# Patient Record
Sex: FEMALE | Race: WHITE | NOT HISPANIC OR LATINO | Employment: OTHER | URBAN - NONMETROPOLITAN AREA
[De-identification: names, ages, dates, MRNs, and addresses within clinical notes are randomized per-mention and may not be internally consistent; named-entity substitution may affect disease eponyms.]

---

## 2018-11-20 ENCOUNTER — DOCTOR'S OFFICE (OUTPATIENT)
Dept: URBAN - NONMETROPOLITAN AREA CLINIC 1 | Facility: CLINIC | Age: 78
Setting detail: OPHTHALMOLOGY
End: 2018-11-20
Payer: COMMERCIAL

## 2018-11-20 DIAGNOSIS — H40.023: ICD-10-CM

## 2018-11-20 DIAGNOSIS — H26.40: ICD-10-CM

## 2018-11-20 DIAGNOSIS — E11.9: ICD-10-CM

## 2018-11-20 DIAGNOSIS — D31.32: ICD-10-CM

## 2018-11-20 DIAGNOSIS — H43.813: ICD-10-CM

## 2018-11-20 DIAGNOSIS — L21.8: ICD-10-CM

## 2018-11-20 DIAGNOSIS — H33.301: ICD-10-CM

## 2018-11-20 DIAGNOSIS — Z96.1: ICD-10-CM

## 2018-11-20 PROCEDURE — 92014 COMPRE OPH EXAM EST PT 1/>: CPT | Performed by: OPHTHALMOLOGY

## 2018-11-20 PROCEDURE — 76514 ECHO EXAM OF EYE THICKNESS: CPT | Performed by: OPHTHALMOLOGY

## 2018-11-20 PROCEDURE — 92134 CPTRZ OPH DX IMG PST SGM RTA: CPT | Performed by: OPHTHALMOLOGY

## 2018-11-20 ASSESSMENT — REFRACTION_CURRENTRX
OD_OVR_VA: 20/
OD_OVR_VA: 20/
OS_OVR_VA: 20/
OD_OVR_VA: 20/

## 2018-11-20 ASSESSMENT — CONFRONTATIONAL VISUAL FIELD TEST (CVF)
OD_FINDINGS: FULL
OS_FINDINGS: FULL

## 2018-11-20 ASSESSMENT — REFRACTION_MANIFEST
OS_SPHERE: PLANO
OD_ADD: +2.50
OS_VA2: 20/
OS_VA1: 20/
OD_VA1: 20/
OS_VA2: 20/30
OS_VA3: 20/
OD_VA3: 20/
OS_VA1: 20/30
OD_VA2: 20/
OS_CYLINDER: -0.75
OD_AXIS: 120
OD_SPHERE: +0.75
OU_VA: 20/
OS_VA3: 20/
OD_VA1: 20/40+2
OD_VA3: 20/
OU_VA: 20/
OD_VA2: 20/40
OS_AXIS: 072
OS_ADD: +2.50
OD_CYLINDER: -0.75

## 2018-11-20 ASSESSMENT — REFRACTION_AUTOREFRACTION
OD_SPHERE: +1.50
OS_AXIS: 083
OD_CYLINDER: -0.75
OS_SPHERE: +0.50
OD_AXIS: 132
OS_CYLINDER: -1.00

## 2018-11-20 ASSESSMENT — VISUAL ACUITY
OD_BCVA: 20/40
OS_BCVA: 20/50+2

## 2018-11-20 ASSESSMENT — SPHEQUIV_DERIVED
OD_SPHEQUIV: 0.375
OD_SPHEQUIV: 1.125
OS_SPHEQUIV: 0

## 2018-11-20 ASSESSMENT — SUPERFICIAL PUNCTATE KERATITIS (SPK)
OD_SPK: T
OS_SPK: T

## 2018-11-27 ENCOUNTER — DOCTOR'S OFFICE (OUTPATIENT)
Dept: URBAN - NONMETROPOLITAN AREA CLINIC 1 | Facility: CLINIC | Age: 78
Setting detail: OPHTHALMOLOGY
End: 2018-11-27
Payer: COMMERCIAL

## 2018-11-27 DIAGNOSIS — H26.491: ICD-10-CM

## 2018-11-27 PROCEDURE — 66821 AFTER CATARACT LASER SURGERY: CPT | Performed by: OPHTHALMOLOGY

## 2018-11-27 ASSESSMENT — SPHEQUIV_DERIVED
OD_SPHEQUIV: 0.375
OS_SPHEQUIV: 0
OD_SPHEQUIV: 1.125

## 2018-11-27 ASSESSMENT — REFRACTION_MANIFEST
OD_VA2: 20/40
OD_VA3: 20/
OU_VA: 20/
OD_VA1: 20/40+2
OD_VA2: 20/
OD_VA3: 20/
OS_VA3: 20/
OD_AXIS: 120
OD_ADD: +2.50
OS_VA1: 20/30
OS_CYLINDER: -0.75
OU_VA: 20/
OD_VA1: 20/
OD_SPHERE: +0.75
OS_VA2: 20/
OS_VA2: 20/30
OS_AXIS: 072
OS_ADD: +2.50
OS_VA3: 20/
OD_CYLINDER: -0.75
OS_VA1: 20/
OS_SPHERE: PLANO

## 2018-11-27 ASSESSMENT — REFRACTION_AUTOREFRACTION
OS_SPHERE: +0.50
OD_CYLINDER: -0.75
OS_CYLINDER: -1.00
OD_AXIS: 132
OS_AXIS: 083
OD_SPHERE: +1.50

## 2018-11-27 ASSESSMENT — REFRACTION_CURRENTRX
OS_OVR_VA: 20/
OD_OVR_VA: 20/
OD_OVR_VA: 20/
OS_OVR_VA: 20/
OD_OVR_VA: 20/
OS_OVR_VA: 20/

## 2018-11-27 ASSESSMENT — VISUAL ACUITY
OS_BCVA: 20/50+2
OD_BCVA: 20/40

## 2018-12-18 ENCOUNTER — DOCTOR'S OFFICE (OUTPATIENT)
Dept: URBAN - NONMETROPOLITAN AREA CLINIC 1 | Facility: CLINIC | Age: 78
Setting detail: OPHTHALMOLOGY
End: 2018-12-18
Payer: COMMERCIAL

## 2018-12-18 DIAGNOSIS — H26.492: ICD-10-CM

## 2018-12-18 PROCEDURE — 66821 AFTER CATARACT LASER SURGERY: CPT | Performed by: OPHTHALMOLOGY

## 2018-12-18 ASSESSMENT — REFRACTION_CURRENTRX
OD_OVR_VA: 20/
OS_OVR_VA: 20/
OD_OVR_VA: 20/
OD_OVR_VA: 20/
OS_OVR_VA: 20/
OS_OVR_VA: 20/

## 2018-12-18 ASSESSMENT — REFRACTION_MANIFEST
OS_VA2: 20/30
OS_VA1: 20/30
OD_VA1: 20/40+2
OS_VA1: 20/
OD_ADD: +2.50
OD_VA3: 20/
OS_VA3: 20/
OD_CYLINDER: -0.75
OU_VA: 20/
OS_AXIS: 072
OD_VA1: 20/
OS_ADD: +2.50
OS_VA2: 20/
OU_VA: 20/
OD_VA2: 20/
OD_SPHERE: +0.75
OD_VA2: 20/40
OS_SPHERE: PLANO
OS_VA3: 20/
OD_AXIS: 120
OS_CYLINDER: -0.75
OD_VA3: 20/

## 2018-12-18 ASSESSMENT — SPHEQUIV_DERIVED
OD_SPHEQUIV: 1.125
OS_SPHEQUIV: 0
OD_SPHEQUIV: 0.375

## 2018-12-18 ASSESSMENT — REFRACTION_AUTOREFRACTION
OD_CYLINDER: -0.75
OS_CYLINDER: -1.00
OD_SPHERE: +1.50
OS_SPHERE: +0.50
OS_AXIS: 083
OD_AXIS: 132

## 2018-12-18 ASSESSMENT — VISUAL ACUITY
OS_BCVA: 20/50+2
OD_BCVA: 20/40

## 2019-05-21 ENCOUNTER — DOCTOR'S OFFICE (OUTPATIENT)
Dept: URBAN - NONMETROPOLITAN AREA CLINIC 1 | Facility: CLINIC | Age: 79
Setting detail: OPHTHALMOLOGY
End: 2019-05-21
Payer: COMMERCIAL

## 2019-05-21 ENCOUNTER — RX ONLY (RX ONLY)
Age: 79
End: 2019-05-21

## 2019-05-21 DIAGNOSIS — H40.023: ICD-10-CM

## 2019-05-21 DIAGNOSIS — H33.301: ICD-10-CM

## 2019-05-21 DIAGNOSIS — H26.40: ICD-10-CM

## 2019-05-21 DIAGNOSIS — H43.813: ICD-10-CM

## 2019-05-21 DIAGNOSIS — E11.9: ICD-10-CM

## 2019-05-21 DIAGNOSIS — Z96.1: ICD-10-CM

## 2019-05-21 DIAGNOSIS — D31.32: ICD-10-CM

## 2019-05-21 PROBLEM — L21.8 SEBORRHEIC DERMATITIS, OTHER: Status: RESOLVED | Noted: 2018-11-20 | Resolved: 2019-05-21

## 2019-05-21 PROCEDURE — 92083 EXTENDED VISUAL FIELD XM: CPT | Performed by: OPHTHALMOLOGY

## 2019-05-21 PROCEDURE — 92134 CPTRZ OPH DX IMG PST SGM RTA: CPT | Performed by: OPHTHALMOLOGY

## 2019-05-21 PROCEDURE — 92014 COMPRE OPH EXAM EST PT 1/>: CPT | Performed by: OPHTHALMOLOGY

## 2019-05-21 ASSESSMENT — REFRACTION_CURRENTRX
OD_OVR_VA: 20/
OS_OVR_VA: 20/
OS_OVR_VA: 20/
OD_OVR_VA: 20/
OD_OVR_VA: 20/
OS_OVR_VA: 20/

## 2019-05-21 ASSESSMENT — REFRACTION_MANIFEST
OU_VA: 20/
OS_SPHERE: PLANO
OS_VA3: 20/
OD_VA2: 20/
OD_VA3: 20/
OS_VA1: 20/30
OD_AXIS: 120
OS_VA2: 20/30
OS_VA3: 20/
OD_VA2: 20/40
OU_VA: 20/
OD_VA1: 20/40+2
OS_VA2: 20/
OS_CYLINDER: -0.75
OD_CYLINDER: -0.75
OS_VA1: 20/
OD_ADD: +2.50
OS_ADD: +2.50
OD_VA3: 20/
OD_VA1: 20/
OS_AXIS: 072
OD_SPHERE: +0.75

## 2019-05-21 ASSESSMENT — CONFRONTATIONAL VISUAL FIELD TEST (CVF)
OD_FINDINGS: FULL
OS_FINDINGS: FULL

## 2019-05-21 ASSESSMENT — SPHEQUIV_DERIVED
OS_SPHEQUIV: -0.25
OD_SPHEQUIV: 0.875
OD_SPHEQUIV: 0.375

## 2019-05-21 ASSESSMENT — REFRACTION_AUTOREFRACTION
OS_SPHERE: +0.75
OD_SPHERE: +1.50
OD_AXIS: 125
OS_AXIS: 070
OD_CYLINDER: -1.25
OS_CYLINDER: -2.00

## 2019-05-21 ASSESSMENT — SUPERFICIAL PUNCTATE KERATITIS (SPK)
OD_SPK: T
OS_SPK: T

## 2019-05-21 ASSESSMENT — VISUAL ACUITY
OD_BCVA: 20/30-1
OS_BCVA: 20/30-1

## 2019-11-22 ENCOUNTER — DOCTOR'S OFFICE (OUTPATIENT)
Dept: URBAN - NONMETROPOLITAN AREA CLINIC 1 | Facility: CLINIC | Age: 79
Setting detail: OPHTHALMOLOGY
End: 2019-11-22
Payer: COMMERCIAL

## 2019-11-22 DIAGNOSIS — D31.32: ICD-10-CM

## 2019-11-22 DIAGNOSIS — Z96.1: ICD-10-CM

## 2019-11-22 DIAGNOSIS — H33.301: ICD-10-CM

## 2019-11-22 DIAGNOSIS — H40.023: ICD-10-CM

## 2019-11-22 DIAGNOSIS — E11.9: ICD-10-CM

## 2019-11-22 DIAGNOSIS — H26.40: ICD-10-CM

## 2019-11-22 DIAGNOSIS — H43.813: ICD-10-CM

## 2019-11-22 PROCEDURE — 92133 CPTRZD OPH DX IMG PST SGM ON: CPT | Performed by: OPHTHALMOLOGY

## 2019-11-22 PROCEDURE — 92014 COMPRE OPH EXAM EST PT 1/>: CPT | Performed by: OPHTHALMOLOGY

## 2019-11-22 ASSESSMENT — REFRACTION_CURRENTRX
OD_OVR_VA: 20/
OS_OVR_VA: 20/
OS_OVR_VA: 20/
OD_OVR_VA: 20/
OD_OVR_VA: 20/
OS_OVR_VA: 20/

## 2019-11-22 ASSESSMENT — REFRACTION_MANIFEST
OD_VA3: 20/
OD_VA1: 20/40+2
OD_VA2: 20/40
OS_VA1: 20/30
OS_VA3: 20/
OD_ADD: +2.50
OS_VA2: 20/
OS_SPHERE: PLANO
OS_AXIS: 072
OD_VA3: 20/
OD_AXIS: 120
OS_ADD: +2.50
OU_VA: 20/
OS_VA3: 20/
OS_VA2: 20/30
OD_CYLINDER: -0.75
OD_VA2: 20/
OS_VA1: 20/
OU_VA: 20/
OD_VA1: 20/
OS_CYLINDER: -0.75
OD_SPHERE: +0.75

## 2019-11-22 ASSESSMENT — CONFRONTATIONAL VISUAL FIELD TEST (CVF)
OS_FINDINGS: FULL
OD_FINDINGS: FULL

## 2019-11-22 ASSESSMENT — SUPERFICIAL PUNCTATE KERATITIS (SPK)
OD_SPK: 2+
OS_SPK: 2+

## 2019-11-22 ASSESSMENT — REFRACTION_AUTOREFRACTION
OS_SPHERE: +0.75
OD_CYLINDER: -1.25
OS_AXIS: 071
OD_SPHERE: +1.50
OD_AXIS: 126
OS_CYLINDER: -1.00

## 2019-11-22 ASSESSMENT — VISUAL ACUITY
OS_BCVA: 20/30+1
OD_BCVA: 20/30-1

## 2019-11-22 ASSESSMENT — SPHEQUIV_DERIVED
OS_SPHEQUIV: 0.25
OD_SPHEQUIV: 0.875
OD_SPHEQUIV: 0.375

## 2020-07-20 ENCOUNTER — HOSPITAL ENCOUNTER (EMERGENCY)
Facility: HOSPITAL | Age: 80
Discharge: HOME | End: 2020-07-20
Attending: EMERGENCY MEDICINE | Admitting: EMERGENCY MEDICINE
Payer: COMMERCIAL

## 2020-07-20 ENCOUNTER — APPOINTMENT (EMERGENCY)
Dept: RADIOLOGY | Facility: HOSPITAL | Age: 80
End: 2020-07-20
Payer: COMMERCIAL

## 2020-07-20 VITALS
WEIGHT: 220.46 LBS | TEMPERATURE: 98.2 F | OXYGEN SATURATION: 96 % | DIASTOLIC BLOOD PRESSURE: 65 MMHG | RESPIRATION RATE: 18 BRPM | HEART RATE: 81 BPM | BODY MASS INDEX: 46.28 KG/M2 | SYSTOLIC BLOOD PRESSURE: 142 MMHG | HEIGHT: 58 IN

## 2020-07-20 DIAGNOSIS — M25.562 LEFT KNEE PAIN: Primary | ICD-10-CM

## 2020-07-20 LAB
ANION GAP SERPL CALCULATED.3IONS-SCNC: 9 MMOL/L (ref 4–13)
APPEARANCE FLD: ABNORMAL
BASOPHILS # BLD AUTO: 0.02 THOUSANDS/ΜL (ref 0–0.1)
BASOPHILS NFR BLD AUTO: 0 % (ref 0–1)
BUN SERPL-MCNC: 30 MG/DL (ref 5–25)
CALCIUM SERPL-MCNC: 8.8 MG/DL (ref 8.3–10.1)
CHLORIDE SERPL-SCNC: 105 MMOL/L (ref 100–108)
CO2 SERPL-SCNC: 29 MMOL/L (ref 21–32)
COLOR FLD: YELLOW
CREAT SERPL-MCNC: 1.07 MG/DL (ref 0.6–1.3)
EOSINOPHIL # BLD AUTO: 0.08 THOUSAND/ΜL (ref 0–0.61)
EOSINOPHIL NFR BLD AUTO: 1 % (ref 0–6)
ERYTHROCYTE [DISTWIDTH] IN BLOOD BY AUTOMATED COUNT: 15.3 % (ref 11.6–15.1)
GFR SERPL CREATININE-BSD FRML MDRD: 49 ML/MIN/1.73SQ M
GLUCOSE SERPL-MCNC: 154 MG/DL (ref 65–140)
HCT VFR BLD AUTO: 35.2 % (ref 34.8–46.1)
HGB BLD-MCNC: 11.1 G/DL (ref 11.5–15.4)
IMM GRANULOCYTES # BLD AUTO: 0.04 THOUSAND/UL (ref 0–0.2)
IMM GRANULOCYTES NFR BLD AUTO: 1 % (ref 0–2)
LYMPHOCYTES # BLD AUTO: 0.65 THOUSANDS/ΜL (ref 0.6–4.47)
LYMPHOCYTES NFR BLD AUTO: 8 % (ref 14–44)
MCH RBC QN AUTO: 30.3 PG (ref 26.8–34.3)
MCHC RBC AUTO-ENTMCNC: 31.5 G/DL (ref 31.4–37.4)
MCV RBC AUTO: 96 FL (ref 82–98)
MONOCYTES # BLD AUTO: 0.71 THOUSAND/ΜL (ref 0.17–1.22)
MONOCYTES NFR BLD AUTO: 9 % (ref 4–12)
MONONUC CELLS NFR FLD MANUAL: 10 %
NEUTROPHILS # BLD AUTO: 6.73 THOUSANDS/ΜL (ref 1.85–7.62)
NEUTS SEG NFR BLD AUTO: 81 % (ref 43–75)
NEUTS SEG NFR BLD AUTO: 90 %
NRBC BLD AUTO-RTO: 0 /100 WBCS
PLATELET # BLD AUTO: 131 THOUSANDS/UL (ref 149–390)
PMV BLD AUTO: 11.6 FL (ref 8.9–12.7)
POTASSIUM SERPL-SCNC: 4 MMOL/L (ref 3.5–5.3)
RBC # BLD AUTO: 3.66 MILLION/UL (ref 3.81–5.12)
SITE: ABNORMAL
SODIUM SERPL-SCNC: 143 MMOL/L (ref 136–145)
TOTAL CELLS COUNTED SPEC: 100
WBC # BLD AUTO: 8.23 THOUSAND/UL (ref 4.31–10.16)
WBC # FLD MANUAL: ABNORMAL /UL (ref 0–200)

## 2020-07-20 PROCEDURE — 86618 LYME DISEASE ANTIBODY: CPT | Performed by: EMERGENCY MEDICINE

## 2020-07-20 PROCEDURE — 87070 CULTURE OTHR SPECIMN AEROBIC: CPT | Performed by: EMERGENCY MEDICINE

## 2020-07-20 PROCEDURE — 80048 BASIC METABOLIC PNL TOTAL CA: CPT | Performed by: EMERGENCY MEDICINE

## 2020-07-20 PROCEDURE — 89051 BODY FLUID CELL COUNT: CPT | Performed by: EMERGENCY MEDICINE

## 2020-07-20 PROCEDURE — 73562 X-RAY EXAM OF KNEE 3: CPT

## 2020-07-20 PROCEDURE — 87205 SMEAR GRAM STAIN: CPT | Performed by: EMERGENCY MEDICINE

## 2020-07-20 PROCEDURE — 99284 EMERGENCY DEPT VISIT MOD MDM: CPT

## 2020-07-20 PROCEDURE — 36415 COLL VENOUS BLD VENIPUNCTURE: CPT | Performed by: EMERGENCY MEDICINE

## 2020-07-20 PROCEDURE — 20610 DRAIN/INJ JOINT/BURSA W/O US: CPT | Performed by: EMERGENCY MEDICINE

## 2020-07-20 PROCEDURE — 99282 EMERGENCY DEPT VISIT SF MDM: CPT | Performed by: EMERGENCY MEDICINE

## 2020-07-20 PROCEDURE — 85025 COMPLETE CBC W/AUTO DIFF WBC: CPT | Performed by: EMERGENCY MEDICINE

## 2020-07-20 RX ORDER — MECLIZINE HYDROCHLORIDE 25 MG/1
25 TABLET ORAL AS NEEDED
COMMUNITY
Start: 2015-05-08

## 2020-07-20 RX ORDER — CLONIDINE HYDROCHLORIDE 0.1 MG/1
1 TABLET ORAL 2 TIMES DAILY
COMMUNITY
Start: 2019-02-25

## 2020-07-20 RX ORDER — COLCHICINE 0.6 MG/1
0.6 TABLET ORAL DAILY
COMMUNITY
Start: 2020-02-04

## 2020-07-20 RX ORDER — ALBUTEROL SULFATE 90 UG/1
2 AEROSOL, METERED RESPIRATORY (INHALATION)
COMMUNITY
Start: 2018-05-07

## 2020-07-20 RX ORDER — CARVEDILOL 25 MG/1
25 TABLET ORAL 2 TIMES DAILY
COMMUNITY

## 2020-07-20 RX ORDER — DIGOXIN 250 MCG
0.5 TABLET ORAL DAILY
COMMUNITY
Start: 2019-02-19

## 2020-07-20 RX ORDER — SPIRONOLACTONE 25 MG/1
25 TABLET ORAL DAILY
COMMUNITY

## 2020-07-20 RX ORDER — WARFARIN SODIUM 7.5 MG/1
2 TABLET ORAL DAILY
COMMUNITY

## 2020-07-20 RX ORDER — FUROSEMIDE 40 MG/1
40 TABLET ORAL 2 TIMES DAILY
COMMUNITY
Start: 2019-03-11

## 2020-07-20 RX ORDER — MULTIVITAMIN
CAPSULE ORAL
COMMUNITY

## 2020-07-20 RX ORDER — ALBUTEROL SULFATE 2.5 MG/3ML
2.5 SOLUTION RESPIRATORY (INHALATION)
COMMUNITY
Start: 2019-08-14

## 2020-07-20 RX ORDER — CALCIUM CARBONATE 300MG(750)
400 TABLET,CHEWABLE ORAL DAILY
COMMUNITY

## 2020-07-20 RX ORDER — PANTOPRAZOLE SODIUM 40 MG/1
40 TABLET, DELAYED RELEASE ORAL 2 TIMES DAILY
COMMUNITY
Start: 2012-08-31

## 2020-07-20 RX ORDER — ACETAMINOPHEN 325 MG/1
975 TABLET ORAL ONCE
Status: COMPLETED | OUTPATIENT
Start: 2020-07-20 | End: 2020-07-20

## 2020-07-20 RX ORDER — ATORVASTATIN CALCIUM 10 MG/1
10 TABLET, FILM COATED ORAL DAILY
COMMUNITY
Start: 2018-12-19

## 2020-07-20 RX ADMIN — ACETAMINOPHEN 975 MG: 325 TABLET ORAL at 18:53

## 2020-07-20 NOTE — DISCHARGE INSTRUCTIONS
Return immediately if worse or any new symptoms  Use walker, avoid weight-bearing on left knee  Tylenol 1000 mg every 6 hours as needed for pain
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
## 2020-07-22 LAB — B BURGDOR IGG+IGM SER-ACNC: <0.91 ISR (ref 0–0.9)

## 2020-07-23 LAB
BACTERIA SPEC BFLD CULT: NO GROWTH
GRAM STN SPEC: NORMAL
GRAM STN SPEC: NORMAL

## 2020-09-30 ENCOUNTER — HOSPITAL ENCOUNTER (EMERGENCY)
Facility: HOSPITAL | Age: 80
Discharge: HOME | End: 2020-09-30
Attending: STUDENT IN AN ORGANIZED HEALTH CARE EDUCATION/TRAINING PROGRAM | Admitting: STUDENT IN AN ORGANIZED HEALTH CARE EDUCATION/TRAINING PROGRAM
Payer: COMMERCIAL

## 2020-09-30 VITALS
HEART RATE: 87 BPM | OXYGEN SATURATION: 97 % | DIASTOLIC BLOOD PRESSURE: 68 MMHG | SYSTOLIC BLOOD PRESSURE: 148 MMHG | WEIGHT: 225.86 LBS | RESPIRATION RATE: 18 BRPM | HEIGHT: 58 IN | BODY MASS INDEX: 47.41 KG/M2 | TEMPERATURE: 96.7 F

## 2020-09-30 DIAGNOSIS — I10 HYPERTENSION, UNSPECIFIED TYPE: Primary | ICD-10-CM

## 2020-09-30 LAB
ANION GAP SERPL CALCULATED.3IONS-SCNC: 6 MMOL/L (ref 4–13)
BASOPHILS # BLD AUTO: 0.03 THOUSANDS/ΜL (ref 0–0.1)
BASOPHILS NFR BLD AUTO: 0 % (ref 0–1)
BUN SERPL-MCNC: 24 MG/DL (ref 5–25)
CALCIUM SERPL-MCNC: 8.6 MG/DL (ref 8.3–10.1)
CHLORIDE SERPL-SCNC: 104 MMOL/L (ref 100–108)
CO2 SERPL-SCNC: 30 MMOL/L (ref 21–32)
CREAT SERPL-MCNC: 0.91 MG/DL (ref 0.6–1.3)
DIGOXIN SERPL-MCNC: 0.4 NG/ML (ref 0.8–2)
EOSINOPHIL # BLD AUTO: 0.18 THOUSAND/ΜL (ref 0–0.61)
EOSINOPHIL NFR BLD AUTO: 2 % (ref 0–6)
ERYTHROCYTE [DISTWIDTH] IN BLOOD BY AUTOMATED COUNT: 15.4 % (ref 11.6–15.1)
GFR SERPL CREATININE-BSD FRML MDRD: 60 ML/MIN/1.73SQ M
GLUCOSE SERPL-MCNC: 125 MG/DL (ref 65–140)
HCT VFR BLD AUTO: 41.1 % (ref 34.8–46.1)
HGB BLD-MCNC: 12.7 G/DL (ref 11.5–15.4)
IMM GRANULOCYTES # BLD AUTO: 0.03 THOUSAND/UL (ref 0–0.2)
IMM GRANULOCYTES NFR BLD AUTO: 0 % (ref 0–2)
INR PPP: 1.73 (ref 0.84–1.19)
LYMPHOCYTES # BLD AUTO: 0.88 THOUSANDS/ΜL (ref 0.6–4.47)
LYMPHOCYTES NFR BLD AUTO: 11 % (ref 14–44)
MCH RBC QN AUTO: 29.6 PG (ref 26.8–34.3)
MCHC RBC AUTO-ENTMCNC: 30.9 G/DL (ref 31.4–37.4)
MCV RBC AUTO: 96 FL (ref 82–98)
MONOCYTES # BLD AUTO: 0.5 THOUSAND/ΜL (ref 0.17–1.22)
MONOCYTES NFR BLD AUTO: 6 % (ref 4–12)
NEUTROPHILS # BLD AUTO: 6.25 THOUSANDS/ΜL (ref 1.85–7.62)
NEUTS SEG NFR BLD AUTO: 81 % (ref 43–75)
NRBC BLD AUTO-RTO: 0 /100 WBCS
PLATELET # BLD AUTO: 150 THOUSANDS/UL (ref 149–390)
PMV BLD AUTO: 11.2 FL (ref 8.9–12.7)
POTASSIUM SERPL-SCNC: 3.9 MMOL/L (ref 3.5–5.3)
PROTHROMBIN TIME: 19.9 SECONDS (ref 11.6–14.5)
RBC # BLD AUTO: 4.29 MILLION/UL (ref 3.81–5.12)
SODIUM SERPL-SCNC: 140 MMOL/L (ref 136–145)
WBC # BLD AUTO: 7.87 THOUSAND/UL (ref 4.31–10.16)

## 2020-09-30 PROCEDURE — 85610 PROTHROMBIN TIME: CPT | Performed by: STUDENT IN AN ORGANIZED HEALTH CARE EDUCATION/TRAINING PROGRAM

## 2020-09-30 PROCEDURE — 80048 BASIC METABOLIC PNL TOTAL CA: CPT | Performed by: STUDENT IN AN ORGANIZED HEALTH CARE EDUCATION/TRAINING PROGRAM

## 2020-09-30 PROCEDURE — 99284 EMERGENCY DEPT VISIT MOD MDM: CPT

## 2020-09-30 PROCEDURE — 80162 ASSAY OF DIGOXIN TOTAL: CPT | Performed by: STUDENT IN AN ORGANIZED HEALTH CARE EDUCATION/TRAINING PROGRAM

## 2020-09-30 PROCEDURE — 99285 EMERGENCY DEPT VISIT HI MDM: CPT | Performed by: STUDENT IN AN ORGANIZED HEALTH CARE EDUCATION/TRAINING PROGRAM

## 2020-09-30 PROCEDURE — 85025 COMPLETE CBC W/AUTO DIFF WBC: CPT | Performed by: STUDENT IN AN ORGANIZED HEALTH CARE EDUCATION/TRAINING PROGRAM

## 2020-09-30 PROCEDURE — 93005 ELECTROCARDIOGRAM TRACING: CPT

## 2020-09-30 PROCEDURE — 36415 COLL VENOUS BLD VENIPUNCTURE: CPT | Performed by: STUDENT IN AN ORGANIZED HEALTH CARE EDUCATION/TRAINING PROGRAM

## 2020-09-30 RX ORDER — GUAIFENESIN 400 MG/1
400 TABLET ORAL
COMMUNITY

## 2020-09-30 RX ORDER — WARFARIN SODIUM 2 MG/1
5 TABLET ORAL
COMMUNITY

## 2020-09-30 RX ORDER — ALLOPURINOL 300 MG/1
300 TABLET ORAL DAILY
COMMUNITY

## 2020-09-30 RX ORDER — ASPIRIN 81 MG/1
81 TABLET, CHEWABLE ORAL DAILY
COMMUNITY

## 2020-10-03 LAB
ATRIAL RATE: 93 BPM
QRS AXIS: 180 DEGREES
QRSD INTERVAL: 134 MS
QT INTERVAL: 424 MS
QTC INTERVAL: 501 MS
T WAVE AXIS: 40 DEGREES
VENTRICULAR RATE: 84 BPM

## 2020-10-03 PROCEDURE — 93010 ELECTROCARDIOGRAM REPORT: CPT | Performed by: INTERNAL MEDICINE

## 2021-05-06 DIAGNOSIS — I50.22 CHRONIC SYSTOLIC (CONGESTIVE) HEART FAILURE (HCC): ICD-10-CM

## 2021-08-23 ENCOUNTER — HOSPITAL ENCOUNTER (EMERGENCY)
Facility: HOSPITAL | Age: 81
Discharge: HOME | End: 2021-08-23
Attending: EMERGENCY MEDICINE | Admitting: EMERGENCY MEDICINE
Payer: COMMERCIAL

## 2021-08-23 ENCOUNTER — APPOINTMENT (EMERGENCY)
Dept: CT IMAGING | Facility: HOSPITAL | Age: 81
End: 2021-08-23
Payer: COMMERCIAL

## 2021-08-23 VITALS
WEIGHT: 234.57 LBS | OXYGEN SATURATION: 96 % | SYSTOLIC BLOOD PRESSURE: 143 MMHG | RESPIRATION RATE: 21 BRPM | DIASTOLIC BLOOD PRESSURE: 65 MMHG | HEART RATE: 61 BPM | TEMPERATURE: 97.1 F

## 2021-08-23 DIAGNOSIS — D69.6 THROMBOCYTOPENIA (HCC): ICD-10-CM

## 2021-08-23 DIAGNOSIS — N28.9 RENAL INSUFFICIENCY: ICD-10-CM

## 2021-08-23 DIAGNOSIS — R20.0 FACIAL NUMBNESS: Primary | ICD-10-CM

## 2021-08-23 DIAGNOSIS — R51.9 FACIAL PAIN: ICD-10-CM

## 2021-08-23 LAB
ANION GAP SERPL CALCULATED.3IONS-SCNC: 11 MMOL/L (ref 4–13)
APTT PPP: 40 SECONDS (ref 23–37)
BUN SERPL-MCNC: 36 MG/DL (ref 5–25)
CALCIUM SERPL-MCNC: 8.5 MG/DL (ref 8.3–10.1)
CHLORIDE SERPL-SCNC: 104 MMOL/L (ref 100–108)
CO2 SERPL-SCNC: 27 MMOL/L (ref 21–32)
CREAT SERPL-MCNC: 1.34 MG/DL (ref 0.6–1.3)
ERYTHROCYTE [DISTWIDTH] IN BLOOD BY AUTOMATED COUNT: 15.7 % (ref 11.6–15.1)
ERYTHROCYTE [SEDIMENTATION RATE] IN BLOOD: 27 MM/HOUR (ref 0–29)
GFR SERPL CREATININE-BSD FRML MDRD: 37 ML/MIN/1.73SQ M
GLUCOSE SERPL-MCNC: 148 MG/DL (ref 65–140)
GLUCOSE SERPL-MCNC: 157 MG/DL (ref 65–140)
HCT VFR BLD AUTO: 37.4 % (ref 34.8–46.1)
HGB BLD-MCNC: 12 G/DL (ref 11.5–15.4)
INR PPP: 2.94 (ref 0.84–1.19)
MCH RBC QN AUTO: 31.5 PG (ref 26.8–34.3)
MCHC RBC AUTO-ENTMCNC: 32.1 G/DL (ref 31.4–37.4)
MCV RBC AUTO: 98 FL (ref 82–98)
PLATELET # BLD AUTO: 123 THOUSANDS/UL (ref 149–390)
PMV BLD AUTO: 11.4 FL (ref 8.9–12.7)
POTASSIUM SERPL-SCNC: 4 MMOL/L (ref 3.5–5.3)
PROTHROMBIN TIME: 30 SECONDS (ref 11.6–14.5)
RBC # BLD AUTO: 3.81 MILLION/UL (ref 3.81–5.12)
SODIUM SERPL-SCNC: 142 MMOL/L (ref 136–145)
TROPONIN I SERPL-MCNC: <0.02 NG/ML
WBC # BLD AUTO: 5.43 THOUSAND/UL (ref 4.31–10.16)

## 2021-08-23 PROCEDURE — 84484 ASSAY OF TROPONIN QUANT: CPT | Performed by: EMERGENCY MEDICINE

## 2021-08-23 PROCEDURE — G0426 INPT/ED TELECONSULT50: HCPCS | Performed by: PSYCHIATRY & NEUROLOGY

## 2021-08-23 PROCEDURE — 99285 EMERGENCY DEPT VISIT HI MDM: CPT

## 2021-08-23 PROCEDURE — 96374 THER/PROPH/DIAG INJ IV PUSH: CPT

## 2021-08-23 PROCEDURE — 70496 CT ANGIOGRAPHY HEAD: CPT

## 2021-08-23 PROCEDURE — 85730 THROMBOPLASTIN TIME PARTIAL: CPT | Performed by: EMERGENCY MEDICINE

## 2021-08-23 PROCEDURE — 70498 CT ANGIOGRAPHY NECK: CPT

## 2021-08-23 PROCEDURE — 85027 COMPLETE CBC AUTOMATED: CPT | Performed by: EMERGENCY MEDICINE

## 2021-08-23 PROCEDURE — 93005 ELECTROCARDIOGRAM TRACING: CPT

## 2021-08-23 PROCEDURE — 85610 PROTHROMBIN TIME: CPT | Performed by: EMERGENCY MEDICINE

## 2021-08-23 PROCEDURE — 82948 REAGENT STRIP/BLOOD GLUCOSE: CPT

## 2021-08-23 PROCEDURE — 36415 COLL VENOUS BLD VENIPUNCTURE: CPT | Performed by: EMERGENCY MEDICINE

## 2021-08-23 PROCEDURE — 80048 BASIC METABOLIC PNL TOTAL CA: CPT | Performed by: EMERGENCY MEDICINE

## 2021-08-23 PROCEDURE — 99291 CRITICAL CARE FIRST HOUR: CPT | Performed by: EMERGENCY MEDICINE

## 2021-08-23 PROCEDURE — 85652 RBC SED RATE AUTOMATED: CPT | Performed by: EMERGENCY MEDICINE

## 2021-08-23 RX ORDER — KETOROLAC TROMETHAMINE 30 MG/ML
7.5 INJECTION, SOLUTION INTRAMUSCULAR; INTRAVENOUS ONCE
Status: COMPLETED | OUTPATIENT
Start: 2021-08-23 | End: 2021-08-23

## 2021-08-23 RX ADMIN — IOHEXOL 85 ML: 350 INJECTION, SOLUTION INTRAVENOUS at 14:23

## 2021-08-23 RX ADMIN — KETOROLAC TROMETHAMINE 7.5 MG: 30 INJECTION, SOLUTION INTRAMUSCULAR at 16:01

## 2021-08-23 NOTE — ED PROVIDER NOTES
History  Chief Complaint   Patient presents with   [address] 20     79year-old female with right-sided headache and right-sided facial numbness  No upper extremity or lower extremity numbness  Started earlier today  No change in vision  No chest pain no shortness of breath  Patient is ambulatory  Stroke alert was called as this symptomatology it started over last 24 hours and was related to the right side of the face  History provided by:  Patient  STROKE Alert  Location:  Right-sided facial numbness and headache  Quality:  Numbness  Severity:  Mild  Onset quality:  Sudden  Timing:  Constant  Progression:  Unchanged  Chronicity:  New  Associated symptoms: headaches    Associated symptoms: no abdominal pain, no chest pain, no congestion, no cough, no diarrhea, no ear pain, no fatigue, no fever, no myalgias, no nausea, no rash, no rhinorrhea, no shortness of breath, no sore throat, no vomiting and no wheezing    Headaches:     Severity:  Mild    Onset quality:  Sudden      None       Past Medical History:   Diagnosis Date    A-fib [facility])     Cardiac pacemaker     [facility]congestive [facility] failure) ([facility])     Chronic cellulitis     COPD [facility]chronic obstructive pulmonary disease) ([facility] [address] )     Diabetes mellitus (HCC)     Edema     High cholesterol     Hyperparathyroidism (HCC)     Hypertension     Hyperuricemia     Stage 4 chronic kidney disease (HCC)        Past Surgical History:   Procedure Laterality Date    CARDIAC DEFIBRILLATOR PLACEMENT       SECTION      x 2    COLON SURGERY      HERNIA REPAIR         History reviewed  No pertinent family history  I have reviewed and agree with the history as documented      E-Cigarette/Vaping    E-Cigarette Use Never User      E-Cigarette/Vaping Substances    Nicotine No     THC No     CBD No     Flavoring No      Social History     Tobacco Use    Smoking status: Never Smoker    Smokeless tobacco: Never Used   Vaping Use    Vaping Use: Never used   Substance Use Topics    Alcohol use: Never    Drug use: Never       Review of Systems   Constitutional: Negative for activity change, fatigue and fever  HENT: Negative for congestion, ear pain, rhinorrhea, sinus pressure and sore throat  Eyes: Negative  Negative for redness and itching  Respiratory: Negative for cough, chest tightness, shortness of breath and wheezing  Cardiovascular: Negative for chest pain, palpitations and leg swelling  Gastrointestinal: Negative for abdominal pain, diarrhea, nausea and vomiting  Endocrine: Negative  Genitourinary: Negative for dysuria, flank pain and frequency  Musculoskeletal: Negative for arthralgias, back pain and myalgias  Skin: Negative  Negative for pallor and rash  Allergic/Immunologic: Negative  Neurological: Positive for headaches  Negative for dizziness, tremors, seizures, syncope, facial asymmetry, speech difficulty and weakness  Hematological: Negative  Psychiatric/Behavioral: Negative  All other systems reviewed and are negative  Physical Exam  Physical Exam  Vitals and nursing note reviewed  Constitutional:       General: She is awake  Appearance: Normal appearance  She is well-developed  She is obese  She is not toxic-appearing  HENT:      Head: Normocephalic and atraumatic  Hair is normal       Jaw: No tenderness or pain on movement  Right Ear: External ear normal       Left Ear: External ear normal       Nose: Nose normal  No congestion or rhinorrhea  Mouth/Throat:      Mouth: Mucous membranes are moist       Pharynx: Oropharynx is clear  Eyes:      General: Lids are normal  No scleral icterus  Extraocular Movements: Extraocular movements intact  Pupils: Pupils are equal, round, and reactive to light  Cardiovascular:      Rate and Rhythm: Normal rate and regular rhythm  Heart sounds: Normal heart sounds  No murmur heard       Pulmonary:      Effort: Pulmonary effort is normal  No respiratory distress  Breath sounds: Normal breath sounds  No stridor  No wheezing or rales  Abdominal:      General: Abdomen is flat  There is no distension  Palpations: Abdomen is soft  Tenderness: There is no abdominal tenderness  There is no right CVA tenderness, left CVA tenderness or guarding  Musculoskeletal:         General: No deformity  Normal range of motion  Cervical back: Normal range of motion and neck supple  Skin:     General: Skin is warm and dry  Coloration: Skin is not jaundiced or pale  Findings: No rash  Neurological:      General: No focal deficit present  Mental Status: She is alert and oriented to person, place, and time  Mental status is at baseline  Cranial Nerves: No cranial nerve deficit  Sensory: No sensory deficit  Motor: No weakness  Gait: Gait normal    Psychiatric:         Attention and Perception: Attention normal          Mood and Affect: Mood normal          Speech: Speech normal          Behavior: Behavior normal          Thought Content:  Thought content normal          Judgment: Judgment normal          Vital Signs  ED Triage Vitals   Temperature Pulse Respirations Blood Pressure SpO2   08/23/21 1500 08/23/21 1430 08/23/21 1430 08/23/21 1430 08/23/21 1430   (!) 97 1 °F (36 2 °C) 70 20 (!) 172/77 97 %      Temp src Heart Rate Source Patient Position - Orthostatic VS BP Location FiO2 (%)   -- 08/23/21 1430 08/23/21 1430 -- --    Monitor Lying        Pain Score       08/23/21 1430       No Pain           Vitals:    08/23/21 1455 08/23/21 1515 08/23/21 1600 08/23/21 1645   BP:  157/66 147/66 143/65   Pulse: 64 62 61 61   Patient Position - Orthostatic VS:             Visual Acuity  Visual Acuity      Most Recent Value   L Pupil Size (mm)  3   R Pupil Size (mm)  3          ED Medications  Medications   iohexol (OMNIPAQUE) 350 MG/ML injection (SINGLE-DOSE) 85 mL (85 mL Intravenous Given 8/23/21 1423)   ketorolac (TORADOL) injection 7 5 mg (7 5 mg Intravenous Given 8/23/21 1601)       Diagnostic Studies  Results Reviewed     Procedure Component Value Units Date/Time    CBC and Platelet [[id number]]  (Abnormal) Collected: 08/23/21 1449    Lab Status: Final result Specimen: Blood from Line, Venous Updated: 08/23/21 1521     WBC 5 43 Thousand/uL      RBC 3 81 Million/uL      Hemoglobin 12 0 g/dL      Hematocrit 37 4 %      MCV 98 fL      MCH 31 5 pg      MCHC 32 1 g/dL      RDW 15 7 %      Platelets 855 Thousands/uL      MPV 11 4 fL     Troponin I [[id number]]  (Normal) Collected: 08/23/21 1449    Lab Status: Final result Specimen: Blood from Line, Venous Updated: 08/23/21 1517     Troponin I <0 02 ng/mL     Protime-INR [[id number]]  (Abnormal) Collected: 08/23/21 1449    Lab Status: Final result Specimen: Blood from Line, Venous Updated: 08/23/21 1512     Protime 30 0 seconds      INR 2 94    APTT [[id number]]  (Abnormal) Collected: 08/23/21 1449    Lab Status: Final result Specimen: Blood from Line, Venous Updated: 08/23/21 1512     PTT 40 seconds     Basic metabolic panel [[id number]]  (Abnormal) Collected: 08/23/21 1449    Lab Status: Final result Specimen: Blood from Line, Venous Updated: 08/23/21 1509     Sodium 142 mmol/L      Potassium 4 0 mmol/L      Chloride 104 mmol/L      CO2 27 mmol/L      ANION GAP 11 mmol/L      BUN 36 mg/dL      Creatinine 1 34 mg/dL      Glucose 148 mg/dL      Calcium 8 5 mg/dL      eGFR 37 ml/min/1 73sq m     Narrative:      [place] guidelines for Chronic Kidney Disease (CKD):     Stage 1 with normal or high GFR (GFR > 90 mL/min/1 73 square meters)    Stage 2 Mild CKD (GFR = 60-89 mL/min/1 73 square meters)    Stage 3A Moderate CKD (GFR = 45-59 mL/min/1 73 square meters)    Stage 3B Moderate CKD (GFR = 30-44 mL/min/1 73 square meters)    Stage 4 Severe CKD (GFR = 15-29 mL/min/1 73 square meters)    Stage 5 End Stage CKD (GFR <15 mL/min/1 73 square meters)  Note: GFR calculation is accurate only with a steady state creatinine    Sedimentation rate, automated [[id number]]  (Normal) Collected: 08/23/21 1449    Lab Status: Final result Specimen: Blood from Line, Venous Updated: 08/23/21 1508     Sed Rate 27 mm/hour     Fingerstick Glucose (POCT) [[id number]]  (Abnormal) Collected: 08/23/21 1445    Lab Status: Final result Updated: 08/23/21 1452     POC Glucose 157 mg/dl                  CT stroke alert brain   Final Result by Marin Carlos MD (08/23 1458)      No mass effect, acute intracranial hemorrhage or CT evidence for a large acute vascular distribution infarct  Correlate with separate CT angiogram report for the remainder the findings including an incidentally noted left paraclinoid lesion  Findings were directly discussed with Dr Jessica Yanez at 2:39 PM       Workstation performed: [id number]         CTA stroke alert (head/neck)   Final Result by Marin Carlos MD (08/23 1458)      No evidence for high-grade stenosis, major branch vessel occlusion or vascular aneurysm of the intracranial circulation  No evidence for high-grade stenosis or focal occlusion of the cervical vasculature  Incidental note is made of an enhancing left paraclinoid lesion, as described above and suspicious for the presence of a meningioma  Contrast-enhanced MRI is recommended for further evaluation  Preliminary findings were directly discussed with Dr Jessica Yanez at 2:39 PM                         Workstation performed: [id number]                    Procedures  Procedures         ED Course  ED Course as of Aug 30 0751   [facility] Aug 23, 2021   1503 No acute findings on CTA or CT  There is an incidental finding of a possible meningioma for which an outpatient MRI can be arranged        [address] [place], [place] [address] Sed Rate: 27                 Stroke Assessment     Row Name 08/23/21 1421 08/23/21 1423          NIH Stroke Scale    Interval  Baseline  --     Level of Consciousness (1a )  0  0     LOC Questions (1b )  0 0     LOC Commands (1c )  0  0     Best Gaze (2 )  0  0     Visual (3 )  0  0     Facial Palsy (4 )  0  0     Motor Arm, Left (5a )  0  0     Motor Arm, Right (5b )  0  0     Motor Leg, Left (6a )  0  0     Motor Leg, Right (6b )  0  0     Limb Ataxia (7 )  0  0     Sensory (8 )  0  0     Best Language (9 )  0  0     Dysarthria (10 )  0  0     Extinction and Inattention (11 ) (Formerly Neglect)  0  0     Total  0  0         First Filed Value   TPA Decision  Patient not a TPA candidate  Patient is not a candidate options  Symptoms resolved/clearly non disabling  MDM  Number of Diagnoses or Management Options  Facial numbness: new and requires workup  Facial pain: new and requires workup  Renal insufficiency: new and requires workup  Thrombocytopenia [facility]): new and requires workup  Diagnosis management comments: Stroke alert called  Suspect stroke unlikely  No tenderness over the temporal artery  No change in vision  Giant cell arteritis also unlikely  Consultation with Neurology  Agree with our assessment  Patient was subsequently discharged after a stroke evaluation in the emergency department  Patient presented to the ED and was found to be critically ill as demonstrated by the clinical history and primary physical evaluation  Pt had demonstrative findings and / or derangements of vital signs indicative for severe illness or injury  I personally performed bedside history and evaluation  Interventions to address these clinical needs were ordered/performed  These included, but not necessarily limited to, the ordering and subsequent review of lab studies, imaging and EKG  Please see chart with regards to specific resuscitative interventions and diagnostics  Due to a probability of clinically significant, life or limb threatening condition, the patient required my highest level of care, intervention and attention   I personally spent the documented time directly managing the patient  The critical care time included obtaining a history, examining the patient, ordering and review of studies, arranging urgent treatment with development of a management plan, evaluation of patient's response to treatment, reassessment, and, if warranted, discussions with other providers or consultants  Documentation to the medical record for continuity of care was also required  Patients records pertinent to the emergent presenting condition were reviewed as available  Family was updated as available and appropriate  This critical care time was performed to assess and manage the high probability of imminent, life-threatening deterioration that could result in multi-organ failure if not addressed  It was exclusive of separately billable procedures and treating other patients and teaching time  Please see MDM section and the rest of the note for further information on patient assessment, reassessment, interventions and treatment  Total time was 33 mins exclusive of separate billable procedures           Amount and/or Complexity of Data Reviewed  Clinical lab tests: ordered and reviewed  Tests in the radiology section of CPT®: reviewed and ordered  Discussion of test results with the performing providers: yes  Obtain history from someone other than the patient: yes  Discuss the patient with other providers: yes    Risk of Complications, Morbidity, and/or Mortality  Presenting problems: high  Diagnostic procedures: moderate  Management options: moderate    Patient Progress  Patient progress: stable      Disposition  Final diagnoses:   Facial numbness   Facial pain   Renal insufficiency   Thrombocytopenia ([address] )     Time reflects when diagnosis was documented in both MDM as applicable and the Disposition within this note     Time User Action Codes Description Comment    8/23/2021  2:17 PM Karla Lozano Add [R20 0] Facial numbness     8/23/2021  4:52 PM Monae Gillespie Add [R51 9] Facial pain 8/23/2021  4:53 PM Roopa Angel Add [N28 9] Renal insufficiency     8/23/2021  4:53 PM Roopa Angel Add [D69 6] Thrombocytopenia [facility])       ED Disposition     ED Disposition Condition Date/Time Comment    Discharge Stable Mon Aug 23, 2021  4:52 PM Miladis Denny discharge to home/self care  Follow-up Information     Follow up With Specialties Details Why Contact Info    Your doctor  In 1 week            There are no discharge medications for this patient  No discharge procedures on file      PDMP Review     None          ED Provider  Electronically Signed by           Boby Crump,   08/23/21 [address] [place] [address],   08/30/21 8039

## 2021-08-23 NOTE — TELEMEDICINE
TeleConsultation - Stroke   Radha Juarez 79 y o  female MRN: [id number]  Unit/Bed#: ED 09 Encounter: [id number]        REQUIRED DOCUMENTATION:     1  This service was provided via Telemedicine  2  Provider located at [address]  3  TeleMed provider: Buzz Marquez DO   4  Identify all parties in room with patient during tele consult:   patient and nurse  5  Patient was then informed that this was a Telemedicine visit and that the exam was being conducted confidentially over secure lines  My office door was closed  No one else was in the room  Patient acknowledged consent and understanding of privacy and security of the Telemedicine visit, and gave us permission to have the assistant stay in the room in order to assist with the history and to conduct the exam   I informed the patient that I have reviewed their record in Epic and presented the opportunity for them to ask any questions regarding the visit today  The patient agreed to participate  Assessment/Plan   Assessment: Plan:      59-year-old female with nonspecific but sharp right temporal pain associated with some tingling   -  In consistent with CVA/TIA  -  Inconsistent with GCA  -  May be some headache/migraine variant however patient denies similar sensation in the past   -  No real need for any additional neurologic workup   -   No need for new outpatient neurologic follow-up  -  okay to discharge from neurologic standpoint   -please call questions /concerns    TPA Decision: Patient not a TPA candidate  Unclear time of onset outside appropriate time window  Radha Juarez will not need outpatient follow up with Neurology  She will not require outpatient neurological testing  History of Present Illness     Reason for Consult / Principal Problem:  Facial tingling  Hx and PE limited by:  Not applicable  Patient last known well:  5:00 a m    Stroke alert called:  14 18  Neurology time of arrival:  immediate  HPI: Radha Juarez is a 79 y o  right handed female  With no reported past medical history or medications who presents with  Right-sided head pain and tingling  Patient reports that she awoke this morning around 5:00 a m  with a sharp pain in the right temple, this has persisted for most of the day  She also reports about 90 minutes of tingling  In a similar location  Appears to be involving a patchy portion of the V1 and V2 segments of the trigeminal nerve  - Patient denies any numbness, tingling, weakness, heaviness, or clumsiness of a limb,change in vision, hearing, or speech difficulties  CT head was unremarkable and CTA head and neck was without any clear hemodynamically significant stenosis, LV 0 or other IR target  Consult to Neurology  Consult performed by: Neela Pelaez DO  Consult ordered by: Corrie Escamilla DO          Review of Systems   Constitutional: Negative for chills and fever  HENT: Negative for facial swelling and hearing loss  Eyes: Negative for pain and discharge  Respiratory: Negative for cough, choking and shortness of breath  Cardiovascular: Negative for chest pain  Gastrointestinal: Negative for constipation, diarrhea, nausea and vomiting  Endocrine: Negative for cold intolerance and heat intolerance  Genitourinary: Negative for difficulty urinating, frequency and urgency  Skin: Negative for color change and rash  Neurological: Negative for dizziness, facial asymmetry, weakness, light-headedness, numbness and headaches  All other systems reviewed and are negative  Historical Information   Past Medical History:   Diagnosis Date    Cardiac pacemaker      No past surgical history on file    Social History   Social History     Substance and Sexual Activity   Alcohol Use None     Social History     Substance and Sexual Activity   Drug Use Not on file     E-Cigarette/Vaping     E-Cigarette/Vaping Substances     Social History     Tobacco Use   Smoking Status Not on file Family History: non-contributory    Review of previous medical records was  completed  Meds/Allergies   all current active meds have been reviewed    No Known Allergies    Objective   Vitals:Blood pressure 169/74, pulse 64, temperature (!) 97 1 °F (36 2 °C), resp  rate (!) 28, weight 106 kg (234 lb 9 1 oz), SpO2 95 %  ,There is no height or weight on file to calculate BMI  No intake or output data in the 24 hours ending 08/23/21 1508    Invasive Devices: Invasive Devices     Peripheral Intravenous Line            Peripheral IV 08/23/21 Left Wrist <1 day                Physical Exam  Constitutional:       General: She is not in acute distress  Appearance: Normal appearance  She is not toxic-appearing  HENT:      Head: Normocephalic  Eyes:      General:         Right eye: No discharge  Left eye: No discharge  Extraocular Movements: Extraocular movements intact  Conjunctiva/sclera: Conjunctivae normal       Pupils: Pupils are equal, round, and reactive to light  Pulmonary:      Effort: Pulmonary effort is normal  No respiratory distress  Breath sounds: Normal breath sounds  Neurological:      General: No focal deficit present  Mental Status: She is alert and oriented to person, place, and time  Cranial Nerves: No cranial nerve deficit  Coordination: Coordination normal    Psychiatric:         Mood and Affect: Mood normal          Behavior: Behavior normal        Neurologic Exam     Mental Status   Oriented to person, place, and time  Awake alert oriented x3  Attention appears normal and intact  Language is fluent, comprehension appears to be intact  No gross evidence of aphasia  Patient is interacting appropriately  Cranial Nerves     CN III, IV, VI   Pupils are equal, round, and reactive to light  Pupils are equal and round  Extraocular muscles intact, gaze conjugate  Face appears symmetric with respect to motor  Tongue is midline    Shoulder shrug is symmetric  Motor Exam Patient moves all 4 extremities equally without drift  Bulk appears normal     Sensory Exam   Patient acknowledges sensation equally in all 4 extremities     Gait, Coordination, and Reflexes  no gross ataxia in any limb  Finger-to-nose and heel-to-shin were intact  Gait was deferred       NIHSS:  1a Level of Consciousness: 0 = Alert   1b  LOC Questions: 0 = Answers both correctly   1c  LOC Commands: 0 = Obeys both correctly   2  Best Gaze: 0 = Normal   3  Visual: 0 = No visual field loss   4  Facial Palsy: 0=Normal symmetric movement   5a  Motor Right Arm: 0=No drift, limb holds 90 (or 45) degrees for full 10 seconds   5b  Motor Left Arm: 0=No drift, limb holds 90 (or 45) degrees for full 10 seconds   6a  Motor Right Le=No drift, limb holds 90 (or 45) degrees for full 10 seconds   6b  Motor Left Le=No drift, limb holds 90 (or 45) degrees for full 10 seconds   7  Limb Ataxia:  0=Absent   8  Sensory: 0=Normal; no sensory loss   9  Best Language:  0=No aphasia, normal   10  Dysarthria: 0=Normal articulation   11  Extinction and Inattention (formerly Neglect): 0=No abnormality   Total Score: 0     Time NIHSS was completed:   3:10 p m  Modified [place] Score:  0 (No baseline symptoms/disability)    Lab Results: I have personally reviewed pertinent reports  Imaging Studies: I have personally reviewed pertinent films in PACS I have personally reviewed radiology reports as well  EKG, Pathology, and Other Studies: I have personally reviewed pertinent reports      VTE Prophylaxis: Sequential compression device Alex Nam)     Code Status: No Order  Advance Directive and Living Will:      Power of :    POLST:

## 2021-08-23 NOTE — DISCHARGE INSTRUCTIONS
Return with any worsening  Follow up with your primary care doctor regarding your platelet count and your kidney function  Procedure To Be Performed At Next Visit: Botox Instructions (Optional): Pt will need about 18 units (10-12 forehead) and 6-7 glabella area Detail Level: Detailed

## 2021-08-24 LAB
ATRIAL RATE: 98 BPM
QRS AXIS: 166 DEGREES
QRSD INTERVAL: 144 MS
QT INTERVAL: 440 MS
QTC INTERVAL: 488 MS
T WAVE AXIS: 27 DEGREES
VENTRICULAR RATE: 74 BPM

## 2021-11-11 ENCOUNTER — APPOINTMENT (EMERGENCY)
Dept: RADIOLOGY | Facility: HOSPITAL | Age: 81
End: 2021-11-11
Payer: COMMERCIAL

## 2021-11-11 ENCOUNTER — HOSPITAL ENCOUNTER (OUTPATIENT)
Facility: HOSPITAL | Age: 81
Setting detail: OBSERVATION
Discharge: HOME | End: 2021-11-12
Attending: EMERGENCY MEDICINE | Admitting: STUDENT IN AN ORGANIZED HEALTH CARE EDUCATION/TRAINING PROGRAM
Payer: COMMERCIAL

## 2021-11-11 DIAGNOSIS — I50.9 CHF (CONGESTIVE HEART FAILURE) (HCC): Primary | ICD-10-CM

## 2021-11-11 LAB
ALBUMIN SERPL BCP-MCNC: 3.3 G/DL (ref 3.5–5)
ALP SERPL-CCNC: 112 U/L (ref 46–116)
ALT SERPL W P-5'-P-CCNC: 19 U/L (ref 12–78)
ANION GAP SERPL CALCULATED.3IONS-SCNC: 11 MMOL/L (ref 4–13)
AST SERPL W P-5'-P-CCNC: 15 U/L (ref 5–45)
BASOPHILS # BLD AUTO: 0.04 THOUSANDS/ΜL (ref 0–0.1)
BASOPHILS NFR BLD AUTO: 1 % (ref 0–1)
BILIRUB SERPL-MCNC: 0.62 MG/DL (ref 0.2–1)
BUN SERPL-MCNC: 23 MG/DL (ref 5–25)
CALCIUM ALBUM COR SERPL-MCNC: 8.8 MG/DL (ref 8.3–10.1)
CALCIUM SERPL-MCNC: 8.2 MG/DL (ref 8.3–10.1)
CHLORIDE SERPL-SCNC: 106 MMOL/L (ref 100–108)
CO2 SERPL-SCNC: 28 MMOL/L (ref 21–32)
CREAT SERPL-MCNC: 1.18 MG/DL (ref 0.6–1.3)
EOSINOPHIL # BLD AUTO: 0.13 THOUSAND/ΜL (ref 0–0.61)
EOSINOPHIL NFR BLD AUTO: 2 % (ref 0–6)
ERYTHROCYTE [DISTWIDTH] IN BLOOD BY AUTOMATED COUNT: 14.9 % (ref 11.6–15.1)
GFR SERPL CREATININE-BSD FRML MDRD: 43 ML/MIN/1.73SQ M
GLUCOSE SERPL-MCNC: 140 MG/DL (ref 65–140)
HCT VFR BLD AUTO: 34.6 % (ref 34.8–46.1)
HGB BLD-MCNC: 10.7 G/DL (ref 11.5–15.4)
IMM GRANULOCYTES # BLD AUTO: 0.04 THOUSAND/UL (ref 0–0.2)
IMM GRANULOCYTES NFR BLD AUTO: 1 % (ref 0–2)
LYMPHOCYTES # BLD AUTO: 0.72 THOUSANDS/ΜL (ref 0.6–4.47)
LYMPHOCYTES NFR BLD AUTO: 9 % (ref 14–44)
MCH RBC QN AUTO: 31.9 PG (ref 26.8–34.3)
MCHC RBC AUTO-ENTMCNC: 30.9 G/DL (ref 31.4–37.4)
MCV RBC AUTO: 103 FL (ref 82–98)
MONOCYTES # BLD AUTO: 0.55 THOUSAND/ΜL (ref 0.17–1.22)
MONOCYTES NFR BLD AUTO: 7 % (ref 4–12)
NEUTROPHILS # BLD AUTO: 6.35 THOUSANDS/ΜL (ref 1.85–7.62)
NEUTS SEG NFR BLD AUTO: 80 % (ref 43–75)
NRBC BLD AUTO-RTO: 0 /100 WBCS
NT-PROBNP SERPL-MCNC: 1583 PG/ML
PLATELET # BLD AUTO: 152 THOUSANDS/UL (ref 149–390)
PMV BLD AUTO: 10.5 FL (ref 8.9–12.7)
POTASSIUM SERPL-SCNC: 3.4 MMOL/L (ref 3.5–5.3)
PROT SERPL-MCNC: 7.1 G/DL (ref 6.4–8.2)
RBC # BLD AUTO: 3.35 MILLION/UL (ref 3.81–5.12)
SODIUM SERPL-SCNC: 145 MMOL/L (ref 136–145)
WBC # BLD AUTO: 7.83 THOUSAND/UL (ref 4.31–10.16)

## 2021-11-11 PROCEDURE — 96374 THER/PROPH/DIAG INJ IV PUSH: CPT

## 2021-11-11 PROCEDURE — 83735 ASSAY OF MAGNESIUM: CPT | Performed by: EMERGENCY MEDICINE

## 2021-11-11 PROCEDURE — 83880 ASSAY OF NATRIURETIC PEPTIDE: CPT

## 2021-11-11 PROCEDURE — 84484 ASSAY OF TROPONIN QUANT: CPT

## 2021-11-11 PROCEDURE — 80053 COMPREHEN METABOLIC PANEL: CPT

## 2021-11-11 PROCEDURE — 71045 X-RAY EXAM CHEST 1 VIEW: CPT

## 2021-11-11 PROCEDURE — 93005 ELECTROCARDIOGRAM TRACING: CPT

## 2021-11-11 PROCEDURE — 85025 COMPLETE CBC W/AUTO DIFF WBC: CPT

## 2021-11-11 PROCEDURE — 99285 EMERGENCY DEPT VISIT HI MDM: CPT | Performed by: EMERGENCY MEDICINE

## 2021-11-11 PROCEDURE — 99285 EMERGENCY DEPT VISIT HI MDM: CPT

## 2021-11-11 PROCEDURE — 36415 COLL VENOUS BLD VENIPUNCTURE: CPT

## 2021-11-11 RX ORDER — NITROGLYCERIN 0.4 MG/1
0.4 TABLET SUBLINGUAL ONCE
Status: COMPLETED | OUTPATIENT
Start: 2021-11-11 | End: 2021-11-11

## 2021-11-11 RX ORDER — FUROSEMIDE 10 MG/ML
40 INJECTION INTRAMUSCULAR; INTRAVENOUS ONCE
Status: COMPLETED | OUTPATIENT
Start: 2021-11-11 | End: 2021-11-11

## 2021-11-11 RX ADMIN — FUROSEMIDE 40 MG: 10 INJECTION, SOLUTION INTRAMUSCULAR; INTRAVENOUS at 23:42

## 2021-11-11 RX ADMIN — NITROGLYCERIN 0.4 MG: 0.4 TABLET SUBLINGUAL at 23:42

## 2021-11-12 VITALS
DIASTOLIC BLOOD PRESSURE: 71 MMHG | BODY MASS INDEX: 50.79 KG/M2 | RESPIRATION RATE: 22 BRPM | SYSTOLIC BLOOD PRESSURE: 130 MMHG | OXYGEN SATURATION: 96 % | TEMPERATURE: 98.2 F | HEART RATE: 77 BPM | WEIGHT: 243 LBS

## 2021-11-12 PROBLEM — N18.4 CKD (CHRONIC KIDNEY DISEASE) STAGE 4, GFR 15-29 ML/MIN (HCC): Status: ACTIVE | Noted: 2021-11-12

## 2021-11-12 PROBLEM — I48.21 PERMANENT ATRIAL FIBRILLATION (HCC): Status: ACTIVE | Noted: 2021-11-12

## 2021-11-12 PROBLEM — I50.9 ACUTE ON CHRONIC CONGESTIVE HEART FAILURE (HCC): Status: ACTIVE | Noted: 2021-11-12

## 2021-11-12 PROBLEM — D64.9 ANEMIA: Status: ACTIVE | Noted: 2021-11-12

## 2021-11-12 PROBLEM — N18.30 STAGE 3 CHRONIC KIDNEY DISEASE (HCC): Status: ACTIVE | Noted: 2021-11-12

## 2021-11-12 LAB
2HR DELTA HS TROPONIN: 1 NG/L
4HR DELTA HS TROPONIN: -1 NG/L
BILIRUB UR QL STRIP: NEGATIVE
CARDIAC TROPONIN I PNL SERPL HS: 12 NG/L
CARDIAC TROPONIN I PNL SERPL HS: 13 NG/L
CARDIAC TROPONIN I PNL SERPL HS: 14 NG/L
CLARITY UR: CLEAR
COLOR UR: YELLOW
DIGOXIN SERPL-MCNC: 1.2 NG/ML (ref 0.8–2)
GLUCOSE UR STRIP-MCNC: NEGATIVE MG/DL
HGB UR QL STRIP.AUTO: NEGATIVE
INR PPP: 2.2 (ref 0.84–1.19)
KETONES UR STRIP-MCNC: NEGATIVE MG/DL
LEUKOCYTE ESTERASE UR QL STRIP: NEGATIVE
MAGNESIUM SERPL-MCNC: 1.7 MG/DL (ref 1.6–2.6)
NITRITE UR QL STRIP: NEGATIVE
PH UR STRIP.AUTO: 5.5 [PH]
PROT UR STRIP-MCNC: NEGATIVE MG/DL
PROTHROMBIN TIME: 23.9 SECONDS (ref 11.6–14.5)
SP GR UR STRIP.AUTO: 1.01 (ref 1–1.03)
UROBILINOGEN UR QL STRIP.AUTO: 0.2 E.U./DL

## 2021-11-12 PROCEDURE — 36415 COLL VENOUS BLD VENIPUNCTURE: CPT | Performed by: EMERGENCY MEDICINE

## 2021-11-12 PROCEDURE — 93005 ELECTROCARDIOGRAM TRACING: CPT

## 2021-11-12 PROCEDURE — 85610 PROTHROMBIN TIME: CPT | Performed by: EMERGENCY MEDICINE

## 2021-11-12 PROCEDURE — 81003 URINALYSIS AUTO W/O SCOPE: CPT | Performed by: EMERGENCY MEDICINE

## 2021-11-12 PROCEDURE — 80162 ASSAY OF DIGOXIN TOTAL: CPT

## 2021-11-12 PROCEDURE — 84484 ASSAY OF TROPONIN QUANT: CPT

## 2021-11-12 PROCEDURE — 99236 HOSP IP/OBS SAME DATE HI 85: CPT | Performed by: STUDENT IN AN ORGANIZED HEALTH CARE EDUCATION/TRAINING PROGRAM

## 2021-11-12 RX ORDER — ASPIRIN 81 MG/1
81 TABLET, CHEWABLE ORAL DAILY
Status: DISCONTINUED | OUTPATIENT
Start: 2021-11-12 | End: 2021-11-12 | Stop reason: HOSPADM

## 2021-11-12 RX ORDER — GABAPENTIN 100 MG/1
100 CAPSULE ORAL 3 TIMES DAILY
Status: DISCONTINUED | OUTPATIENT
Start: 2021-11-12 | End: 2021-11-12 | Stop reason: HOSPADM

## 2021-11-12 RX ORDER — FERROUS SULFATE 325(65) MG
325 TABLET ORAL
Status: DISCONTINUED | OUTPATIENT
Start: 2021-11-12 | End: 2021-11-12 | Stop reason: HOSPADM

## 2021-11-12 RX ORDER — ALLOPURINOL 100 MG/1
300 TABLET ORAL DAILY
Status: DISCONTINUED | OUTPATIENT
Start: 2021-11-12 | End: 2021-11-12 | Stop reason: HOSPADM

## 2021-11-12 RX ORDER — ALBUTEROL SULFATE 2.5 MG/3ML
2.5 SOLUTION RESPIRATORY (INHALATION) EVERY 4 HOURS PRN
Status: DISCONTINUED | OUTPATIENT
Start: 2021-11-12 | End: 2021-11-12 | Stop reason: HOSPADM

## 2021-11-12 RX ORDER — WARFARIN SODIUM 3 MG/1
3 TABLET ORAL 3 TIMES WEEKLY
Status: DISCONTINUED | OUTPATIENT
Start: 2021-11-12 | End: 2021-11-12 | Stop reason: HOSPADM

## 2021-11-12 RX ORDER — ACETAMINOPHEN 325 MG/1
650 TABLET ORAL EVERY 6 HOURS PRN
Status: DISCONTINUED | OUTPATIENT
Start: 2021-11-12 | End: 2021-11-12 | Stop reason: HOSPADM

## 2021-11-12 RX ORDER — ATORVASTATIN CALCIUM 10 MG/1
10 TABLET, FILM COATED ORAL DAILY
Status: DISCONTINUED | OUTPATIENT
Start: 2021-11-12 | End: 2021-11-12 | Stop reason: HOSPADM

## 2021-11-12 RX ORDER — BUDESONIDE AND FORMOTEROL FUMARATE DIHYDRATE 160; 4.5 UG/1; UG/1
2 AEROSOL RESPIRATORY (INHALATION) 2 TIMES DAILY
Status: DISCONTINUED | OUTPATIENT
Start: 2021-11-12 | End: 2021-11-12 | Stop reason: HOSPADM

## 2021-11-12 RX ORDER — DILTIAZEM HYDROCHLORIDE 180 MG/1
180 CAPSULE, COATED, EXTENDED RELEASE ORAL DAILY
Status: DISCONTINUED | OUTPATIENT
Start: 2021-11-12 | End: 2021-11-12 | Stop reason: HOSPADM

## 2021-11-12 RX ORDER — FUROSEMIDE 10 MG/ML
40 INJECTION INTRAMUSCULAR; INTRAVENOUS ONCE
Status: COMPLETED | OUTPATIENT
Start: 2021-11-12 | End: 2021-11-12

## 2021-11-12 RX ORDER — WARFARIN SODIUM 2 MG/1
2 TABLET ORAL
Status: DISCONTINUED | OUTPATIENT
Start: 2021-11-13 | End: 2021-11-12 | Stop reason: HOSPADM

## 2021-11-12 RX ORDER — ACETAMINOPHEN 325 MG/1
975 TABLET ORAL ONCE
Status: COMPLETED | OUTPATIENT
Start: 2021-11-12 | End: 2021-11-12

## 2021-11-12 RX ORDER — DILTIAZEM HYDROCHLORIDE 180 MG/1
180 CAPSULE, COATED, EXTENDED RELEASE ORAL DAILY
COMMUNITY

## 2021-11-12 RX ORDER — FUROSEMIDE 10 MG/ML
40 INJECTION INTRAMUSCULAR; INTRAVENOUS
Status: DISCONTINUED | OUTPATIENT
Start: 2021-11-12 | End: 2021-11-12

## 2021-11-12 RX ORDER — PANTOPRAZOLE SODIUM 40 MG/1
40 TABLET, DELAYED RELEASE ORAL
Status: DISCONTINUED | OUTPATIENT
Start: 2021-11-12 | End: 2021-11-12 | Stop reason: HOSPADM

## 2021-11-12 RX ORDER — CARVEDILOL 12.5 MG/1
25 TABLET ORAL 2 TIMES DAILY
Status: DISCONTINUED | OUTPATIENT
Start: 2021-11-12 | End: 2021-11-12 | Stop reason: HOSPADM

## 2021-11-12 RX ORDER — DIGOXIN 125 MCG
125 TABLET ORAL DAILY
Status: DISCONTINUED | OUTPATIENT
Start: 2021-11-12 | End: 2021-11-12 | Stop reason: HOSPADM

## 2021-11-12 RX ORDER — POTASSIUM CHLORIDE 20 MEQ/1
40 TABLET, EXTENDED RELEASE ORAL ONCE
Status: COMPLETED | OUTPATIENT
Start: 2021-11-12 | End: 2021-11-12

## 2021-11-12 RX ORDER — GABAPENTIN 100 MG/1
100 CAPSULE ORAL 3 TIMES DAILY
COMMUNITY

## 2021-11-12 RX ORDER — BUDESONIDE AND FORMOTEROL FUMARATE DIHYDRATE 160; 4.5 UG/1; UG/1
2 AEROSOL RESPIRATORY (INHALATION) 2 TIMES DAILY
COMMUNITY

## 2021-11-12 RX ORDER — METOLAZONE 5 MG/1
5 TABLET ORAL DAILY
COMMUNITY

## 2021-11-12 RX ADMIN — FERROUS SULFATE TAB 325 MG (65 MG ELEMENTAL FE) 325 MG: 325 (65 FE) TAB at 08:15

## 2021-11-12 RX ADMIN — DILTIAZEM HYDROCHLORIDE 180 MG: 180 CAPSULE, COATED, EXTENDED RELEASE ORAL at 08:18

## 2021-11-12 RX ADMIN — FUROSEMIDE 40 MG: 10 INJECTION, SOLUTION INTRAMUSCULAR; INTRAVENOUS at 08:17

## 2021-11-12 RX ADMIN — ATORVASTATIN CALCIUM 10 MG: 10 TABLET, FILM COATED ORAL at 08:17

## 2021-11-12 RX ADMIN — CARVEDILOL 25 MG: 12.5 TABLET, FILM COATED ORAL at 08:16

## 2021-11-12 RX ADMIN — DIGOXIN 125 MCG: 125 TABLET ORAL at 08:17

## 2021-11-12 RX ADMIN — ACETAMINOPHEN 975 MG: 325 TABLET ORAL at 06:34

## 2021-11-12 RX ADMIN — POTASSIUM CHLORIDE 40 MEQ: 1500 TABLET, EXTENDED RELEASE ORAL at 06:36

## 2021-11-12 RX ADMIN — PANTOPRAZOLE SODIUM 40 MG: 40 TABLET, DELAYED RELEASE ORAL at 06:34

## 2021-11-12 RX ADMIN — BUDESONIDE AND FORMOTEROL FUMARATE DIHYDRATE 2 PUFF: 160; 4.5 AEROSOL RESPIRATORY (INHALATION) at 08:17

## 2021-11-12 RX ADMIN — FUROSEMIDE 40 MG: 10 INJECTION, SOLUTION INTRAMUSCULAR; INTRAVENOUS at 14:36

## 2021-11-12 RX ADMIN — ASPIRIN 81 MG CHEWABLE TABLET 81 MG: 81 TABLET CHEWABLE at 08:16

## 2021-11-12 RX ADMIN — GABAPENTIN 100 MG: 100 CAPSULE ORAL at 08:16

## 2021-11-12 RX ADMIN — SACUBITRIL AND VALSARTAN 1 TABLET: 97; 103 TABLET, FILM COATED ORAL at 08:18

## 2021-11-12 RX ADMIN — GABAPENTIN 100 MG: 100 CAPSULE ORAL at 14:37

## 2021-11-12 RX ADMIN — ALLOPURINOL 300 MG: 100 TABLET ORAL at 08:16

## 2021-11-15 LAB
ATRIAL RATE: 69 BPM
ATRIAL RATE: 70 BPM
ATRIAL RATE: 75 BPM
P AXIS: 14 DEGREES
PR INTERVAL: 192 MS
QRS AXIS: 125 DEGREES
QRS AXIS: 141 DEGREES
QRS AXIS: 182 DEGREES
QRSD INTERVAL: 120 MS
QRSD INTERVAL: 140 MS
QRSD INTERVAL: 150 MS
QT INTERVAL: 426 MS
QT INTERVAL: 450 MS
QT INTERVAL: 462 MS
QTC INTERVAL: 469 MS
QTC INTERVAL: 498 MS
QTC INTERVAL: 502 MS
T WAVE AXIS: -15 DEGREES
T WAVE AXIS: 34 DEGREES
T WAVE AXIS: 71 DEGREES
VENTRICULAR RATE: 70 BPM
VENTRICULAR RATE: 73 BPM
VENTRICULAR RATE: 75 BPM

## 2022-01-01 ENCOUNTER — APPOINTMENT (INPATIENT)
Dept: RADIOLOGY | Facility: HOSPITAL | Age: 82
End: 2022-01-01

## 2022-01-01 ENCOUNTER — APPOINTMENT (INPATIENT)
Dept: ULTRASOUND IMAGING | Facility: HOSPITAL | Age: 82
End: 2022-01-01

## 2022-01-01 ENCOUNTER — APPOINTMENT (EMERGENCY)
Dept: RADIOLOGY | Facility: HOSPITAL | Age: 82
End: 2022-01-01

## 2022-01-01 ENCOUNTER — HOSPITAL ENCOUNTER (INPATIENT)
Facility: HOSPITAL | Age: 82
LOS: 1 days | End: 2022-11-05
Attending: EMERGENCY MEDICINE | Admitting: FAMILY MEDICINE

## 2022-01-01 VITALS
RESPIRATION RATE: 27 BRPM | HEART RATE: 84 BPM | HEIGHT: 64 IN | SYSTOLIC BLOOD PRESSURE: 148 MMHG | DIASTOLIC BLOOD PRESSURE: 65 MMHG | OXYGEN SATURATION: 87 % | TEMPERATURE: 98.8 F | WEIGHT: 214.73 LBS | BODY MASS INDEX: 36.66 KG/M2

## 2022-01-01 DIAGNOSIS — I50.9 CHF (CONGESTIVE HEART FAILURE) (HCC): Primary | ICD-10-CM

## 2022-01-01 DIAGNOSIS — U07.1 COVID-19 VIRUS INFECTION: ICD-10-CM

## 2022-01-01 DIAGNOSIS — J90 PLEURAL EFFUSION: ICD-10-CM

## 2022-01-01 DIAGNOSIS — R78.81 BACTEREMIA: ICD-10-CM

## 2022-01-01 LAB
2HR DELTA HS TROPONIN: 59 NG/L
4HR DELTA HS TROPONIN: 89 NG/L
ALBUMIN SERPL BCP-MCNC: 1.9 G/DL (ref 3.5–5)
ALBUMIN SERPL BCP-MCNC: 1.9 G/DL (ref 3.5–5)
ALBUMIN SERPL BCP-MCNC: 2.1 G/DL (ref 3.5–5)
ALP SERPL-CCNC: 104 U/L (ref 46–116)
ALP SERPL-CCNC: 117 U/L (ref 46–116)
ALP SERPL-CCNC: 120 U/L (ref 46–116)
ALT SERPL W P-5'-P-CCNC: 14 U/L (ref 12–78)
ALT SERPL W P-5'-P-CCNC: 21 U/L (ref 12–78)
ALT SERPL W P-5'-P-CCNC: 21 U/L (ref 12–78)
ANION GAP SERPL CALCULATED.3IONS-SCNC: 1 MMOL/L (ref 4–13)
ANION GAP SERPL CALCULATED.3IONS-SCNC: 2 MMOL/L (ref 4–13)
ANION GAP SERPL CALCULATED.3IONS-SCNC: 3 MMOL/L (ref 4–13)
APTT PPP: 35 SECONDS (ref 23–37)
AST SERPL W P-5'-P-CCNC: 30 U/L (ref 5–45)
AST SERPL W P-5'-P-CCNC: 31 U/L (ref 5–45)
AST SERPL W P-5'-P-CCNC: 34 U/L (ref 5–45)
ATRIAL RATE: 234 BPM
BACTERIA UR QL AUTO: ABNORMAL /HPF
BASE EX.OXY STD BLDV CALC-SCNC: 65.9 % (ref 60–80)
BASE EX.OXY STD BLDV CALC-SCNC: 98.4 % (ref 60–80)
BASE EXCESS BLDA CALC-SCNC: 9 MMOL/L (ref -2–3)
BASE EXCESS BLDV CALC-SCNC: 8.4 MMOL/L
BASE EXCESS BLDV CALC-SCNC: 8.5 MMOL/L
BASOPHILS # BLD AUTO: 0 THOUSANDS/ÂΜL (ref 0–0.1)
BASOPHILS # BLD AUTO: 0 THOUSANDS/ÂΜL (ref 0–0.1)
BASOPHILS # BLD AUTO: 0.01 THOUSANDS/ÂΜL (ref 0–0.1)
BASOPHILS NFR BLD AUTO: 0 % (ref 0–1)
BILIRUB SERPL-MCNC: 0.28 MG/DL (ref 0.2–1)
BILIRUB SERPL-MCNC: 0.29 MG/DL (ref 0.2–1)
BILIRUB SERPL-MCNC: 0.41 MG/DL (ref 0.2–1)
BILIRUB UR QL STRIP: NEGATIVE
BUN SERPL-MCNC: 47 MG/DL (ref 5–25)
BUN SERPL-MCNC: 70 MG/DL (ref 5–25)
BUN SERPL-MCNC: 71 MG/DL (ref 5–25)
CA-I BLD-SCNC: 1.28 MMOL/L (ref 1.12–1.32)
CALCIUM ALBUM COR SERPL-MCNC: 10.1 MG/DL (ref 8.3–10.1)
CALCIUM ALBUM COR SERPL-MCNC: 10.2 MG/DL (ref 8.3–10.1)
CALCIUM ALBUM COR SERPL-MCNC: 10.2 MG/DL (ref 8.3–10.1)
CALCIUM SERPL-MCNC: 8.4 MG/DL (ref 8.3–10.1)
CALCIUM SERPL-MCNC: 8.5 MG/DL (ref 8.3–10.1)
CALCIUM SERPL-MCNC: 8.7 MG/DL (ref 8.3–10.1)
CARDIAC TROPONIN I PNL SERPL HS: 100 NG/L
CARDIAC TROPONIN I PNL SERPL HS: 125 NG/L (ref 8–18)
CARDIAC TROPONIN I PNL SERPL HS: 130 NG/L
CARDIAC TROPONIN I PNL SERPL HS: 41 NG/L
CHLORIDE SERPL-SCNC: 103 MMOL/L (ref 96–108)
CHLORIDE SERPL-SCNC: 104 MMOL/L (ref 96–108)
CHLORIDE SERPL-SCNC: 104 MMOL/L (ref 96–108)
CK SERPL-CCNC: 22 U/L (ref 26–192)
CLARITY UR: CLEAR
CO2 SERPL-SCNC: 37 MMOL/L (ref 21–32)
CO2 SERPL-SCNC: 39 MMOL/L (ref 21–32)
CO2 SERPL-SCNC: 40 MMOL/L (ref 21–32)
COLOR UR: YELLOW
CREAT SERPL-MCNC: 1.02 MG/DL (ref 0.6–1.3)
CREAT SERPL-MCNC: 1.1 MG/DL (ref 0.6–1.3)
CREAT SERPL-MCNC: 1.21 MG/DL (ref 0.6–1.3)
CRP SERPL QL: 153.9 MG/L
CRP SERPL QL: 99.6 MG/L
D DIMER PPP FEU-MCNC: 3.01 UG/ML FEU
DIGOXIN SERPL-MCNC: 0.9 NG/ML (ref 0.8–2)
EOSINOPHIL # BLD AUTO: 0 THOUSAND/ÂΜL (ref 0–0.61)
EOSINOPHIL # BLD AUTO: 0 THOUSAND/ÂΜL (ref 0–0.61)
EOSINOPHIL # BLD AUTO: 0.04 THOUSAND/ÂΜL (ref 0–0.61)
EOSINOPHIL NFR BLD AUTO: 0 % (ref 0–6)
EOSINOPHIL NFR BLD AUTO: 0 % (ref 0–6)
EOSINOPHIL NFR BLD AUTO: 1 % (ref 0–6)
ERYTHROCYTE [DISTWIDTH] IN BLOOD BY AUTOMATED COUNT: 16.5 % (ref 11.6–15.1)
ERYTHROCYTE [DISTWIDTH] IN BLOOD BY AUTOMATED COUNT: 16.6 % (ref 11.6–15.1)
ERYTHROCYTE [DISTWIDTH] IN BLOOD BY AUTOMATED COUNT: 16.7 % (ref 11.6–15.1)
EST. AVERAGE GLUCOSE BLD GHB EST-MCNC: 105 MG/DL
FLUAV RNA RESP QL NAA+PROBE: NEGATIVE
FLUBV RNA RESP QL NAA+PROBE: NEGATIVE
GFR SERPL CREATININE-BSD FRML MDRD: 41 ML/MIN/1.73SQ M
GFR SERPL CREATININE-BSD FRML MDRD: 46 ML/MIN/1.73SQ M
GFR SERPL CREATININE-BSD FRML MDRD: 51 ML/MIN/1.73SQ M
GLUCOSE SERPL-MCNC: 132 MG/DL (ref 65–140)
GLUCOSE SERPL-MCNC: 136 MG/DL (ref 65–140)
GLUCOSE SERPL-MCNC: 137 MG/DL (ref 65–140)
GLUCOSE SERPL-MCNC: 142 MG/DL (ref 65–140)
GLUCOSE SERPL-MCNC: 153 MG/DL (ref 65–140)
GLUCOSE SERPL-MCNC: 174 MG/DL (ref 65–140)
GLUCOSE SERPL-MCNC: 182 MG/DL (ref 65–140)
GLUCOSE SERPL-MCNC: 186 MG/DL (ref 65–140)
GLUCOSE SERPL-MCNC: 204 MG/DL (ref 65–140)
GLUCOSE SERPL-MCNC: 250 MG/DL (ref 65–140)
GLUCOSE UR STRIP-MCNC: NEGATIVE MG/DL
HBA1C MFR BLD: 5.3 %
HCO3 BLDA-SCNC: 39.9 MMOL/L (ref 22–28)
HCO3 BLDV-SCNC: 35.7 MMOL/L (ref 24–30)
HCO3 BLDV-SCNC: 37.3 MMOL/L (ref 24–30)
HCT VFR BLD AUTO: 33 % (ref 34.8–46.1)
HCT VFR BLD AUTO: 35.3 % (ref 34.8–46.1)
HCT VFR BLD AUTO: 35.8 % (ref 34.8–46.1)
HCT VFR BLD CALC: 41 % (ref 34.8–46.1)
HGB BLD-MCNC: 10 G/DL (ref 11.5–15.4)
HGB BLD-MCNC: 9.3 G/DL (ref 11.5–15.4)
HGB BLD-MCNC: 9.9 G/DL (ref 11.5–15.4)
HGB BLDA-MCNC: 13.9 G/DL (ref 11.5–15.4)
HGB UR QL STRIP.AUTO: NEGATIVE
HYALINE CASTS #/AREA URNS LPF: ABNORMAL /LPF
IMM GRANULOCYTES # BLD AUTO: 0.05 THOUSAND/UL (ref 0–0.2)
IMM GRANULOCYTES # BLD AUTO: 0.07 THOUSAND/UL (ref 0–0.2)
IMM GRANULOCYTES # BLD AUTO: 0.07 THOUSAND/UL (ref 0–0.2)
IMM GRANULOCYTES NFR BLD AUTO: 1 % (ref 0–2)
INR PPP: 1.64 (ref 0.84–1.19)
KETONES UR STRIP-MCNC: NEGATIVE MG/DL
LACTATE SERPL-SCNC: 1.2 MMOL/L (ref 0.5–2)
LACTATE SERPL-SCNC: 2.3 MMOL/L (ref 0.5–2)
LEUKOCYTE ESTERASE UR QL STRIP: ABNORMAL
LIPASE SERPL-CCNC: 104 U/L (ref 73–393)
LYMPHOCYTES # BLD AUTO: 0.53 THOUSANDS/ÂΜL (ref 0.6–4.47)
LYMPHOCYTES # BLD AUTO: 0.54 THOUSANDS/ÂΜL (ref 0.6–4.47)
LYMPHOCYTES # BLD AUTO: 0.7 THOUSANDS/ÂΜL (ref 0.6–4.47)
LYMPHOCYTES NFR BLD AUTO: 10 % (ref 14–44)
LYMPHOCYTES NFR BLD AUTO: 9 % (ref 14–44)
LYMPHOCYTES NFR BLD AUTO: 9 % (ref 14–44)
MCH RBC QN AUTO: 28 PG (ref 26.8–34.3)
MCH RBC QN AUTO: 28.1 PG (ref 26.8–34.3)
MCH RBC QN AUTO: 28.4 PG (ref 26.8–34.3)
MCHC RBC AUTO-ENTMCNC: 27.7 G/DL (ref 31.4–37.4)
MCHC RBC AUTO-ENTMCNC: 28.2 G/DL (ref 31.4–37.4)
MCHC RBC AUTO-ENTMCNC: 28.3 G/DL (ref 31.4–37.4)
MCV RBC AUTO: 101 FL (ref 82–98)
MCV RBC AUTO: 101 FL (ref 82–98)
MCV RBC AUTO: 99 FL (ref 82–98)
MONOCYTES # BLD AUTO: 0.12 THOUSAND/ÂΜL (ref 0.17–1.22)
MONOCYTES # BLD AUTO: 0.23 THOUSAND/ÂΜL (ref 0.17–1.22)
MONOCYTES # BLD AUTO: 0.49 THOUSAND/ÂΜL (ref 0.17–1.22)
MONOCYTES NFR BLD AUTO: 2 % (ref 4–12)
MONOCYTES NFR BLD AUTO: 4 % (ref 4–12)
MONOCYTES NFR BLD AUTO: 6 % (ref 4–12)
MRSA NOSE QL CULT: NORMAL
NEUTROPHILS # BLD AUTO: 4.48 THOUSANDS/ÂΜL (ref 1.85–7.62)
NEUTROPHILS # BLD AUTO: 4.95 THOUSANDS/ÂΜL (ref 1.85–7.62)
NEUTROPHILS # BLD AUTO: 6.95 THOUSANDS/ÂΜL (ref 1.85–7.62)
NEUTS SEG NFR BLD AUTO: 83 % (ref 43–75)
NEUTS SEG NFR BLD AUTO: 86 % (ref 43–75)
NEUTS SEG NFR BLD AUTO: 87 % (ref 43–75)
NITRITE UR QL STRIP: NEGATIVE
NON-SQ EPI CELLS URNS QL MICRO: ABNORMAL /HPF
NRBC BLD AUTO-RTO: 0 /100 WBCS
NT-PROBNP SERPL-MCNC: 3526 PG/ML
O2 CT BLDV-SCNC: 16 ML/DL
O2 CT BLDV-SCNC: 9.9 ML/DL
OTHER STN SPEC: ABNORMAL
PCO2 BLD: 43 MMOL/L (ref 21–32)
PCO2 BLD: 90.1 MM HG (ref 36–44)
PCO2 BLDV: 64.3 MM HG (ref 42–50)
PCO2 BLDV: 79.8 MM HG (ref 42–50)
PH BLD: 7.25 [PH] (ref 7.35–7.45)
PH BLDV: 7.29 [PH] (ref 7.3–7.4)
PH BLDV: 7.36 [PH] (ref 7.3–7.4)
PH UR STRIP.AUTO: 5.5 [PH]
PLATELET # BLD AUTO: 258 THOUSANDS/UL (ref 149–390)
PLATELET # BLD AUTO: 282 THOUSANDS/UL (ref 149–390)
PLATELET # BLD AUTO: 290 THOUSANDS/UL (ref 149–390)
PMV BLD AUTO: 10.1 FL (ref 8.9–12.7)
PMV BLD AUTO: 10.3 FL (ref 8.9–12.7)
PMV BLD AUTO: 10.3 FL (ref 8.9–12.7)
PO2 BLD: 39 MM HG (ref 75–129)
PO2 BLDV: 160.9 MM HG (ref 35–45)
PO2 BLDV: 36.4 MM HG (ref 35–45)
POTASSIUM BLD-SCNC: 4.1 MMOL/L (ref 3.5–5.3)
POTASSIUM SERPL-SCNC: 4.2 MMOL/L (ref 3.5–5.3)
POTASSIUM SERPL-SCNC: 4.4 MMOL/L (ref 3.5–5.3)
POTASSIUM SERPL-SCNC: 4.8 MMOL/L (ref 3.5–5.3)
PROCALCITONIN SERPL-MCNC: 0.92 NG/ML
PROT SERPL-MCNC: 6.1 G/DL (ref 6.4–8.4)
PROT SERPL-MCNC: 6.4 G/DL (ref 6.4–8.4)
PROT SERPL-MCNC: 6.5 G/DL (ref 6.4–8.4)
PROT UR STRIP-MCNC: NEGATIVE MG/DL
PROTHROMBIN TIME: 19.5 SECONDS (ref 11.6–14.5)
QRS AXIS: 197 DEGREES
QRSD INTERVAL: 130 MS
QT INTERVAL: 380 MS
QTC INTERVAL: 499 MS
RBC # BLD AUTO: 3.27 MILLION/UL (ref 3.81–5.12)
RBC # BLD AUTO: 3.54 MILLION/UL (ref 3.81–5.12)
RBC # BLD AUTO: 3.56 MILLION/UL (ref 3.81–5.12)
RBC #/AREA URNS AUTO: ABNORMAL /HPF
RSV RNA RESP QL NAA+PROBE: NEGATIVE
SAO2 % BLD FROM PO2: 61 % (ref 60–85)
SARS-COV-2 RNA RESP QL NAA+PROBE: POSITIVE
SODIUM BLD-SCNC: 144 MMOL/L (ref 136–145)
SODIUM SERPL-SCNC: 143 MMOL/L (ref 135–147)
SODIUM SERPL-SCNC: 144 MMOL/L (ref 135–147)
SODIUM SERPL-SCNC: 146 MMOL/L (ref 135–147)
SP GR UR STRIP.AUTO: 1.01 (ref 1–1.03)
SPECIMEN SOURCE: ABNORMAL
T WAVE AXIS: -3 DEGREES
UROBILINOGEN UR QL STRIP.AUTO: 0.2 E.U./DL
VENTRICULAR RATE: 104 BPM
WBC # BLD AUTO: 5.2 THOUSAND/UL (ref 4.31–10.16)
WBC # BLD AUTO: 5.79 THOUSAND/UL (ref 4.31–10.16)
WBC # BLD AUTO: 8.24 THOUSAND/UL (ref 4.31–10.16)
WBC #/AREA URNS AUTO: ABNORMAL /HPF
WBC CLUMPS # UR AUTO: PRESENT /UL

## 2022-01-01 PROCEDURE — XW033E5 INTRODUCTION OF REMDESIVIR ANTI-INFECTIVE INTO PERIPHERAL VEIN, PERCUTANEOUS APPROACH, NEW TECHNOLOGY GROUP 5: ICD-10-PCS | Performed by: FAMILY MEDICINE

## 2022-01-01 RX ORDER — INSULIN LISPRO 100 [IU]/ML
1-6 INJECTION, SOLUTION INTRAVENOUS; SUBCUTANEOUS
Status: DISCONTINUED | OUTPATIENT
Start: 2022-01-01 | End: 2022-01-01

## 2022-01-01 RX ORDER — FUROSEMIDE 10 MG/ML
60 INJECTION INTRAMUSCULAR; INTRAVENOUS
Status: DISCONTINUED | OUTPATIENT
Start: 2022-01-01 | End: 2022-01-01

## 2022-01-01 RX ORDER — FUROSEMIDE 10 MG/ML
60 INJECTION INTRAMUSCULAR; INTRAVENOUS ONCE
Status: COMPLETED | OUTPATIENT
Start: 2022-01-01 | End: 2022-01-01

## 2022-01-01 RX ORDER — CARVEDILOL 3.12 MG/1
3.12 TABLET ORAL 2 TIMES DAILY WITH MEALS
Status: DISCONTINUED | OUTPATIENT
Start: 2022-01-01 | End: 2022-01-01

## 2022-01-01 RX ORDER — BUMETANIDE 0.25 MG/ML
2 INJECTION INTRAMUSCULAR; INTRAVENOUS CONTINUOUS
Status: DISCONTINUED | OUTPATIENT
Start: 2022-01-01 | End: 2022-01-01

## 2022-01-01 RX ORDER — POTASSIUM CHLORIDE 750 MG/1
10 TABLET, EXTENDED RELEASE ORAL DAILY
Status: DISCONTINUED | OUTPATIENT
Start: 2022-01-01 | End: 2022-01-01

## 2022-01-01 RX ORDER — BUDESONIDE 0.5 MG/2ML
0.5 INHALANT ORAL
Status: DISCONTINUED | OUTPATIENT
Start: 2022-01-01 | End: 2022-01-01

## 2022-01-01 RX ORDER — DIGOXIN 125 MCG
62.5 TABLET ORAL DAILY
Status: DISCONTINUED | OUTPATIENT
Start: 2022-01-01 | End: 2022-01-01

## 2022-01-01 RX ORDER — BUMETANIDE 1 MG/1
2 TABLET ORAL 2 TIMES DAILY
Status: DISCONTINUED | OUTPATIENT
Start: 2022-01-01 | End: 2022-01-01

## 2022-01-01 RX ORDER — CEFTRIAXONE 1 G/50ML
1000 INJECTION, SOLUTION INTRAVENOUS EVERY 24 HOURS
Status: DISCONTINUED | OUTPATIENT
Start: 2022-01-01 | End: 2022-01-01

## 2022-01-01 RX ORDER — BUMETANIDE 0.25 MG/ML
1 INJECTION INTRAMUSCULAR; INTRAVENOUS CONTINUOUS
Status: DISCONTINUED | OUTPATIENT
Start: 2022-01-01 | End: 2022-01-01

## 2022-01-01 RX ORDER — HALOPERIDOL 5 MG/ML
0.5 INJECTION INTRAMUSCULAR EVERY 2 HOUR PRN
Status: DISCONTINUED | OUTPATIENT
Start: 2022-01-01 | End: 2022-01-01 | Stop reason: HOSPADM

## 2022-01-01 RX ORDER — MIDODRINE HYDROCHLORIDE 5 MG/1
5 TABLET ORAL
Status: DISCONTINUED | OUTPATIENT
Start: 2022-01-01 | End: 2022-01-01

## 2022-01-01 RX ORDER — CEFEPIME HYDROCHLORIDE 2 G/50ML
2000 INJECTION, SOLUTION INTRAVENOUS EVERY 12 HOURS
Status: DISCONTINUED | OUTPATIENT
Start: 2022-01-01 | End: 2022-01-01

## 2022-01-01 RX ORDER — ACETAMINOPHEN 325 MG/1
650 TABLET ORAL EVERY 6 HOURS PRN
Status: DISCONTINUED | OUTPATIENT
Start: 2022-01-01 | End: 2022-01-01

## 2022-01-01 RX ORDER — ENOXAPARIN SODIUM 100 MG/ML
1 INJECTION SUBCUTANEOUS EVERY 12 HOURS SCHEDULED
Status: DISCONTINUED | OUTPATIENT
Start: 2022-01-01 | End: 2022-01-01

## 2022-01-01 RX ORDER — ACETAMINOPHEN 650 MG/1
650 SUPPOSITORY RECTAL EVERY 4 HOURS PRN
Status: DISCONTINUED | OUTPATIENT
Start: 2022-01-01 | End: 2022-01-01 | Stop reason: HOSPADM

## 2022-01-01 RX ORDER — INSULIN LISPRO 100 [IU]/ML
2-12 INJECTION, SOLUTION INTRAVENOUS; SUBCUTANEOUS
Status: DISCONTINUED | OUTPATIENT
Start: 2022-01-01 | End: 2022-01-01

## 2022-01-01 RX ORDER — PANTOPRAZOLE SODIUM 40 MG/1
40 TABLET, DELAYED RELEASE ORAL
Status: DISCONTINUED | OUTPATIENT
Start: 2022-01-01 | End: 2022-01-01

## 2022-01-01 RX ORDER — DEXAMETHASONE SODIUM PHOSPHATE 4 MG/ML
6 INJECTION, SOLUTION INTRA-ARTICULAR; INTRALESIONAL; INTRAMUSCULAR; INTRAVENOUS; SOFT TISSUE EVERY 24 HOURS
Status: DISCONTINUED | OUTPATIENT
Start: 2022-01-01 | End: 2022-01-01

## 2022-01-01 RX ORDER — LINEZOLID 2 MG/ML
600 INJECTION, SOLUTION INTRAVENOUS ONCE
Status: COMPLETED | OUTPATIENT
Start: 2022-01-01 | End: 2022-01-01

## 2022-01-01 RX ORDER — DEXAMETHASONE SODIUM PHOSPHATE 4 MG/ML
6 INJECTION, SOLUTION INTRA-ARTICULAR; INTRALESIONAL; INTRAMUSCULAR; INTRAVENOUS; SOFT TISSUE EVERY 12 HOURS SCHEDULED
Status: DISCONTINUED | OUTPATIENT
Start: 2022-01-01 | End: 2022-01-01

## 2022-01-01 RX ORDER — POLYETHYLENE GLYCOL 3350 17 G/17G
17 POWDER, FOR SOLUTION ORAL DAILY PRN
Status: DISCONTINUED | OUTPATIENT
Start: 2022-01-01 | End: 2022-01-01

## 2022-01-01 RX ORDER — ALBUMIN (HUMAN) 12.5 G/50ML
12.5 SOLUTION INTRAVENOUS ONCE
Status: CANCELLED | OUTPATIENT
Start: 2022-01-01 | End: 2022-01-01

## 2022-01-01 RX ORDER — LEVALBUTEROL 1.25 MG/.5ML
1.25 SOLUTION, CONCENTRATE RESPIRATORY (INHALATION)
Status: DISCONTINUED | OUTPATIENT
Start: 2022-01-01 | End: 2022-01-01

## 2022-01-01 RX ORDER — MORPHINE SULFATE 10 MG/ML
5 INJECTION, SOLUTION INTRAMUSCULAR; INTRAVENOUS ONCE
Status: COMPLETED | OUTPATIENT
Start: 2022-01-01 | End: 2022-01-01

## 2022-01-01 RX ORDER — PANTOPRAZOLE SODIUM 40 MG/10ML
40 INJECTION, POWDER, LYOPHILIZED, FOR SOLUTION INTRAVENOUS DAILY
Status: DISCONTINUED | OUTPATIENT
Start: 2022-01-01 | End: 2022-01-01

## 2022-01-01 RX ORDER — ALBUMIN (HUMAN) 12.5 G/50ML
25 SOLUTION INTRAVENOUS ONCE
Status: DISCONTINUED | OUTPATIENT
Start: 2022-01-01 | End: 2022-01-01

## 2022-01-01 RX ORDER — ATORVASTATIN CALCIUM 10 MG/1
10 TABLET, FILM COATED ORAL
Status: DISCONTINUED | OUTPATIENT
Start: 2022-01-01 | End: 2022-01-01

## 2022-01-01 RX ORDER — BUMETANIDE 0.25 MG/ML
2 INJECTION, SOLUTION INTRAMUSCULAR; INTRAVENOUS 3 TIMES DAILY
Status: DISCONTINUED | OUTPATIENT
Start: 2022-01-01 | End: 2022-01-01

## 2022-01-01 RX ORDER — ALBUTEROL SULFATE 90 UG/1
2 AEROSOL, METERED RESPIRATORY (INHALATION) EVERY 4 HOURS PRN
Status: DISCONTINUED | OUTPATIENT
Start: 2022-01-01 | End: 2022-01-01

## 2022-01-01 RX ORDER — FERROUS SULFATE 325(65) MG
325 TABLET ORAL DAILY
Status: DISCONTINUED | OUTPATIENT
Start: 2022-01-01 | End: 2022-01-01

## 2022-01-01 RX ORDER — LIDOCAINE 50 MG/G
1 PATCH TOPICAL DAILY
Status: DISCONTINUED | OUTPATIENT
Start: 2022-01-01 | End: 2022-01-01 | Stop reason: HOSPADM

## 2022-01-01 RX ORDER — AMOXICILLIN 250 MG
1 CAPSULE ORAL 2 TIMES DAILY
Status: DISCONTINUED | OUTPATIENT
Start: 2022-01-01 | End: 2022-01-01

## 2022-01-01 RX ORDER — BUMETANIDE 0.25 MG/ML
2 INJECTION, SOLUTION INTRAMUSCULAR; INTRAVENOUS 2 TIMES DAILY
Status: DISCONTINUED | OUTPATIENT
Start: 2022-01-01 | End: 2022-01-01

## 2022-01-01 RX ORDER — FORMOTEROL FUMARATE 20 UG/2ML
20 SOLUTION RESPIRATORY (INHALATION)
Status: DISCONTINUED | OUTPATIENT
Start: 2022-01-01 | End: 2022-01-01

## 2022-01-01 RX ORDER — NYSTATIN 100000 [USP'U]/G
POWDER TOPICAL 2 TIMES DAILY
Status: DISCONTINUED | OUTPATIENT
Start: 2022-01-01 | End: 2022-01-01

## 2022-01-01 RX ORDER — METHOCARBAMOL 750 MG/1
750 TABLET, FILM COATED ORAL EVERY 6 HOURS PRN
Status: DISCONTINUED | OUTPATIENT
Start: 2022-01-01 | End: 2022-01-01

## 2022-01-01 RX ORDER — LANOLIN ALCOHOL/MO/W.PET/CERES
6 CREAM (GRAM) TOPICAL
Status: DISCONTINUED | OUTPATIENT
Start: 2022-01-01 | End: 2022-01-01

## 2022-01-01 RX ORDER — GABAPENTIN 300 MG/1
300 CAPSULE ORAL 3 TIMES DAILY
Status: DISCONTINUED | OUTPATIENT
Start: 2022-01-01 | End: 2022-01-01

## 2022-01-01 RX ORDER — FLUTICASONE FUROATE AND VILANTEROL 100; 25 UG/1; UG/1
1 POWDER RESPIRATORY (INHALATION)
Status: DISCONTINUED | OUTPATIENT
Start: 2022-01-01 | End: 2022-01-01

## 2022-01-01 RX ORDER — LORAZEPAM 2 MG/ML
1 INJECTION INTRAMUSCULAR
Status: DISCONTINUED | OUTPATIENT
Start: 2022-01-01 | End: 2022-01-01 | Stop reason: HOSPADM

## 2022-01-01 RX ORDER — DEXAMETHASONE SODIUM PHOSPHATE 4 MG/ML
0.1 INJECTION, SOLUTION INTRA-ARTICULAR; INTRALESIONAL; INTRAMUSCULAR; INTRAVENOUS; SOFT TISSUE EVERY 12 HOURS
Status: DISCONTINUED | OUTPATIENT
Start: 2022-01-01 | End: 2022-01-01

## 2022-01-01 RX ORDER — BUMETANIDE 0.25 MG/ML
4 INJECTION, SOLUTION INTRAMUSCULAR; INTRAVENOUS ONCE
Status: DISCONTINUED | OUTPATIENT
Start: 2022-01-01 | End: 2022-01-01

## 2022-01-01 RX ADMIN — APIXABAN 5 MG: 5 TABLET, FILM COATED ORAL at 08:35

## 2022-01-01 RX ADMIN — CEFEPIME HYDROCHLORIDE 2000 MG: 2 INJECTION, SOLUTION INTRAVENOUS at 06:01

## 2022-01-01 RX ADMIN — MIDODRINE HYDROCHLORIDE 5 MG: 5 TABLET ORAL at 12:51

## 2022-01-01 RX ADMIN — DIGOXIN 62.5 MCG: 125 TABLET ORAL at 08:33

## 2022-01-01 RX ADMIN — IPRATROPIUM BROMIDE 2 PUFF: 17 AEROSOL, METERED RESPIRATORY (INHALATION) at 17:00

## 2022-01-01 RX ADMIN — FUROSEMIDE 60 MG: 10 INJECTION, SOLUTION INTRAMUSCULAR; INTRAVENOUS at 08:20

## 2022-01-01 RX ADMIN — ATORVASTATIN CALCIUM 10 MG: 10 TABLET, FILM COATED ORAL at 16:48

## 2022-01-01 RX ADMIN — NYSTATIN 1 APPLICATION: 100000 POWDER TOPICAL at 10:03

## 2022-01-01 RX ADMIN — LIDOCAINE 5% 1 PATCH: 700 PATCH TOPICAL at 08:22

## 2022-01-01 RX ADMIN — DEXAMETHASONE SODIUM PHOSPHATE 9.76 MG: 4 INJECTION, SOLUTION INTRAMUSCULAR; INTRAVENOUS at 12:17

## 2022-01-01 RX ADMIN — BUMETANIDE 2 MG: 1 TABLET ORAL at 08:34

## 2022-01-01 RX ADMIN — IPRATROPIUM BROMIDE 2 PUFF: 17 AEROSOL, METERED RESPIRATORY (INHALATION) at 08:33

## 2022-01-01 RX ADMIN — MIDODRINE HYDROCHLORIDE 5 MG: 5 TABLET ORAL at 08:34

## 2022-01-01 RX ADMIN — POTASSIUM CHLORIDE 10 MEQ: 750 TABLET, EXTENDED RELEASE ORAL at 08:34

## 2022-01-01 RX ADMIN — FLUTICASONE FUROATE AND VILANTEROL TRIFENATATE 1 PUFF: 100; 25 POWDER RESPIRATORY (INHALATION) at 08:33

## 2022-01-01 RX ADMIN — GABAPENTIN 300 MG: 300 CAPSULE ORAL at 08:33

## 2022-01-01 RX ADMIN — INSULIN LISPRO 1 UNITS: 100 INJECTION, SOLUTION INTRAVENOUS; SUBCUTANEOUS at 12:52

## 2022-01-01 RX ADMIN — DEXAMETHASONE SODIUM PHOSPHATE 6 MG: 4 INJECTION, SOLUTION INTRAMUSCULAR; INTRAVENOUS at 08:21

## 2022-01-01 RX ADMIN — BUMETANIDE 2 MG: 0.25 INJECTION, SOLUTION INTRAMUSCULAR; INTRAVENOUS at 09:01

## 2022-01-01 RX ADMIN — DEXAMETHASONE SODIUM PHOSPHATE 6 MG: 4 INJECTION, SOLUTION INTRAMUSCULAR; INTRAVENOUS at 21:26

## 2022-01-01 RX ADMIN — NYSTATIN: 100000 POWDER TOPICAL at 08:35

## 2022-01-01 RX ADMIN — MORPHINE SULFATE 5 MG: 10 INJECTION, SOLUTION INTRAMUSCULAR; INTRAVENOUS at 15:41

## 2022-01-01 RX ADMIN — CARVEDILOL 3.12 MG: 3.12 TABLET, FILM COATED ORAL at 16:49

## 2022-01-01 RX ADMIN — LEVALBUTEROL HYDROCHLORIDE 1.25 MG: 1.25 SOLUTION, CONCENTRATE RESPIRATORY (INHALATION) at 09:17

## 2022-01-01 RX ADMIN — INSULIN LISPRO 2 UNITS: 100 INJECTION, SOLUTION INTRAVENOUS; SUBCUTANEOUS at 08:19

## 2022-01-01 RX ADMIN — DEXAMETHASONE SODIUM PHOSPHATE 6 MG: 4 INJECTION, SOLUTION INTRAMUSCULAR; INTRAVENOUS at 08:35

## 2022-01-01 RX ADMIN — ENOXAPARIN SODIUM 100 MG: 100 INJECTION SUBCUTANEOUS at 12:17

## 2022-01-01 RX ADMIN — PANTOPRAZOLE SODIUM 40 MG: 40 TABLET, DELAYED RELEASE ORAL at 08:34

## 2022-01-01 RX ADMIN — BUDESONIDE 0.5 MG: 0.5 INHALANT ORAL at 09:17

## 2022-01-01 RX ADMIN — FUROSEMIDE 60 MG: 10 INJECTION, SOLUTION INTRAMUSCULAR; INTRAVENOUS at 00:20

## 2022-01-01 RX ADMIN — LEVALBUTEROL HYDROCHLORIDE 1.25 MG: 1.25 SOLUTION, CONCENTRATE RESPIRATORY (INHALATION) at 13:57

## 2022-01-01 RX ADMIN — DOCUSATE SODIUM AND SENNOSIDES 1 TABLET: 8.6; 5 TABLET, FILM COATED ORAL at 08:34

## 2022-01-01 RX ADMIN — CARVEDILOL 3.12 MG: 3.12 TABLET, FILM COATED ORAL at 08:44

## 2022-01-01 RX ADMIN — MELATONIN 6 MG: 3 TAB ORAL at 21:24

## 2022-01-01 RX ADMIN — IPRATROPIUM BROMIDE 0.5 MG: 0.5 SOLUTION RESPIRATORY (INHALATION) at 09:17

## 2022-01-01 RX ADMIN — LIDOCAINE 5% 1 PATCH: 700 PATCH TOPICAL at 08:44

## 2022-01-01 RX ADMIN — DORNASE ALFA 2.5 MG: 1 SOLUTION RESPIRATORY (INHALATION) at 11:24

## 2022-01-01 RX ADMIN — IPRATROPIUM BROMIDE 0.5 MG: 0.5 SOLUTION RESPIRATORY (INHALATION) at 13:57

## 2022-01-01 RX ADMIN — NYSTATIN 1 APPLICATION: 100000 POWDER TOPICAL at 17:00

## 2022-01-01 RX ADMIN — MIDODRINE HYDROCHLORIDE 5 MG: 5 TABLET ORAL at 16:49

## 2022-01-01 RX ADMIN — INSULIN LISPRO 1 UNITS: 100 INJECTION, SOLUTION INTRAVENOUS; SUBCUTANEOUS at 21:22

## 2022-01-01 RX ADMIN — SACUBITRIL AND VALSARTAN 1 TABLET: 24; 26 TABLET, FILM COATED ORAL at 17:01

## 2022-01-01 RX ADMIN — INSULIN LISPRO 6 UNITS: 100 INJECTION, SOLUTION INTRAVENOUS; SUBCUTANEOUS at 16:47

## 2022-01-01 RX ADMIN — REMDESIVIR 100 MG: 100 INJECTION, POWDER, LYOPHILIZED, FOR SOLUTION INTRAVENOUS at 09:15

## 2022-01-01 RX ADMIN — LORAZEPAM 1 MG: 2 INJECTION INTRAMUSCULAR; INTRAVENOUS at 15:52

## 2022-01-01 RX ADMIN — SACUBITRIL AND VALSARTAN 1 TABLET: 24; 26 TABLET, FILM COATED ORAL at 08:33

## 2022-01-01 RX ADMIN — GABAPENTIN 300 MG: 300 CAPSULE ORAL at 16:49

## 2022-01-01 RX ADMIN — FUROSEMIDE 60 MG: 10 INJECTION, SOLUTION INTRAMUSCULAR; INTRAVENOUS at 16:48

## 2022-01-01 RX ADMIN — ACETAMINOPHEN 650 MG: 325 TABLET ORAL at 16:47

## 2022-01-01 RX ADMIN — FERROUS SULFATE TAB 325 MG (65 MG ELEMENTAL FE) 325 MG: 325 (65 FE) TAB at 08:33

## 2022-01-01 RX ADMIN — REMDESIVIR 200 MG: 100 INJECTION, POWDER, LYOPHILIZED, FOR SOLUTION INTRAVENOUS at 08:46

## 2022-01-01 RX ADMIN — GABAPENTIN 300 MG: 300 CAPSULE ORAL at 21:24

## 2022-01-01 RX ADMIN — APIXABAN 5 MG: 5 TABLET, FILM COATED ORAL at 17:00

## 2022-01-01 RX ADMIN — LINEZOLID 600 MG: 600 INJECTION, SOLUTION INTRAVENOUS at 03:10

## 2022-01-01 RX ADMIN — IPRATROPIUM BROMIDE 2 PUFF: 17 AEROSOL, METERED RESPIRATORY (INHALATION) at 21:29

## 2022-01-01 RX ADMIN — PANTOPRAZOLE SODIUM 40 MG: 40 INJECTION, POWDER, FOR SOLUTION INTRAVENOUS at 13:51

## 2022-01-01 RX ADMIN — FORMOTEROL FUMARATE DIHYDRATE 20 MCG: 20 SOLUTION RESPIRATORY (INHALATION) at 09:17

## 2022-06-15 ENCOUNTER — DOCTOR'S OFFICE (OUTPATIENT)
Dept: URBAN - NONMETROPOLITAN AREA CLINIC 1 | Facility: CLINIC | Age: 82
Setting detail: OPHTHALMOLOGY
End: 2022-06-15
Payer: COMMERCIAL

## 2022-06-15 ENCOUNTER — RX ONLY (RX ONLY)
Age: 82
End: 2022-06-15

## 2022-06-15 DIAGNOSIS — Z96.1: ICD-10-CM

## 2022-06-15 DIAGNOSIS — H43.813: ICD-10-CM

## 2022-06-15 DIAGNOSIS — Z79.84: ICD-10-CM

## 2022-06-15 DIAGNOSIS — D31.32: ICD-10-CM

## 2022-06-15 DIAGNOSIS — E11.9: ICD-10-CM

## 2022-06-15 DIAGNOSIS — E11.3293: ICD-10-CM

## 2022-06-15 DIAGNOSIS — H40.023: ICD-10-CM

## 2022-06-15 PROBLEM — H26.40 POSTERIOR CAPSULAR OPACIFICATION: Status: RESOLVED | Noted: 2018-11-20 | Resolved: 2022-06-15

## 2022-06-15 PROCEDURE — 92134 CPTRZ OPH DX IMG PST SGM RTA: CPT | Performed by: OPHTHALMOLOGY

## 2022-06-15 PROCEDURE — 99214 OFFICE O/P EST MOD 30 MIN: CPT | Performed by: OPHTHALMOLOGY

## 2022-06-15 PROCEDURE — 92083 EXTENDED VISUAL FIELD XM: CPT | Performed by: OPHTHALMOLOGY

## 2022-06-15 PROCEDURE — 76514 ECHO EXAM OF EYE THICKNESS: CPT | Performed by: OPHTHALMOLOGY

## 2022-06-15 ASSESSMENT — REFRACTION_MANIFEST
OD_CYLINDER: -0.75
OS_VA1: 20/30
OD_SPHERE: +0.75
OD_VA1: 20/40+2
OS_AXIS: 072
OS_CYLINDER: -0.75
OS_ADD: +2.50
OS_VA2: 20/30
OD_ADD: +2.50
OD_AXIS: 120
OS_SPHERE: PLANO
OD_VA2: 20/40

## 2022-06-15 ASSESSMENT — REFRACTION_AUTOREFRACTION
OD_CYLINDER: -0.75
OD_SPHERE: +1.50
OD_AXIS: 112

## 2022-06-15 ASSESSMENT — SUPERFICIAL PUNCTATE KERATITIS (SPK)
OS_SPK: 2+
OD_SPK: 2+

## 2022-06-15 ASSESSMENT — SPHEQUIV_DERIVED
OD_SPHEQUIV: 0.375
OD_SPHEQUIV: 1.125

## 2022-06-15 ASSESSMENT — VISUAL ACUITY
OS_BCVA: 20/50-2
OD_BCVA: 20/40

## 2022-06-15 ASSESSMENT — PACHYMETRY
OS_CT_CORRECTION: 539
OD_CT_CORRECTION: 541

## 2022-07-11 ENCOUNTER — DOCTOR'S OFFICE (OUTPATIENT)
Dept: URBAN - NONMETROPOLITAN AREA CLINIC 1 | Facility: CLINIC | Age: 82
Setting detail: OPHTHALMOLOGY
End: 2022-07-11
Payer: COMMERCIAL

## 2022-07-11 ENCOUNTER — OPTICAL OFFICE (OUTPATIENT)
Dept: URBAN - NONMETROPOLITAN AREA CLINIC 4 | Facility: CLINIC | Age: 82
Setting detail: OPHTHALMOLOGY
End: 2022-07-11
Payer: COMMERCIAL

## 2022-07-11 DIAGNOSIS — H52.223: ICD-10-CM

## 2022-07-11 DIAGNOSIS — H52.4: ICD-10-CM

## 2022-07-11 PROBLEM — H40.023 GLAUCOMA SUSPECT, HIGH RISK; BOTH EYES: Status: ACTIVE | Noted: 2018-11-20

## 2022-07-11 PROCEDURE — 92012 INTRM OPH EXAM EST PATIENT: CPT | Performed by: OPTOMETRIST

## 2022-07-11 PROCEDURE — V2020 VISION SVCS FRAMES PURCHASES: HCPCS | Performed by: OPHTHALMOLOGY

## 2022-07-11 PROCEDURE — V2203 LENS SPHCYL BIFOCAL 4.00D/.1: HCPCS | Performed by: OPHTHALMOLOGY

## 2022-07-11 ASSESSMENT — SPHEQUIV_DERIVED
OD_SPHEQUIV: 0.625
OS_SPHEQUIV: -0.75
OS_SPHEQUIV: -0.25
OD_SPHEQUIV: 0.375

## 2022-07-11 ASSESSMENT — REFRACTION_MANIFEST
OD_CYLINDER: -0.75
OD_AXIS: 120
OS_VA2: 20/30
OS_CYLINDER: -1.00
OS_VA1: 20/30
OD_SPHERE: +0.75
OD_VA2: 20/30-2
OD_VA1: 20/30-2
OS_AXIS: 085
OS_SPHERE: -0.25
OS_ADD: +3.00
OD_ADD: +3.00

## 2022-07-11 ASSESSMENT — REFRACTION_AUTOREFRACTION
OS_AXIS: 086
OS_SPHERE: +0.25
OD_SPHERE: +1.00
OD_CYLINDER: -0.75
OS_CYLINDER: -1.00
OD_AXIS: 119

## 2022-07-11 ASSESSMENT — VISUAL ACUITY
OD_BCVA: 20/40
OS_BCVA: 20/40

## 2022-09-17 ENCOUNTER — HOSPITAL ENCOUNTER (INPATIENT)
Facility: HOSPITAL | Age: 82
LOS: 13 days | Discharge: NURSING, REHAB OR LONG-TERM CARE | DRG: 377 | End: 2022-09-30
Attending: SURGERY | Admitting: SURGERY
Payer: COMMERCIAL

## 2022-09-17 ENCOUNTER — APPOINTMENT (OUTPATIENT)
Dept: RADIOLOGY | Facility: HOSPITAL | Age: 82
End: 2022-09-17
Payer: COMMERCIAL

## 2022-09-17 ENCOUNTER — HOSPITAL ENCOUNTER (EMERGENCY)
Facility: HOSPITAL | Age: 82
End: 2022-09-17
Attending: EMERGENCY MEDICINE | Admitting: EMERGENCY MEDICINE
Payer: COMMERCIAL

## 2022-09-17 ENCOUNTER — APPOINTMENT (EMERGENCY)
Dept: CT IMAGING | Facility: HOSPITAL | Age: 82
End: 2022-09-17
Payer: COMMERCIAL

## 2022-09-17 VITALS
TEMPERATURE: 96.6 F | RESPIRATION RATE: 30 BRPM | SYSTOLIC BLOOD PRESSURE: 109 MMHG | HEART RATE: 70 BPM | DIASTOLIC BLOOD PRESSURE: 53 MMHG | OXYGEN SATURATION: 95 %

## 2022-09-17 DIAGNOSIS — S22.089A T11 VERTEBRAL FRACTURE (HCC): ICD-10-CM

## 2022-09-17 DIAGNOSIS — D64.9 ANEMIA, UNSPECIFIED TYPE: ICD-10-CM

## 2022-09-17 DIAGNOSIS — E87.20 LACTIC ACIDOSIS: ICD-10-CM

## 2022-09-17 DIAGNOSIS — I50.20 HFREF (HEART FAILURE WITH REDUCED EJECTION FRACTION) (HCC): ICD-10-CM

## 2022-09-17 DIAGNOSIS — I50.9 CHRONIC HEART FAILURE (HCC): ICD-10-CM
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
## 2022-09-17 DIAGNOSIS — R79.1 SUPRATHERAPEUTIC INR: ICD-10-CM

## 2022-09-17 DIAGNOSIS — S22.089A CLOSED FRACTURE OF ELEVENTH THORACIC VERTEBRA, UNSPECIFIED FRACTURE MORPHOLOGY, INITIAL ENCOUNTER (HCC): Primary | ICD-10-CM

## 2022-09-17 DIAGNOSIS — J90 PLEURAL EFFUSION ON LEFT: ICD-10-CM

## 2022-09-17 DIAGNOSIS — N17.9 AKI (ACUTE KIDNEY INJURY) (HCC): ICD-10-CM

## 2022-09-17 DIAGNOSIS — K92.2 GI BLEED: ICD-10-CM

## 2022-09-17 DIAGNOSIS — R79.89 ELEVATED BRAIN NATRIURETIC PEPTIDE (BNP) LEVEL: ICD-10-CM
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
## 2022-09-17 DIAGNOSIS — K92.1 MELENA: ICD-10-CM

## 2022-09-17 DIAGNOSIS — D64.9 ANEMIA: ICD-10-CM

## 2022-09-17 LAB
ABO GROUP BLD: NORMAL
ALBUMIN SERPL BCP-MCNC: 2.5 G/DL (ref 3.5–5)
ALBUMIN SERPL BCP-MCNC: 2.5 G/DL (ref 3.5–5)
ALP SERPL-CCNC: 54 U/L (ref 46–116)
ALP SERPL-CCNC: 55 U/L (ref 46–116)
ALT SERPL W P-5'-P-CCNC: 15 U/L (ref 12–78)
ALT SERPL W P-5'-P-CCNC: 39 U/L (ref 12–78)
ANION GAP SERPL CALCULATED.3IONS-SCNC: 12 MMOL/L (ref 4–13)
ANION GAP SERPL CALCULATED.3IONS-SCNC: 20 MMOL/L (ref 4–13)
ANISOCYTOSIS BLD QL SMEAR: PRESENT
APTT PPP: 34 SECONDS (ref 23–37)
APTT PPP: 62 SECONDS (ref 23–37)
AST SERPL W P-5'-P-CCNC: 15 U/L (ref 5–45)
AST SERPL W P-5'-P-CCNC: 44 U/L (ref 5–45)
BACTERIA UR QL AUTO: ABNORMAL /HPF
BASE EX.OXY STD BLDV CALC-SCNC: 77.3 % (ref 60–80)
BASE EXCESS BLDV CALC-SCNC: -10.3 MMOL/L
BASOPHILS # BLD MANUAL: 0 THOUSAND/UL (ref 0–0.1)
BASOPHILS # BLD MANUAL: 0 THOUSAND/UL (ref 0–0.1)
BASOPHILS NFR MAR MANUAL: 0 % (ref 0–1)
BASOPHILS NFR MAR MANUAL: 0 % (ref 0–1)
BILIRUB SERPL-MCNC: 0.44 MG/DL (ref 0.2–1)
BILIRUB SERPL-MCNC: 0.57 MG/DL (ref 0.2–1)
BILIRUB UR QL STRIP: NEGATIVE
BLD GP AB SCN SERPL QL: NEGATIVE
BLD GP AB SCN SERPL QL: NEGATIVE
BUN SERPL-MCNC: 131 MG/DL (ref 5–25)
BUN SERPL-MCNC: 139 MG/DL (ref 5–25)
CALCIUM ALBUM COR SERPL-MCNC: 9.5 MG/DL (ref 8.3–10.1)
CALCIUM ALBUM COR SERPL-MCNC: 9.6 MG/DL (ref 8.3–10.1)
CALCIUM SERPL-MCNC: 8.3 MG/DL (ref 8.3–10.1)
CALCIUM SERPL-MCNC: 8.4 MG/DL (ref 8.3–10.1)
CARDIAC TROPONIN I PNL SERPL HS: 30 NG/L
CFFMA (FUNCTIONAL FIBRINOGEN MAX AMPLITUDE): 42.4 MM (ref 15–32)
CHLORIDE SERPL-SCNC: 104 MMOL/L (ref 96–108)
CHLORIDE SERPL-SCNC: 98 MMOL/L (ref 96–108)
CK SERPL-CCNC: 45 U/L (ref 26–192)
CKLY30: 0 % (ref 0–2.6)
CKR(REACTION TIME): 7.3 MIN (ref 4.6–9.1)
CLARITY UR: CLEAR
CO2 SERPL-SCNC: 19 MMOL/L (ref 21–32)
CO2 SERPL-SCNC: 21 MMOL/L (ref 21–32)
COLOR UR: YELLOW
CREAT SERPL-MCNC: 1.79 MG/DL (ref 0.6–1.3)
CREAT SERPL-MCNC: 1.98 MG/DL (ref 0.6–1.3)
CRTMA(RAPIDTEG MAX AMPLITUDE): 69.8 MM (ref 52–70)
EOSINOPHIL # BLD MANUAL: 0 THOUSAND/UL (ref 0–0.4)
EOSINOPHIL # BLD MANUAL: 0.22 THOUSAND/UL (ref 0–0.4)
EOSINOPHIL NFR BLD MANUAL: 0 % (ref 0–6)
EOSINOPHIL NFR BLD MANUAL: 1 % (ref 0–6)
ERYTHROCYTE [DISTWIDTH] IN BLOOD BY AUTOMATED COUNT: 18.2 % (ref 11.6–15.1)
ERYTHROCYTE [DISTWIDTH] IN BLOOD BY AUTOMATED COUNT: 19.9 % (ref 11.6–15.1)
FLUAV RNA RESP QL NAA+PROBE: NEGATIVE
FLUBV RNA RESP QL NAA+PROBE: NEGATIVE
GFR SERPL CREATININE-BSD FRML MDRD: 23 ML/MIN/1.73SQ M
GFR SERPL CREATININE-BSD FRML MDRD: 26 ML/MIN/1.73SQ M
GLUCOSE SERPL-MCNC: 228 MG/DL (ref 65–140)
GLUCOSE SERPL-MCNC: 263 MG/DL (ref 65–140)
GLUCOSE SERPL-MCNC: 290 MG/DL (ref 65–140)
GLUCOSE UR STRIP-MCNC: ABNORMAL MG/DL
HCO3 BLDV-SCNC: 16.3 MMOL/L (ref 24–30)
HCT VFR BLD AUTO: 18.9 % (ref 34.8–46.1)
HCT VFR BLD AUTO: 21.8 % (ref 34.8–46.1)
HGB BLD-MCNC: 5.7 G/DL (ref 11.5–15.4)
HGB BLD-MCNC: 6.8 G/DL (ref 11.5–15.4)
HGB UR QL STRIP.AUTO: ABNORMAL
HYALINE CASTS #/AREA URNS LPF: ABNORMAL /LPF
HYPERCHROMIA BLD QL SMEAR: PRESENT
INR PPP: 1.65 (ref 0.84–1.19)
INR PPP: 4.66 (ref 0.84–1.19)
KETONES UR STRIP-MCNC: NEGATIVE MG/DL
LACTATE SERPL-SCNC: 10.6 MMOL/L (ref 0.5–2)
LACTATE SERPL-SCNC: 6.8 MMOL/L (ref 0.5–2)
LEUKOCYTE ESTERASE UR QL STRIP: NEGATIVE
LYMPHOCYTES # BLD AUTO: 0.52 THOUSAND/UL (ref 0.6–4.47)
LYMPHOCYTES # BLD AUTO: 0.66 THOUSAND/UL (ref 0.6–4.47)
LYMPHOCYTES # BLD AUTO: 2 % (ref 14–44)
LYMPHOCYTES # BLD AUTO: 3 % (ref 14–44)
MAGNESIUM SERPL-MCNC: 2.7 MG/DL (ref 1.6–2.6)
MCH RBC QN AUTO: 30 PG (ref 26.8–34.3)
MCH RBC QN AUTO: 30.4 PG (ref 26.8–34.3)
MCHC RBC AUTO-ENTMCNC: 30.2 G/DL (ref 31.4–37.4)
MCHC RBC AUTO-ENTMCNC: 31.2 G/DL (ref 31.4–37.4)
MCV RBC AUTO: 100 FL (ref 82–98)
MCV RBC AUTO: 97 FL (ref 82–98)
METAMYELOCYTES NFR BLD MANUAL: 2 % (ref 0–1)
MICROCYTES BLD QL AUTO: PRESENT
MONOCYTES # BLD AUTO: 0.22 THOUSAND/UL (ref 0–1.22)
MONOCYTES # BLD AUTO: 0.26 THOUSAND/UL (ref 0–1.22)
MONOCYTES NFR BLD: 1 % (ref 4–12)
MONOCYTES NFR BLD: 1 % (ref 4–12)
NEUTROPHILS # BLD MANUAL: 20.56 THOUSAND/UL (ref 1.85–7.62)
NEUTROPHILS # BLD MANUAL: 25.35 THOUSAND/UL (ref 1.85–7.62)
NEUTS BAND NFR BLD MANUAL: 3 % (ref 0–8)
NEUTS SEG NFR BLD AUTO: 90 % (ref 43–75)
NEUTS SEG NFR BLD AUTO: 97 % (ref 43–75)
NITRITE UR QL STRIP: NEGATIVE
NON-SQ EPI CELLS URNS QL MICRO: ABNORMAL /HPF
NRBC BLD AUTO-RTO: 1 /100 WBC (ref 0–2)
NT-PROBNP SERPL-MCNC: 2236 PG/ML
O2 CT BLDV-SCNC: 7.2 ML/DL
OVALOCYTES BLD QL SMEAR: PRESENT
OVALOCYTES BLD QL SMEAR: PRESENT
PCO2 BLDV: 39.2 MM HG (ref 42–50)
PH BLDV: 7.24 [PH] (ref 7.3–7.4)
PH UR STRIP.AUTO: 5 [PH]
PHOSPHATE SERPL-MCNC: 4.8 MG/DL (ref 2.3–4.1)
PLATELET # BLD AUTO: 173 THOUSANDS/UL (ref 149–390)
PLATELET # BLD AUTO: 210 THOUSANDS/UL (ref 149–390)
PLATELET BLD QL SMEAR: ABNORMAL
PLATELET BLD QL SMEAR: ADEQUATE
PMV BLD AUTO: 10.5 FL (ref 8.9–12.7)
PMV BLD AUTO: 10.6 FL (ref 8.9–12.7)
PO2 BLDV: 55.3 MM HG (ref 35–45)
POIKILOCYTOSIS BLD QL SMEAR: PRESENT
POIKILOCYTOSIS BLD QL SMEAR: PRESENT
POLYCHROMASIA BLD QL SMEAR: PRESENT
POTASSIUM SERPL-SCNC: 3.7 MMOL/L (ref 3.5–5.3)
POTASSIUM SERPL-SCNC: 3.8 MMOL/L (ref 3.5–5.3)
PROT SERPL-MCNC: 5.9 G/DL (ref 6.4–8.4)
PROT SERPL-MCNC: 6.1 G/DL (ref 6.4–8.4)
PROT UR STRIP-MCNC: NEGATIVE MG/DL
PROTHROMBIN TIME: 19.7 SECONDS (ref 11.6–14.5)
PROTHROMBIN TIME: 43.8 SECONDS (ref 11.6–14.5)
RBC # BLD AUTO: 1.9 MILLION/UL (ref 3.81–5.12)
RBC # BLD AUTO: 2.24 MILLION/UL (ref 3.81–5.12)
RBC #/AREA URNS AUTO: ABNORMAL /HPF
RBC MORPH BLD: PRESENT
RBC MORPH BLD: PRESENT
RH BLD: POSITIVE
RSV RNA RESP QL NAA+PROBE: NEGATIVE
SARS-COV-2 RNA RESP QL NAA+PROBE: NEGATIVE
SODIUM SERPL-SCNC: 137 MMOL/L (ref 135–147)
SODIUM SERPL-SCNC: 137 MMOL/L (ref 135–147)
SP GR UR STRIP.AUTO: 1.01 (ref 1–1.03)
SPECIMEN EXPIRATION DATE: NORMAL
SPECIMEN EXPIRATION DATE: NORMAL
UROBILINOGEN UR QL STRIP.AUTO: 0.2 E.U./DL
WBC # BLD AUTO: 22.11 THOUSAND/UL (ref 4.31–10.16)
WBC # BLD AUTO: 26.13 THOUSAND/UL (ref 4.31–10.16)
WBC #/AREA URNS AUTO: ABNORMAL /HPF

## 2022-09-17 PROCEDURE — 86920 COMPATIBILITY TEST SPIN: CPT

## 2022-09-17 PROCEDURE — 83735 ASSAY OF MAGNESIUM: CPT

## 2022-09-17 PROCEDURE — 90471 IMMUNIZATION ADMIN: CPT

## 2022-09-17 PROCEDURE — 0241U HB NFCT DS VIR RESP RNA 4 TRGT: CPT | Performed by: PHYSICIAN ASSISTANT

## 2022-09-17 PROCEDURE — 36430 TRANSFUSION BLD/BLD COMPNT: CPT

## 2022-09-17 PROCEDURE — 99285 EMERGENCY DEPT VISIT HI MDM: CPT

## 2022-09-17 PROCEDURE — 82948 REAGENT STRIP/BLOOD GLUCOSE: CPT

## 2022-09-17 PROCEDURE — 85025 COMPLETE CBC W/AUTO DIFF WBC: CPT | Performed by: PHYSICIAN ASSISTANT

## 2022-09-17 PROCEDURE — P9016 RBC LEUKOCYTES REDUCED: HCPCS

## 2022-09-17 PROCEDURE — 85730 THROMBOPLASTIN TIME PARTIAL: CPT | Performed by: PHYSICIAN ASSISTANT

## 2022-09-17 PROCEDURE — 82805 BLOOD GASES W/O2 SATURATION: CPT | Performed by: PHYSICIAN ASSISTANT

## 2022-09-17 PROCEDURE — 30233N1 TRANSFUSION OF NONAUTOLOGOUS RED BLOOD CELLS INTO PERIPHERAL VEIN, PERCUTANEOUS APPROACH: ICD-10-PCS | Performed by: FAMILY MEDICINE

## 2022-09-17 PROCEDURE — 85027 COMPLETE CBC AUTOMATED: CPT

## 2022-09-17 PROCEDURE — 85007 BL SMEAR W/DIFF WBC COUNT: CPT

## 2022-09-17 PROCEDURE — 83550 IRON BINDING TEST: CPT

## 2022-09-17 PROCEDURE — 85610 PROTHROMBIN TIME: CPT | Performed by: PHYSICIAN ASSISTANT

## 2022-09-17 PROCEDURE — 85576 BLOOD PLATELET AGGREGATION: CPT

## 2022-09-17 PROCEDURE — 70450 CT HEAD/BRAIN W/O DYE: CPT

## 2022-09-17 PROCEDURE — 84484 ASSAY OF TROPONIN QUANT: CPT | Performed by: PHYSICIAN ASSISTANT

## 2022-09-17 PROCEDURE — 85384 FIBRINOGEN ACTIVITY: CPT

## 2022-09-17 PROCEDURE — 82728 ASSAY OF FERRITIN: CPT

## 2022-09-17 PROCEDURE — 72125 CT NECK SPINE W/O DYE: CPT

## 2022-09-17 PROCEDURE — 74177 CT ABD & PELVIS W/CONTRAST: CPT

## 2022-09-17 PROCEDURE — 86901 BLOOD TYPING SEROLOGIC RH(D): CPT | Performed by: PHYSICIAN ASSISTANT

## 2022-09-17 PROCEDURE — 99291 CRITICAL CARE FIRST HOUR: CPT | Performed by: SURGERY

## 2022-09-17 PROCEDURE — 36415 COLL VENOUS BLD VENIPUNCTURE: CPT

## 2022-09-17 PROCEDURE — 80053 COMPREHEN METABOLIC PANEL: CPT

## 2022-09-17 PROCEDURE — 85007 BL SMEAR W/DIFF WBC COUNT: CPT | Performed by: PHYSICIAN ASSISTANT

## 2022-09-17 PROCEDURE — 84100 ASSAY OF PHOSPHORUS: CPT

## 2022-09-17 PROCEDURE — 86850 RBC ANTIBODY SCREEN: CPT

## 2022-09-17 PROCEDURE — 96374 THER/PROPH/DIAG INJ IV PUSH: CPT

## 2022-09-17 PROCEDURE — 83880 ASSAY OF NATRIURETIC PEPTIDE: CPT | Performed by: PHYSICIAN ASSISTANT

## 2022-09-17 PROCEDURE — 86901 BLOOD TYPING SEROLOGIC RH(D): CPT

## 2022-09-17 PROCEDURE — 71260 CT THORAX DX C+: CPT

## 2022-09-17 PROCEDURE — 99291 CRITICAL CARE FIRST HOUR: CPT | Performed by: PHYSICIAN ASSISTANT

## 2022-09-17 PROCEDURE — 86900 BLOOD TYPING SEROLOGIC ABO: CPT | Performed by: PHYSICIAN ASSISTANT

## 2022-09-17 PROCEDURE — 85027 COMPLETE CBC AUTOMATED: CPT | Performed by: PHYSICIAN ASSISTANT

## 2022-09-17 PROCEDURE — 83605 ASSAY OF LACTIC ACID: CPT

## 2022-09-17 PROCEDURE — C9113 INJ PANTOPRAZOLE SODIUM, VIA: HCPCS

## 2022-09-17 PROCEDURE — 85610 PROTHROMBIN TIME: CPT

## 2022-09-17 PROCEDURE — 73080 X-RAY EXAM OF ELBOW: CPT

## 2022-09-17 PROCEDURE — 85397 CLOTTING FUNCT ACTIVITY: CPT

## 2022-09-17 PROCEDURE — 83540 ASSAY OF IRON: CPT

## 2022-09-17 PROCEDURE — 87040 BLOOD CULTURE FOR BACTERIA: CPT

## 2022-09-17 PROCEDURE — 90715 TDAP VACCINE 7 YRS/> IM: CPT | Performed by: PHYSICIAN ASSISTANT

## 2022-09-17 PROCEDURE — 85347 COAGULATION TIME ACTIVATED: CPT

## 2022-09-17 PROCEDURE — 93005 ELECTROCARDIOGRAM TRACING: CPT

## 2022-09-17 PROCEDURE — 83605 ASSAY OF LACTIC ACID: CPT | Performed by: EMERGENCY MEDICINE

## 2022-09-17 PROCEDURE — 81001 URINALYSIS AUTO W/SCOPE: CPT | Performed by: PHYSICIAN ASSISTANT

## 2022-09-17 PROCEDURE — 86900 BLOOD TYPING SEROLOGIC ABO: CPT

## 2022-09-17 PROCEDURE — 36415 COLL VENOUS BLD VENIPUNCTURE: CPT | Performed by: PHYSICIAN ASSISTANT

## 2022-09-17 PROCEDURE — 94640 AIRWAY INHALATION TREATMENT: CPT

## 2022-09-17 PROCEDURE — 82272 OCCULT BLD FECES 1-3 TESTS: CPT

## 2022-09-17 PROCEDURE — 96365 THER/PROPH/DIAG IV INF INIT: CPT

## 2022-09-17 PROCEDURE — 96375 TX/PRO/DX INJ NEW DRUG ADDON: CPT

## 2022-09-17 PROCEDURE — 87040 BLOOD CULTURE FOR BACTERIA: CPT | Performed by: PHYSICIAN ASSISTANT

## 2022-09-17 PROCEDURE — 86850 RBC ANTIBODY SCREEN: CPT | Performed by: PHYSICIAN ASSISTANT

## 2022-09-17 PROCEDURE — 80053 COMPREHEN METABOLIC PANEL: CPT | Performed by: PHYSICIAN ASSISTANT

## 2022-09-17 PROCEDURE — 73090 X-RAY EXAM OF FOREARM: CPT

## 2022-09-17 PROCEDURE — 82550 ASSAY OF CK (CPK): CPT | Performed by: PHYSICIAN ASSISTANT

## 2022-09-17 PROCEDURE — 85730 THROMBOPLASTIN TIME PARTIAL: CPT

## 2022-09-17 RX ORDER — METHYLPREDNISOLONE SODIUM SUCCINATE 125 MG/2ML
125 INJECTION, POWDER, LYOPHILIZED, FOR SOLUTION INTRAMUSCULAR; INTRAVENOUS ONCE
Status: COMPLETED | OUTPATIENT
Start: 2022-09-17 | End: 2022-09-17

## 2022-09-17 RX ORDER — FUROSEMIDE 40 MG/1
40 TABLET ORAL 2 TIMES DAILY
Status: DISCONTINUED | OUTPATIENT
Start: 2022-09-17 | End: 2022-09-18

## 2022-09-17 RX ORDER — PHYTONADIONE 5 MG/1
5 TABLET ORAL ONCE
Status: COMPLETED | OUTPATIENT
Start: 2022-09-17 | End: 2022-09-17

## 2022-09-17 RX ORDER — BACITRACIN, NEOMYCIN, POLYMYXIN B 400; 3.5; 5 [USP'U]/G; MG/G; [USP'U]/G
1 OINTMENT TOPICAL ONCE
Status: COMPLETED | OUTPATIENT
Start: 2022-09-17 | End: 2022-09-17

## 2022-09-17 RX ORDER — METOLAZONE 5 MG/1
5 TABLET ORAL DAILY
Status: DISCONTINUED | OUTPATIENT
Start: 2022-09-18 | End: 2022-09-18

## 2022-09-17 RX ORDER — IPRATROPIUM BROMIDE AND ALBUTEROL SULFATE 2.5; .5 MG/3ML; MG/3ML
3 SOLUTION RESPIRATORY (INHALATION) ONCE
Status: COMPLETED | OUTPATIENT
Start: 2022-09-17 | End: 2022-09-17

## 2022-09-17 RX ORDER — FENTANYL CITRATE 50 UG/ML
50 INJECTION, SOLUTION INTRAMUSCULAR; INTRAVENOUS ONCE
Status: COMPLETED | OUTPATIENT
Start: 2022-09-17 | End: 2022-09-17

## 2022-09-17 RX ORDER — CLONIDINE HYDROCHLORIDE 0.1 MG/1
0.1 TABLET ORAL 2 TIMES DAILY PRN
Status: DISCONTINUED | OUTPATIENT
Start: 2022-09-17 | End: 2022-09-17

## 2022-09-17 RX ADMIN — PHYTONADIONE 5 MG: 5 TABLET ORAL at 18:19

## 2022-09-17 RX ADMIN — PHYTONADIONE 10 MG: 10 INJECTION, EMULSION INTRAMUSCULAR; INTRAVENOUS; SUBCUTANEOUS at 19:31

## 2022-09-17 RX ADMIN — TETANUS TOXOID, REDUCED DIPHTHERIA TOXOID AND ACELLULAR PERTUSSIS VACCINE, ADSORBED 0.5 ML: 5; 2.5; 8; 8; 2.5 SUSPENSION INTRAMUSCULAR at 18:04

## 2022-09-17 RX ADMIN — Medication 2000 UNITS: at 19:42

## 2022-09-17 RX ADMIN — FENTANYL CITRATE 50 MCG: 50 INJECTION INTRAMUSCULAR; INTRAVENOUS at 20:12

## 2022-09-17 RX ADMIN — IOHEXOL 90 ML: 350 INJECTION, SOLUTION INTRAVENOUS at 17:49

## 2022-09-17 RX ADMIN — IPRATROPIUM BROMIDE AND ALBUTEROL SULFATE 3 ML: 2.5; .5 SOLUTION RESPIRATORY (INHALATION) at 18:01

## 2022-09-17 RX ADMIN — METHYLPREDNISOLONE SODIUM SUCCINATE 125 MG: 125 INJECTION, POWDER, FOR SOLUTION INTRAMUSCULAR; INTRAVENOUS at 18:02

## 2022-09-17 RX ADMIN — BACITRACIN ZINC, NEOMYCIN, POLYMYXIN B 1 LARGE APPLICATION: 400; 3.5; 5 OINTMENT TOPICAL at 18:38

## 2022-09-17 RX ADMIN — SODIUM CHLORIDE 8 MG/HR: 9 INJECTION, SOLUTION INTRAVENOUS at 23:04

## 2022-09-17 NOTE — EMTALA/ACUTE CARE TRANSFER
[address]  [facility] [address]  Dept: [facility] TRANSFER CONSENT    NAME Mary Alice Adams                                         1940                              MRN [id number]    I have been informed of my rights regarding examination, treatment, and transfer   by Dr Tawanda Raines MD    Benefits: Specialized equipment and/or services available at the receiving facility (Include comment)________________________    Risks: Potential for delay in receiving treatment, Potential deterioration of medical condition, Loss of IV, Increased discomfort during transfer, Possible worsening of condition or death during transfer      Consent for Transfer:  I acknowledge that my medical condition has been evaluated and explained to me by the emergency department physician or other qualified medical person and/or my attending physician, who has recommended that I be transferred to the service of  Accepting Physician: Dr Amber Bello at [address] [facility] [address], [address] : One [place] Guzman  The above potential benefits of such transfer, the potential risks associated with such transfer, and the probable risks of not being transferred have been explained to me, and I fully understand them  The doctor has explained that, in my case, the benefits of transfer outweigh the risks  I agree to be transferred  I authorize the performance of emergency medical procedures and treatments upon me in both transit and upon arrival at the receiving facility  Additionally, I authorize the release of any and all medical records to the receiving facility and request they be transported with me, if possible  I understand that the safest mode of transportation during a medical emergency is an ambulance and that the Hospital advocates the use of this mode of transport   Risks of traveling to the receiving facility by car, including absence of medical control, life sustaining equipment, such as oxygen, and medical personnel has been explained to me and I fully understand them  (NILA CORRECT BOX BELOW)  [  ]  I consent to the stated transfer and to be transported by ambulance/helicopter  [  ]  I consent to the stated transfer, but refuse transportation by ambulance and accept full responsibility for my transportation by car  I understand the risks of non-ambulance transfers and I exonerate the Hospital and its staff from any deterioration in my condition that results from this refusal     X___________________________________________    DATE  22  TIME________  Signature of patient or legally responsible individual signing on patient behalf           RELATIONSHIP TO PATIENT_________________________          Provider Certification    NAME Nadege Rondon                                        [facility] 1940                              MRN [id number]    A medical screening exam was performed on the above named patient  Based on the examination:    Condition Necessitating Transfer The primary encounter diagnosis was Fall, initial encounter  Diagnoses of Anemia, GI bleed, Supratherapeutic INR, T11 vertebral fracture (HCC), KAYLEE (acute kidney injury) ([facility] [address] ), Elevated brain natriuretic peptide (BNP) level, and Lactic acidosis were also pertinent to this visit      Patient Condition: The patient has been stabilized such that within reasonable medical probability, no material deterioration of the patient condition or the condition of the unborn child(waleska) is likely to result from the transfer    Reason for Transfer: Level of Care needed not available at this facility    Transfer Requirements: Phil Lyles7, [place]   · Space available and qualified personnel available for treatment as acknowledged by    · Agreed to accept transfer and to provide appropriate medical treatment as acknowledged by         Elvis  · Appropriate medical records of the examination and treatment of the patient are provided at the time of transfer   [address] [place] [address] [address] _______  · Transfer will be performed by qualified personnel from    and appropriate transfer equipment as required, including the use of necessary and appropriate life support measures  Provider Certification: I have examined the patient and explained the following risks and benefits of being transferred/refusing transfer to the patient/family:  General risk, such as traffic hazards, adverse weather conditions, rough terrain or turbulence, possible failure of equipment (including vehicle or aircraft), or consequences of actions of persons outside the control of the transport personnel, Unanticipated needs of medical equipment and personnel during transport, Risk of worsening condition, The possibility of a transport vehicle being unavailable, Consent was not obtained as patient is committed to psychiatric facility and transfer is mandated      Based on these reasonable risks and benefits to the patient and/or the unborn child(waleska), and based upon the information available at the time of the patients examination, I certify that the medical benefits reasonably to be expected from the provision of appropriate medical treatments at another medical facility outweigh the increasing risks, if any, to the individuals medical condition, and in the case of labor to the unborn child, from effecting the transfer      X____________________________________________ DATE 09/17/22        TIME_______      ORIGINAL - SEND TO MEDICAL RECORDS   COPY - SEND WITH PATIENT DURING TRANSFER

## 2022-09-17 NOTE — ED NOTES
Brace applied by this RN, Charge nurse Darien Anthony and Provider Grover, [address]  09/17/22 3980

## 2022-09-17 NOTE — ED PROVIDER NOTES
Emergency Department Trauma Note  Cris Hernandez 79 y o  female MRN: [id number]  Unit/Bed#: ED 11/ED 11 Encounter: [id number]      Trauma Alert: Trauma Acuity: Trauma Evaluation  Model of Arrival: Mode of Arrival: BLS via Trauma Squad Name and Number: [id number]  Trauma Team: Current Providers  Attending Provider: Ofelia Blackwood MD  Registered Nurse: Rochelle Hassan RN  Physician Assistant: Han Howard PA-C  Consultants:     None      History of Present Illness     Chief Complaint:   Chief Complaint   Patient presents with    Fall     HPI:  Cris Hernandez is a 79 y o  female who presents with back pain  Mechanism:Details of Incident: fall from standing last night after legs gave out, denies hitting head, no loc  on coumadin Injury Date: 09/16/22        79year old female presents emergency department from home status post fall  Per patient yesterday evening and this morning when attempting to move on her recliner she slipped off of the recliner  She denies any head strike or LOC  Reports she called EMS today due to lower back pain from landing on her bottom  Patient reports history of COPD states she uses 1 5 L oxygen nasal cannula at home as needed  Reports chronic shortness of breath  Reports chronic leg wounds that are cared for by neighbor  She admits to skin tear on the left forearm which she reports happened last night when she slipped off the chair  Patient on Coumadin which she reports she gets checked biweekly  Patient states she lives alone  States she is frequently checked on by neighbor and brother  Patient initially headache, visual changes, confusion, dizziness  She denies any neck pain  Reports pain in her lower back  Denies chest pain  She denies fevers  Per EMS on their arrival oxygen saturation was in the 80s which improved on 2 L nasal cannula  EMS reported house was unkept         History provided by:  Patient and EMS personnel  Fall  Mechanism of injury: fall    Injury location:  Torso  Torso injury location:  Back  Incident location:  Home  Fall:     Point of impact:  Buttocks    Entrapped after fall: no    Protective equipment: none    Suspicion of alcohol use: no    Suspicion of drug use: no    Tetanus status:  Unknown  Associated symptoms: back pain    Associated symptoms: no abdominal pain, no blindness, no chest pain, no difficulty breathing, no headaches, no hearing loss, no loss of consciousness, no nausea, no neck pain, no seizures and no vomiting      Review of Systems   Constitutional: Negative for appetite change, chills, diaphoresis, fatigue and fever  HENT: Negative  Negative for hearing loss  Eyes: Negative for blindness  Respiratory: Positive for shortness of breath  Negative for cough, choking, chest tightness, wheezing and stridor  Cardiovascular: Negative for chest pain, palpitations and leg swelling  Gastrointestinal: Negative  Negative for abdominal pain, nausea and vomiting  Musculoskeletal: Positive for back pain  Negative for neck pain  Skin: Positive for wound  Neurological: Negative  Negative for seizures, loss of consciousness and headaches  All other systems reviewed and are negative        Historical Information     Immunizations:   Immunization History   Administered Date(s) Administered    Tdap 2022       Past Medical History:   Diagnosis Date    A-fib [facility])     Cardiac pacemaker     CHF (congestive heart failure) (HCC)     Chronic cellulitis     COPD (chronic obstructive pulmonary disease) (HCC)     Diabetes mellitus (HCC)     Edema     High cholesterol     [facility] [address] [facility]     Hypertension     Hyperuricemia     Stage 4 chronic kidney disease ([facility] [address] )        Family History   Problem Relation Age of Onset    Hypertension Mother     Stroke Mother     Heart disease Father      Past Surgical History:   Procedure Laterality Date    CARDIAC DEFIBRILLATOR PLACEMENT       SECTION      x 2    COLON SURGERY      HERNIA REPAIR       Social History     Tobacco Use    Smoking status: Never Smoker    Smokeless tobacco: Never Used   Vaping Use    Vaping Use: Never used   Substance Use Topics    Alcohol use: Never    Drug use: Never     E-Cigarette/Vaping    E-Cigarette Use Never User      E-Cigarette/Vaping Substances    Nicotine No     THC No     CBD No     Flavoring No        Family History: non-contributory    Meds/Allergies   Prior to Admission Medications   Prescriptions Last Dose Informant Patient Reported? Taking? FERROUS SULFATE PO   Yes No   Sig: daily   Magnesium 400 MG TABS   Yes No   Sig: Take 400 mg by mouth daily   Multiple Vitamin (MULTIVITAMIN) capsule   Yes No   Sig: Take by mouth   albuterol (2 5 mg/3 mL) 0 083 % nebulizer solution   Yes No   Sig: Inhale 2 5 mg   albuterol (PROVENTIL HFA,VENTOLIN HFA) 90 mcg/act inhaler   Yes No   Sig: Inhale 2 puffs   allopurinol (ZYLOPRIM) 300 mg tablet   Yes No   Sig: Take 300 mg by mouth daily     aspirin 81 mg chewable tablet   Yes No   Sig: Chew 81 mg daily   atorvastatin (LIPITOR) 10 mg tablet   Yes No   Sig: Take 10 mg by mouth daily   budesonide-formoterol (SYMBICORT) 160-4 5 mcg/act inhaler   Yes No   Sig: Inhale 2 puffs 2 (two) times a day Rinse mouth after use     carvedilol (COREG) 25 mg tablet   Yes No   Sig: Take 25 mg by mouth 2 (two) times a day   cloNIDine (CATAPRES) 0 1 mg tablet   Yes No   Sig: Take 1 tablet by mouth 2 (two) times a day   colchicine (COLCRYS) 0 6 mg tablet   Yes No   Sig: Take 0 6 mg by mouth daily   digoxin (LANOXIN) 0 25 mg tablet   Yes No   Sig: Take 0 5 tablets by mouth daily   diltiazem (CARDIZEM CD) 180 mg 24 hr capsule   Yes No   Sig: Take 180 mg by mouth daily   furosemide (LASIX) 40 mg tablet   Yes No   Sig: Take 40 mg by mouth 2 (two) times a day     gabapentin (NEURONTIN) 100 mg capsule   Yes No   Sig: Take 100 mg by mouth 3 (three) times a day   guaiFENesin 400 mg   Yes No   Sig: Take 400 mg by mouth every 4 (four) hours   meclizine (ANTIVERT) 25 mg tablet   Yes No   Sig: Take 25 mg by mouth as needed   metolazone (ZAROXOLYN) 5 mg tablet   Yes No   Sig: Take 5 mg by mouth daily   pantoprazole (PROTONIX) 40 mg tablet   Yes No   Sig: Take 40 mg by mouth 2 (two) times a day   sacubitril-valsartan (ENTRESTO)  MG TABS   Yes No   Sig: Take 1 tablet by mouth 2 (two) times a day   spironolactone (Aldactone) 25 mg tablet   Yes No   Sig: Take 25 mg by mouth daily   warfarin (COUMADIN) 2 mg tablet   Yes No   Sig: Take 5 mg by mouth daily  and saturday   warfarin (COUMADIN) 7 5 mg tablet   Yes No   Si mg daily  and thursday      Facility-Administered Medications: None       No Known Allergies    PHYSICAL EXAM    PE limited by: none    Objective   Vitals:   First set: Temperature: (!) 97 1 °F (36 2 °C) (22)  Pulse: 90 (22)  Respirations: (!) 31 (22)  Blood Pressure: 136/59 (22)  SpO2: (!) 85 % (22)    Primary Survey:   (A) Airway:  Intact  (B) Breathing:  Spontaneously  (C) Circulation: Pulses:   normal  (D) Disabliity:  GCS Total:  15  (E) Expose:  Completed    Secondary Survey: (Click on Physical Exam tab above)  Physical Exam  Vitals and nursing note reviewed  Exam conducted with a chaperone present Papa Crow)  Constitutional:       General: She is not in acute distress  Appearance: Normal appearance  She is obese  She is ill-appearing  She is not toxic-appearing or diaphoretic  HENT:      Head: Normocephalic and atraumatic  No raccoon eyes, Dwyer's sign or contusion  Nose: Nose normal       Mouth/Throat:      Mouth: Mucous membranes are moist    Eyes:      Extraocular Movements: Extraocular movements intact  Conjunctiva/sclera: Conjunctivae normal       Pupils: Pupils are equal, round, and reactive to light  Cardiovascular:      Rate and Rhythm: Normal rate and regular rhythm     Pulmonary: Effort: Tachypnea present  Breath sounds: Decreased breath sounds, wheezing and rales present  Chest:      Chest wall: No tenderness  Abdominal:      General: Abdomen is flat  Bowel sounds are normal  There is no distension  Palpations: Abdomen is soft  Tenderness: There is no abdominal tenderness  There is no guarding  Genitourinary:     Rectum: Guaiac result positive  Comments: Black stool  Musculoskeletal:         General: Tenderness present  No swelling or deformity  Normal range of motion  Cervical back: Normal, normal range of motion and neck supple  No tenderness  Thoracic back: Tenderness and bony tenderness present  Lumbar back: Tenderness and bony tenderness present  Right lower leg: Edema present  Left lower leg: Edema present  Skin:     General: Skin is warm and dry  Capillary Refill: Capillary refill takes less than 2 seconds  Findings: Bruising present  Comments: Skin tear left arm  Skin tear right arm  Healing wounds bilateral lower legs   Neurological:      General: No focal deficit present  Mental Status: She is alert and oriented to person, place, and time  GCS: GCS eye subscore is 4  GCS verbal subscore is 5  GCS motor subscore is 6  Cranial Nerves: Cranial nerves are intact  No facial asymmetry  Sensory: Sensation is intact  Psychiatric:         Mood and Affect: Mood normal          Behavior: Behavior normal       left arm        Cervical spine cleared by clinical criteria?  No tenderness to exam, due to age and lower back pain will proceed with imagining     Invasive Devices  Report    Peripheral Intravenous Line  Duration           Peripheral IV 09/17/22 Left Hand <1 day    Peripheral IV 09/17/22 Right Antecubital <1 day          Drain  Duration           Urethral Catheter Latex 16 Fr  <1 day                Lab Results:   Results Reviewed     Procedure Component Value Units Date/Time    Urine Microscopic [[id number]]  (Abnormal) Collected: 09/17/22 1839    Lab Status: Final result Specimen: Urine, Indwelling Ball Catheter Updated: 09/17/22 1950     RBC, UA 2-4 /hpf      WBC, UA 2-4 /hpf      Epithelial Cells None Seen /hpf      Bacteria, UA Occasional /hpf      Hyaline Casts, UA 1-2 /lpf     UA w Reflex to Microscopic w Reflex to Culture [[id number]]  (Abnormal) Collected: 09/17/22 1839    Lab Status: Final result Specimen: Urine, Indwelling Ball Catheter Updated: 09/17/22 1925     Color, UA Yellow     Clarity, UA Clear     Specific Gravity, UA 1 015     pH, UA 5 0     Leukocytes, UA Negative     Nitrite, UA Negative     Protein, UA Negative mg/dl      Glucose,  (1/10%) mg/dl      Ketones, UA Negative mg/dl      Urobilinogen, UA 0 2 E U /dl      Bilirubin, UA Negative     Occult Blood, UA Small    HS Troponin I 4hr [[id number]]     Lab Status: No result Specimen: Blood     Protime-INR [[id number]]     Lab Status: No result Specimen: Blood     Manual Differential(PHLEBS Do Not Order) [[id number]]  (Abnormal) Collected: 09/17/22 1718    Lab Status: Final result Specimen: Blood from Arm, Right Updated: 09/17/22 1858     Segmented % 90 %      Bands % 3 %      Lymphocytes % 3 %      Monocytes % 1 %      Eosinophils, % 1 %      Basophils % 0 %      Metamyelocytes% 2 %      Absolute Neutrophils 20 56 Thousand/uL      Lymphocytes Absolute 0 66 Thousand/uL      Monocytes Absolute 0 22 Thousand/uL      Eosinophils Absolute 0 22 Thousand/uL      Basophils Absolute 0 00 Thousand/uL      Total Counted --     RBC Morphology Present     Hypochromia Present     Ovalocytes Present     Poikilocytes Present     Platelet Estimate Adequate    Lactic acid, plasma [[id number]]  (Abnormal) Collected: 09/17/22 1723    Lab Status: Final result Specimen: Blood from Line, Venous Updated: 09/17/22 1847     LACTIC ACID 10 6 mmol/L     Narrative:      Result may be elevated if tourniquet was used during collection      Lactic acid 2 Hours [[id number]]     Lab Status: No result Specimen: Blood     FLU/RSV/COVID - if FLU/RSV clinically relevant [[id number]]  (Normal) Collected: 09/17/22 1718    Lab Status: Final result Specimen: Nares from Nose Updated: 09/17/22 1817     SARS-CoV-2 Negative     INFLUENZA A PCR Negative     INFLUENZA B PCR Negative     RSV PCR Negative    Narrative:      FOR PEDIATRIC PATIENTS - copy/paste COVID Guidelines URL to browser: [email or web address]  [employer]    SARS-CoV-2 assay is a Nucleic Acid Amplification assay intended for the  qualitative detection of nucleic acid from SARS-CoV-2 in nasopharyngeal  swabs  Results are for the presumptive identification of SARS-CoV-2 RNA  Positive results are indicative of infection with SARS-CoV-2, the virus  causing COVID-19, but do not rule out bacterial infection or co-infection  with other viruses  Laboratories within the [place] and its  territories are required to report all positive results to the appropriate  public health authorities  Negative results do not preclude SARS-CoV-2  infection and should not be used as the sole basis for treatment or other  patient management decisions  Negative results must be combined with  clinical observations, patient history, and epidemiological information  This test has not been FDA cleared or approved  This test has been authorized by FDA under an Emergency Use Authorization  (EUA)  This test is only authorized for the duration of time the  declaration that circumstances exist justifying the authorization of the  emergency use of an in vitro diagnostic tests for detection of SARS-CoV-2  virus and/or diagnosis of COVID-19 infection under section 564(b)(1) of  the Act, 21 U  S C  830LSS-3(S)(5), unless the authorization is terminated  or revoked sooner  The test has been validated but independent review by FDA  and CLIA is pending  Test performed using [employer]pert:  This RT-PCR assay targets N2,  a region unique to SARS-CoV-2  A conserved region in the E-gene was chosen  for pan-Sarbecovirus detection which includes SARS-CoV-2      HS Troponin 0hr (reflex protocol) [[id number]]  (Normal) Collected: 09/17/22 1718    Lab Status: Final result Specimen: Blood from Arm, Right Updated: 09/17/22 1809     hs TnI 0hr 30 ng/L     Comprehensive metabolic panel [[id number]]  (Abnormal) Collected: 09/17/22 1718    Lab Status: Final result Specimen: Blood from Arm, Right Updated: 09/17/22 1809     Sodium 137 mmol/L      Potassium 3 7 mmol/L      Chloride 98 mmol/L      CO2 19 mmol/L      ANION GAP 20 mmol/L       mg/dL      Creatinine 1 98 mg/dL      Glucose 263 mg/dL      Calcium 8 3 mg/dL      Corrected Calcium 9 5 mg/dL      AST 15 U/L      ALT 15 U/L      Alkaline Phosphatase 55 U/L      Total Protein 6 1 g/dL      Albumin 2 5 g/dL      Total Bilirubin 0 44 mg/dL      eGFR 23 ml/min/1 73sq m     Narrative:      [place] guidelines for Chronic Kidney Disease (CKD):     Stage 1 with normal or high GFR (GFR > 90 mL/min/1 73 square meters)    Stage 2 Mild CKD (GFR = 60-89 mL/min/1 73 square meters)    Stage 3A Moderate CKD (GFR = 45-59 mL/min/1 73 square meters)    Stage 3B Moderate CKD (GFR = 30-44 mL/min/1 73 square meters)    Stage 4 Severe CKD (GFR = 15-29 mL/min/1 73 square meters)    Stage 5 End Stage CKD (GFR <15 mL/min/1 73 square meters)  Note: GFR calculation is accurate only with a steady state creatinine    NT-BNP PRO [[id number]]  (Abnormal) Collected: 09/17/22 1718    Lab Status: Final result Specimen: Blood from Arm, Right Updated: 09/17/22 1809     NT-proBNP 2,236 pg/mL     CK Total with Reflex CKMB [[id number]]  (Normal) Collected: 09/17/22 1718    Lab Status: Final result Specimen: Blood from Arm, Right Updated: 09/17/22 1809     Total CK 45 U/L     HS Troponin I 2hr [[id number]]     Lab Status: No result Specimen: Blood     Protime-INR [[id number]] (Abnormal) Collected: 09/17/22 1718    Lab Status: Final result Specimen: Blood from Arm, Right Updated: 09/17/22 1804     Protime 43 8 seconds      INR 4 66    APTT [[id number]]  (Abnormal) Collected: 09/17/22 1718    Lab Status: Final result Specimen: Blood from Arm, Right Updated: 09/17/22 1804     PTT 62 seconds     Blood culture #2 [[id number]] Collected: 09/17/22 1749    Lab Status: In process Specimen: Blood from Hand, Right Updated: 09/17/22 1754    CBC and differential [[id number]]  (Abnormal) Collected: 09/17/22 1718    Lab Status: Final result Specimen: Blood from Arm, Right Updated: 09/17/22 1752     WBC 22 11 Thousand/uL      RBC 1 90 Million/uL      Hemoglobin 5 7 g/dL      Hematocrit 18 9 %       fL      MCH 30 0 pg      MCHC 30 2 g/dL      RDW 19 9 %      MPV 10 5 fL      Platelets 104 Thousands/uL     Narrative: This is an appended report  These results have been appended to a previously verified report  Blood gas, venous [[id number]]  (Abnormal) Collected: 09/17/22 1718    Lab Status: Final result Specimen: Blood from Vein Updated: 09/17/22 1733     pH, Amilcar 7 236     pCO2, Amilcar 39 2 mm Hg      pO2, Amilcar 55 3 mm Hg      HCO3, Amilcar 16 3 mmol/L      Base Excess, Amilcar -10 3 mmol/L      O2 Content, Amilcar 7 2 ml/dL      O2 HGB, VENOUS 77 3 %     Blood culture #1 [[id number]] Collected: 09/17/22 1718    Lab Status: In process Specimen: Blood from Arm, Right Updated: 09/17/22 1730    Fingerstick Glucose (POCT) [[id number]]  (Abnormal) Collected: 09/17/22 1711    Lab Status: Final result Updated: 09/17/22 1712     POC Glucose 290 mg/dl                  Imaging Studies:   Direct to CT: No  TRAUMA - CT head wo contrast   Final Result by Jamin Cristina MD (09/17 1803)      No acute intracranial abnormality  The study was marked in EPIC for immediate notification given its trauma status              Workstation performed: [id number]         TRAUMA - CT spine cervical wo contrast   Final Result by Adrianna Jacques MD (09/17 1808)      No cervical spine fracture or traumatic malalignment  The study was marked in EPIC for immediate notification given its trauma status  Workstation performed: [id number]         TRAUMA - CT chest abdomen pelvis w contrast   Final Result by Adrianna Jacques MD (09/17 1840)   1  Fracture of the T11 vertebral body with narrowing of the spinal canal at this level  Evaluation is limited secondary to osteopenia/ankylosing spondylitis  Additional probable small fracture of the anterior endplate of B74  Further evaluation with    MRI should be considered  2   Scattered pulmonary nodules measuring up to 5 mm  Based on current Fleischner Society 2017 Guidelines on incidental pulmonary nodule, no routine follow-up is needed if the patient is low risk  If the patient is high risk, optional follow-up chest CT    at 12 months can be considered  3   Mildly prominent pretracheal lymph node measuring 11 mm  This too can be reassessed at time of follow-up chest CT  4   Bilateral cystic adnexal masses for which further evaluation with pelvic ultrasound is recommended  5   Cardiomegaly  I personally discussed this study with Jaci Whiting on 9/17/2022 at 6:40 PM                The study was marked in [facility] for significant notification  Workstation performed: [id number]         CT recon only thoracolumbar   Final Result by Adrianna Jacques MD (09/17 1840)   1  Evaluation is limited by osteopenia and ankylosing spondylitis  There is disruption of the anterior endplates/syndesmophytes of the T10 vertebral body and disruption of the anterior and posterior endplate of the B33 vertebral bodies compatible with    fracture  There is narrowing of the Spinal canal at the T11 level  Further evaluation with MRI should be considered given spinal canal narrowing and exam limitations     2   Degenerative changes of the thoracolumbar spine  I personally discussed this study with Russell Chaudhary on 9/17/2022 at 6:40 PM                Workstation performed: [id number]         XR forearm 2 views LEFT    (Results Pending)   XR elbow 3+ vw left    (Results Pending)         Procedures  ECG 12 Lead Documentation Only    Date/Time: 9/17/2022 6:30 PM  Performed by: Sol Seymour PA-C  Authorized by: Sol Seymour PA-C     Indications / Diagnosis:  Fall  Patient location:  ED  Interpretation:     Interpretation: non-specific    Rate:     ECG rate:  64    ECG rate assessment: normal    Rhythm:     Rhythm: paced    Ectopy:     Ectopy: none    QRS:     QRS axis:  Normal  Conduction:     Conduction: normal    ST segments:     ST segments:  Normal  T waves:     T waves: normal      CriticalCare Time  Performed by: Sol Seymour PA-C  Authorized by: Sol Seymour PA-C     Critical care provider statement:     Critical care time (minutes):  30    Critical care was necessary to treat or prevent imminent or life-threatening deterioration of the following conditions:  Circulatory failure, trauma and renal failure    Critical care was time spent personally by me on the following activities:  Blood draw for specimens, obtaining history from patient or surrogate, development of treatment plan with patient or surrogate, discussions with consultants, evaluation of patient's response to treatment, examination of patient, interpretation of cardiac output measurements, ordering and performing treatments and interventions, ordering and review of laboratory studies, ordering and review of radiographic studies, re-evaluation of patient's condition and review of old charts             ED Course  ED Course as of 09/17/22 2030   Sat Sep 17, 2022   1716 POC Glucose(!): 290   1716 SpO2(!): 88 %   1717 Will forego pelvic x-ray patient has no pelvic instability on exam   Able lift both legs up off the bed without difficulty     1718 Will forgot chest xray: patient without blunt chest trauma  No tenderness  Reports SOB as baseline due to COPD   1804 CT head:No acute intracranial abnormality   1805 WBC(!): 22 11   1805 Hemoglobin(!!): 5 7  Patient states she required a blood transfusion several years ago  States source of bleeding was unknown at that time  She denies any blood in stool or hematuria   1807 POCT INR(!): 4 66   1814 CT cspine: No cervical spine fracture or traumatic malalignment  1815 Cervical Collar Clearance: The patient had a CT scan of the cervical spine demonstrating no acute injury  On exam, the patient had no midline point tenderness or paresthesias/numbness/weakness in the extremities  The patient had full range of motion (was then able to flex, extend, and rotate head laterally) without pain  There were no distracting injuries and the patient was not intoxicated  The patient's cervical spine was cleared radiologically and clinically  Jaime Underwood PA-C  9/17/2022 6:15 PM        1815 Blood consent obtained   1816 NT-proBNP(!): 2,236   1816 Creatinine(!): 1 98  KAYLEE   1817 hs TnI 0hr: 30   1817 SARS-COV-2: Negative   1821 Patient reports feeling improvement s/p breathing treatment    1845 CT: 1  Fracture of the T11 vertebral body with narrowing of the spinal canal at this level  Evaluation is limited secondary to osteopenia/ankylosing spondylitis  Additional probable small fracture of the anterior endplate of I06  Further evaluation with   MRI should be considered  2   Scattered pulmonary nodules measuring up to 5 mm  Based on current Fleischner Society 2017 Guidelines on incidental pulmonary nodule, no routine follow-up is needed if the patient is low risk  If the patient is high risk, optional follow-up chest CT   at 12 months can be considered  3   Mildly prominent pretracheal lymph node measuring 11 mm  This too can be reassessed at time of follow-up chest CT    4   Bilateral cystic adnexal masses for which further evaluation with pelvic ultrasound is recommended  5   Cardiomegaly  1848 LACTIC ACID(!!): 10 6   1906 Patient was accepted by Dr Jonny Jeffers [facility] trauma    1910 TSLO placed   2023 [employer] at bedside           MDM  Number of Diagnoses or Management Options  KAYLEE (acute kidney injury) [facility]): new and requires workup  Anemia: new and requires workup  Elevated brain natriuretic peptide (BNP) level: new and requires workup  Fall, initial encounter: new and requires workup  GI bleed: new and requires workup  Lactic acidosis: new and requires workup  Supratherapeutic INR: new and requires workup  T11 vertebral fracture [facility]): new and requires workup  Diagnosis management comments: 79year old female presents to the emergency department for evaluation of back pain status post fall  Trauma evaluation called on arrival   Vitals and medical records reviewed  Patient with extensive medical history  Patient found to have GI bleed with melena  Anemia with suprapubic INR - on Coumadin  Patient additionally found to have KAYLEE, elevated BNP  Lactic acidosis leukocytosis  Patient was hypoxic on EMS arrival however now oxygen saturation in 90s on 2L NC, wears 1 5 L at home  Pan scan significant for traumatic injury of T11 vertebrae fracture with spinal cord narrowing and suspected T10 fracture  Patient was placed in TLSO  Discuss transfer with the patient who was agreeable requested [facility]  Discussed with trauma at [facility] accepted patient for transfer under Dr Jonny Jeffers          Amount and/or Complexity of Data Reviewed  Clinical lab tests: ordered and reviewed  Tests in the radiology section of CPT®: ordered and reviewed  Review and summarize past medical records: yes  Discuss the patient with other providers: yes  Independent visualization of images, tracings, or specimens: yes            Disposition  [facility] Transfer: No  Final diagnoses:   Fall, initial encounter   Anemia   GI bleed   Supratherapeutic INR   T11 vertebral fracture (HCC)   KAYLEE (acute kidney injury) (HCC)   Elevated brain natriuretic peptide (BNP) level   Lactic acidosis     Time reflects when diagnosis was documented in both MDM as applicable and the Disposition within this note     Time User Action Codes Description Comment    9/17/2022  7:09 PM Maria Dolores Gong [C97  HFAN] Fall, initial encounter     9/17/2022  7:09 PM Maria Dolores Gong [D64 9] Anemia     9/17/2022  7:09 PM Crystal Sol Add [K92 2] GI bleed     9/17/2022  7:10 PM Crystal Sol Add [R79 1] Supratherapeutic INR     9/17/2022  7:10 PM Crystal Sol Add [T20 315F] T11 vertebral fracture ([facility] [address] )     9/17/2022  7:10 PM Crystal Sol Add [N17 9] KAYLEE (acute kidney injury) ([facility] [address] )     9/17/2022  7:11 PM Crystal Sol Add [R79 89] Elevated brain natriuretic peptide (BNP) level     9/17/2022  7:11 PM Crystal Sol Add [E87 2] Lactic acidosis       ED Disposition     ED Disposition   Transfer to Another Facility-In Network    Condition   --    Date/Time   Sat Sep 17, 2022  7:09 PM    Comment   Lupe Jiménez should be transferred out to [facility]             MD Documentation    Thompson Chavez Most Recent Value   Patient Condition The patient has been stabilized such that within reasonable medical probability, no material deterioration of the patient condition or the condition of the unborn child(waleska) is likely to result from the transfer   Reason for Transfer Level of Care needed not available at this facility   Benefits of Transfer Specialized equipment and/or services available at the receiving facility (Include comment)________________________   Risks of Transfer Potential for delay in receiving treatment, Potential deterioration of medical condition, Loss of IV, Increased discomfort during transfer, Possible worsening of condition or death during transfer   Accepting Physician Dr Giuliana Malone Name, [place], Gloria Marcano PA-C   Provider Certification General risk, such as traffic hazards, adverse weather conditions, rough terrain or turbulence, possible failure of equipment (including vehicle or aircraft), or consequences of actions of persons outside the control of the transport personnel, Unanticipated needs of medical equipment and personnel during transport, Risk of worsening condition, The possibility of a transport vehicle being unavailable, Consent was not obtained as patient is committed to psychiatric facility and transfer is mandated      RN Documentation    Flowsheet Row Most [facility] Name, [address] Gloria Hayden      Follow-up Information    None       Patient's Medications   Discharge Prescriptions    No medications on file     No discharge procedures on file      PDMP Review     None          ED Provider  Electronically Signed by         Marcia Mark PA-C  09/17/22 2030

## 2022-09-18 ENCOUNTER — APPOINTMENT (OUTPATIENT)
Dept: RADIOLOGY | Facility: HOSPITAL | Age: 82
DRG: 377 | End: 2022-09-18
Payer: COMMERCIAL

## 2022-09-18 ENCOUNTER — APPOINTMENT (INPATIENT)
Dept: NON INVASIVE DIAGNOSTICS | Facility: HOSPITAL | Age: 82
DRG: 377 | End: 2022-09-18
Payer: COMMERCIAL

## 2022-09-18 PROBLEM — N17.9 ACUTE-ON-CHRONIC KIDNEY INJURY (HCC): Status: ACTIVE | Noted: 2022-09-18

## 2022-09-18 PROBLEM — N18.9 ACUTE-ON-CHRONIC KIDNEY INJURY (HCC): Status: ACTIVE | Noted: 2022-09-18

## 2022-09-18 PROBLEM — J44.9 COPD (CHRONIC OBSTRUCTIVE PULMONARY DISEASE) (HCC): Status: ACTIVE | Noted: 2022-09-18

## 2022-09-18 PROBLEM — R65.10 SIRS (SYSTEMIC INFLAMMATORY RESPONSE SYNDROME) (HCC): Status: ACTIVE | Noted: 2022-09-18
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
## 2022-09-18 LAB
ALBUMIN SERPL BCP-MCNC: 2.5 G/DL (ref 3.5–5)
ALP SERPL-CCNC: 53 U/L (ref 46–116)
ALT SERPL W P-5'-P-CCNC: 43 U/L (ref 12–78)
ANION GAP SERPL CALCULATED.3IONS-SCNC: 8 MMOL/L (ref 4–13)
AORTIC ROOT: 3.4 CM
AORTIC VALVE MEAN VELOCITY: 17 M/S
APICAL FOUR CHAMBER EJECTION FRACTION: 68 %
ASCENDING AORTA: 3.8 CM
AST SERPL W P-5'-P-CCNC: 54 U/L (ref 5–45)
ATRIAL RATE: 68 BPM
ATRIAL RATE: 69 BPM
AV AREA BY CONTINUOUS VTI: 1.1 CM2
AV AREA PEAK VELOCITY: 1.1 CM2
AV LVOT MEAN GRADIENT: 2 MMHG
AV LVOT PEAK GRADIENT: 4 MMHG
AV MEAN GRADIENT: 13 MMHG
AV PEAK GRADIENT: 25 MMHG
AV REGURGITATION PRESSURE HALF TIME: 428 MS
AV VALVE AREA: 1.1 CM2
AV VELOCITY RATIO: 0.38
BILIRUB SERPL-MCNC: 0.54 MG/DL (ref 0.2–1)
BUN SERPL-MCNC: 133 MG/DL (ref 5–25)
CA-I BLD-SCNC: 1.16 MMOL/L (ref 1.12–1.32)
CALCIUM ALBUM COR SERPL-MCNC: 9.9 MG/DL (ref 8.3–10.1)
CALCIUM SERPL-MCNC: 8.7 MG/DL (ref 8.3–10.1)
CHLORIDE SERPL-SCNC: 106 MMOL/L (ref 96–108)
CO2 SERPL-SCNC: 23 MMOL/L (ref 21–32)
CREAT SERPL-MCNC: 1.64 MG/DL (ref 0.6–1.3)
DIGOXIN SERPL-MCNC: 2 NG/ML (ref 0.8–2)
DOP CALC AO PEAK VEL: 2.49 M/S
DOP CALC AO VTI: 52.08 CM
DOP CALC LVOT AREA: 2.83 CM2
DOP CALC LVOT DIAMETER: 1.9 CM
DOP CALC LVOT PEAK VEL VTI: 20.24 CM
DOP CALC LVOT PEAK VEL: 0.95 M/S
DOP CALC LVOT STROKE INDEX: 30.3 ML/M2
DOP CALC LVOT STROKE VOLUME: 57.36 CM3
ERYTHROCYTE [DISTWIDTH] IN BLOOD BY AUTOMATED COUNT: 17.5 % (ref 11.6–15.1)
FERRITIN SERPL-MCNC: 587 NG/ML (ref 8–388)
FRACTIONAL SHORTENING: 37 % (ref 28–44)
GFR SERPL CREATININE-BSD FRML MDRD: 28 ML/MIN/1.73SQ M
GLUCOSE SERPL-MCNC: 206 MG/DL (ref 65–140)
GLUCOSE SERPL-MCNC: 220 MG/DL (ref 65–140)
GLUCOSE SERPL-MCNC: 314 MG/DL (ref 65–140)
HCT VFR BLD AUTO: 24.3 % (ref 34.8–46.1)
HGB BLD-MCNC: 7.3 G/DL (ref 11.5–15.4)
HGB BLD-MCNC: 7.7 G/DL (ref 11.5–15.4)
INTERVENTRICULAR SEPTUM IN DIASTOLE (PARASTERNAL SHORT AXIS VIEW): 1.3 CM
INTERVENTRICULAR SEPTUM: 1.3 CM (ref 0.6–1.1)
IRON SATN MFR SERPL: 26 % (ref 15–50)
IRON SERPL-MCNC: 67 UG/DL (ref 50–170)
LAAS-AP2: 41.5 CM2
LAAS-AP4: 36.3 CM2
LACTATE SERPL-SCNC: 1.7 MMOL/L (ref 0.5–2)
LEFT ATRIUM SIZE: 6.3 CM
LEFT INTERNAL DIMENSION IN SYSTOLE: 3.1 CM (ref 2.1–4)
LEFT VENTRICULAR INTERNAL DIMENSION IN DIASTOLE: 4.9 CM (ref 3.5–6)
LEFT VENTRICULAR POSTERIOR WALL IN END DIASTOLE: 1 CM
LEFT VENTRICULAR STROKE VOLUME: 73 ML
LVSV (TEICH): 73 ML
MAGNESIUM SERPL-MCNC: 2.8 MG/DL (ref 1.6–2.6)
MCH RBC QN AUTO: 30.2 PG (ref 26.8–34.3)
MCHC RBC AUTO-ENTMCNC: 31.7 G/DL (ref 31.4–37.4)
MCV RBC AUTO: 95 FL (ref 82–98)
NRBC BLD AUTO-RTO: 0 /100 WBCS
PHOSPHATE SERPL-MCNC: 4.5 MG/DL (ref 2.3–4.1)
PLATELET # BLD AUTO: 145 THOUSANDS/UL (ref 149–390)
PMV BLD AUTO: 11 FL (ref 8.9–12.7)
POTASSIUM SERPL-SCNC: 4.1 MMOL/L (ref 3.5–5.3)
PR INTERVAL: 0 MS
PR INTERVAL: 0 MS
PROT SERPL-MCNC: 5.7 G/DL (ref 6.4–8.4)
QRS AXIS: 239 DEGREES
QRS AXIS: 244 DEGREES
QRSD INTERVAL: 142 MS
QRSD INTERVAL: 142 MS
QT INTERVAL: 417 MS
QT INTERVAL: 450 MS
QTC INTERVAL: 447 MS
QTC INTERVAL: 479 MS
RA PRESSURE ESTIMATED: 15 MMHG
RBC # BLD AUTO: 2.55 MILLION/UL (ref 3.81–5.12)
RIGHT ATRIAL 2D VOLUME: 78 ML
RIGHT ATRIUM AREA SYSTOLE A4C: 28 CM2
RIGHT VENTRICLE ID DIMENSION: 4 CM
RV PSP: 59 MMHG
SL CV AV DECELERATION TIME RETROGRADE: 1476 MS
SL CV AV PEAK GRADIENT RETROGRADE: 52 MMHG
SL CV LEFT ATRIUM LENGTH A2C: 8.3 CM
SL CV LV EF: 65
SL CV PED ECHO LEFT VENTRICLE DIASTOLIC VOLUME (MOD BIPLANE) 2D: 110 ML
SL CV PED ECHO LEFT VENTRICLE SYSTOLIC VOLUME (MOD BIPLANE) 2D: 38 ML
SODIUM SERPL-SCNC: 137 MMOL/L (ref 135–147)
T WAVE AXIS: 81 DEGREES
T WAVE AXIS: 81 DEGREES
TIBC SERPL-MCNC: 260 UG/DL (ref 250–450)
TR MAX PG: 44 MMHG
TR PEAK VELOCITY: 3.3 M/S
TRICUSPID VALVE PEAK REGURGITATION VELOCITY: 3.32 M/S
VENTRICULAR RATE: 68 BPM
VENTRICULAR RATE: 69 BPM
WBC # BLD AUTO: 20.53 THOUSAND/UL (ref 4.31–10.16)

## 2022-09-18 PROCEDURE — 85027 COMPLETE CBC AUTOMATED: CPT

## 2022-09-18 PROCEDURE — 85018 HEMOGLOBIN: CPT | Performed by: PHYSICIAN ASSISTANT

## 2022-09-18 PROCEDURE — C9113 INJ PANTOPRAZOLE SODIUM, VIA: HCPCS | Performed by: PHYSICIAN ASSISTANT

## 2022-09-18 PROCEDURE — C8929 TTE W OR WO FOL WCON,DOPPLER: HCPCS

## 2022-09-18 PROCEDURE — 99233 SBSQ HOSP IP/OBS HIGH 50: CPT | Performed by: STUDENT IN AN ORGANIZED HEALTH CARE EDUCATION/TRAINING PROGRAM

## 2022-09-18 PROCEDURE — C9113 INJ PANTOPRAZOLE SODIUM, VIA: HCPCS

## 2022-09-18 PROCEDURE — 99232 SBSQ HOSP IP/OBS MODERATE 35: CPT | Performed by: NEUROLOGICAL SURGERY

## 2022-09-18 PROCEDURE — 83605 ASSAY OF LACTIC ACID: CPT

## 2022-09-18 PROCEDURE — 71045 X-RAY EXAM CHEST 1 VIEW: CPT

## 2022-09-18 PROCEDURE — NC001 PR NO CHARGE: Performed by: PHYSICIAN ASSISTANT

## 2022-09-18 PROCEDURE — 80162 ASSAY OF DIGOXIN TOTAL: CPT | Performed by: PHYSICIAN ASSISTANT

## 2022-09-18 PROCEDURE — 93005 ELECTROCARDIOGRAM TRACING: CPT

## 2022-09-18 PROCEDURE — 93306 TTE W/DOPPLER COMPLETE: CPT | Performed by: INTERNAL MEDICINE

## 2022-09-18 PROCEDURE — 93010 ELECTROCARDIOGRAM REPORT: CPT | Performed by: INTERNAL MEDICINE

## 2022-09-18 PROCEDURE — 82948 REAGENT STRIP/BLOOD GLUCOSE: CPT

## 2022-09-18 PROCEDURE — P9016 RBC LEUKOCYTES REDUCED: HCPCS

## 2022-09-18 PROCEDURE — 80053 COMPREHEN METABOLIC PANEL: CPT

## 2022-09-18 PROCEDURE — 83735 ASSAY OF MAGNESIUM: CPT

## 2022-09-18 PROCEDURE — 99222 1ST HOSP IP/OBS MODERATE 55: CPT | Performed by: INTERNAL MEDICINE

## 2022-09-18 PROCEDURE — 84100 ASSAY OF PHOSPHORUS: CPT

## 2022-09-18 PROCEDURE — 82330 ASSAY OF CALCIUM: CPT

## 2022-09-18 PROCEDURE — 30233N1 TRANSFUSION OF NONAUTOLOGOUS RED BLOOD CELLS INTO PERIPHERAL VEIN, PERCUTANEOUS APPROACH: ICD-10-PCS | Performed by: FAMILY MEDICINE

## 2022-09-18 RX ORDER — ALBUMIN, HUMAN INJ 5% 5 %
12.5 SOLUTION INTRAVENOUS ONCE
Status: COMPLETED | OUTPATIENT
Start: 2022-09-18 | End: 2022-09-18

## 2022-09-18 RX ORDER — DIGOXIN 125 MCG
125 TABLET ORAL DAILY
Status: DISCONTINUED | OUTPATIENT
Start: 2022-09-19 | End: 2022-09-23

## 2022-09-18 RX ORDER — LANOLIN ALCOHOL/MO/W.PET/CERES
6 CREAM (GRAM) TOPICAL
Status: DISCONTINUED | OUTPATIENT
Start: 2022-09-18 | End: 2022-09-30 | Stop reason: HOSPADM

## 2022-09-18 RX ORDER — LIDOCAINE 50 MG/G
1 PATCH TOPICAL DAILY
Status: DISCONTINUED | OUTPATIENT
Start: 2022-09-18 | End: 2022-09-30 | Stop reason: HOSPADM

## 2022-09-18 RX ORDER — BUDESONIDE AND FORMOTEROL FUMARATE DIHYDRATE 160; 4.5 UG/1; UG/1
2 AEROSOL RESPIRATORY (INHALATION) 2 TIMES DAILY
Status: DISCONTINUED | OUTPATIENT
Start: 2022-09-18 | End: 2022-09-23

## 2022-09-18 RX ORDER — OXYCODONE HYDROCHLORIDE 5 MG/1
2.5 TABLET ORAL EVERY 4 HOURS PRN
Status: DISCONTINUED | OUTPATIENT
Start: 2022-09-18 | End: 2022-09-30 | Stop reason: HOSPADM

## 2022-09-18 RX ORDER — ALBUTEROL SULFATE 90 UG/1
2 AEROSOL, METERED RESPIRATORY (INHALATION) EVERY 4 HOURS PRN
Status: DISCONTINUED | OUTPATIENT
Start: 2022-09-18 | End: 2022-09-30 | Stop reason: HOSPADM

## 2022-09-18 RX ORDER — PANTOPRAZOLE SODIUM 40 MG/10ML
40 INJECTION, POWDER, LYOPHILIZED, FOR SOLUTION INTRAVENOUS EVERY 12 HOURS SCHEDULED
Status: DISCONTINUED | OUTPATIENT
Start: 2022-09-18 | End: 2022-09-21

## 2022-09-18 RX ORDER — METHOCARBAMOL 500 MG/1
500 TABLET, FILM COATED ORAL EVERY 6 HOURS SCHEDULED
Status: DISCONTINUED | OUTPATIENT
Start: 2022-09-18 | End: 2022-09-28

## 2022-09-18 RX ORDER — HEPARIN SODIUM 5000 [USP'U]/ML
5000 INJECTION, SOLUTION INTRAVENOUS; SUBCUTANEOUS EVERY 8 HOURS SCHEDULED
Status: DISCONTINUED | OUTPATIENT
Start: 2022-09-18 | End: 2022-09-30 | Stop reason: HOSPADM

## 2022-09-18 RX ORDER — OXYCODONE HYDROCHLORIDE 5 MG/1
5 TABLET ORAL EVERY 4 HOURS PRN
Status: DISCONTINUED | OUTPATIENT
Start: 2022-09-18 | End: 2022-09-30 | Stop reason: HOSPADM

## 2022-09-18 RX ORDER — METHOCARBAMOL 500 MG/1
500 TABLET, FILM COATED ORAL EVERY 6 HOURS PRN
Status: DISCONTINUED | OUTPATIENT
Start: 2022-09-18 | End: 2022-09-18

## 2022-09-18 RX ORDER — INSULIN LISPRO 100 [IU]/ML
1-5 INJECTION, SOLUTION INTRAVENOUS; SUBCUTANEOUS EVERY 6 HOURS SCHEDULED
Status: DISCONTINUED | OUTPATIENT
Start: 2022-09-18 | End: 2022-09-28

## 2022-09-18 RX ORDER — CALCIUM GLUCONATE 20 MG/ML
2 INJECTION, SOLUTION INTRAVENOUS ONCE
Status: COMPLETED | OUTPATIENT
Start: 2022-09-18 | End: 2022-09-18

## 2022-09-18 RX ORDER — HYDROMORPHONE HCL IN WATER/PF 6 MG/30 ML
0.2 PATIENT CONTROLLED ANALGESIA SYRINGE INTRAVENOUS EVERY 4 HOURS PRN
Status: DISCONTINUED | OUTPATIENT
Start: 2022-09-18 | End: 2022-09-24

## 2022-09-18 RX ORDER — GABAPENTIN 100 MG/1
100 CAPSULE ORAL 3 TIMES DAILY
Status: DISCONTINUED | OUTPATIENT
Start: 2022-09-18 | End: 2022-09-28

## 2022-09-18 RX ORDER — ACETAMINOPHEN 325 MG/1
975 TABLET ORAL EVERY 8 HOURS PRN
Status: DISCONTINUED | OUTPATIENT
Start: 2022-09-18 | End: 2022-09-18

## 2022-09-18 RX ORDER — ATORVASTATIN CALCIUM 10 MG/1
10 TABLET, FILM COATED ORAL DAILY
Status: DISCONTINUED | OUTPATIENT
Start: 2022-09-18 | End: 2022-09-30 | Stop reason: HOSPADM

## 2022-09-18 RX ORDER — ACETAMINOPHEN 325 MG/1
975 TABLET ORAL EVERY 8 HOURS SCHEDULED
Status: DISCONTINUED | OUTPATIENT
Start: 2022-09-18 | End: 2022-09-30 | Stop reason: HOSPADM

## 2022-09-18 RX ADMIN — OXYCODONE HYDROCHLORIDE 5 MG: 5 TABLET ORAL at 18:58

## 2022-09-18 RX ADMIN — LIDOCAINE 5% 1 PATCH: 700 PATCH TOPICAL at 09:33

## 2022-09-18 RX ADMIN — ACETAMINOPHEN 975 MG: 325 TABLET, FILM COATED ORAL at 11:44

## 2022-09-18 RX ADMIN — PANTOPRAZOLE SODIUM 40 MG: 40 INJECTION, POWDER, FOR SOLUTION INTRAVENOUS at 12:59

## 2022-09-18 RX ADMIN — OXYCODONE HYDROCHLORIDE 5 MG: 5 TABLET ORAL at 01:51

## 2022-09-18 RX ADMIN — ALBUMIN (HUMAN) 12.5 G: 12.5 INJECTION, SOLUTION INTRAVENOUS at 17:33

## 2022-09-18 RX ADMIN — BUDESONIDE AND FORMOTEROL FUMARATE DIHYDRATE 2 PUFF: 160; 4.5 AEROSOL RESPIRATORY (INHALATION) at 11:50

## 2022-09-18 RX ADMIN — BUDESONIDE AND FORMOTEROL FUMARATE DIHYDRATE 2 PUFF: 160; 4.5 AEROSOL RESPIRATORY (INHALATION) at 21:47

## 2022-09-18 RX ADMIN — SODIUM CHLORIDE 8 MG/HR: 9 INJECTION, SOLUTION INTRAVENOUS at 09:37

## 2022-09-18 RX ADMIN — CALCIUM GLUCONATE 2 G: 20 INJECTION, SOLUTION INTRAVENOUS at 02:13

## 2022-09-18 RX ADMIN — Medication 6 MG: at 01:51

## 2022-09-18 RX ADMIN — PERFLUTREN 0.6 ML/MIN: 6.52 INJECTION, SUSPENSION INTRAVENOUS at 10:28

## 2022-09-18 RX ADMIN — HEPARIN SODIUM 5000 UNITS: 5000 INJECTION INTRAVENOUS; SUBCUTANEOUS at 21:38

## 2022-09-18 RX ADMIN — HEPARIN SODIUM 5000 UNITS: 5000 INJECTION INTRAVENOUS; SUBCUTANEOUS at 11:44

## 2022-09-18 RX ADMIN — GABAPENTIN 100 MG: 100 CAPSULE ORAL at 17:34

## 2022-09-18 RX ADMIN — Medication 6 MG: at 21:38

## 2022-09-18 RX ADMIN — METHOCARBAMOL TABLETS 500 MG: 500 TABLET, COATED ORAL at 17:34

## 2022-09-18 RX ADMIN — PANTOPRAZOLE SODIUM 40 MG: 40 INJECTION, POWDER, FOR SOLUTION INTRAVENOUS at 21:39

## 2022-09-18 RX ADMIN — OXYCODONE HYDROCHLORIDE 5 MG: 5 TABLET ORAL at 09:33

## 2022-09-18 RX ADMIN — ACETAMINOPHEN 975 MG: 325 TABLET, FILM COATED ORAL at 21:38

## 2022-09-18 RX ADMIN — GABAPENTIN 100 MG: 100 CAPSULE ORAL at 21:38

## 2022-09-18 RX ADMIN — INSULIN LISPRO 3 UNITS: 100 INJECTION, SOLUTION INTRAVENOUS; SUBCUTANEOUS at 17:37

## 2022-09-18 RX ADMIN — METHOCARBAMOL TABLETS 500 MG: 500 TABLET, COATED ORAL at 11:44

## 2022-09-18 RX ADMIN — ATORVASTATIN CALCIUM 10 MG: 10 TABLET, FILM COATED ORAL at 11:44

## 2022-09-18 RX ADMIN — GABAPENTIN 100 MG: 100 CAPSULE ORAL at 11:44

## 2022-09-18 NOTE — ASSESSMENT & PLAN NOTE
Lab Results   Component Value Date    EGFR 28 09/18/2022    EGFR 26 09/17/2022    EGFR 23 09/17/2022    CREATININE 1 64 (H) 09/18/2022    CREATININE 1 79 (H) 09/17/2022    CREATININE 1 98 (H) 09/17/2022     · Baseline creatinine:  1 2-1 4  · Admission creatinine:  1 79  · Most recent creatinine:  1 64  · In the setting of acute blood loss anemia  · Diuretic dependent with Lasix 40 b i d  At baseline, currently holding  · Low threshold for low-dose diuretic p r n   For urine output  · Ball in place, monitor urine output  · Trend BUN/creatinine

## 2022-09-18 NOTE — ASSESSMENT & PLAN NOTE
Was receiving 1u pRBC on arrival via life flight for hgb 5 6  SHANT positive for melanotic stool  UGI bleed suspected  On coumadin, was given Kcentra, PO and IV vitamin K prior to transfer    Plan:  - GI consult  - trend hgb

## 2022-09-18 NOTE — PROGRESS NOTES
[facility]  ICU Transfer Note - Irving Sharma 1940, 79 y o  female MRN: [id number]  Unit/Bed#: ICU 07 Encounter: [id number]  Primary Care Provider: Guzman Flores DO   Date and time admitted to hospital: 9/17/2022  9:14 PM    * T11 vertebral fracture [facility])  Assessment & Plan  9/17 CT CAP: Fracture of the T11 vertebral body with narrowing of the spinal canal at this level  Evaluation is limited secondary to osteopenia/ankylosing spondylitis  Additional probable small fracture of the anterior endplate of U41     6/13 CT thoracic: Ddisruption of the anterior endplates/syndesmophytes of the T10 vertebral body and disruption of the anterior and posterior endplate of the Y44 vertebral bodies compatible with fracture  There is narrowing of the Spinal canal at the T11 level  Plan:  · Neurosurgery consult  · TLSO brace   · Upright XR   · q2h neuro checks   · No neurosurgical intervention at this time given neurologically intact and stable exam     Anemia  Assessment & Plan  · Hgb 5 7 on admission with INR 4 66 on coumadin, received 2 U PRBC now 7 7  · SHANT positive for melanotic stool  · Reports 2 weeks of melena   · UGI bleed suspected    Plan:  · Coumadin reversed with Elzie Lopez and Vitamin K   · Trend Hgb  · IV PPI BID    · GI consulted - plan for EGD and flex sig 9/19 or sooner if patient decompensates  · Clear liquid diet, NPO after midnight     Acute-on-chronic kidney injury [facility])  Assessment & Plan  Lab Results   Component Value Date    EGFR 28 09/18/2022    EGFR 26 09/17/2022    EGFR 23 09/17/2022    CREATININE 1 64 (H) 09/18/2022    CREATININE 1 79 (H) 09/17/2022    CREATININE 1 98 (H) 09/17/2022     · Baseline creatinine:  1 2-1 4  · Admission creatinine:  1 79  · Most recent creatinine:  1 64  · In the setting of acute blood loss anemia  · Diuretic dependent with Lasix 40 b i d  At baseline, currently holding  · Low threshold for low-dose diuretic p r n   For urine output  · Ball in place, monitor urine output  · Trend BUN/creatinine    Chronic heart failure (HCC)  Assessment & Plan  Wt Readings from Last 3 Encounters:   09/18/22 110 kg (243 lb)   11/12/21 110 kg (243 lb)   08/23/21 106 kg (234 lb 9 1 oz)     · Not in acute exacerbation   · Hx HF with biventricular dysfunction and EF 40% with PPM   · 9/18 Echo - EF 49%, diastolic dysfunction, mod AS, mod MR, RVSP 59  · Home regimen: Entresto, diltiazem  mg, digoxin 125 mcg, Coreg 25 mg b i d , aspirin 81 daily, lasix 40 mg BID     Plan:  · Telemetry monitoring  · Digoxin level 2 0  · Restart home digoxin tomorrow   · Holding all other home medications at this time, plan to slowly restart in below order   · 1  Coreg   · 2  Cardizem short acting 60 mg q6h   · 3  Lasix  (or earlier as needed for volume status and UOP)       Permanent atrial fibrillation (HCC)  Assessment & Plan  · On coumadin with INR 4 66 on admission   · Coumadin reversed with Kcentra and vitamin K on admission for acute blood loss anemia and received 2 PRBC  · Home regimen:  Digoxin 125 mcg, Coreg 25 mg b i d , diltiazem  mg    Plan:  · Currently rate controlled, continue holding home medications, restart Coreg and then Cardizem short-acting as noted above as tolerated  · Continue hold Coumadin pending EGD with GI    COPD (chronic obstructive pulmonary disease) ([facility])  Assessment & Plan  · On 2 L nasal cannula p r n  At home  · SpO2 goal > 88%  · Continue home Symbicort, albuterol p r n      SIRS (systemic inflammatory response syndrome) ([facility])  Assessment & Plan  · POA as evidenced by WBC 22 1, tachycardia 100, lactic 10 6  · No current concern for infectious etiology, SIRS secondary to GI bleed and anemia    Plan:  · Monitoring off antibiotics at this time  · Follow-up cultures  · Trend fever and WBC count    Fall  Assessment & Plan  Fell from chair height, was sitting  Denies LOC, but was found down after pressing alert button    Plan:  - geriatrics consult  - Plan as above     Code Status: Level 1 - Full Code  POA:    POLST:      Reason for ICU admission:   Close HD monitoring and neuro checks with anemia and T11 fracture     Active problems:   Principal Problem:    T11 vertebral fracture (HCC)  Active Problems:    Anemia    Acute-on-chronic kidney injury ([facility] [address] )    Chronic heart failure (HCC)    Permanent atrial fibrillation (HCC)    COPD (chronic obstructive pulmonary disease) ([facility] [address] )    Fall    SIRS (systemic inflammatory response syndrome) ([facility] )  Resolved Problems:    * No resolved hospital problems  *      Consultants:   Neurosurgery  GI    History of Present Illness:   Per Marsha Perez PA-C "79 y o  female who presented to [address] [place] [address] with pre-syncopal fall from standing with negative, negative LOC  Found to have T11 vertebral body fracture with narrowing of the spinal canal at the T11 level  Presented to ED with hemoglobin of 5 6 given 1u PRBCs  Given Kcentra and Vit K for INR of 4 6       PMHx includes CKD 4, HFrEF-last EF of 40% w/ BiV dysfunction, moderate pulm HTN, CKD 4, DM2, Afib on Coumadin, Pacemaker status, COPD/XIOMARA on CPAP at home, hx of colon ca s/p colectomy  "    Summary of clinical course:   She was given 1 unit of PRBC for hemoglobin of 5 6, recheck 6 8 and received 1 more unit with hemoglobin improved to 7 7 watched in ICU overnight and is hemodynamically stable  Evaluated by Neurosurgery with, no surgical intervention at this time as patient is neurologically intact  Evaluated by GI with plans for EGD tomorrow 9/18  Echo obtained with history of heart failure and presyncopal event, EF has improved to 65%, pre-syncope likely secondary to anemia    She has remained hemodynamically stable neurologically intact and she is stable to transfer to step-down level to    Recent or scheduled procedures:   Pending EGD and flex sig 9/19     Outstanding/pending diagnostics:   Thoracic upper right XR    Cultures:   Blood cultures in process  COVID/flu/RSV negative       Mobilization Plan:   Pending P T /OT evaluation  TLSO brace and thoracic spine precautions    Nutrition Plan:   Clear liquid diet, NPO at midnight for EGD    Invasive Devices Review  Invasive Devices  Report    Peripheral Intravenous Line  Duration           Peripheral IV 09/17/22 Right Antecubital <1 day    Peripheral IV 09/18/22 Left Antecubital <1 day                Rationale for remaining devices: Ball for accurate I/O with KAYLEE and anemia    VTE Pharmacologic Prophylaxis: Pharmacologic VTE Prophylaxis contraindicated due to Acute blood loss anemia, GI bleed  VTE Mechanical Prophylaxis: sequential compression device    Discharge Plan:   Initial Physical Therapy Recommendations:  Pending  Initial Occupational Therapy Recommendations:  Pending  Initial /Plan:  Pending    Home medications that are not reordered and reason why:   Entresto-KAYLEE, normotension  Coreg-currently rate controlled and normotensive  Cardizem-currently rate controlled and normotensive  Lasix-KAYLEE, not in acute heart failure exacerbation, resuscitation with blood products  Aspirin, Coumadin-acute blood loss anemia    Spoke with Karla Osorio PA-C  regarding transfer  Please contact critical care via [employer] with any questions or concerns  Portions of the record may have been created with voice recognition software  Occasional wrong word or "sound a like" substitutions may have occurred due to the inherent limitations of voice recognition software  Read the chart carefully and recognize, using context, where substitutions have occurred

## 2022-09-18 NOTE — ASSESSMENT & PLAN NOTE
· On coumadin with INR 4 66 on admission   · Coumadin reversed with Kcentra and vitamin K on admission for acute blood loss anemia and received 2 PRBC  · Home regimen:  Digoxin 125 mcg, Coreg 25 mg b i d , diltiazem  mg    Plan:  · Currently rate controlled, continue holding home medications, restart Coreg and then Cardizem short-acting as noted above as tolerated  · Continue hold Coumadin pending EGD with GI

## 2022-09-18 NOTE — ASSESSMENT & PLAN NOTE
· POA as evidenced by WBC 22 1, tachycardia 100, lactic 10 6  · No current concern for infectious etiology, SIRS secondary to GI bleed and anemia    Plan:  · Monitoring off antibiotics at this time  · Follow-up cultures  · Trend fever and WBC count

## 2022-09-18 NOTE — CASE MANAGEMENT
Case Management Assessment & Discharge Planning Note    Patient name Lenita Boas  Location ICU 07/ICU 07 MRN [id number]  : 1940 Date 2022       Current Admission Date: 2022  Current Admission Diagnosis:T11 vertebral fracture [facility])   Patient Active Problem List    Diagnosis Date Noted    Fall 2022    SIRS (systemic inflammatory response syndrome) ([facility] [address] ) 2022    [facility]    Acute on chronic congestive heart failure ([facility] [address] ) 2021    Permanent atrial fibrillation ([facility] [address] ) 2021    Stage 3 chronic kidney disease ([facility] 2021    Anemia 2021      LOS (days): 1  Geometric Mean LOS (GMLOS) (days):   Days to GMLOS:     OBJECTIVE:    Risk of Unplanned Readmission Score: 21 21         Current admission status: Inpatient       Preferred Pharmacy:   [facility] [employer] [facility] [address], [address] [facility] [address] [place] [address]  Phone: [phone] Fax: [phone]    Primary Care Provider: Arpita Saenz DO    Primary Insurance: [facility]  Secondary Insurance:     ASSESSMENT:  Aniket [address], [address]   Primary Phone: [phone] (Mobile)                 Patient Information  Admitted from[de-identified] Home  Mental Status: Alert  During Assessment patient was accompanied by: Not accompanied during assessment  Assessment information provided by[de-identified] Patient  Primary Caregiver: Self  Support Systems: Self, Family members, Marcella Baldwin [address] of Residence: [address] do you live in?: [address] entry access options  Select all that apply : Stairs  Number of steps to enter home  : 1  Do the steps have railings?: Yes  Type of Current Residence: [place]  In the last 12 months, was there a time when you were not able to pay the mortgage or rent on time?: No  In the last 12 months, how many places have you lived?: 1  In the last 12 months, was there a time when you did not have a steady place to sleep or slept in a shelter (including now)?: No  Homeless/housing insecurity resource given?: N/A  Living Arrangements: Lives Alone  Is patient a ?: No    Activities of Daily Living Prior to Admission  Functional Status: Independent  Completes ADLs independently?: Yes  Ambulates independently?: Yes  Does patient use assisted devices?: Yes  Assisted Devices (DME) used: Terri Collado, Other (Comment), Straight Cane (Scooter)  Does patient have a history of Outpatient Therapy (PT/OT)?: No  Does the patient have a history of Short-Term Rehab?: No  Does patient have a history of HHC?: No  Does patient currently have [facility]?: No         Patient Information Continued  Income Source: Pension/correction  Does patient have prescription coverage?: Yes  Within the past 12 months, you worried that your food would run out before you got the money to buy more : Never true  Within the past 12 months, the food you bought just didn't last and you didn't have money to get more : Never true  Food insecurity resource given?: N/A  Does patient receive dialysis treatments?: No  Does patient have a history of substance abuse?: No  Does patient have a history of Mental Health Diagnosis?: No         Means of Transportation  Means of Transport to Appts[de-identified] Drives Self  In the past 12 months, has lack of transportation kept you from medical appointments or from getting medications?: No  In the past 12 months, has lack of transportation kept you from meetings, work, or from getting things needed for daily living?: No  Was application for public transport provided?: N/A        DISCHARGE DETAILS:    Discharge planning discussed with[de-identified] Patient  Freedom of Choice: Yes  Comments - Freedom of Choice: Discussed  CM contacted family/caregiver?: No- see comments (Declined)     CM reviewed d/c planning process including the following: identifying help at home, patient preference for d/c planning needs, Discharge [facility] Galvez Meds to Bed program, availability of treatment team to discuss questions or concerns patient and/or family may have regarding understanding medications and recognizing signs and symptoms once discharged  CM also encouraged patient to follow up with all recommended appointments after discharge  Patient advised of importance for patient and family to participate in managing patients medical well being  Information obtained from patient  Lives in ranch style home alone, 1 alhaji  Was IADLS, has walker, scooter and cane at home  States she has supportive neighbors, is a retired PCA, enjoys watching tv    Has had 3 covid vaccines, and would be agreeable to second booster if needed

## 2022-09-18 NOTE — PROGRESS NOTES
[address] [facility] 79 y o  female MRN: [id number]  Unit/Bed#: CARMELITA Encounter: [id number]      -------------------------------------------------------------------------------------------------------------  Chief Complaint: pre-syncope-fall, T12 fx, UGIB    History of Present Illness   HX and PE limited by: Carito Abdi is a 79 y o  female who presented to [address] [place] [address] with pre-syncopal fall from standing with negative, negative LOC  Found to have T11 vertebral body fracture with narrowing of the spinal canal at the T11 level  Presented to ED with hemoglobin of 5 6 given 1u PRBCs  Given Kcentra and Vit K for INR of 4 6  PMHx includes CKD 4, HFrEF-last EF of 40% w/ BiV dysfunction, moderate pulm HTN, CKD 4, DM2, Afib on Coumadin, Pacemaker status, COPD/XIOMARA on CPAP at home, hx of colon ca s/p colectomy  History obtained from chart review and the patient   -------------------------------------------------------------------------------------------------------------  Assessment and Plan:    Neuro:    Diagnosis: T-12 fx w/ possible canal narrowing, PAD  o Plan:   o Neurosurgery consulted, appreciate recs  - Imaging shows possible canal narrowing but patient is sensorimotor intact in upper and lower extremities  o Multimodal analgesia  o T-spine precautions   o Q4 neuro checks  o Melatonin qHS    CV:    Diagnosis:  Heart failure with reduced ejection fraction-40% with biventricular dysfunction, moderate pulmonary hypertension, AFib on Coumadin, status post pacemaker, HLD  o Plan:   o Holding home Entresto, diltiazem  mg, digoxin 250 mcg, Coreg 25 mg b i d , aspirin 81 daily  o Blood pressure and heart rate stable for now but if rising use p r n  Antihypertensives, IV rate control in setting of active upper GI bleed  o Consider resuming digoxin if renal function is stable   o Hold Coumadin and other full-dose anticoagulation in setting of active upper GI bleed    o Status post pacemaker, continued telemetry  o Continue home statin, if able to safely take p o   o Repeat TTE    Pulm:   Diagnosis:  COPD/XOIMARA on p r n  2 L nasal cannula, moderate pulmonary hypertension  o Plan:   o Continue nasal cannula, wean for FiO2 greater than 90%      GI:    Diagnosis:  Upper GI bleed, melena, history of colon cancer status post transverse colon resection  o Plan:   o History of melana x2 weeks, low suspicion for acute upper GI bleed given hemodynamic stability  o Status post 2 units PRBC transfusion  o Given Kcentra, vitamin K for supratherapeutic INR reversal   o GI consulted appreciate recommendations    :    Diagnosis:  KAYLEE on CKD IV, Volume management, diuretic dependence  o Plan:   o Trend renal function/urine output daily  - Baseline creatinine 1 2-1 4, currently 1 79  o Diuretic dependence, takes 40 Lasix b i d   And Duncan  o Patient does not have adequate urine output consider restarting home diuresis verses p r n  IV diuresis    F/E/N:    Plan:   o F:  No IV fluids, resuscitated with blood products as indicated  o E: Replete PRN K>4 0, Phos>3 0, Mg>2 0  o N:  NPO pending GI evaluation/recommendations for scope      Heme/Onc:    Diagnosis:  ABLA, supratherapeutic INR on warfarin  o Plan:   o Transfuse p r n   o Resume anticoagulation when safe to do so from a hemodynamic/GI standpoint    Endo:    Diagnosis:  Non-insulin-dependent diabetes  o Plan:   o ISS goal -180    ID:    Diagnosis:  SIRS criteria  o Plan:   o Sirs criteria initially met likely secondary to acute upper GI bleed, improvement with blood resuscitation  o Blood cultures drawn and pending  o Trend fever/leukocytosis curve    MSK/Skin:    Diagnosis:  T12 fracture, multiple skin tears, venous stasis ulcers  o Plan:   o Wound care consult as needed  o TLSO when out of bed, maintain C-spine precautions  o Local wound care as needed for skin tears, venous stasis ulcers  o Continue daily dressing changes to left upper extremity wound  o Frequent repositionoing    Disposition: Continue Critical Care   Code Status: Level 1 - Full Code  --------------------------------------------------------------------------------------------------------------  Review of Systems   Constitutional: Negative for chills and fever  Respiratory: Negative for chest tightness and shortness of breath  Cardiovascular: Positive for leg swelling  Negative for chest pain and palpitations  Gastrointestinal: Positive for abdominal distention and blood in stool  Negative for abdominal pain, diarrhea, nausea and vomiting  Musculoskeletal: Positive for arthralgias and back pain  Neurological: Positive for light-headedness  Negative for syncope, weakness and numbness  Psychiatric/Behavioral: Negative for confusion  A 12-point, complete review of systems was reviewed and negative except as stated above     Physical Exam  Constitutional:       General: She is not in acute distress  Appearance: She is obese  She is ill-appearing  Interventions: Nasal cannula in place  HENT:      Head: Normocephalic and atraumatic  Cardiovascular:      Rate and Rhythm: Normal rate and regular rhythm  Heart sounds: Normal heart sounds  Pulmonary:      Effort: Pulmonary effort is normal       Breath sounds: Normal breath sounds  Abdominal:      General: Abdomen is flat  There is distension  Palpations: Abdomen is soft  Tenderness: There is no abdominal tenderness  Skin:     General: Skin is warm and dry  Capillary Refill: Capillary refill takes 2 to 3 seconds  Coloration: Skin is pale  Findings: Abrasion and signs of injury present  Neurological:      General: No focal deficit present  Mental Status: She is alert and oriented to person, place, and time  GCS: GCS eye subscore is 4  GCS verbal subscore is 5  GCS motor subscore is 6  --------------------------------------------------------------------------------------------------------------  Vitals:   Vitals:    09/17/22 2200 09/17/22 2300 09/17/22 2310 09/18/22 0000   BP: 119/55 94/50 100/50 104/50   BP Location:  Right arm Right arm    Pulse: 62 (!) 50 67 66   Resp: 22 22 20 20   Temp:  (!) 97 3 °F (36 3 °C)  (!) 97 4 °F (36 3 °C)   TempSrc:  Tympanic  Tympanic   SpO2: 99% 98% 98% 97%     Temp  Min: 96 °F (35 6 °C)  Max: 97 4 °F (36 3 °C)        There is no height or weight on file to calculate BMI  Laboratory and Diagnostics:  Results from last 7 days   Lab Units 09/17/22 2222 09/17/22 1718   WBC Thousand/uL 26 13* 22 11*   HEMOGLOBIN g/dL 6 8* 5 7*   HEMATOCRIT % 21 8* 18 9*   PLATELETS Thousands/uL 173 210   BANDS PCT %  --  3   MONO PCT % 1* 1*     Results from last 7 days   Lab Units 09/17/22 2222 09/17/22  1718   SODIUM mmol/L 137 137   POTASSIUM mmol/L 3 8 3 7   CHLORIDE mmol/L 104 98   CO2 mmol/L 21 19*   ANION GAP mmol/L 12 20*   BUN mg/dL 139* 131*   CREATININE mg/dL 1 79* 1 98*   CALCIUM mg/dL 8 4 8 3   GLUCOSE RANDOM mg/dL 228* 263*   ALT U/L 39 15   AST U/L 44 15   ALK PHOS U/L 54 55   ALBUMIN g/dL 2 5* 2 5*   TOTAL BILIRUBIN mg/dL 0 57 0 44     Results from last 7 days   Lab Units 09/17/22 2222   MAGNESIUM mg/dL 2 7*   PHOSPHORUS mg/dL 4 8*      Results from last 7 days   Lab Units 09/17/22 2222 09/17/22  1718   INR  1 65* 4 66*   PTT seconds 34 62*          Results from last 7 days   Lab Units 09/17/22 2222 09/17/22  1723   LACTIC ACID mmol/L 6 8* 10 6*     ABG:    VBG:  Results from last 7 days   Lab Units 09/17/22  1718   PH WENDY  7 236*   PCO2 WENDY mm Hg 39 2*   PO2 WENDY mm Hg 55 3*   HCO3 WENDY mmol/L 16 3*   BASE EXC WENDY mmol/L -10 3           Micro:        EKG: NSR on tele, rate 86  Imaging: I have personally reviewed pertinent reports        Historical Information   Past Medical History:   Diagnosis Date    A-fib [facility])     Cardiac pacemaker     CHF (congestive heart failure) (HCC)     Chronic cellulitis     COPD (chronic obstructive pulmonary disease) (HCC)     Diabetes mellitus (HCC)     Edema     High cholesterol     Hyperparathyroidism ([address] )     Hypertension     Hyperuricemia     Stage 4 chronic kidney disease (HCC)      Past Surgical History:   Procedure Laterality Date    CARDIAC DEFIBRILLATOR PLACEMENT       SECTION      x 2    COLON SURGERY      HERNIA REPAIR       Social History   Social History     Substance and Sexual Activity   Alcohol Use Never     Social History     Substance and Sexual Activity   Drug Use Never     Social History     Tobacco Use   Smoking Status Never Smoker   Smokeless Tobacco Never Used     Exercise History: Ambulates around house without assistance  Family History:   Family History   Problem Relation Age of Onset    Hypertension Mother     Stroke Mother     Heart disease Father      I have reviewed this patient's family history and commented on sigificant items within the HPI      Medications:  Current Facility-Administered Medications   Medication Dose Route Frequency    furosemide (LASIX) tablet 40 mg  40 mg Oral BID    metolazone (ZAROXOLYN) tablet 5 mg  5 mg Oral Daily    pantoprazole (PROTONIX) 80 mg in sodium chloride 0 9 % 100 mL infusion  8 mg/hr Intravenous Continuous     Home medications:  Prior to Admission Medications   Prescriptions Last Dose Informant Patient Reported? Taking?    FERROUS SULFATE PO   Yes No   Sig: daily   Magnesium 400 MG TABS   Yes No   Sig: Take 400 mg by mouth daily   Multiple Vitamin (MULTIVITAMIN) capsule   Yes No   Sig: Take by mouth   albuterol (2 5 mg/3 mL) 0 083 % nebulizer solution   Yes No   Sig: Inhale 2 5 mg   albuterol (PROVENTIL HFA,VENTOLIN HFA) 90 mcg/act inhaler   Yes No   Sig: Inhale 2 puffs   allopurinol (ZYLOPRIM) 300 mg tablet   Yes No   Sig: Take 300 mg by mouth daily     aspirin 81 mg chewable tablet   Yes No   Sig: Chew 81 mg daily atorvastatin (LIPITOR) 10 mg tablet   Yes No   Sig: Take 10 mg by mouth daily   budesonide-formoterol (SYMBICORT) 160-4 5 mcg/act inhaler   Yes No   Sig: Inhale 2 puffs 2 (two) times a day Rinse mouth after use     carvedilol (COREG) 25 mg tablet   Yes No   Sig: Take 25 mg by mouth 2 (two) times a day   cloNIDine (CATAPRES) 0 1 mg tablet   Yes No   Sig: Take 1 tablet by mouth 2 (two) times a day   colchicine (COLCRYS) 0 6 mg tablet   Yes No   Sig: Take 0 6 mg by mouth daily   digoxin (LANOXIN) 0 25 mg tablet   Yes No   Sig: Take 0 5 tablets by mouth daily   diltiazem (CARDIZEM CD) 180 mg 24 hr capsule   Yes No   Sig: Take 180 mg by mouth daily   furosemide (LASIX) 40 mg tablet   Yes No   Sig: Take 40 mg by mouth 2 (two) times a day     gabapentin (NEURONTIN) 100 mg capsule   Yes No   Sig: Take 100 mg by mouth 3 (three) times a day   guaiFENesin 400 mg   Yes No   Sig: Take 400 mg by mouth every 4 (four) hours   meclizine (ANTIVERT) 25 mg tablet   Yes No   Sig: Take 25 mg by mouth as needed   metolazone (ZAROXOLYN) 5 mg tablet   Yes No   Sig: Take 5 mg by mouth daily   pantoprazole (PROTONIX) 40 mg tablet   Yes No   Sig: Take 40 mg by mouth 2 (two) times a day   sacubitril-valsartan (ENTRESTO)  MG TABS   Yes No   Sig: Take 1 tablet by mouth 2 (two) times a day   spironolactone (Aldactone) 25 mg tablet   Yes No   Sig: Take 25 mg by mouth daily   warfarin (COUMADIN) 2 mg tablet   Yes No   Sig: Take 5 mg by mouth daily  and saturday   warfarin (COUMADIN) 7 5 mg tablet   Yes No   Si mg daily  and thursday      Facility-Administered Medications: None     Allergies:  No Known Allergies  ------------------------------------------------------------------------------------------------------------  Advance Directive and Living Will:      Power of :    POLST: ------------------------------------------------------------------------------------------------------------  Anticipated Length of Stay is > 2 midnights     Care Time Delivered:   Upon my evaluation, this patient had a high probability of imminent or life-threatening deterioration due to GIB, which required my direct attention, intervention, and personal management  I have personally provided 45 minutes (0000 to 0100) of critical care time, exclusive of procedures, teaching, family meetings, and any prior time recorded by providers other than myself  Hilario Cuellar PA-C        Portions of the record may have been created with voice recognition software  Occasional wrong word or "sound a like" substitutions may have occurred due to the inherent limitations of voice recognition software    Read the chart carefully and recognize, using context, where substitutions have occurred

## 2022-09-18 NOTE — ASSESSMENT & PLAN NOTE
9/17 CT CAP: Fracture of the T11 vertebral body with narrowing of the spinal canal at this level  Evaluation is limited secondary to osteopenia/ankylosing spondylitis  Additional probable small fracture of the anterior endplate of X68     8/76 CT thoracic: Ddisruption of the anterior endplates/syndesmophytes of the T10 vertebral body and disruption of the anterior and posterior endplate of the K70 vertebral bodies compatible with fracture  There is narrowing of the Spinal canal at the T11 level        Plan:  · Neurosurgery consult  · TLSO brace   · Upright XR   · q2h neuro checks   · No neurosurgical intervention at this time given neurologically intact and stable exam

## 2022-09-18 NOTE — H&P
[facility]  H&P- Teresa Net 1940, 79 y o  female MRN: [id number]  Unit/Bed#: ED 10 Encounter: [id number]  Primary Care Provider: Kindra Solares DO   Date and time admitted to hospital: 9/17/2022  9:14 PM    SIRS (systemic inflammatory response syndrome) ([facility] )  Assessment & Plan  Patient met SIRS criteria on arrival:  WBC 22 1  Lactate 10 6    Afebrile  Likely not infectious etiology, most likely 2/2 ongoing hemorrhage    Plan:  - f/u infectious workup, bcx, ucx    Fall  Assessment & Plan  Fell from chair height, was sitting  Denies LOC, but was found down after pressing alert button    Plan:  - geriatrics consult  - treat for associated injuries, remainder of care per ICU    T11 vertebral fracture [facility])  Assessment & Plan  9/17 CT CAP: 1  Fracture of the T11 vertebral body with narrowing of the spinal canal at this level  Evaluation is limited secondary to osteopenia/ankylosing spondylitis  Additional probable small fracture of the anterior endplate of M49  Further evaluation with MRI should be considered  2   Scattered pulmonary nodules measuring up to 5 mm  Based on current Fleischner Society 2017 Guidelines on incidental pulmonary nodule, no routine follow-up is needed if the patient is low risk  If the patient is high risk, optional follow-up chest CT at 12 months can be considered  3   Mildly prominent pretracheal lymph node measuring 11 mm  This too can be reassessed at time of follow-up chest CT  4   Bilateral cystic adnexal masses for which further evaluation with pelvic ultrasound is recommended  5   Cardiomegaly  9/17 CT thoracic: 1  Evaluation is limited by osteopenia and ankylosing spondylitis  There is disruption of the anterior endplates/syndesmophytes of the T10 vertebral body and disruption of the anterior and posterior endplate of the G58 vertebral bodies compatible with fracture    There is narrowing of the Spinal canal at the T11 level   Further evaluation with MRI should be considered given spinal canal narrowing and exam limitations  2   Degenerative changes of the thoracolumbar spine  Plan:  - neurosurgery consult  - maintain TLSO brace    Anemia  Assessment & Plan  Was receiving 1u pRBC on arrival via life flight for hgb 5 6  SHANT positive for melanotic stool  UGI bleed suspected  On coumadin, was given Kcentra, PO and IV vitamin K prior to transfer    Plan:  - GI consult  - trend hgb      Trauma Alert: Other transfer   Model of Arrival: Helicopter    Trauma Team: Attending Dr Kristi Kamara, Residents Wanda Massey and Fellow Lazaro Thornton  Consultants:     Neurosurgery: routine consult; Epic consult order placed; Other: {routine consult; Epic consult order placed; GI    History of Present Illness     Chief Complaint: fall  Mechanism:Fall     HPI:    Marisabel Neves is a 79 y o  female who presented via life flight as a transfer after sustaining a fall  She said she was sitting in a chair when she fell from sitting height and pressed her alert button  She was found down after an unknown amount of time  Denies LOC and headstrike  Was found to have a T11 fracture in setting of ankylosing spondylitis  Prior to transfer, the patient had a hgb of 5 6 and was transfused with 1u pRBC on her way to [facility]  Received 1 more unit on arrival with 4u prepared and held  Was alerted because patient met SIRS criteria  Review of Systems   Constitutional: Negative  HENT: Negative  Respiratory: Negative  Cardiovascular: Negative  Gastrointestinal: Positive for blood in stool  Genitourinary: Negative  Musculoskeletal: Negative  Skin: Negative  Neurological: Negative  Psychiatric/Behavioral: Negative  12-point, complete review of systems was reviewed and negative except as stated above       Historical Information     Past Medical History:   Diagnosis Date    A-fib [facility])     Cardiac pacemaker     CHF (congestive heart failure) (HCC)     Chronic cellulitis     COPD (chronic obstructive pulmonary disease) (HCC)     Diabetes mellitus (HCC)     Edema     High cholesterol     Hyperparathyroidism ([facility] [address] )     Hypertension     Hyperuricemia     Stage 4 chronic kidney disease (HCC)      Past Surgical History:   Procedure Laterality Date    CARDIAC DEFIBRILLATOR PLACEMENT       SECTION      x 2    COLON SURGERY      HERNIA REPAIR          Social History     Tobacco Use    Smoking status: Never Smoker    Smokeless tobacco: Never Used   Vaping Use    Vaping Use: Never used   Substance Use Topics    Alcohol use: Never    Drug use: Never     Immunization History   Administered Date(s) Administered    Tdap 2022     Last Tetanus: 2022  Family History: Non-contributory    1  Before the illness or injury that brought you to the Emergency, did you need someone to help you on a regular basis? 1=Yes   2  Since the illness or injury that brought you to the Emergency, have you needed more help than usual to take care of yourself? 1=Yes   3  Have you been hospitalized for one or more nights during the past 6 months (excluding a stay in the Emergency Department)? 0=No   4  In general, do you see well? 0=Yes   5  In general, do you have serious problems with your memory? 0=No   6  Do you take more than three different medications everyday?  1=Yes   TOTAL   3     Did you order a geriatric consult if the score was 2 or greater?: yes     Meds/Allergies   current meds:   Current Facility-Administered Medications   Medication Dose Route Frequency    acetaminophen (TYLENOL) tablet 975 mg  975 mg Oral Q8H PRN    HYDROmorphone HCl (DILAUDID) injection 0 2 mg  0 2 mg Intravenous Q4H PRN    lidocaine (LIDODERM) 5 % patch 1 patch  1 patch Topical Daily    melatonin tablet 6 mg  6 mg Oral HS    methocarbamol (ROBAXIN) tablet 500 mg  500 mg Oral Q6H PRN    oxyCODONE (ROXICODONE) IR tablet 2 5 mg  2 5 mg Oral Q4H PRN    oxyCODONE (ROXICODONE) IR tablet 5 mg  5 mg Oral Q4H PRN    pantoprazole (PROTONIX) 80 mg in sodium chloride 0 9 % 100 mL infusion  8 mg/hr Intravenous Continuous      No Known Allergies    Objective   Initial Vitals:   Temperature: (!) 96 °F (35 6 °C) (09/17/22 2120)  Pulse: 100 (09/17/22 2120)  Respirations: 22 (09/17/22 2120)  Blood Pressure: (!) 122/47 (09/17/22 2120)    Primary Survey:   Airway:        Status: patent;        Pre-hospital Interventions: none        Hospital Interventions: none  Breathing:        Pre-hospital Interventions: oxygen       Effort: normal       Right breath sounds: normal       Left breath sounds: normal  Circulation:        Rhythm:        Rate: regular   Right Pulses Left Pulses                        Disability:        GCS: Eye: 4; Verbal: 5 Motor: 6 Total: 15       Right Pupil: round;  reactive         Left Pupil:  round;  reactive      R Motor Strength L Motor Strength    R : 5/5   L : 5/5            Exposure:       Completed: No      Secondary Survey:  Physical Exam  Vitals reviewed  Constitutional:       General: She is not in acute distress  Appearance: She is obese  HENT:      Head: Normocephalic and atraumatic  Right Ear: External ear normal       Left Ear: External ear normal       Nose: Nose normal       Mouth/Throat:      Mouth: Mucous membranes are dry  Eyes:      Extraocular Movements: Extraocular movements intact  Pupils: Pupils are equal, round, and reactive to light  Cardiovascular:      Rate and Rhythm: Normal rate  Pulses: Normal pulses  Pulmonary:      Effort: Pulmonary effort is normal  No respiratory distress  Breath sounds: Normal breath sounds  Abdominal:      General: There is no distension  Palpations: Abdomen is soft  Tenderness: There is no abdominal tenderness  Musculoskeletal:         General: Normal range of motion  Cervical back: Normal range of motion     Neurological:      General: No focal deficit present  Mental Status: She is alert and oriented to person, place, and time  Invasive Devices  Report    Peripheral Intravenous Line  Duration           Peripheral IV 09/17/22 Left Hand <1 day    Peripheral IV 09/17/22 Right Antecubital <1 day    Peripheral IV 09/18/22 Left Antecubital <1 day          Drain  Duration           Urethral Catheter Latex 16 Fr  <1 day              Lab Results:   BMP/CMP:   Lab Results   Component Value Date    SODIUM 137 09/17/2022    K 3 8 09/17/2022     09/17/2022    CO2 21 09/17/2022     (H) 09/17/2022    CREATININE 1 79 (H) 09/17/2022    CALCIUM 8 4 09/17/2022    AST 44 09/17/2022    ALT 39 09/17/2022    ALKPHOS 54 09/17/2022    EGFR 26 09/17/2022   , CBC:   Lab Results   Component Value Date    WBC 26 13 (H) 09/17/2022    HGB 6 8 (LL) 09/17/2022    HCT 21 8 (L) 09/17/2022    MCV 97 09/17/2022     09/17/2022    MCH 30 4 09/17/2022    MCHC 31 2 (L) 09/17/2022    RDW 18 2 (H) 09/17/2022    MPV 10 6 09/17/2022    NRBC 1 09/17/2022    and Lactate: No results found for: LACTATE    Imaging Results: I have personally reviewed pertinent films in PACS  Chest Xray(s): pending   FAST exam(s): N/A   CT Scan(s): positive for acute findings: see below   Additional Xray(s): pending       9/17 CT H: no acute intracranial abnormality  9/17 CT cspine: No cervical spine fracture or traumatic malalignment  9/17 CT CAP: 1  Fracture of the T11 vertebral body with narrowing of the spinal canal at this level  Evaluation is limited secondary to osteopenia/ankylosing spondylitis  Additional probable small fracture of the anterior endplate of P66  Further evaluation with MRI should be considered  2   Scattered pulmonary nodules measuring up to 5 mm  Based on current Fleischner Society 2017 Guidelines on incidental pulmonary nodule, no routine follow-up is needed if the patient is low risk    If the patient is high risk, optional follow-up chest CT   at 12 months can be considered  3   Mildly prominent pretracheal lymph node measuring 11 mm  This too can be reassessed at time of follow-up chest CT  4   Bilateral cystic adnexal masses for which further evaluation with pelvic ultrasound is recommended  5   Cardiomegaly  9/17 CT thoracic: 1  Evaluation is limited by osteopenia and ankylosing spondylitis  There is disruption of the anterior endplates/syndesmophytes of the T10 vertebral body and disruption of the anterior and posterior endplate of the P50 vertebral bodies compatible with fracture  There is narrowing of the Spinal canal at the T11 level  Further evaluation with MRI should be considered given spinal canal narrowing and exam limitations  2   Degenerative changes of the thoracolumbar spine

## 2022-09-18 NOTE — ED NOTES
Lab called to add on INR to specimen obtained and sent down earlier  Lab stated they will add on        Philippe Blanton RN  09/17/22 [id number]

## 2022-09-18 NOTE — ASSESSMENT & PLAN NOTE
Selene Power from chair height, was sitting  Denies LOC, but was found down after pressing alert button    Plan:  - geriatrics consult  - Plan as above

## 2022-09-18 NOTE — PLAN OF CARE
Problem: MOBILITY - ADULT  Goal: Maintain or return to baseline ADL function  Description: INTERVENTIONS:  -  Assess patient's ability to carry out ADLs; assess patient's baseline for ADL function and identify physical deficits which impact ability to perform ADLs (bathing, care of mouth/teeth, toileting, grooming, dressing, etc )  - Assess/evaluate cause of self-care deficits   - Assess range of motion  - Assess patient's mobility; develop plan if impaired  - Assess patient's need for assistive devices and provide as appropriate  - Encourage maximum independence but intervene and supervise when necessary  - Involve family in performance of ADLs  - Assess for home care needs following discharge   - Consider OT consult to assist with ADL evaluation and planning for discharge  - Provide patient education as appropriate  Outcome: Progressing  Goal: Maintains/Returns to pre admission functional level  Description: INTERVENTIONS:  - Perform BMAT or MOVE assessment daily    - Set and communicate daily mobility goal to care team and patient/family/caregiver  - Collaborate with rehabilitation services on mobility goals if consulted  - Perform Range of Motion 3 times a day  - Reposition patient every 2 hours    - Dangle patient 3 times a day  - Stand patient 3 times a day  - Ambulate patient 3 times a day  - Out of bed to chair 3 times a day   - Out of bed for meals 3 times a day  - Out of bed for toileting  - Record patient progress and toleration of activity level   Outcome: Progressing     Problem: Potential for Falls  Goal: Patient will remain free of falls  Description: INTERVENTIONS:  - Educate patient/family on patient safety including physical limitations  - Instruct patient to call for assistance with activity   - Consult OT/PT to assist with strengthening/mobility   - Keep Call bell within reach  - Keep bed low and locked with side rails adjusted as appropriate  - Keep care items and personal belongings within reach  - Initiate and maintain comfort rounds  - Make Fall Risk Sign visible to staff  - Offer Toileting every 2 Hours, in advance of need  - Initiate/Maintain bed alarm  - Obtain necessary fall risk management equipment: n/a  - Apply yellow socks and bracelet for high fall risk patients  - Consider moving patient to room near nurses station  Outcome: Progressing     Problem: Prexisting or High Potential for Compromised Skin Integrity  Goal: Skin integrity is maintained or improved  Description: INTERVENTIONS:  - Identify patients at risk for skin breakdown  - Assess and monitor skin integrity  - Assess and monitor nutrition and hydration status  - Monitor labs   - Assess for incontinence   - Turn and reposition patient  - Assist with mobility/ambulation  - Relieve pressure over bony prominences  - Avoid friction and shearing  - Provide appropriate hygiene as needed including keeping skin clean and dry  - Evaluate need for skin moisturizer/barrier cream  - Collaborate with interdisciplinary team   - Patient/family teaching  - Consider wound care consult   Outcome: Progressing     Problem: PAIN - ADULT  Goal: Verbalizes/displays adequate comfort level or baseline comfort level  Description: Interventions:  - Encourage patient to monitor pain and request assistance  - Assess pain using appropriate pain scale  - Administer analgesics based on type and severity of pain and evaluate response  - Implement non-pharmacological measures as appropriate and evaluate response  - Consider cultural and social influences on pain and pain management  - Notify physician/advanced practitioner if interventions unsuccessful or patient reports new pain  Outcome: Progressing     Problem: INFECTION - ADULT  Goal: Absence or prevention of progression during hospitalization  Description: INTERVENTIONS:  - Assess and monitor for signs and symptoms of infection  - Monitor lab/diagnostic results  - Monitor all insertion sites, i e  indwelling lines, tubes, and drains  - Monitor endotracheal if appropriate and nasal secretions for changes in amount and color  - [place] appropriate cooling/warming therapies per order  - Administer medications as ordered  - Instruct and encourage patient and family to use good hand hygiene technique  - Identify and instruct in appropriate isolation precautions for identified infection/condition  Outcome: Progressing     Problem: SAFETY ADULT  Goal: Maintain or return to baseline ADL function  Description: INTERVENTIONS:  -  Assess patient's ability to carry out ADLs; assess patient's baseline for ADL function and identify physical deficits which impact ability to perform ADLs (bathing, care of mouth/teeth, toileting, grooming, dressing, etc )  - Assess/evaluate cause of self-care deficits   - Assess range of motion  - Assess patient's mobility; develop plan if impaired  - Assess patient's need for assistive devices and provide as appropriate  - Encourage maximum independence but intervene and supervise when necessary  - Involve family in performance of ADLs  - Assess for home care needs following discharge   - Consider OT consult to assist with ADL evaluation and planning for discharge  - Provide patient education as appropriate  Outcome: Progressing  Goal: Maintains/Returns to pre admission functional level  Description: INTERVENTIONS:  - Perform BMAT or MOVE assessment daily    - Set and communicate daily mobility goal to care team and patient/family/caregiver  - Collaborate with rehabilitation services on mobility goals if consulted  - Perform Range of Motion 3 times a day  - Reposition patient every 2 hours    - Dangle patient 3 times a day  - Stand patient 3 times a day  - Ambulate patient 3 times a day  - Out of bed to chair 3 times a day   - Out of bed for meals 3 times a day  - Out of bed for toileting  - Record patient progress and toleration of activity level   Outcome: Progressing  Goal: Patient will remain free of falls  Description: INTERVENTIONS:  - Educate patient/family on patient safety including physical limitations  - Instruct patient to call for assistance with activity   - Consult OT/PT to assist with strengthening/mobility   - Keep Call bell within reach  - Keep bed low and locked with side rails adjusted as appropriate  - Keep care items and personal belongings within reach  - Initiate and maintain comfort rounds  - Make Fall Risk Sign visible to staff  - Offer Toileting every 2 Hours, in advance of need  - Initiate/Maintain bed alarm  - Obtain necessary fall risk management equipment: n/a  - Apply yellow socks and bracelet for high fall risk patients  - Consider moving patient to room near nurses station  Outcome: Progressing     Problem: DISCHARGE PLANNING  Goal: Discharge to home or other facility with appropriate resources  Description: INTERVENTIONS:  - Identify barriers to discharge w/patient and caregiver  - Arrange for needed discharge resources and transportation as appropriate  - Identify discharge learning needs (meds, wound care, etc )  - Arrange for interpretive services to assist at discharge as needed  - Refer to Case Management Department for coordinating discharge planning if the patient needs post-hospital services based on physician/advanced practitioner order or complex needs related to functional status, cognitive ability, or social support system  Outcome: Progressing     Problem: Knowledge Deficit  Goal: Patient/family/caregiver demonstrates understanding of disease process, treatment plan, medications, and discharge instructions  Description: Complete learning assessment and assess knowledge base    Interventions:  - Provide teaching at level of understanding  - Provide teaching via preferred learning methods  Outcome: Progressing     Problem: Nutrition/Hydration-ADULT  Goal: Nutrient/Hydration intake appropriate for improving, restoring or maintaining nutritional needs  Description: Monitor and assess patient's nutrition/hydration status for malnutrition  Collaborate with interdisciplinary team and initiate plan and interventions as ordered  Monitor patient's weight and dietary intake as ordered or per policy  Utilize nutrition screening tool and intervene as necessary  Determine patient's food preferences and provide high-protein, high-caloric foods as appropriate       INTERVENTIONS:  - Monitor oral intake, urinary output, labs, and treatment plans  - Assess nutrition and hydration status and recommend course of action  - Evaluate amount of meals eaten  - Assist patient with eating if necessary   - Allow adequate time for meals  - Recommend/ encourage appropriate diets, oral nutritional supplements, and vitamin/mineral supplements  - Order, calculate, and assess calorie counts as needed  - Recommend, monitor, and adjust tube feedings and TPN/PPN based on assessed needs  - Assess need for intravenous fluids  - Provide specific nutrition/hydration education as appropriate  - Include patient/family/caregiver in decisions related to nutrition  Outcome: Progressing

## 2022-09-18 NOTE — ASSESSMENT & PLAN NOTE
Wt Readings from Last 3 Encounters:   09/18/22 110 kg (243 lb)   11/12/21 110 kg (243 lb)   08/23/21 106 kg (234 lb 9 1 oz)     · Not in acute exacerbation   · Hx HF with biventricular dysfunction and EF 40% with PPM   · 9/18 Echo - EF 13%, diastolic dysfunction, mod AS, mod MR, RVSP 59  · Home regimen: Entresto, diltiazem  mg, digoxin 125 mcg, Coreg 25 mg b i d , aspirin 81 daily, lasix 40 mg BID     Plan:  · Telemetry monitoring  · Digoxin level 2 0  · Restart home digoxin tomorrow   · Holding all other home medications at this time, plan to slowly restart in below order   · 1  Coreg   · 2  Cardizem short acting 60 mg q6h   · 3   Lasix  (or earlier as needed for volume status and UOP)

## 2022-09-18 NOTE — ED NOTES
PT transported to [facility] ED by [employer]  At time of transport, 200mL of 350mL bag of blood was infused  Life flight took blood off of pump and hung by gravity  Pt stable, no s/s of distress  Report called to [facility] ED       Eulalia Ramirez, MAUREEN  09/17/22 2046

## 2022-09-18 NOTE — TRAUMA DOCUMENTATION
Pt arrives with blood infusing by gravity  Per life flight 3 there is approximately 250 ml left to infuse  This RN continued infusion  Pt also has a temp of 96 F upon arrival, bear hugger applied

## 2022-09-18 NOTE — ASSESSMENT & PLAN NOTE
Roney from chair height, was sitting  Denies LOC, but was found down after pressing alert button    Plan:  - geriatrics consult  - treat for associated injuries, remainder of care per ICU

## 2022-09-18 NOTE — RESTORATIVE TECHNICIAN NOTE
Restorative Technician Note      Patient Name: Trinidad Vegas     Restorative Tech Visit Date: 9/18/2022  Note Type: Bracing, Initial consult  Patient Position Upon Consult: Supine  Brace Applied: [place] TLSO  Additional Brace Ordered: No  Patient Position When Brace Applied: Supine  Bracing Recommendations: None  Education Provided: Yes  Patient Position at End of Consult: Supine; All needs within reach  Nurse Communication: Nurse aware of consult, application of brace      Pt fit for TLSO brace overnight    Please call Mobility Coordinator at ext  [phone] or on [place] text " [facility]-PT-Restorative Tech" role in regards to bracing instruction and/or adjustment        Lenora Lott  Restorative Technician

## 2022-09-18 NOTE — CONSULTS
Neurosurgery Consult Note  Anne Max 79 y o  female MRN: [id number]    Assessment:   Patent is a 58-year-old female with h/o Afib on Coumadin, reversed with K centra, pacemaker, CHF, COPD, DM, HLD, HTN, CKD stage IV who presented after fall from chair, found to have anemia likely due to UGI bleed a/w leukocytosis  Patient denies significant back pain (only pain in bilateral arms diffusely due to bruising) or any radicular symptoms (baseline requires walker and cane to ambulate)  Neurologically intact on exam     CT showed diffuse osteopenia and ankylosing spondylitis with T10/T11 fracture and minimal retropulsion      Plan:  [facility] neuro exams in ICU  -Thoracic spine precautions, bedrest and flat (placed in reverse Trendelenburg position)  -TSLO brace with baseline XR when able  -No acute neurosurgical intervention for now given intact and stable exam  -Trauma primary      HPI  See above    History:  Past Medical History:   Diagnosis Date    A-fib [facility])     Cardiac pacemaker     CHF (congestive heart failure) (HCC)     Chronic cellulitis     COPD (chronic obstructive pulmonary disease) (HCC)     Diabetes mellitus (HCC)     Edema     High cholesterol     Hyperparathyroidism (HCC)     Hypertension     Hyperuricemia     Stage 4 chronic kidney disease (HCC)      Past Surgical History:   Procedure Laterality Date    CARDIAC DEFIBRILLATOR PLACEMENT       SECTION      x 2    COLON SURGERY      HERNIA REPAIR         Current medications:    Current Facility-Administered Medications:     acetaminophen (TYLENOL) tablet 975 mg, 975 mg, Oral, Q8H PRN, Zachary G Paul    HYDROmorphone HCl (DILAUDID) injection 0 2 mg, 0 2 mg, Intravenous, Q4H PRN, Zachary Moreno    lidocaine (LIDODERM) 5 % patch 1 patch, 1 patch, Topical, Daily, Zachary Moreno    melatonin tablet 6 mg, 6 mg, Oral, HS, Zachary Moreno, 6 mg at 22 0151    methocarbamol (ROBAXIN) tablet 500 mg, 500 mg, Oral, Q6H PRN, Roxanne Giordano Severa Seed    oxyCODONE (ROXICODONE) IR tablet 2 5 mg, 2 5 mg, Oral, Q4H PRN, Royal Spoon    oxyCODONE (ROXICODONE) IR tablet 5 mg, 5 mg, Oral, Q4H PRN, Royal Spoon, 5 mg at 09/18/22 0151    pantoprazole (PROTONIX) 80 mg in sodium chloride 0 9 % 100 mL infusion, 8 mg/hr, Intravenous, Continuous, Stan Dick MD, Last Rate: 10 mL/hr at 09/17/22 2304, 8 mg/hr at 09/17/22 2304    Allergies:  No Known Allergies    Review of systems:  General ROS: negative for chills, fatigue, fever, night sweats, or unintentional weight changes  Respiratory ROS: no cough, shortness of breath, or wheezing  Cardiovascular ROS: no chest pain or dyspnea on exertion  Gastrointestinal ROS: no abdominal pain, change in bowel habits, or black or bloody stools  Genito-Urinary ROS: no dysuria, trouble voiding, or hematuria  Musculoskeletal ROS: negative  Neurological ROS: negative except for symptoms outlined in HPI      Neurologic exam:  Alert, oriented X 3  Language intact  PERRL, EOMI  Face symmetric  5/5 in all extremities (BUE pain limited due to diffuse pain and bruising but at least 4/5 with effort)  Sensation intact throughout  No long tract signs    Lab Results: All relevant preoperative labs reviewed  Imaging: All relevant preoperative imaging reviewed  EKG, Pathology, and Other Studies:  All relevant preoperative studies reviewed    Mariposa Ferrell MD

## 2022-09-18 NOTE — ASSESSMENT & PLAN NOTE
· On 2 L nasal cannula p r n  At home  · SpO2 goal > 88%  · Continue home Symbicort, albuterol p r n

## 2022-09-18 NOTE — H&P
[address] [facility]  H&P- Terri Rebolledo 1940, 79 y o  female MRN: [id number]  Unit/Bed#: ED 10 Encounter: [id number]  Primary Care Provider: Angeles Luu DO   Date and time admitted to hospital: 2022  9:14 PM    T11 vertebral fracture [facility])  Assessment & Plan  Plan:  - neurosurgery consult        Trauma Alert: Other transfer   Model of Arrival: Helicopter    Trauma Team: Attending Dr Missy Huber, Residents Mendoza Solis and Fellow Jania Whitten  Consultants:     Neurosurgery: routine consult; Epic consult order placed; History of Present Illness     Chief Complaint: fall  Mechanism:Fall     HPI:    Terri Rebolledo is a 79 y o  female who presents with ***  Review of Systems   Constitutional: Negative  HENT: Negative  Respiratory: Negative  Cardiovascular: Negative  Gastrointestinal: Negative  Genitourinary: Negative  Musculoskeletal: Negative  Skin: Negative  Neurological: Negative  Hematological: Negative  12-point, complete review of systems was reviewed and negative except as stated above       Historical Information     Past Medical History:   Diagnosis Date    A-fib [facility])     Cardiac pacemaker     CHF (congestive heart failure) (HCC)     Chronic cellulitis     COPD (chronic obstructive pulmonary disease) (HCC)     Diabetes mellitus (HCC)     Edema     High cholesterol     Hyperparathyroidism ([address] )     Hypertension     Hyperuricemia     Stage 4 chronic kidney disease (HCC)      Past Surgical History:   Procedure Laterality Date    CARDIAC DEFIBRILLATOR PLACEMENT       SECTION      x 2    COLON SURGERY      HERNIA REPAIR          Social History     Tobacco Use    Smoking status: Never Smoker    Smokeless tobacco: Never Used   Vaping Use    Vaping Use: Never used   Substance Use Topics    Alcohol use: Never    Drug use: Never     Immunization History   Administered Date(s) Administered    Tdap 09/17/2022     Last Tetanus: 9/17/2022  Family History: Non-contributory    1  Before the illness or injury that brought you to the Emergency, did you need someone to help you on a regular basis? 1=Yes   2  Since the illness or injury that brought you to the Emergency, have you needed more help than usual to take care of yourself? 1=Yes   3  Have you been hospitalized for one or more nights during the past 6 months (excluding a stay in the Emergency Department)? {Yes=1/No=0:11161}   4  In general, do you see well? {Yes=0/No=1:62499}   5  In general, do you have serious problems with your memory? {Yes=1/No=0:82496}   6  Do you take more than three different medications everyday?  {Yes=1/No=0:64933}   TOTAL   {Numbers; 5-6:46942}     Did you order a geriatric consult if the score was 2 or greater?: yes     Meds/Allergies   current meds:   Current Facility-Administered Medications   Medication Dose Route Frequency    furosemide (LASIX) tablet 40 mg  40 mg Oral BID    [START ON 9/18/2022] metolazone (ZAROXOLYN) tablet 5 mg  5 mg Oral Daily    pantoprazole (PROTONIX) 80 mg in sodium chloride 0 9 % 100 mL infusion  8 mg/hr Intravenous Continuous      No Known Allergies    Objective   Initial Vitals:   Temperature: (!) 96 °F (35 6 °C) (09/17/22 2120)  Pulse: 100 (09/17/22 2120)  Respirations: 22 (09/17/22 2120)  Blood Pressure: (!) 122/47 (09/17/22 2120)    Primary Survey:   Airway:        Status: patent;        Pre-hospital Interventions: none        Hospital Interventions: none  Breathing:        Pre-hospital Interventions: oxygen       Effort: normal       Right breath sounds: normal       Left breath sounds: normal  Circulation:        Rhythm: regular       Rate: regular   Right Pulses Left Pulses    R radial: 2+         L radial: 2+           Disability:        GCS: Eye: 4; Verbal: 5 Motor: 6 Total: 15       Right Pupil: round;  reactive         Left Pupil:  round;  reactive      R Motor Strength L Motor Strength R : 5/5   L : 5/5          Sensory:  No sensory deficit  Exposure:       Completed: Yes      Secondary Survey:  Physical Exam  Vitals reviewed  Constitutional:       General: She is not in acute distress  Appearance: She is obese  HENT:      Head: Normocephalic and atraumatic  Right Ear: External ear normal       Left Ear: External ear normal       Nose: Nose normal       Mouth/Throat:      Mouth: Mucous membranes are dry  Eyes:      Extraocular Movements: Extraocular movements intact  Pupils: Pupils are equal, round, and reactive to light  Cardiovascular:      Rate and Rhythm: Normal rate and regular rhythm  Pulses: Normal pulses  Heart sounds: Normal heart sounds  Pulmonary:      Effort: Pulmonary effort is normal       Breath sounds: Normal breath sounds  Abdominal:      General: There is no distension  Palpations: Abdomen is soft  Tenderness: There is no abdominal tenderness  Musculoskeletal:         General: Normal range of motion  Skin:     Findings: Abrasion present  Neurological:      General: No focal deficit present  Mental Status: She is alert and oriented to person, place, and time           Invasive Devices  Report    Peripheral Intravenous Line  Duration           Peripheral IV 09/17/22 Left Hand <1 day    Peripheral IV 09/17/22 Right Antecubital <1 day          Drain  Duration           Urethral Catheter Latex 16 Fr  <1 day              Lab Results: {Laboratory Data:59421}    Imaging Results: I have personally reviewed pertinent films in PACS  Chest Xray(s): N/A   FAST exam(s): N/A   CT Scan(s): positive for acute findings: see below   Additional Xray(s): {Results:41255}     Other Studies: ***    Code Status: Level 1 - Full Code  Advance Directive and Living Will:      Power of :    POLST:    I have spent *** minutes with {Patient /Family:03877} today in which greater than 50% of this time was spent in counseling/coordination of care regarding {AMB Counseling Topics:[id number]}

## 2022-09-18 NOTE — CONSULTS
Consultation - 126 [facility]  Estephanie Speaker 79 y o  female MRN: [id number]  Unit/Bed#: ICU 07 Encounter: [id number]              Inpatient consult to gastroenterology     Performed by  Teddy Simons DO     Authorized by Ermelinda Chance              Reason for Consult / Principal Problem:     Anemia      ASSESSMENT AND PLAN:      70-year-old female with past medical history of colon cancer status post colectomy with ileoanal anastomosis, AFib on Coumadin, CKD 4, hypertension, COPD, heart failure with reduced ejection fraction of 40% who presented to [facility] after sustaining a fall with T11 compression fracture  GI is consulted due to anemia and reported history of melena  1  Anemia, reported melena  Patient remains hemodynamically stable, not requiring pressor therapy  BUN is elevated however she has KAYLEE  Hemoglobin increased appropriately from 5 77 7 after 2 units PRBCs  Rectal exam shows brown stool  Differential is broad but includes peptic ulcer disease, AVMs, GAVE, Dieulafoy's lesion  INR was 4 6 on presentation  Given vitamin K and Kcentra  · Will perform EGD/flex sig tentatively tomorrow given patient's hemodynamic stability  · If any change in hemodynamic status with signs of overt GI bleeding will perform endoscopy sooner  · Continue PPI GTT  · Tap water enemas tomorrow morning  · Clear liquid diet, NPO at midnight  · Avoid anticoagulation  · Avoid hypotensive medication   Monitor hemoglobin, transfuse for less than 7      2  Atrial fibrillation on Coumadin  INR 4 6 on presentation, corrected with vitamin K, Kcentra, currently 1 65   · Hold anticoagulation  · Management as described above  3  History of colon cancer status post total colectomy with ileoanal anastomosis in 2019  · Follow-up as an outpatient  Rest of care per primary team   Thank you for this consultation  ______________________________________________________________________    HPI:  39-year-old female with past medical history of colon cancer status post colectomy with ileoanal anastomosis, AFib on Coumadin, CKD 4, hypertension, COPD, heart failure with reduced ejection fraction of 40% who presented to [facility] after sustaining a fall with T11 compression fracture  GI is consulted due to anemia and reported history of melena  For patient she was at home and fell from chair due to leg weakness  She denies any presyncopal symptoms including dizziness, gait imbalance, blurry vision, shortness of breath  Stated that she was having darker stools over the past few days but was taking iron pill supplementation  Denies any bright red blood, hematemesis, coffee-ground emesis  No prior upper endoscopy  Last colonoscopy was in 2019  No report available  REVIEW OF SYSTEMS:    Review of Systems   Constitutional: Positive for fatigue  Negative for chills and fever  HENT: Negative for congestion and sinus pressure  Respiratory: Negative for cough and shortness of breath  Cardiovascular: Negative for chest pain, palpitations and leg swelling  Gastrointestinal: Negative for abdominal pain, diarrhea, nausea and vomiting  Genitourinary: Negative for dysuria and hematuria  Musculoskeletal: Positive for back pain  Negative for arthralgias  Skin: Negative for color change and rash  Neurological: Negative for dizziness and headaches  Psychiatric/Behavioral: Negative for agitation and confusion  All other systems reviewed and are negative           Historical Information   Past Medical History:   Diagnosis Date    A-fib [facility])     Cardiac pacemaker     CHF (congestive heart failure) (HCC)     Chronic cellulitis     COPD (chronic obstructive pulmonary disease) (HCC)     Diabetes mellitus (HCC)     Edema     High cholesterol     Hyperparathyroidism ([facility] [address] )     Hypertension     Hyperuricemia     Stage 4 chronic kidney disease (HCC)      Past Surgical History:   Procedure Laterality Date    CARDIAC DEFIBRILLATOR PLACEMENT       SECTION      x 2    COLON SURGERY      HERNIA REPAIR       Social History   Social History     Substance and Sexual Activity   Alcohol Use Never     Social History     Substance and Sexual Activity   Drug Use Never     Social History     Tobacco Use   Smoking Status Never Smoker   Smokeless Tobacco Never Used     Family History   Problem Relation Age of Onset    Hypertension Mother     Stroke Mother     Heart disease Father        Meds/Allergies     Medications Prior to Admission   Medication    albuterol (2 5 mg/3 mL) 0 083 % nebulizer solution    albuterol (PROVENTIL HFA,VENTOLIN HFA) 90 mcg/act inhaler    allopurinol (ZYLOPRIM) 300 mg tablet    aspirin 81 mg chewable tablet    atorvastatin (LIPITOR) 10 mg tablet    budesonide-formoterol (SYMBICORT) 160-4 5 mcg/act inhaler    carvedilol (COREG) 25 mg tablet    cloNIDine (CATAPRES) 0 1 mg tablet    colchicine (COLCRYS) 0 6 mg tablet    digoxin (LANOXIN) 0 25 mg tablet    diltiazem (CARDIZEM CD) 180 mg 24 hr capsule    FERROUS SULFATE PO    furosemide (LASIX) 40 mg tablet    gabapentin (NEURONTIN) 100 mg capsule    guaiFENesin 400 mg    Magnesium 400 MG TABS    meclizine (ANTIVERT) 25 mg tablet    metolazone (ZAROXOLYN) 5 mg tablet    Multiple Vitamin (MULTIVITAMIN) capsule    pantoprazole (PROTONIX) 40 mg tablet    sacubitril-valsartan (ENTRESTO)  MG TABS    spironolactone (Aldactone) 25 mg tablet    warfarin (COUMADIN) 2 mg tablet    warfarin (COUMADIN) 7 5 mg tablet     Current Facility-Administered Medications   Medication Dose Route Frequency    acetaminophen (TYLENOL) tablet 975 mg  975 mg Oral Q8H PRN    HYDROmorphone HCl (DILAUDID) injection 0 2 mg  0 2 mg Intravenous Q4H PRN    lidocaine (LIDODERM) 5 % patch 1 patch  1 patch Topical Daily    melatonin tablet 6 mg  6 mg Oral HS    methocarbamol (ROBAXIN) tablet 500 mg  500 mg Oral Q6H PRN    oxyCODONE (ROXICODONE) IR tablet 2 5 mg  2 5 mg Oral Q4H PRN    oxyCODONE (ROXICODONE) IR tablet 5 mg  5 mg Oral Q4H PRN    pantoprazole (PROTONIX) 80 mg in sodium chloride 0 9 % 100 mL infusion  8 mg/hr Intravenous Continuous       No Known Allergies        Objective     Blood pressure 121/59, pulse 70, temperature (!) 97 4 °F (36 3 °C), temperature source Oral, resp  rate (!) 23, SpO2 97 %  There is no height or weight on file to calculate BMI  Intake/Output Summary (Last 24 hours) at 9/18/2022 0614  Last data filed at 9/18/2022 0401  Gross per 24 hour   Intake 449 5 ml   Output 425 ml   Net 24 5 ml         PHYSICAL EXAM:      Physical Exam  Vitals and nursing note reviewed  Constitutional:       General: She is not in acute distress  Appearance: Normal appearance  She is well-developed  She is obese  She is not ill-appearing  Interventions: Nasal cannula in place  HENT:      Head: Normocephalic and atraumatic  Mouth/Throat:      Mouth: Mucous membranes are moist    Eyes:      Extraocular Movements: Extraocular movements intact  Conjunctiva/sclera: Conjunctivae normal       Pupils: Pupils are equal, round, and reactive to light  Cardiovascular:      Rate and Rhythm: Normal rate and regular rhythm  Pulses: Normal pulses  Heart sounds: Normal heart sounds  No murmur heard  No friction rub  No gallop  Pulmonary:      Effort: Pulmonary effort is normal  No respiratory distress  Breath sounds: Normal breath sounds  No wheezing or rales  Abdominal:      General: Abdomen is flat  Bowel sounds are normal  There is no distension  Palpations: Abdomen is soft  Tenderness: There is no abdominal tenderness  There is no guarding  Genitourinary:     Comments: Brown stool in rectal vault  Musculoskeletal:      Cervical back: Neck supple  Right lower leg: No edema        Left lower leg: No edema  Skin:     General: Skin is warm and dry  Neurological:      General: No focal deficit present  Mental Status: She is alert and oriented to person, place, and time  Psychiatric:         Mood and Affect: Mood normal          Behavior: Behavior normal           Lab Results:   Admission on 09/17/2022   Component Date Value    WBC 09/17/2022 26 13 (A)    RBC 09/17/2022 2 24 (A)    Hemoglobin 09/17/2022 6 8 (A)    Hematocrit 09/17/2022 21 8 (A)    MCV 09/17/2022 97     MCH 09/17/2022 30 4     MCHC 09/17/2022 31 2 (A)    RDW 09/17/2022 18 2 (A)    MPV 09/17/2022 10 6     Platelets 10/09/7484 173     Sodium 09/17/2022 137     Potassium 09/17/2022 3 8     Chloride 09/17/2022 104     CO2 09/17/2022 21     ANION GAP 09/17/2022 12     BUN 09/17/2022 139 (A)    Creatinine 09/17/2022 1 79 (A)    Glucose 09/17/2022 228 (A)    Calcium 09/17/2022 8 4     Corrected Calcium 09/17/2022 9 6     AST 09/17/2022 44     ALT 09/17/2022 39     Alkaline Phosphatase 09/17/2022 54     Total Protein 09/17/2022 5 9 (A)    Albumin 09/17/2022 2 5 (A)    Total Bilirubin 09/17/2022 0 57     eGFR 09/17/2022 26     Protime 09/17/2022 19 7 (A)    INR 09/17/2022 1 65 (A)    PTT 09/17/2022 34     CKR(REACTION TIME) 09/17/2022 7 3     CKLY30 09/17/2022 0 0     CRTMA(RAPIDTEG MAX AMPLI* 09/17/2022 69 8     CFFMA (FUNCTIONAL FIBRIN* 09/17/2022 42 4 (A)    Magnesium 09/17/2022 2 7 (A)    Phosphorus 09/17/2022 4 8 (A)    LACTIC ACID 09/17/2022 6 8 (A)    Blood Culture 09/17/2022 Received in Microbiology Lab  Culture in Progress   Blood Culture 09/17/2022 Received in Microbiology Lab  Culture in Progress       ABO Grouping 09/17/2022 O     Rh Factor 09/17/2022 Positive     Antibody Screen 09/17/2022 Negative     Specimen Expiration Date 09/17/2022 [id number]     Unit Product Code 09/18/2022 [id number]     Unit Number 09/18/2022 [id number]     Unit ABO 09/18/2022 Graciela Kimble     Unit RH 09/18/2022 POS     Crossmatch 09/18/2022 Compatible     Unit Dispense Status 09/18/2022 Crossmatched     Unit Product Volume 09/18/2022 350     Unit Product Code 09/18/2022 [id number]     Unit Number 09/18/2022 [id number]     Unit ABO 09/18/2022 O     Unit RH 09/18/2022 POS     Crossmatch 09/18/2022 Compatible     Unit Dispense Status 09/18/2022 Presumed Trans     Unit Product Volume 09/18/2022 350     Unit Product Code 09/18/2022 [id number]     Unit Number 09/18/2022 [id number]     Unit ABO 09/18/2022 O     Unit RH 09/18/2022 POS     Crossmatch 09/18/2022 Compatible     Unit Dispense Status 09/18/2022 Crossmatched     Unit Product Volume 09/18/2022 350     Unit Product Code 09/18/2022 [id number]     Unit Number 09/18/2022 [id number]     Unit ABO 09/18/2022 O     Unit RH 09/18/2022 POS     Crossmatch 09/18/2022 Compatible     Unit Dispense Status 09/18/2022 Crossmatched     Unit Product Volume 09/18/2022 350     Segmented % 09/17/2022 97 (A)    Lymphocytes % 09/17/2022 2 (A)    Monocytes % 09/17/2022 1 (A)    Eosinophils, % 09/17/2022 0     Basophils % 09/17/2022 0     Absolute Neutrophils 09/17/2022 25 35 (A)    Lymphocytes Absolute 09/17/2022 0 52 (A)    Monocytes Absolute 09/17/2022 0 26     Eosinophils Absolute 09/17/2022 0 00     Basophils Absolute 09/17/2022 0 00     nRBC 09/17/2022 1     RBC Morphology 09/17/2022 Present     Anisocytosis 09/17/2022 Present     Microcytes 09/17/2022 Present     Ovalocytes 09/17/2022 Present     Poikilocytes 09/17/2022 Present     Polychromasia 09/17/2022 Present     Platelet Estimate 40/36/3441 Decreased (A)    Sodium 09/18/2022 137     Potassium 09/18/2022 4 1     Chloride 09/18/2022 106     CO2 09/18/2022 23     ANION GAP 09/18/2022 8     BUN 09/18/2022 133 (A)    Creatinine 09/18/2022 1 64 (A)    Glucose 09/18/2022 220 (A)    Calcium 09/18/2022 8 7     Corrected Calcium 09/18/2022 9 9     AST 09/18/2022 54 (A)  ALT 09/18/2022 43     Alkaline Phosphatase 09/18/2022 53     Total Protein 09/18/2022 5 7 (A)    Albumin 09/18/2022 2 5 (A)    Total Bilirubin 09/18/2022 0 54     eGFR 09/18/2022 28     WBC 09/18/2022 20 53 (A)    RBC 09/18/2022 2 55 (A)    Hemoglobin 09/18/2022 7 7 (A)    Hematocrit 09/18/2022 24 3 (A)    MCV 09/18/2022 95     MCH 09/18/2022 30 2     MCHC 09/18/2022 31 7     RDW 09/18/2022 17 5 (A)    MPV 09/18/2022 11 0     Platelets 93/15/3634 145 (A)    nRBC 09/18/2022 0     LACTIC ACID 09/18/2022 1 7     Magnesium 09/18/2022 2 8 (A)    Phosphorus 09/18/2022 4 5 (A)    Calcium, Ionized 09/18/2022 1 16     Ventricular Rate 09/18/2022 68     Atrial Rate 09/18/2022 68     AK Interval 09/18/2022 0     QRSD Interval 09/18/2022 142     QT Interval 09/18/2022 450     QTC Interval 09/18/2022 479     QRS Axis 09/18/2022 244     T Wave Axis 09/18/2022 81     Iron Saturation 09/17/2022 26     TIBC 09/17/2022 260     Iron 09/17/2022 67     Ferritin 09/17/2022 587 (A)    Ventricular Rate 09/18/2022 69     Atrial Rate 09/18/2022 69     AK Interval 09/18/2022 0     QRSD Interval 09/18/2022 142     QT Interval 09/18/2022 417     QTC Interval 09/18/2022 447     QRS Axis 09/18/2022 239     T Wave Axis 09/18/2022 81        Imaging Studies: I have personally reviewed pertinent imaging studies  Ricardo MALDONADO    Gastroenterology Fellow  PGY-4  Available via [facility]  9/18/2022 6:14 AM

## 2022-09-18 NOTE — ASSESSMENT & PLAN NOTE
· Hgb 5 7 on admission with INR 4 66 on coumadin, received 2 U PRBC now 7 7  · SHANT positive for melanotic stool  · Reports 2 weeks of melena   · UGI bleed suspected    Plan:  · Coumadin reversed with Charlotta Madura and Vitamin K   · Trend Hgb  · IV PPI BID    · GI consulted - plan for EGD and flex sig 9/19 or sooner if patient decompensates  · Clear liquid diet, NPO after midnight

## 2022-09-18 NOTE — ASSESSMENT & PLAN NOTE
9/17 CT CAP: 1  Fracture of the T11 vertebral body with narrowing of the spinal canal at this level  Evaluation is limited secondary to osteopenia/ankylosing spondylitis  Additional probable small fracture of the anterior endplate of I46  Further evaluation with MRI should be considered  2   Scattered pulmonary nodules measuring up to 5 mm  Based on current Fleischner Society 2017 Guidelines on incidental pulmonary nodule, no routine follow-up is needed if the patient is low risk  If the patient is high risk, optional follow-up chest CT at 12 months can be considered  3   Mildly prominent pretracheal lymph node measuring 11 mm  This too can be reassessed at time of follow-up chest CT  4   Bilateral cystic adnexal masses for which further evaluation with pelvic ultrasound is recommended  5   Cardiomegaly  9/17 CT thoracic: 1  Evaluation is limited by osteopenia and ankylosing spondylitis  There is disruption of the anterior endplates/syndesmophytes of the T10 vertebral body and disruption of the anterior and posterior endplate of the H44 vertebral bodies compatible with fracture  There is narrowing of the Spinal canal at the T11 level  Further evaluation with MRI should be considered given spinal canal narrowing and exam limitations  2   Degenerative changes of the thoracolumbar spine      Plan:  - neurosurgery consult  - maintain TLSO brace

## 2022-09-19 ENCOUNTER — APPOINTMENT (OUTPATIENT)
Dept: RADIOLOGY | Facility: HOSPITAL | Age: 82
DRG: 377 | End: 2022-09-19
Payer: COMMERCIAL

## 2022-09-19 ENCOUNTER — ANESTHESIA (INPATIENT)
Dept: GASTROENTEROLOGY | Facility: HOSPITAL | Age: 82
DRG: 377 | End: 2022-09-19
Payer: COMMERCIAL

## 2022-09-19 ENCOUNTER — ANESTHESIA EVENT (OUTPATIENT)
Dept: ANESTHESIOLOGY | Facility: HOSPITAL | Age: 82
End: 2022-09-19

## 2022-09-19 ENCOUNTER — ANESTHESIA (OUTPATIENT)
Dept: ANESTHESIOLOGY | Facility: HOSPITAL | Age: 82
End: 2022-09-19

## 2022-09-19 ENCOUNTER — APPOINTMENT (OUTPATIENT)
Dept: GASTROENTEROLOGY | Facility: HOSPITAL | Age: 82
DRG: 377 | End: 2022-09-19
Payer: COMMERCIAL

## 2022-09-19 ENCOUNTER — ANESTHESIA EVENT (INPATIENT)
Dept: GASTROENTEROLOGY | Facility: HOSPITAL | Age: 82
DRG: 377 | End: 2022-09-19
Payer: COMMERCIAL

## 2022-09-19 LAB
ANION GAP SERPL CALCULATED.3IONS-SCNC: 7 MMOL/L (ref 4–13)
ATRIAL RATE: 54 BPM
BUN SERPL-MCNC: 131 MG/DL (ref 5–25)
CALCIUM SERPL-MCNC: 8 MG/DL (ref 8.3–10.1)
CHLORIDE SERPL-SCNC: 104 MMOL/L (ref 96–108)
CO2 SERPL-SCNC: 24 MMOL/L (ref 21–32)
CREAT SERPL-MCNC: 1.87 MG/DL (ref 0.6–1.3)
ERYTHROCYTE [DISTWIDTH] IN BLOOD BY AUTOMATED COUNT: 19.6 % (ref 11.6–15.1)
GFR SERPL CREATININE-BSD FRML MDRD: 24 ML/MIN/1.73SQ M
GLUCOSE SERPL-MCNC: 154 MG/DL (ref 65–140)
GLUCOSE SERPL-MCNC: 158 MG/DL (ref 65–140)
GLUCOSE SERPL-MCNC: 159 MG/DL (ref 65–140)
GLUCOSE SERPL-MCNC: 171 MG/DL (ref 65–140)
GLUCOSE SERPL-MCNC: 174 MG/DL (ref 65–140)
GLUCOSE SERPL-MCNC: 188 MG/DL (ref 65–140)
GLUCOSE SERPL-MCNC: 228 MG/DL (ref 65–140)
HCT VFR BLD AUTO: 24.2 % (ref 34.8–46.1)
HGB BLD-MCNC: 7.3 G/DL (ref 11.5–15.4)
MCH RBC QN AUTO: 29.9 PG (ref 26.8–34.3)
MCHC RBC AUTO-ENTMCNC: 30.2 G/DL (ref 31.4–37.4)
MCV RBC AUTO: 99 FL (ref 82–98)
PLATELET # BLD AUTO: 136 THOUSANDS/UL (ref 149–390)
PMV BLD AUTO: 10.7 FL (ref 8.9–12.7)
POTASSIUM SERPL-SCNC: 3.9 MMOL/L (ref 3.5–5.3)
QRS AXIS: 158 DEGREES
QRSD INTERVAL: 150 MS
QT INTERVAL: 422 MS
QTC INTERVAL: 435 MS
RBC # BLD AUTO: 2.44 MILLION/UL (ref 3.81–5.12)
SODIUM SERPL-SCNC: 135 MMOL/L (ref 135–147)
T WAVE AXIS: 100 DEGREES
VENTRICULAR RATE: 64 BPM
WBC # BLD AUTO: 16.25 THOUSAND/UL (ref 4.31–10.16)

## 2022-09-19 PROCEDURE — 43235 EGD DIAGNOSTIC BRUSH WASH: CPT | Performed by: INTERNAL MEDICINE

## 2022-09-19 PROCEDURE — 80048 BASIC METABOLIC PNL TOTAL CA: CPT | Performed by: PHYSICIAN ASSISTANT

## 2022-09-19 PROCEDURE — 0DJ08ZZ INSPECTION OF UPPER INTESTINAL TRACT, VIA NATURAL OR ARTIFICIAL OPENING ENDOSCOPIC: ICD-10-PCS | Performed by: INTERNAL MEDICINE

## 2022-09-19 PROCEDURE — 99232 SBSQ HOSP IP/OBS MODERATE 35: CPT | Performed by: SURGERY

## 2022-09-19 PROCEDURE — 99222 1ST HOSP IP/OBS MODERATE 55: CPT | Performed by: INTERNAL MEDICINE

## 2022-09-19 PROCEDURE — 82948 REAGENT STRIP/BLOOD GLUCOSE: CPT

## 2022-09-19 PROCEDURE — 99232 SBSQ HOSP IP/OBS MODERATE 35: CPT | Performed by: INTERNAL MEDICINE

## 2022-09-19 PROCEDURE — 85027 COMPLETE CBC AUTOMATED: CPT | Performed by: PHYSICIAN ASSISTANT

## 2022-09-19 PROCEDURE — 0DJD8ZZ INSPECTION OF LOWER INTESTINAL TRACT, VIA NATURAL OR ARTIFICIAL OPENING ENDOSCOPIC: ICD-10-PCS | Performed by: INTERNAL MEDICINE

## 2022-09-19 PROCEDURE — 45330 DIAGNOSTIC SIGMOIDOSCOPY: CPT | Performed by: INTERNAL MEDICINE

## 2022-09-19 PROCEDURE — 71045 X-RAY EXAM CHEST 1 VIEW: CPT

## 2022-09-19 PROCEDURE — C9113 INJ PANTOPRAZOLE SODIUM, VIA: HCPCS | Performed by: PHYSICIAN ASSISTANT

## 2022-09-19 RX ORDER — LIDOCAINE HYDROCHLORIDE 10 MG/ML
INJECTION, SOLUTION EPIDURAL; INFILTRATION; INTRACAUDAL; PERINEURAL AS NEEDED
Status: DISCONTINUED | OUTPATIENT
Start: 2022-09-19 | End: 2022-09-19

## 2022-09-19 RX ORDER — PROPOFOL 10 MG/ML
INJECTION, EMULSION INTRAVENOUS AS NEEDED
Status: DISCONTINUED | OUTPATIENT
Start: 2022-09-19 | End: 2022-09-19

## 2022-09-19 RX ORDER — FUROSEMIDE 10 MG/ML
40 INJECTION INTRAMUSCULAR; INTRAVENOUS ONCE
Status: COMPLETED | OUTPATIENT
Start: 2022-09-19 | End: 2022-09-19

## 2022-09-19 RX ADMIN — METHOCARBAMOL TABLETS 500 MG: 500 TABLET, COATED ORAL at 17:33

## 2022-09-19 RX ADMIN — METHOCARBAMOL TABLETS 500 MG: 500 TABLET, COATED ORAL at 23:18

## 2022-09-19 RX ADMIN — ACETAMINOPHEN 975 MG: 325 TABLET, FILM COATED ORAL at 21:51

## 2022-09-19 RX ADMIN — ATORVASTATIN CALCIUM 10 MG: 10 TABLET, FILM COATED ORAL at 08:51

## 2022-09-19 RX ADMIN — BUDESONIDE AND FORMOTEROL FUMARATE DIHYDRATE 2 PUFF: 160; 4.5 AEROSOL RESPIRATORY (INHALATION) at 17:13

## 2022-09-19 RX ADMIN — INSULIN LISPRO 1 UNITS: 100 INJECTION, SOLUTION INTRAVENOUS; SUBCUTANEOUS at 17:13

## 2022-09-19 RX ADMIN — ACETAMINOPHEN 975 MG: 325 TABLET, FILM COATED ORAL at 06:26

## 2022-09-19 RX ADMIN — FUROSEMIDE 40 MG: 10 INJECTION, SOLUTION INTRAMUSCULAR; INTRAVENOUS at 16:16

## 2022-09-19 RX ADMIN — HEPARIN SODIUM 5000 UNITS: 5000 INJECTION INTRAVENOUS; SUBCUTANEOUS at 14:54

## 2022-09-19 RX ADMIN — HEPARIN SODIUM 5000 UNITS: 5000 INJECTION INTRAVENOUS; SUBCUTANEOUS at 06:26

## 2022-09-19 RX ADMIN — DIGOXIN 125 MCG: 125 TABLET ORAL at 08:50

## 2022-09-19 RX ADMIN — PROPOFOL 50 MG: 10 INJECTION, EMULSION INTRAVENOUS at 13:04

## 2022-09-19 RX ADMIN — GABAPENTIN 100 MG: 100 CAPSULE ORAL at 14:57

## 2022-09-19 RX ADMIN — METHOCARBAMOL TABLETS 500 MG: 500 TABLET, COATED ORAL at 11:23

## 2022-09-19 RX ADMIN — LIDOCAINE HYDROCHLORIDE 50 MG: 10 INJECTION, SOLUTION EPIDURAL; INFILTRATION; INTRACAUDAL at 13:04

## 2022-09-19 RX ADMIN — ACETAMINOPHEN 975 MG: 325 TABLET, FILM COATED ORAL at 14:54

## 2022-09-19 RX ADMIN — PROPOFOL 10 MG: 10 INJECTION, EMULSION INTRAVENOUS at 13:10

## 2022-09-19 RX ADMIN — PANTOPRAZOLE SODIUM 40 MG: 40 INJECTION, POWDER, FOR SOLUTION INTRAVENOUS at 08:50

## 2022-09-19 RX ADMIN — PANTOPRAZOLE SODIUM 40 MG: 40 INJECTION, POWDER, FOR SOLUTION INTRAVENOUS at 21:51

## 2022-09-19 RX ADMIN — INSULIN LISPRO 1 UNITS: 100 INJECTION, SOLUTION INTRAVENOUS; SUBCUTANEOUS at 23:19

## 2022-09-19 RX ADMIN — LIDOCAINE 5% 1 PATCH: 700 PATCH TOPICAL at 08:51

## 2022-09-19 RX ADMIN — INSULIN LISPRO 1 UNITS: 100 INJECTION, SOLUTION INTRAVENOUS; SUBCUTANEOUS at 11:27

## 2022-09-19 RX ADMIN — PROPOFOL 10 MG: 10 INJECTION, EMULSION INTRAVENOUS at 13:07

## 2022-09-19 RX ADMIN — GABAPENTIN 100 MG: 100 CAPSULE ORAL at 21:51

## 2022-09-19 RX ADMIN — GABAPENTIN 100 MG: 100 CAPSULE ORAL at 08:51

## 2022-09-19 RX ADMIN — BUDESONIDE AND FORMOTEROL FUMARATE DIHYDRATE 2 PUFF: 160; 4.5 AEROSOL RESPIRATORY (INHALATION) at 09:05

## 2022-09-19 RX ADMIN — METHOCARBAMOL TABLETS 500 MG: 500 TABLET, COATED ORAL at 06:26

## 2022-09-19 RX ADMIN — PROPOFOL 10 MG: 10 INJECTION, EMULSION INTRAVENOUS at 13:12

## 2022-09-19 RX ADMIN — HEPARIN SODIUM 5000 UNITS: 5000 INJECTION INTRAVENOUS; SUBCUTANEOUS at 21:51

## 2022-09-19 NOTE — WOUND OSTOMY CARE
Consult Note - Wound   Gerry Beaver 79 y o  female MRN: [id number]  Unit/Bed#: [facility] 634-01 Encounter: [id number]        History and Present Illness:  Patient is 78 yo female admitted to B s/p fall with vertebral fracture  Patient also experiencing respiratory distress and on oxygen  Patient was ordered P-500 specialty bed from bedside nurse for poor mobility, multiple wounds, and fluid overload  Patient is max assist X2 with turning side to side  Patient is incontinent of bowel and bladder, pure wick in place  Assessment Findings:     1  Venous ulcers B/L legs- scattered partial thickness openings with pink tissue from edema in legs that have caused blistering, that have erupted  Hemosiderin staining to B/L legs  Moderate amount of serosanguineous drainage  2  Skin tears B/L arms- large partial thickness skin loss wounds to B/L arms with beefy red tissue and small amount of drainage  Arms are also very edematous and some drainage coming from wounds  3  POA DTI buttocks- purple, non-blanchable erythema to intact skin, no drainage present  DTI's have the potential to evolve into full thickness unstageable, stage III, or stage IV wounds  No induration, fluctuance, odor, warmth/temperature differences, redness, or purulence noted to the above noted wounds and skin areas assessed  New dressings applied per orders listed below  Patient tolerated well- no s/s of non-verbal pain or discomfort observed during the encounter  Bedside nurse aware of plan of care  See flow sheets for more detailed assessment findings  Wound care will continue to follow  Skin care Plan:  1-Cleanse sacro-buttocks with soap and water  Apply Allevyn foam  Lucien with T for treatment  Change every other day and PRN  2-Turn/reposition q2h or when medically stable for pressure re-distribution on skin     3-Elevate heels to offload pressure  4-Moisturize skin daily with skin nourishing cream  5-Ehob cushion in chair when out of bed   6-Hydraguard to bilateral heels and bilateral ischium BID and PRN  7-Cleanse B/L arms/legs with normal saline, apply adaptic and maxorb to wound beds, cover with ABD, secure with kat and tape  Change every other day and PRN   8-P-500    Wounds:  Wound 09/18/22 Venous Ulcer Leg Right;Lateral (Active)   Wound Image   09/19/22 1100   Wound Description Edema;Fragile;Pink 09/19/22 1100   Crystal-wound Assessment Intact;Fragile 09/19/22 1100   Wound Length (cm) 11 5 cm 09/19/22 1100   Wound Width (cm) 2 5 cm 09/19/22 1100   Wound Depth (cm) 0 1 cm 09/19/22 1100   Wound Surface Area (cm^2) 28 75 cm^2 09/19/22 1100   Wound Volume (cm^3) 2 875 cm^3 09/19/22 1100   Calculated Wound Volume (cm^3) 2 88 cm^3 09/19/22 1100   Tunneling 0 cm 09/19/22 1100   Tunneling in depth located at 0 09/19/22 1100   Undermining 0 09/19/22 1100   Undermining is depth extending from 0 09/19/22 1100   Wound Site Closure TAO 09/19/22 1100   Drainage Amount Moderate 09/19/22 1100   Drainage Description Serosanguineous 09/19/22 1100   Non-staged Wound Description Partial thickness 09/19/22 1100   Treatments Cleansed 09/19/22 1100   Dressing Calcium Alginate;Dry dressing;Non adherent 09/19/22 1100   Wound packed? No 09/19/22 1100   Packing- # removed 0 09/19/22 1100   Packing- # inserted 0 09/19/22 1100   Dressing Changed New 09/19/22 1100   Patient Tolerance Tolerated well 09/19/22 1100   Dressing Status Clean;Dry; Intact 09/19/22 1100       Wound 09/18/22 Venous Ulcer Pretibial Left;Lateral (Active)   Wound Image   09/19/22 1101   Wound Description Fragile;Pink;Slough 09/19/22 1101   Crystal-wound Assessment Fragile; Intact 09/19/22 1101   Wound Length (cm) 3 cm 09/19/22 1101   Wound Width (cm) 2 cm 09/19/22 1101   Wound Depth (cm) 0 1 cm 09/19/22 1101   Wound Surface Area (cm^2) 6 cm^2 09/19/22 1101   Wound Volume (cm^3) 0 6 cm^3 09/19/22 1101   Calculated Wound Volume (cm^3) 0 6 cm^3 09/19/22 1101   Tunneling 0 cm 09/19/22 1101   Tunneling in depth located at 0 09/19/22 1101   Undermining 0 09/19/22 1101   Undermining is depth extending from 0 09/19/22 1101   Wound Site Closure TAO 09/19/22 1101   Drainage Amount Small 09/19/22 1101   Drainage Description Serosanguineous 09/19/22 1101   Non-staged Wound Description Partial thickness 09/19/22 1101   Treatments Cleansed;Site care 09/19/22 1101   Dressing Non adherent;Calcium Alginate;Dry dressing 09/19/22 1101   Wound packed? No 09/19/22 1101   Packing- # removed 0 09/19/22 1101   Packing- # inserted 0 09/19/22 1101   Dressing Changed New 09/19/22 1101   Patient Tolerance Tolerated well 09/19/22 1101   Dressing Status Clean;Dry; Intact 09/19/22 1101       Wound 09/18/22 Skin Tear Arm Anterior;Left;Proximal;Inferior (Active)   Wound Image   09/19/22 1058   Wound Description Beefy red;Epithelialization;Fragile 09/19/22 1058   Crystal-wound Assessment Clean;Dry; Intact 09/19/22 1058   Wound Length (cm) 10 5 cm 09/19/22 1058   Wound Width (cm) 3 cm 09/19/22 1058   Wound Depth (cm) 0 1 cm 09/19/22 1058   Wound Surface Area (cm^2) 31 5 cm^2 09/19/22 1058   Wound Volume (cm^3) 3 15 cm^3 09/19/22 1058   Calculated Wound Volume (cm^3) 3 15 cm^3 09/19/22 1058   Tunneling 0 cm 09/19/22 1058   Tunneling in depth located at 0 09/19/22 1058   Undermining 0 09/19/22 1058   Undermining is depth extending from 0 09/19/22 1058   Wound Site Closure TAO 09/19/22 1058   Drainage Amount Small 09/19/22 1058   Drainage Description Serosanguineous 09/19/22 1058   Non-staged Wound Description Partial thickness 09/19/22 1058   Treatments Cleansed 09/19/22 1058   Dressing Calcium Alginate;ABD;Dry dressing;Non adherent 09/19/22 1058   Wound packed? No 09/19/22 1058   Packing- # removed 0 09/19/22 1058   Packing- # inserted 0 09/19/22 1058   Dressing Changed New 09/19/22 1058   Patient Tolerance Tolerated well 09/19/22 1058   Dressing Status Clean;Dry; Intact 09/19/22 1058       Wound 09/18/22 Pressure Injury Buttocks Right (Active)   Wound Image   09/19/22 1112   Wound Description Non-blanchable erythema 09/19/22 1112   Pressure Injury Stage DTPI 09/19/22 1112   Crystal-wound Assessment Clean;Dry; Intact 09/19/22 1112   Wound Length (cm) 2 5 cm 09/19/22 1112   Wound Width (cm) 2 cm 09/19/22 1112   Wound Depth (cm) 0 cm 09/19/22 1112   Wound Surface Area (cm^2) 5 cm^2 09/19/22 1112   Wound Volume (cm^3) 0 cm^3 09/19/22 1112   Calculated Wound Volume (cm^3) 0 cm^3 09/19/22 1112   Tunneling 0 cm 09/19/22 1112   Tunneling in depth located at 0 09/19/22 1112   Undermining 0 09/19/22 1112   Undermining is depth extending from 0 09/19/22 1112   Wound Site Closure TAO 09/19/22 1112   Drainage Amount None 09/19/22 1112   Non-staged Wound Description Not applicable 29/55/11 0230   Treatments Cleansed 09/19/22 1112   Dressing Foam, Silicon (eg  Allevyn, etc) 09/19/22 1112   Wound packed? No 09/19/22 1112   Packing- # removed 0 09/19/22 1112   Packing- # inserted 0 09/19/22 1112   Dressing Changed New 09/19/22 1112   Patient Tolerance Tolerated well 09/19/22 1112   Dressing Status Clean;Dry; Intact 09/19/22 1112       Wound 09/19/22 Skin Tear Arm Right;Lateral (Active)   Wound Image   09/19/22 1114   Wound Description Yellow;Epithelialization;Fragile 09/19/22 1114   Crystal-wound Assessment Dry;Clean; Intact 09/19/22 1114   Wound Length (cm) 2 cm 09/19/22 1114   Wound Width (cm) 4 cm 09/19/22 1114   Wound Depth (cm) 0 1 cm 09/19/22 1114   Wound Surface Area (cm^2) 8 cm^2 09/19/22 1114   Wound Volume (cm^3) 0 8 cm^3 09/19/22 1114   Calculated Wound Volume (cm^3) 0 8 cm^3 09/19/22 1114   Tunneling 0 cm 09/19/22 1114   Tunneling in depth located at 0 09/19/22 1114   Undermining 0 09/19/22 1114   Undermining is depth extending from 0 09/19/22 1114   Wound Site Closure TAO 09/19/22 1114   Drainage Amount Small 09/19/22 1114   Drainage Description Serosanguineous 09/19/22 1114   Non-staged Wound Description Partial thickness 09/19/22 1114   Treatments Cleansed 09/19/22 1114 Dressing ABD;Calcium Alginate;Dry dressing;Non adherent 09/19/22 1114   Wound packed? No 09/19/22 1114   Packing- # removed 0 09/19/22 1114   Packing- # inserted 0 09/19/22 1114   Dressing Changed New 09/19/22 1114   Patient Tolerance Tolerated well 09/19/22 1114   Dressing Status Clean;Dry; Intact 09/19/22 1114       Lisa CRISTOBALN, RN, [facility]

## 2022-09-19 NOTE — ASSESSMENT & PLAN NOTE
Janette Clifford from chair height, was sitting  Denies LOC, but was found down after pressing alert button    Plan:  - geriatrics consult  - Plan as above   - PT/OT

## 2022-09-19 NOTE — ASSESSMENT & PLAN NOTE
· Hgb 5 7 on admission with INR 4 66 on coumadin, received 2 U PRBC, stable in 7s  · SHANT positive for melanotic stool  · Reports 2 weeks of melena   · Elevated BUN  · UGI bleed suspected    Plan:  · Coumadin reversed with KCentra and Vitamin K   · Trend Hgb  · IV PPI BID    · GI consulted - plan for EGD and flex sig today

## 2022-09-19 NOTE — CONSULTS
Consultation - Geriatric Medicine   Oseas Valenica 79 y o  female MRN: [id number]  Unit/Bed#: [facility] 634-01 Encounter: [id number]      Assessment/Plan     Ambulatory dysfunction with fall  -reportedly mechanical fall at home on 9/16/22  -(-) head strike (-) loss of consciousness  -in context of chronic systemic anticoagulation with Coumadin (afib) - INR 4 66 on admission now 1 65 s/p [place] Ca and vitamin K on admission  -injuries as outlined below  -Requires use of walker/cane for ambulation at baseline dependent on circumstances  -hx recurrent falls with at least two additional  in past six months  -remains high risk future falls due to age, hx fall, deconditioning/debility and unfamiliar environment   -encourage good body mechanics and assist with all transfers  -keep personal items and call bell close to prevent reaching  -maintain environment free of fall hazards  -encourage appropriate footwear and adequate lighting at all times when out of bed  -recommend home fall risk assessment on returning home  -PT and OT pending    T11 vertebra fracture  -s/p fall as outlined above  -noted on CT chest abdomen pelvis on admission  -continue thoracolumbar spine precautions  -TLSO brace as directed  -neurovascular checks per protocol  -acute pain control  -Nsx recommending conservative management    Acute pain due to trauma  -recommend pain control per Geriatric pain protocol:  Tylenol 975mg Q8H scheduled  Roxicodone 2 5mg Q4H PRN moderate pain  Roxicodone 5mg Q4H PRN severe pain  Dilaudid 0 2mg Q4H PRN  -recommend adjuncts such as lidocaine patch topically  -encourage addition of non-pharmacologic pain treatment including ice and frequent repositioning  -recommend  bowel regimen to prevent and treat constipation due to increased risk with acute pain and opiate pain medications    Anemia   -likely multifactorial including KAYLEE CKD,cannot r/o GI bleed  -Hb 5 7 on admission now stable at 7 3 s/p transfusion PRBC x2  -currently on Protonix IV BID and GI on board, ?tentative endoscopy today per GI documentation   -continue to monitor closely and transfuse as indicated    Permanent AFib  -home regimen includes rate control with Coreg and anticoagulation with Coumadin which is currently on hold due to acute blood loss anemia requiring transfusion on admission as well as concern for acute GI bleed  -will need to clarify if maintained on single agent with Coreg or still on digoxin as well as o/p    Coagulopathy due to Coumadin  -see above    COPD  -increases risk of acute respiratory failure  -not maintained on supplemental O2 at baseline which is currently requiring  -monitor respiratory status closely, continue supplemental O2 titration to appropriate saturation  -continue home inhaler regimen    XIOMARA   -recommend respiratory consult for CPAP which is utilized at home  -CPAP pressure setting 2 per patient     Class 3 obesity  -due to excess calories  -BMI 50 79  -encourage healthy lifestyle modifications and balance of caloric intake/energy expenditure    KAYLEE on CKD-III  -creatinine elevated at 1 98 on admission, up from baseline approx 1 2  -mild improvement to 1 79 this am  -optimize hemodynamics  -avoid nephrotoxic agents and renally dose all medications    Cognitive screening  -alert and oriented, denies cognitive or memory concerns  -reportedly independent with ADLs and IADLs  -no prior cognitive testing on record for review  -CTH obtained on admission personally reviewed, least mild to moderate chronic microangiopathic changes appreciated most notable bilateral inferior temporal areas  -no recent TSH or B12, recommend checking  -encourage use of sensory assist devices appropriate times to reduce risk sensory impairment contributing to confusion, isolation, and more precipitous cognitive decline  -encourage patient remain physically, socially, cognitively active engaged to maintain cognitive acuity    Impaired mastication  -Requires use of dentures -encourage use at all appropriate times  -ensure meal consistency appropriate for abilities  -continue aspiration precautions    Deconditioning/debility/frailty  -clinical frailty scale stage 5/6, mild to moderately frail  -multifactorial including age, Congestive Heart Failure XIOMARA with CPAP requirement, and multiple additional chronic medical comorbidities now fall acute traumatic injury superimposed on elderly individual with limited physiologic and metabolic reserves  -albumin slightly low at 2 5, encourage well-balanced nutrition, consider nutritional supplements between meals if oral intake is poor   -optimize chronic conditions and address acute derangements as arise  -ensure that any anxiety/depression/mood symptoms if present are well controlled as may impact patient's response to therapies as well as overall sense of well-being and quality of life    Delirium precautions  -Patient is high risk of delirium due to age, fall, traumatic injuries, acute pain, hospitalization, unfamiliar environment  -Initiate delirium precautions  -maintain normal sleep/wake cycle  -minimize overnight interruptions, group overnight vitals/labs/nursing checks as possible  -dim lights, close blinds and turn off tv to minimize stimulation and encourage sleep environment in evenings  -ensure that pain is well controlled   -monitor for fecal and urinary retention which may precipitate delirium  -encourage early mobilization and ambulation with assistance once cleared to safely do so by primary service  -provide frequent reorientation and redirection as indicated and appropriate  -encourage family and friends at the bedside to help help calm patient if anxious  -minimize use of medications which may precipitate or worsen delirium such as tramadol, benzodiazepine, anticholinergics, and benadryl  -encourage hydration and nutrition   -redirect unwanted behaviors as first line    Home medication review    Will need to confirm with [facility] [phone] when open during business hours as not open at time of initial consultation  Care coordination: rounded with Karla Soria (RN)    History of Present Illness   Physician Requesting Consult: Ross Mendieta MD  Reason for Consult / Principal Problem:  Fall  Hx and PE limited by: N/A  Additional history obtained from: Chart review and patient evaluation    HPI: Lisha Salvador is a 79y o  year old female with COPD, Congestive Heart Failure, hypertension, morbid obesity due to excess calories, XIOMARA, DM-II with diabetic renal disease, chronic atrial fibrillation, and ambulatory dysfunction who is admitted to Trauma Service with fall found to have T11 vertebra fracture on admission imaging, she is being seen in consultation by Geriatrics for high risk of developing delirium during hospitalization  Bryson Millan is seen and examined at bedside where she is lying resting, she explains that she had a fall at home on 9/16/22 when she was attempting to get up from a chair and her legs went out from under her, she denies head strike or loss of consciousness but was unable to get up without assistance, she pressed her life alert button for assistance  She initially presented to the [address] [place] [address] [address] and was transferred to [facility] for evaluation by Trauma and Neurosurgical teams  Prior to admission catheter reports that she was residing home independently in the community with close support from a niece who resides locally  She reports independence with ADLs and IADLs and denies memory concerns  She requires use of walker/cane depending on situation and reports at least 2 additional falls within the past 6 months  She requires dentures, does not use hearing aids or glasses  Inpatient consult to Gerontology  Consult performed by: Maximo Obando DO  Consult ordered by: Michael Weaver PA-C        Review of Systems   Constitutional: Negative    Negative for chills and fever    HENT: Positive for dental problem (Wears dentures)  Eyes: Negative  Respiratory: Negative  Negative for shortness of breath ( despite being visibly tachypneic requiring oxygen not utilized at baseline)  Cardiovascular: Negative  Gastrointestinal: Negative  Endocrine: Negative for polyuria  Genitourinary: Negative  Musculoskeletal: Positive for gait problem  Negative for back pain  Skin: Negative  Neurological: Positive for weakness (Legs bilaterally, repeatedly giving out resutling in falls )  Negative for dizziness, light-headedness and headaches  Tremors: legs bilaterally  Hematological: Negative  Psychiatric/Behavioral: Negative  Negative for sleep disturbance  All other systems reviewed and are negative      Historical Information   Past Medical History:   Diagnosis Date    A-fib [facility])     Cardiac pacemaker     CH[facility]congestive heart failure) [facility])     Chronic cellulitis     COPD (chronic obstructive pulmonary disease) ([facility]     Diabetes mellitus (HCC)     Edema     High cholesterol     Hyperparathyroidism ([address] )     Hypertension     Hyperuricemia     Stage 4 chronic kidney disease (HCC)      Past Surgical History:   Procedure Laterality Date    CARDIAC DEFIBRILLATOR PLACEMENT       SECTION      x 2    COLON SURGERY      HERNIA REPAIR       Social History   Social History     Substance and Sexual Activity   Alcohol Use Never     Social History     Substance and Sexual Activity   Drug Use Never     Social History     Tobacco Use   Smoking Status Never Smoker   Smokeless Tobacco Never Used     Family History:   Family History   Problem Relation Age of Onset    Hypertension Mother     Stroke Mother     Heart disease Father      Meds/Allergies   all current active meds have been reviewed    No Known Allergies    Objective     Intake/Output Summary (Last 24 hours) at 202222  Last data filed at 2022  Gross per 24 hour   Intake 6569 66 ml   Output 495 ml   Net 804 66 ml     Invasive Devices  Report    Peripheral Intravenous Line  Duration           Peripheral IV 09/17/22 Right Antecubital 1 day    Peripheral IV 09/18/22 Left Antecubital 1 day              Physical Exam  Vitals and nursing note reviewed  Constitutional:       General: She is not in acute distress  Appearance: She is obese  She is not ill-appearing or toxic-appearing  HENT:      Head: Normocephalic  Nose: Nose normal       Comments: O2 via NC     Mouth/Throat:      Mouth: Mucous membranes are dry  Comments: Edentulous, dentures not in place  Mucous membranes pale  Eyes:      General:         Right eye: No discharge  Left eye: No discharge  Neck:      Comments: Phonation normal  Cardiovascular:      Rate and Rhythm: Normal rate  Pulses: Normal pulses  Pulmonary:      Comments: Shallow slightly rapid respirations, no rales or rhonchi    Currently requiring supplemental O2 3LPM NC not utilized at baseline   Abdominal:      General: Bowel sounds are normal  There is no distension  Palpations: Abdomen is soft  Tenderness: There is no abdominal tenderness  Musculoskeletal:      Cervical back: Neck supple  Right lower leg: Edema present  Left lower leg: Edema present  Comments: Obese body habitus with reduced overall muscle mass   Skin:     General: Skin is warm and dry  Coloration: Skin is pale  Findings: Bruising (right arm - extensive) present  Neurological:      Mental Status: She is alert  Comments: Awake and alert, oriented to person, place, year, month and situation, not day of week    answers questions appropriately   Psychiatric:      Comments: Calm and cooperative        Lab Results:     I have personally reviewed pertinent lab results      I have personally reviewed the following imaging study reports in PACS:    9/17/22-CT head without contrast, CT C-spine without contrast, CT chest abdomen pelvis with contrast, left forearm XR, left elbow XR  9/18/22-portable chest x-ray    Therapies:   PT:  Pending  OT:  Pending    VTE Prophylaxis: Heparin    Code Status: Level 1 - Full Code  Advance Directive and Living Will:      Power of :    POLST:      Family and Social Support:   Living Arrangements: Lives Alone  Support Systems: Self; Family members; Friends/neighbors  Type of Current Residence: [facility]  Discharge planning discussed with[de-identified] Patient  Freedom of Choice: Yes    Goals of Care: sleep

## 2022-09-19 NOTE — ASSESSMENT & PLAN NOTE
9/17 CT CAP: Fracture of the T11 vertebral body with narrowing of the spinal canal at this level  Evaluation is limited secondary to osteopenia/ankylosing spondylitis  Additional probable small fracture of the anterior endplate of J45     6/45 CT thoracic: Ddisruption of the anterior endplates/syndesmophytes of the T10 vertebral body and disruption of the anterior and posterior endplate of the J63 vertebral bodies compatible with fracture  There is narrowing of the Spinal canal at the T11 level        Plan:  · Neurosurgery consult  · TLSO brace   · Upright XR   · q2h neuro checks   · No neurosurgical intervention at this time given neurologically intact and stable exam

## 2022-09-19 NOTE — ASSESSMENT & PLAN NOTE
Lab Results   Component Value Date    EGFR 24 09/19/2022    EGFR 28 09/18/2022    EGFR 26 09/17/2022    CREATININE 1 87 (H) 09/19/2022    CREATININE 1 64 (H) 09/18/2022    CREATININE 1 79 (H) 09/17/2022     · Baseline creatinine:  1 2-1 4  · Admission creatinine:  1 79  · Most recent creatinine:  1 87  · In the setting of acute blood loss anemia  · Diuretic dependent with Lasix 40 b i d  At baseline, currently holding  · Low threshold for low-dose diuretic p r n   For urine output  · Ball in place, monitor urine output  · Trend BUN/creatinine

## 2022-09-19 NOTE — PROGRESS NOTES
[facility] - Progress Note  Anne Max 79 y o  female MRN: [id number]  [facility] Encounter: [id number]      ASSESSMENT & PLAN:    78 y/o female w hx of colon cancer s/p colecteomy w ileoanal anastamosis, Afib on coumadin, CKD 4, COPD, HFrEF 40% presenting after fall with T11 fracture, with anemia and reported melena  Concern for GI Bleed  - AC held since yesterday   - Rectal exam negative yesterday  - Did not receive tap water enema; regardless, will attempt visualization with flex sig today  - EGD today   ______________________________________________________________________    SUBJECTIVE:     Pt feels well  No further episodes of bleeding  Hg stable       Scheduled Meds:  Current Facility-Administered Medications   Medication Dose Route Frequency Provider Last Rate    acetaminophen  975 mg Oral Q8H [address] [place], [place]      albuterol  2 puff Inhalation Q4H PRN Min Devi [place]      atorvastatin  10 mg Oral Daily Min Devi [place]      budesonide-formoterol  2 puff Inhalation BID Min Dev[place]      digoxin  125 mcg Oral Daily Min Devi [place]      gabapentin  100 mg Oral TID Min Devi PA-C      heparin (porcine)  5,000 Units Subcutaneous Pending sale to [facility] Min Devi [place]      HYDROmorphone  0 2 mg Intravenous Q4H PRN Min Devi PA-C      insulin lispro  1-5 Units Subcutaneous Q6H [address] [place]      lidocaine  1 patch Topical Daily Min Devi [place]      melatonin  6 mg Oral HS Min Devi [place]      methocarbamol  500 mg Oral [facility] Min Devi [place]      oxyCODONE  2 5 mg Oral Q4H PRN Mirta Enamorado      oxyCODONE  5 mg Oral Q4H PRN Min Devi PA-C      pantoprazole  40 mg Intravenous Q12H [address] Min Devi PA-C       Continuous Infusions:   PRN Meds:   albuterol    HYDROmorphone    oxyCODONE    oxyCODONE    OBJECTIVE:     Objective   Blood pressure 128/74, pulse 76, temperature 98 2 °F (36 8 °C), temperature source Tympanic, resp  rate 20, height 4' 10" (1 473 m), weight 110 kg (243 lb), SpO2 94 %  Body mass index is 50 79 kg/m²      Intake/Output Summary (Last 24 hours) at 9/19/2022 1252  Last data filed at 9/18/2022 2001  Gross per 24 hour   Intake 899 83 ml   Output 220 ml   Net 679 83 ml       PHYSICAL EXAM:   General Appearance: Awake and alert, in no acute distress  Abdomen: Soft, non-tender, non-distended; bowel sounds normal; no masses or no organomegaly    Invasive Devices  Report    Peripheral Intravenous Line  Duration           Peripheral IV 09/17/22 Right Antecubital 1 day    Peripheral IV 09/18/22 Left Antecubital 1 day                LAB RESULTS:      Lab Units 09/19/22  0532 09/18/22  0342 09/17/22 2222 09/17/22 1718 11/11/21  2327   SODIUM mmol/L 135 137 137 137 145   POTASSIUM mmol/L 3 9 4 1 3 8 3 7 3 4*   CHLORIDE mmol/L 104 106 104 98 106   CO2 mmol/L 24 23 21 19* 28   BUN mg/dL 131* 133* 139* 131* 23   CREATININE mg/dL 1 87* 1 64* 1 79* 1 98* 1 18   GLUCOSE RANDOM mg/dL 171* 220* 228* 263* 140   CALCIUM mg/dL 8 0* 8 7 8 4 8 3 8 2*   MAGNESIUM mg/dL  --  2 8* 2 7*  --  1 7   PHOSPHORUS mg/dL  --  4 5* 4 8*  --   --             Lab Units 09/18/22  0342 09/17/22 2222 09/17/22 1718 11/11/21  2327   TOTAL PROTEIN g/dL 5 7* 5 9* 6 1* 7 1   ALBUMIN g/dL 2 5* 2 5* 2 5* 3 3*   TOTAL BILIRUBIN mg/dL 0 54 0 57 0 44 0 62   AST U/L 54* 44 15 15   ALT U/L 43 39 15 19   ALK PHOS U/L 53 54 55 112           Lab Units 09/19/22  0532 09/18/22  1659 09/18/22 0342 09/17/22 2222 09/17/22 1718 11/11/21  2327   WBC Thousand/uL 16 25*  --  20 53* 26 13* 22 11* 7 83   HEMOGLOBIN g/dL 7 3* 7 3* 7 7* 6 8* 5 7* 10 7*   HEMATOCRIT % 24 2*  --  24 3* 21 8* 18 9* 34 6*   PLATELETS Thousands/uL 136*  --  145* 173 210 152   MCV fL 99*  --  95 97 100* 103*       Lab Results   Component Value Date    IRON 67 09/17/2022    TIBC 260 09/17/2022    FERRITIN 587 (H) 09/17/2022       Lab Results   Component Value Date    INR 1 65 (H) 09/17/2022    INR 4 66 (H) 09/17/2022    INR 2 20 (H) 11/12/2021    PROTIME 19 7 (H) 09/17/2022    PROTIME 43 8 (H) 09/17/2022    PROTIME 23 9 (H) 11/12/2021       RADIOLOGY RESULTS:   Procedure: XR elbow 3+ vw left    Result Date: 9/19/2022  Narrative: LEFT ELBOW INDICATION:   pain  COMPARISON:  None VIEWS:  XR ELBOW 3+ VW LEFT Images: 4 FINDINGS: There is no acute fracture or dislocation  There is no joint effusion  Moderate degenerative changes  No lytic or blastic osseous lesion  There are atherosclerotic calcifications  Soft tissues are otherwise unremarkable  Impression: No acute osseous abnormality  Workstation performed: [id number]     Procedure: XR forearm 2 views LEFT    Result Date: 9/19/2022  Narrative: LEFT FOREARM INDICATION:   pain  COMPARISON:  None VIEWS:  XR FOREARM 2 VW LEFT Images: 4 FINDINGS: There is no acute fracture or dislocation  Degenerative changes in the elbow and wrist  No lytic or blastic osseous lesion  There are atherosclerotic calcifications  Soft tissues are otherwise unremarkable  Impression: No acute osseous abnormality  Workstation performed: [id number]     Procedure: TRAUMA - CT head wo contrast    Result Date: 9/17/2022  Narrative: CT BRAIN - WITHOUT CONTRAST INDICATION:   TRAUMA  COMPARISON:  None  TECHNIQUE:  CT examination of the brain was performed  In addition to axial images, sagittal and coronal 2D reformatted images were created and submitted for interpretation  Radiation dose length product (DLP) for this visit:  855 mGy-cm   This examination, like all CT scans performed in the [facility], was performed utilizing techniques to minimize radiation dose exposure, including the use of iterative reconstruction and automated exposure control  IMAGE QUALITY:  Diagnostic   FINDINGS: PARENCHYMA: Decreased attenuation is noted in periventricular and subcortical white matter demonstrating an appearance that is statistically most likely to represent mild microangiopathic change  No CT signs of acute infarction  No intracranial mass, mass effect or midline shift  No acute parenchymal hemorrhage  VENTRICLES AND EXTRA-AXIAL SPACES:  Enlargement of ventricles and extra-axial CSF spaces, out of proportion to the patient's age most consistent with cerebral and cerebellar atrophy  VISUALIZED ORBITS AND PARANASAL SINUSES:  Unremarkable  CALVARIUM AND EXTRACRANIAL SOFT TISSUES:  Normal      Impression: No acute intracranial abnormality  The study was marked in EPIC for immediate notification given its trauma status  Workstation performed: [id number]     Procedure: TRAUMA - CT spine cervical wo contrast    Result Date: 9/17/2022  Narrative: CT CERVICAL SPINE - WITHOUT CONTRAST INDICATION:   TRAUMA  COMPARISON:  CTA head and neck 8/23/2021 TECHNIQUE:  CT examination of the cervical spine was performed without intravenous contrast   Contiguous axial images were obtained  Sagittal and coronal reconstructions were performed  Radiation dose length product (DLP) for this visit:  415 mGy-cm   This examination, like all CT scans performed in the [facility], was performed utilizing techniques to minimize radiation dose exposure, including the use of iterative reconstruction and automated exposure control  IMAGE QUALITY:  Diagnostic  FINDINGS: ALIGNMENT:  Normal alignment of the cervical spine  No subluxation  VERTEBRAL BODIES:  No fracture  DEGENERATIVE CHANGES:  Mild multilevel cervical degenerative changes are noted without critical central canal stenosis  PREVERTEBRAL AND PARASPINAL SOFT TISSUES:  Unremarkable  Posterior soft tissue calcification is stable from prior exam  THORACIC INLET:  Please refer to the concurrent chest, abdomen, and pelvic CT report for description of the thoracic inlet findings  Impression: No cervical spine fracture or traumatic malalignment   The study was marked in [facility] for immediate notification given its trauma status  Workstation performed: [id number]     Procedure: TRAUMA - CT chest abdomen pelvis w contrast    Result Date: 9/17/2022  Narrative: CT CHEST, ABDOMEN AND PELVIS WITH IV CONTRAST INDICATION:   TRAUMA  COMPARISON:  None  TECHNIQUE: CT examination of the chest, abdomen and pelvis was performed  Axial, sagittal, and coronal 2D reformatted images were created from the source data and submitted for interpretation  Radiation dose length product (DLP) for this visit:  1394 mGy-cm   This examination, like all CT scans performed in the [facility], was performed utilizing techniques to minimize radiation dose exposure, including the use of iterative reconstruction and automated exposure control  IV Contrast:  90 mL of iohexol (OMNIPAQUE) Enteric Contrast: Enteric contrast was administered  FINDINGS: CHEST LUNGS:  5 mm left upper lobe nodule (series 3 image 28)  4 mm right upper lobe nodule (series 3 image 48)  There is bibasilar dependent atelectasis  There is no tracheal or endobronchial lesion  PLEURA:  Unremarkable  HEART/GREAT VESSELS: The heart is enlarged  There is no thoracic aortic aneurysm  MEDIASTINUM AND JESSICA:  There is an 11 mm pretracheal lymph node  No additional enlarged mediastinal or hilar lymph nodes  CHEST WALL AND LOWER NECK:  There is a left-sided pacer generator device  ABDOMEN LIVER/BILIARY TREE:  Unremarkable  GALLBLADDER:  No calcified gallstones  No pericholecystic inflammatory change  SPLEEN:  Subcentimeter hypodensities in the spleen are too small to fully characterize but statistically likely represent cysts  PANCREAS:  Unremarkable  ADRENAL GLANDS:  Unremarkable  KIDNEYS/URETERS:  One or more simple renal cyst(s) is noted  Otherwise unremarkable kidneys  No hydronephrosis  STOMACH AND BOWEL:  There is a small hiatal hernia  There is no evidence of bowel obstruction  APPENDIX:  No findings to suggest appendicitis   ABDOMINOPELVIC CAVITY:  No ascites  No pneumoperitoneum  No lymphadenopathy  VESSELS:  Atherosclerotic changes are present  No evidence of aneurysm  PELVIS REPRODUCTIVE ORGANS:  There are multiple calcified uterine fibroids  Bilateral adnexal cystic lesions measuring up to 10 4 x 4 5 cm in the left adnexa  URINARY BLADDER:  Unremarkable  ABDOMINAL WALL/INGUINAL REGIONS:  Unremarkable  OSSEOUS STRUCTURES:  There is diffuse osteopenia and thin anterior syndesmophytes throughout the thoracic spine  There is disruption of the anterior and posterior cortex of the T11 vertebral body (series 602 image 112)  There is narrowing of the spinal  canal at this level  Additionally there is irregularity of the anterior endplate of G99 suspicious for small fracture  Impression: 1  Fracture of the T11 vertebral body with narrowing of the spinal canal at this level  Evaluation is limited secondary to osteopenia/ankylosing spondylitis  Additional probable small fracture of the anterior endplate of E99  Further evaluation with MRI should be considered  2   Scattered pulmonary nodules measuring up to 5 mm  Based on current Fleischner Society 2017 Guidelines on incidental pulmonary nodule, no routine follow-up is needed if the patient is low risk  If the patient is high risk, optional follow-up chest CT  at 12 months can be considered  3   Mildly prominent pretracheal lymph node measuring 11 mm  This too can be reassessed at time of follow-up chest CT  4   Bilateral cystic adnexal masses for which further evaluation with pelvic ultrasound is recommended  5   Cardiomegaly  I personally discussed this study with Jann Patch on 9/17/2022 at 6:40 PM  The study was marked in [facility] for significant notification  Workstation performed: [id number]     Procedure: XR chest portable ICU    Result Date: 9/18/2022  Narrative: CHEST INDICATION:   f/u  COMPARISON:  CXR 11/19/2021 and chest CT 9/17/2022   EXAM PERFORMED/VIEWS:  XR CHEST PORTABLE ICU FINDINGS: Moderate cardiomegaly with left subclavian ICD leads in the right atrium, right ventricle, and cardiac vein  Pulmonary artery enlargement  The lungs are clear  No pneumothorax or pleural effusion  Osseous structures appear within normal limits for patient age  Impression: No acute cardiopulmonary disease  Workstation performed: [id number]     Procedure: CT recon only thoracolumbar    Result Date: 9/17/2022  Narrative: CT THORACIC AND LUMBAR SPINE INDICATION:   fall  COMPARISON:  None TECHNIQUE: Axial CT examination of the thoracic and lumbar spine was obtained utilizing reconstructed images from CT of the chest abdomen and pelvis performed the same day  Images were reformatted in the sagittal and coronal planes  This examination, like all CT scans performed in the [facility], was performed utilizing techniques to minimize radiation dose exposure, including the use of iterative reconstruction and automated exposure control  FINDINGS: Evaluation is limited by osteopenia and thin anterior syndesmophytes throughout the thoracic spine, suggestive of ankylosing spondylitis  ALIGNMENT: Normal alignment of lumbar vertebral bodies  No subluxation  VERTEBRAL BODIES: There is disruption of the anterior endplate of the D17 vertebral body and the anterior and posterior endplate of the F28 vertebral body  There is narrowing of the spinal canal at T11  DEGENERATIVE CHANGES: There are endplate and facet joint degenerative changes throughout the thoracolumbar spine  PREVERTEBRAL AND PARASPINAL SOFT TISSUES: Normal      Impression: 1  Evaluation is limited by osteopenia and ankylosing spondylitis  There is disruption of the anterior endplates/syndesmophytes of the T10 vertebral body and disruption of the anterior and posterior endplate of the F40 vertebral bodies compatible with fracture  There is narrowing of the Spinal canal at the T11 level    Further evaluation with MRI should be considered given spinal canal narrowing and exam limitations  2   Degenerative changes of the thoracolumbar spine  I personally discussed this study with Alice Sallycodie on 9/17/2022 at 6:40 PM  Workstation performed: [id number]     Procedure: Echo complete w/ contrast if indicated    Result Date: 9/18/2022  Narrative: [place] Monge  Left Ventricle: Left ventricular cavity size is normal  Wall thickness is normal  The left ventricular ejection fraction is 65%  Systolic function is normal  Wall motion is normal  Diastolic function is abnormal   Left atrial filling pressure is elevated    Left Atrium: The atrium is severely dilated    Right Atrium: The atrium is dilated    Aortic Valve: The aortic valve is trileaflet  The leaflets are moderately thickened  The leaflets are mildly calcified  There is mild regurgitation  There is mild to moderate and There is moderate stenosis  The aortic valve peak velocity is 2 49 m/s  The aortic valve mean gradient is 13 mmHg  The dimensionless velocity index is 0 38  The aortic valve area is 1 10 cm2    Mitral Valve: There is moderate regurgitation    Tricuspid Valve: There is mild regurgitation  The right ventricular systolic pressure is moderately to severely elevated  The estimated right ventricular systolic pressure is 78 93 mmHg    Aorta: The aortic root is normal in size  The ascending aorta is mildly dilated  The aortic root is 3 40 cm  The ascending aorta is 3 8 cm    Pericardium: There is a trivial pericardial effusion  Narrative/Impressions - 3 day look back     PRUDENCE Noriega   PGY-4 Gastroenterology Fellow  [facility]  Available on [email or web address]

## 2022-09-19 NOTE — ANESTHESIA PREPROCEDURE EVALUATION
Procedure:  PRE-OP ONLY    Initially presented as a trauma from a fall and found to have a T11 fracture  Workup prior to transfer to [facility] showed hgb of 5 6 - patient has since received 2 units of PRBC    supratherapeutic INR (on coumadin for afib)  SIRS response related to GI bleed    Relevant Problems   CARDIO   (+) Permanent atrial fibrillation (HCC)      /RENAL   (+) Acute-on-chronic kidney injury (HCC)   (+) Stage 3 chronic kidney disease (HCC)      HEMATOLOGY   (+) Anemia      PULMONARY   (+) COPD (chronic obstructive pulmonary disease) (HCC) (2L O2 PRN)      Cardiovascular and Mediastinum   (+) Chronic heart failure (HCC)      Class III obesity       Anesthesia Plan  ASA Score- 4     Anesthesia Type- IV sedation with anesthesia with ASA Monitors  Additional Monitors:   Airway Plan:           Plan Factors-    Chart reviewed  Patient summary reviewed  Induction- intravenous  Postoperative Plan-     Informed Consent- Anesthetic plan and risks discussed with patient  I personally reviewed this patient with the CRNA  Discussed and agreed on the Anesthesia Plan with the CRNA  Rhiannon Clifford

## 2022-09-19 NOTE — ASSESSMENT & PLAN NOTE
· POA as evidenced by WBC 22 1, tachycardia 100, lactic 10 6  · WBC down-trending, tachycardia resolved  · No current concern for infectious etiology, SIRS secondary to GI bleed and anemia    Plan:  · Monitoring off antibiotics at this time  · Follow-up cultures  · Trend fever and WBC count

## 2022-09-19 NOTE — DISCHARGE INSTRUCTIONS
Neurosurgery discharge instructions following spine fracture:     TLSO brace to be worn when out of bed of head of bed greater than 45 degrees  May place brace on while sitting on edge of bed  May be removed for showering  No bending, twisting or heavy lifting  No strenuous activities  No Driving  Follow-up as scheduled in two weeks with repeat spine x-rays to be completed 2-3 days prior to visit  Prescription has been entered electronically  **Please notify MD immediately if you have increased back or leg pain  New numbness, tingling, weakness in your legs and/or bowel/bladder incontinence**      Upper Endoscopy   WHAT YOU NEED TO KNOW:   An upper endoscopy is also called an upper gastrointestinal (GI) endoscopy, or an esophagogastroduodenoscopy (EGD)  It is a procedure to examine the inside of your esophagus, stomach, and duodenum (first part of the small intestine) with a scope  You may feel bloated, gassy, or have some abdominal discomfort after your procedure  Your throat may be sore for 24 to 36 hours  You may burp or pass gas from air that is still inside your body  DISCHARGE INSTRUCTIONS:   Seek care immediately if:   You have sudden, severe abdominal pain  You have problems swallowing  You have a large amount of black, sticky bowel movements or blood in your bowel movements  You have sudden trouble breathing  You feel weak, lightheaded, or faint or your heart beats faster than normal for you  Contact your healthcare provider if:   You have a fever and chills  You have nausea or are vomiting  Your abdomen is bloated or feels full and hard  You have abdominal pain  You have black, sticky bowel movements or blood in your bowel movements  You have not had a bowel movement for 3 days after your procedure  You have rash or hives  You have questions or concerns about your procedure      Activity:   Do not lift, strain, or run for 24 hours after your procedure  Rest after your procedure  You have been given medicine to relax you  Do not drive or make important decisions until the day after your procedure  Return to your normal activity as directed  Relieve gas and discomfort from bloating by lying on your right side with a heating pad on your abdomen  You may need to take short walks to help the gas move out  Eat small meals until bloating is relieved  Follow up with your healthcare provider as directed: Write down your questions so you remember to ask them during your visits  If you take a blood thinner, please review the specific instructions from your endoscopist about when you should resume it  These can be found in the Recommendation and Your Medication list sections of this After Visit Summary  Colonoscopy   WHAT YOU NEED TO KNOW:   A colonoscopy is a procedure to examine the inside of your colon (intestine) with a scope  Polyps or tissue growths may have been removed during your colonoscopy  It is normal to feel bloated and to have some abdominal discomfort  You should be passing gas  If you have hemorrhoids or you had polyps removed, you may have a small amount of bleeding  DISCHARGE INSTRUCTIONS:   Seek care immediately if:   You have sudden, severe abdominal pain  You have problems swallowing  You have a large amount of black, sticky bowel movements or blood in your bowel movements  You have sudden trouble breathing  You feel weak, lightheaded, or faint or your heart beats faster than normal for you  Contact your healthcare provider if:   You have a fever and chills  You have nausea or are vomiting  Your abdomen is bloated or feels full and hard  You have abdominal pain  You have black, sticky bowel movements or blood in your bowel movements  You have not had a bowel movement for 3 days after your procedure  You have rash or hives    You have questions or concerns about your procedure  Activity:   Do not lift, strain, or run for 24 hours after your procedure  Rest after your procedure  You have been given medicine to relax you  Do not drive or make important decisions until the day after your procedure  Return to your normal activity as directed  Relieve gas and discomfort from bloating by lying on your right side with a heating pad on your abdomen  You may need to take short walks to help the gas move out  Eat small meals until bloating is relieved  Follow up with your healthcare provider as directed: Write down your questions so you remember to ask them during your visits  If you take a blood thinner, please review the specific instructions from your endoscopist about when you should resume it  These can be found in the Recommendation and Your Medication list sections of this After Visit Summary

## 2022-09-19 NOTE — ASSESSMENT & PLAN NOTE
Wt Readings from Last 3 Encounters:   09/18/22 110 kg (243 lb)   11/12/21 110 kg (243 lb)   08/23/21 106 kg (234 lb 9 1 oz)     · Hx HF with biventricular dysfunction and EF 40% with PPM   · 9/18 Echo - EF 65%, diastolic dysfunction, mod AS, mod MR, RVSP 59  · Home regimen: Entresto, diltiazem  mg, digoxin 125 mcg, Coreg 25 mg b i d , aspirin 81 daily, lasix 40 mg BID     Plan:  · Telemetry monitoring  · 3L NC, mild tacypnea - CXR today  · Digoxin level 2 0  · Restarted home digoxin 9/19  · Holding all other home medications at this time, plan to slowly restart in below order   · 1  Coreg - restart today if BP tolerates after GI procedures  · 2  Cardizem short acting 60 mg q6h   · 3   Lasix  (or earlier as needed for volume status and UOP)

## 2022-09-19 NOTE — PLAN OF CARE
Problem: MOBILITY - ADULT  Goal: Maintain or return to baseline ADL function  Description: INTERVENTIONS:  -  Assess patient's ability to carry out ADLs; assess patient's baseline for ADL function and identify physical deficits which impact ability to perform ADLs (bathing, care of mouth/teeth, toileting, grooming, dressing, etc )  - Assess/evaluate cause of self-care deficits   - Assess range of motion  - Assess patient's mobility; develop plan if impaired  - Assess patient's need for assistive devices and provide as appropriate  - Encourage maximum independence but intervene and supervise when necessary  - Involve family in performance of ADLs  - Assess for home care needs following discharge   - Consider OT consult to assist with ADL evaluation and planning for discharge  - Provide patient education as appropriate  Outcome: Progressing  Goal: Maintains/Returns to pre admission functional level  Description: INTERVENTIONS:  - Perform BMAT or MOVE assessment daily    - Set and communicate daily mobility goal to care team and patient/family/caregiver  - Collaborate with rehabilitation services on mobility goals if consulted  - Perform Range of Motion    times a day  - Reposition patient every    hours    - Dangle patient    times a day  - Stand patient    times a day  - Ambulate patient    times a day  - Out of bed to chair    times a day   - Out of bed for meals    times a day  - Out of bed for toileting  - Record patient progress and toleration of activity level   Outcome: Progressing     Problem: Potential for Falls  Goal: Patient will remain free of falls  Description: INTERVENTIONS:  - Educate patient/family on patient safety including physical limitations  - Instruct patient to call for assistance with activity   - Consult OT/PT to assist with strengthening/mobility   - Keep Call bell within reach  - Keep bed low and locked with side rails adjusted as appropriate  - Keep care items and personal belongings within reach  - Initiate and maintain comfort rounds  - Make Fall Risk Sign visible to staff  - Offer Toileting every    Hours, in advance of need  - Initiate/Maintain   alarm  - Obtain necessary fall risk management equipment:     - Apply yellow socks and bracelet for high fall risk patients  - Consider moving patient to room near nurses station  Outcome: Progressing     Problem: Prexisting or High Potential for Compromised Skin Integrity  Goal: Skin integrity is maintained or improved  Description: INTERVENTIONS:  - Identify patients at risk for skin breakdown  - Assess and monitor skin integrity  - Assess and monitor nutrition and hydration status  - Monitor labs   - Assess for incontinence   - Turn and reposition patient  - Assist with mobility/ambulation  - Relieve pressure over bony prominences  - Avoid friction and shearing  - Provide appropriate hygiene as needed including keeping skin clean and dry  - Evaluate need for skin moisturizer/barrier cream  - Collaborate with interdisciplinary team   - Patient/family teaching  - Consider wound care consult   Outcome: Progressing     Problem: PAIN - ADULT  Goal: Verbalizes/displays adequate comfort level or baseline comfort level  Description: Interventions:  - Encourage patient to monitor pain and request assistance  - Assess pain using appropriate pain scale  - Administer analgesics based on type and severity of pain and evaluate response  - Implement non-pharmacological measures as appropriate and evaluate response  - Consider cultural and social influences on pain and pain management  - Notify physician/advanced practitioner if interventions unsuccessful or patient reports new pain  Outcome: Progressing     Problem: INFECTION - ADULT  Goal: Absence or prevention of progression during hospitalization  Description: INTERVENTIONS:  - Assess and monitor for signs and symptoms of infection  - Monitor lab/diagnostic results  - Monitor all insertion sites, i e  indwelling lines, tubes, and drains  - Monitor endotracheal if appropriate and nasal secretions for changes in amount and color  - Reynolds appropriate cooling/warming therapies per order  - Administer medications as ordered  - Instruct and encourage patient and family to use good hand hygiene technique  - Identify and instruct in appropriate isolation precautions for identified infection/condition  Outcome: Progressing     Problem: SAFETY ADULT  Goal: Maintain or return to baseline ADL function  Description: INTERVENTIONS:  -  Assess patient's ability to carry out ADLs; assess patient's baseline for ADL function and identify physical deficits which impact ability to perform ADLs (bathing, care of mouth/teeth, toileting, grooming, dressing, etc )  - Assess/evaluate cause of self-care deficits   - Assess range of motion  - Assess patient's mobility; develop plan if impaired  - Assess patient's need for assistive devices and provide as appropriate  - Encourage maximum independence but intervene and supervise when necessary  - Involve family in performance of ADLs  - Assess for home care needs following discharge   - Consider OT consult to assist with ADL evaluation and planning for discharge  - Provide patient education as appropriate  Outcome: Progressing  Goal: Maintains/Returns to pre admission functional level  Description: INTERVENTIONS:  - Perform BMAT or MOVE assessment daily    - Set and communicate daily mobility goal to care team and patient/family/caregiver  - Collaborate with rehabilitation services on mobility goals if consulted  - Perform Range of Motion    times a day  - Reposition patient every    hours    - Dangle patient    times a day  - Stand patient    times a day  - Ambulate patient    times a day  - Out of bed to chair    times a day   - Out of bed for meals    times a day  - Out of bed for toileting  - Record patient progress and toleration of activity level   Outcome: Progressing  Goal: Patient will remain free of falls  Description: INTERVENTIONS:  - Educate patient/family on patient safety including physical limitations  - Instruct patient to call for assistance with activity   - Consult OT/PT to assist with strengthening/mobility   - Keep Call bell within reach  - Keep bed low and locked with side rails adjusted as appropriate  - Keep care items and personal belongings within reach  - Initiate and maintain comfort rounds  - Make Fall Risk Sign visible to staff  - Offer Toileting every      Hours, in advance of need  - Initiate/Maintain   alarm  - Obtain necessary fall risk management equipment:   - Apply yellow socks and bracelet for high fall risk patients  - Consider moving patient to room near nurses station  Outcome: Progressing     Problem: DISCHARGE PLANNING  Goal: Discharge to home or other facility with appropriate resources  Description: INTERVENTIONS:  - Identify barriers to discharge w/patient and caregiver  - Arrange for needed discharge resources and transportation as appropriate  - Identify discharge learning needs (meds, wound care, etc )  - Arrange for interpretive services to assist at discharge as needed  - Refer to Case Management Department for coordinating discharge planning if the patient needs post-hospital services based on physician/advanced practitioner order or complex needs related to functional status, cognitive ability, or social support system  Outcome: Progressing     Problem: Knowledge Deficit  Goal: Patient/family/caregiver demonstrates understanding of disease process, treatment plan, medications, and discharge instructions  Description: Complete learning assessment and assess knowledge base    Interventions:  - Provide teaching at level of understanding  - Provide teaching via preferred learning methods  Outcome: Progressing     Problem: Nutrition/Hydration-ADULT  Goal: Nutrient/Hydration intake appropriate for improving, restoring or maintaining nutritional needs  Description: Monitor and assess patient's nutrition/hydration status for malnutrition  Collaborate with interdisciplinary team and initiate plan and interventions as ordered  Monitor patient's weight and dietary intake as ordered or per policy  Utilize nutrition screening tool and intervene as necessary  Determine patient's food preferences and provide high-protein, high-caloric foods as appropriate       INTERVENTIONS:  - Monitor oral intake, urinary output, labs, and treatment plans  - Assess nutrition and hydration status and recommend course of action  - Evaluate amount of meals eaten  - Assist patient with eating if necessary   - Allow adequate time for meals  - Recommend/ encourage appropriate diets, oral nutritional supplements, and vitamin/mineral supplements  - Order, calculate, and assess calorie counts as needed  - Recommend, monitor, and adjust tube feedings and TPN/PPN based on assessed needs  - Assess need for intravenous fluids  - Provide specific nutrition/hydration education as appropriate  - Include patient/family/caregiver in decisions related to nutrition  Outcome: Progressing

## 2022-09-19 NOTE — PROGRESS NOTES
[facility]  Progress Note - Matthews Brittle 1940, 79 y o  female MRN: [id number]  Unit/Bed#: [facility] 634-01 Encounter: [id number]  Primary Care Provider: Hilton Reddy DO   Date and time admitted to hospital: 9/17/2022  9:14 PM    * T11 vertebral fracture [facility])  Assessment & Plan  9/17 CT CAP: Fracture of the T11 vertebral body with narrowing of the spinal canal at this level  Evaluation is limited secondary to osteopenia/ankylosing spondylitis  Additional probable small fracture of the anterior endplate of H71     8/81 CT thoracic: Ddisruption of the anterior endplates/syndesmophytes of the T10 vertebral body and disruption of the anterior and posterior endplate of the L59 vertebral bodies compatible with fracture  There is narrowing of the Spinal canal at the T11 level  Plan:  · Neurosurgery consult  · TLSO brace   · Upright XR   · q2h neuro checks   · No neurosurgical intervention at this time given neurologically intact and stable exam     Anemia  Assessment & Plan  · Hgb 5 7 on admission with INR 4 66 on coumadin, received 2 U PRBC, stable in 7s  · SHANT positive for melanotic stool  · Reports 2 weeks of melena   · Elevated BUN  · UGI bleed suspected    Plan:  · Coumadin reversed with Doucette Arango and Vitamin K   · Trend Hgb  · IV PPI BID    · GI consulted - plan for EGD and flex sig today      Acute-on-chronic kidney injury [facility])  Assessment & Plan  Lab Results   Component Value Date    EGFR 24 09/19/2022    EGFR 28 09/18/2022    EGFR 26 09/17/2022    CREATININE 1 87 (H) 09/19/2022    CREATININE 1 64 (H) 09/18/2022    CREATININE 1 79 (H) 09/17/2022     · Baseline creatinine:  1 2-1 4  · Admission creatinine:  1 79  · Most recent creatinine:  1 87  · In the setting of acute blood loss anemia  · Diuretic dependent with Lasix 40 b i d  At baseline, currently holding  · Low threshold for low-dose diuretic p r n   For urine output  · Ball in place, monitor urine output  · Trend BUN/creatinine    Chronic heart failure (HCC)  Assessment & Plan  Wt Readings from Last 3 Encounters:   09/18/22 110 kg (243 lb)   11/12/21 110 kg (243 lb)   08/23/21 106 kg (234 lb 9 1 oz)     · Hx HF with biventricular dysfunction and EF 40% with PPM   · 9/18 Echo - EF 15%, diastolic dysfunction, mod AS, mod MR, RVSP 59  · Home regimen: Entresto, diltiazem  mg, digoxin 125 mcg, Coreg 25 mg b i d , aspirin 81 daily, lasix 40 mg BID     Plan:  · Telemetry monitoring  · 3L NC, mild tacypnea - CXR today  · Digoxin level 2 0  · Restarted home digoxin 9/19  · Holding all other home medications at this time, plan to slowly restart in below order   · 1  Coreg - restart today if BP tolerates after GI procedures  · 2  Cardizem short acting 60 mg q6h   · 3  Lasix  (or earlier as needed for volume status and UOP)     Permanent atrial fibrillation ([facility])  Assessment & Plan  · On coumadin with INR 4 66 on admission   · Coumadin reversed with Kcentra and vitamin K on admission for acute blood loss anemia and received 2 PRBC  · Home regimen:  Digoxin 125 mcg, Coreg 25 mg b i d , diltiazem  mg    Plan:  · Currently rate controlled, continue holding home medications, restart Coreg and then Cardizem short-acting as noted above as tolerated  · Continue hold Coumadin pending EGD with GI    COPD (chronic obstructive pulmonary disease) ([facility])  Assessment & Plan  · On 2 L nasal cannula p r n  At home  · SpO2 goal > 88%  · Continue home Symbicort, albuterol p r n      SIRS (systemic inflammatory response syndrome) ([facility])  Assessment & Plan  · POA as evidenced by WBC 22 1, tachycardia 100, lactic 10 6  · WBC down-trending, tachycardia resolved  · No current concern for infectious etiology, SIRS secondary to GI bleed and anemia    Plan:  · Monitoring off antibiotics at this time  · Follow-up cultures  · Trend fever and WBC count    Fall  Assessment & Plan  Fell from chair height, was sitting  Denies LOC, but was found down after pressing alert button    Plan:  - geriatrics consult  - Plan as above   - PT/OT        Disposition:  Continue inpatient treatment    SUBJECTIVE:  Chief Complaint:  Fall    Subjective: This morning patient reports having some mild back pain, she denies new weakness or numbness  Patient denies chest pain but reports feeling some increase in shortness of breath compared to her home baseline, especially while lying flat  She denies abdominal pain  OBJECTIVE:   Vitals:   Temp:  [97 3 °F (36 3 °C)-98 2 °F (36 8 °C)] 98 2 °F (36 8 °C)  HR:  [58-76] 76  Resp:  [14-20] 20  BP: ()/(38-74) 128/74    Intake/Output:  I/O       09/17 0701  09/18 0700 09/18 0701 09/19 0700 09/19 0701 09/20 0700    P  O   960     I V  (mL/kg) 49 5 89 7 (0 8)     Blood 300      IV Piggyback 100 250     Total Intake(mL/kg) 449 5 1299 7 (11 8)     Urine (mL/kg/hr) 850 495 (0 2)     Stool  0     Total Output 850 495     Net -400 5 +804 7            Unmeasured Stool Occurrence  0 x          Nutrition: Diet NPO; Sips with meds  GI Proph/Bowel Reg:  Protonix  VTE Prophylaxis:Heparin     Physical Exam:   GENERAL APPEARANCE:  No acute distress  NEURO:  Awake and alert, moving all extremities  HEENT:  Normocephalic  CV:  Regular rate and rhythm  LUNGS:  Mild tachypnea, no focal rales or rhonchi  GI:  Nondistended, nontender to palpation  MSK:  Trace to 1+ pitting edema in bilateral lower extremities  SKIN:  Warm and dry    Invasive Devices  Report    Peripheral Intravenous Line  Duration           Peripheral IV 09/17/22 Right Antecubital 1 day    Peripheral IV 09/18/22 Left Antecubital 1 day                      Lab Results: Results: I have personally reviewed all pertinent laboratory/tests results  Imaging/EKG Studies: I have personally reviewed pertinent reports

## 2022-09-19 NOTE — DISCHARGE INSTR - OTHER ORDERS
Skin care Plan:  1-Calazime paste to sacrum/buttocks TID and PRN  2-Turn/reposition q2h or when medically stable for pressure re-distribution on skin   3-Elevate heels to offload pressure  4-Moisturize skin daily with skin nourishing cream  5-Ehob cushion in chair when out of bed  6-Hydraguard to bilateral heels and bilateral ischium BID and PRN  7-Cleanse B/L arms/legs with normal saline, apply adaptic to right leg and arm and adaptic and maxorb to left arm and leg, cover with ABD, secure with kat and tape   Change every other day and PRN

## 2022-09-20 LAB
ABO GROUP BLD BPU: NORMAL
ANION GAP SERPL CALCULATED.3IONS-SCNC: 6 MMOL/L (ref 4–13)
BASOPHILS # BLD AUTO: 0.01 THOUSANDS/ΜL (ref 0–0.1)
BASOPHILS NFR BLD AUTO: 0 % (ref 0–1)
BPU ID: NORMAL
BUN SERPL-MCNC: 135 MG/DL (ref 5–25)
CALCIUM SERPL-MCNC: 8.1 MG/DL (ref 8.3–10.1)
CHLORIDE SERPL-SCNC: 104 MMOL/L (ref 96–108)
CO2 SERPL-SCNC: 26 MMOL/L (ref 21–32)
CREAT SERPL-MCNC: 1.89 MG/DL (ref 0.6–1.3)
CROSSMATCH: NORMAL
EOSINOPHIL # BLD AUTO: 0.01 THOUSAND/ΜL (ref 0–0.61)
EOSINOPHIL NFR BLD AUTO: 0 % (ref 0–6)
ERYTHROCYTE [DISTWIDTH] IN BLOOD BY AUTOMATED COUNT: 20.2 % (ref 11.6–15.1)
GFR SERPL CREATININE-BSD FRML MDRD: 24 ML/MIN/1.73SQ M
GLUCOSE SERPL-MCNC: 116 MG/DL (ref 65–140)
GLUCOSE SERPL-MCNC: 121 MG/DL (ref 65–140)
GLUCOSE SERPL-MCNC: 152 MG/DL (ref 65–140)
GLUCOSE SERPL-MCNC: 182 MG/DL (ref 65–140)
GLUCOSE SERPL-MCNC: 194 MG/DL (ref 65–140)
GLUCOSE SERPL-MCNC: 219 MG/DL (ref 65–140)
HCT VFR BLD AUTO: 25.5 % (ref 34.8–46.1)
HGB BLD-MCNC: 7.8 G/DL (ref 11.5–15.4)
IMM GRANULOCYTES # BLD AUTO: 0.09 THOUSAND/UL (ref 0–0.2)
IMM GRANULOCYTES NFR BLD AUTO: 1 % (ref 0–2)
LYMPHOCYTES # BLD AUTO: 0.47 THOUSANDS/ΜL (ref 0.6–4.47)
LYMPHOCYTES NFR BLD AUTO: 4 % (ref 14–44)
MCH RBC QN AUTO: 30.6 PG (ref 26.8–34.3)
MCHC RBC AUTO-ENTMCNC: 30.6 G/DL (ref 31.4–37.4)
MCV RBC AUTO: 100 FL (ref 82–98)
MONOCYTES # BLD AUTO: 0.86 THOUSAND/ΜL (ref 0.17–1.22)
MONOCYTES NFR BLD AUTO: 7 % (ref 4–12)
NEUTROPHILS # BLD AUTO: 11.86 THOUSANDS/ΜL (ref 1.85–7.62)
NEUTS SEG NFR BLD AUTO: 88 % (ref 43–75)
NRBC BLD AUTO-RTO: 0 /100 WBCS
PLATELET # BLD AUTO: 149 THOUSANDS/UL (ref 149–390)
PMV BLD AUTO: 10.4 FL (ref 8.9–12.7)
POTASSIUM SERPL-SCNC: 3.9 MMOL/L (ref 3.5–5.3)
RBC # BLD AUTO: 2.55 MILLION/UL (ref 3.81–5.12)
SODIUM SERPL-SCNC: 136 MMOL/L (ref 135–147)
UNIT DISPENSE STATUS: NORMAL
UNIT PRODUCT CODE: NORMAL
UNIT PRODUCT VOLUME: 350 ML
UNIT RH: NORMAL
WBC # BLD AUTO: 13.3 THOUSAND/UL (ref 4.31–10.16)

## 2022-09-20 PROCEDURE — 80048 BASIC METABOLIC PNL TOTAL CA: CPT

## 2022-09-20 PROCEDURE — 99223 1ST HOSP IP/OBS HIGH 75: CPT | Performed by: INTERNAL MEDICINE

## 2022-09-20 PROCEDURE — 85025 COMPLETE CBC W/AUTO DIFF WBC: CPT

## 2022-09-20 PROCEDURE — 99232 SBSQ HOSP IP/OBS MODERATE 35: CPT | Performed by: INTERNAL MEDICINE

## 2022-09-20 PROCEDURE — 99232 SBSQ HOSP IP/OBS MODERATE 35: CPT | Performed by: SURGERY

## 2022-09-20 PROCEDURE — 82948 REAGENT STRIP/BLOOD GLUCOSE: CPT

## 2022-09-20 PROCEDURE — C9113 INJ PANTOPRAZOLE SODIUM, VIA: HCPCS | Performed by: PHYSICIAN ASSISTANT

## 2022-09-20 RX ORDER — POLYETHYLENE GLYCOL 3350 17 G/17G
17 POWDER, FOR SOLUTION ORAL 2 TIMES DAILY
Status: DISCONTINUED | OUTPATIENT
Start: 2022-09-20 | End: 2022-09-30 | Stop reason: HOSPADM

## 2022-09-20 RX ORDER — CARVEDILOL 12.5 MG/1
12.5 TABLET ORAL 2 TIMES DAILY WITH MEALS
Status: DISCONTINUED | OUTPATIENT
Start: 2022-09-20 | End: 2022-09-30 | Stop reason: HOSPADM

## 2022-09-20 RX ORDER — FUROSEMIDE 10 MG/ML
40 INJECTION INTRAMUSCULAR; INTRAVENOUS
Status: DISCONTINUED | OUTPATIENT
Start: 2022-09-20 | End: 2022-09-21

## 2022-09-20 RX ORDER — SENNOSIDES 8.6 MG
1 TABLET ORAL 2 TIMES DAILY
Status: DISCONTINUED | OUTPATIENT
Start: 2022-09-20 | End: 2022-09-22

## 2022-09-20 RX ORDER — BISACODYL 10 MG
10 SUPPOSITORY, RECTAL RECTAL DAILY PRN
Status: DISCONTINUED | OUTPATIENT
Start: 2022-09-20 | End: 2022-09-21

## 2022-09-20 RX ORDER — POLYETHYLENE GLYCOL 3350 17 G/17G
17 POWDER, FOR SOLUTION ORAL DAILY PRN
Status: DISCONTINUED | OUTPATIENT
Start: 2022-09-20 | End: 2022-09-30 | Stop reason: HOSPADM

## 2022-09-20 RX ADMIN — BUDESONIDE AND FORMOTEROL FUMARATE DIHYDRATE 2 PUFF: 160; 4.5 AEROSOL RESPIRATORY (INHALATION) at 08:12

## 2022-09-20 RX ADMIN — HEPARIN SODIUM 5000 UNITS: 5000 INJECTION INTRAVENOUS; SUBCUTANEOUS at 15:19

## 2022-09-20 RX ADMIN — CARVEDILOL 12.5 MG: 12.5 TABLET, FILM COATED ORAL at 17:21

## 2022-09-20 RX ADMIN — POLYETHYLENE GLYCOL 3350 17 G: 17 POWDER, FOR SOLUTION ORAL at 08:34

## 2022-09-20 RX ADMIN — GABAPENTIN 100 MG: 100 CAPSULE ORAL at 08:12

## 2022-09-20 RX ADMIN — GABAPENTIN 100 MG: 100 CAPSULE ORAL at 22:15

## 2022-09-20 RX ADMIN — ALBUTEROL SULFATE 2 PUFF: 90 AEROSOL, METERED RESPIRATORY (INHALATION) at 22:27

## 2022-09-20 RX ADMIN — METHOCARBAMOL TABLETS 500 MG: 500 TABLET, COATED ORAL at 17:20

## 2022-09-20 RX ADMIN — FUROSEMIDE 40 MG: 10 INJECTION, SOLUTION INTRAMUSCULAR; INTRAVENOUS at 08:12

## 2022-09-20 RX ADMIN — METHOCARBAMOL TABLETS 500 MG: 500 TABLET, COATED ORAL at 05:46

## 2022-09-20 RX ADMIN — FUROSEMIDE 40 MG: 10 INJECTION, SOLUTION INTRAMUSCULAR; INTRAVENOUS at 15:19

## 2022-09-20 RX ADMIN — LIDOCAINE 5% 1 PATCH: 700 PATCH TOPICAL at 08:13

## 2022-09-20 RX ADMIN — INSULIN LISPRO 1 UNITS: 100 INJECTION, SOLUTION INTRAVENOUS; SUBCUTANEOUS at 12:49

## 2022-09-20 RX ADMIN — PANTOPRAZOLE SODIUM 40 MG: 40 INJECTION, POWDER, FOR SOLUTION INTRAVENOUS at 08:12

## 2022-09-20 RX ADMIN — ACETAMINOPHEN 975 MG: 325 TABLET, FILM COATED ORAL at 22:09

## 2022-09-20 RX ADMIN — BUDESONIDE AND FORMOTEROL FUMARATE DIHYDRATE 2 PUFF: 160; 4.5 AEROSOL RESPIRATORY (INHALATION) at 17:21

## 2022-09-20 RX ADMIN — ACETAMINOPHEN 975 MG: 325 TABLET, FILM COATED ORAL at 15:19

## 2022-09-20 RX ADMIN — DIGOXIN 125 MCG: 125 TABLET ORAL at 08:13

## 2022-09-20 RX ADMIN — METHOCARBAMOL TABLETS 500 MG: 500 TABLET, COATED ORAL at 12:47

## 2022-09-20 RX ADMIN — PANTOPRAZOLE SODIUM 40 MG: 40 INJECTION, POWDER, FOR SOLUTION INTRAVENOUS at 22:09

## 2022-09-20 RX ADMIN — ACETAMINOPHEN 975 MG: 325 TABLET, FILM COATED ORAL at 05:46

## 2022-09-20 RX ADMIN — ATORVASTATIN CALCIUM 10 MG: 10 TABLET, FILM COATED ORAL at 08:12

## 2022-09-20 RX ADMIN — GABAPENTIN 100 MG: 100 CAPSULE ORAL at 15:19

## 2022-09-20 RX ADMIN — INSULIN LISPRO 1 UNITS: 100 INJECTION, SOLUTION INTRAVENOUS; SUBCUTANEOUS at 23:38

## 2022-09-20 RX ADMIN — SENNOSIDES 8.6 MG: 8.6 TABLET, FILM COATED ORAL at 17:20

## 2022-09-20 RX ADMIN — METHOCARBAMOL TABLETS 500 MG: 500 TABLET, COATED ORAL at 22:09

## 2022-09-20 RX ADMIN — INSULIN LISPRO 1 UNITS: 100 INJECTION, SOLUTION INTRAVENOUS; SUBCUTANEOUS at 17:21

## 2022-09-20 RX ADMIN — HEPARIN SODIUM 5000 UNITS: 5000 INJECTION INTRAVENOUS; SUBCUTANEOUS at 05:46

## 2022-09-20 RX ADMIN — HEPARIN SODIUM 5000 UNITS: 5000 INJECTION INTRAVENOUS; SUBCUTANEOUS at 22:09

## 2022-09-20 NOTE — ASSESSMENT & PLAN NOTE
· On coumadin with INR 4 66 on admission   · Coumadin reversed with Kcentra and vitamin K on admission for acute blood loss anemia and received 2 PRBC  · Home regimen:  Digoxin 125 mcg, Coreg 25 mg b i d , diltiazem  mg    Plan:  · Currently rate controlled, continue holding home medications, restart Coreg and then Cardizem short-acting as noted above as tolerated  · Continue hold Coumadin

## 2022-09-20 NOTE — ANESTHESIA PREPROCEDURE EVALUATION
Procedure:  EGD  FLEXIBLE SIGMOIDOSCOPY    Initially presented as a trauma from a fall and found to have a T11 fracture  Workup prior to transfer to [facility] showed hgb of 5 6 - patient has since received 2 units of PRBC    supratherapeutic INR (on coumadin for afib)  SIRS response related to GI bleed    Relevant Problems   CARDIO   (+) Permanent atrial fibrillation (HCC)      /RENAL   (+) Acute-on-chronic kidney injury (HCC)   (+) Stage 3 chronic kidney disease (HCC)      HEMATOLOGY   (+) Anemia      PULMONARY   (+) COPD (chronic obstructive pulmonary disease) (HCC) (2L O2 PRN)      Class III obesity         Anesthesia Plan  ASA Score- 4     Anesthesia Type- IV sedation with anesthesia with ASA Monitors  Additional Monitors:   Airway Plan:     Comment: Npo after MN  Plan Factors-    Chart reviewed  Patient summary reviewed  Induction- intravenous  Postoperative Plan-     Informed Consent- Anesthetic plan and risks discussed with patient  I personally reviewed this patient with the CRNA  Discussed and agreed on the Anesthesia Plan with the CRNA  Jessica Bailey

## 2022-09-20 NOTE — PLAN OF CARE
Problem: MOBILITY - ADULT  Goal: Maintain or return to baseline ADL function  Description: INTERVENTIONS:  -  Assess patient's ability to carry out ADLs; assess patient's baseline for ADL function and identify physical deficits which impact ability to perform ADLs (bathing, care of mouth/teeth, toileting, grooming, dressing, etc )  - Assess/evaluate cause of self-care deficits   - Assess range of motion  - Assess patient's mobility; develop plan if impaired  - Assess patient's need for assistive devices and provide as appropriate  - Encourage maximum independence but intervene and supervise when necessary  - Involve family in performance of ADLs  - Assess for home care needs following discharge   - Consider OT consult to assist with ADL evaluation and planning for discharge  - Provide patient education as appropriate  Outcome: Progressing  Goal: Maintains/Returns to pre admission functional level  Description: INTERVENTIONS:  - Perform BMAT or MOVE assessment daily    - Set and communicate daily mobility goal to care team and patient/family/caregiver  - Collaborate with rehabilitation services on mobility goals if consulted  - Perform Range of Motion 3 times a day  - Reposition patient every 2 hours    - Dangle patient 3 times a day  - Stand patient 3 times a day  - Ambulate patient 3 times a day  - Out of bed to chair 3 times a day   - Out of bed for meals 3 times a day  - Out of bed for toileting  - Record patient progress and toleration of activity level   Outcome: Progressing     Problem: Potential for Falls  Goal: Patient will remain free of falls  Description: INTERVENTIONS:  - Educate patient/family on patient safety including physical limitations  - Instruct patient to call for assistance with activity   - Consult OT/PT to assist with strengthening/mobility   - Keep Call bell within reach  - Keep bed low and locked with side rails adjusted as appropriate  - Keep care items and personal belongings within reach  - Initiate and maintain comfort rounds  - Make Fall Risk Sign visible to staff  - Offer Toileting every Hours, in advance of need  - Initiate/Maintain alarm  - Obtain necessary fall risk management equipment:  - Apply yellow socks and bracelet for high fall risk patients  - Consider moving patient to room near nurses station  Outcome: Progressing     Problem: Prexisting or High Potential for Compromised Skin Integrity  Goal: Skin integrity is maintained or improved  Description: INTERVENTIONS:  - Identify patients at risk for skin breakdown  - Assess and monitor skin integrity  - Assess and monitor nutrition and hydration status  - Monitor labs   - Assess for incontinence   - Turn and reposition patient  - Assist with mobility/ambulation  - Relieve pressure over bony prominences  - Avoid friction and shearing  - Provide appropriate hygiene as needed including keeping skin clean and dry  - Evaluate need for skin moisturizer/barrier cream  - Collaborate with interdisciplinary team   - Patient/family teaching  - Consider wound care consult   Outcome: Progressing     Problem: PAIN - ADULT  Goal: Verbalizes/displays adequate comfort level or baseline comfort level  Description: Interventions:  - Encourage patient to monitor pain and request assistance  - Assess pain using appropriate pain scale  - Administer analgesics based on type and severity of pain and evaluate response  - Implement non-pharmacological measures as appropriate and evaluate response  - Consider cultural and social influences on pain and pain management  - Notify physician/advanced practitioner if interventions unsuccessful or patient reports new pain  Outcome: Progressing     Problem: INFECTION - ADULT  Goal: Absence or prevention of progression during hospitalization  Description: INTERVENTIONS:  - Assess and monitor for signs and symptoms of infection  - Monitor lab/diagnostic results  - Monitor all insertion sites, i e  indwelling lines, tubes, and drains  - Monitor endotracheal if appropriate and nasal secretions for changes in amount and color  - [place] appropriate cooling/warming therapies per order  - Administer medications as ordered  - Instruct and encourage patient and family to use good hand hygiene technique  - Identify and instruct in appropriate isolation precautions for identified infection/condition  Outcome: Progressing     Problem: SAFETY ADULT  Goal: Maintain or return to baseline ADL function  Description: INTERVENTIONS:  -  Assess patient's ability to carry out ADLs; assess patient's baseline for ADL function and identify physical deficits which impact ability to perform ADLs (bathing, care of mouth/teeth, toileting, grooming, dressing, etc )  - Assess/evaluate cause of self-care deficits   - Assess range of motion  - Assess patient's mobility; develop plan if impaired  - Assess patient's need for assistive devices and provide as appropriate  - Encourage maximum independence but intervene and supervise when necessary  - Involve family in performance of ADLs  - Assess for home care needs following discharge   - Consider OT consult to assist with ADL evaluation and planning for discharge  - Provide patient education as appropriate  Outcome: Progressing  Goal: Maintains/Returns to pre admission functional level  Description: INTERVENTIONS:  - Perform BMAT or MOVE assessment daily    - Set and communicate daily mobility goal to care team and patient/family/caregiver  - Collaborate with rehabilitation services on mobility goals if consulted  - Perform Range of Motion 3 times a day  - Reposition patient every 2 hours    - Dangle patient 3 times a day  - Stand patient 3 times a day  - Ambulate patient 3 times a day  - Out of bed to chair 3 times a day   - Out of bed for meals 3 times a day  - Out of bed for toileting  - Record patient progress and toleration of activity level   Outcome: Progressing  Goal: Patient will remain free of falls  Description: INTERVENTIONS:  - Educate patient/family on patient safety including physical limitations  - Instruct patient to call for assistance with activity   - Consult OT/PT to assist with strengthening/mobility   - Keep Call bell within reach  - Keep bed low and locked with side rails adjusted as appropriate  - Keep care items and personal belongings within reach  - Initiate and maintain comfort rounds  - Make Fall Risk Sign visible to staff  - Offer Toileting every  Hours, in advance of need  - Initiate/Maintain alarm  - Obtain necessary fall risk management equipment:   - Apply yellow socks and bracelet for high fall risk patients  - Consider moving patient to room near nurses station  Outcome: Progressing     Problem: DISCHARGE PLANNING  Goal: Discharge to home or other facility with appropriate resources  Description: INTERVENTIONS:  - Identify barriers to discharge w/patient and caregiver  - Arrange for needed discharge resources and transportation as appropriate  - Identify discharge learning needs (meds, wound care, etc )  - Arrange for interpretive services to assist at discharge as needed  - Refer to Case Management Department for coordinating discharge planning if the patient needs post-hospital services based on physician/advanced practitioner order or complex needs related to functional status, cognitive ability, or social support system  Outcome: Progressing     Problem: Knowledge Deficit  Goal: Patient/family/caregiver demonstrates understanding of disease process, treatment plan, medications, and discharge instructions  Description: Complete learning assessment and assess knowledge base    Interventions:  - Provide teaching at level of understanding  - Provide teaching via preferred learning methods  Outcome: Progressing     Problem: Nutrition/Hydration-ADULT  Goal: Nutrient/Hydration intake appropriate for improving, restoring or maintaining nutritional needs  Description: Monitor and assess patient's nutrition/hydration status for malnutrition  Collaborate with interdisciplinary team and initiate plan and interventions as ordered  Monitor patient's weight and dietary intake as ordered or per policy  Utilize nutrition screening tool and intervene as necessary  Determine patient's food preferences and provide high-protein, high-caloric foods as appropriate       INTERVENTIONS:  - Monitor oral intake, urinary output, labs, and treatment plans  - Assess nutrition and hydration status and recommend course of action  - Evaluate amount of meals eaten  - Assist patient with eating if necessary   - Allow adequate time for meals  - Recommend/ encourage appropriate diets, oral nutritional supplements, and vitamin/mineral supplements  - Order, calculate, and assess calorie counts as needed  - Recommend, monitor, and adjust tube feedings and TPN/PPN based on assessed needs  - Assess need for intravenous fluids  - Provide specific nutrition/hydration education as appropriate  - Include patient/family/caregiver in decisions related to nutrition  Outcome: Progressing

## 2022-09-20 NOTE — ASSESSMENT & PLAN NOTE
· POA as evidenced by WBC 22 1, tachycardia 100, lactic 10 6  · WBC down-trending, tachycardia resolved  · No current concern for infectious etiology, SIRS secondary to GI bleed and anemia    Plan:  · Monitoring off antibiotics at this time  · Follow-up cultures  · Trend fever and WBC count  · improving

## 2022-09-20 NOTE — ASSESSMENT & PLAN NOTE
9/17 CT CAP: Fracture of the T11 vertebral body with narrowing of the spinal canal at this level  Evaluation is limited secondary to osteopenia/ankylosing spondylitis  Additional probable small fracture of the anterior endplate of K25     9/98 CT thoracic: Ddisruption of the anterior endplates/syndesmophytes of the T10 vertebral body and disruption of the anterior and posterior endplate of the M01 vertebral bodies compatible with fracture  There is narrowing of the Spinal canal at the T11 level        Plan:  · Neurosurgery consult  · TLSO brace   · Upright XR   · q2h neuro checks   · No neurosurgical intervention at this time given neurologically intact and stable exam

## 2022-09-20 NOTE — ASSESSMENT & PLAN NOTE
Yifan Mon from chair height, was sitting  Denies LOC, but was found down after pressing alert button    Plan:  - geriatrics consult  - Plan as above   - PT/OT

## 2022-09-20 NOTE — ANESTHESIA POSTPROCEDURE EVALUATION
Post-Op Assessment Note    CV Status:  Stable  Pain Score: 0    Pain management: adequate     Mental Status:  Arousable   Hydration Status:  Stable   PONV Controlled:  None   Airway Patency:  Patent      Post Op Vitals Reviewed: Yes      Staff: Anesthesiologist         No complications documented      BP     Temp      Pulse     Resp      SpO2       please refer to nursing vitals records for specific vital measurements

## 2022-09-20 NOTE — ASSESSMENT & PLAN NOTE
· Hgb 5 7 on admission with INR 4 66 on coumadin, received 2 U PRBC, stable in 7s  · SHANT positive for melanotic stool  · Reports 2 weeks of melena   · Elevated BUN  · UGI bleed suspected    Plan:  · Coumadin reversed with Darnell Cirri and Vitamin K   · Trend Hgb, stable  · IV PPI BID    · 9/19 EGD negative, flex sigmoidoscopy incomplete due to stool but not obvious source of bleed

## 2022-09-20 NOTE — ASSESSMENT & PLAN NOTE
Wt Readings from Last 3 Encounters:   09/20/22 105 kg (231 lb 3 2 oz)   11/12/21 110 kg (243 lb)   08/23/21 106 kg (234 lb 9 1 oz)     · Hx HF with biventricular dysfunction and EF 40% with PPM   · 9/18 Echo - EF 33%, diastolic dysfunction, mod AS, mod MR, RVSP 59  · Home regimen: Entresto, diltiazem  mg, digoxin 125 mcg, Coreg 25 mg b i d , aspirin 81 daily, lasix 40 mg BID     Plan:  · Telemetry monitoring  · 9/19 4L NC, CXR with pulm edema, given 40mg IV lasix  · Digoxin level 2 0, restarted home digoxin 9/19  · Lasix 40mg IV BID  · HF consult

## 2022-09-20 NOTE — CONSULTS
Consultation - Cardiology Team One  Abraham Adams 79 y o  female MRN: [id number]  Unit/Bed#: [facility] 634-01 Encounter: [id number]    Inpatient consult to Cardiology  Consult performed by: FIDELIA Cabrera  Consult ordered by: Quinn Middleton MD          Physician Requesting Consult: Joni Lee MD  Reason for Consult / Principal Problem: Chronic heart failure       Assessment/ Plan    1  Chronic systolic heart failure   Currently on furosemide 40 mg IV BID  She takes furosemide 80 mg PO daily at home   Check BMP in am   Continue 2 gram Na diet   Monitor I/Os  Daily weights   Echocardiogram 9/18/2022 showed EF 65%, LA severely dilated, RA dilated, mild AI, moderate AS, moderate MR and mild TR      2  Cardiomyopathy   S/p ICD  Recommend resuming home medications: Entresto  for GDMT prior to discharge pending renal function   Restart coreg    3  Acute on Chronic kidney disease stage III   Baseline creatinine 1 2-1 4  Creatinine 1 89 today     4  Chronic atrial fibrillation   On digoxin 125 mcg PO daily   Will start coreg 12 5 mg PO BID  Patient takes coreg 25 mg PO BID at home but BP has been borderline at times  Home medication: coumadin on hold  Recommend resume [facility] once cleared by GI    5  T11 Vertebral fracture  Followed by primary team and neurosurgery team   TLSO brace     6  Anemia   Hemoglobin 5 7 on admission   S/p transfusion   Hemoglobin 7 8 today     History of Present Illness   HPI: Abraham Adams is a 79y o  year old female who has chronic systolic heart failure s/p ICD, chronic atrial fibrillation, valvular heart disease, chronic CKD stage III, type II diabetes, XIOMARA, COPD, colon cancer s/p colectomy and obesity  She follows with Dr Sary Velazquez at [facility]  [facility]  AT [place] 9/17/2022 s/p fall and transferred to [facility] for further management   Imaging showed fracture of T11 vertebral body with narrowing of the spinal canal  Neurosurgery consulted and plan for no acute neurosurgical intervention and TSLO brace  Hemoglobin was also 5 7 on admission  She was transfused PRBCs  She is on coumadin for [facility] but currently held due to anemia and reversed with KCentra and Vitamin K  EGD yesterday showed no obvious source of bleed and flex sigmoidoscopy limited due to stool  Cardiology consulted due to history of chronic heart failure  She was started on IV diuretics this admission due to concern for volume overload  She takes furosemide 80 mg PO daily at home  She is denying SOB, chest pain or palpitations  Echocardiogram 2022 showed EF 65%, LA severely dilated, RA dilated, mild AI, moderate AS, moderate MR and mild TR  EKG reviewed personally:   V paced   Ventricular rate 69 bpm     Telemetry reviewed personally:   No telemetry       Review of Systems   Constitutional: Negative for chills, fever and malaise/fatigue  HENT: Negative for congestion  Cardiovascular: Negative for chest pain, leg swelling, orthopnea and palpitations  Respiratory: Positive for shortness of breath  Musculoskeletal: Positive for arthritis and back pain  Negative for falls  Gastrointestinal: Negative for bloating, nausea and vomiting  Neurological: Negative for dizziness and light-headedness  Psychiatric/Behavioral: Negative for altered mental status  All other systems reviewed and are negative      Historical Information   Past Medical History:   Diagnosis Date    A-fib [facility])     Cardiac pacemaker     CHF (congestive heart failure) (HCC)     Chronic cellulitis     COPD (chronic obstructive pulmonary disease) (HCC)     Diabetes mellitus (HCC)     Edema     High cholesterol     Hyperparathyroidism ([facility] [address] )     Hypertension     Hyperuricemia     Stage 4 chronic kidney disease ([facility] )      Past Surgical History:   Procedure Laterality Date    CARDIAC DEFIBRILLATOR PLACEMENT       SECTION      x 2    COLON SURGERY      HERNIA REPAIR       Social History Substance and Sexual Activity   Alcohol Use Never     Social History     Substance and Sexual Activity   Drug Use Never     Social History     Tobacco Use   Smoking Status Never Smoker   Smokeless Tobacco Never Used     Family History:   Family History   Problem Relation Age of Onset    Hypertension Mother     Stroke Mother     Heart disease Father        Meds/Allergies   all current active meds have been reviewed and current meds:   Current Facility-Administered Medications   Medication Dose Route Frequency    acetaminophen (TYLENOL) tablet 975 mg  975 mg Oral Q8H [address]    albuterol (PROVENTIL HFA,VENTOLIN HFA) inhaler 2 puff  2 puff Inhalation Q4H PRN    atorvastatin (LIPITOR) tablet 10 mg  10 mg Oral Daily    budesonide-formoterol (SYMBICORT) 160-4 5 mcg/act inhaler 2 puff  2 puff Inhalation BID    digoxin (LANOXIN) tablet 125 mcg  125 mcg Oral Daily    furosemide (LASIX) injection 40 mg  40 mg Intravenous BID (diuretic)    gabapentin (NEURONTIN) capsule 100 mg  100 mg Oral TID    heparin (porcine) subcutaneous injection 5,000 Units  5,000 Units Subcutaneous Q8H [address]    HYDROmorphone HCl (DILAUDID) injection 0 2 mg  0 2 mg Intravenous Q4H PRN    insulin lispro (HumaLOG) 100 units/mL subcutaneous injection 1-5 Units  1-5 Units Subcutaneous Q6H [address]    lidocaine (LIDODERM) 5 % patch 1 patch  1 patch Topical Daily    melatonin tablet 6 mg  6 mg Oral HS    methocarbamol (ROBAXIN) tablet 500 mg  500 mg Oral Q6H DONTRELL    oxyCODONE (ROXICODONE) IR tablet 2 5 mg  2 5 mg Oral Q4H PRN    oxyCODONE (ROXICODONE) IR tablet 5 mg  5 mg Oral Q4H PRN    pantoprazole (PROTONIX) injection 40 mg  40 mg Intravenous Q12H DONTRELL    polyethylene glycol (MIRALAX) packet 17 g  17 g Oral BID          No Known Allergies    Objective   Vitals: Blood pressure (!) 121/36, pulse 71, temperature (!) 97 3 °F (36 3 °C), resp  rate 15, height 4' 10" (1 473 m), weight 105 kg (231 lb 3 2 oz), SpO2 95 %  ,     Body mass index is 48 32 kg/m² ,     Systolic (48ZKM), LTE:341 , Min:101 , GQI:210     Diastolic (88BWS), SPP:17, Min:36, Max:74        Intake/Output Summary (Last 24 hours) at 9/20/2022 1108  Last data filed at 9/20/2022 9697  Gross per 24 hour   Intake 540 ml   Output 800 ml   Net -260 ml     Weight (last 2 days)     Date/Time Weight    09/20/22 0600 105 (231 2)    09/18/22 1000 110 (243)        Invasive Devices  Report    Peripheral Intravenous Line  Duration           Peripheral IV 09/19/22 Right Antecubital <1 day          Drain  Duration           External Urinary Catheter 1 day                Physical Exam  Constitutional:       General: She is not in acute distress  Appearance: She is obese  HENT:      Head: Normocephalic  Mouth/Throat:      Mouth: Mucous membranes are moist    Cardiovascular:      Rate and Rhythm: Normal rate and regular rhythm  Pulses: Normal pulses  Heart sounds: Murmur heard  Comments: Systolic murmur   Pulmonary:      Effort: Pulmonary effort is normal  No respiratory distress  Comments: Decreased  2 L NC   Abdominal:      General: Bowel sounds are normal       Palpations: Abdomen is soft  Musculoskeletal:         General: Swelling present  Cervical back: Neck supple  Comments: Trace bilateral LE edema    Skin:     General: Skin is warm and dry  Capillary Refill: Capillary refill takes less than 2 seconds  Comments: Venous stasis bilateral LE    Neurological:      General: No focal deficit present  Mental Status: She is alert and oriented to person, place, and time     Psychiatric:         Mood and Affect: Mood normal            LABORATORY RESULTS:  Results from last 7 days   Lab Units 09/17/22  1718   CK TOTAL U/L 45     CBC with diff:   Results from last 7 days   Lab Units 09/20/22  0629 09/19/22  0532 09/18/22  1659 09/18/22  0342 09/17/22  2222 09/17/22  1718   WBC Thousand/uL 13 30* 16 25*  --  20 53* 26 13* 22 11*   HEMOGLOBIN g/dL 7 8* 7 3* 7 3* 7  7* 6 8* 5 7*   HEMATOCRIT % 25 5* 24 2*  --  24 3* 21 8* 18 9*   MCV fL 100* 99*  --  95 97 100*   PLATELETS Thousands/uL 149 136*  --  145* 173 210   MCH pg 30 6 29 9  --  30 2 30 4 30 0   MCHC g/dL 30 6* 30 2*  --  31 7 31 2* 30 2*   RDW % 20 2* 19 6*  --  17 5* 18 2* 19 9*   MPV fL 10 4 10 7  --  11 0 10 6 10 5   NRBC AUTO /100 WBCs 0  --   --  0  --   --    NRBC /100 WBC  --   --   --   --  1  --        CMP:  Results from last 7 days   Lab Units 09/20/22  0629 09/19/22  0532 09/18/22 0342 09/17/22 2222 09/17/22  1718   POTASSIUM mmol/L 3 9 3 9 4 1 3 8 3 7   CHLORIDE mmol/L 104 104 106 104 98   CO2 mmol/L 26 24 23 21 19*   BUN mg/dL 135* 131* 133* 139* 131*   CREATININE mg/dL 1 89* 1 87* 1 64* 1 79* 1 98*   CALCIUM mg/dL 8 1* 8 0* 8 7 8 4 8 3   AST U/L  --   --  54* 44 15   ALT U/L  --   --  43 39 15   ALK PHOS U/L  --   --  53 54 55   EGFR ml/min/1 73sq m 24 24 28 26 23       BMP:  Results from last 7 days   Lab Units 09/20/22 0629 09/19/22 0532 09/18/22 0342 09/17/22 2222 09/17/22  1718   POTASSIUM mmol/L 3 9 3 9 4 1 3 8 3 7   CHLORIDE mmol/L 104 104 106 104 98   CO2 mmol/L 26 24 23 21 19*   BUN mg/dL 135* 131* 133* 139* 131*   CREATININE mg/dL 1 89* 1 87* 1 64* 1 79* 1 98*   CALCIUM mg/dL 8 1* 8 0* 8 7 8 4 8 3          Lab Results   Component Value Date    NTBNP 2,236 (H) 09/17/2022    NTBNP 1,583 (H) 11/11/2021            Results from last 7 days   Lab Units 09/18/22 0342 09/17/22  2222   MAGNESIUM mg/dL 2 8* 2 7*                     Results from last 7 days   Lab Units 09/17/22  2222 09/17/22  1718   INR  1 65* 4 66*     Lipid Profile:   No results found for: CHOL  No results found for: HDL  No results found for: LDLCALC  No results found for: TRIG      Cardiac testing:   No results found for this or any previous visit  No results found for this or any previous visit  No valid procedures specified  No results found for this or any previous visit      Imaging:   EGD    Result Date: 9/19/2022  Narrative: [facility] [id number] [address] [phone] DATE OF SERVICE: 9/19/22 PHYSICIAN(S): Attending: Rachel Fox MD Fellow: Aretha Diez MD INDICATION: Melena POST-OP DIAGNOSIS: See the impression below  PREPROCEDURE: Informed consent was obtained for the procedure, including sedation  Risks of perforation, hemorrhage, adverse drug reaction and aspiration were discussed  The patient was placed in the left lateral decubitus position  Patient was explained about the risks and benefits of the procedure  Risks including but not limited to bleeding, infection, and perforation were explained in detail  Also explained about less than 100% sensitivity with the exam and other alternatives  DETAILS OF PROCEDURE: Patient was taken to the procedure room where a time out was performed to confirm correct patient and correct procedure  The patient underwent monitored anesthesia care, which was administered by an anesthesia professional  The patient's blood pressure, heart rate, level of consciousness, respirations, oxygen and ETCO2 were monitored throughout the procedure  The scope was advanced to the second part of the duodenum  Retroflexion was performed in the fundus  The patient experienced no blood loss  The procedure was not difficult  The patient tolerated the procedure well  There were no apparent complications  ANESTHESIA INFORMATION: ASA: ASA status not filed in the log  Anesthesia Type: Anesthesia type not filed in the log   MEDICATIONS: No administrations occurring from 1259 to 1319 on 09/19/22 FINDINGS: All observed locations appeared normal  Regular Z-line 39 cm from the incisors SPECIMENS: * No specimens in log *     Impression: All observed locations appeared normal  Regular Z-line 39 cm from the incisors RECOMMENDATION: Proceed with planned flex sig  ATTENDING ATTESTATION:  I was present throughout the entire procedure from insertion to complete withdrawal of the scope  I performed all interventions myself or oversaw the fellow  Jered Au MD     XR chest portable    Result Date: 9/19/2022  Narrative: CHEST INDICATION:   CHF  COMPARISON:  None EXAM PERFORMED/VIEWS:  XR CHEST PORTABLE FINDINGS: Moderate cardiomegaly with left subclavian ICD leads in the right atrium, right ventricle, and cardiac vein  Pulmonary artery enlargement  The lungs are clear  No pneumothorax or pleural effusion  Osseous structures appear within normal limits for patient age  Impression: No acute cardiopulmonary disease  Workstation performed: [id number]     XR elbow 3+ vw left    Result Date: 9/19/2022  Narrative: LEFT ELBOW INDICATION:   pain  COMPARISON:  None VIEWS:  XR ELBOW 3+ VW LEFT Images: 4 FINDINGS: There is no acute fracture or dislocation  There is no joint effusion  Moderate degenerative changes  No lytic or blastic osseous lesion  There are atherosclerotic calcifications  Soft tissues are otherwise unremarkable  Impression: No acute osseous abnormality  Workstation performed: [id number]     XR forearm 2 views LEFT    Result Date: 9/19/2022  Narrative: LEFT FOREARM INDICATION:   pain  COMPARISON:  None VIEWS:  XR FOREARM 2 VW LEFT Images: 4 FINDINGS: There is no acute fracture or dislocation  Degenerative changes in the elbow and wrist  No lytic or blastic osseous lesion  There are atherosclerotic calcifications  Soft tissues are otherwise unremarkable  Impression: No acute osseous abnormality  Workstation performed: [id number]     TRAUMA - CT head wo contrast    Result Date: 9/17/2022  Narrative: CT BRAIN - WITHOUT CONTRAST INDICATION:   TRAUMA  COMPARISON:  None  TECHNIQUE:  CT examination of the brain was performed  In addition to axial images, sagittal and coronal 2D reformatted images were created and submitted for interpretation  Radiation dose length product (DLP) for this visit:  855 mGy-cm     This examination, like all CT scans performed in the [facility], was performed utilizing techniques to minimize radiation dose exposure, including the use of iterative reconstruction and automated exposure control  IMAGE QUALITY:  Diagnostic  FINDINGS: PARENCHYMA: Decreased attenuation is noted in periventricular and subcortical white matter demonstrating an appearance that is statistically most likely to represent mild microangiopathic change  No CT signs of acute infarction  No intracranial mass, mass effect or midline shift  No acute parenchymal hemorrhage  VENTRICLES AND EXTRA-AXIAL SPACES:  Enlargement of ventricles and extra-axial CSF spaces, out of proportion to the patient's age most consistent with cerebral and cerebellar atrophy  VISUALIZED ORBITS AND PARANASAL SINUSES:  Unremarkable  CALVARIUM AND EXTRACRANIAL SOFT TISSUES:  Normal      Impression: No acute intracranial abnormality  The study was marked in EPIC for immediate notification given its trauma status  Workstation performed: [id number]     TRAUMA - CT spine cervical wo contrast    Result Date: 9/17/2022  Narrative: CT CERVICAL SPINE - WITHOUT CONTRAST INDICATION:   TRAUMA  COMPARISON:  CTA head and neck 8/23/2021 TECHNIQUE:  CT examination of the cervical spine was performed without intravenous contrast   Contiguous axial images were obtained  Sagittal and coronal reconstructions were performed  Radiation dose length product (DLP) for this visit:  415 mGy-cm   This examination, like all CT scans performed in the [facility], was performed utilizing techniques to minimize radiation dose exposure, including the use of iterative reconstruction and automated exposure control  IMAGE QUALITY:  Diagnostic  FINDINGS: ALIGNMENT:  Normal alignment of the cervical spine  No subluxation  VERTEBRAL BODIES:  No fracture  DEGENERATIVE CHANGES:  Mild multilevel cervical degenerative changes are noted without critical central canal stenosis   PREVERTEBRAL AND PARASPINAL SOFT TISSUES:  Unremarkable  Posterior soft tissue calcification is stable from prior exam  THORACIC INLET:  Please refer to the concurrent chest, abdomen, and pelvic CT report for description of the thoracic inlet findings  Impression: No cervical spine fracture or traumatic malalignment  The study was marked in EPIC for immediate notification given its trauma status  Workstation performed: [id number]     TRAUMA - CT chest abdomen pelvis w contrast    Result Date: 9/17/2022  Narrative: CT CHEST, ABDOMEN AND PELVIS WITH IV CONTRAST INDICATION:   TRAUMA  COMPARISON:  None  TECHNIQUE: CT examination of the chest, abdomen and pelvis was performed  Axial, sagittal, and coronal 2D reformatted images were created from the source data and submitted for interpretation  Radiation dose length product (DLP) for this visit:  1394 mGy-cm   This examination, like all CT scans performed in the [place], was performed utilizing techniques to minimize radiation dose exposure, including the use of iterative reconstruction and automated exposure control  IV Contrast:  90 mL of iohexol (OMNIPAQUE) Enteric Contrast: Enteric contrast was administered  FINDINGS: CHEST LUNGS:  5 mm left upper lobe nodule (series 3 image 28)  4 mm right upper lobe nodule (series 3 image 48)  There is bibasilar dependent atelectasis  There is no tracheal or endobronchial lesion  PLEURA:  Unremarkable  HEART/GREAT VESSELS: The heart is enlarged  There is no thoracic aortic aneurysm  MEDIASTINUM AND JESSICA:  There is an 11 mm pretracheal lymph node  No additional enlarged mediastinal or hilar lymph nodes  CHEST WALL AND LOWER NECK:  There is a left-sided pacer generator device  ABDOMEN LIVER/BILIARY TREE:  Unremarkable  GALLBLADDER:  No calcified gallstones  No pericholecystic inflammatory change   SPLEEN:  Subcentimeter hypodensities in the spleen are too small to fully characterize but statistically likely represent cysts  PANCREAS:  Unremarkable  ADRENAL GLANDS:  Unremarkable  KIDNEYS/URETERS:  One or more simple renal cyst(s) is noted  Otherwise unremarkable kidneys  No hydronephrosis  STOMACH AND BOWEL:  There is a small hiatal hernia  There is no evidence of bowel obstruction  APPENDIX:  No findings to suggest appendicitis  ABDOMINOPELVIC CAVITY:  No ascites  No pneumoperitoneum  No lymphadenopathy  VESSELS:  Atherosclerotic changes are present  No evidence of aneurysm  PELVIS REPRODUCTIVE ORGANS:  There are multiple calcified uterine fibroids  Bilateral adnexal cystic lesions measuring up to 10 4 x 4 5 cm in the left adnexa  URINARY BLADDER:  Unremarkable  ABDOMINAL WALL/INGUINAL REGIONS:  Unremarkable  OSSEOUS STRUCTURES:  There is diffuse osteopenia and thin anterior syndesmophytes throughout the thoracic spine  There is disruption of the anterior and posterior cortex of the T11 vertebral body (series 602 image 112)  There is narrowing of the spinal  canal at this level  Additionally there is irregularity of the anterior endplate of Y35 suspicious for small fracture  Impression: 1  Fracture of the T11 vertebral body with narrowing of the spinal canal at this level  Evaluation is limited secondary to osteopenia/ankylosing spondylitis  Additional probable small fracture of the anterior endplate of T63  Further evaluation with MRI should be considered  2   Scattered pulmonary nodules measuring up to 5 mm  Based on current Fleischner Society 2017 Guidelines on incidental pulmonary nodule, no routine follow-up is needed if the patient is low risk  If the patient is high risk, optional follow-up chest CT  at 12 months can be considered  3   Mildly prominent pretracheal lymph node measuring 11 mm  This too can be reassessed at time of follow-up chest CT  4   Bilateral cystic adnexal masses for which further evaluation with pelvic ultrasound is recommended  5   Cardiomegaly    I personally discussed this study with Neil Lie on 9/17/2022 at 6:40 PM  The study was marked in [facility] for significant notification  Workstation performed: [id number]     XR chest portable ICU    Result Date: 9/18/2022  Narrative: CHEST INDICATION:   f/u  COMPARISON:  CXR 11/19/2021 and chest CT 9/17/2022  EXAM PERFORMED/VIEWS:  XR CHEST PORTABLE ICU FINDINGS: Moderate cardiomegaly with left subclavian ICD leads in the right atrium, right ventricle, and cardiac vein  Pulmonary artery enlargement  The lungs are clear  No pneumothorax or pleural effusion  Osseous structures appear within normal limits for patient age  Impression: No acute cardiopulmonary disease  Workstation performed: [id number]     CT recon only thoracolumbar    Result Date: 9/17/2022  Narrative: CT THORACIC AND LUMBAR SPINE INDICATION:   fall  COMPARISON:  None TECHNIQUE: Axial CT examination of the thoracic and lumbar spine was obtained utilizing reconstructed images from CT of the chest abdomen and pelvis performed the same day  Images were reformatted in the sagittal and coronal planes  This examination, like all CT scans performed in the [facility], was performed utilizing techniques to minimize radiation dose exposure, including the use of iterative reconstruction and automated exposure control  FINDINGS: Evaluation is limited by osteopenia and thin anterior syndesmophytes throughout the thoracic spine, suggestive of ankylosing spondylitis  ALIGNMENT: Normal alignment of lumbar vertebral bodies  No subluxation  VERTEBRAL BODIES: There is disruption of the anterior endplate of the K92 vertebral body and the anterior and posterior endplate of the V28 vertebral body  There is narrowing of the spinal canal at T11  DEGENERATIVE CHANGES: There are endplate and facet joint degenerative changes throughout the thoracolumbar spine  PREVERTEBRAL AND PARASPINAL SOFT TISSUES: Normal      Impression: 1    Evaluation is limited by osteopenia and ankylosing spondylitis  There is disruption of the anterior endplates/syndesmophytes of the T10 vertebral body and disruption of the anterior and posterior endplate of the Z36 vertebral bodies compatible with fracture  There is narrowing of the Spinal canal at the T11 level  Further evaluation with MRI should be considered given spinal canal narrowing and exam limitations  2   Degenerative changes of the thoracolumbar spine  I personally discussed this study with Carolann Chavez on 9/17/2022 at 6:40 PM  Workstation performed: [id number]     Flexible Sigmoidoscopy    Result Date: 9/19/2022  Narrative: [address] [place] [address] Endoscopy [address] [phone] [address] Street: 9/19/22 PHYSICIAN(S): Attending: Nila Bethea MD Fellow: Massimo Roche MD INDICATION: Melena POST-OP DIAGNOSIS: See the impression below  HISTORY: Colonic resection, now with ileoanal anastamosis BOWEL PREPARATION:  PREPROCEDURE: Informed consent was obtained for the procedure, including sedation  Risks including but not limited to bleeding, infection, perforation, adverse drug reaction and aspiration were explained in detail  Also explained about less than 100% sensitivity with the exam and other alternatives  The patient was placed in the left lateral decubitus position  DETAILS OF PROCEDURE: Patient was taken to the procedure room where a time out was performed to confirm correct patient and correct procedure  The patient underwent monitored anesthesia care, which was administered by an anesthesia professional  The patient's blood pressure, heart rate, level of consciousness, oxygen, respirations and ETCO2 were monitored throughout the procedure  The scope was introduced through the anus and advanced to the rectum  The patient experienced no blood loss  The procedure was not difficult  The patient tolerated the procedure well  There were no apparent complications   ANESTHESIA INFORMATION: ASA: ASA status not filed in the log  Anesthesia Type: Anesthesia type not filed in the log  MEDICATIONS: No administrations occurring from 1259 to 1321 on 09/19/22 FINDINGS: Dark stool present  - procedure aborted  EVENTS: Procedure Events Event Event Time ENDO SCOPE OUT TIME 9/19/2022  1:14 PM ENDO SCOPE OUT TIME 9/19/2022  1:18 PM SPECIMENS: * No specimens in log * EQUIPMENT: Colonoscope - [id number]     Impression: Dark stool present diffusely - procedure aborted No obvious source of bleeding identified RECOMMENDATION: Initiate bowel regimen (miralax daily) Trend Hg daily Transfuse for Hg < 7 ATTENDING ATTESTATION:  I was present throughout the entire procedure from insertion to complete withdrawal of the scope  I performed all interventions myself or oversaw the fellow  Ericka Bolanos MD     Echo complete w/ contrast if indicated    Result Date: 9/18/2022  Narrative: [address] [place] [address]  Left Ventricle: Left ventricular cavity size is normal  Wall thickness is normal  The left ventricular ejection fraction is 65%  Systolic function is normal  Wall motion is normal  Diastolic function is abnormal   Left atrial filling pressure is elevated    Left Atrium: The atrium is severely dilated    Right Atrium: The atrium is dilated    Aortic Valve: The aortic valve is trileaflet  The leaflets are moderately thickened  The leaflets are mildly calcified  There is mild regurgitation  There is mild to moderate and There is moderate stenosis  The aortic valve peak velocity is 2 49 m/s  The aortic valve mean gradient is 13 mmHg  The dimensionless velocity index is 0 38  The aortic valve area is 1 10 cm2    Mitral Valve: There is moderate regurgitation    Tricuspid Valve: There is mild regurgitation  The right ventricular systolic pressure is moderately to severely elevated  The estimated right ventricular systolic pressure is 94 29 mmHg    Aorta: The aortic root is normal in size  The ascending aorta is mildly dilated  The aortic root is 3 40 cm   The ascending aorta is 3  8 cm    Pericardium: There is a trivial pericardial effusion  Thank you for allowing us to participate in this patient's care  This pt will follow up with          once discharged  Counseling / Coordination of Care  Total floor / unit time spent today 45 minutes  Greater than 50% of total time was spent with the patient and / or family counseling and / or coordination of care  A description of the counseling / coordination of care: Review of history, current assessment, development of a plan  Code Status: Level 1 - Full Code    ** Please Note: Dragon 360 Dictation voice to text software may have been used in the creation of this document   **

## 2022-09-20 NOTE — PLAN OF CARE
Problem: MOBILITY - ADULT  Goal: Maintain or return to baseline ADL function  Description: INTERVENTIONS:  -  Assess patient's ability to carry out ADLs; assess patient's baseline for ADL function and identify physical deficits which impact ability to perform ADLs (bathing, care of mouth/teeth, toileting, grooming, dressing, etc )  - Assess/evaluate cause of self-care deficits   - Assess range of motion  - Assess patient's mobility; develop plan if impaired  - Assess patient's need for assistive devices and provide as appropriate  - Encourage maximum independence but intervene and supervise when necessary  - Involve family in performance of ADLs  - Assess for home care needs following discharge   - Consider OT consult to assist with ADL evaluation and planning for discharge  - Provide patient education as appropriate  Outcome: Progressing  Goal: Maintains/Returns to pre admission functional level  Description: INTERVENTIONS:  - Perform BMAT or MOVE assessment daily    - Set and communicate daily mobility goal to care team and patient/family/caregiver  - Collaborate with rehabilitation services on mobility goals if consulted  - Perform Range of Motion 3 times a day  - Reposition patient every 2 hours    - Dangle patient 3 times a day  - Stand patient 3 times a day  - Ambulate patient 3 times a day  - Out of bed to chair 3 times a day   - Out of bed for meals 3 times a day  - Out of bed for toileting  - Record patient progress and toleration of activity level   Outcome: Progressing     Problem: Potential for Falls  Goal: Patient will remain free of falls  Description: INTERVENTIONS:  - Educate patient/family on patient safety including physical limitations  - Instruct patient to call for assistance with activity   - Consult OT/PT to assist with strengthening/mobility   - Keep Call bell within reach  - Keep bed low and locked with side rails adjusted as appropriate  - Keep care items and personal belongings within reach  - Initiate and maintain comfort rounds  - Make Fall Risk Sign visible to staff  - Offer Toileting every 2  Hours, in advance of need  - Initiate/Maintain bed alarm  - Obtain necessary fall risk management equipment  - Apply yellow socks and bracelet for high fall risk patients  - Consider moving patient to room near nurses station  Outcome: Progressing     Problem: Prexisting or High Potential for Compromised Skin Integrity  Goal: Skin integrity is maintained or improved  Description: INTERVENTIONS:  - Identify patients at risk for skin breakdown  - Assess and monitor skin integrity  - Assess and monitor nutrition and hydration status  - Monitor labs   - Assess for incontinence   - Turn and reposition patient  - Assist with mobility/ambulation  - Relieve pressure over bony prominences  - Avoid friction and shearing  - Provide appropriate hygiene as needed including keeping skin clean and dry  - Evaluate need for skin moisturizer/barrier cream  - Collaborate with interdisciplinary team   - Patient/family teaching  - Consider wound care consult   Outcome: Progressing     Problem: PAIN - ADULT  Goal: Verbalizes/displays adequate comfort level or baseline comfort level  Description: Interventions:  - Encourage patient to monitor pain and request assistance  - Assess pain using appropriate pain scale  - Administer analgesics based on type and severity of pain and evaluate response  - Implement non-pharmacological measures as appropriate and evaluate response  - Consider cultural and social influences on pain and pain management  - Notify physician/advanced practitioner if interventions unsuccessful or patient reports new pain  Outcome: Progressing     Problem: INFECTION - ADULT  Goal: Absence or prevention of progression during hospitalization  Description: INTERVENTIONS:  - Assess and monitor for signs and symptoms of infection  - Monitor lab/diagnostic results  - Monitor all insertion sites, i e  indwelling lines, tubes, and drains  - Monitor endotracheal if appropriate and nasal secretions for changes in amount and color  - [place] appropriate cooling/warming therapies per order  - Administer medications as ordered  - Instruct and encourage patient and family to use good hand hygiene technique  - Identify and instruct in appropriate isolation precautions for identified infection/condition  Outcome: Progressing     Problem: SAFETY ADULT  Goal: Maintain or return to baseline ADL function  Description: INTERVENTIONS:  -  Assess patient's ability to carry out ADLs; assess patient's baseline for ADL function and identify physical deficits which impact ability to perform ADLs (bathing, care of mouth/teeth, toileting, grooming, dressing, etc )  - Assess/evaluate cause of self-care deficits   - Assess range of motion  - Assess patient's mobility; develop plan if impaired  - Assess patient's need for assistive devices and provide as appropriate  - Encourage maximum independence but intervene and supervise when necessary  - Involve family in performance of ADLs  - Assess for home care needs following discharge   - Consider OT consult to assist with ADL evaluation and planning for discharge  - Provide patient education as appropriate  Outcome: Progressing  Goal: Maintains/Returns to pre admission functional level  Description: INTERVENTIONS:  - Perform BMAT or MOVE assessment daily    - Set and communicate daily mobility goal to care team and patient/family/caregiver  - Collaborate with rehabilitation services on mobility goals if consulted  - Perform Range of Motion 3 times a day  - Reposition patient every 2 hours    - Dangle patient 3 times a day  - Stand patient 3 times a day  - Ambulate patient 3 times a day  - Out of bed to chair 3 times a day   - Out of bed for meals 3 times a day  - Out of bed for toileting  - Record patient progress and toleration of activity level   Outcome: Progressing  Goal: Patient will remain free of falls  Description: INTERVENTIONS:  - Educate patient/family on patient safety including physical limitations  - Instruct patient to call for assistance with activity   - Consult OT/PT to assist with strengthening/mobility   - Keep Call bell within reach  - Keep bed low and locked with side rails adjusted as appropriate  - Keep care items and personal belongings within reach  - Initiate and maintain comfort rounds  - Make Fall Risk Sign visible to staff  - Offer Toileting every 2 Hours, in advance of need  - Initiate/Maintain bed alarm  - Obtain necessary fall risk management equipment  - Apply yellow socks and bracelet for high fall risk patients  - Consider moving patient to room near nurses station  Outcome: Progressing     Problem: DISCHARGE PLANNING  Goal: Discharge to home or other facility with appropriate resources  Description: INTERVENTIONS:  - Identify barriers to discharge w/patient and caregiver  - Arrange for needed discharge resources and transportation as appropriate  - Identify discharge learning needs (meds, wound care, etc )  - Arrange for interpretive services to assist at discharge as needed  - Refer to Case Management Department for coordinating discharge planning if the patient needs post-hospital services based on physician/advanced practitioner order or complex needs related to functional status, cognitive ability, or social support system  Outcome: Progressing     Problem: Knowledge Deficit  Goal: Patient/family/caregiver demonstrates understanding of disease process, treatment plan, medications, and discharge instructions  Description: Complete learning assessment and assess knowledge base    Interventions:  - Provide teaching at level of understanding  - Provide teaching via preferred learning methods  Outcome: Progressing     Problem: Nutrition/Hydration-ADULT  Goal: Nutrient/Hydration intake appropriate for improving, restoring or maintaining nutritional needs  Description: Monitor and assess patient's nutrition/hydration status for malnutrition  Collaborate with interdisciplinary team and initiate plan and interventions as ordered  Monitor patient's weight and dietary intake as ordered or per policy  Utilize nutrition screening tool and intervene as necessary  Determine patient's food preferences and provide high-protein, high-caloric foods as appropriate       INTERVENTIONS:  - Monitor oral intake, urinary output, labs, and treatment plans  - Assess nutrition and hydration status and recommend course of action  - Evaluate amount of meals eaten  - Assist patient with eating if necessary   - Allow adequate time for meals  - Recommend/ encourage appropriate diets, oral nutritional supplements, and vitamin/mineral supplements  - Order, calculate, and assess calorie counts as needed  - Recommend, monitor, and adjust tube feedings and TPN/PPN based on assessed needs  - Assess need for intravenous fluids  - Provide specific nutrition/hydration education as appropriate  - Include patient/family/caregiver in decisions related to nutrition  Outcome: Progressing

## 2022-09-20 NOTE — PROGRESS NOTES
[facility]  Progress Note - Gerry Beaver 1940, 79 y o  female MRN: [id number]  Unit/Bed#: [facility] 634-01 Encounter: [id number]  Primary Care Provider: Tiffany Saleh DO   Date and time admitted to hospital: 9/17/2022  9:14 PM    * T11 vertebral fracture [facility])  Assessment & Plan  9/17 CT CAP: Fracture of the T11 vertebral body with narrowing of the spinal canal at this level  Evaluation is limited secondary to osteopenia/ankylosing spondylitis  Additional probable small fracture of the anterior endplate of J00     8/51 CT thoracic: Ddisruption of the anterior endplates/syndesmophytes of the T10 vertebral body and disruption of the anterior and posterior endplate of the L91 vertebral bodies compatible with fracture  There is narrowing of the Spinal canal at the T11 level        Plan:  · Neurosurgery consult  · TLSO brace   · Upright XR   · q2h neuro checks   · No neurosurgical intervention at this time given neurologically intact and stable exam     Anemia  Assessment & Plan  · Hgb 5 7 on admission with INR 4 66 on coumadin, received 2 U PRBC, stable in 7s  · SHANT positive for melanotic stool  · Reports 2 weeks of melena   · Elevated BUN  · UGI bleed suspected    Plan:  · Coumadin reversed with Deya Glatter and Vitamin K   · Trend Hgb, stable  · IV PPI BID    · 9/19 EGD negative, flex sigmoidoscopy incomplete due to stool but not obvious source of bleed      Chronic heart failure (HCC)  Assessment & Plan  Wt Readings from Last 3 Encounters:   09/20/22 105 kg (231 lb 3 2 oz)   11/12/21 110 kg (243 lb)   08/23/21 106 kg (234 lb 9 1 oz)     · Hx HF with biventricular dysfunction and EF 40% with PPM   · 9/18 Echo - EF 98%, diastolic dysfunction, mod AS, mod MR, RVSP 59  · Home regimen: Entresto, diltiazem  mg, digoxin 125 mcg, Coreg 25 mg b i d , aspirin 81 daily, lasix 40 mg BID     Plan:  · Telemetry monitoring  · 9/19 4L NC, CXR with pulm edema, given 40mg IV lasix  · Digoxin level 2 0, restarted home digoxin 9/19  · Lasix 40mg IV BID  · HF consult    Acute-on-chronic kidney injury [facility])  Assessment & Plan  Lab Results   Component Value Date    EGFR 24 09/20/2022    EGFR 24 09/19/2022    EGFR 28 09/18/2022    CREATININE 1 89 (H) 09/20/2022    CREATININE 1 87 (H) 09/19/2022    CREATININE 1 64 (H) 09/18/2022     · Baseline creatinine:  1 2-1 4  · Admission creatinine:  1 79  · In the setting of acute blood loss anemia and cardiorenal in setting of CHF  · Diuresing  · Ball in place, monitor urine output  · Trend BUN/creatinine    Permanent atrial fibrillation (HCC)  Assessment & Plan  · On coumadin with INR 4 66 on admission   · Coumadin reversed with Kcentra and vitamin K on admission for acute blood loss anemia and received 2 PRBC  · Home regimen:  Digoxin 125 mcg, Coreg 25 mg b i d , diltiazem  mg    Plan:  · Currently rate controlled, continue holding home medications, restart Coreg and then Cardizem short-acting as noted above as tolerated  · Continue hold Coumadin    COPD (chronic obstructive pulmonary disease) ([facility])  Assessment & Plan  · On 2 L nasal cannula p r n  At home  · SpO2 goal > 88%  · Continue home Symbicort, albuterol p r n  SIRS (systemic inflammatory response syndrome) ([facility])  Assessment & Plan  · POA as evidenced by WBC 22 1, tachycardia 100, lactic 10 6  · WBC down-trending, tachycardia resolved  · No current concern for infectious etiology, SIRS secondary to GI bleed and anemia    Plan:  · Monitoring off antibiotics at this time  · Follow-up cultures  · Trend fever and WBC count  · improving    Fall  Assessment & Plan  Fell from chair height, was sitting  Denies LOC, but was found down after pressing alert button    Plan:  - geriatrics consult  - Plan as above   - PT/OT        Disposition:  Continue inpatient treatment    SUBJECTIVE:  Chief Complaint:  Fall    Subjective: This morning patient reports feeling okay    She reports some mild soreness in her back  She denies chest pain  She states that her breathing has had some improvement compared to yesterday  She denies abdominal pain  OBJECTIVE:   Vitals:   Temp:  [97 3 °F (36 3 °C)-98 5 °F (36 9 °C)] 97 5 °F (36 4 °C)  HR:  [62-85] 69  Resp:  [15-20] 15  BP: (101-128)/(38-74) 128/43    Intake/Output:  I/O       09/18 0701 09/19 0700 09/19 0701 09/20 0700 09/20 0701 09/21 0700    P  O  960 340 200    I V  (mL/kg) 89 7 (0 8)      Blood       IV Piggyback 250      Total Intake(mL/kg) 1299 7 (11 8) 340 (3 2) 200 (1 9)    Urine (mL/kg/hr) 495 (0 2) 600 (0 2) 200 (0 7)    Stool 0      Total Output 495 600 200    Net +804 7 -260 0           Unmeasured Urine Occurrence  0 x     Unmeasured Stool Occurrence 0 x           Nutrition: Diet Cardiovascular; Cardiac  GI Proph/Bowel Reg:  Protonix, MiraLax  VTE Prophylaxis:Heparin     Physical Exam:   GENERAL APPEARANCE:  No acute distress  NEURO:  Awake and alert, moving all extremities symmetrically, light touch sensation intact  HEENT:  Normocephalic  CV:  Regular rate and rhythm  LUNGS:  Bibasilar crackles  GI:  Nondistended, nontender to palpation  MSK: 1+ pitting edema in bilateral upper and lower extremities  SKIN:  Warm and dry    Invasive Devices  Report    Peripheral Intravenous Line  Duration           Peripheral IV 09/19/22 Right Antecubital <1 day          Drain  Duration           External Urinary Catheter 1 day                      Lab Results: Results: I have personally reviewed all pertinent laboratory/tests results  Imaging/EKG Studies: I have personally reviewed pertinent reports

## 2022-09-20 NOTE — ASSESSMENT & PLAN NOTE
Lab Results   Component Value Date    EGFR 24 09/20/2022    EGFR 24 09/19/2022    EGFR 28 09/18/2022    CREATININE 1 89 (H) 09/20/2022    CREATININE 1 87 (H) 09/19/2022    CREATININE 1 64 (H) 09/18/2022     · Baseline creatinine:  1 2-1 4  · Admission creatinine:  1 79  · In the setting of acute blood loss anemia and cardiorenal in setting of CHF  · Diuresing  · Ball in place, monitor urine output  · Trend BUN/creatinine

## 2022-09-20 NOTE — PROGRESS NOTES
Progress Note - Geriatric Medicine   Bhavesh Parisi 79 y o  female MRN: [id number]  Unit/Bed#: [facility] 634-01 Encounter: [id number]      Assessment/Plan:    Ambulatory dysfunction with fall  -injuries as below  -remains high risk future falls, continue fall precautions, assist with transfers  -maintain environment free of fall hazards  -PT and OT following    T11 vertebra fracture  -s/p fall as outlined above  -noted on CT chest abdomen pelvis on admission  -TLSO brace as directed  -neurovascular checks per protocol  -continue management per Neurosurgery    Permanent atrial fibrillation  -confirmed home medications with [facility] as below  -Cardiology on consult     COPD  -no inhalers reported by o/p pharmacy  -clinic requiring supplemental O2 via NC which is not utilized at baseline    XIOMARA  -requires use of CPAP - pressure set at 2 per pt    Impaired mastication  -encourage use of dentures at all appropriate times  -ensure meal consistency appropriate for abilities  -continue aspiration precautions    High risk developing delirium  -ensure that acute pain is well controlled  -continue to address acute metabolic derangements and optimize chronic conditions  -maintain normal circadian rhythm  -reorient frequently as appropriate and indicated    Home med review  Personally confirmed home medications with [facility] [phone]:    Tiadylt ER 300mg daily  Torsemide 20 mg BID  Ferrous Banks sulfate 325 mg daily  Lipitor 10 mg daily  Farxiga 10 mg daily  Diltiazem 300 mg daily  Albuterol neb solution  Entresto 97-103mg BID    Care coordination: rounded with Dwayne Kenny (RN)    Subjective:     Catheter was seen examined bedside where she is lying resting, she reports continued lower back pain and dizziness, despite having visibly increased work of breathing denies dyspnea or other acute complaints  Reports that she slept well overnight and appetite is good  Review of Systems   Constitutional: Negative    Negative for activity change, chills and fever  HENT: Negative  Eyes: Negative  Respiratory: Negative  Negative for shortness of breath  Cardiovascular: Negative  Gastrointestinal: Negative  Genitourinary: Negative  Musculoskeletal: Positive for back pain and gait problem  Skin: Negative  Neurological: Positive for dizziness  Negative for weakness, light-headedness, numbness and headaches  Hematological: Negative  Psychiatric/Behavioral: Negative  Negative for sleep disturbance  All other systems reviewed and are negative  Objective:     Vitals: Blood pressure (!) 128/43, pulse 69, temperature 97 5 °F (36 4 °C), resp  rate 15, height 4' 10" (1 473 m), weight 105 kg (231 lb 3 2 oz), SpO2 94 %  ,Body mass index is 48 32 kg/m²  Intake/Output Summary (Last 24 hours) at 9/20/2022 0948  Last data filed at 9/20/2022 0941  Gross per 24 hour   Intake 540 ml   Output 800 ml   Net -260 ml     Current Medications: Reviewed    Physical Exam:   Physical Exam  Vitals and nursing note reviewed  Constitutional:       General: She is not in acute distress  Appearance: She is obese  HENT:      Head: Normocephalic  Nose: Nose normal       Comments: O2 via NC     Mouth/Throat:      Mouth: Mucous membranes are dry  Eyes:      General: No scleral icterus  Right eye: No discharge  Left eye: No discharge  Conjunctiva/sclera: Conjunctivae normal    Neck:      Comments: Voice hoarse  Cardiovascular:      Rate and Rhythm: Normal rate  Pulmonary:      Comments: Increased work of breathing, shallow rapid respirations, continues to require supplemental O2 not use at baseline  Abdominal:      Tenderness: There is no abdominal tenderness  Comments: Obese protuberant abdomen, soft, bowel sounds present   Musculoskeletal:      Cervical back: Neck supple  Right lower leg: Edema present  Left lower leg: Edema present        Comments: Obese body habitus, reduced overall muscle mass Skin:     General: Skin is warm and dry  Comments: Extensive bilateral upper extremity ecchymoses    Skin thin friable   Neurological:      Mental Status: She is alert  Comments: Awake and alert, answers questions appropriately   Psychiatric:      Comments: Flat affect but pleasant and cooperative        Invasive Devices  Report    Peripheral Intravenous Line  Duration           Peripheral IV 09/19/22 Right Antecubital <1 day          Drain  Duration           External Urinary Catheter 1 day              Lab, Imaging and other studies: I have personally reviewed pertinent reports

## 2022-09-21 ENCOUNTER — APPOINTMENT (OUTPATIENT)
Dept: RADIOLOGY | Facility: HOSPITAL | Age: 82
DRG: 377 | End: 2022-09-21
Payer: COMMERCIAL

## 2022-09-21 LAB
ABO GROUP BLD BPU: NORMAL
ABO GROUP BLD BPU: NORMAL
ANION GAP SERPL CALCULATED.3IONS-SCNC: 4 MMOL/L (ref 4–13)
BASOPHILS # BLD AUTO: 0.01 THOUSANDS/ΜL (ref 0–0.1)
BASOPHILS NFR BLD AUTO: 0 % (ref 0–1)
BPU ID: NORMAL
BPU ID: NORMAL
BUN SERPL-MCNC: 120 MG/DL (ref 5–25)
CALCIUM SERPL-MCNC: 7.8 MG/DL (ref 8.3–10.1)
CHLORIDE SERPL-SCNC: 106 MMOL/L (ref 96–108)
CO2 SERPL-SCNC: 30 MMOL/L (ref 21–32)
CREAT SERPL-MCNC: 1.81 MG/DL (ref 0.6–1.3)
CROSSMATCH: NORMAL
CROSSMATCH: NORMAL
EOSINOPHIL # BLD AUTO: 0.08 THOUSAND/ΜL (ref 0–0.61)
EOSINOPHIL NFR BLD AUTO: 1 % (ref 0–6)
ERYTHROCYTE [DISTWIDTH] IN BLOOD BY AUTOMATED COUNT: 20.7 % (ref 11.6–15.1)
GFR SERPL CREATININE-BSD FRML MDRD: 25 ML/MIN/1.73SQ M
GLUCOSE SERPL-MCNC: 132 MG/DL (ref 65–140)
GLUCOSE SERPL-MCNC: 134 MG/DL (ref 65–140)
GLUCOSE SERPL-MCNC: 169 MG/DL (ref 65–140)
GLUCOSE SERPL-MCNC: 190 MG/DL (ref 65–140)
HCT VFR BLD AUTO: 27.8 % (ref 34.8–46.1)
HGB BLD-MCNC: 8.4 G/DL (ref 11.5–15.4)
IMM GRANULOCYTES # BLD AUTO: 0.18 THOUSAND/UL (ref 0–0.2)
IMM GRANULOCYTES NFR BLD AUTO: 1 % (ref 0–2)
LYMPHOCYTES # BLD AUTO: 0.4 THOUSANDS/ΜL (ref 0.6–4.47)
LYMPHOCYTES NFR BLD AUTO: 3 % (ref 14–44)
MCH RBC QN AUTO: 30.4 PG (ref 26.8–34.3)
MCHC RBC AUTO-ENTMCNC: 30.2 G/DL (ref 31.4–37.4)
MCV RBC AUTO: 101 FL (ref 82–98)
MONOCYTES # BLD AUTO: 0.94 THOUSAND/ΜL (ref 0.17–1.22)
MONOCYTES NFR BLD AUTO: 7 % (ref 4–12)
NEUTROPHILS # BLD AUTO: 11.28 THOUSANDS/ΜL (ref 1.85–7.62)
NEUTS SEG NFR BLD AUTO: 88 % (ref 43–75)
NRBC BLD AUTO-RTO: 0 /100 WBCS
PLATELET # BLD AUTO: 153 THOUSANDS/UL (ref 149–390)
PMV BLD AUTO: 10.2 FL (ref 8.9–12.7)
POTASSIUM SERPL-SCNC: 4 MMOL/L (ref 3.5–5.3)
RBC # BLD AUTO: 2.76 MILLION/UL (ref 3.81–5.12)
SODIUM SERPL-SCNC: 140 MMOL/L (ref 135–147)
UNIT DISPENSE STATUS: NORMAL
UNIT DISPENSE STATUS: NORMAL
UNIT PRODUCT CODE: NORMAL
UNIT PRODUCT CODE: NORMAL
UNIT PRODUCT VOLUME: 350 ML
UNIT PRODUCT VOLUME: 350 ML
UNIT RH: NORMAL
UNIT RH: NORMAL
WBC # BLD AUTO: 12.89 THOUSAND/UL (ref 4.31–10.16)

## 2022-09-21 PROCEDURE — 97167 OT EVAL HIGH COMPLEX 60 MIN: CPT

## 2022-09-21 PROCEDURE — 99291 CRITICAL CARE FIRST HOUR: CPT

## 2022-09-21 PROCEDURE — 71045 X-RAY EXAM CHEST 1 VIEW: CPT

## 2022-09-21 PROCEDURE — 99232 SBSQ HOSP IP/OBS MODERATE 35: CPT | Performed by: INTERNAL MEDICINE

## 2022-09-21 PROCEDURE — 94760 N-INVAS EAR/PLS OXIMETRY 1: CPT

## 2022-09-21 PROCEDURE — 94640 AIRWAY INHALATION TREATMENT: CPT

## 2022-09-21 PROCEDURE — 82948 REAGENT STRIP/BLOOD GLUCOSE: CPT

## 2022-09-21 PROCEDURE — 80048 BASIC METABOLIC PNL TOTAL CA: CPT | Performed by: NURSE PRACTITIONER

## 2022-09-21 PROCEDURE — 85025 COMPLETE CBC W/AUTO DIFF WBC: CPT

## 2022-09-21 PROCEDURE — 97163 PT EVAL HIGH COMPLEX 45 MIN: CPT

## 2022-09-21 PROCEDURE — 94002 VENT MGMT INPAT INIT DAY: CPT

## 2022-09-21 PROCEDURE — 99233 SBSQ HOSP IP/OBS HIGH 50: CPT | Performed by: INTERNAL MEDICINE

## 2022-09-21 RX ORDER — FUROSEMIDE 10 MG/ML
40 INJECTION INTRAMUSCULAR; INTRAVENOUS
Status: DISCONTINUED | OUTPATIENT
Start: 2022-09-21 | End: 2022-09-22

## 2022-09-21 RX ORDER — FUROSEMIDE 80 MG
80 TABLET ORAL DAILY
Status: DISCONTINUED | OUTPATIENT
Start: 2022-09-21 | End: 2022-09-21

## 2022-09-21 RX ORDER — BISACODYL 10 MG
10 SUPPOSITORY, RECTAL RECTAL DAILY PRN
Status: DISCONTINUED | OUTPATIENT
Start: 2022-09-21 | End: 2022-09-22

## 2022-09-21 RX ORDER — PANTOPRAZOLE SODIUM 40 MG/1
40 TABLET, DELAYED RELEASE ORAL
Status: DISCONTINUED | OUTPATIENT
Start: 2022-09-21 | End: 2022-09-30 | Stop reason: HOSPADM

## 2022-09-21 RX ADMIN — ACETAMINOPHEN 975 MG: 325 TABLET, FILM COATED ORAL at 21:32

## 2022-09-21 RX ADMIN — FUROSEMIDE 40 MG: 10 INJECTION, SOLUTION INTRAMUSCULAR; INTRAVENOUS at 08:35

## 2022-09-21 RX ADMIN — DIGOXIN 125 MCG: 125 TABLET ORAL at 08:35

## 2022-09-21 RX ADMIN — METHOCARBAMOL TABLETS 500 MG: 500 TABLET, COATED ORAL at 17:55

## 2022-09-21 RX ADMIN — ATORVASTATIN CALCIUM 10 MG: 10 TABLET, FILM COATED ORAL at 08:35

## 2022-09-21 RX ADMIN — INSULIN LISPRO 1 UNITS: 100 INJECTION, SOLUTION INTRAVENOUS; SUBCUTANEOUS at 19:13

## 2022-09-21 RX ADMIN — GABAPENTIN 100 MG: 100 CAPSULE ORAL at 08:35

## 2022-09-21 RX ADMIN — SENNOSIDES 8.6 MG: 8.6 TABLET, FILM COATED ORAL at 08:35

## 2022-09-21 RX ADMIN — GABAPENTIN 100 MG: 100 CAPSULE ORAL at 17:56

## 2022-09-21 RX ADMIN — POLYETHYLENE GLYCOL 3350 17 G: 17 POWDER, FOR SOLUTION ORAL at 08:35

## 2022-09-21 RX ADMIN — PANTOPRAZOLE SODIUM 40 MG: 40 TABLET, DELAYED RELEASE ORAL at 08:35

## 2022-09-21 RX ADMIN — ALBUTEROL SULFATE 5 MG: 2.5 SOLUTION RESPIRATORY (INHALATION) at 06:30

## 2022-09-21 RX ADMIN — LIDOCAINE 5% 1 PATCH: 700 PATCH TOPICAL at 08:35

## 2022-09-21 RX ADMIN — POLYETHYLENE GLYCOL 3350 17 G: 17 POWDER, FOR SOLUTION ORAL at 21:37

## 2022-09-21 RX ADMIN — Medication 6 MG: at 21:32

## 2022-09-21 RX ADMIN — CARVEDILOL 12.5 MG: 12.5 TABLET, FILM COATED ORAL at 08:37

## 2022-09-21 RX ADMIN — ACETAMINOPHEN 975 MG: 325 TABLET, FILM COATED ORAL at 06:07

## 2022-09-21 RX ADMIN — PANTOPRAZOLE SODIUM 40 MG: 40 TABLET, DELAYED RELEASE ORAL at 17:55

## 2022-09-21 RX ADMIN — BUDESONIDE AND FORMOTEROL FUMARATE DIHYDRATE 2 PUFF: 160; 4.5 AEROSOL RESPIRATORY (INHALATION) at 08:36

## 2022-09-21 RX ADMIN — FUROSEMIDE 40 MG: 10 INJECTION, SOLUTION INTRAMUSCULAR; INTRAVENOUS at 16:58

## 2022-09-21 RX ADMIN — CARVEDILOL 12.5 MG: 12.5 TABLET, FILM COATED ORAL at 17:55

## 2022-09-21 RX ADMIN — HEPARIN SODIUM 5000 UNITS: 5000 INJECTION INTRAVENOUS; SUBCUTANEOUS at 21:32

## 2022-09-21 RX ADMIN — GABAPENTIN 100 MG: 100 CAPSULE ORAL at 21:32

## 2022-09-21 RX ADMIN — INSULIN LISPRO 1 UNITS: 100 INJECTION, SOLUTION INTRAVENOUS; SUBCUTANEOUS at 13:06

## 2022-09-21 RX ADMIN — HEPARIN SODIUM 5000 UNITS: 5000 INJECTION INTRAVENOUS; SUBCUTANEOUS at 06:07

## 2022-09-21 RX ADMIN — SENNOSIDES 8.6 MG: 8.6 TABLET, FILM COATED ORAL at 17:56

## 2022-09-21 RX ADMIN — METHOCARBAMOL TABLETS 500 MG: 500 TABLET, COATED ORAL at 06:07

## 2022-09-21 NOTE — PROGRESS NOTES
Progress Note - Geriatric Medicine   Anne Max 79 y o  female MRN: [id number]  [facility] Encounter: [id number]      Assessment/Plan:    Ambulatory dysfunction with fall  -reportedly mechanical fall at home 9/16/22  -in setting of chronic systemic anticoagulation with Coumadin   -requires use of walker for ambulation at baseline  -remains high risk future falls, continue fall precautions and assist with transfers  -PT/OT following     T11 vertebra fracture  -pain well controlled   -thoracolumbar spine precautions  -TLSO brace as directed by Nsx    Anemia of multifactorial origin  -Hb 5 7 on admission now stable at 8 4 s/p 2U PRBC earlier in admission   -underwent EGD and flex sig w GI - incomplete due to stool burden - no further intervention at current time as remains stable per GI    Acute hypoxic respiratory failure  -multifactorial including acute on chronic combined systolic and diastolic heart failure, COPD, XIOMARA, poor respiratory reserve, and amenia in frail elderly individual with limited pulmonary reserve  -tx'd to ICU for Bipap    Permanent Afib  -Cardiology on consult   -assist family history  -currently rate controlled with digoxin and Coreg, home coumadin on hold for acute GI bleed    Impaired mastication  -encourage use of dentures at all appropriate  -continue consistency abilities  -aspiration precautions    Frailty syndrome in geriatric patient  -due to age and comorbidities including Congestive Heart Failure, COPD, obesity and limited physiologic and metabolic reserves  -may be sensitive to even mild metabolic derangements or insults -monitor closely and address as arise  -ensure treatments interventions patient's wishes and goals of care    Subjective:     Danyel Jackman was seen and examined at bedside where she is lying resting, she endorses some dyspnea but feels that it is improving, she also reports lightheadedness since yesterday which is now persistent, no alleviating factors identified  Review of Systems   Constitutional: Negative  Negative for chills and fever  HENT: Negative  Eyes: Negative  Respiratory: Positive for shortness of breath  Cardiovascular: Negative  Gastrointestinal: Negative  Genitourinary: Negative  Musculoskeletal: Negative  Skin: Negative  Neurological: Positive for dizziness and light-headedness  Hematological: Bruises/bleeds easily  Psychiatric/Behavioral: Negative  All other systems reviewed and are negative  Objective:     Vitals: Blood pressure (!) 112/43, pulse 72, temperature 97 5 °F (36 4 °C), resp  rate 18, height 4' 10" (1 473 m), weight 105 kg (231 lb 3 2 oz), SpO2 93 %  ,Body mass index is 48 32 kg/m²  Intake/Output Summary (Last 24 hours) at 9/21/2022 9387  Last data filed at 9/20/2022 1721  Gross per 24 hour   Intake 318 ml   Output 1400 ml   Net -1082 ml     Current Medications: Reviewed    Physical Exam:   Physical Exam  Vitals and nursing note reviewed  Constitutional:       Appearance: She is obese  Comments: Appears uncomfortable   HENT:      Head: Normocephalic  Nose: Nose normal       Comments: O2 via NC     Mouth/Throat:      Mouth: Mucous membranes are dry  Eyes:      General:         Right eye: No discharge  Left eye: No discharge  Comments: Right corneal haze   Neck:      Comments: Phonation normal  Cardiovascular:      Rate and Rhythm: Normal rate  Heart sounds: Murmur heard  Pulmonary:      Comments: Tachypneic, shallow respirations, mildly coarse  Abdominal:      General: Bowel sounds are normal  There is no distension  Palpations: Abdomen is soft  Tenderness: There is no abdominal tenderness  Musculoskeletal:      Cervical back: Neck supple  Right lower leg: Edema present  Left lower leg: Edema present  Skin:     General: Skin is warm        Comments: Dressings on bilateral lower extremities   Neurological:      Mental Status: She is alert  Comments: Awake and alert, oriented, answers questions appropriately, speech clear and fluent    Psychiatric:      Comments: Affect somewhat flat but remains pleasant and cooperative        Invasive Devices  Report    Peripheral Intravenous Line  Duration           Peripheral IV 09/19/22 Right Antecubital 1 day          Drain  Duration           External Urinary Catheter 2 days              Lab, Imaging and other studies: I have personally reviewed pertinent reports

## 2022-09-21 NOTE — OCCUPATIONAL THERAPY NOTE
Occupational Therapy Evaluation     Patient Name: Veronica Billings  VGODN'A Date: 2022  Problem List  Principal Problem:    T11 vertebral fracture ([facility] )  Active Problems:    Chronic heart failure (HCC)    Permanent atrial fibrillation ([facility] )    Anemia    Fall    SIRS (systemic inflammatory response syndrome) (HCC)    COPD (chronic obstructive pulmonary disease) ([facility] )    [facility]    Past Medical History  Past Medical History:   Diagnosis Date    A-fib [facility])     Cardiac pacemaker     CHF (congestive heart failure) ([facility])     Chronic cellulitis     COPD (chronic obstructive pulmonary disease) ([facility])     Diabetes mellitus (HCC)     Edema     High cholesterol     Hyperparathyroidism (HCC)     Hypertension     Hyperuricemia     Stage 4 chronic kidney disease ([facility])      Past Surgical History  Past Surgical History:   Procedure Laterality Date    CARDIAC DEFIBRILLATOR PLACEMENT       SECTION      x 2    COLON SURGERY      HERNIA REPAIR           22 1101   OT Last Visit   OT Visit Date 22   Note Type   Note type Evaluation   Restrictions/Precautions   Weight Bearing Precautions Per Order No   Braces or Orthoses (S)  TLSO   Other Precautions Cognitive; Chair Alarm; Bed Alarm;Multiple lines;O2;Telemetry; Fall Risk;Pain;Spinal precautions   Pain Assessment   Pain Assessment Tool 0-10   Pain Score 4   Pain Location/Orientation Location: Back   Patient's Stated Pain Goal No pain   Hospital Pain Intervention(s) Repositioned; Ambulation/increased activity; Emotional support   Home Living   Type of [address] [place] [address] One level  (0 RASHEED)   Bathroom Shower/Tub Tub/shower unit   Bathroom Toilet Standard   Bathroom Equipment Shower chair;Commode   Bathroom Accessibility Accessible   Home Equipment Walker; Wheelchair-manual   Additional Comments PT REPORTS USE OF RW WITHIN HOME AND WC FOR COMMUNITY USE  + USE OF SC AND BSC OVER TOILET   Prior Function   Level of [place] Independent with ADLs and functional mobility   Lives With (S)  Alone   Receives Help From Family;Friend(s); [address] in the last 6 months 1 to 4  (2)   Vocational Retired   Lifestyle   Autonomy PT [address] I/MOD I  WITH ADLS/IADLS/DRIVING PTA   Reciprocal Relationships LIVES ALONE  PT REPORTS LOCAL FAMILY INCLUDING BROTHER AND SUPPORTIVE FRIENDS/NEIGHBORS   Service to Others RETIRED AIDE   Intrinsic Gratification ENJOYS COOKING   ADL   Eating Assistance 4  Minimal Assistance   Grooming Assistance 3  Moderate Assistance   UB Bathing Assistance 2  Maximal Assistance   LB Bathing Assistance 2  Maximal Assistance   UB Dressing Assistance 2  Maximal Assistance   LB Dressing Assistance 1  Total Assistance   Toileting Assistance  1  Total Assistance   Functional Assistance 2  Maximal Assistance   Bed Mobility   Supine to Sit 2  Maximal assistance   Additional items Assist x 2; Increased time required;Verbal cues;LE management   Sit to Supine 2  Maximal assistance   Additional items Assist x 2; Increased time required;Verbal cues;LE management   Transfers   Sit to Stand 2  Maximal assistance   Additional items Assist x 2; Increased time required;Verbal cues   Stand to Sit 2  Maximal assistance   Additional items Assist x 2; Increased time required;Verbal cues   Additional Comments PT REQUIRED OVERALL MOD-MAX A FOR SITTING EOB WITH CUES  MAX A X2 FOR SIT<->STAND TXF, ABLE TO CLEAR BUTTOCKS FROM BED HOWEVER UNABLE TO REACH FULL UPRIGHT POSITIONING  RETURNED TO [place] AND REPOSITIONED COMFORTABLY   LEFT WITH ALL NEEDS IN REACH + ALARM ON   Balance   Static Sitting Poor   Dynamic Sitting Poor -   Static Standing Zero   Activity Tolerance   Activity Tolerance Patient limited by fatigue;Patient limited by pain   Medical Staff Made Aware PT SEEN FOR CO-SESSION WITH SKILLED PHYSICAL THERAPIST 2' CLINICALLY UNSTABLE PRESENTATION, POLY-TRAUMATIC INJURIES, NEW PRECAUTIONS/LIMITATIONS, LIMITED ACTIVITY TOLERANCE AND PRESENT IMPAIRMENTS WHICH ARE A REGRESSION FROM THE PT'S BASELINE AND IMPACTING OVERALL OCCUPATIONAL PERFORMANCE  Nurse Made Aware APPROPRIATE TO SEE PER RN   RUE Assessment   RUE Assessment X  (GENERALIZED WEAKNESS)   LUE Assessment   LUE Assessment X  (GENERALIZED WEAKNESS)   Cognition   Overall Cognitive Status Impaired   Arousal/Participation Responsive; Cooperative   Attention Attends with cues to redirect   Orientation Level Oriented to person;Oriented to place;Oriented to situation;Oriented to time  (GROSSLY ORIENTED TO TIME)   Memory Decreased recall of precautions;Decreased recall of recent events;Decreased short term memory   Following Commands Follows one step commands with increased time or repetition   Comments PT IS PLEASANT AND COOPERATIVE  PRESENTS AS FATIGUED, WITH CUES TO ATTEND TO TASK  SLOW TO PROCESS/RESPOND  APPRECIATIVE OF THERAPIST  ALARM ON FOR SAFETY  Assessment   Limitation Decreased UE ROM; Decreased ADL status; Decreased Safe judgement during ADL;Decreased cognition;Decreased endurance;Decreased self-care trans;Decreased high-level ADLs   Prognosis Fair   Assessment 78 YO Female SEEN FOR INITIAL OCCUPATIONAL THERAPY EVALUATION FOLLOWING TXF FROM GSL->SLB S/P FALL RESULTING IN T10/T11 VERTEBRAL FX   PER NEUROSX, PT CURRENTLY HAS THE FOLLOWING RESTRICTIONS;SPINAL PRECAUTIONS and TLSO   PROBLEMS LIST INCLUDES LOW HGB REQUIRING TRANSFUSION, A-FIB, COPD, SIRS, CHF, ACUTE ON CHRONIC KIDNEY INJURY, DM AND HTN  PT IS FROM HOME ALONE WHERE SHE REPORTS BEING INDEPENDENT/ MOD I WITH ADLS/IADLS/DRIVING PTA  PT CURRENTLY REQUIRES OVERALL MAX-TOTAL A WITH ADLS, AND MAX AX2 WITH BED MOBILITY AND SIT<->STAND TRANSFERS WITHOUT AD  PT ABLE TO CLEAR BUTTOCKS FROM BED HOWEVER UNABLE TO REACH FULL UPRIGHT POSITIONING  RETURNED TO [place] AND REPOSITIONED COMFORTABLY  LEFT WITH ALL NEEDS IN REACH + ALARM ON   TLSO NOTED TO BE SMALL, ORTHO TECH MADE AWARE AND PROVIDED EXTENSION PANEL FOR PROPER FIT AT END OF SESSION  PT IS LIMITED 2' PAIN, FATIGUE, IMPAIRED BALANCE, FALL RISK , LIMITED SAFETY AWARENESS/INSIGHT/JUDGEMENT, CUES TO ATTEND TO TASK, SLOW TO PROCESS/RESPOND, SPINAL PRECAUTIONS, OVERALL WEAKNESS/DECONDITIONING , DIZZINESS WITH CHANGE OF POSITIONING , SOB, LIMITED FAMILY/FRIEND SUPPORT  and OVERALL LIMITED ACTIVITY TOLERANCE  PT EDUCATED ON SPINAL PRECAUTIONS, DEEP BREATHING TECHNIQUES T/O ACTIVITY, SLOWING OF PACE and CONTINUE PARTICIPATION IN SELF-CARE/MOBILITY WITH STAFF WHILE IN THE HOSPITAL   The patient's raw score on the AM-PAC Daily Activity inpatient short form is 11, standardized score is 29 04, less than 39 4  Patients at this level are likely to benefit from discharge to post-acute rehabilitation services  Please refer to the recommendation of the Occupational Therapist for safe discharge planning  FROM AN OCCUPATIONAL THERAPY PERSPECTIVE, PT WOULD BENEFIT FROM ADDITIONAL OT SERVICES IN AN INPT REHAB SETTING UPON D/C  WILL CONT TO FOLLOW TO ADDRESS THE BELOW DESCRIBED GOALS  Goals   Patient Goals TO REST   LTG Time Frame 10-14   Long Term Goal #1 SEE BELOW   Plan   Treatment Interventions ADL retraining;Functional transfer training; Endurance training;Cognitive reorientation;Patient/family training;Equipment evaluation/education; Compensatory technique education; Energy conservation; Activityengagement   Goal Expiration Date 10/05/22   OT Frequency 3-5x/wk   Recommendation   OT Discharge Recommendation Post acute rehabilitation services   AM-PAC Daily Activity Inpatient   Lower Body Dressing 1   Bathing 2   Toileting 1   Upper Body Dressing 2   Grooming 2   Eating 3   Daily Activity Raw Score 11   Daily Activity Standardized Score (Calc for Raw Score >=11) 29 04   AM-PAC Applied Cognition Inpatient   Following a Speech/Presentation 3   Understanding Ordinary Conversation 3   Taking Medications 2   Remembering Where Things Are Placed or Put Away 2 Remembering List of 4-5 Errands 2   Taking Care of Complicated Tasks 2   Applied Cognition Raw Score 14   Applied Cognition Standardized Score 32 02   Modified Monument Scale   Modified Monument Scale 4       OCCUPATIONAL THERAPY GOALS TO BE MET WITHIN 14 DAYS:    -Pt will increase bed mobility to MIN A to participate in functional activities   -Pt will tolerate sitting EOB for ~15 minutes at S LEVEL in order to increase participation in self-care tasks  -Pt will improve functional mobility and transfers to MIN A on/off all surfaces including toileting   -Pt will participate in lt grooming/ feeding task with S after set-up   -Pt will increase independence in UB ADLS to MIN A with G carry over of learned compensatory/adaptive techniques as needed  -Pt will increase independence in LB ADLS to MOD A with G carry over of learned compensatory/adaptive techniques as needed  -Pt will improve activity tolerance to G for 30 min txment sessions w/ G carry over of learned energy conservation techniques   -Pt will demonstrate G carryover of learned spinal precautions, safety techniques and proper body mechanics in functional and leisure activities with use of DME   -Pt will complete additional cognitive assessment with 100% attention to task in order to assist with safe d/c plan  -Pt will follow 100% simple 2-step commands and be A&O x4 consistently with environmental cues to increase participation in functional activities  -Pt will improve B/L UE ROM/strength to [facility] via AROM/AAROM/PROM in all planes as tolerated in order to participate in functional activities          Documentation completed by Bruce Alvarado, [address] [facility], OTR/L  [facility] OF THE [facility] Certified ID# [id number]

## 2022-09-21 NOTE — RESTORATIVE TECHNICIAN NOTE
Restorative Technician Note      Patient Name: Emi Blackburn     Note Type: Bracing, Follow-up fitting  Patient Position Upon Consult: Supine  Brace Applied: Other (Comment) ([employer] Extension Panel)      Delivered [employer] Extension Panel for optimal fit and comfort    Please call Mobility Coordinator at ext  [phone] or on [place] text " [facility]-PT-Restorative Tech" role in regards to bracing instruction and/or adjustment    Constantino Martinez Restorative Technician, BS

## 2022-09-21 NOTE — PLAN OF CARE
Problem: Nutrition/Hydration-ADULT  Goal: Nutrient/Hydration intake appropriate for improving, restoring or maintaining nutritional needs  Description: Monitor and assess patient's nutrition/hydration status for malnutrition  Collaborate with interdisciplinary team and initiate plan and interventions as ordered  Monitor patient's weight and dietary intake as ordered or per policy  Utilize nutrition screening tool and intervene as necessary  Determine patient's food preferences and provide high-protein, high-caloric foods as appropriate       INTERVENTIONS:  - Monitor oral intake, urinary output, labs, and treatment plans  - Assess nutrition and hydration status and recommend course of action  - Evaluate amount of meals eaten  - Assist patient with eating if necessary   - Allow adequate time for meals  - Recommend/ encourage appropriate diets, oral nutritional supplements, and vitamin/mineral supplements  - Order, calculate, and assess calorie counts as needed  - Recommend, monitor, and adjust tube feedings and TPN/PPN based on assessed needs  - Assess need for intravenous fluids  - Provide specific nutrition/hydration education as appropriate  - Include patient/family/caregiver in decisions related to nutrition  Outcome: Not Progressing   NPO

## 2022-09-21 NOTE — PROGRESS NOTES
General Cardiology   Progress Note -  [employer]   Kalli Dennis 79 y o  female MRN: [id number]    Unit/Bed#: [facility] 634-01 Encounter: [id number]    Assessment/ Plan    1  Chronic systolic heart failure   Currently on furosemide 40 mg IV BID  Will d/c IV diuretics and start home dose: furosemide 80 mg PO daily tomorrow   Check BMP in am   Continue 2 gram Na diet   Monitor I/Os -2 0 L in 24 hours   Daily weights  No weights today   Echocardiogram 9/18/2022 showed EF 65%, LA severely dilated, RA dilated, mild AI, moderate AS, moderate MR and mild TR       2  Cardiomyopathy   S/p ICD  Recommend resuming home medications: Entresto  for GDMT prior to discharge pending renal function   Continue coreg      3  Acute on Chronic kidney disease stage III   Baseline creatinine 1 2-1 4  Creatinine 1 81 today      4  Chronic atrial fibrillation   On digoxin 125 mcg PO daily and coreg 12 5 mg PO BID  Patient takes coreg 25 mg PO BID at home but BP has been borderline at times  Home medication: coumadin on hold  Recommend resume [address] once cleared by GI     5  T11 Vertebral fracture  Followed by primary team and neurosurgery team   TIKAO brace      6  Anemia   Hemoglobin 5 7 on admission   S/p transfusion   Hemoglobin 8 4 today        Subjective  Patient reporting back pain  She denies SOB  She reports breathing is about her baseline  No chest pain or palpitations  No fever or chills  Review of Systems   Constitutional: Positive for malaise/fatigue  Negative for chills and fever  HENT: Negative for congestion  Cardiovascular: Positive for dyspnea on exertion  Negative for chest pain, leg swelling, orthopnea and palpitations  Respiratory: Negative for shortness of breath  Musculoskeletal: Positive for back pain  Gastrointestinal: Negative for bloating, nausea and vomiting  Neurological: Negative for dizziness and light-headedness  All other systems reviewed and are negative        Objective:   Vitals: Blood pressure 127/55, pulse 70, temperature 97 5 °F (36 4 °C), resp  rate 18, height 4' 10" (1 473 m), weight 105 kg (231 lb 3 2 oz), SpO2 91 % ,       Body mass index is 48 32 kg/m²  ,     Systolic (23JAF), BKS:990 , Min:112 , IGE:784     Diastolic (94XPP), WKV:48, Min:36, Max:55        Intake/Output Summary (Last 24 hours) at 9/21/2022 0911  Last data filed at 9/21/2022 0400  Gross per 24 hour   Intake 318 ml   Output 2200 ml   Net -1882 ml     Weight (last 2 days)     Date/Time Weight    09/20/22 0600 105 (231 2)        Telemetry Review: V paced     Physical Exam  Constitutional:       General: She is not in acute distress  Appearance: She is obese  HENT:      Head: Normocephalic  Mouth/Throat:      Mouth: Mucous membranes are moist    Cardiovascular:      Rate and Rhythm: Normal rate and regular rhythm  Pulses: Normal pulses  Pulmonary:      Effort: Pulmonary effort is normal  No respiratory distress  Comments: Course   3 L NC (home O2)   Abdominal:      General: Bowel sounds are normal       Palpations: Abdomen is soft  Musculoskeletal:         General: Normal range of motion  Cervical back: Neck supple  Skin:     General: Skin is warm and dry  Capillary Refill: Capillary refill takes less than 2 seconds  Neurological:      General: No focal deficit present  Mental Status: She is alert and oriented to person, place, and time     Psychiatric:         Mood and Affect: Mood normal          LABORATORY RESULTS  Results from last 7 days   Lab Units 09/17/22  1718   CK TOTAL U/L 45     CBC with diff:   Results from last 7 days   Lab Units 09/21/22  0623 09/20/22  0629 09/19/22  0532 09/18/22  1659 09/18/22  0342 09/17/22  2222 09/17/22  1718   WBC Thousand/uL 12 89* 13 30* 16 25*  --  20 53* 26 13* 22 11*   HEMOGLOBIN g/dL 8 4* 7 8* 7 3* 7 3* 7 7* 6 8* 5 7*   HEMATOCRIT % 27 8* 25 5* 24 2*  --  24 3* 21 8* 18 9*   MCV fL 101* 100* 99*  --  95 97 100*   PLATELETS Thousands/uL 153 149 136*  --  145* 173 210   MCH pg 30 4 30 6 29 9  --  30 2 30 4 30 0   MCHC g/dL 30 2* 30 6* 30 2*  --  31 7 31 2* 30 2*   RDW % 20 7* 20 2* 19 6*  --  17 5* 18 2* 19 9*   MPV fL 10 2 10 4 10 7  --  11 0 10 6 10 5   NRBC AUTO /100 WBCs 0 0  --   --  0  --   --    NRBC /100 WBC  --   --   --   --   --  1  --        CMP:  Results from last 7 days   Lab Units 09/21/22  0623 09/20/22  0629 09/19/22  0532 09/18/22 0342 09/17/22 2222 09/17/22  1718   POTASSIUM mmol/L 4 0 3 9 3 9 4 1 3 8 3 7   CHLORIDE mmol/L 106 104 104 106 104 98   CO2 mmol/L 30 26 24 23 21 19*   BUN mg/dL 120* 135* 131* 133* 139* 131*   CREATININE mg/dL 1 81* 1 89* 1 87* 1 64* 1 79* 1 98*   CALCIUM mg/dL 7 8* 8 1* 8 0* 8 7 8 4 8 3   AST U/L  --   --   --  54* 44 15   ALT U/L  --   --   --  43 39 15   ALK PHOS U/L  --   --   --  53 54 55   EGFR ml/min/1 73sq m 25 24 24 28 26 23       BMP:  Results from last 7 days   Lab Units 09/21/22 0623 09/20/22 0629 09/19/22  0532 09/18/22 0342 09/17/22 2222 09/17/22  1718   POTASSIUM mmol/L 4 0 3 9 3 9 4 1 3 8 3 7   CHLORIDE mmol/L 106 104 104 106 104 98   CO2 mmol/L 30 26 24 23 21 19*   BUN mg/dL 120* 135* 131* 133* 139* 131*   CREATININE mg/dL 1 81* 1 89* 1 87* 1 64* 1 79* 1 98*   CALCIUM mg/dL 7 8* 8 1* 8 0* 8 7 8 4 8 3       Lab Results   Component Value Date    NTBNP 2,236 (H) 09/17/2022    NTBNP 1,583 (H) 11/11/2021             Results from last 7 days   Lab Units 09/18/22  0342 09/17/22  2222   MAGNESIUM mg/dL 2 8* 2 7*                     Results from last 7 days   Lab Units 09/17/22  2222 09/17/22  1718   INR  1 65* 4 66*       Lipid Profile:   No results found for: CHOL  No results found for: HDL  No results found for: LDLCALC  No results found for: TRIG    Cardiac testing:   No results found for this or any previous visit  No results found for this or any previous visit  No results found for this or any previous visit  No valid procedures specified    No results found for this or any previous visit      Meds/Allergies   all current active meds have been reviewed and current meds:   Current Facility-Administered Medications   Medication Dose Route Frequency    acetaminophen (TYLENOL) tablet 975 mg  975 mg Oral Q8H [facility]    albuterol (PROVENTIL HFA,VENTOLIN HFA) inhaler 2 puff  2 puff Inhalation Q4H PRN    atorvastatin (LIPITOR) tablet 10 mg  10 mg Oral Daily    bisacodyl (DULCOLAX) rectal suppository 10 mg  10 mg Rectal Daily PRN    budesonide-formoterol (SYMBICORT) 160-4 5 mcg/act inhaler 2 puff  2 puff Inhalation BID    carvedilol (COREG) tablet 12 5 mg  12 5 mg Oral BID With Meals    digoxin (LANOXIN) tablet 125 mcg  125 mcg Oral Daily    furosemide (LASIX) injection 40 mg  40 mg Intravenous BID (diuretic)    gabapentin (NEURONTIN) capsule 100 mg  100 mg Oral TID    heparin (porcine) subcutaneous injection 5,000 Units  5,000 Units Subcutaneous Q8H [facility]    HYDROmorphone HCl (DILAUDID) injection 0 2 mg  0 2 mg Intravenous Q4H PRN    insulin lispro (HumaLOG) 100 units/mL subcutaneous injection 1-5 Units  1-5 Units Subcutaneous Q6H [facility]    lidocaine (LIDODERM) 5 % patch 1 patch  1 patch Topical Daily    melatonin tablet 6 mg  6 mg Oral HS    methocarbamol (ROBAXIN) tablet 500 mg  500 mg Oral Q6H [facility]    oxyCODONE (ROXICODONE) IR tablet 2 5 mg  2 5 mg Oral Q4H PRN    oxyCODONE (ROXICODONE) IR tablet 5 mg  5 mg Oral Q4H PRN    pantoprazole (PROTONIX) EC tablet 40 mg  40 mg Oral BID AC    polyethylene glycol (MIRALAX) packet 17 g  17 g Oral BID    polyethylene glycol (MIRALAX) packet 17 g  17 g Oral Daily PRN    senna (SENOKOT) tablet 8 6 mg  1 tablet Oral BID     Medications Prior to Admission   Medication    albuterol (2 5 mg/3 mL) 0 083 % nebulizer solution    albuterol (PROVENTIL HFA,VENTOLIN HFA) 90 mcg/act inhaler    allopurinol (ZYLOPRIM) 300 mg tablet    aspirin 81 mg chewable tablet    atorvastatin (LIPITOR) 10 mg tablet    budesonide-formoterol (SYMBICORT) 160-4 5 mcg/act inhaler    carvedilol (COREG) 25 mg tablet    cloNIDine (CATAPRES) 0 1 mg tablet    colchicine (COLCRYS) 0 6 mg tablet    digoxin (LANOXIN) 0 25 mg tablet    diltiazem (CARDIZEM CD) 180 mg 24 hr capsule    FERROUS SULFATE PO    furosemide (LASIX) 40 mg tablet    gabapentin (NEURONTIN) 100 mg capsule    guaiFENesin 400 mg    Magnesium 400 MG TABS    meclizine (ANTIVERT) 25 mg tablet    metolazone (ZAROXOLYN) 5 mg tablet    Multiple Vitamin (MULTIVITAMIN) capsule    pantoprazole (PROTONIX) 40 mg tablet    sacubitril-valsartan (ENTRESTO)  MG TABS    spironolactone (ALDACTONE) 25 mg tablet    warfarin (COUMADIN) 2 mg tablet    warfarin (COUMADIN) 7 5 mg tablet     Counseling / Coordination of Care  Total floor / unit time spent today 20 minutes  Greater than 50% of total time was spent with the patient and / or family counseling and / or coordination of care  ** Please Note: Dragon 360 Dictation voice to text software may have been used in the creation of this document   **

## 2022-09-21 NOTE — PLAN OF CARE
Problem: OCCUPATIONAL THERAPY ADULT  Goal: Performs self-care activities at highest level of function for planned discharge setting  See evaluation for individualized goals  Description: Treatment Interventions: ADL retraining, Functional transfer training, Endurance training, Cognitive reorientation, Patient/family training, Equipment evaluation/education, Compensatory technique education, Energy conservation, Activityengagement          See flowsheet documentation for full assessment, interventions and recommendations  Note: Limitation: Decreased UE ROM, Decreased ADL status, Decreased Safe judgement during ADL, Decreased cognition, Decreased endurance, Decreased self-care trans, Decreased high-level ADLs  Prognosis: Fair  Assessment: 78 YO Female SEEN FOR INITIAL OCCUPATIONAL THERAPY EVALUATION FOLLOWING TXF FROM GSL->SLB S/P FALL RESULTING IN T10/T11 VERTEBRAL FX   PER NEUROSX, PT CURRENTLY HAS THE FOLLOWING RESTRICTIONS;SPINAL PRECAUTIONS and TLSO   PROBLEMS LIST INCLUDES LOW HGB REQUIRING TRANSFUSION, A-FIB, COPD, SIRS, CHF, ACUTE ON CHRONIC KIDNEY INJURY, DM AND HTN  PT IS FROM HOME ALONE WHERE SHE REPORTS BEING INDEPENDENT/ MOD I WITH ADLS/IADLS/DRIVING PTA  PT CURRENTLY REQUIRES OVERALL MAX-TOTAL A WITH ADLS, AND MAX AX2 WITH BED MOBILITY AND SIT<->STAND TRANSFERS WITHOUT AD  PT ABLE TO CLEAR BUTTOCKS FROM BED HOWEVER UNABLE TO REACH FULL UPRIGHT POSITIONING  RETURNED TO SUPINE AND REPOSITIONED COMFORTABLY  LEFT WITH ALL NEEDS IN REACH + ALARM ON  TLSO NOTED TO BE SMALL, ORTHO TECH MADE AWARE AND PROVIDED EXTENSION PANEL FOR PROPER FIT AT END OF SESSION  PT IS LIMITED 2' PAIN, FATIGUE, IMPAIRED BALANCE, FALL RISK , LIMITED SAFETY AWARENESS/INSIGHT/JUDGEMENT, CUES TO ATTEND TO TASK, SLOW TO PROCESS/RESPOND, SPINAL PRECAUTIONS, OVERALL WEAKNESS/DECONDITIONING , DIZZINESS WITH CHANGE OF POSITIONING , SOB, LIMITED FAMILY/FRIEND SUPPORT  and OVERALL LIMITED ACTIVITY TOLERANCE   PT EDUCATED ON SPINAL PRECAUTIONS, DEEP BREATHING TECHNIQUES T/O ACTIVITY, SLOWING OF PACE and CONTINUE PARTICIPATION IN SELF-CARE/MOBILITY WITH STAFF WHILE IN THE HOSPITAL   The patient's raw score on the AM-PAC Daily Activity inpatient short form is 11, standardized score is 29 04, less than 39 4  Patients at this level are likely to benefit from discharge to post-acute rehabilitation services  Please refer to the recommendation of the Occupational Therapist for safe discharge planning  FROM AN OCCUPATIONAL THERAPY PERSPECTIVE, PT WOULD BENEFIT FROM ADDITIONAL OT SERVICES IN AN INPT REHAB SETTING UPON D/C  WILL CONT TO FOLLOW TO ADDRESS THE BELOW DESCRIBED GOALS       OT Discharge Recommendation: Post acute rehabilitation services

## 2022-09-21 NOTE — RESPIRATORY THERAPY NOTE
RT Protocol Note  Abraham Adams 79 y o  female MRN: [id number]  Unit/Bed#: [facility] 634-01 Encounter: [id number]    Assessment    Principal Problem:    T11 vertebral fracture [facility])  Active Problems:    Chronic heart failure (HCC)    Permanent atrial fibrillation (HCC)    Anemia    Fall    SIRS (systemic inflammatory response syndrome) (HCC)    COPD (chronic obstructive pulmonary disease) ([facility])    Acute-on-chronic kidney injury ([facility] )      Home Pulmonary Medications:  Albuterol & Symbicort       Past Medical History:   Diagnosis Date    A-fib [facility])     Cardiac pacemaker     CHF (congestive heart failure) ([facility])     Chronic cellulitis     COPD (chronic obstructive pulmonary disease) ([facility])     Diabetes mellitus ([facility])     Edema     High cholesterol     Hyperparathyroidism ([facility])     Hypertension     Hyperuricemia     Stage 4 chronic kidney disease ([facility] )      Social History     Socioeconomic History    Marital status:      Spouse name: None    Number of children: None    Years of education: None    Highest education level: None   Occupational History    None   Tobacco Use    Smoking status: Never Smoker    Smokeless tobacco: Never Used   Vaping Use    Vaping Use: Never used   Substance and Sexual Activity    Alcohol use: Never    Drug use: Never    Sexual activity: Not Currently   Other Topics Concern    None   Social History Narrative    ** Merged History Encounter **          Social Determinants of Health     Financial Resource Strain: Not on file   Food Insecurity: No Food Insecurity    Worried About Running Out of Food in the Last Year: Never true    Ran Out of Food in the Last Year: Never true   Transportation Needs: No Transportation Needs    Lack of Transportation (Medical): No    Lack of Transportation (Non-Medical):  No   Physical Activity: Not on file   Stress: Not on file   Social Connections: Not on file   Intimate Partner Violence: Not on file   Housing Stability: Low Risk     Unable to Pay for Housing in the Last Year: No    Number of Places Lived in the Last Year: 1    Unstable Housing in the Last Year: No       Subjective         Objective    Physical Exam:   Assessment Type: Assess only  Respiratory Pattern: Normal  Chest Assessment: Chest expansion symmetrical  Bilateral Breath Sounds: Diminished  O2 Device: Bipap    Vitals:  Blood pressure (!) 120/46, pulse 64, temperature 97 5 °F (36 4 °C), resp  rate (!) 28, height 4' 10" (1 473 m), weight 105 kg (231 lb 3 2 oz), SpO2 90 %  Imaging and other studies: I have personally reviewed pertinent reports  O2 Device: Bipap     Plan    Respiratory Plan: Vent/NIV/HFNC        Resp Comments: Called to pt room by nurse because pt was desatting into the 70's  Pt placed on Bipap, 14/6 70%, pt sat stoney 93-95%  Pt currently tolerating Bipap well

## 2022-09-21 NOTE — PHYSICAL THERAPY NOTE
PHYSICAL THERAPY EVALUATION  NAME:  Matthews Brittle  DATE: 22    AGE:   79 y o  Mrn:   [id number]  ADMIT DX:  Melena [[id number]]  Fall [W19  XXXA]  Fall from chair, initial encounter [W07  XXXA]  Closed fracture of eleventh thoracic vertebra, unspecified fracture morphology, initial encounter ([facility] ) [S22 089A]    Past Medical History:   Diagnosis Date    A-fib [facility])     Cardiac pacemaker     CHF (congestive heart failure) ([facility])     Chronic cellulitis     COPD (chronic obstructive pulmonary disease) ([facility])     Diabetes mellitus ([facility])     Edema     High cholesterol     Hyperparathyroidism ([facility])     Hypertension     Hyperuricemia     Stage 4 chronic kidney disease ([facility] )        Past Surgical History:   Procedure Laterality Date    CARDIAC DEFIBRILLATOR PLACEMENT       SECTION      x 2    COLON SURGERY      HERNIA REPAIR         Length Of Stay: 4    PHYSICAL THERAPY EVALUATION:        22 1100   Note Type   Note type Evaluation   Pain Assessment   Pain Assessment Tool 0-10   Pain Score 4   Pain Location/Orientation Location: Back   Pain Onset/Description Onset: Ongoing;Frequency: Constant/Continuous; Descriptor: Aching   Effect of Pain on Daily Activities increased pain with activity   Patient's Stated Pain Goal No pain   Hospital Pain Intervention(s) Ambulation/increased activity;Repositioned   Restrictions/Precautions   Weight Bearing Precautions Per Order No   Braces or Orthoses (S)  TLSO   Other Precautions Cognitive; Chair Alarm; Bed Alarm;Multiple lines; Fall Risk;Pain;O2   Home Living   Type of [address] [place] [address] One level  (0 RASHEED)   Home Equipment Walker; Wheelchair-manual   Additional Comments Patient reports living alone, but states she has local family, friends, neighbors who check in on her if needed   Prior Function   Level of [place] Independent with ADLs and functional mobility   Lives With Alone   Receives Help From Family;Friend(s); [facility]  in the last 6 months 1 to 4  (2 as per pt)   Comments Patient reports use of a rolling walker for household distances and the use of wheelchair for community distance mobility prior to admission   General   Family/Caregiver Present No   Cognition   Overall Cognitive Status Impaired   Arousal/Participation Alert   Attention Attends with cues to redirect   Orientation Level Oriented to person;Oriented to place;Oriented to time;Oriented to situation   Memory Decreased short term memory;Decreased recall of recent events;Decreased recall of precautions   Following Commands Follows one step commands with increased time or repetition   RUE Assessment   RUE Assessment X   LUE Assessment   LUE Assessment X   RLE Assessment   RLE Assessment X  (Generalized weakness)   Strength RLE   RLE Overall Strength 3-/5   LLE Assessment   LLE Assessment X  (Generalized weakness)   Strength LLE   LLE Overall Strength 3-/5   Bed Mobility   Supine to Sit 2  Maximal assistance   Additional items Assist x 2; Increased time required;Verbal cues   Sit to Supine 2  Maximal assistance   Additional items Assist x 2; Increased time required;Verbal cues   Transfers   Sit to Stand 2  Maximal assistance   Additional items Assist x 2; Increased time required;Verbal cues   Stand to Sit 2  Maximal assistance   Additional items Assist x 2; Increased time required;Verbal cues   Additional Comments Max Ax2 required to perform sit <-> stand transfer  Pt able to clear buttock from bed, however pt was unable to achieve full upright standing position despite max Ax2  b/l HHA required for sit <-> stand transfer     Ambulation/Elevation   Gait pattern Not appropriate  (decreased standing tolerance at current time)   Balance   Static Sitting Poor   Dynamic Sitting Poor -   Endurance Deficit   Endurance Deficit Yes   Endurance Deficit Description fatigue, pain   Activity Tolerance   Activity Tolerance Patient limited by fatigue;Patient limited by pain   Medical Staff Made Ad Zamudio OT; Cierra Urbano OT student; OT present for co evaluation due to patient's current medical presentation   Nurse Made Aware Patient appropriate to be seen and mobilized per nursing   Assessment   Prognosis Fair   Problem List Decreased strength;Decreased range of motion;Decreased endurance; Impaired balance;Decreased mobility; Decreased cognition; Impaired judgement;Decreased safety awareness;Pain;Orthopedic restrictions   Assessment Pt is 79 y o  female seen for PT evaluation s/p admit to [facility] on 9/17/2022  Two pt identifiers were used to confirm  Pt presented s/p fall out of chair at home  Patient originally presented at [address] [place] [address] and was transferred to [facility] for further medical management/ evaluation  Pt was admitted with a primary dx of:  Vertebral fracture, and other active problems including anemia, CHF, acute on chronic kidney disease, permanent AFib, COPD, systemic inflammatory response syndrome  Neurosurgery recommending TLSO brace when out of bed  PT now consulted for assessment of mobility and d/c needs  Pts current co morbidities affecting treatment include:  Hip, cardiac pacemaker, CHF, COPD, DM, HTN, high cholesterol, hyperparathyroidism,CKD, and personal factors including living alone  Pts current clinical presentation is Unstable/ Unpredictable (high complexity) due to Ongoing medical management for primary dx, Decreased activity tolerance compared to baseline, Fall risk, Increased assistance needed from caregiver at current time, Cog status, Spinal precautions at current time, Continuous pulse oximetry monitoring , requiring O2 via NC    Upon evaluation, pt currently is requiring Max Ax2 for bed mobility; Max Ax2 for si <-> stand transfer    Pt presents at PT eval functioning below baseline and currently w/ overall mobility deficits 2* to: BLE weakness, decreased ROM, impaired balance, decreased endurance, pain, decreased activity tolerance compared to baseline, fall risk, decreased skin integrity, spinal precautions  Pt currently at a fall risk 2* to impairments listed above  Based on the aforementioned PT evaluation, pt will continue to benefit from skilled Acute PT interventions to address stated impairments; to maximize functional mobility; for ongoing pt/ family training; and DME needs  At conclusion of PT session pt returned BTB and bed alarm engaged with phone and call bell within reach  Pt denies any further questions at this time  PT is currently recommending Rehab  PT will continue to follow during hospital stay  Goals   Patient Goals " to rest"   STG Expiration Date 10/05/22   Short Term Goal #1 In 14 days pt will complete: 1) Bed mobility skills with min Ax1 to increase safety and independence as well as decrease caregiver burden  2) Functional sit <-> stand transfers with min Ax1 to promote increased independence, safety, and QOL  3) Improve balance grades by 1/2 grade to increase safety with all mobility and decrease fall risk  4) Improve BLE strength by 1/2 grade to help increase overall functional mobility and decrease fall risk  5) further out of bed transfers and ambulation to be assessed when appropriate, PT to see at that time   Plan   Treatment/Interventions Functional transfer training;LE strengthening/ROM; Therapeutic exercise; Endurance training;Patient/family training;Equipment eval/education; Bed mobility;Spoke to nursing;Continued evaluation;OT   PT Frequency 3-5x/wk   Recommendation   PT Discharge Recommendation Post acute rehabilitation services   Equipment Recommended   (continue to assess)   AM-PAC Basic Mobility Inpatient   Turning in Bed Without Bedrails 1   Lying on Back to Sitting on Edge of Flat Bed 1   Moving Bed to Chair 1   Standing Up From Chair 1   Walk in Room 1   Climb 3-5 Stairs 1   Basic Mobility Inpatient Raw Score 6   Turning Head Towards Sound 4   Follow Simple Instructions 3   Low Function Basic Mobility Raw Score 13   Low Function Basic Mobility Standardized Score 20 14   Highest Level Of Mobility   [facility] Goal 2: Bed activities/Dependent transfer   -[facility] Achieved 3: Sit at edge of bed   Modified [place] Scale   Modified [place] Scale 4   Barthel Index   Feeding 10   Bathing 0   Grooming Score 0   Dressing Score 0   Bladder Score 0   Bowels Score 10   Toilet Use Score 5   Transfers (Bed/Chair) Score 5   Mobility (Level Surface) Score 0   Stairs Score 0   Barthel Index Score 30   Portions of the documentation may have been created using voice recognition software  Occasional wrong word or sound alike substitutions may have occurred due to the inherent limitations of the voice recognition software  Read the chart carefully and recognize, using context, where substitutions have occurred      Bao Mckeon, PT, DPT

## 2022-09-21 NOTE — RESPIRATORY THERAPY NOTE
Resp Care   09/21/22 6409   Respiratory Assessment   Resp Comments Pt switched over from Bipap to HFNC  70% Fio2 50L, Pt satting 97%     Non-Invasive Information   O2 Interface Device HFNC prongs   Non-Invasive Ventilation Mode HFNC (High flow)   SpO2 97 %   $ Pulse Oximetry Spot Check Charge Completed   Non-Invasive Settings   FiO2 (%) 70   Flow (lpm) 50   Maintenance   Water bag changed Yes

## 2022-09-21 NOTE — CASE MANAGEMENT
Case Management Discharge Planning Note    Patient name Abraham Adams  Location [facility] 634/[facility] 670-50 MRN [id number]  : 1940 Date 2022       Current Admission Date: 2022  Current Admission Diagnosis:T11 vertebral fracture [facility])   Patient Active Problem List    Diagnosis Date Noted    Fall 2022    SIRS (systemic inflammatory response syndrome) ([facility] ) 2022    COPD (chronic obstructive pulmonary disease) ([facility] ) 2022    Acute-on-chronic kidney injury ([facility] ) 2022    T11 vertebral fracture ([facility] ) 2022    Chronic heart failure ([facility] ) 2021    Permanent atrial fibrillation ([facility] ) 2021    Stage 3 chronic kidney disease ([facility] ) 2021    Anemia 2021      LOS (days): 4  Geometric Mean LOS (GMLOS) (days): 3 00  Days to GMLOS:-0 6     OBJECTIVE:  Risk of Unplanned Readmission Score: 24 1         Current admission status: Inpatient   Preferred Pharmacy:   [facility], [address] [facility] [address] [place], [place] [id number]  Phone: [phone] Fax: [phone]    Primary Care Provider: Trudy Castro DO    Primary Insurance: [facility]  Secondary Insurance:     DISCHARGE DETAILS:    Were Treatment Team discharge recommendations reviewed with patient/caregiver?: Yes  Did patient/caregiver verbalize understanding of patient care needs?: Yes  Were patient/caregiver advised of the risks associated with not following Treatment Team discharge recommendations?: Yes    [address]         Is the patient interested in [facility] at discharge?: No    DME Referral Provided  Referral made for DME?: No    Other Referral/Resources/Interventions Provided:  Interventions: SNF, Short Term Rehab    Treatment Team Recommendation: SNF, Short Term Rehab  Discharge Destination Plan[de-identified] Short Term Rehab, SNF      Pt was seen by OT/PT and recommended for IP rehab  CM met with pt to discuss d/c plan   CM reviewed acute and SNF options  Pt is agreeable to SNF in her area  Pt's preference is [facility] but agreeable to referrals to other locations in the event they can't accept   CM placed and will follow up

## 2022-09-21 NOTE — NURSING NOTE
Pt received from p6 via bed into ICU 7 , on BiPAP with improved SATs noted  Md at bedside to assess pt  Ball catheter placed as ordered   To monitor closely in ICU   Brother at bedside to visit and updated on pt  Status and  plan of care

## 2022-09-21 NOTE — PROGRESS NOTES
The pantoprazole has been converted to Oral per [facility] IV-to-PO Auto-Conversion Protocol for Adults as approved by the Pharmacy and Therapeutics Committee  The patient met all eligible criteria:  3 Age = 25years old   2) Received at least one dose of the IV form   3) Receiving at least one other scheduled oral/enteral medication   4) Tolerating an oral/enteral diet   and did not have any exclusions:   1) Critical care patient   2) Active GI bleed (IF assessing H2RAs or PPIs)   3) Continuous tube feeding (IF assessing cipro, doxycycline, levofloxacin, minocycline, rifampin, or voriconazole)   4) Receiving PO vancomycin (IF assessing metronidazole)   5) Persistent nausea and/or vomiting   6) Ileus or gastrointestinal obstruction   7) Nena/nasogastric tube set for continuous suction   8) Specific order not to automatically convert to PO (in the order's comments or if discussed in the most recent Infectious Disease or primary team's progress notes)

## 2022-09-21 NOTE — UTILIZATION REVIEW
Continued Stay Review    Date: 09/21/22                          Current Patient Class: IP  Current Level of Care: Critical Care    HPI:81 y o  female initially admitted on 9/17 for T11 fracture  Neurosurgery recommend no surgical intervention at this time  9/20 Cardiology Consult: Pt w/ significant anemia (Hgb 5 7 on admission) and supratherapeutic INR  Reversed w/ Vit K and KCentra, transfused w/ pRBCs  Echocardiogram 9/18/2022 showed EF 65%, LA severely dilated, RA dilated, mild AI, moderate AS, moderate MR and mild TR  EGD unremarkable but limited s/t stool  Hgb 7 8  Plan: continue IV lasix 40 mg BID, sodium restriction, DW, I&O, continue PTA meds except AC/AP and entresto, resume Coreg  9/20 Flex Sig & EGD  INDICATION: Melena  IMPRESSION: All observed locations appeared normal  Regular Z-line 39 cm from the incisors  Dark stool present diffusely - flex sig procedure aborted; No obvious source of bleeding identified  Assessment/Plan:  Cardiology following and patient had various cardiac medications restarted, was getting BID IV lasix 40mg and diuresed well with -1 6L/24h and -3 6L since admission  Hb stable on floor and GI performed EGD which was wnl and flex sig which did not visualize any concerning areas but was incomplete due to stool  Developed respiratory distress on trauma floor and was upgraded back to ICU for BiPAP needs  On exam, on BIPAP, ill-appearing, decreased breath sounds       Vital Signs:   Date/Time Temp Pulse Resp BP MAP (mmHg) SpO2 FiO2 (%) Calculated FIO2 (%) - Nasal Cannula Nasal Cannula O2 Flow Rate (L/min) O2 Device O2 Interface Device   09/21/22 1519 -- -- -- -- -- 92 % -- -- -- -- Face mask   09/21/22 1515 -- 65 23 Abnormal  145/54 80 92 % 70 -- -- BiPAP --   09/21/22 1308 -- -- -- -- -- 91 % -- -- -- -- Face mask   09/21/22 12:50:33 -- 64 28 Abnormal  120/46 Abnormal  71 90 % -- -- -- -- --   09/21/22 12:49:36 -- 65 -- 120/46 Abnormal  71 88 % Abnormal  -- -- -- -- -- 09/21/22 12:07:34 97 5 °F (36 4 °C) 70 16 119/45 Abnormal  70 85 % Abnormal  -- -- -- -- --   09/21/22 08:21:49 97 5 °F (36 4 °C) 70 18 127/55 79 91 % -- -- -- -- --   09/21/22 0630 -- -- -- -- -- 92 % -- -- -- -- --   09/21/22 03:20:34 97 5 °F (36 4 °C) 72 18 112/43 Abnormal  66 93 % -- -- -- -- --   09/21/22 0044 -- 73 -- -- -- 88 % Abnormal  -- -- -- -- --   09/20/22 22:18:50 98 1 °F (36 7 °C) 68 18 113/43 Abnormal  66 93 % -- -- -- -- --   09/20/22 19:08:12 97 5 °F (36 4 °C) 71 18 117/43 Abnormal  68 95 % -- -- -- -- --   09/20/22 15:30:29 97 6 °F (36 4 °C) 73 -- 121/39 Abnormal  66 95 % -- -- -- -- --   09/20/22 14:24:36 98 5 °F (36 9 °C) 77 -- 118/39 Abnormal  65 95 % -- -- -- -- --   09/20/22 10:28:28 97 3 °F (36 3 °C) Abnormal  71 -- 121/36 Abnormal  64 Abnormal  95 % -- -- -- -- --   09/20/22 0813 -- 69 -- -- -- -- -- -- -- -- --   09/20/22 0800 -- -- -- -- -- 92 % -- 32 3 L/min Nasal cannula --   09/20/22 07:33:11 97 5 °F (36 4 °C) 66 -- 128/43 Abnormal  71 94 % -- -- -- -- --   09/20/22 02:33:28 97 9 °F (36 6 °C) 85 -- 119/49 Abnormal  72 96 % -- -- -- -- --         Pertinent Labs/Diagnostic Results:   Results from last 7 days   Lab Units 09/17/22  1718   SARS-COV-2  Negative     Results from last 7 days   Lab Units 09/21/22  0623 09/20/22  0629 09/19/22  0532 09/18/22  1659 09/18/22  0342 09/17/22  2222 09/17/22  1718   WBC Thousand/uL 12 89* 13 30* 16 25*  --  20 53* 26 13* 22 11*   HEMOGLOBIN g/dL 8 4* 7 8* 7 3* 7 3* 7 7* 6 8* 5 7*   HEMATOCRIT % 27 8* 25 5* 24 2*  --  24 3* 21 8* 18 9*   PLATELETS Thousands/uL 153 149 136*  --  145* 173 210   NEUTROS ABS Thousands/µL 11 28* 11 86*  --   --   --   --   --    BANDS PCT %  --   --   --   --   --   --  3         Results from last 7 days   Lab Units 09/21/22  0623 09/20/22  0629 09/19/22  0532 09/18/22  0342 09/17/22  2222   SODIUM mmol/L 140 136 135 137 137   POTASSIUM mmol/L 4 0 3 9 3 9 4 1 3 8   CHLORIDE mmol/L 106 104 104 106 104   CO2 mmol/L 30 26 24 23 21   ANION GAP mmol/L 4 6 7 8 12   BUN mg/dL 120* 135* 131* 133* 139*   CREATININE mg/dL 1 81* 1 89* 1 87* 1 64* 1 79*   EGFR ml/min/1 73sq m 25 24 24 28 26   CALCIUM mg/dL 7 8* 8 1* 8 0* 8 7 8 4   CALCIUM, IONIZED mmol/L  --   --   --  1 16  --    MAGNESIUM mg/dL  --   --   --  2 8* 2 7*   PHOSPHORUS mg/dL  --   --   --  4 5* 4 8*     Results from last 7 days   Lab Units 09/18/22  0342 09/17/22 2222 09/17/22  1718   AST U/L 54* 44 15   ALT U/L 43 39 15   ALK PHOS U/L 53 54 55   TOTAL PROTEIN g/dL 5 7* 5 9* 6 1*   ALBUMIN g/dL 2 5* 2 5* 2 5*   TOTAL BILIRUBIN mg/dL 0 54 0 57 0 44     Results from last 7 days   Lab Units 09/21/22  1209 09/21/22  0608 09/20/22  2337 09/20/22  2116 09/20/22  1702 09/20/22  1248 09/20/22  0543 09/19/22  2317 09/19/22  1647 09/19/22  1148 09/19/22  1127 09/19/22  0624   POC GLUCOSE mg/dl 190* 134 182* 219* 152* 194* 121 159* 154* 158* 174* 188*     Results from last 7 days   Lab Units 09/21/22  0623 09/20/22  0629 09/19/22  0532 09/18/22  0342 09/17/22  2222 09/17/22  1718   GLUCOSE RANDOM mg/dL 132 116 171* 220* 228* 263*     Results from last 7 days   Lab Units 09/17/22  1718   PH WENDY  7 236*   PCO2 WENDY mm Hg 39 2*   PO2 WENDY mm Hg 55 3*   HCO3 WENDY mmol/L 16 3*   BASE EXC WENDY mmol/L -10 3   O2 CONTENT WENDY ml/dL 7 2   O2 HGB, VENOUS % 77 3         Results from last 7 days   Lab Units 09/17/22  1718   CK TOTAL U/L 45     Results from last 7 days   Lab Units 09/17/22  1718   HS TNI 0HR ng/L 30         Results from last 7 days   Lab Units 09/17/22  2222 09/17/22  1718   PROTIME seconds 19 7* 43 8*   INR  1 65* 4 66*   PTT seconds 34 62*             Results from last 7 days   Lab Units 09/18/22  0342 09/17/22 2222 09/17/22  1723   LACTIC ACID mmol/L 1 7 6 8* 10 6*         Results from last 7 days   Lab Units 09/18/22  0342   DIGOXIN LVL ng/mL 2 0     Results from last 7 days   Lab Units 09/17/22  1718   NT-PRO BNP pg/mL 2,236*     Results from last 7 days   Lab Units 09/17/22 2222 FERRITIN ng/mL 587*                         Results from last 7 days   Lab Units 09/17/22  1839   CLARITY UA  Clear   COLOR UA  Yellow   SPEC GRAV UA  1 015   PH UA  5 0   GLUCOSE UA mg/dl 100 (1/10%)*   KETONES UA mg/dl Negative   BLOOD UA  Small*   PROTEIN UA mg/dl Negative   NITRITE UA  Negative   BILIRUBIN UA  Negative   UROBILINOGEN UA E U /dl 0 2   LEUKOCYTES UA  Negative   WBC UA /hpf 2-4   RBC UA /hpf 2-4   BACTERIA UA /hpf Occasional   EPITHELIAL CELLS WET PREP /hpf None Seen     Results from last 7 days   Lab Units 09/17/22  1718   INFLUENZA A PCR  Negative   INFLUENZA B PCR  Negative   RSV PCR  Negative     Results from last 7 days   Lab Units 09/17/22  2224 09/17/22  2222 09/17/22  1749 09/17/22  1718   BLOOD CULTURE  No Growth at 72 hrs  No Growth at 72 hrs  No Growth at 48 hrs  No Growth at 48 hrs                   Medications:   Scheduled Medications:  acetaminophen, 975 mg, Oral, Q8H [address]  atorvastatin, 10 mg, Oral, Daily  budesonide-formoterol, 2 puff, Inhalation, BID  carvedilol, 12 5 mg, Oral, BID With Meals  digoxin, 125 mcg, Oral, Daily  furosemide, 40 mg, Intravenous, BID (diuretic)  gabapentin, 100 mg, Oral, TID  heparin (porcine), 5,000 Units, Subcutaneous, Q8H [address]  insulin lispro, 1-5 Units, Subcutaneous, Q6H DONTRELL  lidocaine, 1 patch, Topical, Daily  melatonin, 6 mg, Oral, HS  methocarbamol, 500 mg, Oral, Q6H DONTRLEL  pantoprazole, 40 mg, Oral, BID AC  polyethylene glycol, 17 g, Oral, BID  senna, 1 tablet, Oral, BID    Continuous IV Infusions:    none    PRN Meds:  albuterol, 2 puff, Inhalation, Q4H PRN  bisacodyl, 10 mg, Rectal, Daily PRN  HYDROmorphone, 0 2 mg, Intravenous, Q4H PRN  oxyCODONE, 2 5 mg, Oral, Q4H PRN  oxyCODONE, 5 mg, Oral, Q4H PRN  polyethylene glycol, 17 g, Oral, Daily PRN        Discharge Plan: TBD, remains in critical care    Network Utilization Review Department  ATTENTION: Please call with any questions or concerns to [phone] and carefully listen to the prompts so that you are directed to the right person  All voicemails are confidential   Tory Martini all requests for admission clinical reviews, approved or denied determinations and any other requests to dedicated fax number below belonging to the campus where the patient is receiving treatment   List of dedicated fax numbers for the Facilities:  [address] DENIALS (Administrative/Medical Necessity) [phone]   [address] (Maternity/NICU/Pediatrics) [phone]   [address]  [address] [facility] [place] [address] [id number] [address] [place] [address] [facility] [place] [address] [phone]

## 2022-09-21 NOTE — QUICK NOTE
Discussed patient and plan with attending physician Dr lIsa Long  Stable, and okay to start [facility] from GI perspective  GI to sign off at this time, please call us back with any questions or concerns      Hans Welch MD  PGY-4 Gastroenterology Fellow

## 2022-09-21 NOTE — PLAN OF CARE
Problem: MOBILITY - ADULT  Goal: Maintain or return to baseline ADL function  Description: INTERVENTIONS:  -  Assess patient's ability to carry out ADLs; assess patient's baseline for ADL function and identify physical deficits which impact ability to perform ADLs (bathing, care of mouth/teeth, toileting, grooming, dressing, etc )  - Assess/evaluate cause of self-care deficits   - Assess range of motion  - Assess patient's mobility; develop plan if impaired  - Assess patient's need for assistive devices and provide as appropriate  - Encourage maximum independence but intervene and supervise when necessary  - Involve family in performance of ADLs  - Assess for home care needs following discharge   - Consider OT consult to assist with ADL evaluation and planning for discharge  - Provide patient education as appropriate  Outcome: Progressing  Goal: Maintains/Returns to pre admission functional level  Description: INTERVENTIONS:  - Perform BMAT or MOVE assessment daily    - Set and communicate daily mobility goal to care team and patient/family/caregiver  - Collaborate with rehabilitation services on mobility goals if consulted  - Perform Range of Motion 3 times a day  - Reposition patient every 2 hours    - Dangle patient 3 times a day  - Stand patient 3 times a day  - Ambulate patient 3 times a day  - Out of bed to chair 3 times a day   - Out of bed for meals 3 times a day  - Out of bed for toileting  - Record patient progress and toleration of activity level   Outcome: Progressing     Problem: Potential for Falls  Goal: Patient will remain free of falls  Description: INTERVENTIONS:  -  Assess patient's ability to carry out ADLs; assess patient's baseline for ADL function and identify physical deficits which impact ability to perform ADLs (bathing, care of mouth/teeth, toileting, grooming, dressing, etc )  - Assess/evaluate cause of self-care deficits   - Assess range of motion  - Assess patient's mobility; develop plan if impaired  - Assess patient's need for assistive devices and provide as appropriate  - Encourage maximum independence but intervene and supervise when necessary  - Involve family in performance of ADLs  - Assess for home care needs following discharge   - Consider OT consult to assist with ADL evaluation and planning for discharge  - Provide patient education as appropriate  Outcome: Progressing     Problem: Prexisting or High Potential for Compromised Skin Integrity  Goal: Skin integrity is maintained or improved  Description: INTERVENTIONS:  - Identify patients at risk for skin breakdown  - Assess and monitor skin integrity  - Assess and monitor nutrition and hydration status  - Monitor labs   - Assess for incontinence   - Turn and reposition patient  - Assist with mobility/ambulation  - Relieve pressure over bony prominences  - Avoid friction and shearing  - Provide appropriate hygiene as needed including keeping skin clean and dry  - Evaluate need for skin moisturizer/barrier cream  - Collaborate with interdisciplinary team   - Patient/family teaching  - Consider wound care consult   Outcome: Progressing     Problem: PAIN - ADULT  Goal: Verbalizes/displays adequate comfort level or baseline comfort level  Description: Interventions:  - Encourage patient to monitor pain and request assistance  - Assess pain using appropriate pain scale  - Administer analgesics based on type and severity of pain and evaluate response  - Implement non-pharmacological measures as appropriate and evaluate response  - Consider cultural and social influences on pain and pain management  - Notify physician/advanced practitioner if interventions unsuccessful or patient reports new pain  Outcome: Progressing     Problem: INFECTION - ADULT  Goal: Absence or prevention of progression during hospitalization  Description: INTERVENTIONS:  - Assess and monitor for signs and symptoms of infection  - Monitor lab/diagnostic results  - Monitor all insertion sites, i e  indwelling lines, tubes, and drains  - Monitor endotracheal if appropriate and nasal secretions for changes in amount and color  - [place] appropriate cooling/warming therapies per order  - Administer medications as ordered  - Instruct and encourage patient and family to use good hand hygiene technique  - Identify and instruct in appropriate isolation precautions for identified infection/condition  Outcome: Progressing     Problem: SAFETY ADULT  Goal: Maintain or return to baseline ADL function  Description: INTERVENTIONS:  -  Assess patient's ability to carry out ADLs; assess patient's baseline for ADL function and identify physical deficits which impact ability to perform ADLs (bathing, care of mouth/teeth, toileting, grooming, dressing, etc )  - Assess/evaluate cause of self-care deficits   - Assess range of motion  - Assess patient's mobility; develop plan if impaired  - Assess patient's need for assistive devices and provide as appropriate  - Encourage maximum independence but intervene and supervise when necessary  - Involve family in performance of ADLs  - Assess for home care needs following discharge   - Consider OT consult to assist with ADL evaluation and planning for discharge  - Provide patient education as appropriate  Outcome: Progressing  Goal: Maintains/Returns to pre admission functional level  Description: INTERVENTIONS:  - Perform BMAT or MOVE assessment daily    - Set and communicate daily mobility goal to care team and patient/family/caregiver  - Collaborate with rehabilitation services on mobility goals if consulted  - Perform Range of Motion 3 times a day  - Reposition patient every 2 hours    - Dangle patient 3 times a day  - Stand patient 3 times a day  - Ambulate patient 3 times a day  - Out of bed to chair 3 times a day   - Out of bed for meals 3 times a day  - Out of bed for toileting  - Record patient progress and toleration of activity level   Outcome: Progressing  Goal: Patient will remain free of falls  Description: INTERVENTIONS:  -  Assess patient's ability to carry out ADLs; assess patient's baseline for ADL function and identify physical deficits which impact ability to perform ADLs (bathing, care of mouth/teeth, toileting, grooming, dressing, etc )  - Assess/evaluate cause of self-care deficits   - Assess range of motion  - Assess patient's mobility; develop plan if impaired  - Assess patient's need for assistive devices and provide as appropriate  - Encourage maximum independence but intervene and supervise when necessary  - Involve family in performance of ADLs  - Assess for home care needs following discharge   - Consider OT consult to assist with ADL evaluation and planning for discharge  - Provide patient education as appropriate  Outcome: Progressing     Problem: DISCHARGE PLANNING  Goal: Discharge to home or other facility with appropriate resources  Description: INTERVENTIONS:  - Identify barriers to discharge w/patient and caregiver  - Arrange for needed discharge resources and transportation as appropriate  - Identify discharge learning needs (meds, wound care, etc )  - Arrange for interpretive services to assist at discharge as needed  - Refer to Case Management Department for coordinating discharge planning if the patient needs post-hospital services based on physician/advanced practitioner order or complex needs related to functional status, cognitive ability, or social support system  Outcome: Progressing     Problem: Knowledge Deficit  Goal: Patient/family/caregiver demonstrates understanding of disease process, treatment plan, medications, and discharge instructions  Description: Complete learning assessment and assess knowledge base    Interventions:  - Provide teaching at level of understanding  - Provide teaching via preferred learning methods  Outcome: Progressing     Problem: Nutrition/Hydration-ADULT  Goal: Nutrient/Hydration intake appropriate for improving, restoring or maintaining nutritional needs  Description: Monitor and assess patient's nutrition/hydration status for malnutrition  Collaborate with interdisciplinary team and initiate plan and interventions as ordered  Monitor patient's weight and dietary intake as ordered or per policy  Utilize nutrition screening tool and intervene as necessary  Determine patient's food preferences and provide high-protein, high-caloric foods as appropriate       INTERVENTIONS:  - Monitor oral intake, urinary output, labs, and treatment plans  - Assess nutrition and hydration status and recommend course of action  - Evaluate amount of meals eaten  - Assist patient with eating if necessary   - Allow adequate time for meals  - Recommend/ encourage appropriate diets, oral nutritional supplements, and vitamin/mineral supplements  - Order, calculate, and assess calorie counts as needed  - Recommend, monitor, and adjust tube feedings and TPN/PPN based on assessed needs  - Assess need for intravenous fluids  - Provide specific nutrition/hydration education as appropriate  - Include patient/family/caregiver in decisions related to nutrition  Outcome: Progressing

## 2022-09-21 NOTE — PROGRESS NOTES
Daily Progress Note - Critical Care   Davina Atkinson 79 y o  female MRN: [id number]  [facility] Encounter: [id number]        ----------------------------------------------------------------------------------------  HPI/24hr events: Davina Atkinson is a 79 y o  female who presented to [address] [place] [address] with pre-syncopal fall from standing with negative, negative LOC  Found to have T11 vertebral body fracture with narrowing of the spinal canal at the T11 level  Presented to ED with hemoglobin of 5 6 given 2u PRBCs  Given Kcentra and Vit K for INR of 4 6       PMHx includes CKD 4, HFrEF-last EF of 40% w/ BiV dysfunction, moderate pulm HTN, CKD 4, DM2, Afib on Coumadin, Pacemaker status, COPD/XIOMARA on CPAP at home, hx of colon ca s/p colectomy  24h: Cardiology following and patient had various cardiac medications restarted, was getting BID IV lasix 40mg and diuresed well with -1 6L/24h and -3 6L since admission  Hb stable on floor and GI performed EGD which was wnl and flex sig which did not visualize any concerning areas but was incomplete due to stool  Developed respiratory distress on trauma floor and was upgraded back to ICU for BiPAP needs  ---------------------------------------------------------------------------------------  SUBJECTIVE  SOB improved with BiPAP support  Review of Systems   Constitutional: Negative for chills and fever  Respiratory: Positive for shortness of breath  Negative for chest tightness and wheezing  Cardiovascular: Negative for chest pain  Gastrointestinal: Negative for abdominal distention and abdominal pain  Musculoskeletal: Positive for back pain       Review of systems was reviewed and negative unless stated above in HPI/24-hour events   ---------------------------------------------------------------------------------------  Assessment and Plan:    Neuro:    Diagnosis: T 11 fx w/ canal narrowing, PAD  o Plan:   o Non-op per neurosx  o Continue TLSO brace, Upright CXR  o Space NC to q4h  o Melatonin qHS  o Multimodal analgesia w/ Robaxin/neutontin/tylenol ATC    CV:   · Diagnosis: Heart failure with reduced ejection fraction-40% with biventricular dysfunction, moderate pulmonary hypertension, AFib on Coumadin, status post pacemaker, HLD  o Plan:   o Currently holding home entresto, Dilt XR 180mg, Cired 25mg BID, ASA 81 daily  - Restarted Dig 125mcg daily, Coreg 12 5mg BID,   o Continue lipitor 10mg daily  o Continue Lasix 40IV BID   o Continue BiPAP support as needed      Pulm:   Diagnosis: AHRF, COPD/XIOMARA   o Plan:   o Continue Symbicort inhaler, transition to nebs if not able to wean BiPAP  o Currently on 14/8/1 0 for goal sat >90  o POCUS to evaulate for B lines, pleural effusions  o Consider thoracentesis as CXR c/w pleural effusions and patient has been diuresing well and is negative for admission    GI:   · Diagnosis: Upper GI bleed, melena, history of colon cancer status post transverse colon resection, constipation  o Plan:   o 2 week hx of melanotic stools PTA, likely chronic GIB w/ Hb of 5 6 while hemodynamically stable  o Given 2u PRBCs on admission, no further hemodynamic issues or transfusion needs  o Given Kcentra/Vit K for supratherapeutic INR reversal   o GI consulted and signed off  o EGD and flex sig negative but incomplete flex sig given stool burden, consider re-consulting if persistent melanotic stool  o Consider bowel prep vs aggressive Bowel regimen    :    Diagnosis: KAYLEE on CKD  o Plan:   o 1 81 from 1 9 on admission  o Baseline Cr 1 2-1 4  o Diuretic dependence w/ Lasix 40 BID PO at home  o Continue Lasix 40 IV BID and Farxiga     F/E/N:    Plan:   o F: No IVF  o E: Replete PRN K>4 0, Phos>3 0, Mg>2 0  o N: Cardiac diet w/ 1 5L fluid restriction      Heme/Onc:    Diagnosis: Anemia  o Plan:   o Hb stable at 8 4  o Transfuse PRN    Endo:    Diagnosis: NIDDM  o Plan:   o Continue ISS for goal -180    ID:    Diagnosis: No acute issues  o Plan:   o Trend fever/wbc curve  o Blood cx NGTD in 72hr    MSK/Skin:   · Diagnosis: T12 fracture, multiple skin tears, venous stasis ulcers  o Plan:   o Wound care f/u for skin tears and venous stasis ulcerations  o TLSO when out of bed, maintain C-spine precautions   o Continue daily dressing changes to UE wound    Patient appropriate for transfer out of the ICU today?: No  Disposition: Continue Critical Care   Code Status: Level 1 - Full Code  ---------------------------------------------------------------------------------------  ICU CORE MEASURES    Prophylaxis   VTE Pharmacologic Prophylaxis: Heparin  VTE Mechanical Prophylaxis: sequential compression device  Stress Ulcer Prophylaxis: Pantoprazole IV     ABCDE Protocol (if indicated)  CAM-ICU: Negative    Invasive Devices Review  Invasive Devices  Report    Peripheral Intravenous Line  Duration           Peripheral IV 22 Right Antecubital 1 day    Peripheral IV 22 Dorsal (posterior); Left Hand <1 day          Drain  Duration           External Urinary Catheter 2 days              Can any invasive devices be discontinued today?  No  ---------------------------------------------------------------------------------------  OBJECTIVE    Vitals   Vitals:    22 0821 22 1207 22 1249 22 1250   BP: 127/55 (!) 119/45 (!) 120/46 (!) 120/46   Pulse: 70 70 65 64   Resp: 18 16  (!) 28   Temp: 97 5 °F (36 4 °C) 97 5 °F (36 4 °C)     TempSrc:       SpO2: 91% (!) 85% (!) 88% 90%   Weight:       Height:         Temp (24hrs), Av 7 °F (36 5 °C), Min:97 5 °F (36 4 °C), Max:98 5 °F (36 9 °C)  Current: Temperature: 97 5 °F (36 4 °C)  Temp:  [97 5 °F (36 4 °C)-98 5 °F (36 9 °C)] 97 5 °F (36 4 °C)  HR:  [64-77] 64  Resp:  [16-28] 28  BP: (112-127)/(39-55) 120/46      Respiratory:  BiPAP  16/8/1 0    Invasive/non-invasive ventilation settings   Respiratory  Report   Lab Data (Last 4 hours)    None         O2/Vent Data (Last 4 hours) None                Physical Exam  Constitutional:       General: She is not in acute distress  Appearance: She is ill-appearing  Interventions: Face mask in place  Comments: Comfortable on BiPAP   HENT:      Head: Normocephalic and atraumatic  Eyes:      General: Lids are normal       Conjunctiva/sclera: Conjunctivae normal    Cardiovascular:      Rate and Rhythm: Normal rate and regular rhythm  Heart sounds: Normal heart sounds  Pulmonary:      Effort: Pulmonary effort is normal       Breath sounds: Examination of the right-middle field reveals decreased breath sounds  Examination of the right-lower field reveals decreased breath sounds  Examination of the left-lower field reveals decreased breath sounds  Decreased breath sounds present  Abdominal:      General: Abdomen is flat  There is no distension  Palpations: Abdomen is soft  Tenderness: There is no abdominal tenderness  Skin:     General: Skin is warm and dry  Capillary Refill: Capillary refill takes less than 2 seconds  Neurological:      General: No focal deficit present  Mental Status: She is alert and oriented to person, place, and time  GCS: GCS eye subscore is 4  GCS verbal subscore is 5  GCS motor subscore is 6  Psychiatric:         Behavior: Behavior is cooperative               Laboratory and Diagnostics:  Results from last 7 days   Lab Units 09/21/22  0623 09/20/22  0629 09/19/22  0532 09/18/22  1659 09/18/22  0342 09/17/22  2222 09/17/22  1718   WBC Thousand/uL 12 89* 13 30* 16 25*  --  20 53* 26 13* 22 11*   HEMOGLOBIN g/dL 8 4* 7 8* 7 3* 7 3* 7 7* 6 8* 5 7*   HEMATOCRIT % 27 8* 25 5* 24 2*  --  24 3* 21 8* 18 9*   PLATELETS Thousands/uL 153 149 136*  --  145* 173 210   NEUTROS PCT % 88* 88*  --   --   --   --   --    BANDS PCT %  --   --   --   --   --   --  3   MONOS PCT % 7 7  --   --   --   --   --    MONO PCT %  --   --   --   --   --  1* 1*     Results from last 7 days   Lab Units 09/21/22  6969 09/20/22  6747 09/19/22  0532 09/18/22  0342 09/17/22  2222 09/17/22  1718   SODIUM mmol/L 140 136 135 137 137 137   POTASSIUM mmol/L 4 0 3 9 3 9 4 1 3 8 3 7   CHLORIDE mmol/L 106 104 104 106 104 98   CO2 mmol/L 30 26 24 23 21 19*   ANION GAP mmol/L 4 6 7 8 12 20*   BUN mg/dL 120* 135* 131* 133* 139* 131*   CREATININE mg/dL 1 81* 1 89* 1 87* 1 64* 1 79* 1 98*   CALCIUM mg/dL 7 8* 8 1* 8 0* 8 7 8 4 8 3   GLUCOSE RANDOM mg/dL 132 116 171* 220* 228* 263*   ALT U/L  --   --   --  43 39 15   AST U/L  --   --   --  54* 44 15   ALK PHOS U/L  --   --   --  53 54 55   ALBUMIN g/dL  --   --   --  2 5* 2 5* 2 5*   TOTAL BILIRUBIN mg/dL  --   --   --  0 54 0 57 0 44     Results from last 7 days   Lab Units 09/18/22  0342 09/17/22 2222   MAGNESIUM mg/dL 2 8* 2 7*   PHOSPHORUS mg/dL 4 5* 4 8*      Results from last 7 days   Lab Units 09/17/22  2222 09/17/22  1718   INR  1 65* 4 66*   PTT seconds 34 62*          Results from last 7 days   Lab Units 09/18/22  0342 09/17/22 2222 09/17/22  1723   LACTIC ACID mmol/L 1 7 6 8* 10 6*     ABG:    VBG:  Results from last 7 days   Lab Units 09/17/22  1718   PH WENDY  7 236*   PCO2 WENDY mm Hg 39 2*   PO2 WENDY mm Hg 55 3*   HCO3 WENDY mmol/L 16 3*   BASE EXC WENDY mmol/L -10 3           Micro  Results from last 7 days   Lab Units 09/17/22  2224 09/17/22  2222 09/17/22  1749 09/17/22  1718   BLOOD CULTURE  No Growth at 72 hrs  No Growth at 72 hrs  No Growth at 48 hrs  No Growth at 48 hrs  EKG: NSR on tele, rate 86  Imaging:  I have personally reviewed pertinent films in PACS CXR from earlier w/ bibasilar atelectasis, b/l pleural effusions  Intake and Output  I/O       09/19 0701 09/20 0700 09/20 0701 09/21 0700 09/21 0701 09/22 0700    P  O  340 318 120    I V  (mL/kg)       IV Piggyback       Total Intake(mL/kg) 340 (3 2) 318 (3) 120 (1 1)    Urine (mL/kg/hr) 600 (0 2) 2400 (1) 1800 (2 8)    Stool       Total Output 600 2400 1800    Net -302 -4468 -7693 Unmeasured Urine Occurrence 0 x          UOP: 1 ml/kg/hr     Height and Weights   Height: 4' 10" (147 3 cm)     Body mass index is 48 32 kg/m²  Weight (last 2 days)     Date/Time Weight    09/20/22 0600 105 (231 2)            Nutrition       Diet Orders   (From admission, onward)             Start     Ordered    09/20/22 1251  Diet Cardiovascular; Sodium 2 GM; Fluid Restriction 1500 ML  Diet effective now        References:    Nutrtion Support Algorithm Enteral vs  Parenteral   Question Answer Comment   Diet Type Cardiovascular    Cardiac Sodium 2 GM    Other Restriction(s): Fluid Restriction 1500 ML    RD to adjust diet per protocol?  No        09/20/22 1251                Active Medications  Scheduled Meds:  Current Facility-Administered Medications   Medication Dose Route Frequency Provider Last Rate    acetaminophen  975 mg Oral Q8H [address] Zacharysabine Moreno      albuterol  2 puff Inhalation Q4H PRN Cherylin Proper      atorvastatin  10 mg Oral Daily Zacharybonita Moreno      bisacodyl  10 mg Rectal Daily PRN Cherylin Proper      budesonide-formoterol  2 puff Inhalation BID Cherylin Proper      carvedilol  12 5 mg Oral BID With Meals Cherylin Proper      digoxin  125 mcg Oral Daily Cherylin Proper      furosemide  40 mg Intravenous BID (diuretic) Cherylin Proper      gabapentin  100 mg Oral TID Cherylin Proper      heparin (porcine)  5,000 Units Subcutaneous Q8H [address] Zachary Juliana Peaks      HYDROmorphone  0 2 mg Intravenous Q4H PRN Cherylin Proper      insulin lispro  1-5 Units Subcutaneous Q6H [address] Zachary Moreno      lidocaine  1 patch Topical Daily Zachary Moreno      melatonin  6 mg Oral HS Zacharysabine Moreno      methocarbamol  500 mg Oral Q6H [address] Zachary Juliana Peaks      oxyCODONE  2 5 mg Oral Q4H PRN Cherylin Proper      oxyCODONE  5 mg Oral Q4H PRN Cherylin Proper      pantoprazole  40 mg Oral BID AC Zacharysabine Moreno      polyethylene glycol  17 g Oral BID Zacharybonita Moreno      polyethylene glycol  17 g Oral Daily PRN Kennedi Samantha      senna  1 tablet Oral BID Zachary Moreno       Continuous Infusions:     PRN Meds:   albuterol, 2 puff, Q4H PRN  bisacodyl, 10 mg, Daily PRN  HYDROmorphone, 0 2 mg, Q4H PRN  oxyCODONE, 2 5 mg, Q4H PRN  oxyCODONE, 5 mg, Q4H PRN  polyethylene glycol, 17 g, Daily PRN        Allergies   No Known Allergies  ---------------------------------------------------------------------------------------  Advance Directive and Living Will:      Power of :    POLST:    ---------------------------------------------------------------------------------------  Care Time Delivered:   Upon my evaluation, this patient had a high probability of imminent or life-threatening deterioration due to AHRF requiring BiPAP support, which required my direct attention, intervention, and personal management  I have personally provided 35 minutes (1300 to 1400) of critical care time, exclusive of procedures, teaching, family meetings, and any prior time recorded by providers other than myself  Ricardo Jacques PA-C      Portions of the record may have been created with voice recognition software  Occasional wrong word or "sound a like" substitutions may have occurred due to the inherent limitations of voice recognition software    Read the chart carefully and recognize, using context, where substitutions have occurred

## 2022-09-21 NOTE — NURSING NOTE
Pt placed on high flow nasal cannula  o2 SATs decreased  Md aware  will continue to monitor closely

## 2022-09-21 NOTE — PLAN OF CARE
Problem: PHYSICAL THERAPY ADULT  Goal: Performs mobility at highest level of function for planned discharge setting  See evaluation for individualized goals  Description: Treatment/Interventions: Functional transfer training, LE strengthening/ROM, Therapeutic exercise, Endurance training, Patient/family training, Equipment eval/education, Bed mobility, Spoke to nursing, Continued evaluation, OT  Equipment Recommended:  (continue to assess)       See flowsheet documentation for full assessment, interventions and recommendations  Note: Prognosis: Fair  Problem List: Decreased strength, Decreased range of motion, Decreased endurance, Impaired balance, Decreased mobility, Decreased cognition, Impaired judgement, Decreased safety awareness, Pain, Orthopedic restrictions  Assessment: Pt is 79 y o  female seen for PT evaluation s/p admit to [facility] on 9/17/2022  Two pt identifiers were used to confirm  Pt presented s/p fall out of chair at home  Patient originally presented at [address] [place] [address] and was transferred to [facility] for further medical management/ evaluation  Pt was admitted with a primary dx of:  Vertebral fracture, and other active problems including anemia, CHF, acute on chronic kidney disease, permanent AFib, COPD, systemic inflammatory response syndrome  Neurosurgery recommending TLSO brace when out of bed  PT now consulted for assessment of mobility and d/c needs  Pts current co morbidities affecting treatment include:  Hip, cardiac pacemaker, CHF, COPD, DM, HTN, high cholesterol, hyperparathyroidism,CKD, and personal factors including living alone   Pts current clinical presentation is Unstable/ Unpredictable (high complexity) due to Ongoing medical management for primary dx, Decreased activity tolerance compared to baseline, Fall risk, Increased assistance needed from caregiver at current time, Cog status, Spinal precautions at current time, Continuous pulse oximetry monitoring , requiring O2 via NC    Upon evaluation, pt currently is requiring Max Ax2 for bed mobility; Max Ax2 for si <-> stand transfer  Pt presents at PT eval functioning below baseline and currently w/ overall mobility deficits 2* to: BLE weakness, decreased ROM, impaired balance, decreased endurance, pain, decreased activity tolerance compared to baseline, fall risk, decreased skin integrity, spinal precautions  Pt currently at a fall risk 2* to impairments listed above  Based on the aforementioned PT evaluation, pt will continue to benefit from skilled Acute PT interventions to address stated impairments; to maximize functional mobility; for ongoing pt/ family training; and DME needs  At conclusion of PT session pt returned BTB and bed alarm engaged with phone and call bell within reach  Pt denies any further questions at this time  PT is currently recommending Rehab  PT will continue to follow during hospital stay  PT Discharge Recommendation: Post acute rehabilitation services    See flowsheet documentation for full assessment

## 2022-09-22 ENCOUNTER — APPOINTMENT (OUTPATIENT)
Dept: RADIOLOGY | Facility: HOSPITAL | Age: 82
DRG: 377 | End: 2022-09-22
Payer: COMMERCIAL

## 2022-09-22 LAB
ANION GAP SERPL CALCULATED.3IONS-SCNC: 5 MMOL/L (ref 4–13)
ANION GAP SERPL CALCULATED.3IONS-SCNC: 5 MMOL/L (ref 4–13)
BASE EXCESS BLDA CALC-SCNC: 10 MMOL/L (ref -2–3)
BASOPHILS # BLD AUTO: 0.01 THOUSANDS/ΜL (ref 0–0.1)
BASOPHILS NFR BLD AUTO: 0 % (ref 0–1)
BUN SERPL-MCNC: 105 MG/DL (ref 5–25)
BUN SERPL-MCNC: 111 MG/DL (ref 5–25)
CA-I BLD-SCNC: 1.14 MMOL/L (ref 1.12–1.32)
CALCIUM SERPL-MCNC: 8.1 MG/DL (ref 8.3–10.1)
CALCIUM SERPL-MCNC: 8.2 MG/DL (ref 8.3–10.1)
CHLORIDE SERPL-SCNC: 104 MMOL/L (ref 96–108)
CHLORIDE SERPL-SCNC: 105 MMOL/L (ref 96–108)
CO2 SERPL-SCNC: 33 MMOL/L (ref 21–32)
CO2 SERPL-SCNC: 34 MMOL/L (ref 21–32)
CREAT SERPL-MCNC: 1.39 MG/DL (ref 0.6–1.3)
CREAT SERPL-MCNC: 1.46 MG/DL (ref 0.6–1.3)
EOSINOPHIL # BLD AUTO: 0.01 THOUSAND/ΜL (ref 0–0.61)
EOSINOPHIL NFR BLD AUTO: 0 % (ref 0–6)
ERYTHROCYTE [DISTWIDTH] IN BLOOD BY AUTOMATED COUNT: 20.7 % (ref 11.6–15.1)
GFR SERPL CREATININE-BSD FRML MDRD: 33 ML/MIN/1.73SQ M
GFR SERPL CREATININE-BSD FRML MDRD: 35 ML/MIN/1.73SQ M
GLUCOSE SERPL-MCNC: 136 MG/DL (ref 65–140)
GLUCOSE SERPL-MCNC: 136 MG/DL (ref 65–140)
GLUCOSE SERPL-MCNC: 143 MG/DL (ref 65–140)
GLUCOSE SERPL-MCNC: 148 MG/DL (ref 65–140)
GLUCOSE SERPL-MCNC: 152 MG/DL (ref 65–140)
GLUCOSE SERPL-MCNC: 169 MG/DL (ref 65–140)
GLUCOSE SERPL-MCNC: 246 MG/DL (ref 65–140)
HCO3 BLDA-SCNC: 37.6 MMOL/L (ref 24–30)
HCT VFR BLD AUTO: 28.7 % (ref 34.8–46.1)
HCT VFR BLD CALC: 26 % (ref 34.8–46.1)
HGB BLD-MCNC: 8.8 G/DL (ref 11.5–15.4)
HGB BLDA-MCNC: 8.8 G/DL (ref 11.5–15.4)
IMM GRANULOCYTES # BLD AUTO: 0.15 THOUSAND/UL (ref 0–0.2)
IMM GRANULOCYTES NFR BLD AUTO: 1 % (ref 0–2)
LYMPHOCYTES # BLD AUTO: 0.29 THOUSANDS/ΜL (ref 0.6–4.47)
LYMPHOCYTES NFR BLD AUTO: 2 % (ref 14–44)
MAGNESIUM SERPL-MCNC: 2.9 MG/DL (ref 1.6–2.6)
MCH RBC QN AUTO: 31.1 PG (ref 26.8–34.3)
MCHC RBC AUTO-ENTMCNC: 30.7 G/DL (ref 31.4–37.4)
MCV RBC AUTO: 101 FL (ref 82–98)
MONOCYTES # BLD AUTO: 0.86 THOUSAND/ΜL (ref 0.17–1.22)
MONOCYTES NFR BLD AUTO: 5 % (ref 4–12)
NEUTROPHILS # BLD AUTO: 15.67 THOUSANDS/ΜL (ref 1.85–7.62)
NEUTS SEG NFR BLD AUTO: 92 % (ref 43–75)
NRBC BLD AUTO-RTO: 0 /100 WBCS
PCO2 BLD: 40 MMOL/L (ref 21–32)
PCO2 BLD: 75.8 MM HG (ref 42–50)
PH BLD: 7.3 [PH] (ref 7.3–7.4)
PLATELET # BLD AUTO: 137 THOUSANDS/UL (ref 149–390)
PMV BLD AUTO: 10.3 FL (ref 8.9–12.7)
PO2 BLD: 41 MM HG (ref 35–45)
POTASSIUM BLD-SCNC: 4 MMOL/L (ref 3.5–5.3)
POTASSIUM SERPL-SCNC: 3.5 MMOL/L (ref 3.5–5.3)
POTASSIUM SERPL-SCNC: 4.1 MMOL/L (ref 3.5–5.3)
RBC # BLD AUTO: 2.83 MILLION/UL (ref 3.81–5.12)
SAO2 % BLD FROM PO2: 68 % (ref 60–85)
SODIUM BLD-SCNC: 143 MMOL/L (ref 136–145)
SODIUM SERPL-SCNC: 143 MMOL/L (ref 135–147)
SODIUM SERPL-SCNC: 143 MMOL/L (ref 135–147)
SPECIMEN SOURCE: ABNORMAL
WBC # BLD AUTO: 16.99 THOUSAND/UL (ref 4.31–10.16)

## 2022-09-22 PROCEDURE — 94003 VENT MGMT INPAT SUBQ DAY: CPT

## 2022-09-22 PROCEDURE — 82803 BLOOD GASES ANY COMBINATION: CPT

## 2022-09-22 PROCEDURE — 99291 CRITICAL CARE FIRST HOUR: CPT | Performed by: EMERGENCY MEDICINE

## 2022-09-22 PROCEDURE — 82330 ASSAY OF CALCIUM: CPT

## 2022-09-22 PROCEDURE — 82947 ASSAY GLUCOSE BLOOD QUANT: CPT

## 2022-09-22 PROCEDURE — 99233 SBSQ HOSP IP/OBS HIGH 50: CPT | Performed by: INTERNAL MEDICINE

## 2022-09-22 PROCEDURE — 84132 ASSAY OF SERUM POTASSIUM: CPT

## 2022-09-22 PROCEDURE — 32555 ASPIRATE PLEURA W/ IMAGING: CPT | Performed by: EMERGENCY MEDICINE

## 2022-09-22 PROCEDURE — 94760 N-INVAS EAR/PLS OXIMETRY 1: CPT

## 2022-09-22 PROCEDURE — 80048 BASIC METABOLIC PNL TOTAL CA: CPT | Performed by: PHYSICIAN ASSISTANT

## 2022-09-22 PROCEDURE — 85025 COMPLETE CBC W/AUTO DIFF WBC: CPT

## 2022-09-22 PROCEDURE — 83735 ASSAY OF MAGNESIUM: CPT

## 2022-09-22 PROCEDURE — 71045 X-RAY EXAM CHEST 1 VIEW: CPT

## 2022-09-22 PROCEDURE — 84295 ASSAY OF SERUM SODIUM: CPT

## 2022-09-22 PROCEDURE — 94640 AIRWAY INHALATION TREATMENT: CPT

## 2022-09-22 PROCEDURE — 80048 BASIC METABOLIC PNL TOTAL CA: CPT

## 2022-09-22 PROCEDURE — NC001 PR NO CHARGE: Performed by: EMERGENCY MEDICINE

## 2022-09-22 PROCEDURE — 85014 HEMATOCRIT: CPT

## 2022-09-22 PROCEDURE — 82948 REAGENT STRIP/BLOOD GLUCOSE: CPT

## 2022-09-22 PROCEDURE — NC001 PR NO CHARGE

## 2022-09-22 RX ORDER — POTASSIUM CHLORIDE 14.9 MG/ML
20 INJECTION INTRAVENOUS ONCE
Status: DISCONTINUED | OUTPATIENT
Start: 2022-09-22 | End: 2022-09-22

## 2022-09-22 RX ORDER — AMOXICILLIN 250 MG
2 CAPSULE ORAL 2 TIMES DAILY
Status: DISCONTINUED | OUTPATIENT
Start: 2022-09-22 | End: 2022-09-24

## 2022-09-22 RX ORDER — ALBUTEROL SULFATE 2.5 MG/3ML
SOLUTION RESPIRATORY (INHALATION)
Status: COMPLETED
Start: 2022-09-22 | End: 2022-09-22

## 2022-09-22 RX ORDER — ALBUTEROL SULFATE 2.5 MG/3ML
2.5 SOLUTION RESPIRATORY (INHALATION) EVERY 4 HOURS PRN
Status: DISCONTINUED | OUTPATIENT
Start: 2022-09-22 | End: 2022-09-30 | Stop reason: HOSPADM

## 2022-09-22 RX ORDER — FUROSEMIDE 10 MG/ML
40 INJECTION INTRAMUSCULAR; INTRAVENOUS ONCE
Status: COMPLETED | OUTPATIENT
Start: 2022-09-22 | End: 2022-09-22

## 2022-09-22 RX ORDER — BISACODYL 10 MG
10 SUPPOSITORY, RECTAL RECTAL DAILY
Status: DISCONTINUED | OUTPATIENT
Start: 2022-09-22 | End: 2022-09-24

## 2022-09-22 RX ORDER — POTASSIUM CHLORIDE 20 MEQ/1
20 TABLET, EXTENDED RELEASE ORAL ONCE
Status: COMPLETED | OUTPATIENT
Start: 2022-09-22 | End: 2022-09-22

## 2022-09-22 RX ORDER — METOLAZONE 5 MG/1
5 TABLET ORAL ONCE
Status: COMPLETED | OUTPATIENT
Start: 2022-09-22 | End: 2022-09-22

## 2022-09-22 RX ORDER — FUROSEMIDE 10 MG/ML
40 INJECTION INTRAMUSCULAR; INTRAVENOUS EVERY 12 HOURS
Status: DISCONTINUED | OUTPATIENT
Start: 2022-09-22 | End: 2022-09-23

## 2022-09-22 RX ORDER — POTASSIUM CHLORIDE 20 MEQ/1
40 TABLET, EXTENDED RELEASE ORAL ONCE
Status: COMPLETED | OUTPATIENT
Start: 2022-09-22 | End: 2022-09-22

## 2022-09-22 RX ORDER — METOLAZONE 5 MG/1
5 TABLET ORAL ONCE
Status: DISCONTINUED | OUTPATIENT
Start: 2022-09-22 | End: 2022-09-22

## 2022-09-22 RX ORDER — DEXMEDETOMIDINE HYDROCHLORIDE 4 UG/ML
.1-.7 INJECTION, SOLUTION INTRAVENOUS
Status: DISCONTINUED | OUTPATIENT
Start: 2022-09-22 | End: 2022-09-23

## 2022-09-22 RX ADMIN — OXYCODONE HYDROCHLORIDE 5 MG: 5 TABLET ORAL at 09:00

## 2022-09-22 RX ADMIN — FUROSEMIDE 40 MG: 10 INJECTION, SOLUTION INTRAMUSCULAR; INTRAVENOUS at 14:59

## 2022-09-22 RX ADMIN — CARVEDILOL 12.5 MG: 12.5 TABLET, FILM COATED ORAL at 15:37

## 2022-09-22 RX ADMIN — BUDESONIDE AND FORMOTEROL FUMARATE DIHYDRATE 2 PUFF: 160; 4.5 AEROSOL RESPIRATORY (INHALATION) at 18:07

## 2022-09-22 RX ADMIN — LIDOCAINE 5% 1 PATCH: 700 PATCH TOPICAL at 08:54

## 2022-09-22 RX ADMIN — METHOCARBAMOL TABLETS 500 MG: 500 TABLET, COATED ORAL at 00:15

## 2022-09-22 RX ADMIN — DEXMEDETOMIDINE HYDROCHLORIDE 0.2 MCG/KG/HR: 400 INJECTION INTRAVENOUS at 20:36

## 2022-09-22 RX ADMIN — POTASSIUM CHLORIDE 40 MEQ: 1500 TABLET, EXTENDED RELEASE ORAL at 08:59

## 2022-09-22 RX ADMIN — ATORVASTATIN CALCIUM 10 MG: 10 TABLET, FILM COATED ORAL at 08:54

## 2022-09-22 RX ADMIN — GABAPENTIN 100 MG: 100 CAPSULE ORAL at 15:37

## 2022-09-22 RX ADMIN — ACETAMINOPHEN 975 MG: 325 TABLET, FILM COATED ORAL at 06:19

## 2022-09-22 RX ADMIN — METOLAZONE 5 MG: 5 TABLET ORAL at 22:04

## 2022-09-22 RX ADMIN — PANTOPRAZOLE SODIUM 40 MG: 40 TABLET, DELAYED RELEASE ORAL at 15:37

## 2022-09-22 RX ADMIN — SENNOSIDES AND DOCUSATE SODIUM 2 TABLET: 8.6; 5 TABLET ORAL at 08:54

## 2022-09-22 RX ADMIN — GABAPENTIN 100 MG: 100 CAPSULE ORAL at 08:54

## 2022-09-22 RX ADMIN — FUROSEMIDE 40 MG: 10 INJECTION, SOLUTION INTRAMUSCULAR; INTRAVENOUS at 20:30

## 2022-09-22 RX ADMIN — CARVEDILOL 12.5 MG: 12.5 TABLET, FILM COATED ORAL at 08:59

## 2022-09-22 RX ADMIN — HEPARIN SODIUM 5000 UNITS: 5000 INJECTION INTRAVENOUS; SUBCUTANEOUS at 14:59

## 2022-09-22 RX ADMIN — PANTOPRAZOLE SODIUM 40 MG: 40 TABLET, DELAYED RELEASE ORAL at 06:19

## 2022-09-22 RX ADMIN — DIGOXIN 125 MCG: 125 TABLET ORAL at 08:55

## 2022-09-22 RX ADMIN — ALBUTEROL SULFATE 2.5 MG: 2.5 SOLUTION RESPIRATORY (INHALATION) at 20:26

## 2022-09-22 RX ADMIN — BUDESONIDE AND FORMOTEROL FUMARATE DIHYDRATE 2 PUFF: 160; 4.5 AEROSOL RESPIRATORY (INHALATION) at 08:54

## 2022-09-22 RX ADMIN — METHOCARBAMOL TABLETS 500 MG: 500 TABLET, COATED ORAL at 06:19

## 2022-09-22 RX ADMIN — FUROSEMIDE 40 MG: 10 INJECTION, SOLUTION INTRAMUSCULAR; INTRAVENOUS at 08:55

## 2022-09-22 RX ADMIN — BISACODYL 10 MG: 10 SUPPOSITORY RECTAL at 11:32

## 2022-09-22 RX ADMIN — METHOCARBAMOL TABLETS 500 MG: 500 TABLET, COATED ORAL at 18:06

## 2022-09-22 RX ADMIN — SENNOSIDES AND DOCUSATE SODIUM 2 TABLET: 8.6; 5 TABLET ORAL at 18:06

## 2022-09-22 RX ADMIN — POLYETHYLENE GLYCOL 3350 17 G: 17 POWDER, FOR SOLUTION ORAL at 08:54

## 2022-09-22 RX ADMIN — METHOCARBAMOL TABLETS 500 MG: 500 TABLET, COATED ORAL at 11:32

## 2022-09-22 RX ADMIN — POTASSIUM CHLORIDE 20 MEQ: 1500 TABLET, EXTENDED RELEASE ORAL at 10:39

## 2022-09-22 RX ADMIN — INSULIN LISPRO 2 UNITS: 100 INJECTION, SOLUTION INTRAVENOUS; SUBCUTANEOUS at 11:34

## 2022-09-22 RX ADMIN — HEPARIN SODIUM 5000 UNITS: 5000 INJECTION INTRAVENOUS; SUBCUTANEOUS at 06:19

## 2022-09-22 RX ADMIN — POTASSIUM CHLORIDE 20 MEQ: 14.9 INJECTION, SOLUTION INTRAVENOUS at 09:00

## 2022-09-22 RX ADMIN — INSULIN LISPRO 1 UNITS: 100 INJECTION, SOLUTION INTRAVENOUS; SUBCUTANEOUS at 18:13

## 2022-09-22 NOTE — ACP (ADVANCE CARE PLANNING)
Critical Care Advanced Care Planning Note  Nadege Rondon 79 y o  female MRN: [id number]  Unit/Bed#: ICU 07 Encounter: [id number]    Nadege Rondon is a 79 y o  female requiring critical care evaluation and advanced care planning  The patient has chronic comorbidities, including but not limited to HFrEF, Afib, Pulm HTN, COPD/XIOMARA, Hx of colon cancer, which is now further complicated by the following acute conditions: T11 fx, Acute on chronic HFrEF, AHRF, UGIB  Due to the severity of the patient's acute condition and/or the extent of chronic conditions, additional conversations pertaining to advanced care planning were required  Today's discussion, which was held in a face-to-face meeting, included Patient, and it was established that all stake holders understood the rationale for the advanced care planning  The patient was able to participate in the discussion  Summary of Discussion: We reviewed Violette's clinical course and the severity of her heart failure and the decline in her respiratory function now requiring BiPAP support  We again revisited the possibility of intubation and what that would entail and the low likelihood of her surviving a prolonged course on a ventilator with her co-morbidities  The patient at this time decided that she would not want to suffer through the process of being intubated and would rather be transitioned to comfort focused care if her respiratory function declines to the point of requiring intubation  Total time spent, (15) minutes (1400 to 1415)        CODE STATUS: DNR/DNI - Level 3  POA:    POLST:        SIGNATURE: Randell Boxer  DATE: September 22, 2022  TIME: 5:28 PM

## 2022-09-22 NOTE — PROGRESS NOTES
General Cardiology   Progress Note -  [facility]   Abraham Adams 79 y o  female MRN: [id number]    Unit/Bed#: ICU 07 Encounter: [id number]    Assessment/ Plan       1  Acute on chronic hypoxic respiratory failure in the setting of acute on chronic systolic heart failure   Continue IV diuretics: furosemide 40 mg IV BID   Creatinine 1 46 today from 1 8  Continue 2 gram Na diet   Monitor I/Os - 3 9  L in 24 hours but no input documented   Daily weights  No weights today  Will d/w nursing   Echocardiogram 9/18/2022 showed EF 65%, LA severely dilated, RA dilated, mild AI, moderate AS, moderate MR and mild TR    Chest xray showed persistent moderate effusions and bibasilar atelectasis   Spoke with primary team  Plan for thoracentesis tomorrow pending oxygen requirements      2  Cardiomyopathy   S/p ICD  Recommend resuming home medications: Entresto  for GDMT prior to discharge pending renal function   Continue coreg      3  Acute on Chronic kidney disease stage III   Baseline creatinine 1 2-1 4  Creatinine 1 46 today      4  Chronic atrial fibrillation   On digoxin 125 mcg PO daily and coreg 12 5 mg PO BID  Patient takes coreg 25 mg PO BID at home but BP has been borderline at times    Recommend resume coumadin  Cleared by GI  Discussed recommendations with primary team       5  T11 Vertebral fracture  Followed by primary team and neurosurgery team   TLSO brace      6  Anemia   Hemoglobin 5 7 on admission   S/p transfusion   Hemoglobin 8 8 today     7  Hypokalemia   K 3 5   Replete           Subjective  Patient resting in chair  She is on HFNC  She reports lightheadedness  No chest pain, SOB or palpitations  No fever or chills  Review of Systems   Constitutional: Positive for malaise/fatigue  Negative for chills and fever  HENT: Negative for congestion  Cardiovascular: Negative for chest pain  Respiratory: Negative for shortness of breath  Musculoskeletal: Positive for arthritis, back pain and falls  Gastrointestinal: Negative for bloating, nausea and vomiting  Neurological: Negative for dizziness and light-headedness  Psychiatric/Behavioral: Negative for altered mental status  All other systems reviewed and are negative  Objective:   Vitals: Blood pressure 150/56, pulse 64, temperature 97 9 °F (36 6 °C), temperature source Oral, resp  rate 17, height 4' 10" (1 473 m), weight 105 kg (231 lb 3 2 oz), SpO2 93 %  ,       Body mass index is 48 32 kg/m²  ,     Systolic (72JBF), PYZ:642 , Min:114 , TIM:811     Diastolic (64HVO), MANOJ:83, Min:43, Max:70      Intake/Output Summary (Last 24 hours) at 9/22/2022 1010  Last data filed at 9/22/2022 0600  Gross per 24 hour   Intake 0 ml   Output 3545 ml   Net -3545 ml     Weight (last 2 days)     Date/Time Weight    09/20/22 0600 105 (231 2)        Physical Exam  Constitutional:       Appearance: She is obese  HENT:      Head: Normocephalic  Mouth/Throat:      Mouth: Mucous membranes are moist    Cardiovascular:      Rate and Rhythm: Normal rate  Rhythm irregular  Pulses: Normal pulses  Pulmonary:      Effort: Pulmonary effort is normal  No respiratory distress  Comments: HFNC   40L   Decreased in bases/Course  Abdominal:      General: Bowel sounds are normal       Palpations: Abdomen is soft  Musculoskeletal:         General: No swelling  Cervical back: Neck supple  Skin:     General: Skin is warm and dry  Capillary Refill: Capillary refill takes less than 2 seconds  Neurological:      Mental Status: She is alert and oriented to person, place, and time     Psychiatric:         Mood and Affect: Mood normal          LABORATORY RESULTS  Results from last 7 days   Lab Units 09/17/22  1718   CK TOTAL U/L 45     CBC with diff:   Results from last 7 days   Lab Units 09/22/22  0634 09/21/22  0623 09/20/22  0629 09/19/22  0532 09/18/22  1659 09/18/22  0342 09/17/22  2222 09/17/22  1718   WBC Thousand/uL 16 99* 12 89* 13 30* 16 25*  -- 20 53* 26 13* 22 11*   HEMOGLOBIN g/dL 8 8* 8 4* 7 8* 7 3* 7 3* 7 7* 6 8* 5 7*   HEMATOCRIT % 28 7* 27 8* 25 5* 24 2*  --  24 3* 21 8* 18 9*   MCV fL 101* 101* 100* 99*  --  95 97 100*   PLATELETS Thousands/uL 137* 153 149 136*  --  145* 173 210   MCH pg 31 1 30 4 30 6 29 9  --  30 2 30 4 30 0   MCHC g/dL 30 7* 30 2* 30 6* 30 2*  --  31 7 31 2* 30 2*   RDW % 20 7* 20 7* 20 2* 19 6*  --  17 5* 18 2* 19 9*   MPV fL 10 3 10 2 10 4 10 7  --  11 0 10 6 10 5   NRBC AUTO /100 WBCs 0 0 0  --   --  0  --   --    NRBC /100 WBC  --   --   --   --   --   --  1  --        CMP:  Results from last 7 days   Lab Units 09/22/22  0534 09/21/22 0623 09/20/22  0629 09/19/22  0532 09/18/22 0342 09/17/22 2222 09/17/22  1718   POTASSIUM mmol/L 3 5 4 0 3 9 3 9 4 1 3 8 3 7   CHLORIDE mmol/L 105 106 104 104 106 104 98   CO2 mmol/L 33* 30 26 24 23 21 19*   BUN mg/dL 111* 120* 135* 131* 133* 139* 131*   CREATININE mg/dL 1 46* 1 81* 1 89* 1 87* 1 64* 1 79* 1 98*   CALCIUM mg/dL 8 1* 7 8* 8 1* 8 0* 8 7 8 4 8 3   AST U/L  --   --   --   --  54* 44 15   ALT U/L  --   --   --   --  43 39 15   ALK PHOS U/L  --   --   --   --  53 54 55   EGFR ml/min/1 73sq m 33 25 24 24 28 26 23       BMP:  Results from last 7 days   Lab Units 09/22/22  0534 09/21/22  0623 09/20/22  0629 09/19/22  0532 09/18/22 0342 09/17/22 2222 09/17/22  1718   POTASSIUM mmol/L 3 5 4 0 3 9 3 9 4 1 3 8 3 7   CHLORIDE mmol/L 105 106 104 104 106 104 98   CO2 mmol/L 33* 30 26 24 23 21 19*   BUN mg/dL 111* 120* 135* 131* 133* 139* 131*   CREATININE mg/dL 1 46* 1 81* 1 89* 1 87* 1 64* 1 79* 1 98*   CALCIUM mg/dL 8 1* 7 8* 8 1* 8 0* 8 7 8 4 8 3       Lab Results   Component Value Date    NTBNP 2,236 (H) 09/17/2022    NTBNP 1,583 (H) 11/11/2021             Results from last 7 days   Lab Units 09/22/22  0534 09/18/22 0342 09/17/22 2222   MAGNESIUM mg/dL 2 9* 2 8* 2 7*                     Results from last 7 days   Lab Units 09/17/22  2222 09/17/22  1718   INR  1 65* 4 66*       Lipid Profile:   No results found for: CHOL  No results found for: HDL  No results found for: LDLCALC  No results found for: TRIG    Cardiac testing:   No results found for this or any previous visit  No results found for this or any previous visit  No results found for this or any previous visit  No valid procedures specified  No results found for this or any previous visit      Meds/Allergies   all current active meds have been reviewed and current meds:   Current Facility-Administered Medications   Medication Dose Route Frequency    acetaminophen (TYLENOL) tablet 975 mg  975 mg Oral Q8H [facility]    albuterol (PROVENTIL HFA,VENTOLIN HFA) inhaler 2 puff  2 puff Inhalation Q4H PRN    atorvastatin (LIPITOR) tablet 10 mg  10 mg Oral Daily    bisacodyl (DULCOLAX) rectal suppository 10 mg  10 mg Rectal Daily    budesonide-formoterol (SYMBICORT) 160-4 5 mcg/act inhaler 2 puff  2 puff Inhalation BID    carvedilol (COREG) tablet 12 5 mg  12 5 mg Oral BID With Meals    digoxin (LANOXIN) tablet 125 mcg  125 mcg Oral Daily    furosemide (LASIX) injection 40 mg  40 mg Intravenous BID (diuretic)    gabapentin (NEURONTIN) capsule 100 mg  100 mg Oral TID    heparin (porcine) subcutaneous injection 5,000 Units  5,000 Units Subcutaneous Q8H [facility]    HYDROmorphone HCl (DILAUDID) injection 0 2 mg  0 2 mg Intravenous Q4H PRN    insulin lispro (HumaLOG) 100 units/mL subcutaneous injection 1-5 Units  1-5 Units Subcutaneous Q6H [facility]    lidocaine (LIDODERM) 5 % patch 1 patch  1 patch Topical Daily    melatonin tablet 6 mg  6 mg Oral HS    methocarbamol (ROBAXIN) tablet 500 mg  500 mg Oral Q6H [facility]    oxyCODONE (ROXICODONE) IR tablet 2 5 mg  2 5 mg Oral Q4H PRN    oxyCODONE (ROXICODONE) IR tablet 5 mg  5 mg Oral Q4H PRN    pantoprazole (PROTONIX) EC tablet 40 mg  40 mg Oral BID AC    polyethylene glycol (MIRALAX) packet 17 g  17 g Oral BID    polyethylene glycol (MIRALAX) packet 17 g  17 g Oral Daily PRN    potassium chloride 20 mEq IVPB (premix)  20 mEq Intravenous Once    senna-docusate sodium (SENOKOT S) 8 6-50 mg per tablet 2 tablet  2 tablet Oral BID     Medications Prior to Admission   Medication    albuterol (2 5 mg/3 mL) 0 083 % nebulizer solution    albuterol (PROVENTIL HFA,VENTOLIN HFA) 90 mcg/act inhaler    allopurinol (ZYLOPRIM) 300 mg tablet    aspirin 81 mg chewable tablet    atorvastatin (LIPITOR) 10 mg tablet    budesonide-formoterol (SYMBICORT) 160-4 5 mcg/act inhaler    carvedilol (COREG) 25 mg tablet    cloNIDine (CATAPRES) 0 1 mg tablet    colchicine (COLCRYS) 0 6 mg tablet    digoxin (LANOXIN) 0 25 mg tablet    diltiazem (CARDIZEM CD) 180 mg 24 hr capsule    FERROUS SULFATE PO    furosemide (LASIX) 40 mg tablet    gabapentin (NEURONTIN) 100 mg capsule    guaiFENesin 400 mg    Magnesium 400 MG TABS    meclizine (ANTIVERT) 25 mg tablet    metolazone (ZAROXOLYN) 5 mg tablet    Multiple Vitamin (MULTIVITAMIN) capsule    pantoprazole (PROTONIX) 40 mg tablet    sacubitril-valsartan (ENTRESTO)  MG TABS    spironolactone (ALDACTONE) 25 mg tablet    warfarin (COUMADIN) 2 mg tablet    warfarin (COUMADIN) 7 5 mg tablet       Counseling / Coordination of Care  Total floor / unit time spent today 20 minutes  Greater than 50% of total time was spent with the patient and / or family counseling and / or coordination of care  ** Please Note: Dragon 360 Dictation voice to text software may have been used in the creation of this document   **

## 2022-09-22 NOTE — PROGRESS NOTES
Daily Progress Note - Critical Care   Quan Alfredo 79 y o  female MRN: [id number]  Unit/Bed#: ICU 07 Encounter: [id number]        ----------------------------------------------------------------------------------------  HPI/24hr events: Quan Alfredo is a 79 y o  female who presented to [facility] with pre-syncopal fall from standing with negative, negative LOC   Found to have T11 vertebral body fracture with narrowing of the spinal canal at the T11 level   Presented to ED with hemoglobin of 5 6 given 2u PRBCs  Given Kcentra and Vit K for INR of 4 6       PMHx includes CKD 4, HFrEF-last EF of 40% w/ BiV dysfunction, moderate pulm HTN, CKD 4, DM2, Afib on Coumadin, Pacemaker status, COPD/XIOMARA on CPAP at home, hx of colon ca s/p colectomy       24h: Tolerated BID IV lasix without issue  Weaned to HFNC and went back on BiPAP at night for rest   -3 775 L in 24h      ---------------------------------------------------------------------------------------  SUBJECTIVE  Notes improved WOB, stable mild midback pain  Denies other complaints  Review of Systems   Constitutional: Negative for chills and fever  Respiratory: Positive for shortness of breath  Negative for chest tightness  Cardiovascular: Negative for chest pain  Gastrointestinal: Negative for abdominal distention  Genitourinary: Negative for flank pain  Musculoskeletal: Positive for back pain  Negative for arthralgias  Neurological: Negative for headaches  Psychiatric/Behavioral: Negative for confusion  Review of systems was reviewed and negative unless stated above in HPI/24-hour events   ---------------------------------------------------------------------------------------  Assessment and Plan:    Neuro:   · Diagnosis: T 11 fx w/ canal narrowing, PAD  ? Plan:   ? Non-op per neurosx  ? Continue TLSO brace/  ? Space NC to q4h  ?  Melatonin qHS/  ? Multimodal analgesia w/ Robaxin/neutontin/tylenol ATC     CV:     · Diagnosis: Heart failure with reduced ejection fraction-40% with biventricular dysfunction, moderate pulmonary hypertension, AFib on Coumadin, status post pacemaker, HLD  ? Plan:   ? Currently holding home entresto, Dilt XR 180mg, Cired 25mg BID, ASA 81 daily  § Restarted Dig 125mcg daily, Coreg 12 5mg BID,   ? Continue lipitor 10mg daily  ? Continue Lasix 40IV BID until no longer requiring HFNC   ? Continue BiPAP support as needed  ? IF no continued melanotic stools then consider restarting home eliquis  ? HAS-BLED score is 4 which carries 8 9% risk of major bleeding and therefore may defer restarting DOAC atleast until PCP evaluation    Pulm:  · Diagnosis: AHRF, COPD/XIOMARA   ? Plan:   ? Wears 2L NC PRN at home  ? Continue Symbicort inhaler, transition to nebs if not able to wean BiPAP  ? HFNC in day time with BiPAP qHS  ? POCUS to evaulate for B lines, pleural effusions on 9/21 revealed small-mod sized L  Effusion and moderate R sided pleural effusion  ? Defer thoracentesis in setting of decreasing O2 requirements  ? Will require mobilization and IS/pulmonary toilet to improve atelectasis in conjunction w/ aggressive diuresis    GI:   · Diagnosis: Upper GI bleed, melena, history of colon cancer status post transverse colon resection, constipation  ? Plan:   ? 2 week hx of melanotic stools PTA, likely chronic GIB w/ Hb of 5 6 while hemodynamically stable  ? Given 2u PRBCs on admission, no further hemodynamic issues or transfusion needs  ? Given Kcentra/Vit K for supratherapeutic INR reversal   ? GI consulted and signed off  ? EGD and flex sig negative but incomplete flex sig given stool burden, consider re-consulting if persistent melanotic stool  ? Consider bowel prep vs aggressive Bowel regimen if no BM     :    Diagnosis: KAYLEE on CKD  o Plan:   o Cr   Down to 1 46 on AM check  o Baseline 1 2-1 4  o Diuretic dependence w/ lasix 40 BID PO and Farxiga at home  o Continue Lasix 40 IV BID     F/E/N:    Plan:   o F: No IVF  o E: Replete PRN K>4 0, Phos>3 0, Mg>2 0  o N: Cardiac diet w/ 1 5L fluid restriction    Heme/Onc:   · Diagnosis: Anemia  ? Plan:   ? Hb stable at 8 4  ? Transfuse PRN     Endo:   · Diagnosis: NIDDM  ? Plan:   ? Continue ISS for goal -180     ID:   · Diagnosis: No acute issues  ? Plan:   ? Trend fever/wbc curve  ? Blood cx NGTD in 72hr    MSK/Skin:   · Diagnosis: T12 fracture, multiple skin tears, venous stasis ulcers  ? Plan:   ? Wound care f/u for skin tears and venous stasis ulcerations  ? TLSO when out of bed, maintain T-spine precautions   ? Continue daily dressing changes to UE wound      Patient appropriate for transfer out of the ICU today?: No  Disposition: Continue Stepdown Level 1 level of care   Code Status: Level 1 - Full Code  ---------------------------------------------------------------------------------------  ICU CORE MEASURES    Prophylaxis   VTE Pharmacologic Prophylaxis: Heparin  VTE Mechanical Prophylaxis: sequential compression device  Stress Ulcer Prophylaxis: Pantoprazole IV     ABCDE Protocol (if indicated)  CAM-ICU: Negative    Invasive Devices Review  Invasive Devices  Report    Peripheral Intravenous Line  Duration           Peripheral IV 22 Right Antecubital 2 days    Peripheral IV 22 Dorsal (posterior); Left Hand <1 day          Drain  Duration           Urethral Catheter Non-latex 16 Fr  <1 day              Can any invasive devices be discontinued today?  No  ---------------------------------------------------------------------------------------  OBJECTIVE    Vitals   Vitals:    22 0300 22 0400 22 0500 22 0600   BP: (!) 114/48 127/53 121/55 (!) 144/44   BP Location:  Right arm     Pulse: 60 66 64 68   Resp: 18 20 22 17   Temp:  97 9 °F (36 6 °C)     TempSrc:  Oral     SpO2: 97% 95% 94% 94%   Weight:       Height:         Temp (24hrs), Av 8 °F (36 6 °C), Min:97 5 °F (36 4 °C), Max:98 2 °F (36 8 °C)  Current: Temperature: 97 9 °F (36 6 °C)  Temp:  [97 5 °F (36 4 °C)-98 2 °F (36 8 °C)] 97 9 °F (36 6 °C)  HR:  [56-70] 68  Resp:  [13-28] 17  BP: (114-148)/(43-70) 144/44  FiO2 (%):  [70-90] 90      Respiratory:  HFNC   60L/65%    Invasive/non-invasive ventilation settings   Respiratory  Report   Lab Data (Last 4 hours)    None         O2/Vent Data (Last 4 hours)    None                Physical Exam  Constitutional:       General: She is not in acute distress  HENT:      Head: Normocephalic and atraumatic  Cardiovascular:      Rate and Rhythm: Normal rate and regular rhythm  Pulmonary:      Effort: Pulmonary effort is normal       Breath sounds: Examination of the right-middle field reveals rhonchi  Examination of the right-lower field reveals rhonchi  Rhonchi present  Abdominal:      General: Abdomen is flat  There is no distension  Palpations: Abdomen is soft  Tenderness: There is no abdominal tenderness  Musculoskeletal:      Right lower le+ Pitting Edema present  Left lower le+ Pitting Edema present  Skin:     General: Skin is warm and dry  Capillary Refill: Capillary refill takes less than 2 seconds  Neurological:      Mental Status: She is alert  GCS: GCS eye subscore is 4  GCS verbal subscore is 5  GCS motor subscore is 6  Psychiatric:         Behavior: Behavior is cooperative               Laboratory and Diagnostics:  Results from last 7 days   Lab Units 22  0623 22  0629 22  0532 22  1659 22  0342 22  2222 22  1718   WBC Thousand/uL 12 89* 13 30* 16 25*  --  20 53* 26 13* 22 11*   HEMOGLOBIN g/dL 8 4* 7 8* 7 3* 7 3* 7 7* 6 8* 5 7*   HEMATOCRIT % 27 8* 25 5* 24 2*  --  24 3* 21 8* 18 9*   PLATELETS Thousands/uL 153 149 136*  --  145* 173 210   NEUTROS PCT % 88* 88*  --   --   --   --   --    BANDS PCT %  --   --   --   --   --   --  3   MONOS PCT % 7 7  --   --   --   --   --    MONO PCT %  --   --   --   --   --  1* 1*     Results from last 7 days   Lab Units 22  0564 09/20/22  8058 09/19/22  0532 09/18/22  0342 09/17/22  2222 09/17/22  1718   SODIUM mmol/L 140 136 135 137 137 137   POTASSIUM mmol/L 4 0 3 9 3 9 4 1 3 8 3 7   CHLORIDE mmol/L 106 104 104 106 104 98   CO2 mmol/L 30 26 24 23 21 19*   ANION GAP mmol/L 4 6 7 8 12 20*   BUN mg/dL 120* 135* 131* 133* 139* 131*   CREATININE mg/dL 1 81* 1 89* 1 87* 1 64* 1 79* 1 98*   CALCIUM mg/dL 7 8* 8 1* 8 0* 8 7 8 4 8 3   GLUCOSE RANDOM mg/dL 132 116 171* 220* 228* 263*   ALT U/L  --   --   --  43 39 15   AST U/L  --   --   --  54* 44 15   ALK PHOS U/L  --   --   --  53 54 55   ALBUMIN g/dL  --   --   --  2 5* 2 5* 2 5*   TOTAL BILIRUBIN mg/dL  --   --   --  0 54 0 57 0 44     Results from last 7 days   Lab Units 09/18/22  0342 09/17/22  2222   MAGNESIUM mg/dL 2 8* 2 7*   PHOSPHORUS mg/dL 4 5* 4 8*      Results from last 7 days   Lab Units 09/17/22  2222 09/17/22  1718   INR  1 65* 4 66*   PTT seconds 34 62*          Results from last 7 days   Lab Units 09/18/22  0342 09/17/22  2222 09/17/22  1723   LACTIC ACID mmol/L 1 7 6 8* 10 6*     ABG:    VBG:  Results from last 7 days   Lab Units 09/17/22  1718   PH WENDY  7 236*   PCO2 WENDY mm Hg 39 2*   PO2 WENDY mm Hg 55 3*   HCO3 WENDY mmol/L 16 3*   BASE EXC WENDY mmol/L -10 3           Micro  Results from last 7 days   Lab Units 09/17/22  2224 09/17/22  2222 09/17/22  1749 09/17/22  1718   BLOOD CULTURE  No Growth After 4 Days  No Growth After 4 Days  No Growth at 72 hrs  No Growth at 72 hrs  EKG: V-paced on tele, rate 62  Imaging:  I have personally reviewed pertinent reports  and I have personally reviewed pertinent films in PACS AM CXR pending     Intake and Output  I/O       09/20 0701  09/21 0700 09/21 0701 09/22 0700    P  O  318 120    Total Intake(mL/kg) 318 (3) 120 (1 1)    Urine (mL/kg/hr) 2400 (1) 3765 (1 5)    Total Output 2400 3765    Net -2082 -0889              UOP: 1 5 ml/kg/hr     Height and Weights   Height: 4' 10" (147 3 cm)     Body mass index is 48 32 kg/m²    Weight (last 2 days)     Date/Time Weight    09/20/22 0600 105 (231 2)            Nutrition       Diet Orders   (From admission, onward)             Start     Ordered    09/21/22 1307  Diet NPO; Sips with meds  Diet effective now        References:    Nutrtion Support Algorithm Enteral vs  Parenteral   Question Answer Comment   Diet Type NPO    NPO Except: Sips with meds    RD to adjust diet per protocol?  No        09/21/22 1307                  Active Medications  Scheduled Meds:  Current Facility-Administered Medications   Medication Dose Route Frequency Provider Last Rate    acetaminophen  975 mg Oral Q8H [address] Belkis Romero PA-C      albuterol  2 puff Inhalation Q4H PRN Belkis Romero PA-C      atorvastatin  10 mg Oral Daily Belkis Romero PA-C      bisacodyl  10 mg Rectal Daily PRN Belkis Romero PA-C      budesonide-formoterol  2 puff Inhalation BID Belkis Romero PA-C      carvedilol  12 5 mg Oral BID With Meals Belkis Romero PA-C      digoxin  125 mcg Oral Daily Belkis Romero PA-C      furosemide  40 mg Intravenous BID (diuretic) Belkis Romero PA-C      gabapentin  100 mg Oral TID Belkis Romero PA-C      heparin (porcine)  5,000 Units Subcutaneous [facility] Belkis Romero PA-C      HYDROmorphone  0 2 mg Intravenous Q4H PRN Belkis Romero PA-C      insulin lispro  1-5 Units Subcutaneous Q6H [address] Belkis Romero PA-C      lidocaine  1 patch Topical Daily Belkis Romero PA-C      melatonin  6 mg Oral HS Belkis Romero PA-C      methocarbamol  500 mg Oral Q6H [address] Belkis Romero PA-C      oxyCODONE  2 5 mg Oral Q4H PRN Belkis Romero PA-C      oxyCODONE  5 mg Oral Q4H PRN Belkis Romero PA-C      pantoprazole  40 mg Oral BID AC Niasmaryann Miranda PA-C      polyethylene glycol  17 g Oral BID Pierre Miranda PA-C      polyethylene glycol  17 g Oral Daily PRN Belkis Romero PA-C      senna  1 tablet Oral BID Angelica Miranda PA-C       Continuous Infusions:     PRN Meds:   albuterol, 2 puff, Q4H PRN  bisacodyl, 10 mg, Daily PRN  HYDROmorphone, 0 2 mg, Q4H PRN  oxyCODONE, 2 5 mg, Q4H PRN  oxyCODONE, 5 mg, Q4H PRN  polyethylene glycol, 17 g, Daily PRN        Allergies   No Known Allergies  ---------------------------------------------------------------------------------------  Advance Directive and Living Will:      Power of :    POLST:    ---------------------------------------------------------------------------------------  Care Time Delivered:   Upon my evaluation, this patient had a high probability of imminent or life-threatening deterioration due to Decompensated CHF, which required my direct attention, intervention, and personal management  I have personally provided 30 minutes (0600 to 0630) of critical care time, exclusive of procedures, teaching, family meetings, and any prior time recorded by providers other than myself  Suzette Hayes PA-C      Portions of the record may have been created with voice recognition software  Occasional wrong word or "sound a like" substitutions may have occurred due to the inherent limitations of voice recognition software    Read the chart carefully and recognize, using context, where substitutions have occurred

## 2022-09-22 NOTE — RESPIRATORY THERAPY NOTE
Resp Care   09/22/22 1251   Respiratory Assessment   Resp Comments Called to pt room for pt desatting, pt switched from [facility] to [facility]  Bipap settings of IPAP 16, EPAP 10, Fio2 60%, pt satting 94%  Will continue to monitor pt     Non-Invasive Information   O2 Interface Device Face mask   Non-Invasive Ventilation Mode BiPAP   $ Continous NIV Subsequent   SpO2 94 %   $ Pulse Oximetry Spot Check Charge Completed   Non-Invasive Settings   IPAP (cm) 16 cm   EPAP (cm) 10 cm   Rate (Set) 8   FiO2 (%) 60   Rise Time 3   Inspiratory Time (Set) 1   Temperature (Set) 3   Non-Invasive Readings   Skin Intervention Skin barrier applied   Total Rate 22   Peak Pressure (Obs) 17   Spontaneous Vt (mL) 378   Heater Temperature (Obs) 32   Leak (lpm) 4   Non-Invasive Alarms   Insp Pressure High (cm H20) 30   Insp Pressure Low (cm H20) 5   Low Insp Pressure Time (sec) 20 sec   Vt High (mL) 1000   Vt Low (mL) 250   High Resp Rate (BPM) 35 BPM   Low Resp Rate (BPM) 8 BPM   Maintenance   Water bag changed No

## 2022-09-22 NOTE — PLAN OF CARE
Problem: MOBILITY - ADULT  Goal: Maintain or return to baseline ADL function  Description: INTERVENTIONS:  -  Assess patient's ability to carry out ADLs; assess patient's baseline for ADL function and identify physical deficits which impact ability to perform ADLs (bathing, care of mouth/teeth, toileting, grooming, dressing, etc )  - Assess/evaluate cause of self-care deficits   - Assess range of motion  - Assess patient's mobility; develop plan if impaired  - Assess patient's need for assistive devices and provide as appropriate  - Encourage maximum independence but intervene and supervise when necessary  - Involve family in performance of ADLs  - Assess for home care needs following discharge   - Consider OT consult to assist with ADL evaluation and planning for discharge  - Provide patient education as appropriate  Outcome: Progressing  Goal: Maintains/Returns to pre admission functional level  Description: INTERVENTIONS:  - Perform BMAT or MOVE assessment daily    - Set and communicate daily mobility goal to care team and patient/family/caregiver  - Collaborate with rehabilitation services on mobility goals if consulted  - Perform Range of Motion 3 times a day  - Reposition patient every 2 hours    - Dangle patient 3 times a day  - Stand patient 3 times a day  - Ambulate patient 3 times a day  - Out of bed to chair 3 times a day   - Out of bed for meals 3 times a day  - Out of bed for toileting  - Record patient progress and toleration of activity level   Outcome: Progressing     Problem: Potential for Falls  Goal: Patient will remain free of falls  Description: INTERVENTIONS:  - Educate patient/family on patient safety including physical limitations  - Instruct patient to call for assistance with activity   - Consult OT/PT to assist with strengthening/mobility   - Keep Call bell within reach  - Keep bed low and locked with side rails adjusted as appropriate  - Keep care items and personal belongings within reach  - Initiate and maintain comfort rounds  - Make Fall Risk Sign visible to staff  - Apply yellow socks and bracelet for high fall risk patients  - Consider moving patient to room near nurses station  Outcome: Progressing     Problem: Prexisting or High Potential for Compromised Skin Integrity  Goal: Skin integrity is maintained or improved  Description: INTERVENTIONS:  - Identify patients at risk for skin breakdown  - Assess and monitor skin integrity  - Assess and monitor nutrition and hydration status  - Monitor labs   - Assess for incontinence   - Turn and reposition patient  - Assist with mobility/ambulation  - Relieve pressure over bony prominences  - Avoid friction and shearing  - Provide appropriate hygiene as needed including keeping skin clean and dry  - Evaluate need for skin moisturizer/barrier cream  - Collaborate with interdisciplinary team   - Patient/family teaching  - Consider wound care consult   Outcome: Progressing     Problem: PAIN - ADULT  Goal: Verbalizes/displays adequate comfort level or baseline comfort level  Description: Interventions:  - Encourage patient to monitor pain and request assistance  - Assess pain using appropriate pain scale  - Administer analgesics based on type and severity of pain and evaluate response  - Implement non-pharmacological measures as appropriate and evaluate response  - Consider cultural and social influences on pain and pain management  - Notify physician/advanced practitioner if interventions unsuccessful or patient reports new pain  Outcome: Progressing     Problem: INFECTION - ADULT  Goal: Absence or prevention of progression during hospitalization  Description: INTERVENTIONS:  - Assess and monitor for signs and symptoms of infection  - Monitor lab/diagnostic results  - Monitor all insertion sites, i e  indwelling lines, tubes, and drains  - Monitor endotracheal if appropriate and nasal secretions for changes in amount and color  - [place] appropriate cooling/warming therapies per order  - Administer medications as ordered  - Instruct and encourage patient and family to use good hand hygiene technique  - Identify and instruct in appropriate isolation precautions for identified infection/condition  Outcome: Progressing     Problem: SAFETY ADULT  Goal: Maintain or return to baseline ADL function  Description: INTERVENTIONS:  -  Assess patient's ability to carry out ADLs; assess patient's baseline for ADL function and identify physical deficits which impact ability to perform ADLs (bathing, care of mouth/teeth, toileting, grooming, dressing, etc )  - Assess/evaluate cause of self-care deficits   - Assess range of motion  - Assess patient's mobility; develop plan if impaired  - Assess patient's need for assistive devices and provide as appropriate  - Encourage maximum independence but intervene and supervise when necessary  - Involve family in performance of ADLs  - Assess for home care needs following discharge   - Consider OT consult to assist with ADL evaluation and planning for discharge  - Provide patient education as appropriate  Outcome: Progressing  Goal: Maintains/Returns to pre admission functional level  Description: INTERVENTIONS:  - Perform BMAT or MOVE assessment daily    - Set and communicate daily mobility goal to care team and patient/family/caregiver  - Collaborate with rehabilitation services on mobility goals if consulted  - Perform Range of Motion 3 times a day  - Reposition patient every 2 hours    - Dangle patient 3 times a day  - Stand patient 3 times a day  - Ambulate patient 3 times a day  - Out of bed to chair 3 times a day   - Out of bed for meals 3 times a day  - Out of bed for toileting  - Record patient progress and toleration of activity level   Outcome: Progressing  Goal: Patient will remain free of falls  Description: INTERVENTIONS:  - Educate patient/family on patient safety including physical limitations  - Instruct patient to call for assistance with activity   - Consult OT/PT to assist with strengthening/mobility   - Keep Call bell within reach  - Keep bed low and locked with side rails adjusted as appropriate  - Keep care items and personal belongings within reach  - Initiate and maintain comfort rounds  - Make Fall Risk Sign visible to staff  - Apply yellow socks and bracelet for high fall risk patients  - Consider moving patient to room near nurses station  Outcome: Progressing     Problem: DISCHARGE PLANNING  Goal: Discharge to home or other facility with appropriate resources  Description: INTERVENTIONS:  - Identify barriers to discharge w/patient and caregiver  - Arrange for needed discharge resources and transportation as appropriate  - Identify discharge learning needs (meds, wound care, etc )  - Arrange for interpretive services to assist at discharge as needed  - Refer to Case Management Department for coordinating discharge planning if the patient needs post-hospital services based on physician/advanced practitioner order or complex needs related to functional status, cognitive ability, or social support system  Outcome: Progressing     Problem: Knowledge Deficit  Goal: Patient/family/caregiver demonstrates understanding of disease process, treatment plan, medications, and discharge instructions  Description: Complete learning assessment and assess knowledge base  Interventions:  - Provide teaching at level of understanding  - Provide teaching via preferred learning methods  Outcome: Progressing     Problem: Nutrition/Hydration-ADULT  Goal: Nutrient/Hydration intake appropriate for improving, restoring or maintaining nutritional needs  Description: Monitor and assess patient's nutrition/hydration status for malnutrition  Collaborate with interdisciplinary team and initiate plan and interventions as ordered  Monitor patient's weight and dietary intake as ordered or per policy   Utilize nutrition screening tool and intervene as necessary  Determine patient's food preferences and provide high-protein, high-caloric foods as appropriate       INTERVENTIONS:  - Monitor oral intake, urinary output, labs, and treatment plans  - Assess nutrition and hydration status and recommend course of action  - Evaluate amount of meals eaten  - Assist patient with eating if necessary   - Allow adequate time for meals  - Recommend/ encourage appropriate diets, oral nutritional supplements, and vitamin/mineral supplements  - Order, calculate, and assess calorie counts as needed  - Recommend, monitor, and adjust tube feedings and TPN/PPN based on assessed needs  - Assess need for intravenous fluids  - Provide specific nutrition/hydration education as appropriate  - Include patient/family/caregiver in decisions related to nutrition  Outcome: Progressing

## 2022-09-22 NOTE — CASE MANAGEMENT
Case Management Discharge Planning Note    Patient name Kalli Dennis  Location ICU 07/ICU 07 MRN [id number]  : 1940 Date 2022       Current Admission Date: 2022  Current Admission Diagnosis:T11 vertebral fracture [facility])   Patient Active Problem List    Diagnosis Date Noted    Fall 2022    SIRS (systemic inflammatory response syndrome) ([facility] ) 2022    [facility]ronic obstructive [facility] disease[facility] 2022    Acute-on-chronic kidney injury ([facility] ) 2022    T11 vertebral fracture ([facility] ) 2022    Chronic heart failure ([facility] ) 2021    Permanent atrial fibrillation ([facility] ) 2021    Stage 3 chronic kidney disease ([facility] ) 2021    Anemia 2021      LOS (days): 5  Geometric Mean LOS (GMLOS) (days): 3 00  Days to GMLOS:-1 6     OBJECTIVE:  Risk of Unplanned Readmission Score: 24 42         Current admission status: Inpatient   Preferred Pharmacy:   [address], [address] [facility] [address] [place] [id number]  Phone: [phone] Fax: [phone]    Primary Care Provider: Stefano Quesada DO    Primary Insurance: [facility] [employer]  Secondary Insurance:     DISCHARGE DETAILS:    Pt remains in ICU at this time  Pt was accepted by [address] [place] [address]  Pt's preference is [facility]  Cm reserved them in Aidin and will keep them informed as to pt's progress

## 2022-09-23 ENCOUNTER — APPOINTMENT (OUTPATIENT)
Dept: RADIOLOGY | Facility: HOSPITAL | Age: 82
DRG: 377 | End: 2022-09-23
Payer: COMMERCIAL

## 2022-09-23 LAB
ANION GAP SERPL CALCULATED.3IONS-SCNC: 4 MMOL/L (ref 4–13)
APPEARANCE FLD: ABNORMAL
BACTERIA BLD CULT: NORMAL
BASE EX.OXY STD BLDV CALC-SCNC: 71.8 % (ref 60–80)
BASE EXCESS BLDV CALC-SCNC: 7.2 MMOL/L
BUN SERPL-MCNC: 105 MG/DL (ref 5–25)
CA-I BLD-SCNC: 1.05 MMOL/L (ref 1.12–1.32)
CALCIUM SERPL-MCNC: 8.4 MG/DL (ref 8.3–10.1)
CHLORIDE SERPL-SCNC: 104 MMOL/L (ref 96–108)
CO2 SERPL-SCNC: 33 MMOL/L (ref 21–32)
COLOR FLD: ABNORMAL
CREAT SERPL-MCNC: 1.56 MG/DL (ref 0.6–1.3)
DIGOXIN SERPL-MCNC: 1.7 NG/ML (ref 0.8–2)
ERYTHROCYTE [DISTWIDTH] IN BLOOD BY AUTOMATED COUNT: 20.3 % (ref 11.6–15.1)
GFR SERPL CREATININE-BSD FRML MDRD: 30 ML/MIN/1.73SQ M
GLUCOSE FLD-MCNC: 152 MG/DL
GLUCOSE SERPL-MCNC: 135 MG/DL (ref 65–140)
GLUCOSE SERPL-MCNC: 142 MG/DL (ref 65–140)
GLUCOSE SERPL-MCNC: 145 MG/DL (ref 65–140)
GLUCOSE SERPL-MCNC: 146 MG/DL (ref 65–140)
GLUCOSE SERPL-MCNC: 156 MG/DL (ref 65–140)
HCO3 BLDV-SCNC: 34.4 MMOL/L (ref 24–30)
HCT VFR BLD AUTO: 27.7 % (ref 34.8–46.1)
HGB BLD-MCNC: 8.4 G/DL (ref 11.5–15.4)
LDH FLD L TO P-CCNC: 735 U/L
LYMPHOCYTES NFR BLD AUTO: 29 %
MAGNESIUM SERPL-MCNC: 3 MG/DL (ref 1.6–2.6)
MCH RBC QN AUTO: 30.8 PG (ref 26.8–34.3)
MCHC RBC AUTO-ENTMCNC: 30.3 G/DL (ref 31.4–37.4)
MCV RBC AUTO: 102 FL (ref 82–98)
MONOCYTES NFR BLD AUTO: 6 %
NEUTS SEG NFR BLD AUTO: 65 %
O2 CT BLDV-SCNC: 9.6 ML/DL
PCO2 BLDV: 65.2 MM HG (ref 42–50)
PH BLDV: 7.34 [PH] (ref 7.3–7.4)
PH BODY FLUID: 7.8
PHOSPHATE SERPL-MCNC: 4.2 MG/DL (ref 2.3–4.1)
PLATELET # BLD AUTO: 155 THOUSANDS/UL (ref 149–390)
PMV BLD AUTO: 10.3 FL (ref 8.9–12.7)
PO2 BLDV: 40.7 MM HG (ref 35–45)
POTASSIUM SERPL-SCNC: 4.2 MMOL/L (ref 3.5–5.3)
PROT FLD-MCNC: 2 G/DL
RBC # BLD AUTO: 2.73 MILLION/UL (ref 3.81–5.12)
SITE: ABNORMAL
SODIUM SERPL-SCNC: 141 MMOL/L (ref 135–147)
TOTAL CELLS COUNTED SPEC: 100
WBC # BLD AUTO: 15.08 THOUSAND/UL (ref 4.31–10.16)
WBC # FLD MANUAL: 184 /UL

## 2022-09-23 PROCEDURE — 87205 SMEAR GRAM STAIN: CPT

## 2022-09-23 PROCEDURE — 94640 AIRWAY INHALATION TREATMENT: CPT

## 2022-09-23 PROCEDURE — 94660 CPAP INITIATION&MGMT: CPT

## 2022-09-23 PROCEDURE — 83986 ASSAY PH BODY FLUID NOS: CPT

## 2022-09-23 PROCEDURE — 80162 ASSAY OF DIGOXIN TOTAL: CPT

## 2022-09-23 PROCEDURE — 83615 LACTATE (LD) (LDH) ENZYME: CPT

## 2022-09-23 PROCEDURE — 80048 BASIC METABOLIC PNL TOTAL CA: CPT

## 2022-09-23 PROCEDURE — 82397 CHEMILUMINESCENT ASSAY: CPT

## 2022-09-23 PROCEDURE — 82805 BLOOD GASES W/O2 SATURATION: CPT

## 2022-09-23 PROCEDURE — 84157 ASSAY OF PROTEIN OTHER: CPT

## 2022-09-23 PROCEDURE — 82945 GLUCOSE OTHER FLUID: CPT

## 2022-09-23 PROCEDURE — 88305 TISSUE EXAM BY PATHOLOGIST: CPT | Performed by: PATHOLOGY

## 2022-09-23 PROCEDURE — NC001 PR NO CHARGE: Performed by: EMERGENCY MEDICINE

## 2022-09-23 PROCEDURE — 71045 X-RAY EXAM CHEST 1 VIEW: CPT

## 2022-09-23 PROCEDURE — 83735 ASSAY OF MAGNESIUM: CPT

## 2022-09-23 PROCEDURE — 85027 COMPLETE CBC AUTOMATED: CPT

## 2022-09-23 PROCEDURE — 99291 CRITICAL CARE FIRST HOUR: CPT

## 2022-09-23 PROCEDURE — 32555 ASPIRATE PLEURA W/ IMAGING: CPT | Performed by: EMERGENCY MEDICINE

## 2022-09-23 PROCEDURE — 94760 N-INVAS EAR/PLS OXIMETRY 1: CPT

## 2022-09-23 PROCEDURE — 88112 CYTOPATH CELL ENHANCE TECH: CPT | Performed by: PATHOLOGY

## 2022-09-23 PROCEDURE — 84100 ASSAY OF PHOSPHORUS: CPT

## 2022-09-23 PROCEDURE — 89051 BODY FLUID CELL COUNT: CPT

## 2022-09-23 PROCEDURE — 87070 CULTURE OTHR SPECIMN AEROBIC: CPT

## 2022-09-23 PROCEDURE — 82948 REAGENT STRIP/BLOOD GLUCOSE: CPT

## 2022-09-23 PROCEDURE — 99232 SBSQ HOSP IP/OBS MODERATE 35: CPT | Performed by: INTERNAL MEDICINE

## 2022-09-23 PROCEDURE — 82330 ASSAY OF CALCIUM: CPT

## 2022-09-23 RX ORDER — ALBUMIN (HUMAN) 12.5 G/50ML
25 SOLUTION INTRAVENOUS ONCE
Status: COMPLETED | OUTPATIENT
Start: 2022-09-23 | End: 2022-09-23

## 2022-09-23 RX ORDER — CALCIUM GLUCONATE 20 MG/ML
2 INJECTION, SOLUTION INTRAVENOUS ONCE
Status: COMPLETED | OUTPATIENT
Start: 2022-09-23 | End: 2022-09-23

## 2022-09-23 RX ORDER — LEVALBUTEROL 1.25 MG/.5ML
1.25 SOLUTION, CONCENTRATE RESPIRATORY (INHALATION)
Status: DISCONTINUED | OUTPATIENT
Start: 2022-09-23 | End: 2022-09-30 | Stop reason: HOSPADM

## 2022-09-23 RX ORDER — DIGOXIN 125 MCG
62.5 TABLET ORAL DAILY
Status: DISCONTINUED | OUTPATIENT
Start: 2022-09-24 | End: 2022-09-30 | Stop reason: HOSPADM

## 2022-09-23 RX ORDER — SODIUM PHOSPHATE, DIBASIC AND SODIUM PHOSPHATE, MONOBASIC 7; 19 G/133ML; G/133ML
1 ENEMA RECTAL DAILY PRN
Status: DISCONTINUED | OUTPATIENT
Start: 2022-09-23 | End: 2022-09-24

## 2022-09-23 RX ORDER — BUMETANIDE 0.25 MG/ML
2 INJECTION, SOLUTION INTRAMUSCULAR; INTRAVENOUS 2 TIMES DAILY
Status: DISCONTINUED | OUTPATIENT
Start: 2022-09-23 | End: 2022-09-24

## 2022-09-23 RX ADMIN — HEPARIN SODIUM 5000 UNITS: 5000 INJECTION INTRAVENOUS; SUBCUTANEOUS at 21:29

## 2022-09-23 RX ADMIN — ATORVASTATIN CALCIUM 10 MG: 10 TABLET, FILM COATED ORAL at 09:57

## 2022-09-23 RX ADMIN — LEVALBUTEROL HYDROCHLORIDE 1.25 MG: 1.25 SOLUTION, CONCENTRATE RESPIRATORY (INHALATION) at 13:52

## 2022-09-23 RX ADMIN — GABAPENTIN 100 MG: 100 CAPSULE ORAL at 16:29

## 2022-09-23 RX ADMIN — METHOCARBAMOL TABLETS 500 MG: 500 TABLET, COATED ORAL at 11:00

## 2022-09-23 RX ADMIN — BUMETANIDE 2 MG: 0.25 INJECTION INTRAMUSCULAR; INTRAVENOUS at 17:39

## 2022-09-23 RX ADMIN — SODIUM PHOSPHATE 1 ENEMA: 7; 19 ENEMA RECTAL at 15:12

## 2022-09-23 RX ADMIN — LIDOCAINE 5% 1 PATCH: 700 PATCH TOPICAL at 09:57

## 2022-09-23 RX ADMIN — OXYCODONE HYDROCHLORIDE 5 MG: 5 TABLET ORAL at 10:58

## 2022-09-23 RX ADMIN — BUMETANIDE 2 MG: 0.25 INJECTION INTRAMUSCULAR; INTRAVENOUS at 11:00

## 2022-09-23 RX ADMIN — SENNOSIDES AND DOCUSATE SODIUM 2 TABLET: 8.6; 5 TABLET ORAL at 09:57

## 2022-09-23 RX ADMIN — IPRATROPIUM BROMIDE 0.5 MG: 0.5 SOLUTION RESPIRATORY (INHALATION) at 10:14

## 2022-09-23 RX ADMIN — GABAPENTIN 100 MG: 100 CAPSULE ORAL at 21:29

## 2022-09-23 RX ADMIN — ACETAMINOPHEN 975 MG: 325 TABLET, FILM COATED ORAL at 14:59

## 2022-09-23 RX ADMIN — PANTOPRAZOLE SODIUM 40 MG: 40 TABLET, DELAYED RELEASE ORAL at 16:29

## 2022-09-23 RX ADMIN — LEVALBUTEROL HYDROCHLORIDE 1.25 MG: 1.25 SOLUTION, CONCENTRATE RESPIRATORY (INHALATION) at 10:14

## 2022-09-23 RX ADMIN — LEVALBUTEROL HYDROCHLORIDE 1.25 MG: 1.25 SOLUTION, CONCENTRATE RESPIRATORY (INHALATION) at 19:37

## 2022-09-23 RX ADMIN — POLYETHYLENE GLYCOL 3350 17 G: 17 POWDER, FOR SOLUTION ORAL at 21:29

## 2022-09-23 RX ADMIN — IPRATROPIUM BROMIDE 0.5 MG: 0.5 SOLUTION RESPIRATORY (INHALATION) at 13:52

## 2022-09-23 RX ADMIN — CARVEDILOL 12.5 MG: 12.5 TABLET, FILM COATED ORAL at 10:04

## 2022-09-23 RX ADMIN — METHOCARBAMOL TABLETS 500 MG: 500 TABLET, COATED ORAL at 17:38

## 2022-09-23 RX ADMIN — DEXMEDETOMIDINE HYDROCHLORIDE 0.2 MCG/KG/HR: 400 INJECTION INTRAVENOUS at 06:38

## 2022-09-23 RX ADMIN — HEPARIN SODIUM 5000 UNITS: 5000 INJECTION INTRAVENOUS; SUBCUTANEOUS at 14:59

## 2022-09-23 RX ADMIN — INSULIN LISPRO 1 UNITS: 100 INJECTION, SOLUTION INTRAVENOUS; SUBCUTANEOUS at 00:32

## 2022-09-23 RX ADMIN — BUDESONIDE AND FORMOTEROL FUMARATE DIHYDRATE 2 PUFF: 160; 4.5 AEROSOL RESPIRATORY (INHALATION) at 09:59

## 2022-09-23 RX ADMIN — DIGOXIN 125 MCG: 125 TABLET ORAL at 09:58

## 2022-09-23 RX ADMIN — ALBUMIN (HUMAN) 25 G: 0.25 INJECTION, SOLUTION INTRAVENOUS at 10:59

## 2022-09-23 RX ADMIN — CARVEDILOL 12.5 MG: 12.5 TABLET, FILM COATED ORAL at 16:29

## 2022-09-23 RX ADMIN — CALCIUM GLUCONATE 2 G: 20 INJECTION, SOLUTION INTRAVENOUS at 06:41

## 2022-09-23 RX ADMIN — BISACODYL 10 MG: 10 SUPPOSITORY RECTAL at 09:47

## 2022-09-23 RX ADMIN — SENNOSIDES AND DOCUSATE SODIUM 2 TABLET: 8.6; 5 TABLET ORAL at 17:38

## 2022-09-23 RX ADMIN — HEPARIN SODIUM 5000 UNITS: 5000 INJECTION INTRAVENOUS; SUBCUTANEOUS at 07:12

## 2022-09-23 RX ADMIN — HEPARIN SODIUM 5000 UNITS: 5000 INJECTION INTRAVENOUS; SUBCUTANEOUS at 00:15

## 2022-09-23 RX ADMIN — GABAPENTIN 100 MG: 100 CAPSULE ORAL at 09:57

## 2022-09-23 RX ADMIN — ACETAMINOPHEN 975 MG: 325 TABLET, FILM COATED ORAL at 21:29

## 2022-09-23 RX ADMIN — IPRATROPIUM BROMIDE 0.5 MG: 0.5 SOLUTION RESPIRATORY (INHALATION) at 19:37

## 2022-09-23 RX ADMIN — POLYETHYLENE GLYCOL 3350 17 G: 17 POWDER, FOR SOLUTION ORAL at 09:57

## 2022-09-23 RX ADMIN — Medication 6 MG: at 21:29

## 2022-09-23 NOTE — CASE MANAGEMENT
Case Management Discharge Planning Note    Patient name Veronica Billings  Location ICU 07/ICU 07 MRN [id number]  : 1940 Date 2022       Current Admission Date: 2022  Current Admission Diagnosis:T11 vertebral fracture [facility])   Patient Active Problem List    Diagnosis Date Noted    Fall 2022    SIRS (systemic inflammatory response syndrome) ([facility] ) 2022    COPD [facility]chronic obstructive [facility] disease) [facility] 2022    Acute-on-chronic kidney injury ([facility] ) 2022    T11 vertebral fracture ([facility] ) 2022    Chronic heart failure ([facility] ) 2021    Permanent atrial fibrillation ([facility] ) 2021    Stage 3 chronic kidney disease ([facility] ) 2021    Anemia 2021      LOS (days): 6  Geometric Mean LOS (GMLOS) (days): 4 40  Days to GMLOS:-1 2     OBJECTIVE:  Risk of Unplanned Readmission Score: 26 89         Current admission status: Inpatient   Preferred Pharmacy:   [address] [id number]  Phone: [phone] Fax: [phone]    Primary Care Provider: Shelley Kelley DO    Primary Insurance: [facility] [employer]  Secondary Insurance:     DISCHARGE DETAILS:    Pt remains in ICU  Pt not medically stable for d/c at this time   Pt accepted to [facility] when ready

## 2022-09-23 NOTE — PROCEDURES
Thoracentesis (Bedside)    Date/Time: 9/22/2022 11:45 PM  Performed by: Madelyn Srinivasan MD  Authorized by: Madelyn Srinivasan MD     Patient location:  ICU and bedside  Consent:     Consent obtained:  Verbal    Consent given by:  Patient    Risks discussed:  Pneumothorax, bleeding, incomplete drainage and pain    Alternatives discussed:  No treatment  Universal protocol:     Procedure explained and questions answered to patient or proxy's satisfaction: yes      Relevant documents present and verified: yes      Test results available and properly labeled: yes      Radiology Images displayed and confirmed  If images not available, report reviewed: yes      Required blood products, implants, devices and special equipment available: yes      Immediately prior to procedure a time out was called: yes      Patient identity confirmed:  Hospital-assigned identification number  Indications:     Procedure Purpose: therapeutic      Indications: pleural effusion    Anesthesia (see MAR for exact dosages): Anesthesia method:  Local infiltration    Local anesthetic:  Lidocaine 1% w/o epi  Procedure details:     Preparation: Patient was prepped and draped in usual sterile fashion      Standard thoracentesis cath kit used: Yes      Patient position:  Sitting    Laterality:  Right    Location:  Posterior    Intercostal space:  6th    Puncture method:  Over-the-needle catheter    Ultrasound guidance: yes      Reason for ultrasound: Identify fluid collection and guide catheter placement        Ultrasound image availability:  Not saved    Sterile ultrasound techniques: Sterile gel and sterile probe covers were used      Indwelling catheter placed: no      Number of attempts:  2    Needle gauge:  18    Catheter size:  8 Fr    Drainage characteristics:  Serosanguinous and cloudy    Fluid removed amount:  400  Post-procedure details:     Chest x-ray performed: yes      Chest x-ray findings:  Pleural effusion improved Patient tolerance of procedure:   Tolerated well, no immediate complications

## 2022-09-23 NOTE — PROCEDURES
Thoracentesis (Bedside)    Date/Time: 9/23/2022 1:47 PM  Performed by: Marline Bray DO  Authorized by: Marline Bray DO     Patient location:  ICU  Consent:     Consent obtained:  Written and verbal    Consent given by:  Patient and healthcare agent    Risks discussed:  Bleeding, incomplete drainage, nerve damage, infection, pain and pneumothorax    Alternatives discussed:  Observation, alternative treatment, delayed treatment and no treatment  Universal protocol:     Procedure explained and questions answered to patient or proxy's satisfaction: yes      Relevant documents present and verified: yes      Test results available and properly labeled: yes      Radiology Images displayed and confirmed  If images not available, report reviewed: yes      Required blood products, implants, devices and special equipment available: yes      Site/side marked: yes      Immediately prior to procedure a time out was called: yes      Patient identity confirmed:  Arm band, verbally with patient and hospital-assigned identification number  Indications:     Procedure Purpose: therapeutic      Indications: pleural effusion    Anesthesia (see MAR for exact dosages): Anesthesia method:  Local infiltration    Local anesthetic:  Lidocaine 1% w/o epi  Procedure details:     Preparation: Patient was prepped and draped in usual sterile fashion      Standard thoracentesis cath kit used: Yes      Patient position:  R lateral decubitus    Laterality:  Left    Location:  Midscapular line    Intercostal space:  5th    Puncture method:  Over-the-needle catheter    Ultrasound guidance: yes      Reason for ultrasound: Identify fluid collection and guide catheter placement        Indwelling catheter placed: no      Number of attempts:  1    Needle gauge:  14    Catheter size:  8 Fr    Drainage characteristics:  Serosanguinous    Fluid removed amount:  500 cc  Post-procedure details:     Chest x-ray performed: yes      Chest x-ray findings: Pleural effusion improved    Patient tolerance of procedure:   Tolerated well, no immediate complications

## 2022-09-23 NOTE — PROGRESS NOTES
Daily Progress Note - Critical Care   Nadege Rondon 79 y o  female MRN: [id number]  Unit/Bed#: ICU 07 Encounter: [id number]        ----------------------------------------------------------------------------------------  HPI/24hr events:  Nadege Rondon is a 79 y o  female who presented to [address] [place] [address] with pre-syncopal fall from standing with negative, negative LOC   Found to have T11 vertebral body fracture with narrowing of the spinal canal at the T11 level   Presented to ED with hemoglobin of 5 6 given 2u PRBCs  Given Kcentra and Vit K for INR of 4 6       PMHx includes CKD 4, HFrEF-last EF of 40% w/ BiV dysfunction, moderate pulm HTN, CKD 4, DM2, Afib on Coumadin, Pacemaker status, COPD/XIOMARA on CPAP at home, hx of colon ca s/p colectomy       24h: Worsening hypoxia/WOB requiring continuous BiPAP o/n  Reduced response to TID lasix and metolazone yesterday, Net -1 58L in 24h  Unfortunately started desatting on BiPAP yesterday, discussed with family and decided to proceed with thoracentesis of R side, had 450 of SS drainage out of R side  Improved WOB afterwards and therefore decided to rest on BiPAP o/n and allow aeration      ---------------------------------------------------------------------------------------  SUBJECTIVE      Review of Systems   Unable to perform ROS: Severe respiratory distress     Review of systems was unable to be performed secondary to acuity  ---------------------------------------------------------------------------------------  Assessment and Plan:    Neuro:   · Diagnosis: T 11 fx w/ canal narrowing, PAD  ? Plan:   ? Non-op per neurosx  ? Continue TLSO brace/  ? Space NC to q4h  ? Melatonin qHS/  ? Multimodal analgesia w/ Robaxin/neutontin/tylenol ATC  ?  Continue Precedex prn for BiPAP tolerance     CV:   · Diagnosis: Heart failure with reduced ejection fraction-40% with biventricular dysfunction, moderate pulmonary hypertension, AFib on Coumadin, status post pacemaker, HLD  ? Plan:   ? Currently holding home entresto, Dilt XR 180mg, Cired 25mg BID, ASA 81 daily  § Restarted Dig 125mcg daily, Coreg 12 5mg BID,   ? Continue lipitor 10mg daily  ? Continue Lasix 40IV BID until no longer requiring HFNC   ? Continue BiPAP support as needed  ? IF no continued melanotic stools then consider restarting home eliquis  § HAS-BLED score is 4 which carries 8 9% risk of major bleeding and therefore may defer restarting DOAC atleast until PCP evaluation    Pulm:  · AHRF, COPD/XIOMARA   ? Plan:   ? Wears 2L NC PRN at home  ? Continue Symbicort inhaler, transition to nebs if not able to wean BiPAP  ? S/p R sided thoracentesis w/ 400cc out  ? Consider L sided thoracentesis today to optimize respiratory function   ? Will require mobilization and IS/pulmonary toilet to improve atelectasis in conjunction w/ aggressive diuresis    GI:   · Diagnosis: Upper GI bleed, melena, history of colon cancer status post transverse colon resection, constipation  ? Plan:   ? 2 week hx of melanotic stools PTA, likely chronic GIB w/ Hb of 5 6 while hemodynamically stable  ? Given 2u PRBCs on admission, no further hemodynamic issues or transfusion needs  ? Given Kcentra/Vit K for supratherapeutic INR reversal   ? GI consulted and signed off  § EGD and flex sig negative but incomplete flex sig given stool burden, consider re-consulting if persistent melanotic stool  ? Consider bowel prep vs aggressive Bowel regimen if does not have a BM today  :   · Diagnosis: KAYLEE on CKD  ? Plan:   ? Cr  Elevated to 1 56 from 1 46 on AM check, previously 1 46  ? Baseline 1 2-1 4  ? Diuretic dependence w/ lasix 40 BID PO and Farxiga at home  ? Continue Lasix 40 IV BID scheduled, but would not continue further aggressive IV diuresis if renal function continues to worsen    F/E/N:    Plan:   o F:No IVF  o E: Replete PRN K>4 0, Phos>3 0, Mg>2 0  o N: NPO         Heme/Onc:   · Diagnosis: Anemia  ? Plan:   ? Hb stable at 8 4  ?  Transfuse PRN     Endo:   · Diagnosis: NIDDM  ? Plan:   ? Continue ISS for goal -180     ID:   · Diagnosis: No acute issues  ? Plan:   ? Trend fever/wbc curve  ? Blood cx NGTD in 72hr     MSK/Skin:   · Diagnosis: T12 fracture, multiple skin tears, venous stasis ulcers  ? Plan:   ? Wound care f/u for skin tears and venous stasis ulcerations  ? TLSO when out of bed, maintain T-spine precautions   ? Continue daily dressing changes to UE wound      Patient appropriate for transfer out of the ICU today?: No  Disposition: Continue Stepdown Level 1 level of care   Code Status: Level 3 - DNAR and DNI  ---------------------------------------------------------------------------------------  ICU CORE MEASURES    Prophylaxis   VTE Pharmacologic Prophylaxis: Heparin  VTE Mechanical Prophylaxis: sequential compression device  Stress Ulcer Prophylaxis: Pantoprazole IV     ABCDE Protocol (if indicated)  CAM-ICU: Negative    Invasive Devices Review  Invasive Devices  Report    Peripheral Intravenous Line  Duration           Peripheral IV 22 Dorsal (posterior); Left Hand 1 day    Peripheral IV 22 Right;Dorsal (posterior) Forearm <1 day          Drain  Duration           Urethral Catheter Non-latex 16 Fr  1 day              Can any invasive devices be discontinued today?  No  ---------------------------------------------------------------------------------------  OBJECTIVE    Vitals   Vitals:    22 0000 22 0100 22 0200 22 0300   BP: 114/55 114/55 134/61 (!) 94/42   Pulse: 62 62 76 60   Resp: 20 19 14 13   Temp:       TempSrc:       SpO2: 100% 100% 100% 100%   Weight:       Height:         Temp (24hrs), Av 1 °F (37 3 °C), Min:99 °F (37 2 °C), Max:99 1 °F (37 3 °C)  Current: Temperature: 99 1 °F (37 3 °C)  Temp:  [99 °F (37 2 °C)-99 1 °F (37 3 °C)] 99 1 °F (37 3 °C)  HR:  [58-76] 60  Resp:  [13-24] 13  BP: ()/(42-73) 94/42      Respiratory:  BiPAP  20/10  1 0    Invasive/non-invasive ventilation settings   Respiratory  Report   Lab Data (Last 4 hours)    None         O2/Vent Data (Last 4 hours)    None                Physical Exam  Constitutional:       General: She is not in acute distress  Appearance: She is ill-appearing  Interventions: Face mask in place  HENT:      Head: Normocephalic and atraumatic  Cardiovascular:      Rate and Rhythm: Normal rate and regular rhythm  Pulmonary:      Effort: Pulmonary effort is normal       Breath sounds: Normal breath sounds  Abdominal:      General: Abdomen is protuberant  Palpations: Abdomen is soft  Tenderness: There is no abdominal tenderness  Skin:     General: Skin is warm and dry  Capillary Refill: Capillary refill takes less than 2 seconds  Neurological:      General: No focal deficit present  Mental Status: She is lethargic  GCS: GCS eye subscore is 4  GCS verbal subscore is 4  GCS motor subscore is 6  Psychiatric:         Behavior: Behavior is cooperative               Laboratory and Diagnostics:  Results from last 7 days   Lab Units 09/22/22 2159 09/22/22  0634 09/21/22  0623 09/20/22  0629 09/19/22  0532 09/18/22  1659 09/18/22  0342 09/17/22  2222 09/17/22  1718   WBC Thousand/uL  --  16 99* 12 89* 13 30* 16 25*  --  20 53* 26 13* 22 11*   HEMOGLOBIN g/dL  --  8 8* 8 4* 7 8* 7 3* 7 3* 7 7* 6 8* 5 7*   I STAT HEMOGLOBIN g/dl 8 8*  --   --   --   --   --   --   --   --    HEMATOCRIT %  --  28 7* 27 8* 25 5* 24 2*  --  24 3* 21 8* 18 9*   HEMATOCRIT, ISTAT % 26*  --   --   --   --   --   --   --   --    PLATELETS Thousands/uL  --  137* 153 149 136*  --  145* 173 210   NEUTROS PCT %  --  92* 88* 88*  --   --   --   --   --    BANDS PCT %  --   --   --   --   --   --   --   --  3   MONOS PCT %  --  5 7 7  --   --   --   --   --    MONO PCT %  --   --   --   --   --   --   --  1* 1*     Results from last 7 days   Lab Units 09/22/22 2159 09/22/22  1814 09/22/22  0534 09/21/22  0417 09/20/22  0629 09/19/22  0532 09/18/22 0342 09/17/22 2222 09/17/22  1718   SODIUM mmol/L  --  143 143 140 136 135 137 137 137   POTASSIUM mmol/L  --  4 1 3 5 4 0 3 9 3 9 4 1 3 8 3 7   CHLORIDE mmol/L  --  104 105 106 104 104 106 104 98   CO2 mmol/L  --  34* 33* 30 26 24 23 21 19*   CO2, I-STAT mmol/L 40*  --   --   --   --   --   --   --   --    ANION GAP mmol/L  --  5 5 4 6 7 8 12 20*   BUN mg/dL  --  105* 111* 120* 135* 131* 133* 139* 131*   CREATININE mg/dL  --  1 39* 1 46* 1 81* 1 89* 1 87* 1 64* 1 79* 1 98*   CALCIUM mg/dL  --  8 2* 8 1* 7 8* 8 1* 8 0* 8 7 8 4 8 3   GLUCOSE RANDOM mg/dL  --  152* 136 132 116 171* 220* 228* 263*   ALT U/L  --   --   --   --   --   --  43 39 15   AST U/L  --   --   --   --   --   --  54* 44 15   ALK PHOS U/L  --   --   --   --   --   --  53 54 55   ALBUMIN g/dL  --   --   --   --   --   --  2 5* 2 5* 2 5*   TOTAL BILIRUBIN mg/dL  --   --   --   --   --   --  0 54 0 57 0 44     Results from last 7 days   Lab Units 09/22/22  0534 09/18/22 0342 09/17/22 2222   MAGNESIUM mg/dL 2 9* 2 8* 2 7*   PHOSPHORUS mg/dL  --  4 5* 4 8*      Results from last 7 days   Lab Units 09/17/22 2222 09/17/22  1718   INR  1 65* 4 66*   PTT seconds 34 62*          Results from last 7 days   Lab Units 09/18/22 0342 09/17/22 2222 09/17/22  1723   LACTIC ACID mmol/L 1 7 6 8* 10 6*     ABG:    VBG:  Results from last 7 days   Lab Units 09/17/22  1718   PH WENDY  7 236*   PCO2 WENDY mm Hg 39 2*   PO2 WENDY mm Hg 55 3*   HCO3 WENDY mmol/L 16 3*   BASE EXC WENDY mmol/L -10 3           Micro  Results from last 7 days   Lab Units 09/17/22  2224 09/17/22  2222 09/17/22  1749 09/17/22  1718   BLOOD CULTURE  No Growth After 5 Days  No Growth After 5 Days  No Growth After 4 Days  No Growth After 4 Days  EKG: NSR on tele, rate 60  Imaging:  I have personally reviewed pertinent reports  Intake and Output  I/O       09/21 0701 09/22 0700 09/22 0701 09/23 0700    P  O  120 540    I V  (mL/kg)  39 2 (0 4)    Total Intake(mL/kg) 120 (1 1) 579 2 (5 5)    Urine (mL/kg/hr) 4095 (1 6) 1660 (0 7)    Other  500    Total Output 4095 2160    Net -1315 -0170 8              UOP: 0  ml/hr     Height and Weights   Height: 4' 10" (147 3 cm)     Body mass index is 48 32 kg/m²  Weight (last 2 days)     None            Nutrition       Diet Orders   (From admission, onward)             Start     Ordered    09/22/22 0717  Diet Cardiovascular; Cardiac; Fluid Restriction 1500 ML  Diet effective now        References:    Nutrtion Support Algorithm Enteral vs  Parenteral   Question Answer Comment   Diet Type Cardiovascular    Cardiac Cardiac    Other Restriction(s): Fluid Restriction 1500 ML    RD to adjust diet per protocol?  No        09/22/22 0717              Active Medications  Scheduled Meds:  Current Facility-Administered Medications   Medication Dose Route Frequency Provider Last Rate    acetaminophen  975 mg Oral Q8H [address] NIURKA Zavala-PRASHANTH      albuterol  2 puff Inhalation Q4H PRN Rajani Reveles PA-C      albuterol  2 5 mg Nebulization Q4H PRN Loi Mcfarland MD      atorvastatin  10 mg Oral Daily Rajani Reveles PA-C      bisacodyl  10 mg Rectal Daily Rafi Gone      budesonide-formoterol  2 puff Inhalation BID Rajani Reveles PA-C      carvedilol  12 5 mg Oral BID With Meals Rajani Reveles PA-C      dexmedetomidine  0 1-0 7 mcg/kg/hr Intravenous Titrated PAULIE WaltersNP 0 2 mcg/kg/hr (09/22/22 2036)    digoxin  125 mcg Oral Daily Tae Miranda PA-C      furosemide  40 mg Intravenous Q12H Zachary G Paul      gabapentin  100 mg Oral TID Rajani Reveles PA-C      heparin (porcine)  5,000 Units Subcutaneous [facility] Rajani Reveles PA-C      HYDROmorphone  0 2 mg Intravenous Q4H PRN NIURKA Zavala-PRASHANTH      insulin lispro  1-5 Units Subcutaneous Q6H [address] Tae Miranda PA-C      lidocaine  1 patch Topical Daily NIURKA Zavala-PRASHANTH      melatonin  6 mg Oral HS Rajani Reveles PA-C  methocarbamol  500 mg Oral Q6H [address] Zachary Red Willow, PA-C      oxyCODONE  2 5 mg Oral Q4H PRN Zachary Red Willow, PA-C      oxyCODONE  5 mg Oral Q4H PRN Zachary Red Willow, PA-C      pantoprazole  40 mg Oral BID AC Nisa R Rasmuson, PA-C      polyethylene glycol  17 g Oral BID Zachary Red Willow, PA-C      polyethylene glycol  17 g Oral Daily PRN Zachary Red Willow, PA-C      senna-docusate sodium  2 tablet Oral BID Zachary G Josh       Continuous Infusions:  dexmedetomidine, 0 1-0 7 mcg/kg/hr, Last Rate: 0 2 mcg/kg/hr (09/22/22 2036)      PRN Meds:   albuterol, 2 puff, Q4H PRN  albuterol, 2 5 mg, Q4H PRN  HYDROmorphone, 0 2 mg, Q4H PRN  oxyCODONE, 2 5 mg, Q4H PRN  oxyCODONE, 5 mg, Q4H PRN  polyethylene glycol, 17 g, Daily PRN        Allergies   No Known Allergies  ---------------------------------------------------------------------------------------  Advance Directive and Living Will:      Power of :    POLST:    ---------------------------------------------------------------------------------------  Care Time Delivered:   Upon my evaluation, this patient had a high probability of imminent or life-threatening deterioration due to Acute on chronic CHF, which required my direct attention, intervention, and personal management  I have personally provided 60 minutes (0100 to 0200) of critical care time, exclusive of procedures, teaching, family meetings, and any prior time recorded by providers other than myself  Shi Mckinney PA-C      Portions of the record may have been created with voice recognition software  Occasional wrong word or "sound a like" substitutions may have occurred due to the inherent limitations of voice recognition software    Read the chart carefully and recognize, using context, where substitutions have occurred

## 2022-09-23 NOTE — PLAN OF CARE
Problem: MOBILITY - ADULT  Goal: Maintain or return to baseline ADL function  Description: INTERVENTIONS:  -  Assess patient's ability to carry out ADLs; assess patient's baseline for ADL function and identify physical deficits which impact ability to perform ADLs (bathing, care of mouth/teeth, toileting, grooming, dressing, etc )  - Assess/evaluate cause of self-care deficits   - Assess range of motion  - Assess patient's mobility; develop plan if impaired  - Assess patient's need for assistive devices and provide as appropriate  - Encourage maximum independence but intervene and supervise when necessary  - Involve family in performance of ADLs  - Assess for home care needs following discharge   - Consider OT consult to assist with ADL evaluation and planning for discharge  - Provide patient education as appropriate  Outcome: Progressing     Problem: Potential for Falls  Goal: Patient will remain free of falls  Description: INTERVENTIONS:  -  Assess patient's ability to carry out ADLs; assess patient's baseline for ADL function and identify physical deficits which impact ability to perform ADLs (bathing, care of mouth/teeth, toileting, grooming, dressing, etc )  - Assess/evaluate cause of self-care deficits   - Assess range of motion  - Assess patient's mobility; develop plan if impaired  - Assess patient's need for assistive devices and provide as appropriate  - Encourage maximum independence but intervene and supervise when necessary  - Involve family in performance of ADLs  - Assess for home care needs following discharge   - Consider OT consult to assist with ADL evaluation and planning for discharge  - Provide patient education as appropriate  Outcome: Progressing     Problem: Prexisting or High Potential for Compromised Skin Integrity  Goal: Skin integrity is maintained or improved  Description: INTERVENTIONS:  - Identify patients at risk for skin breakdown  - Assess and monitor skin integrity  - Assess and monitor nutrition and hydration status  - Monitor labs   - Assess for incontinence   - Turn and reposition patient  - Assist with mobility/ambulation  - Relieve pressure over bony prominences  - Avoid friction and shearing  - Provide appropriate hygiene as needed including keeping skin clean and dry  - Evaluate need for skin moisturizer/barrier cream  - Collaborate with interdisciplinary team   - Patient/family teaching  - Consider wound care consult   Outcome: Progressing

## 2022-09-23 NOTE — PROGRESS NOTES
General Cardiology   Progress Note -  Team One   Marisabel Main 79 y o  female MRN: [id number]    Unit/Bed#: ICU 07 Encounter: [id number]    Assessment:    1  Acute on chronic hypoxic respiratory failure: In the setting of volume overload  Had worsening hypoxia requiring BiPAP s/p right thoracentesis with improvement in WOB  2  Acute on chronic systolic heart failure: CXR today with improved right effusion, persistent left effusion  Currently on IV lasix 40 mg BID and received a single metolazone dose yesterday  · Echocardiogram 9/18/2022 showed EF 65%, LA severely dilated, RA dilated, mild AI, moderate AS, moderate MR and mild TR  · Home diuretic regimen: Lasix 40 mg BID and metolazone 5 mg daily  · Dry weight: Unknown  · S/p right thoracentesis with removal of 450 mL this morning  · I&Os: Output 24 hours -1 6 L  Net output -7 5 L  · No weight documented today  · Remains significantly volume overloaded on exam     3  History of Cardiomyopathy: S/p ICD  Currently on Coreg  Entresto on hold      4  Acute on Chronic kidney disease stage III: Baseline creatinine 1 2-1 4 Creatinine 1 56 today from 1 39 yesterday      5  Chronic atrial fibrillation: Currently on digoxin 125 mcg daily and coreg 12 5 mg BID  · Typically takes coreg 25 mg BID and diltiazem 180 mg daily at home but BP has been borderline at times  · Anticoagulated on coumadin that is on hold      6  T11 Vertebral fracture: Followed by primary team and neurosurgery team  TLSO brace      7  Anemia: Hemoglobin 5 7 POA s/p transfusion  AC on hold  HgB 8 4 today  GI cleared to resume AC as no definite source identified      8  Hypoalbuminemia: Albumin 2 5       Plan/Recommendations:  · Switch to IV Bumex 2 mg BID given hypoalbuminemia and monitor response  · Monitor I&Os, renal function, electrolytes and weight  · Maintain potasium >4 and magnesium >2  · Low sodium, fluid restricted diet  · Continue coreg  · Would consider restarting AC with trial of heparin gtt before resuming oral AC  · Resume Entresto at discharge if renal function remains stable  · Follow up with [facility], Dr Toña Torres      ______________________________________________________    Subjective    Patient seen and examined  No acute events overnight  Currently in BiPAP    Review of Systems   Unable to perform ROS: other   BiPAP    Objective:   Vitals: Blood pressure (!) 111/43, pulse 60, temperature 98 °F (36 7 °C), temperature source Axillary, resp  rate 12, height 4' 10" (1 473 m), weight 105 kg (231 lb 3 2 oz), SpO2 100 %  ,     Wt Readings from Last 3 Encounters:   22 105 kg (231 lb 3 2 oz)   21 110 kg (243 lb)   21 106 kg (234 lb 9 1 oz)        Lab Results   Component Value Date    CREATININE 1 56 (H) 2022    CREATININE 1 39 (H) 2022    CREATININE 1 46 (H) 2022         Body mass index is 48 32 kg/m²  ,     Systolic (73UTO), HXY:580 , Min:94 , QLJ:581     Diastolic (03OEM), PB, Min:42, Max:73          Intake/Output Summary (Last 24 hours) at 2022 0849  Last data filed at 2022 4152  Gross per 24 hour   Intake 233 18 ml   Output 2110 ml   Net -1876 82 ml     Weight (last 2 days)     None        Telemetry Review: Vpaced      Physical Exam  Vitals and nursing note reviewed  Constitutional:       General: She is not in acute distress  Appearance: She is well-developed  She is obese  Comments: On BiPAP in NAD   HENT:      Head: Normocephalic and atraumatic  Neck:      Vascular: No JVD  Cardiovascular:      Rate and Rhythm: Normal rate and regular rhythm  Heart sounds: Normal heart sounds  No murmur heard  No friction rub  Pulmonary:      Effort: Pulmonary effort is normal  No respiratory distress  Breath sounds: No wheezing or rales  Comments: Diminished breath sounds bilaterally  Abdominal:      General: Bowel sounds are normal  There is no distension  Palpations: Abdomen is soft  Tenderness:  There is no abdominal tenderness  Musculoskeletal:         General: No tenderness  Normal range of motion  Cervical back: Normal range of motion and neck supple  Right lower leg: Edema present  Left lower leg: Edema present  Comments: ++ edema to the thighs bilaterally   Skin:     General: Skin is warm and dry  Findings: No erythema  Neurological:      Mental Status: She is alert and oriented to person, place, and time  Psychiatric:         Mood and Affect: Mood normal          Behavior: Behavior normal          Thought Content:  Thought content normal          Judgment: Judgment normal          LABORATORY RESULTS  Results from last 7 days   Lab Units 09/17/22  1718   CK TOTAL U/L 45     CBC with diff:   Results from last 7 days   Lab Units 09/23/22  0438 09/22/22  2159 09/22/22  0634 09/21/22  0623 09/20/22  0629 09/19/22  0532 09/18/22  1659 09/18/22  0342 09/17/22  2222   WBC Thousand/uL 15 08*  --  16 99* 12 89* 13 30* 16 25*  --  20 53* 26 13*   HEMOGLOBIN g/dL 8 4*  --  8 8* 8 4* 7 8* 7 3* 7 3* 7 7* 6 8*   I STAT HEMOGLOBIN g/dl  --  8 8*  --   --   --   --   --   --   --    HEMATOCRIT % 27 7*  --  28 7* 27 8* 25 5* 24 2*  --  24 3* 21 8*   HEMATOCRIT, ISTAT %  --  26*  --   --   --   --   --   --   --    MCV fL 102*  --  101* 101* 100* 99*  --  95 97   PLATELETS Thousands/uL 155  --  137* 153 149 136*  --  145* 173   MCH pg 30 8  --  31 1 30 4 30 6 29 9  --  30 2 30 4   MCHC g/dL 30 3*  --  30 7* 30 2* 30 6* 30 2*  --  31 7 31 2*   RDW % 20 3*  --  20 7* 20 7* 20 2* 19 6*  --  17 5* 18 2*   MPV fL 10 3  --  10 3 10 2 10 4 10 7  --  11 0 10 6   NRBC AUTO /100 WBCs  --   --  0 0 0  --   --  0  --    NRBC /100 WBC  --   --   --   --   --   --   --   --  1       CMP:  Results from last 7 days   Lab Units 09/23/22  0438 09/22/22  2159 09/22/22  1814 09/22/22  0534 09/21/22  0623 09/20/22  0629 09/19/22  0532 09/18/22  0342 09/17/22  2222 09/17/22  1718   POTASSIUM mmol/L 4 2  --  4 1 3 5 4 0 3 9 3 9 4 1 3 8 3 7   CHLORIDE mmol/L 104  --  104 105 106 104 104 106 104 98   CO2 mmol/L 33*  --  34* 33* 30 26 24 23 21 19*   CO2, I-STAT mmol/L  --  40*  --   --   --   --   --   --   --   --    BUN mg/dL 105*  --  105* 111* 120* 135* 131* 133* 139* 131*   CREATININE mg/dL 1 56*  --  1 39* 1 46* 1 81* 1 89* 1 87* 1 64* 1 79* 1 98*   GLUCOSE, ISTAT mg/dl  --  143*  --   --   --   --   --   --   --   --    CALCIUM mg/dL 8 4  --  8 2* 8 1* 7 8* 8 1* 8 0* 8 7 8 4 8 3   AST U/L  --   --   --   --   --   --   --  54* 44 15   ALT U/L  --   --   --   --   --   --   --  43 39 15   ALK PHOS U/L  --   --   --   --   --   --   --  53 54 55   EGFR ml/min/1 73sq m 30  --  35 33 25 24 24 28 26 23       BMP:  Results from last 7 days   Lab Units 09/23/22  0438 09/22/22  2159 09/22/22  1814 09/22/22  0534 09/21/22  0623 09/20/22  0629 09/19/22  0532 09/18/22  0342   POTASSIUM mmol/L 4 2  --  4 1 3 5 4 0 3 9 3 9 4 1   CHLORIDE mmol/L 104  --  104 105 106 104 104 106   CO2 mmol/L 33*  --  34* 33* 30 26 24 23   CO2, I-STAT mmol/L  --  40*  --   --   --   --   --   --    BUN mg/dL 105*  --  105* 111* 120* 135* 131* 133*   CREATININE mg/dL 1 56*  --  1 39* 1 46* 1 81* 1 89* 1 87* 1 64*   GLUCOSE, ISTAT mg/dl  --  143*  --   --   --   --   --   --    CALCIUM mg/dL 8 4  --  8 2* 8 1* 7 8* 8 1* 8 0* 8 7       Lab Results   Component Value Date    NTBNP 2,236 (H) 09/17/2022    NTBNP 1,583 (H) 11/11/2021             Results from last 7 days   Lab Units 09/23/22  0438 09/22/22  0534 09/18/22  0342 09/17/22  2222   MAGNESIUM mg/dL 3 0* 2 9* 2 8* 2 7*                     Results from last 7 days   Lab Units 09/17/22  2222 09/17/22  1718   INR  1 65* 4 66*       Lipid Profile:   No results found for: CHOL  No results found for: HDL  No results found for: LDLCALC  No results found for: TRIG    Cardiac testing:   No results found for this or any previous visit  No results found for this or any previous visit      No results found for this or any previous visit  No valid procedures specified  No results found for this or any previous visit          Meds/Allergies   all current active meds have been reviewed, current meds:   Current Facility-Administered Medications   Medication Dose Route Frequency    acetaminophen (TYLENOL) tablet 975 mg  975 mg Oral Q8H [facility]    albuterol (PROVENTIL HFA,VENTOLIN HFA) inhaler 2 puff  2 puff Inhalation Q4H PRN    albuterol inhalation solution 2 5 mg  2 5 mg Nebulization Q4H PRN    atorvastatin (LIPITOR) tablet 10 mg  10 mg Oral Daily    bisacodyl (DULCOLAX) rectal suppository 10 mg  10 mg Rectal Daily    budesonide-formoterol (SYMBICORT) 160-4 5 mcg/act inhaler 2 puff  2 puff Inhalation BID    carvedilol (COREG) tablet 12 5 mg  12 5 mg Oral BID With Meals    dexmedeTOMIDine (Precedex) 400 mcg in sodium chloride 0 9% 100 mL  0 1-0 7 mcg/kg/hr Intravenous Titrated    digoxin (LANOXIN) tablet 125 mcg  125 mcg Oral Daily    furosemide (LASIX) injection 40 mg  40 mg Intravenous Q12H    gabapentin (NEURONTIN) capsule 100 mg  100 mg Oral TID    heparin (porcine) subcutaneous injection 5,000 Units  5,000 Units Subcutaneous Q8H [facility]    HYDROmorphone HCl (DILAUDID) injection 0 2 mg  0 2 mg Intravenous Q4H PRN    insulin lispro (HumaLOG) 100 units/mL subcutaneous injection 1-5 Units  1-5 Units Subcutaneous Q6H [facility]    lidocaine (LIDODERM) 5 % patch 1 patch  1 patch Topical Daily    melatonin tablet 6 mg  6 mg Oral HS    methocarbamol (ROBAXIN) tablet 500 mg  500 mg Oral Q6H DONTRELL    oxyCODONE (ROXICODONE) IR tablet 2 5 mg  2 5 mg Oral Q4H PRN    oxyCODONE (ROXICODONE) IR tablet 5 mg  5 mg Oral Q4H PRN    pantoprazole (PROTONIX) EC tablet 40 mg  40 mg Oral BID AC    polyethylene glycol (MIRALAX) packet 17 g  17 g Oral BID    polyethylene glycol (MIRALAX) packet 17 g  17 g Oral Daily PRN    senna-docusate sodium (SENOKOT S) 8 6-50 mg per tablet 2 tablet  2 tablet Oral BID    and PTA meds:   Prior to Admission Medications   Prescriptions Last Dose Informant Patient Reported? Taking? FERROUS SULFATE PO   Yes No   Sig: daily   Magnesium 400 MG TABS Not Taking at Unknown time  Yes No   Sig: Take 400 mg by mouth daily   Patient not taking: Reported on 9/18/2022   Multiple Vitamin (MULTIVITAMIN) capsule   Yes No   Sig: Take by mouth   albuterol (2 5 mg/3 mL) 0 083 % nebulizer solution   Yes No   Sig: Inhale 2 5 mg   albuterol (PROVENTIL HFA,VENTOLIN HFA) 90 mcg/act inhaler   Yes No   Sig: Inhale 2 puffs   allopurinol (ZYLOPRIM) 300 mg tablet   Yes No   Sig: Take 300 mg by mouth daily     aspirin 81 mg chewable tablet   Yes No   Sig: Chew 81 mg daily   atorvastatin (LIPITOR) 10 mg tablet   Yes No   Sig: Take 10 mg by mouth daily   budesonide-formoterol (SYMBICORT) 160-4 5 mcg/act inhaler   Yes No   Sig: Inhale 2 puffs 2 (two) times a day Rinse mouth after use     carvedilol (COREG) 25 mg tablet   Yes No   Sig: Take 25 mg by mouth 2 (two) times a day   cloNIDine (CATAPRES) 0 1 mg tablet Not Taking at Unknown time  Yes No   Sig: Take 1 tablet by mouth 2 (two) times a day   Patient not taking: Reported on 9/18/2022   colchicine (COLCRYS) 0 6 mg tablet Not Taking at Unknown time  Yes No   Sig: Take 0 6 mg by mouth daily   Patient not taking: Reported on 9/18/2022   digoxin (LANOXIN) 0 25 mg tablet   Yes No   Sig: Take 0 5 tablets by mouth daily   diltiazem (CARDIZEM CD) 180 mg 24 hr capsule   Yes No   Sig: Take 180 mg by mouth daily   furosemide (LASIX) 40 mg tablet   Yes No   Sig: Take 40 mg by mouth 2 (two) times a day     gabapentin (NEURONTIN) 100 mg capsule   Yes No   Sig: Take 100 mg by mouth 3 (three) times a day   guaiFENesin 400 mg   Yes No   Sig: Take 400 mg by mouth every 4 (four) hours   meclizine (ANTIVERT) 25 mg tablet   Yes No   Sig: Take 25 mg by mouth as needed   metolazone (ZAROXOLYN) 5 mg tablet Not Taking at Unknown time  Yes No   Sig: Take 5 mg by mouth daily   Patient not taking: Reported on 9/18/2022 pantoprazole (PROTONIX) 40 mg tablet   Yes No   Sig: Take 40 mg by mouth 2 (two) times a day   sacubitril-valsartan (ENTRESTO)  MG TABS   Yes No   Sig: Take 1 tablet by mouth 2 (two) times a day   spironolactone (ALDACTONE) 25 mg tablet Not Taking at Unknown time  Yes No   Sig: Take 25 mg by mouth daily   Patient not taking: Reported on 2022   warfarin (COUMADIN) 2 mg tablet   Yes No   Sig: Take 5 mg by mouth daily  and saturday   warfarin (COUMADIN) 7 5 mg tablet   Yes No   Si mg daily  and thursday      Facility-Administered Medications: None     Medications Prior to Admission   Medication    albuterol (2 5 mg/3 mL) 0 083 % nebulizer solution    albuterol (PROVENTIL HFA,VENTOLIN HFA) 90 mcg/act inhaler    allopurinol (ZYLOPRIM) 300 mg tablet    aspirin 81 mg chewable tablet    atorvastatin (LIPITOR) 10 mg tablet    budesonide-formoterol (SYMBICORT) 160-4 5 mcg/act inhaler    carvedilol (COREG) 25 mg tablet    cloNIDine (CATAPRES) 0 1 mg tablet    colchicine (COLCRYS) 0 6 mg tablet    digoxin (LANOXIN) 0 25 mg tablet    diltiazem (CARDIZEM CD) 180 mg 24 hr capsule    FERROUS SULFATE PO    furosemide (LASIX) 40 mg tablet    gabapentin (NEURONTIN) 100 mg capsule    guaiFENesin 400 mg    Magnesium 400 MG TABS    meclizine (ANTIVERT) 25 mg tablet    metolazone (ZAROXOLYN) 5 mg tablet    Multiple Vitamin (MULTIVITAMIN) capsule    pantoprazole (PROTONIX) 40 mg tablet    sacubitril-valsartan (ENTRESTO)  MG TABS    spironolactone (ALDACTONE) 25 mg tablet    warfarin (COUMADIN) 2 mg tablet    warfarin (COUMADIN) 7 5 mg tablet       dexmedetomidine, 0 1-0 7 mcg/kg/hr, Last Rate: Stopped (22 8517)        Counseling / Coordination of Care  Total floor / unit time spent today 20 minutes  Greater than 50% of total time was spent with the patient and / or family counseling and / or coordination of care        ** Please Note: Dragon 360 Dictation voice to text software may have been used in the creation of this document   **

## 2022-09-23 NOTE — QUICK NOTE
Patient was noted to have significant difficulty breathing  Patient was replaced on BiPAP with some improvement  Patient had persistent episodes of desaturation requiring increase BiPAP support to 20/10  Discussion was had with the patient and with the patient's daughter in regards to performing a thoracentesis to remove fluid from her lungs to allow for more recruitment  Daughter agreed with the procedure  Patient initially was reluctant to have the procedure however she did agree  Patient underwent a right-sided thoracentesis with approximately 400 cc of serosanguineous fluid removed  Patient tolerated the procedure well  Postprocedure patient had significant improvement in respiratory status and work of breathing  Repeat x-ray shows significant improvement in aeration on the right side

## 2022-09-24 LAB
ALBUMIN SERPL BCP-MCNC: 2.5 G/DL (ref 3.5–5)
ALP SERPL-CCNC: 67 U/L (ref 46–116)
ALT SERPL W P-5'-P-CCNC: 20 U/L (ref 12–78)
ANION GAP SERPL CALCULATED.3IONS-SCNC: 7 MMOL/L (ref 4–13)
ANISOCYTOSIS BLD QL SMEAR: PRESENT
AST SERPL W P-5'-P-CCNC: 14 U/L (ref 5–45)
BASOPHILS # BLD MANUAL: 0 THOUSAND/UL (ref 0–0.1)
BASOPHILS NFR MAR MANUAL: 0 % (ref 0–1)
BILIRUB SERPL-MCNC: 1.05 MG/DL (ref 0.2–1)
BUN SERPL-MCNC: 108 MG/DL (ref 5–25)
CA-I BLD-SCNC: 0.98 MMOL/L (ref 1.12–1.32)
CALCIUM ALBUM COR SERPL-MCNC: 9.3 MG/DL (ref 8.3–10.1)
CALCIUM SERPL-MCNC: 8.1 MG/DL (ref 8.3–10.1)
CHLORIDE SERPL-SCNC: 102 MMOL/L (ref 96–108)
CO2 SERPL-SCNC: 32 MMOL/L (ref 21–32)
CREAT SERPL-MCNC: 1.39 MG/DL (ref 0.6–1.3)
EOSINOPHIL # BLD MANUAL: 0 THOUSAND/UL (ref 0–0.4)
EOSINOPHIL NFR BLD MANUAL: 0 % (ref 0–6)
ERYTHROCYTE [DISTWIDTH] IN BLOOD BY AUTOMATED COUNT: 20.5 % (ref 11.6–15.1)
GFR SERPL CREATININE-BSD FRML MDRD: 35 ML/MIN/1.73SQ M
GLUCOSE SERPL-MCNC: 119 MG/DL (ref 65–140)
GLUCOSE SERPL-MCNC: 127 MG/DL (ref 65–140)
GLUCOSE SERPL-MCNC: 166 MG/DL (ref 65–140)
GLUCOSE SERPL-MCNC: 171 MG/DL (ref 65–140)
GLUCOSE SERPL-MCNC: 194 MG/DL (ref 65–140)
GLUCOSE SERPL-MCNC: 216 MG/DL (ref 65–140)
GLUCOSE SERPL-MCNC: 235 MG/DL (ref 65–140)
HCT VFR BLD AUTO: 25.3 % (ref 34.8–46.1)
HEMOCCULT STL QL: ABNORMAL
HEMOCCULT STL QL: ABNORMAL
HEMOCCULT STL QL: POSITIVE
HGB BLD-MCNC: 7.5 G/DL (ref 11.5–15.4)
LYMPHOCYTES # BLD AUTO: 0.25 THOUSAND/UL (ref 0.6–4.47)
LYMPHOCYTES # BLD AUTO: 2 % (ref 14–44)
MAGNESIUM SERPL-MCNC: 2.7 MG/DL (ref 1.6–2.6)
MCH RBC QN AUTO: 30.1 PG (ref 26.8–34.3)
MCHC RBC AUTO-ENTMCNC: 29.6 G/DL (ref 31.4–37.4)
MCV RBC AUTO: 102 FL (ref 82–98)
MICROCYTES BLD QL AUTO: PRESENT
MONOCYTES # BLD AUTO: 0.13 THOUSAND/UL (ref 0–1.22)
MONOCYTES NFR BLD: 1 % (ref 4–12)
NEUTROPHILS # BLD MANUAL: 12.07 THOUSAND/UL (ref 1.85–7.62)
NEUTS SEG NFR BLD AUTO: 96 % (ref 43–75)
PHOSPHATE SERPL-MCNC: 4.6 MG/DL (ref 2.3–4.1)
PLATELET # BLD AUTO: 150 THOUSANDS/UL (ref 149–390)
PLATELET BLD QL SMEAR: ADEQUATE
PMV BLD AUTO: 10.7 FL (ref 8.9–12.7)
POLYCHROMASIA BLD QL SMEAR: PRESENT
POTASSIUM SERPL-SCNC: 3.1 MMOL/L (ref 3.5–5.3)
PROT SERPL-MCNC: 5.5 G/DL (ref 6.4–8.4)
RBC # BLD AUTO: 2.49 MILLION/UL (ref 3.81–5.12)
RBC MORPH BLD: PRESENT
SODIUM SERPL-SCNC: 141 MMOL/L (ref 135–147)
VARIANT LYMPHS # BLD AUTO: 1 %
WBC # BLD AUTO: 12.57 THOUSAND/UL (ref 4.31–10.16)

## 2022-09-24 PROCEDURE — 80053 COMPREHEN METABOLIC PANEL: CPT

## 2022-09-24 PROCEDURE — 94760 N-INVAS EAR/PLS OXIMETRY 1: CPT

## 2022-09-24 PROCEDURE — 82272 OCCULT BLD FECES 1-3 TESTS: CPT

## 2022-09-24 PROCEDURE — 94668 MNPJ CHEST WALL SBSQ: CPT

## 2022-09-24 PROCEDURE — 82948 REAGENT STRIP/BLOOD GLUCOSE: CPT

## 2022-09-24 PROCEDURE — 83735 ASSAY OF MAGNESIUM: CPT

## 2022-09-24 PROCEDURE — 84100 ASSAY OF PHOSPHORUS: CPT

## 2022-09-24 PROCEDURE — 99233 SBSQ HOSP IP/OBS HIGH 50: CPT | Performed by: EMERGENCY MEDICINE

## 2022-09-24 PROCEDURE — 94660 CPAP INITIATION&MGMT: CPT

## 2022-09-24 PROCEDURE — 82330 ASSAY OF CALCIUM: CPT

## 2022-09-24 PROCEDURE — 94664 DEMO&/EVAL PT USE INHALER: CPT

## 2022-09-24 PROCEDURE — 85027 COMPLETE CBC AUTOMATED: CPT

## 2022-09-24 PROCEDURE — 99232 SBSQ HOSP IP/OBS MODERATE 35: CPT | Performed by: INTERNAL MEDICINE

## 2022-09-24 PROCEDURE — 94640 AIRWAY INHALATION TREATMENT: CPT

## 2022-09-24 PROCEDURE — 85007 BL SMEAR W/DIFF WBC COUNT: CPT

## 2022-09-24 RX ORDER — BISACODYL 10 MG
10 SUPPOSITORY, RECTAL RECTAL DAILY PRN
Status: DISCONTINUED | OUTPATIENT
Start: 2022-09-24 | End: 2022-09-25

## 2022-09-24 RX ORDER — POTASSIUM CHLORIDE 20 MEQ/1
40 TABLET, EXTENDED RELEASE ORAL ONCE
Status: COMPLETED | OUTPATIENT
Start: 2022-09-24 | End: 2022-09-24

## 2022-09-24 RX ORDER — BUMETANIDE 0.25 MG/ML
2 INJECTION, SOLUTION INTRAMUSCULAR; INTRAVENOUS 3 TIMES DAILY
Status: DISCONTINUED | OUTPATIENT
Start: 2022-09-24 | End: 2022-09-26

## 2022-09-24 RX ORDER — AMOXICILLIN 250 MG
1 CAPSULE ORAL 2 TIMES DAILY
Status: DISCONTINUED | OUTPATIENT
Start: 2022-09-24 | End: 2022-09-30 | Stop reason: HOSPADM

## 2022-09-24 RX ORDER — CALCIUM GLUCONATE 20 MG/ML
2 INJECTION, SOLUTION INTRAVENOUS ONCE
Status: COMPLETED | OUTPATIENT
Start: 2022-09-24 | End: 2022-09-24

## 2022-09-24 RX ORDER — POTASSIUM CHLORIDE 14.9 MG/ML
20 INJECTION INTRAVENOUS
Status: COMPLETED | OUTPATIENT
Start: 2022-09-24 | End: 2022-09-24

## 2022-09-24 RX ADMIN — HEPARIN SODIUM 5000 UNITS: 5000 INJECTION INTRAVENOUS; SUBCUTANEOUS at 05:39

## 2022-09-24 RX ADMIN — OXYCODONE HYDROCHLORIDE 5 MG: 5 TABLET ORAL at 01:33

## 2022-09-24 RX ADMIN — BUMETANIDE 2 MG: 0.25 INJECTION INTRAMUSCULAR; INTRAVENOUS at 22:00

## 2022-09-24 RX ADMIN — METHOCARBAMOL TABLETS 500 MG: 500 TABLET, COATED ORAL at 12:38

## 2022-09-24 RX ADMIN — PANTOPRAZOLE SODIUM 40 MG: 40 TABLET, DELAYED RELEASE ORAL at 16:06

## 2022-09-24 RX ADMIN — METHOCARBAMOL TABLETS 500 MG: 500 TABLET, COATED ORAL at 05:39

## 2022-09-24 RX ADMIN — DIGOXIN 62.5 MCG: 125 TABLET ORAL at 09:28

## 2022-09-24 RX ADMIN — CARVEDILOL 12.5 MG: 12.5 TABLET, FILM COATED ORAL at 16:06

## 2022-09-24 RX ADMIN — LEVALBUTEROL HYDROCHLORIDE 1.25 MG: 1.25 SOLUTION, CONCENTRATE RESPIRATORY (INHALATION) at 14:32

## 2022-09-24 RX ADMIN — GABAPENTIN 100 MG: 100 CAPSULE ORAL at 22:00

## 2022-09-24 RX ADMIN — IPRATROPIUM BROMIDE 0.5 MG: 0.5 SOLUTION RESPIRATORY (INHALATION) at 14:32

## 2022-09-24 RX ADMIN — ACETAMINOPHEN 975 MG: 325 TABLET, FILM COATED ORAL at 14:24

## 2022-09-24 RX ADMIN — GABAPENTIN 100 MG: 100 CAPSULE ORAL at 16:06

## 2022-09-24 RX ADMIN — METHOCARBAMOL TABLETS 500 MG: 500 TABLET, COATED ORAL at 00:39

## 2022-09-24 RX ADMIN — LEVALBUTEROL HYDROCHLORIDE 1.25 MG: 1.25 SOLUTION, CONCENTRATE RESPIRATORY (INHALATION) at 19:17

## 2022-09-24 RX ADMIN — HEPARIN SODIUM 5000 UNITS: 5000 INJECTION INTRAVENOUS; SUBCUTANEOUS at 14:25

## 2022-09-24 RX ADMIN — OXYCODONE HYDROCHLORIDE 2.5 MG: 5 TABLET ORAL at 22:02

## 2022-09-24 RX ADMIN — INSULIN LISPRO 1 UNITS: 100 INJECTION, SOLUTION INTRAVENOUS; SUBCUTANEOUS at 18:02

## 2022-09-24 RX ADMIN — METHOCARBAMOL TABLETS 500 MG: 500 TABLET, COATED ORAL at 18:02

## 2022-09-24 RX ADMIN — POTASSIUM CHLORIDE 20 MEQ: 14.9 INJECTION, SOLUTION INTRAVENOUS at 10:41

## 2022-09-24 RX ADMIN — BUMETANIDE 2 MG: 0.25 INJECTION INTRAMUSCULAR; INTRAVENOUS at 16:09

## 2022-09-24 RX ADMIN — BUMETANIDE 2 MG: 0.25 INJECTION INTRAMUSCULAR; INTRAVENOUS at 09:27

## 2022-09-24 RX ADMIN — POTASSIUM CHLORIDE 20 MEQ: 14.9 INJECTION, SOLUTION INTRAVENOUS at 07:52

## 2022-09-24 RX ADMIN — PANTOPRAZOLE SODIUM 40 MG: 40 TABLET, DELAYED RELEASE ORAL at 08:34

## 2022-09-24 RX ADMIN — POTASSIUM CHLORIDE 40 MEQ: 1500 TABLET, EXTENDED RELEASE ORAL at 07:50

## 2022-09-24 RX ADMIN — ATORVASTATIN CALCIUM 10 MG: 10 TABLET, FILM COATED ORAL at 09:25

## 2022-09-24 RX ADMIN — INSULIN LISPRO 1 UNITS: 100 INJECTION, SOLUTION INTRAVENOUS; SUBCUTANEOUS at 23:59

## 2022-09-24 RX ADMIN — CARVEDILOL 12.5 MG: 12.5 TABLET, FILM COATED ORAL at 07:51

## 2022-09-24 RX ADMIN — LEVALBUTEROL HYDROCHLORIDE 1.25 MG: 1.25 SOLUTION, CONCENTRATE RESPIRATORY (INHALATION) at 07:36

## 2022-09-24 RX ADMIN — IPRATROPIUM BROMIDE 0.5 MG: 0.5 SOLUTION RESPIRATORY (INHALATION) at 19:17

## 2022-09-24 RX ADMIN — Medication 6 MG: at 22:02

## 2022-09-24 RX ADMIN — ACETAMINOPHEN 975 MG: 325 TABLET, FILM COATED ORAL at 05:39

## 2022-09-24 RX ADMIN — ACETAMINOPHEN 975 MG: 325 TABLET, FILM COATED ORAL at 22:02

## 2022-09-24 RX ADMIN — HEPARIN SODIUM 5000 UNITS: 5000 INJECTION INTRAVENOUS; SUBCUTANEOUS at 22:02

## 2022-09-24 RX ADMIN — METHOCARBAMOL TABLETS 500 MG: 500 TABLET, COATED ORAL at 23:59

## 2022-09-24 RX ADMIN — GABAPENTIN 100 MG: 100 CAPSULE ORAL at 09:25

## 2022-09-24 RX ADMIN — LIDOCAINE 5% 1 PATCH: 700 PATCH TOPICAL at 09:15

## 2022-09-24 RX ADMIN — CALCIUM GLUCONATE 2 G: 20 INJECTION, SOLUTION INTRAVENOUS at 09:29

## 2022-09-24 RX ADMIN — IPRATROPIUM BROMIDE 0.5 MG: 0.5 SOLUTION RESPIRATORY (INHALATION) at 07:36

## 2022-09-24 RX ADMIN — INSULIN LISPRO 2 UNITS: 100 INJECTION, SOLUTION INTRAVENOUS; SUBCUTANEOUS at 12:38

## 2022-09-24 NOTE — PLAN OF CARE
Problem: MOBILITY - ADULT  Goal: Maintain or return to baseline ADL function  Description: INTERVENTIONS:  -  Assess patient's ability to carry out ADLs; assess patient's baseline for ADL function and identify physical deficits which impact ability to perform ADLs (bathing, care of mouth/teeth, toileting, grooming, dressing, etc )  - Assess/evaluate cause of self-care deficits   - Assess range of motion  - Assess patient's mobility; develop plan if impaired  - Assess patient's need for assistive devices and provide as appropriate  - Encourage maximum independence but intervene and supervise when necessary  - Involve family in performance of ADLs  - Assess for home care needs following discharge   - Consider OT consult to assist with ADL evaluation and planning for discharge  - Provide patient education as appropriate  Outcome: Progressing  Goal: Maintains/Returns to pre admission functional level  Description: INTERVENTIONS:  - Perform BMAT or MOVE assessment daily    - Set and communicate daily mobility goal to care team and patient/family/caregiver  - Collaborate with rehabilitation services on mobility goals if consulted  - Perform Range of Motion 3 times a day  - Reposition patient every 2 hours    - Dangle patient 3 times a day  - Stand patient 3 times a day  - Ambulate patient 3 times a day  - Out of bed to chair 3 times a day   - Out of bed for meals 3 times a day  - Out of bed for toileting  - Record patient progress and toleration of activity level   Outcome: Progressing     Problem: Potential for Falls  Goal: Patient will remain free of falls  Description: INTERVENTIONS:  - Educate patient/family on patient safety including physical limitations  - Instruct patient to call for assistance with activity   - Consult OT/PT to assist with strengthening/mobility   - Keep Call bell within reach  - Keep bed low and locked with side rails adjusted as appropriate  - Keep care items and personal belongings within reach  - Initiate and maintain comfort rounds  - Make Fall Risk Sign visible to staff  - Offer Toileting every 2 Hours, in advance of need  - Initiate/Maintain bed alarm  - Obtain necessary fall risk management equipment  - Apply yellow socks and bracelet for high fall risk patients  - Consider moving patient to room near nurses station  Outcome: Progressing     Problem: Prexisting or High Potential for Compromised Skin Integrity  Goal: Skin integrity is maintained or improved  Description: INTERVENTIONS:  - Identify patients at risk for skin breakdown  - Assess and monitor skin integrity  - Assess and monitor nutrition and hydration status  - Monitor labs   - Assess for incontinence   - Turn and reposition patient  - Assist with mobility/ambulation  - Relieve pressure over bony prominences  - Avoid friction and shearing  - Provide appropriate hygiene as needed including keeping skin clean and dry  - Evaluate need for skin moisturizer/barrier cream  - Collaborate with interdisciplinary team   - Patient/family teaching  - Consider wound care consult   Outcome: Progressing     Problem: PAIN - ADULT  Goal: Verbalizes/displays adequate comfort level or baseline comfort level  Description: Interventions:  - Encourage patient to monitor pain and request assistance  - Assess pain using appropriate pain scale  - Administer analgesics based on type and severity of pain and evaluate response  - Implement non-pharmacological measures as appropriate and evaluate response  - Consider cultural and social influences on pain and pain management  - Notify physician/advanced practitioner if interventions unsuccessful or patient reports new pain  Outcome: Progressing     Problem: INFECTION - ADULT  Goal: Absence or prevention of progression during hospitalization  Description: INTERVENTIONS:  - Assess and monitor for signs and symptoms of infection  - Monitor lab/diagnostic results  - Monitor all insertion sites, i e  indwelling lines, tubes, and drains  - Monitor endotracheal if appropriate and nasal secretions for changes in amount and color  - [place] appropriate cooling/warming therapies per order  - Administer medications as ordered  - Instruct and encourage patient and family to use good hand hygiene technique  - Identify and instruct in appropriate isolation precautions for identified infection/condition  Outcome: Progressing     Problem: SAFETY ADULT  Goal: Maintain or return to baseline ADL function  Description: INTERVENTIONS:  -  Assess patient's ability to carry out ADLs; assess patient's baseline for ADL function and identify physical deficits which impact ability to perform ADLs (bathing, care of mouth/teeth, toileting, grooming, dressing, etc )  - Assess/evaluate cause of self-care deficits   - Assess range of motion  - Assess patient's mobility; develop plan if impaired  - Assess patient's need for assistive devices and provide as appropriate  - Encourage maximum independence but intervene and supervise when necessary  - Involve family in performance of ADLs  - Assess for home care needs following discharge   - Consider OT consult to assist with ADL evaluation and planning for discharge  - Provide patient education as appropriate  Outcome: Progressing  Goal: Maintains/Returns to pre admission functional level  Description: INTERVENTIONS:  - Perform BMAT or MOVE assessment daily    - Set and communicate daily mobility goal to care team and patient/family/caregiver  - Collaborate with rehabilitation services on mobility goals if consulted  - Perform Range of Motion 3 times a day  - Reposition patient every 2 hours    - Dangle patient 3 times a day  - Stand patient 3 times a day  - Ambulate patient 3 times a day  - Out of bed to chair 3 times a day   - Out of bed for meals 3 times a day  - Out of bed for toileting  - Record patient progress and toleration of activity level   Outcome: Progressing  Goal: Patient will remain free of falls  Description: INTERVENTIONS:  - Educate patient/family on patient safety including physical limitations  - Instruct patient to call for assistance with activity   - Consult OT/PT to assist with strengthening/mobility   - Keep Call bell within reach  - Keep bed low and locked with side rails adjusted as appropriate  - Keep care items and personal belongings within reach  - Initiate and maintain comfort rounds  - Make Fall Risk Sign visible to staff  - Offer Toileting every 2 Hours, in advance of need  - Initiate/Maintain bed alarm  - Obtain necessary fall risk management equipment  - Apply yellow socks and bracelet for high fall risk patients  - Consider moving patient to room near nurses station  Outcome: Progressing     Problem: DISCHARGE PLANNING  Goal: Discharge to home or other facility with appropriate resources  Description: INTERVENTIONS:  - Identify barriers to discharge w/patient and caregiver  - Arrange for needed discharge resources and transportation as appropriate  - Identify discharge learning needs (meds, wound care, etc )  - Arrange for interpretive services to assist at discharge as needed  - Refer to Case Management Department for coordinating discharge planning if the patient needs post-hospital services based on physician/advanced practitioner order or complex needs related to functional status, cognitive ability, or social support system  Outcome: Progressing     Problem: Knowledge Deficit  Goal: Patient/family/caregiver demonstrates understanding of disease process, treatment plan, medications, and discharge instructions  Description: Complete learning assessment and assess knowledge base    Interventions:  - Provide teaching at level of understanding  - Provide teaching via preferred learning methods  Outcome: Progressing     Problem: Nutrition/Hydration-ADULT  Goal: Nutrient/Hydration intake appropriate for improving, restoring or maintaining nutritional needs  Description: Monitor and assess patient's nutrition/hydration status for malnutrition  Collaborate with interdisciplinary team and initiate plan and interventions as ordered  Monitor patient's weight and dietary intake as ordered or per policy  Utilize nutrition screening tool and intervene as necessary  Determine patient's food preferences and provide high-protein, high-caloric foods as appropriate       INTERVENTIONS:  - Monitor oral intake, urinary output, labs, and treatment plans  - Assess nutrition and hydration status and recommend course of action  - Evaluate amount of meals eaten  - Assist patient with eating if necessary   - Allow adequate time for meals  - Recommend/ encourage appropriate diets, oral nutritional supplements, and vitamin/mineral supplements  - Order, calculate, and assess calorie counts as needed  - Recommend, monitor, and adjust tube feedings and TPN/PPN based on assessed needs  - Assess need for intravenous fluids  - Provide specific nutrition/hydration education as appropriate  - Include patient/family/caregiver in decisions related to nutrition  Outcome: Progressing

## 2022-09-24 NOTE — PROGRESS NOTES
Progress Note - Cardiology   Anne Jurist 79 y o  female MRN: [id number]  Unit/Bed#: ICU 07 Encounter: [id number]    Assessment/Plan:  #  Mechanical fall  #  T11 vertebral fracture  #  Anemia s/p 2 units PRBCs  #  Supratherapeutic INR  #  Acute on Chronic HFpEF  #  Cardiomyopathy  #  AICD  #  Moderate AS  #  Moderate MR    S/p R thoracentesis  Volume status slowly improving  Renal function stable/trending down  Consider increasing bumex to TID dosing, replete K  Plan discussed with primary team on AM rounds    Subjective/Objective   Subjective: Seen & examined this morning  Remains on HFNC  Urine output good over last shift  Renal function stable      Objective:  Vitals: BP (!) 108/45   Pulse 66   Temp 97 8 °F (36 6 °C) (Oral)   Resp 21   Ht 4' 10" (1 473 m)   Wt 105 kg (231 lb 3 2 oz)   SpO2 100%   BMI 48 32 kg/m²   Vitals:    09/18/22 1000 09/20/22 0600   Weight: 110 kg (243 lb) 105 kg (231 lb 3 2 oz)     Orthostatic Blood Pressures    Flowsheet Row Most Recent Value   Blood Pressure 108/45 filed at 09/24/2022 1000   Patient Position - Orthostatic VS Lying filed at 09/24/2022 0800            Intake/Output Summary (Last 24 hours) at 9/24/2022 1053  Last data filed at 9/24/2022 8878  Gross per 24 hour   Intake 1589 17 ml   Output 2565 ml   Net -975 83 ml       Invasive Devices  Report    Peripheral Intravenous Line  Duration           Peripheral IV 09/22/22 Right;Dorsal (posterior) Forearm 1 day          Drain  Duration           Urethral Catheter Non-latex 16 Fr  2 days                Review of Systems: All systems reviewed and negative except for that noted above    Physical Exam: General appearance: alert and oriented, in no acute distress  Neck: no JVD and supple, symmetrical, trachea midline  Lungs: decreased bilaterally, +rales, no wheezing  Heart: irregular, normal heart sounds, +systolic murmnur  Abdomen: soft, non-tender; bowel sounds normal; no masses,  no organomegaly  Extremities: edema bilateral LE - improving    Lab Results: I have personally reviewed pertinent lab results  Imaging: I have personally reviewed pertinent reports  EKG: Personally reviewed    Counseling / Coordination of Care  Total time spent today 20 minutes  Greater than 50% of total time was spent with the patient and / or family counseling and / or coordination of care

## 2022-09-24 NOTE — PROGRESS NOTES
Daily Progress Note - Critical Care   Quan Alfredo 79 y o  female MRN: [id number]  Unit/Bed#: ICU 07 Encounter: [id number]        ----------------------------------------------------------------------------------------  HPI: "Quan Alfredo is a 79 y o  female who presented to [address] [place] [address] with pre-syncopal fall from standing with negative, negative LOC   Found to have T11 vertebral body fracture with narrowing of the spinal canal at the T11 level   Presented to ED with hemoglobin of 5 6 given 2u PRBCs  Given Kcentra and Vit K for INR of 4 6       PMHx includes CKD 4, HFrEF-last EF of 40% w/ BiV dysfunction, moderate pulm HTN, CKD 4, DM2, Afib on Coumadin, Pacemaker status, COPD/XIOMARA on CPAP at home, hx of colon ca s/p colectomy  "    24hrs: Underwent L  Thoracentesis with 500cc SS drainage  Improvement in respiratory status following  Appropriate diuresis after transitioned to bumex (net -1 4L/24hrs)  Weaned to HFNC 50%/50L during the day  Placed on BiPAP overnight to assist with recruitment and prevention of atelectasis  Small smear with 2 enemas yesterday     ---------------------------------------------------------------------------------------  SUBJECTIVE  Reports feeling much improved today, particular improved breathing/SOB  Review of Systems  Review of systems was reviewed and negative unless stated above in HPI/24-hour events   ---------------------------------------------------------------------------------------  Assessment and Plan:    Neuro:   · Diagnosis: T 11 fx w/ canal narrowing, PAD  ? Plan:   ? Non-op per neurosx  ? Continue TLSO brace/  ? Q4hr NC  ? Melatonin qHS, CAM-ICU per protocol  ? Multimodal analgesia w/ Robaxin/neutontin/tylenol ATC  ? Consider decreasing robaxin dosing     CV:   · Diagnosis: Heart failure with reduced ejection fraction-40% with biventricular dysfunction, moderate pulmonary hypertension, AFib on Coumadin, status post pacemaker, HLD  ?  Plan:   ? Currently holding home entresto, Dilt XR 180mg, ASA 81 daily  § Restarted Dig 62  5mcg daily, Coreg 12 5mg BID   ? Continue lipitor 10mg daily  ? Continue bumex 2mg IV BID  ? On eliquis outpt--- HAS-BLED score is 4 which carries 8 9% risk of major bleeding and therefore may defer restarting DOAC atleast until PCP evaluation     Pulm:  · AHRF, COPD/XIOMARA   ? Plan:   ? Wears 2L NC PRN at home  ? Xopenox/atrovent RTC, can switch to home symbicort if patient is able to utilize appropriately  ? S/p R sided thoracentesis (400cc) and L  Sided thoracentesis (500cc)  ? HFNC 50%/40L-- wean as able, suspect will be able to transition to [address] today; continue nocturnal BiPAP as patient tolerates  ? Will require mobilization and IS/pulmonary toilet to improve atelectasis in conjunction w/ aggressive diuresis     GI:   · Diagnosis: Upper GI bleed, melena, history of colon cancer status post transverse colon resection, constipation  ? Plan:   ? 2 week hx of melanotic stools PTA, likely chronic GIB w/ Hb of 5 6 while hemodynamically stable  ? Given 2u PRBCs on admission, no further hemodynamic issues or transfusion needs  ? Given Kcentra/Vit K for supratherapeutic INR reversal   ? GI consulted and signed off  § EGD and flex sig negative but incomplete flex sig given stool burden, consider re-consulting if persistent melanotic stool  ? Continue aggressive bowel regimen; if no BM would schedule lactulose     :    · Diagnosis: KAYLEE on CKD  ? Plan:   ? Cr  1 39 today from 1 56  ? Baseline 1 2-1 4  ? Diuretic dependence w/ lasix 40 BID PO and Farxiga at home  ? Continue bumex 2mg BID  ? D/c rivera today and place lizeth     F/E/N:   · Plan:   ? F:No IVF  ? E: Replete PRN K>4 0, Phos>3 0, Mg>2 0  ? N: NPO           Heme/Onc:   · Diagnosis: Anemia  ? Plan:   ? Hb stable at 7 5  ? Transfuse PRN for goal Hgb >7 0     Endo:   · Diagnosis: NIDDM  ? Plan:   ? Continue ISS for goal -180     ID:   · Diagnosis: No acute issues  ?  Plan:   ? Trend fever/wbc curve  ? Blood cx NGTD     MSK/Skin:   · Diagnosis: T12 fracture, multiple skin tears, venous stasis ulcers  ? Plan:   ? Wound care f/u for skin tears and venous stasis ulcerations  ? TLSO when out of bed, maintain T-spine precautions   ? Continue daily dressing changes to UE wound    Patient appropriate for transfer out of the ICU today?: No  Disposition: If stable on [address] [facility], can transfer to [facility]  Code Status: Level 3 - DNAR and DNI  ---------------------------------------------------------------------------------------  ICU CORE MEASURES    Prophylaxis   VTE Pharmacologic Prophylaxis: Heparin  VTE Mechanical Prophylaxis: sequential compression device  Stress Ulcer Prophylaxis: Pantoprazole PO     Invasive Devices Review  Invasive Devices  Report    Peripheral Intravenous Line  Duration           Peripheral IV 22 Right;Dorsal (posterior) Forearm 1 day          Drain  Duration           Urethral Catheter Non-latex 16 Fr  2 days              Can any invasive devices be discontinued today? Yes  ---------------------------------------------------------------------------------------  OBJECTIVE    Vitals   Vitals:    22 0736 22 0741 22 0751 22 0800   BP:   114/50 121/51   BP Location:    Right arm   Pulse:   61 60   Resp:    15   Temp:    97 8 °F (36 6 °C)   TempSrc:    Oral   SpO2: 96% 95%  97%   Weight:       Height:         Temp (24hrs), Av 7 °F (37 1 °C), Min:97 8 °F (36 6 °C), Max:99 3 °F (37 4 °C)  Current: Temperature: 97 8 °F (36 6 °C)    Respiratory:  SpO2: SpO2: 97 %, SpO2 Activity: SpO2 Activity: At Rest, SpO2 Device: O2 Device: High flow nasal cannula  Nasal Cannula O2 Flow Rate (L/min): 3 L/min    Invasive/non-invasive ventilation settings   Respiratory  Report   Lab Data (Last 4 hours)    None         O2/Vent Data (Last 4 hours)       0740          Non-Invasive Ventilation Mode HFNC (High flow)                   Physical Exam  Vitals and nursing note reviewed  Constitutional:       General: She is not in acute distress  Appearance: She is well-developed  HENT:      Head: Normocephalic and atraumatic  Mouth/Throat:      Mouth: Mucous membranes are moist    Eyes:      Conjunctiva/sclera: Conjunctivae normal    Cardiovascular:      Rate and Rhythm: Normal rate and regular rhythm  Heart sounds: No murmur heard  Pulmonary:      Effort: Pulmonary effort is normal  No respiratory distress  Breath sounds: Rales (mild bibasilar) present  No wheezing  Abdominal:      General: There is no distension  Palpations: Abdomen is soft  Tenderness: There is no abdominal tenderness  There is no guarding  Musculoskeletal:      Cervical back: Neck supple  Right lower leg: Edema present  Left lower leg: Edema present  Skin:     General: Skin is warm and dry  Neurological:      General: No focal deficit present  Mental Status: She is alert and oriented to person, place, and time               Laboratory and Diagnostics:  Results from last 7 days   Lab Units 09/24/22  0515 09/23/22  0438 09/22/22  2159 09/22/22  5437 09/21/22  0623 09/20/22  0629 09/19/22  0532 09/18/22  1659 09/18/22  0342 09/17/22  2222 09/17/22  1718   WBC Thousand/uL 12 57* 15 08*  --  16 99* 12 89* 13 30* 16 25*  --  20 53* 26 13* 22 11*   HEMOGLOBIN g/dL 7 5* 8 4*  --  8 8* 8 4* 7 8* 7 3*   < > 7 7* 6 8* 5 7*   I STAT HEMOGLOBIN g/dl  --   --  8 8*  --   --   --   --   --   --   --   --    HEMATOCRIT % 25 3* 27 7*  --  28 7* 27 8* 25 5* 24 2*  --  24 3* 21 8* 18 9*   HEMATOCRIT, ISTAT %  --   --  26*  --   --   --   --   --   --   --   --    PLATELETS Thousands/uL 150 155  --  137* 153 149 136*  --  145* 173 210   NEUTROS PCT %  --   --   --  92* 88* 88*  --   --   --   --   --    BANDS PCT %  --   --   --   --   --   --   --   --   --   --  3   MONOS PCT %  --   --   --  5 7 7  --   --   --   --   --    MONO PCT % 1*  --   --   --   --   --   --   --   --  1* 1*    < > = values in this interval not displayed       Results from last 7 days   Lab Units 09/24/22  0515 09/23/22  0438 09/22/22  2159 09/22/22  1814 09/22/22  0534 09/21/22  1670 09/20/22  0629 09/19/22  0532 09/18/22 0342 09/17/22  2222 09/17/22  1718   SODIUM mmol/L 141 141  --  143 143 140 136 135 137 137 137   POTASSIUM mmol/L 3 1* 4 2  --  4 1 3 5 4 0 3 9 3 9 4 1 3 8 3 7   CHLORIDE mmol/L 102 104  --  104 105 106 104 104 106 104 98   CO2 mmol/L 32 33*  --  34* 33* 30 26 24 23 21 19*   CO2, I-STAT mmol/L  --   --  40*  --   --   --   --   --   --   --   --    ANION GAP mmol/L 7 4  --  5 5 4 6 7 8 12 20*   BUN mg/dL 108* 105*  --  105* 111* 120* 135* 131* 133* 139* 131*   CREATININE mg/dL 1 39* 1 56*  --  1 39* 1 46* 1 81* 1 89* 1 87* 1 64* 1 79* 1 98*   CALCIUM mg/dL 8 1* 8 4  --  8 2* 8 1* 7 8* 8 1* 8 0* 8 7 8 4 8 3   GLUCOSE RANDOM mg/dL 119 135  --  152* 136 132 116 171* 220* 228* 263*   ALT U/L 20  --   --   --   --   --   --   --  43 39 15   AST U/L 14  --   --   --   --   --   --   --  54* 44 15   ALK PHOS U/L 67  --   --   --   --   --   --   --  53 54 55   ALBUMIN g/dL 2 5*  --   --   --   --   --   --   --  2 5* 2 5* 2 5*   TOTAL BILIRUBIN mg/dL 1 05*  --   --   --   --   --   --   --  0 54 0 57 0 44     Results from last 7 days   Lab Units 09/24/22  0515 09/23/22  0438 09/22/22  0534 09/18/22 0342 09/17/22  2222   MAGNESIUM mg/dL 2 7* 3 0* 2 9* 2 8* 2 7*   PHOSPHORUS mg/dL 4 6* 4 2*  --  4 5* 4 8*      Results from last 7 days   Lab Units 09/17/22 2222 09/17/22  1718   INR  1 65* 4 66*   PTT seconds 34 62*          Results from last 7 days   Lab Units 09/18/22  0342 09/17/22 2222 09/17/22  1723   LACTIC ACID mmol/L 1 7 6 8* 10 6*     ABG:    VBG:  Results from last 7 days   Lab Units 09/23/22  0929   PH WENDY  7 340   PCO2 WENDY mm Hg 65 2*   PO2 WENDY mm Hg 40 7   HCO3 WENDY mmol/L 34 4*   BASE EXC WENDY mmol/L 7 2           Micro  Results from last 7 days   Lab Units 09/23/22  0016 09/17/22 2224 09/17/22 2222 09/17/22  1749 09/17/22  1718   BLOOD CULTURE   --  No Growth After 5 Days  No Growth After 5 Days  No Growth After 5 Days  No Growth After 5 Days  GRAM STAIN RESULT  No Polys or Bacteria seen  --   --   --   --        Imaging: No new  I have personally reviewed pertinent reports  and I have personally reviewed pertinent films in PACS    Intake and Output  I/O       09/22 0701  09/23 0700 09/23 0701 09/24 0700    P  O  540 1020    I V  (mL/kg) 53 2 (0 5)     IV Piggyback  50    Total Intake(mL/kg) 593 2 (5 6) 1070 (10 2)    Urine (mL/kg/hr) 1730 (0 7) 2025 (0 8)    Other 500 500    Total Output 2230 2525    Net -1636 8 -1455              UOP: 80-90 ml/hr     Height and Weights   Height: 4' 10" (147 3 cm)     Body mass index is 48 32 kg/m²  Weight (last 2 days)     None            Nutrition       Diet Orders   (From admission, onward)             Start     Ordered    09/22/22 0717  Diet Cardiovascular; Cardiac; Fluid Restriction 1500 ML  Diet effective now        References:    Nutrtion Support Algorithm Enteral vs  Parenteral   Question Answer Comment   Diet Type Cardiovascular    Cardiac Cardiac    Other Restriction(s): Fluid Restriction 1500 ML    RD to adjust diet per protocol?  No        09/22/22 0717              Active Medications  Scheduled Meds:  Current Facility-Administered Medications   Medication Dose Route Frequency Provider Last Rate    acetaminophen  975 mg Oral Q8H [facility] Ricky Erazo PA-C      albuterol  2 puff Inhalation Q4H PRN Ricky Erazo PA-C      albuterol  2 5 mg Nebulization Q4H PRN Marek Johansen MD      atorvastatin  10 mg Oral Daily Ricky Erazo PA-C      bisacodyl  10 mg Rectal Daily Misael Butler      bumetanide  2 mg Intravenous BID Hal Daniel PA-C      calcium gluconate  2 g Intravenous Once Conrad Grullon PA-C      carvedilol  12 5 mg Oral BID With Meals Ricky Erazo PA-C      digoxin  62 5 mcg Oral Daily Mouna Grullon PA-C      gabapentin 100 mg Oral TID Dee Miller, VICKY      heparin (porcine)  5,000 Units Subcutaneous Pending sale to [facility] Kate Rodriguez, VICKY      insulin lispro  1-5 Units Subcutaneous Q6H [facility] Dee Miller PA-C      ipratropium  0 5 mg Nebulization TID Royer Jeremias, VICKY      levalbuterol  1 25 mg Nebulization TID Royer Goodnight, VICKY      lidocaine  1 patch Topical Daily Dee Miller, VICKY      melatonin  6 mg Oral HS Dee Miller, VICKY      methocarbamol  500 mg Oral Q6H [facility] [place] [place]      oxyCODONE  2 5 mg Oral Q4H PRN Dee Miller, VICKY      oxyCODONE  5 mg Oral Q4H PRN Dee Miller, VICKY      pantoprazole  40 mg Oral BID AC Nisa HEMANT Miranda PA-C      polyethylene glycol  17 g Oral BID Dee Miller, VICKY      polyethylene glycol  17 g Oral Daily PRN Dee Miller PA-C      potassium chloride  20 mEq Intravenous Q2H Conrad Grullon PA-C 20 mEq (09/24/22 0752)    senna-docusate sodium  2 tablet Oral BID Zachary Moreno       Continuous Infusions:     PRN Meds:   albuterol, 2 puff, Q4H PRN  albuterol, 2 5 mg, Q4H PRN  oxyCODONE, 2 5 mg, Q4H PRN  oxyCODONE, 5 mg, Q4H PRN  polyethylene glycol, 17 g, Daily PRN        Allergies   No Known Allergies  ---------------------------------------------------------------------------------------  Advance Directive and Living Will:      Power of :    POLST:    ---------------------------------------------------------------------------------------  Care Time Delivered:   No Critical Care time spent     Royer Mcdowell PA-C      Portions of the record may have been created with voice recognition software  Occasional wrong word or "sound a like" substitutions may have occurred due to the inherent limitations of voice recognition software    Read the chart carefully and recognize, using context, where substitutions have occurred

## 2022-09-24 NOTE — PLAN OF CARE
Problem: MOBILITY - ADULT  Goal: Maintain or return to baseline ADL function  Description: INTERVENTIONS:  -  Assess patient's ability to carry out ADLs; assess patient's baseline for ADL function and identify physical deficits which impact ability to perform ADLs (bathing, care of mouth/teeth, toileting, grooming, dressing, etc )  - Assess/evaluate cause of self-care deficits   - Assess range of motion  - Assess patient's mobility; develop plan if impaired  - Assess patient's need for assistive devices and provide as appropriate  - Encourage maximum independence but intervene and supervise when necessary  - Involve family in performance of ADLs  - Assess for home care needs following discharge   - Consider OT consult to assist with ADL evaluation and planning for discharge  - Provide patient education as appropriate  Outcome: Progressing  Goal: Maintains/Returns to pre admission functional level  Description: INTERVENTIONS:  - Perform BMAT or MOVE assessment daily    - Set and communicate daily mobility goal to care team and patient/family/caregiver  - Collaborate with rehabilitation services on mobility goals if consulted  - Perform Range of Motion 3 times a day  - Reposition patient every 2 hours    - Dangle patient 3 times a day  - Stand patient 3 times a day  - Ambulate patient 3 times a day  - Out of bed to chair 3 times a day   - Out of bed for meals 3 times a day  - Out of bed for toileting  - Record patient progress and toleration of activity level   Outcome: Progressing     Problem: Potential for Falls  Goal: Patient will remain free of falls  Description: INTERVENTIONS:  - Educate patient/family on patient safety including physical limitations  - Instruct patient to call for assistance with activity   - Consult OT/PT to assist with strengthening/mobility   - Keep Call bell within reach  - Keep bed low and locked with side rails adjusted as appropriate  - Keep care items and personal belongings within reach  - Initiate and maintain comfort rounds  - Make Fall Risk Sign visible to staff  - Offer Toileting every 2 Hours, in advance of need  - Initiate/Maintain bed alarm  - Obtain necessary fall risk management equipment  - Apply yellow socks and bracelet for high fall risk patients  - Consider moving patient to room near nurses station  Outcome: Progressing     Problem: Prexisting or High Potential for Compromised Skin Integrity  Goal: Skin integrity is maintained or improved  Description: INTERVENTIONS:  - Identify patients at risk for skin breakdown  - Assess and monitor skin integrity  - Assess and monitor nutrition and hydration status  - Monitor labs   - Assess for incontinence   - Turn and reposition patient  - Assist with mobility/ambulation  - Relieve pressure over bony prominences  - Avoid friction and shearing  - Provide appropriate hygiene as needed including keeping skin clean and dry  - Evaluate need for skin moisturizer/barrier cream  - Collaborate with interdisciplinary team   - Patient/family teaching  - Consider wound care consult   Outcome: Progressing     Problem: PAIN - ADULT  Goal: Verbalizes/displays adequate comfort level or baseline comfort level  Description: Interventions:  - Encourage patient to monitor pain and request assistance  - Assess pain using appropriate pain scale  - Administer analgesics based on type and severity of pain and evaluate response  - Implement non-pharmacological measures as appropriate and evaluate response  - Consider cultural and social influences on pain and pain management  - Notify physician/advanced practitioner if interventions unsuccessful or patient reports new pain  Outcome: Progressing     Problem: INFECTION - ADULT  Goal: Absence or prevention of progression during hospitalization  Description: INTERVENTIONS:  - Assess and monitor for signs and symptoms of infection  - Monitor lab/diagnostic results  - Monitor all insertion sites, i e  indwelling lines, tubes, and drains  - Monitor endotracheal if appropriate and nasal secretions for changes in amount and color  - [place] appropriate cooling/warming therapies per order  - Administer medications as ordered  - Instruct and encourage patient and family to use good hand hygiene technique  - Identify and instruct in appropriate isolation precautions for identified infection/condition  Outcome: Progressing     Problem: SAFETY ADULT  Goal: Maintain or return to baseline ADL function  Description: INTERVENTIONS:  -  Assess patient's ability to carry out ADLs; assess patient's baseline for ADL function and identify physical deficits which impact ability to perform ADLs (bathing, care of mouth/teeth, toileting, grooming, dressing, etc )  - Assess/evaluate cause of self-care deficits   - Assess range of motion  - Assess patient's mobility; develop plan if impaired  - Assess patient's need for assistive devices and provide as appropriate  - Encourage maximum independence but intervene and supervise when necessary  - Involve family in performance of ADLs  - Assess for home care needs following discharge   - Consider OT consult to assist with ADL evaluation and planning for discharge  - Provide patient education as appropriate  Outcome: Progressing  Goal: Maintains/Returns to pre admission functional level  Description: INTERVENTIONS:  - Perform BMAT or MOVE assessment daily    - Set and communicate daily mobility goal to care team and patient/family/caregiver  - Collaborate with rehabilitation services on mobility goals if consulted  - Perform Range of Motion 3 times a day  - Reposition patient every 2 hours    - Dangle patient 3 times a day  - Stand patient 3 times a day  - Ambulate patient 3 times a day  - Out of bed to chair 3 times a day   - Out of bed for meals 3 times a day  - Out of bed for toileting  - Record patient progress and toleration of activity level   Outcome: Progressing  Goal: Patient will remain free of falls  Description: INTERVENTIONS:  - Educate patient/family on patient safety including physical limitations  - Instruct patient to call for assistance with activity   - Consult OT/PT to assist with strengthening/mobility   - Keep Call bell within reach  - Keep bed low and locked with side rails adjusted as appropriate  - Keep care items and personal belongings within reach  - Initiate and maintain comfort rounds  - Make Fall Risk Sign visible to staff  - Apply yellow socks and bracelet for high fall risk patients  - Consider moving patient to room near nurses station  Outcome: Progressing     Problem: DISCHARGE PLANNING  Goal: Discharge to home or other facility with appropriate resources  Description: INTERVENTIONS:  - Identify barriers to discharge w/patient and caregiver  - Arrange for needed discharge resources and transportation as appropriate  - Identify discharge learning needs (meds, wound care, etc )  - Arrange for interpretive services to assist at discharge as needed  - Refer to Case Management Department for coordinating discharge planning if the patient needs post-hospital services based on physician/advanced practitioner order or complex needs related to functional status, cognitive ability, or social support system  Outcome: Progressing     Problem: Knowledge Deficit  Goal: Patient/family/caregiver demonstrates understanding of disease process, treatment plan, medications, and discharge instructions  Description: Complete learning assessment and assess knowledge base  Interventions:  - Provide teaching at level of understanding  - Provide teaching via preferred learning methods  Outcome: Progressing     Problem: Nutrition/Hydration-ADULT  Goal: Nutrient/Hydration intake appropriate for improving, restoring or maintaining nutritional needs  Description: Monitor and assess patient's nutrition/hydration status for malnutrition   Collaborate with interdisciplinary team and initiate plan and interventions as ordered  Monitor patient's weight and dietary intake as ordered or per policy  Utilize nutrition screening tool and intervene as necessary  Determine patient's food preferences and provide high-protein, high-caloric foods as appropriate       INTERVENTIONS:  - Monitor oral intake, urinary output, labs, and treatment plans  - Assess nutrition and hydration status and recommend course of action  - Evaluate amount of meals eaten  - Assist patient with eating if necessary   - Allow adequate time for meals  - Recommend/ encourage appropriate diets, oral nutritional supplements, and vitamin/mineral supplements  - Order, calculate, and assess calorie counts as needed  - Recommend, monitor, and adjust tube feedings and TPN/PPN based on assessed needs  - Assess need for intravenous fluids  - Provide specific nutrition/hydration education as appropriate  - Include patient/family/caregiver in decisions related to nutrition  Outcome: Progressing

## 2022-09-25 ENCOUNTER — APPOINTMENT (OUTPATIENT)
Dept: RADIOLOGY | Facility: HOSPITAL | Age: 82
DRG: 377 | End: 2022-09-25
Payer: COMMERCIAL

## 2022-09-25 PROBLEM — E11.9 DM (DIABETES MELLITUS) (HCC): Status: ACTIVE | Noted: 2022-09-25

## 2022-09-25 PROBLEM — J96.21 ACUTE ON CHRONIC RESPIRATORY FAILURE WITH HYPOXIA (HCC): Status: ACTIVE | Noted: 2022-09-25

## 2022-09-25 PROBLEM — I50.20 HFREF (HEART FAILURE WITH REDUCED EJECTION FRACTION) (HCC): Status: ACTIVE | Noted: 2021-11-12

## 2022-09-25 PROBLEM — J96.01 ACUTE RESPIRATORY FAILURE WITH HYPOXIA (HCC): Status: ACTIVE | Noted: 2022-09-25

## 2022-09-25 LAB
ANION GAP SERPL CALCULATED.3IONS-SCNC: 7 MMOL/L (ref 4–13)
BASOPHILS # BLD AUTO: 0.01 THOUSANDS/ΜL (ref 0–0.1)
BASOPHILS NFR BLD AUTO: 0 % (ref 0–1)
BUN SERPL-MCNC: 108 MG/DL (ref 5–25)
CA-I BLD-SCNC: 1.01 MMOL/L (ref 1.12–1.32)
CALCIUM SERPL-MCNC: 8.4 MG/DL (ref 8.3–10.1)
CHLORIDE SERPL-SCNC: 99 MMOL/L (ref 96–108)
CO2 SERPL-SCNC: 35 MMOL/L (ref 21–32)
CREAT SERPL-MCNC: 1.41 MG/DL (ref 0.6–1.3)
EOSINOPHIL # BLD AUTO: 0.2 THOUSAND/ΜL (ref 0–0.61)
EOSINOPHIL NFR BLD AUTO: 2 % (ref 0–6)
ERYTHROCYTE [DISTWIDTH] IN BLOOD BY AUTOMATED COUNT: 20.1 % (ref 11.6–15.1)
ERYTHROCYTE [DISTWIDTH] IN BLOOD BY AUTOMATED COUNT: 20.5 % (ref 11.6–15.1)
GFR SERPL CREATININE-BSD FRML MDRD: 34 ML/MIN/1.73SQ M
GLUCOSE SERPL-MCNC: 120 MG/DL (ref 65–140)
GLUCOSE SERPL-MCNC: 142 MG/DL (ref 65–140)
GLUCOSE SERPL-MCNC: 194 MG/DL (ref 65–140)
GLUCOSE SERPL-MCNC: 218 MG/DL (ref 65–140)
GLUCOSE SERPL-MCNC: 248 MG/DL (ref 65–140)
HCT VFR BLD AUTO: 26.5 % (ref 34.8–46.1)
HCT VFR BLD AUTO: 26.7 % (ref 34.8–46.1)
HGB BLD-MCNC: 7.9 G/DL (ref 11.5–15.4)
HGB BLD-MCNC: 7.9 G/DL (ref 11.5–15.4)
IMM GRANULOCYTES # BLD AUTO: 0.11 THOUSAND/UL (ref 0–0.2)
IMM GRANULOCYTES NFR BLD AUTO: 1 % (ref 0–2)
LYMPHOCYTES # BLD AUTO: 0.41 THOUSANDS/ΜL (ref 0.6–4.47)
LYMPHOCYTES NFR BLD AUTO: 4 % (ref 14–44)
MAGNESIUM SERPL-MCNC: 2.7 MG/DL (ref 1.6–2.6)
MCH RBC QN AUTO: 30.2 PG (ref 26.8–34.3)
MCH RBC QN AUTO: 30.4 PG (ref 26.8–34.3)
MCHC RBC AUTO-ENTMCNC: 29.6 G/DL (ref 31.4–37.4)
MCHC RBC AUTO-ENTMCNC: 29.8 G/DL (ref 31.4–37.4)
MCV RBC AUTO: 102 FL (ref 82–98)
MCV RBC AUTO: 102 FL (ref 82–98)
MONOCYTES # BLD AUTO: 0.74 THOUSAND/ΜL (ref 0.17–1.22)
MONOCYTES NFR BLD AUTO: 7 % (ref 4–12)
NEUTROPHILS # BLD AUTO: 8.88 THOUSANDS/ΜL (ref 1.85–7.62)
NEUTS SEG NFR BLD AUTO: 86 % (ref 43–75)
NRBC BLD AUTO-RTO: 0 /100 WBCS
PHOSPHATE SERPL-MCNC: 4.3 MG/DL (ref 2.3–4.1)
PLATELET # BLD AUTO: 151 THOUSANDS/UL (ref 149–390)
PLATELET # BLD AUTO: 159 THOUSANDS/UL (ref 149–390)
PMV BLD AUTO: 10.9 FL (ref 8.9–12.7)
PMV BLD AUTO: 10.9 FL (ref 8.9–12.7)
POTASSIUM SERPL-SCNC: 3.6 MMOL/L (ref 3.5–5.3)
RBC # BLD AUTO: 2.6 MILLION/UL (ref 3.81–5.12)
RBC # BLD AUTO: 2.62 MILLION/UL (ref 3.81–5.12)
SODIUM SERPL-SCNC: 141 MMOL/L (ref 135–147)
WBC # BLD AUTO: 10.29 THOUSAND/UL (ref 4.31–10.16)
WBC # BLD AUTO: 10.35 THOUSAND/UL (ref 4.31–10.16)

## 2022-09-25 PROCEDURE — 94669 MECHANICAL CHEST WALL OSCILL: CPT

## 2022-09-25 PROCEDURE — 80048 BASIC METABOLIC PNL TOTAL CA: CPT | Performed by: PHYSICIAN ASSISTANT

## 2022-09-25 PROCEDURE — 82948 REAGENT STRIP/BLOOD GLUCOSE: CPT

## 2022-09-25 PROCEDURE — 94664 DEMO&/EVAL PT USE INHALER: CPT

## 2022-09-25 PROCEDURE — 94760 N-INVAS EAR/PLS OXIMETRY 1: CPT

## 2022-09-25 PROCEDURE — 94660 CPAP INITIATION&MGMT: CPT

## 2022-09-25 PROCEDURE — 84100 ASSAY OF PHOSPHORUS: CPT | Performed by: PHYSICIAN ASSISTANT

## 2022-09-25 PROCEDURE — 99232 SBSQ HOSP IP/OBS MODERATE 35: CPT | Performed by: INTERNAL MEDICINE

## 2022-09-25 PROCEDURE — 94668 MNPJ CHEST WALL SBSQ: CPT

## 2022-09-25 PROCEDURE — 85027 COMPLETE CBC AUTOMATED: CPT | Performed by: STUDENT IN AN ORGANIZED HEALTH CARE EDUCATION/TRAINING PROGRAM

## 2022-09-25 PROCEDURE — NC001 PR NO CHARGE: Performed by: PHYSICIAN ASSISTANT

## 2022-09-25 PROCEDURE — 83735 ASSAY OF MAGNESIUM: CPT | Performed by: PHYSICIAN ASSISTANT

## 2022-09-25 PROCEDURE — 71045 X-RAY EXAM CHEST 1 VIEW: CPT

## 2022-09-25 PROCEDURE — 94640 AIRWAY INHALATION TREATMENT: CPT

## 2022-09-25 PROCEDURE — 82330 ASSAY OF CALCIUM: CPT | Performed by: PHYSICIAN ASSISTANT

## 2022-09-25 PROCEDURE — 99232 SBSQ HOSP IP/OBS MODERATE 35: CPT | Performed by: EMERGENCY MEDICINE

## 2022-09-25 PROCEDURE — 85025 COMPLETE CBC W/AUTO DIFF WBC: CPT | Performed by: PHYSICIAN ASSISTANT

## 2022-09-25 RX ORDER — POTASSIUM CHLORIDE 14.9 MG/ML
20 INJECTION INTRAVENOUS ONCE
Status: DISCONTINUED | OUTPATIENT
Start: 2022-09-25 | End: 2022-09-25

## 2022-09-25 RX ORDER — CALCIUM GLUCONATE 20 MG/ML
1 INJECTION, SOLUTION INTRAVENOUS ONCE
Status: COMPLETED | OUTPATIENT
Start: 2022-09-25 | End: 2022-09-25

## 2022-09-25 RX ORDER — BISACODYL 10 MG
10 SUPPOSITORY, RECTAL RECTAL DAILY PRN
Status: DISCONTINUED | OUTPATIENT
Start: 2022-09-25 | End: 2022-09-30 | Stop reason: HOSPADM

## 2022-09-25 RX ORDER — POTASSIUM CHLORIDE 20 MEQ/1
40 TABLET, EXTENDED RELEASE ORAL ONCE
Status: COMPLETED | OUTPATIENT
Start: 2022-09-25 | End: 2022-09-25

## 2022-09-25 RX ORDER — SIMETHICONE 80 MG
80 TABLET,CHEWABLE ORAL EVERY 6 HOURS PRN
Status: DISCONTINUED | OUTPATIENT
Start: 2022-09-25 | End: 2022-09-30 | Stop reason: HOSPADM

## 2022-09-25 RX ADMIN — CARVEDILOL 12.5 MG: 12.5 TABLET, FILM COATED ORAL at 16:02

## 2022-09-25 RX ADMIN — METHOCARBAMOL TABLETS 500 MG: 500 TABLET, COATED ORAL at 05:56

## 2022-09-25 RX ADMIN — GABAPENTIN 100 MG: 100 CAPSULE ORAL at 08:03

## 2022-09-25 RX ADMIN — POTASSIUM CHLORIDE 40 MEQ: 1500 TABLET, EXTENDED RELEASE ORAL at 10:39

## 2022-09-25 RX ADMIN — INSULIN LISPRO 1 UNITS: 100 INJECTION, SOLUTION INTRAVENOUS; SUBCUTANEOUS at 17:56

## 2022-09-25 RX ADMIN — HEPARIN SODIUM 5000 UNITS: 5000 INJECTION INTRAVENOUS; SUBCUTANEOUS at 05:57

## 2022-09-25 RX ADMIN — LIDOCAINE 5% 1 PATCH: 700 PATCH TOPICAL at 08:03

## 2022-09-25 RX ADMIN — HEPARIN SODIUM 5000 UNITS: 5000 INJECTION INTRAVENOUS; SUBCUTANEOUS at 21:02

## 2022-09-25 RX ADMIN — LEVALBUTEROL HYDROCHLORIDE 1.25 MG: 1.25 SOLUTION, CONCENTRATE RESPIRATORY (INHALATION) at 08:11

## 2022-09-25 RX ADMIN — BUMETANIDE 2 MG: 0.25 INJECTION INTRAMUSCULAR; INTRAVENOUS at 16:05

## 2022-09-25 RX ADMIN — Medication 6 MG: at 21:02

## 2022-09-25 RX ADMIN — IPRATROPIUM BROMIDE 0.5 MG: 0.5 SOLUTION RESPIRATORY (INHALATION) at 08:11

## 2022-09-25 RX ADMIN — HEPARIN SODIUM 5000 UNITS: 5000 INJECTION INTRAVENOUS; SUBCUTANEOUS at 13:54

## 2022-09-25 RX ADMIN — CALCIUM GLUCONATE 1 G: 20 INJECTION, SOLUTION INTRAVENOUS at 06:50

## 2022-09-25 RX ADMIN — CARVEDILOL 12.5 MG: 12.5 TABLET, FILM COATED ORAL at 08:03

## 2022-09-25 RX ADMIN — BUMETANIDE 2 MG: 0.25 INJECTION INTRAMUSCULAR; INTRAVENOUS at 08:05

## 2022-09-25 RX ADMIN — ATORVASTATIN CALCIUM 10 MG: 10 TABLET, FILM COATED ORAL at 08:04

## 2022-09-25 RX ADMIN — IPRATROPIUM BROMIDE 0.5 MG: 0.5 SOLUTION RESPIRATORY (INHALATION) at 17:50

## 2022-09-25 RX ADMIN — SIMETHICONE 80 MG: 80 TABLET, CHEWABLE ORAL at 17:53

## 2022-09-25 RX ADMIN — IPRATROPIUM BROMIDE 0.5 MG: 0.5 SOLUTION RESPIRATORY (INHALATION) at 14:18

## 2022-09-25 RX ADMIN — PANTOPRAZOLE SODIUM 40 MG: 40 TABLET, DELAYED RELEASE ORAL at 16:02

## 2022-09-25 RX ADMIN — GABAPENTIN 100 MG: 100 CAPSULE ORAL at 21:02

## 2022-09-25 RX ADMIN — ACETAMINOPHEN 975 MG: 325 TABLET, FILM COATED ORAL at 05:57

## 2022-09-25 RX ADMIN — INSULIN LISPRO 1 UNITS: 100 INJECTION, SOLUTION INTRAVENOUS; SUBCUTANEOUS at 11:58

## 2022-09-25 RX ADMIN — OXYCODONE HYDROCHLORIDE 5 MG: 5 TABLET ORAL at 08:39

## 2022-09-25 RX ADMIN — PANTOPRAZOLE SODIUM 40 MG: 40 TABLET, DELAYED RELEASE ORAL at 08:04

## 2022-09-25 RX ADMIN — DIGOXIN 62.5 MCG: 125 TABLET ORAL at 08:06

## 2022-09-25 RX ADMIN — ACETAMINOPHEN 975 MG: 325 TABLET, FILM COATED ORAL at 13:53

## 2022-09-25 RX ADMIN — METHOCARBAMOL TABLETS 500 MG: 500 TABLET, COATED ORAL at 17:53

## 2022-09-25 RX ADMIN — METHOCARBAMOL TABLETS 500 MG: 500 TABLET, COATED ORAL at 11:58

## 2022-09-25 RX ADMIN — ACETAMINOPHEN 975 MG: 325 TABLET, FILM COATED ORAL at 21:02

## 2022-09-25 RX ADMIN — LEVALBUTEROL HYDROCHLORIDE 1.25 MG: 1.25 SOLUTION, CONCENTRATE RESPIRATORY (INHALATION) at 14:18

## 2022-09-25 RX ADMIN — GABAPENTIN 100 MG: 100 CAPSULE ORAL at 16:02

## 2022-09-25 RX ADMIN — LEVALBUTEROL HYDROCHLORIDE 1.25 MG: 1.25 SOLUTION, CONCENTRATE RESPIRATORY (INHALATION) at 17:50

## 2022-09-25 RX ADMIN — BUMETANIDE 2 MG: 0.25 INJECTION INTRAMUSCULAR; INTRAVENOUS at 21:04

## 2022-09-25 NOTE — ASSESSMENT & PLAN NOTE
9/17 CT CAP: Fracture of the T11 vertebral body with narrowing of the spinal canal at this level  Evaluation is limited secondary to osteopenia/ankylosing spondylitis  Additional probable small fracture of the anterior endplate of G02     6/17 CT thoracic: Ddisruption of the anterior endplates/syndesmophytes of the T10 vertebral body and disruption of the anterior and posterior endplate of the F17 vertebral bodies compatible with fracture  There is narrowing of the Spinal canal at the T11 level        Plan:  · Neurosurgery consult-- No neurosurgical intervention given neurologically intact and stable exam   · TLSO brace   · q4h neuro checks   · Multimodal analgesia with schedule tylenol, robaxin, and neurontin  · PT/OT

## 2022-09-25 NOTE — PLAN OF CARE
Problem: MOBILITY - ADULT  Goal: Maintain or return to baseline ADL function  Description: INTERVENTIONS:  -  Assess patient's ability to carry out ADLs; assess patient's baseline for ADL function and identify physical deficits which impact ability to perform ADLs (bathing, care of mouth/teeth, toileting, grooming, dressing, etc )  - Assess/evaluate cause of self-care deficits   - Assess range of motion  - Assess patient's mobility; develop plan if impaired  - Assess patient's need for assistive devices and provide as appropriate  - Encourage maximum independence but intervene and supervise when necessary  - Involve family in performance of ADLs  - Assess for home care needs following discharge   - Consider OT consult to assist with ADL evaluation and planning for discharge  - Provide patient education as appropriate  Outcome: Progressing  Goal: Maintains/Returns to pre admission functional level  Description: INTERVENTIONS:  - Perform BMAT or MOVE assessment daily    - Set and communicate daily mobility goal to care team and patient/family/caregiver  - Collaborate with rehabilitation services on mobility goals if consulted  - Perform Range of Motion 3 times a day  - Reposition patient every 2 hours    - Dangle patient 3 times a day  - Stand patient 3 times a day  - Ambulate patient 3 times a day  - Out of bed to chair 3 times a day   - Out of bed for meals 3 times a day  - Out of bed for toileting  - Record patient progress and toleration of activity level   Outcome: Progressing     Problem: Potential for Falls  Goal: Patient will remain free of falls  Description: INTERVENTIONS:  - Educate patient/family on patient safety including physical limitations  - Instruct patient to call for assistance with activity   - Consult OT/PT to assist with strengthening/mobility   - Keep Call bell within reach  - Keep bed low and locked with side rails adjusted as appropriate  - Keep care items and personal belongings within reach  - Initiate and maintain comfort rounds  - Make Fall Risk Sign visible to staff  - Offer Toileting every 2 Hours, in advance of need  - Initiate/Maintain bed alarm  - Obtain necessary fall risk management equipment  - Apply yellow socks and bracelet for high fall risk patients  - Consider moving patient to room near nurses station  Outcome: Progressing     Problem: Prexisting or High Potential for Compromised Skin Integrity  Goal: Skin integrity is maintained or improved  Description: INTERVENTIONS:  - Identify patients at risk for skin breakdown  - Assess and monitor skin integrity  - Assess and monitor nutrition and hydration status  - Monitor labs   - Assess for incontinence   - Turn and reposition patient  - Assist with mobility/ambulation  - Relieve pressure over bony prominences  - Avoid friction and shearing  - Provide appropriate hygiene as needed including keeping skin clean and dry  - Evaluate need for skin moisturizer/barrier cream  - Collaborate with interdisciplinary team   - Patient/family teaching  - Consider wound care consult   Outcome: Progressing     Problem: PAIN - ADULT  Goal: Verbalizes/displays adequate comfort level or baseline comfort level  Description: Interventions:  - Encourage patient to monitor pain and request assistance  - Assess pain using appropriate pain scale  - Administer analgesics based on type and severity of pain and evaluate response  - Implement non-pharmacological measures as appropriate and evaluate response  - Consider cultural and social influences on pain and pain management  - Notify physician/advanced practitioner if interventions unsuccessful or patient reports new pain  Outcome: Progressing     Problem: INFECTION - ADULT  Goal: Absence or prevention of progression during hospitalization  Description: INTERVENTIONS:  - Assess and monitor for signs and symptoms of infection  - Monitor lab/diagnostic results  - Monitor all insertion sites, i e  indwelling lines, tubes, and drains  - Monitor endotracheal if appropriate and nasal secretions for changes in amount and color  - [place] appropriate cooling/warming therapies per order  - Administer medications as ordered  - Instruct and encourage patient and family to use good hand hygiene technique  - Identify and instruct in appropriate isolation precautions for identified infection/condition  Outcome: Progressing     Problem: SAFETY ADULT  Goal: Maintain or return to baseline ADL function  Description: INTERVENTIONS:  -  Assess patient's ability to carry out ADLs; assess patient's baseline for ADL function and identify physical deficits which impact ability to perform ADLs (bathing, care of mouth/teeth, toileting, grooming, dressing, etc )  - Assess/evaluate cause of self-care deficits   - Assess range of motion  - Assess patient's mobility; develop plan if impaired  - Assess patient's need for assistive devices and provide as appropriate  - Encourage maximum independence but intervene and supervise when necessary  - Involve family in performance of ADLs  - Assess for home care needs following discharge   - Consider OT consult to assist with ADL evaluation and planning for discharge  - Provide patient education as appropriate  Outcome: Progressing  Goal: Maintains/Returns to pre admission functional level  Description: INTERVENTIONS:  - Perform BMAT or MOVE assessment daily    - Set and communicate daily mobility goal to care team and patient/family/caregiver  - Collaborate with rehabilitation services on mobility goals if consulted  - Perform Range of Motion 3 times a day  - Reposition patient every 2 hours    - Dangle patient 3 times a day  - Stand patient 3 times a day  - Ambulate patient 3 times a day  - Out of bed to chair 3 times a day   - Out of bed for meals 3 times a day  - Out of bed for toileting  - Record patient progress and toleration of activity level   Outcome: Progressing  Goal: Patient will remain free of falls  Description: INTERVENTIONS:  - Educate patient/family on patient safety including physical limitations  - Instruct patient to call for assistance with activity   - Consult OT/PT to assist with strengthening/mobility   - Keep Call bell within reach  - Keep bed low and locked with side rails adjusted as appropriate  - Keep care items and personal belongings within reach  - Initiate and maintain comfort rounds  - Make Fall Risk Sign visible to staff  - Offer Toileting every 2 Hours, in advance of need  - Initiate/Maintain bed alarm  - Obtain necessary fall risk management equipment  - Apply yellow socks and bracelet for high fall risk patients  - Consider moving patient to room near nurses station  Outcome: Progressing     Problem: DISCHARGE PLANNING  Goal: Discharge to home or other facility with appropriate resources  Description: INTERVENTIONS:  - Identify barriers to discharge w/patient and caregiver  - Arrange for needed discharge resources and transportation as appropriate  - Identify discharge learning needs (meds, wound care, etc )  - Arrange for interpretive services to assist at discharge as needed  - Refer to Case Management Department for coordinating discharge planning if the patient needs post-hospital services based on physician/advanced practitioner order or complex needs related to functional status, cognitive ability, or social support system  Outcome: Progressing     Problem: Knowledge Deficit  Goal: Patient/family/caregiver demonstrates understanding of disease process, treatment plan, medications, and discharge instructions  Description: Complete learning assessment and assess knowledge base    Interventions:  - Provide teaching at level of understanding  - Provide teaching via preferred learning methods  Outcome: Progressing     Problem: Nutrition/Hydration-ADULT  Goal: Nutrient/Hydration intake appropriate for improving, restoring or maintaining nutritional needs  Description: Monitor and assess patient's nutrition/hydration status for malnutrition  Collaborate with interdisciplinary team and initiate plan and interventions as ordered  Monitor patient's weight and dietary intake as ordered or per policy  Utilize nutrition screening tool and intervene as necessary  Determine patient's food preferences and provide high-protein, high-caloric foods as appropriate       INTERVENTIONS:  - Monitor oral intake, urinary output, labs, and treatment plans  - Assess nutrition and hydration status and recommend course of action  - Evaluate amount of meals eaten  - Assist patient with eating if necessary   - Allow adequate time for meals  - Recommend/ encourage appropriate diets, oral nutritional supplements, and vitamin/mineral supplements  - Order, calculate, and assess calorie counts as needed  - Recommend, monitor, and adjust tube feedings and TPN/PPN based on assessed needs  - Assess need for intravenous fluids  - Provide specific nutrition/hydration education as appropriate  - Include patient/family/caregiver in decisions related to nutrition  Outcome: Progressing

## 2022-09-25 NOTE — ASSESSMENT & PLAN NOTE
Lab Results   Component Value Date    EGFR 34 09/25/2022    EGFR 35 09/24/2022    EGFR 30 09/23/2022    CREATININE 1 41 (H) 09/25/2022    CREATININE 1 39 (H) 09/24/2022    CREATININE 1 56 (H) 09/23/2022     · Baseline creatinine:  1 2-1 4  · Admission creatinine:  1 79  · In the setting of acute blood loss anemia and cardiorenal in setting of CHF  · Cr now back at baseline  · Trend BUN/creatinine  · Monitor I/Os

## 2022-09-25 NOTE — PROGRESS NOTES
[facility]  ICU Transfer to SD Note - Bhavesh Parisi 1940, 79 y o  female MRN: [id number]  Unit/Bed#: ICU 07 Encounter: [id number]  Primary Care Provider: Toby Palacios DO   Date and time admitted to hospital: 9/17/2022  9:14 PM    * T11 vertebral fracture [facility])  Assessment & Plan  9/17 CT CAP: Fracture of the T11 vertebral body with narrowing of the spinal canal at this level  Evaluation is limited secondary to osteopenia/ankylosing spondylitis  Additional probable small fracture of the anterior endplate of I33     7/31 CT thoracic: Ddisruption of the anterior endplates/syndesmophytes of the T10 vertebral body and disruption of the anterior and posterior endplate of the P97 vertebral bodies compatible with fracture  There is narrowing of the Spinal canal at the T11 level        Plan:  · Neurosurgery consult-- No neurosurgical intervention given neurologically intact and stable exam   · TLSO brace   · q4h neuro checks   · Multimodal analgesia with schedule tylenol, robaxin, and neurontin  · PT/OT    Anemia  Assessment & Plan  · Hgb 5 7 on admission with INR 4 66 on coumadin, received 2 U PRBC, stable in 7s  · SHANT positive for melanotic stool  · Reports 2 weeks of melena   · Elevated BUN  · UGI bleed suspected    Plan:  · Coumadin reversed with Emily Certain and Vitamin K   · Trend Hgb, stable  · PO PPI BID    · 9/19 EGD negative, flex sigmoidoscopy incomplete due to stool but not obvious source of bleed  · Re-check H/H 16:30 today  · If worsening anemia and continued melena, re-consult GI      HFrEF (heart failure with reduced ejection fraction) (HCC)  Assessment & Plan  Wt Readings from Last 3 Encounters:   09/25/22 97 5 kg (214 lb 15 2 oz)   11/12/21 110 kg (243 lb)   08/23/21 106 kg (234 lb 9 1 oz)     · Hx HF with biventricular dysfunction and EF 40% with PPM   · 9/18 Echo - EF 02%, diastolic dysfunction, mod AS, mod MR, RVSP 59  · Home regimen: Entresto, diltiazem  mg, digoxin 125 mcg, Coreg 25 mg b i d , aspirin 81 daily, lasix 40 mg BID     Plan:  · Telemetry monitoring  · Bumex 2mg IV TID  · Cardiology consulted, appreciate recommendations    Acute-on-chronic kidney injury [facility])  Assessment & Plan  Lab Results   Component Value Date    EGFR 34 09/25/2022    EGFR 35 09/24/2022    EGFR 30 09/23/2022    CREATININE 1 41 (H) 09/25/2022    CREATININE 1 39 (H) 09/24/2022    CREATININE 1 56 (H) 09/23/2022     · Baseline creatinine:  1 2-1 4  · Admission creatinine:  1 79  · In the setting of acute blood loss anemia and cardiorenal in setting of CHF  · Cr now back at baseline  · Trend BUN/creatinine  · Monitor I/Os    Permanent atrial fibrillation (HCC)  Assessment & Plan  · On coumadin with INR 4 66 on admission   · Coumadin reversed with Kcentra and vitamin K on admission for acute blood loss anemia and received 2 PRBC  · Home regimen:  Digoxin 125 mcg, Coreg 25 mg b i d , diltiazem  mg    Plan:  · Currently rate controlled, continue holding home medication  · Continue coreg 12 5mg BID  · Continue hold AC---- HAS-BLED score is 4 which carries 8 9% risk of major bleeding and therefore may defer restarting DOAC atleast until PCP evaluation     COPD (chronic obstructive pulmonary disease) ([facility])  Assessment & Plan  · On 2 L nasal cannula p r n  At home  · SpO2 goal > 88%  · Continue home Symbicort, albuterol p r n      DM (diabetes mellitus) [facility])  Assessment & Plan  Lab Results   Component Value Date    HGBA1C 6 6 (H) 06/08/2022       Recent Labs     09/24/22  1638 09/24/22  1801 09/24/22  2351 09/25/22  0556   POCGLU 194* 171* 166* 142*       Blood Sugar Average: Last 72 hrs:  (P) 350 [id number]     · Continue SSI, goal -180    Acute on chronic respiratory failure with hypoxia (HCC)  Assessment & Plan  · 2L NC baseline O2  · Now on 4L NC  · Significant improvement following diuresis and bilateral thoracentesis  · Encourage pulmonary hygiene, mobilization, OPEP  · Diuresis as above  · Continue nocturnal BiPAP    SIRS (systemic inflammatory response syndrome) (HCC)  Assessment & Plan  · POA as evidenced by WBC 22 1, tachycardia 100, lactic 10 6  · WBC down-trending, tachycardia resolved  · No current concern for infectious etiology, SIRS secondary to GI bleed and anemia    Plan:  · Monitoring off antibiotics  · BCx NGTD  · Trend temp curve and WBC count  · Resolved    Fall  Assessment & Plan  Manuel Graves from chair height, was sitting  Denies LOC, but was found down after pressing alert button    Plan:  - geriatrics consult  - Plan as above   - PT/OT  - Fall precautions      Code Status: Level 3 - DNAR and DNI  POA:    POLST:      Reason for ICU admission:   Acute hypoxic respiratory failure/ T11 vertebral fx    Active problems:   Principal Problem:    T11 vertebral fracture (HCC)  Active Problems:    Anemia    HFrEF (heart failure with reduced ejection fraction) ([facility])    Acute-on-chronic kidney injury ([facility] [address] )    Permanent atrial fibrillation ([facility] [address] )    COPD (chronic obstructive pulmonary disease) ([facility] [address] )    Fall    SIRS (systemic inflammatory response syndrome) ([facility] [address] )    Acute on chronic respiratory failure with hypoxia ([facility] )    DM (diabetes mellitus) ([facility] [address] )  Resolved Problems:    * No resolved hospital problems  *      Consultants:   Cardiology, Neurosurgery    History of Present Illness:   Per VICKY Phoenix, "Per Emma Jiménez PA-C "79 y o  female who presented to [address] [place] [address] with pre-syncopal fall from standing with negative, negative LOC   Found to have T11 vertebral body fracture with narrowing of the spinal canal at the T11 level   Presented to ED with hemoglobin of 5 6 given 1u PRBCs  Given Kcentra and Vit K for INR of 4 6  PMHx includes CKD 4, HFrEF-last EF of 40% w/ BiV dysfunction, moderate pulm HTN, CKD 4, DM2, Afib on Coumadin, Pacemaker status, COPD/XIOMARA on CPAP at home, hx of colon ca s/p colectomy   She was given 1 unit of PRBC for hemoglobin of 5 6, recheck 6 8 and received 1 more unit with hemoglobin improved to 7 7 watched in ICU overnight and is hemodynamically stable  Evaluated by Neurosurgery with, no surgical intervention at this time as patient is neurologically intact  Evaluated by GI with plans for EGD tomorrow 9/18  Echo obtained with history of heart failure and presyncopal event, EF has improved to 65%, pre-syncope likely secondary to anemia "     Summary of clinical course:   She was deemed stable for transfer to trauma floor on 9/18  On 9/21 developed worsening respiratory failure  Placed on BIPAP and transferred back to ICU  Underwent bilateral thoracenteses as well as aggressive diuresis with bumex with significnat improvement  Now back on 4L NC  Has stabilized from respiratory standpoint to transfer back to trauma floor  Recent or scheduled procedures:   9//22: R  Thoracentesis  9/23: L  thoracentesis    Outstanding/pending diagnostics:   N/a    Cultures:   NGTD       Mobilization Plan:   OOBTC, PT/OT    Nutrition Plan:   Cardiac diet, fluid restriction    Invasive Devices Review  Invasive Devices  Report    Peripheral Intravenous Line  Duration           Peripheral IV 09/22/22 Right;Dorsal (posterior) Forearm 2 days          Drain  Duration           External Urinary Catheter <1 day                Rationale for remaining devices: n/a    VTE Pharmacologic Prophylaxis: Heparin  VTE Mechanical Prophylaxis: sequential compression device    Discharge Plan:   Patient should be ready for discharge likely to acute rehab pending further PT/OT evaluations    Initial Physical Therapy Recommendations: Pending  Initial Occupational Therapy Recommendations: Pending  Initial /Plan: Pending    Home medications that are not reordered and reason why:   Holding home diltiazem per cardiology recs    Spoke with Dr Janelle Porras  regarding transfer  Please contact critical care via [employer] with any questions or concerns       Portions of the record may have been created with voice recognition software  Occasional wrong word or "sound a like" substitutions may have occurred due to the inherent limitations of voice recognition software  Read the chart carefully and recognize, using context, where substitutions have occurred

## 2022-09-25 NOTE — ASSESSMENT & PLAN NOTE
· POA as evidenced by WBC 22 1, tachycardia 100, lactic 10 6  · WBC down-trending, tachycardia resolved  · No current concern for infectious etiology, SIRS secondary to GI bleed and anemia    Plan:  · Monitoring off antibiotics  · BCx NGTD  · Trend temp curve and WBC count  · Resolved

## 2022-09-25 NOTE — ASSESSMENT & PLAN NOTE
· 2L NC baseline O2  · Now on 4L NC  · Significant improvement following diuresis and bilateral thoracentesis  · Encourage pulmonary hygiene, mobilization, OPEP  · Diuresis as above  · Continue nocturnal BiPAP

## 2022-09-25 NOTE — ASSESSMENT & PLAN NOTE
· Holding home diltiazem XR 180mg  · Continue coreg 12 5mg BID  · Telemetry monitoring  · On eliquis outpatient- HAS-BLED score is 4 which carries 8 9% risk of major bleeding and therefore may defer restarting DOAC at least until PCP evaluation

## 2022-09-25 NOTE — PROGRESS NOTES
Daily Progress Note - Critical Care   Abraham Adams 79 y o  female MRN: [id number]  Unit/Bed#: ICU 07 Encounter: [id number]        ----------------------------------------------------------------------------------------  HPI: "Abraham Adams is a 79 y o  female who presented to [facility] with pre-syncopal fall from standing with negative, negative LOC   Found to have T11 vertebral body fracture with narrowing of the spinal canal at the T11 level   Presented to ED with hemoglobin of 5 6 given 2u PRBCs  Given Kcentra and Vit K for INR of 4 6       PMHx includes CKD 4, HFrEF-last EF of 40% w/ BiV dysfunction, moderate pulm HTN, CKD 4, DM2, Afib on Coumadin, Pacemaker status, COPD/XIOMARA on CPAP at home, hx of colon ca s/p colectomy  "    24hr events: Continued improvement in respiratory status yesterday, weaned to 4L NC  Multiple large bowel movements after aggressive bowel regimen  Diuresis increased to bumex 2mg TID-- difficult to assess total fluid balance given incontinent episodes, however made appropriate urine     ---------------------------------------------------------------------------------------  SUBJECTIVE  Reports overall feeling improved today  Denies any significant SOB, difficulty breathing  Denies CP, palpitations  Occasional back pain, but well controlled right now  Review of Systems  Review of systems was reviewed and negative unless stated above in HPI/24-hour events   ---------------------------------------------------------------------------------------  Assessment and Plan:    Neuro:   · Diagnosis: T 11 fx w/ canal narrowing, PAD  ? Plan:   ? Non-op per neurosx  ? Continue TLSO brace  ? Q4hr NC  ? Melatonin qHS, CAM-ICU per protocol  ? Multimodal analgesia w/ Robaxin/neutontin/tylenol ATC     CV:   · Diagnosis: Heart failure with reduced ejection fraction-40% with biventricular dysfunction, moderate pulmonary hypertension, AFib on Coumadin, status post pacemaker, HLD  ?  Plan:   ? Currently holding home entresto, Dilt XR 180mg, ASA 81 daily  § Restarted Dig 62  5mcg daily, Coreg 12 5mg BID   ? Continue lipitor 10mg daily  ? Continue bumex 2mg IV TID  ? On eliquis outpt--- HAS-BLED score is 4 which carries 8 9% risk of major bleeding and therefore may defer restarting DOAC atleast until PCP evaluation     Pulm:  · AHRF, COPD/XIOMARA   ? Plan:   ? Wears 2L NC PRN at home  ? Xopenox/atrovent RTC  ? Switch to home symbicort if patient is able to utilize appropriately   ? S/p R sided thoracentesis (400cc) and L  Sided thoracentesis (500cc)  ? Weaned to traditional NC, continue to wean as able for goal SpO2 >90%  ? Continue nocturnal BiPAP as patient tolerates  ? Will require mobilization and OPEP/pulmonary toilet to improve atelectasis in conjunction w/ aggressive diuresis     GI:   · Diagnosis: Upper GI bleed, melena, history of colon cancer status post transverse colon resection, constipation  ? Plan:   ? 2 week hx of melanotic stools PTA, likely chronic GIB w/ Hb of 5 6 while hemodynamically stable  ? Given 2u PRBCs on admission, no further hemodynamic issues or transfusion needs  ? Given Kcentra/Vit K for supratherapeutic INR reversal   ? GI consulted and signed off  § EGD and flex sig negative but incomplete flex sig given stool burden, consider re-consulting if persistent melanotic stool  ? Continue bowel regimen, de-escalate as needed     :    · Diagnosis: KAYLEE on CKD  ? Plan:   ? Cr  1 41 today from 1 39  ? Baseline 1 2-1 4  ? Diuretic dependence w/ lasix 40 BID PO and Farxiga at home  ? Continue bumex 2mg TID  ? Ball out  ? Monitor I/Os  ? Trend BUN/Cr     F/E/N:   · Plan:   ? F:No IVF  ? E: Replete PRN K>4 0, Phos>3 0, Mg>2 0  ? N: Cardiac diet, fluid restriction           Heme/Onc:   · Diagnosis: Anemia  ? Plan:   ? Hb stable at 7 8  ? Transfuse PRN for goal Hgb >7 0     Endo:   · Diagnosis: NIDDM  ? Plan:   ? Continue ISS for goal -180     ID:   · Diagnosis: No acute issues  ?  Plan:   ? Trend fever/wbc curve  ? Blood cx NGTD     MSK/Skin:   · Diagnosis: T12 fracture, multiple skin tears, venous stasis ulcers  ? Plan:   ? Wound care f/u for skin tears and venous stasis ulcerations  ? TLSO when out of bed, maintain T-spine precautions   ? Continue daily dressing changes to UE wound    Patient appropriate for transfer out of the ICU today?: Patient does not meet criteria for ICU Follow-up Clinic; referral has not been made  Disposition: Transfer to Med-Surg   Code Status: Level 3 - DNAR and DNI  ---------------------------------------------------------------------------------------  ICU CORE MEASURES    Prophylaxis   VTE Pharmacologic Prophylaxis: Heparin  VTE Mechanical Prophylaxis: sequential compression device  Stress Ulcer Prophylaxis: Pantoprazole PO    Invasive Devices Review  Invasive Devices  Report    Peripheral Intravenous Line  Duration           Peripheral IV 22 Right;Dorsal (posterior) Forearm 2 days          Drain  Duration           External Urinary Catheter <1 day              Can any invasive devices be discontinued today? No  ---------------------------------------------------------------------------------------  OBJECTIVE    Vitals   Vitals:    22 0700 22 0800 22 0803 22 0813   BP: 140/51 140/65 140/65    BP Location:  Right arm     Pulse: 60 58 57    Resp: (!) 30 (!) 23     Temp:       TempSrc:       SpO2: 98% 99%  99%   Weight:       Height:         Temp (24hrs), Av 7 °F (36 5 °C), Min:97 5 °F (36 4 °C), Max:97 9 °F (36 6 °C)  Current: Temperature: 97 9 °F (36 6 °C)    Respiratory:  SpO2: SpO2: 99 %, SpO2 Activity: SpO2 Activity: At Rest, SpO2 Device: O2 Device: Nasal cannula  Nasal Cannula O2 Flow Rate (L/min): 4 L/min    Invasive/non-invasive ventilation settings   Respiratory  Report   Lab Data (Last 4 hours)    None         O2/Vent Data (Last 4 hours)    None                Physical Exam  Vitals and nursing note reviewed     Constitutional: General: She is not in acute distress  Appearance: She is well-developed  HENT:      Head: Normocephalic and atraumatic  Mouth/Throat:      Mouth: Mucous membranes are moist    Eyes:      Conjunctiva/sclera: Conjunctivae normal    Cardiovascular:      Rate and Rhythm: Normal rate and regular rhythm  Heart sounds: No murmur heard  Pulmonary:      Effort: Pulmonary effort is normal  No respiratory distress  Breath sounds: No wheezing or rales  Abdominal:      General: There is no distension  Palpations: Abdomen is soft  Tenderness: There is no abdominal tenderness  There is no guarding  Musculoskeletal:      Cervical back: Neck supple  Right lower leg: Edema present  Left lower leg: Edema present  Skin:     General: Skin is warm and dry  Neurological:      General: No focal deficit present  Mental Status: She is alert and oriented to person, place, and time               Laboratory and Diagnostics:  Results from last 7 days   Lab Units 09/25/22  0541 09/24/22  0515 09/23/22  0438 09/22/22  2159 09/22/22  0634 09/21/22  0623 09/20/22  0629 09/19/22  0532   WBC Thousand/uL 10 35* 12 57* 15 08*  --  16 99* 12 89* 13 30* 16 25*   HEMOGLOBIN g/dL 7 9* 7 5* 8 4*  --  8 8* 8 4* 7 8* 7 3*   I STAT HEMOGLOBIN g/dl  --   --   --  8 8*  --   --   --   --    HEMATOCRIT % 26 7* 25 3* 27 7*  --  28 7* 27 8* 25 5* 24 2*   HEMATOCRIT, ISTAT %  --   --   --  26*  --   --   --   --    PLATELETS Thousands/uL 159 150 155  --  137* 153 149 136*   NEUTROS PCT % 86*  --   --   --  92* 88* 88*  --    MONOS PCT % 7  --   --   --  5 7 7  --    MONO PCT %  --  1*  --   --   --   --   --   --      Results from last 7 days   Lab Units 09/25/22  0541 09/24/22  0515 09/23/22  0438 09/22/22  2159 09/22/22  1814 09/22/22  0534 09/21/22  0623 09/20/22  0629   SODIUM mmol/L 141 141 141  --  143 143 140 136   POTASSIUM mmol/L 3 6 3 1* 4 2  --  4 1 3 5 4 0 3 9   CHLORIDE mmol/L 99 102 104  --  104 105 106 104   CO2 mmol/L 35* 32 33*  --  34* 33* 30 26   CO2, I-STAT mmol/L  --   --   --  40*  --   --   --   --    ANION GAP mmol/L 7 7 4  --  5 5 4 6   BUN mg/dL 108* 108* 105*  --  105* 111* 120* 135*   CREATININE mg/dL 1 41* 1 39* 1 56*  --  1 39* 1 46* 1 81* 1 89*   CALCIUM mg/dL 8 4 8 1* 8 4  --  8 2* 8 1* 7 8* 8 1*   GLUCOSE RANDOM mg/dL 120 119 135  --  152* 136 132 116   ALT U/L  --  20  --   --   --   --   --   --    AST U/L  --  14  --   --   --   --   --   --    ALK PHOS U/L  --  67  --   --   --   --   --   --    ALBUMIN g/dL  --  2 5*  --   --   --   --   --   --    TOTAL BILIRUBIN mg/dL  --  1 05*  --   --   --   --   --   --      Results from last 7 days   Lab Units 09/25/22  0541 09/24/22  0515 09/23/22  0438 09/22/22  0534   MAGNESIUM mg/dL 2 7* 2 7* 3 0* 2 9*   PHOSPHORUS mg/dL 4 3* 4 6* 4 2*  --                    ABG:    VBG:  Results from last 7 days   Lab Units 09/23/22  0929   PH WENDY  7 340   PCO2 WENDY mm Hg 65 2*   PO2 WENDY mm Hg 40 7   HCO3 WENDY mmol/L 34 4*   BASE EXC WENDY mmol/L 7 2           Micro  Results from last 7 days   Lab Units 09/23/22  0016   GRAM STAIN RESULT  No Polys or Bacteria seen   BODY FLUID CULTURE, STERILE  No growth       Imaging: No new  I have personally reviewed pertinent reports  and I have personally reviewed pertinent films in PACS    Intake and Output  I/O       09/23 0701 09/24 0700 09/24 0701 09/25 0700    P  O  1020 1400    I V  (mL/kg)  10 (0 1)    IV Piggyback 50 299 2    Total Intake(mL/kg) 1070 (10 2) 1709 2 (17 5)    Urine (mL/kg/hr) 2025 (0 8) 1230 (0 5)    Other 500     Stool  0    Total Output 2525 1230    Net -1455 +479 2          Unmeasured Urine Occurrence  3 x    Unmeasured Stool Occurrence  4 x        UOP: 0 5 ml/kg/hr     Height and Weights   Height: 4' 10" (147 3 cm)     Body mass index is 44 92 kg/m²    Weight (last 2 days)     Date/Time Weight    09/25/22 0557 97 5 (214 95)            Nutrition       Diet Orders   (From admission, onward) Start     Ordered    09/22/22 0717  Diet Cardiovascular; Cardiac; Fluid Restriction 1500 ML  Diet effective now        References:    Nutrtion Support Algorithm Enteral vs  Parenteral   Question Answer Comment   Diet Type Cardiovascular    Cardiac Cardiac    Other Restriction(s): Fluid Restriction 1500 ML    RD to adjust diet per protocol?  No        09/22/22 0717                Active Medications  Scheduled Meds:  Current Facility-Administered Medications   Medication Dose Route Frequency Provider Last Rate    acetaminophen  975 mg Oral Q8H [address] Smiley Senate, PA-C      albuterol  2 puff Inhalation Q4H PRN Smiley Senate, PA-C      albuterol  2 5 mg Nebulization Q4H PRN Roselia Dubon MD      atorvastatin  10 mg Oral Daily Smiley Senate, PA-C      bisacodyl  10 mg Rectal Daily PRN Wesly Leriche, PA-PRASHANTH      bumetanide  2 mg Intravenous TID Rafa Aragon MD      carvedilol  12 5 mg Oral BID With Meals Smiley Senate, PA-C      digoxin  62 5 mcg Oral Daily Conrad PRUDENCE Cross, PA-C      gabapentin  100 mg Oral TID Smiley Senate, PA-C      heparin (porcine)  5,000 Units Subcutaneous [address] Smiley Senate, PA-C      insulin lispro  1-5 Units Subcutaneous Q6H [address] Alois Furl Rasmuson, PA-C      ipratropium  0 5 mg Nebulization TID Wesly Leriche, PA-C      levalbuterol  1 25 mg Nebulization TID Wesly Leriche, PA-C      lidocaine  1 patch Topical Daily Smiley Senate, PA-C      melatonin  6 mg Oral HS Smiley Senate, PA-C      methocarbamol  500 mg Oral Q6H [address] Smiley Senate, PA-C      oxyCODONE  2 5 mg Oral Q4H PRN Smiley Senate, PA-C      oxyCODONE  5 mg Oral Q4H PRN Smiley Senate, PA-C      pantoprazole  40 mg Oral BID AC Nisa R Rasmuson, PA-C      polyethylene glycol  17 g Oral BID Alois Furl Rasmuson, PA-C      polyethylene glycol  17 g Oral Daily PRN Smiley Senate, PA-C      potassium chloride  20 mEq Intravenous Once MD Kamaljit Soto senna-docusate sodium  1 tablet Oral BID Sen Grullon PA-C       Continuous Infusions:     PRN Meds:   albuterol, 2 puff, Q4H PRN  albuterol, 2 5 mg, Q4H PRN  bisacodyl, 10 mg, Daily PRN  oxyCODONE, 2 5 mg, Q4H PRN  oxyCODONE, 5 mg, Q4H PRN  polyethylene glycol, 17 g, Daily PRN        Allergies   No Known Allergies  ---------------------------------------------------------------------------------------  Advance Directive and Living Will:      Power of :    POLST:    ---------------------------------------------------------------------------------------  Care Time Delivered:   No Critical Care time spent     Bridget Suarez PA-C      Portions of the record may have been created with voice recognition software  Occasional wrong word or "sound a like" substitutions may have occurred due to the inherent limitations of voice recognition software    Read the chart carefully and recognize, using context, where substitutions have occurred

## 2022-09-25 NOTE — ASSESSMENT & PLAN NOTE
Alejandro Fix from chair height, was sitting  Denies LOC, but was found down after pressing alert button    Plan:  - geriatrics consult  - Plan as above   - PT/OT  - Fall precautions

## 2022-09-25 NOTE — PROGRESS NOTES
Progress Note - Cardiology   Sahkir Phillips 79 y o  female MRN: [id number]  Unit/Bed#: ICU 07 Encounter: [id number]    Assessment/Plan:  #  Mechanical fall  #  T11 vertebral fracture  #  Anemia s/p 2 units PRBCs  #  Supratherapeutic INR  #  Acute on Chronic HFpEF  #  Cardiomyopathy  #  Afib  #  AICD  #  Moderate AS  #  Moderate MR    79 F with past medical history of chronic HFrEF, AICD, chronic atrial fibrillation, CKD stage III, T2DM, XIOMARA, COPD, colon CA s/p colectomy who initially presented to [facility]  AT [place] with a mechanical fall  Trauma work up revealed T11 vertebral body fracture with narrowing of spinal canal  Neurosurgery consulted - no surgical intervention as this time  She also had significant anemia and supratherapeutic INR  INR was reversed with vitamin K and Kcentra  She was transfused 2 units PRBCs  EGD unremarkable but flex sig limited due to stool  GI cleared for oral AC  TTE this admission EF 65%, moderate AS and MR  Responded well to IV lasix but still developed acute hypoxic resp failure and transfer to ICU for BiPAP on 9/21  She is now s/p bilateral thoracentesis  Her diuretics were transition to IV bumex and respiratory requirements are improving  Recommendations:  *Acute on chronic HFpEF, mod AS, mod MR: Volume status continues to improve  Incontinent episodes but clinical improvement with edema and oxygen requirements  She is now off HFNC and on 4 L with O2 sat 99%  -Continue IV bumex 2mg TID and resume spironolactone   -Home diuretic regimen: Lasix 40 mg BID, Spironolactone 25 mg daily  -Eventual resumption of ARNI    *Afib  V-paced, underlying afib  Continue digoxin and carvedilol  Cardizem on home medication list but does not need to be restarted  Hemoglobin remains stable, high C2V  Anticoagulation on hold currently  Still no definite source of blood loss  GI cleared to resume AC  Can consider heparin gtt trial before resuming oral AC      Will plan to follow up with Dr Rachel Oliva at [facility]      Subjective/Objective   Subjective: No acute events overnight  BiPAP overnight  Now on nasal cannula 4L  No complaints this morning  Feels well and denies chest pain or dyspnea  Objective:  Vitals: /65   Pulse 57   Temp 97 9 °F (36 6 °C) (Axillary)   Resp (!) 23   Ht 4' 10" (1 473 m)   Wt 97 5 kg (214 lb 15 2 oz)   SpO2 99%   BMI 44 92 kg/m²   Vitals:    09/20/22 0600 09/25/22 0557   Weight: 105 kg (231 lb 3 2 oz) 97 5 kg (214 lb 15 2 oz)     Orthostatic Blood Pressures    Flowsheet Row Most Recent Value   Blood Pressure 140/65 filed at 09/25/2022 0803   Patient Position - Orthostatic VS Lying filed at 09/25/2022 0800            Intake/Output Summary (Last 24 hours) at 9/25/2022 1017  Last data filed at 9/25/2022 1443  Gross per 24 hour   Intake 1320 ml   Output 1070 ml   Net 250 ml       Invasive Devices  Report    Peripheral Intravenous Line  Duration           Peripheral IV 09/22/22 Right;Dorsal (posterior) Forearm 2 days          Drain  Duration           External Urinary Catheter <1 day                Review of Systems: All systems reviewed and negative except for that noted above    Physical Exam:   General appearance: alert, oriented, in no acute distress, morbid obesity  Neck: no jugular venous distension  Lungs: decreased breath sounds bilaterally, mild crackles, +exp wheezing  Heart: regular rate and rhythm, +systolic murmur   Abdomen: +BS, soft, nontender, nondistended    Extremities: trace lower extremity edema, hip edema improving    Lab Results: I have personally reviewed pertinent lab results  Imaging: I have personally reviewed pertinent reports  EKG: Personally reviewed    Counseling / Coordination of Care  Total time spent today 20 minutes  Greater than 50% of total time was spent with the patient and / or family counseling and / or coordination of care

## 2022-09-25 NOTE — ASSESSMENT & PLAN NOTE
Wt Readings from Last 3 Encounters:   09/25/22 97 5 kg (214 lb 15 2 oz)   11/12/21 110 kg (243 lb)   08/23/21 106 kg (234 lb 9 1 oz)     · Hx HF with biventricular dysfunction and EF 40% with PPM   · 9/18 Echo - EF 59%, diastolic dysfunction, mod AS, mod MR, RVSP 59  · Home regimen: Entresto, diltiazem  mg, digoxin 125 mcg, Coreg 25 mg b i d , aspirin 81 daily, lasix 40 mg BID     Plan:  · Telemetry monitoring  · Bumex 2mg IV TID  · Cardiology consulted, appreciate recommendations

## 2022-09-25 NOTE — ASSESSMENT & PLAN NOTE
· Hgb 5 7 on admission with INR 4 66 on coumadin, received 2 U PRBC, stable in 7s  · SHANT positive for melanotic stool  · Reports 2 weeks of melena   · Elevated BUN  · UGI bleed suspected    Plan:  · Coumadin reversed with Peggye Mohamud and Vitamin K   · Trend Hgb, stable  · PO PPI BID    · 9/19 EGD negative, flex sigmoidoscopy incomplete due to stool but not obvious source of bleed  · Re-check H/H 16:30 today  · If worsening anemia and continued melena, re-consult GI

## 2022-09-25 NOTE — ASSESSMENT & PLAN NOTE
· On coumadin with INR 4 66 on admission   · Coumadin reversed with Kcentra and vitamin K on admission for acute blood loss anemia and received 2 PRBC  · Home regimen:  Digoxin 125 mcg, Coreg 25 mg b i d , diltiazem  mg    Plan:  · Currently rate controlled, continue holding home medication  · Continue coreg 12 5mg BID  · Continue hold AC---- HAS-BLED score is 4 which carries 8 9% risk of major bleeding and therefore may defer restarting DOAC atleast until PCP evaluation

## 2022-09-25 NOTE — PLAN OF CARE
Problem: MOBILITY - ADULT  Goal: Maintain or return to baseline ADL function  Description: INTERVENTIONS:  -  Assess patient's ability to carry out ADLs; assess patient's baseline for ADL function and identify physical deficits which impact ability to perform ADLs (bathing, care of mouth/teeth, toileting, grooming, dressing, etc )  - Assess/evaluate cause of self-care deficits   - Assess range of motion  - Assess patient's mobility; develop plan if impaired  - Assess patient's need for assistive devices and provide as appropriate  - Encourage maximum independence but intervene and supervise when necessary  - Involve family in performance of ADLs  - Assess for home care needs following discharge   - Consider OT consult to assist with ADL evaluation and planning for discharge  - Provide patient education as appropriate  Outcome: Progressing  Goal: Maintains/Returns to pre admission functional level  Description: INTERVENTIONS:  - Perform BMAT or MOVE assessment daily    - Set and communicate daily mobility goal to care team and patient/family/caregiver  - Collaborate with rehabilitation services on mobility goals if consulted  - Perform Range of Motion 3 times a day  - Reposition patient every 2 hours    - Dangle patient 3 times a day  - Stand patient 3 times a day  - Ambulate patient 3 times a day  - Out of bed to chair 3 times a day   - Out of bed for meals 3 times a day  - Out of bed for toileting  - Record patient progress and toleration of activity level   Outcome: Progressing     Problem: Potential for Falls  Goal: Patient will remain free of falls  Description: INTERVENTIONS:  - Educate patient/family on patient safety including physical limitations  - Instruct patient to call for assistance with activity   - Consult OT/PT to assist with strengthening/mobility   - Keep Call bell within reach  - Keep bed low and locked with side rails adjusted as appropriate  - Keep care items and personal belongings within reach  - Initiate and maintain comfort rounds  - Make Fall Risk Sign visible to staff  - Offer Toileting every 2 Hours, in advance of need  - Initiate/Maintain bed alarm  - Obtain necessary fall risk management equipment  - Apply yellow socks and bracelet for high fall risk patients  - Consider moving patient to room near nurses station  Outcome: Progressing     Problem: Prexisting or High Potential for Compromised Skin Integrity  Goal: Skin integrity is maintained or improved  Description: INTERVENTIONS:  - Identify patients at risk for skin breakdown  - Assess and monitor skin integrity  - Assess and monitor nutrition and hydration status  - Monitor labs   - Assess for incontinence   - Turn and reposition patient  - Assist with mobility/ambulation  - Relieve pressure over bony prominences  - Avoid friction and shearing  - Provide appropriate hygiene as needed including keeping skin clean and dry  - Evaluate need for skin moisturizer/barrier cream  - Collaborate with interdisciplinary team   - Patient/family teaching  - Consider wound care consult   Outcome: Progressing     Problem: PAIN - ADULT  Goal: Verbalizes/displays adequate comfort level or baseline comfort level  Description: Interventions:  - Encourage patient to monitor pain and request assistance  - Assess pain using appropriate pain scale  - Administer analgesics based on type and severity of pain and evaluate response  - Implement non-pharmacological measures as appropriate and evaluate response  - Consider cultural and social influences on pain and pain management  - Notify physician/advanced practitioner if interventions unsuccessful or patient reports new pain  Outcome: Progressing     Problem: INFECTION - ADULT  Goal: Absence or prevention of progression during hospitalization  Description: INTERVENTIONS:  - Assess and monitor for signs and symptoms of infection  - Monitor lab/diagnostic results  - Monitor all insertion sites, i e  indwelling lines, tubes, and drains  - Monitor endotracheal if appropriate and nasal secretions for changes in amount and color  - [place] appropriate cooling/warming therapies per order  - Administer medications as ordered  - Instruct and encourage patient and family to use good hand hygiene technique  - Identify and instruct in appropriate isolation precautions for identified infection/condition  Outcome: Progressing     Problem: SAFETY ADULT  Goal: Maintain or return to baseline ADL function  Description: INTERVENTIONS:  -  Assess patient's ability to carry out ADLs; assess patient's baseline for ADL function and identify physical deficits which impact ability to perform ADLs (bathing, care of mouth/teeth, toileting, grooming, dressing, etc )  - Assess/evaluate cause of self-care deficits   - Assess range of motion  - Assess patient's mobility; develop plan if impaired  - Assess patient's need for assistive devices and provide as appropriate  - Encourage maximum independence but intervene and supervise when necessary  - Involve family in performance of ADLs  - Assess for home care needs following discharge   - Consider OT consult to assist with ADL evaluation and planning for discharge  - Provide patient education as appropriate  Outcome: Progressing  Goal: Maintains/Returns to pre admission functional level  Description: INTERVENTIONS:  - Perform BMAT or MOVE assessment daily    - Set and communicate daily mobility goal to care team and patient/family/caregiver  - Collaborate with rehabilitation services on mobility goals if consulted  - Perform Range of Motion 3 times a day  - Reposition patient every 2 hours    - Dangle patient 3 times a day  - Stand patient 3 times a day  - Ambulate patient 3 times a day  - Out of bed to chair 3 times a day   - Out of bed for meals 3 times a day  - Out of bed for toileting  - Record patient progress and toleration of activity level   Outcome: Progressing  Goal: Patient will remain free of falls  Description: INTERVENTIONS:  - Educate patient/family on patient safety including physical limitations  - Instruct patient to call for assistance with activity   - Consult OT/PT to assist with strengthening/mobility   - Keep Call bell within reach  - Keep bed low and locked with side rails adjusted as appropriate  - Keep care items and personal belongings within reach  - Initiate and maintain comfort rounds  - Make Fall Risk Sign visible to staff  - Consider moving patient to room near nurses station  Outcome: Progressing     Problem: DISCHARGE PLANNING  Goal: Discharge to home or other facility with appropriate resources  Description: INTERVENTIONS:  - Identify barriers to discharge w/patient and caregiver  - Arrange for needed discharge resources and transportation as appropriate  - Identify discharge learning needs (meds, wound care, etc )  - Arrange for interpretive services to assist at discharge as needed  - Refer to Case Management Department for coordinating discharge planning if the patient needs post-hospital services based on physician/advanced practitioner order or complex needs related to functional status, cognitive ability, or social support system  Outcome: Progressing     Problem: Knowledge Deficit  Goal: Patient/family/caregiver demonstrates understanding of disease process, treatment plan, medications, and discharge instructions  Description: Complete learning assessment and assess knowledge base  Interventions:  - Provide teaching at level of understanding  - Provide teaching via preferred learning methods  Outcome: Progressing     Problem: Nutrition/Hydration-ADULT  Goal: Nutrient/Hydration intake appropriate for improving, restoring or maintaining nutritional needs  Description: Monitor and assess patient's nutrition/hydration status for malnutrition  Collaborate with interdisciplinary team and initiate plan and interventions as ordered    Monitor patient's weight and dietary intake as ordered or per policy  Utilize nutrition screening tool and intervene as necessary  Determine patient's food preferences and provide high-protein, high-caloric foods as appropriate       INTERVENTIONS:  - Monitor oral intake, urinary output, labs, and treatment plans  - Assess nutrition and hydration status and recommend course of action  - Evaluate amount of meals eaten  - Assist patient with eating if necessary   - Allow adequate time for meals  - Recommend/ encourage appropriate diets, oral nutritional supplements, and vitamin/mineral supplements  - Order, calculate, and assess calorie counts as needed  - Recommend, monitor, and adjust tube feedings and TPN/PPN based on assessed needs  - Assess need for intravenous fluids  - Provide specific nutrition/hydration education as appropriate  - Include patient/family/caregiver in decisions related to nutrition  Outcome: Progressing

## 2022-09-25 NOTE — ASSESSMENT & PLAN NOTE
Wt Readings from Last 3 Encounters:   09/25/22 97 5 kg (214 lb 15 2 oz)   11/12/21 110 kg (243 lb)   08/23/21 106 kg (234 lb 9 1 oz)     · Hx HFrEF (EF 40%) with BiV dysfunction though most recen TTE with EF 65%, mod MR, mod AS  · Currently holding home entresto  · Cardiology consulted, appreciate recommendations  · Continue diuresis with bumex 2mg IV TID

## 2022-09-25 NOTE — ASSESSMENT & PLAN NOTE
· Non-operative mgmt per NSGY  · Continue TLSO brace  · Q4hr neuro checks  · PT/OT  · Multimodal analgesia with scheduled tylenol, neurontin, and robaxin

## 2022-09-25 NOTE — ASSESSMENT & PLAN NOTE
Lab Results   Component Value Date    HGBA1C 6 6 (H) 06/08/2022       Recent Labs     09/24/22  1638 09/24/22  1801 09/24/22  2351 09/25/22  0556   POCGLU 194* 171* 166* 142*       Blood Sugar Average: Last 72 hrs:  (P) 373 [id number]     · Continue SSI, goal -180

## 2022-09-26 LAB
ANION GAP SERPL CALCULATED.3IONS-SCNC: 6 MMOL/L (ref 4–13)
BACTERIA SPEC BFLD CULT: NO GROWTH
BASOPHILS # BLD AUTO: 0.01 THOUSANDS/ΜL (ref 0–0.1)
BASOPHILS NFR BLD AUTO: 0 % (ref 0–1)
BUN SERPL-MCNC: 106 MG/DL (ref 5–25)
CALCIUM SERPL-MCNC: 8.2 MG/DL (ref 8.3–10.1)
CHLORIDE SERPL-SCNC: 97 MMOL/L (ref 96–108)
CO2 SERPL-SCNC: 36 MMOL/L (ref 21–32)
CREAT SERPL-MCNC: 1.29 MG/DL (ref 0.6–1.3)
EOSINOPHIL # BLD AUTO: 0.16 THOUSAND/ΜL (ref 0–0.61)
EOSINOPHIL NFR BLD AUTO: 2 % (ref 0–6)
ERYTHROCYTE [DISTWIDTH] IN BLOOD BY AUTOMATED COUNT: 20.1 % (ref 11.6–15.1)
GFR SERPL CREATININE-BSD FRML MDRD: 38 ML/MIN/1.73SQ M
GLUCOSE SERPL-MCNC: 113 MG/DL (ref 65–140)
GLUCOSE SERPL-MCNC: 134 MG/DL (ref 65–140)
GLUCOSE SERPL-MCNC: 136 MG/DL (ref 65–140)
GLUCOSE SERPL-MCNC: 190 MG/DL (ref 65–140)
GLUCOSE SERPL-MCNC: 209 MG/DL (ref 65–140)
GLUCOSE SERPL-MCNC: 218 MG/DL (ref 65–140)
GRAM STN SPEC: NORMAL
HCT VFR BLD AUTO: 27 % (ref 34.8–46.1)
HGB BLD-MCNC: 8.1 G/DL (ref 11.5–15.4)
IMM GRANULOCYTES # BLD AUTO: 0.15 THOUSAND/UL (ref 0–0.2)
IMM GRANULOCYTES NFR BLD AUTO: 1 % (ref 0–2)
LYMPHOCYTES # BLD AUTO: 0.44 THOUSANDS/ΜL (ref 0.6–4.47)
LYMPHOCYTES NFR BLD AUTO: 4 % (ref 14–44)
MAGNESIUM SERPL-MCNC: 2.4 MG/DL (ref 1.6–2.6)
MCH RBC QN AUTO: 30.3 PG (ref 26.8–34.3)
MCHC RBC AUTO-ENTMCNC: 30 G/DL (ref 31.4–37.4)
MCV RBC AUTO: 101 FL (ref 82–98)
MONOCYTES # BLD AUTO: 0.89 THOUSAND/ΜL (ref 0.17–1.22)
MONOCYTES NFR BLD AUTO: 8 % (ref 4–12)
NEUTROPHILS # BLD AUTO: 9.18 THOUSANDS/ΜL (ref 1.85–7.62)
NEUTS SEG NFR BLD AUTO: 85 % (ref 43–75)
NRBC BLD AUTO-RTO: 0 /100 WBCS
PHOSPHATE SERPL-MCNC: 4.1 MG/DL (ref 2.3–4.1)
PLATELET # BLD AUTO: 156 THOUSANDS/UL (ref 149–390)
PMV BLD AUTO: 11 FL (ref 8.9–12.7)
POTASSIUM SERPL-SCNC: 3.7 MMOL/L (ref 3.5–5.3)
RBC # BLD AUTO: 2.67 MILLION/UL (ref 3.81–5.12)
SODIUM SERPL-SCNC: 139 MMOL/L (ref 135–147)
WBC # BLD AUTO: 10.83 THOUSAND/UL (ref 4.31–10.16)

## 2022-09-26 PROCEDURE — 88112 CYTOPATH CELL ENHANCE TECH: CPT | Performed by: PATHOLOGY

## 2022-09-26 PROCEDURE — 94760 N-INVAS EAR/PLS OXIMETRY 1: CPT

## 2022-09-26 PROCEDURE — 99232 SBSQ HOSP IP/OBS MODERATE 35: CPT | Performed by: INTERNAL MEDICINE

## 2022-09-26 PROCEDURE — 97530 THERAPEUTIC ACTIVITIES: CPT

## 2022-09-26 PROCEDURE — 85025 COMPLETE CBC W/AUTO DIFF WBC: CPT | Performed by: PHYSICIAN ASSISTANT

## 2022-09-26 PROCEDURE — 88305 TISSUE EXAM BY PATHOLOGIST: CPT | Performed by: PATHOLOGY

## 2022-09-26 PROCEDURE — 94668 MNPJ CHEST WALL SBSQ: CPT

## 2022-09-26 PROCEDURE — 84100 ASSAY OF PHOSPHORUS: CPT | Performed by: PHYSICIAN ASSISTANT

## 2022-09-26 PROCEDURE — 94640 AIRWAY INHALATION TREATMENT: CPT

## 2022-09-26 PROCEDURE — 80048 BASIC METABOLIC PNL TOTAL CA: CPT | Performed by: PHYSICIAN ASSISTANT

## 2022-09-26 PROCEDURE — 99232 SBSQ HOSP IP/OBS MODERATE 35: CPT | Performed by: SURGERY

## 2022-09-26 PROCEDURE — 94660 CPAP INITIATION&MGMT: CPT

## 2022-09-26 PROCEDURE — 82948 REAGENT STRIP/BLOOD GLUCOSE: CPT

## 2022-09-26 PROCEDURE — 83735 ASSAY OF MAGNESIUM: CPT | Performed by: PHYSICIAN ASSISTANT

## 2022-09-26 PROCEDURE — 97112 NEUROMUSCULAR REEDUCATION: CPT

## 2022-09-26 PROCEDURE — 97535 SELF CARE MNGMENT TRAINING: CPT

## 2022-09-26 RX ORDER — BUMETANIDE 0.25 MG/ML
2 INJECTION, SOLUTION INTRAMUSCULAR; INTRAVENOUS 2 TIMES DAILY
Status: DISCONTINUED | OUTPATIENT
Start: 2022-09-26 | End: 2022-09-27

## 2022-09-26 RX ORDER — POTASSIUM CHLORIDE 20 MEQ/1
40 TABLET, EXTENDED RELEASE ORAL ONCE
Status: COMPLETED | OUTPATIENT
Start: 2022-09-26 | End: 2022-09-26

## 2022-09-26 RX ADMIN — GABAPENTIN 100 MG: 100 CAPSULE ORAL at 17:42

## 2022-09-26 RX ADMIN — LEVALBUTEROL HYDROCHLORIDE 1.25 MG: 1.25 SOLUTION, CONCENTRATE RESPIRATORY (INHALATION) at 14:40

## 2022-09-26 RX ADMIN — IPRATROPIUM BROMIDE 0.5 MG: 0.5 SOLUTION RESPIRATORY (INHALATION) at 14:40

## 2022-09-26 RX ADMIN — PANTOPRAZOLE SODIUM 40 MG: 40 TABLET, DELAYED RELEASE ORAL at 06:30

## 2022-09-26 RX ADMIN — POTASSIUM CHLORIDE 40 MEQ: 1500 TABLET, EXTENDED RELEASE ORAL at 11:33

## 2022-09-26 RX ADMIN — POLYETHYLENE GLYCOL 3350 17 G: 17 POWDER, FOR SOLUTION ORAL at 08:59

## 2022-09-26 RX ADMIN — BUMETANIDE 2 MG: 0.25 INJECTION INTRAMUSCULAR; INTRAVENOUS at 08:59

## 2022-09-26 RX ADMIN — ACETAMINOPHEN 975 MG: 325 TABLET, FILM COATED ORAL at 13:44

## 2022-09-26 RX ADMIN — INSULIN LISPRO 1 UNITS: 100 INJECTION, SOLUTION INTRAVENOUS; SUBCUTANEOUS at 17:50

## 2022-09-26 RX ADMIN — LIDOCAINE 5% 1 PATCH: 700 PATCH TOPICAL at 08:58

## 2022-09-26 RX ADMIN — IPRATROPIUM BROMIDE 0.5 MG: 0.5 SOLUTION RESPIRATORY (INHALATION) at 08:20

## 2022-09-26 RX ADMIN — METHOCARBAMOL TABLETS 500 MG: 500 TABLET, COATED ORAL at 00:25

## 2022-09-26 RX ADMIN — PANTOPRAZOLE SODIUM 40 MG: 40 TABLET, DELAYED RELEASE ORAL at 17:41

## 2022-09-26 RX ADMIN — GABAPENTIN 100 MG: 100 CAPSULE ORAL at 22:06

## 2022-09-26 RX ADMIN — HEPARIN SODIUM 5000 UNITS: 5000 INJECTION INTRAVENOUS; SUBCUTANEOUS at 13:44

## 2022-09-26 RX ADMIN — INSULIN LISPRO 1 UNITS: 100 INJECTION, SOLUTION INTRAVENOUS; SUBCUTANEOUS at 13:42

## 2022-09-26 RX ADMIN — HEPARIN SODIUM 5000 UNITS: 5000 INJECTION INTRAVENOUS; SUBCUTANEOUS at 06:30

## 2022-09-26 RX ADMIN — LEVALBUTEROL HYDROCHLORIDE 1.25 MG: 1.25 SOLUTION, CONCENTRATE RESPIRATORY (INHALATION) at 08:20

## 2022-09-26 RX ADMIN — ATORVASTATIN CALCIUM 10 MG: 10 TABLET, FILM COATED ORAL at 08:58

## 2022-09-26 RX ADMIN — HEPARIN SODIUM 5000 UNITS: 5000 INJECTION INTRAVENOUS; SUBCUTANEOUS at 22:06

## 2022-09-26 RX ADMIN — SENNOSIDES AND DOCUSATE SODIUM 1 TABLET: 8.6; 5 TABLET ORAL at 08:58

## 2022-09-26 RX ADMIN — LEVALBUTEROL HYDROCHLORIDE 1.25 MG: 1.25 SOLUTION, CONCENTRATE RESPIRATORY (INHALATION) at 20:12

## 2022-09-26 RX ADMIN — IPRATROPIUM BROMIDE 0.5 MG: 0.5 SOLUTION RESPIRATORY (INHALATION) at 20:12

## 2022-09-26 RX ADMIN — CARVEDILOL 12.5 MG: 12.5 TABLET, FILM COATED ORAL at 08:58

## 2022-09-26 RX ADMIN — OXYCODONE HYDROCHLORIDE 5 MG: 5 TABLET ORAL at 11:35

## 2022-09-26 RX ADMIN — ACETAMINOPHEN 975 MG: 325 TABLET, FILM COATED ORAL at 22:06

## 2022-09-26 RX ADMIN — DIGOXIN 62.5 MCG: 125 TABLET ORAL at 08:59

## 2022-09-26 RX ADMIN — METHOCARBAMOL TABLETS 500 MG: 500 TABLET, COATED ORAL at 06:30

## 2022-09-26 RX ADMIN — ACETAMINOPHEN 975 MG: 325 TABLET, FILM COATED ORAL at 06:30

## 2022-09-26 RX ADMIN — METHOCARBAMOL TABLETS 500 MG: 500 TABLET, COATED ORAL at 17:42

## 2022-09-26 RX ADMIN — Medication 6 MG: at 22:06

## 2022-09-26 RX ADMIN — GABAPENTIN 100 MG: 100 CAPSULE ORAL at 08:58

## 2022-09-26 RX ADMIN — METHOCARBAMOL TABLETS 500 MG: 500 TABLET, COATED ORAL at 11:32

## 2022-09-26 RX ADMIN — CARVEDILOL 12.5 MG: 12.5 TABLET, FILM COATED ORAL at 17:42

## 2022-09-26 RX ADMIN — BUMETANIDE 2 MG: 0.25 INJECTION, SOLUTION INTRAMUSCULAR; INTRAVENOUS at 22:11

## 2022-09-26 NOTE — PROGRESS NOTES
General Cardiology   Progress Note -  [facility] One   Trinidad Vegas 79 y o  female MRN: [id number]    Unit/Bed#: ICU 07 Encounter: [id number]    Assessment/ Plan    1  Acute on chronic hypoxic respiratory failure in the setting of acute on chronic systolic heart failure   On Bumex 2 mg IV TID, Can decrease to BID then likely change to oral diuretics tomorrow  Patient takes furosemide 80 mg PO daily at home  S/p bilateral thoracentesis   Creatinine 1 29  Check BMP in am   Continue 2 gram Na diet   Monitor I/Os - 650 ml in 24 hours (? Accuracy with incontinence)   Daily weights  217 lbs (bedscale)   Echocardiogram 9/18/2022 showed EF 65%, LA severely dilated, RA dilated, mild AI, moderate AS, moderate MR and mild TR      2  Cardiomyopathy   S/p ICD  Recommend resuming home medications: Entresto  for GDMT prior to discharge pending renal function   Continue coreg      3  Acute on Chronic kidney disease stage III   Baseline creatinine 1 2-1 4  Creatinine 1 29 today      4  Chronic atrial fibrillation   On digoxin 62 5 mcg PO daily and coreg 12 5 mg PO BID  Patient takes digoxin 125 mcg PO daily and coreg 25 mg PO BID at home  Recommend resume coumadin or can consider trial of heparin gtt if concern for bleeding  Patient follows with Dr Teresa Spann from [facility]      5  T11 Vertebral fracture  Followed by primary team and neurosurgery team   TLSO brace      6  Anemia   Hemoglobin 5 7 on admission   S/p transfusion   Hemoglobin 8 1  today       Subjective  Patient resting in bed and getting transferred to [facility] floor  She denies SOB, orthopnea, chest pain or palpitations  No fever or chills  She is feeling better today she reports  Review of Systems   Constitutional: Positive for malaise/fatigue  Negative for chills and fever  HENT: Negative for congestion  Cardiovascular: Negative for chest pain, leg swelling and orthopnea  Respiratory: Negative for shortness of breath      Musculoskeletal: Negative for falls  Gastrointestinal: Negative for bloating, nausea and vomiting  Neurological: Negative for dizziness and light-headedness  Psychiatric/Behavioral: Negative for altered mental status  All other systems reviewed and are negative  Objective:   Vitals: Blood pressure (!) 100/47, pulse 64, temperature 98 2 °F (36 8 °C), temperature source Oral, resp  rate 17, height 4' 10" (1 473 m), weight 98 7 kg (217 lb 9 5 oz), SpO2 98 %  ,       Body mass index is 45 48 kg/m²  ,     Systolic (33PCD), QXA:149 , Min:91 , NMT:828     Diastolic (38LDW), JNR:84, Min:37, Max:59      Intake/Output Summary (Last 24 hours) at 9/26/2022 1010  Last data filed at 9/26/2022 0400  Gross per 24 hour   Intake 1020 ml   Output 1890 ml   Net -870 ml     Weight (last 2 days)     Date/Time Weight    09/26/22 0600 98 7 (217 59)    09/25/22 0557 97 5 (214 95)        Telemetry Review: V paced       Physical Exam  Constitutional:       Appearance: She is obese  HENT:      Head: Normocephalic  Mouth/Throat:      Mouth: Mucous membranes are moist    Cardiovascular:      Rate and Rhythm: Normal rate and regular rhythm  Pulses: Normal pulses  Pulmonary:      Effort: Pulmonary effort is normal  No respiratory distress  Breath sounds: Normal breath sounds  Comments: Decreased in bases  2 L NC   Abdominal:      General: Bowel sounds are normal       Palpations: Abdomen is soft  Musculoskeletal:         General: No swelling  Normal range of motion  Cervical back: Neck supple  Skin:     General: Skin is warm and dry  Capillary Refill: Capillary refill takes less than 2 seconds  Neurological:      General: No focal deficit present  Mental Status: She is alert and oriented to person, place, and time  Mental status is at baseline     Psychiatric:         Mood and Affect: Mood normal          LABORATORY RESULTS      CBC with diff:   Results from last 7 days   Lab Units 09/26/22  0553 09/25/22  3759 09/25/22  0541 09/24/22  0515 09/23/22  0438 09/22/22 2159 09/22/22  0634 09/21/22  0623 09/20/22  0629   WBC Thousand/uL 10 83* 10 29* 10 35* 12 57* 15 08*  --  16 99* 12 89* 13 30*   HEMOGLOBIN g/dL 8 1* 7 9* 7 9* 7 5* 8 4*  --  8 8* 8 4* 7 8*   I STAT HEMOGLOBIN g/dl  --   --   --   --   --  8 8*  --   --   --    HEMATOCRIT % 27 0* 26 5* 26 7* 25 3* 27 7*  --  28 7* 27 8* 25 5*   HEMATOCRIT, ISTAT %  --   --   --   --   --  26*  --   --   --    MCV fL 101* 102* 102* 102* 102*  --  101* 101* 100*   PLATELETS Thousands/uL 156 151 159 150 155  --  137* 153 149   MCH pg 30 3 30 4 30 2 30 1 30 8  --  31 1 30 4 30 6   MCHC g/dL 30 0* 29 8* 29 6* 29 6* 30 3*  --  30 7* 30 2* 30 6*   RDW % 20 1* 20 1* 20 5* 20 5* 20 3*  --  20 7* 20 7* 20 2*   MPV fL 11 0 10 9 10 9 10 7 10 3  --  10 3 10 2 10 4   NRBC AUTO /100 WBCs 0  --  0  --   --   --  0 0 0       CMP:  Results from last 7 days   Lab Units 09/26/22  0553 09/25/22  0541 09/24/22  0515 09/23/22 0438 09/22/22 2159 09/22/22  1814 09/22/22  0534 09/21/22  0623   POTASSIUM mmol/L 3 7 3 6 3 1* 4 2  --  4 1 3 5 4 0   CHLORIDE mmol/L 97 99 102 104  --  104 105 106   CO2 mmol/L 36* 35* 32 33*  --  34* 33* 30   CO2, I-STAT mmol/L  --   --   --   --  40*  --   --   --    BUN mg/dL 106* 108* 108* 105*  --  105* 111* 120*   CREATININE mg/dL 1 29 1 41* 1 39* 1 56*  --  1 39* 1 46* 1 81*   GLUCOSE, ISTAT mg/dl  --   --   --   --  143*  --   --   --    CALCIUM mg/dL 8 2* 8 4 8 1* 8 4  --  8 2* 8 1* 7 8*   AST U/L  --   --  14  --   --   --   --   --    ALT U/L  --   --  20  --   --   --   --   --    ALK PHOS U/L  --   --  67  --   --   --   --   --    EGFR ml/min/1 73sq m 38 34 35 30  --  35 33 25       BMP:  Results from last 7 days   Lab Units 09/26/22  0553 09/25/22  0541 09/24/22  0515 09/23/22  0438 09/22/22  2159 09/22/22  1814 09/22/22  0534 09/21/22  0623   POTASSIUM mmol/L 3 7 3 6 3 1* 4 2  --  4 1 3 5 4 0   CHLORIDE mmol/L 97 99 102 104  --  104 105 106   CO2 mmol/L 36* 35* 32 33*  --  34* 33* 30   CO2, I-STAT mmol/L  --   --   --   --  40*  --   --   --    BUN mg/dL 106* 108* 108* 105*  --  105* 111* 120*   CREATININE mg/dL 1 29 1 41* 1 39* 1 56*  --  1 39* 1 46* 1 81*   GLUCOSE, ISTAT mg/dl  --   --   --   --  143*  --   --   --    CALCIUM mg/dL 8 2* 8 4 8 1* 8 4  --  8 2* 8 1* 7 8*       Lab Results   Component Value Date    NTBNP 2,236 (H) 09/17/2022    NTBNP 1,583 (H) 11/11/2021             Results from last 7 days   Lab Units 09/26/22  0553 09/25/22  0541 09/24/22  0515 09/23/22  0438 09/22/22  0534   MAGNESIUM mg/dL 2 4 2 7* 2 7* 3 0* 2 9*                           Lipid Profile:   No results found for: CHOL  No results found for: HDL  No results found for: LDLCALC  No results found for: TRIG    Cardiac testing:   No results found for this or any previous visit  No results found for this or any previous visit  No results found for this or any previous visit  No valid procedures specified  No results found for this or any previous visit      Meds/Allergies   all current active meds have been reviewed and current meds:   Current Facility-Administered Medications   Medication Dose Route Frequency    acetaminophen (TYLENOL) tablet 975 mg  975 mg Oral Q8H [address]    albuterol (PROVENTIL HFA,VENTOLIN HFA) inhaler 2 puff  2 puff Inhalation Q4H PRN    albuterol inhalation solution 2 5 mg  2 5 mg Nebulization Q4H PRN    atorvastatin (LIPITOR) tablet 10 mg  10 mg Oral Daily    bisacodyl (DULCOLAX) rectal suppository 10 mg  10 mg Rectal Daily PRN    bumetanide (BUMEX) injection 2 mg  2 mg Intravenous TID    carvedilol (COREG) tablet 12 5 mg  12 5 mg Oral BID With Meals    digoxin (LANOXIN) tablet 62 5 mcg  62 5 mcg Oral Daily    gabapentin (NEURONTIN) capsule 100 mg  100 mg Oral TID    heparin (porcine) subcutaneous injection 5,000 Units  5,000 Units Subcutaneous Q8H [address]    insulin lispro (HumaLOG) 100 units/mL subcutaneous injection 1-5 Units  1-5 Units Subcutaneous Q6H [address]    ipratropium (ATROVENT) 0 02 % inhalation solution 0 5 mg  0 5 mg Nebulization TID    levalbuterol (XOPENEX) inhalation solution 1 25 mg  1 25 mg Nebulization TID    lidocaine (LIDODERM) 5 % patch 1 patch  1 patch Topical Daily    melatonin tablet 6 mg  6 mg Oral HS    methocarbamol (ROBAXIN) tablet 500 mg  500 mg Oral Q6H [address]    oxyCODONE (ROXICODONE) IR tablet 2 5 mg  2 5 mg Oral Q4H PRN    oxyCODONE (ROXICODONE) IR tablet 5 mg  5 mg Oral Q4H PRN    pantoprazole (PROTONIX) EC tablet 40 mg  40 mg Oral BID AC    polyethylene glycol (MIRALAX) packet 17 g  17 g Oral BID    polyethylene glycol (MIRALAX) packet 17 g  17 g Oral Daily PRN    senna-docusate sodium (SENOKOT S) 8 6-50 mg per tablet 1 tablet  1 tablet Oral BID    simethicone (MYLICON) chewable tablet 80 mg  80 mg Oral Q6H PRN     Medications Prior to Admission   Medication    albuterol (2 5 mg/3 mL) 0 083 % nebulizer solution    albuterol (PROVENTIL HFA,VENTOLIN HFA) 90 mcg/act inhaler    allopurinol (ZYLOPRIM) 300 mg tablet    aspirin 81 mg chewable tablet    atorvastatin (LIPITOR) 10 mg tablet    budesonide-formoterol (SYMBICORT) 160-4 5 mcg/act inhaler    carvedilol (COREG) 25 mg tablet    cloNIDine (CATAPRES) 0 1 mg tablet    colchicine (COLCRYS) 0 6 mg tablet    digoxin (LANOXIN) 0 25 mg tablet    diltiazem (CARDIZEM CD) 180 mg 24 hr capsule    FERROUS SULFATE PO    furosemide (LASIX) 40 mg tablet    gabapentin (NEURONTIN) 100 mg capsule    guaiFENesin 400 mg    Magnesium 400 MG TABS    meclizine (ANTIVERT) 25 mg tablet    metolazone (ZAROXOLYN) 5 mg tablet    Multiple Vitamin (MULTIVITAMIN) capsule    pantoprazole (PROTONIX) 40 mg tablet    sacubitril-valsartan (ENTRESTO)  MG TABS    spironolactone (ALDACTONE) 25 mg tablet    warfarin (COUMADIN) 2 mg tablet    warfarin (COUMADIN) 7 5 mg tablet       Counseling / Coordination of Care  Total floor / unit time spent today 20 minutes    Greater than 50% of total time was spent with the patient and / or family counseling and / or coordination of care  ** Please Note: Dragon 360 Dictation voice to text software may have been used in the creation of this document   **

## 2022-09-26 NOTE — PLAN OF CARE
Problem: PHYSICAL THERAPY ADULT  Goal: Performs mobility at highest level of function for planned discharge setting  See evaluation for individualized goals  Description: Treatment/Interventions: Functional transfer training, LE strengthening/ROM, Therapeutic exercise, Endurance training, Patient/family training, Equipment eval/education, Bed mobility, Spoke to nursing, Continued evaluation, OT  Equipment Recommended:  (continue to assess)       See flowsheet documentation for full assessment, interventions and recommendations  Outcome: Not Progressing  Note: Prognosis: Fair  Problem List: Decreased strength, Decreased endurance, Decreased range of motion, Impaired balance, Decreased mobility, Decreased coordination, Decreased safety awareness, Pain  Assessment: Pt required increased encouragement to continue to participate 2* to pain and fatigue  REquired A to complete log roll during bed mobility, move LE to dependent position, and upright trunk  Poor sitting tolerance and balance requiring constant A to maintain upright  Required total A for LE management during back to bed with decreased LE strength  Pt will benefit from continued inpt skilled PT and rehab to maximize functional mobility & safety  PT Discharge Recommendation: Post acute rehabilitation services    See flowsheet documentation for full assessment

## 2022-09-26 NOTE — PLAN OF CARE
Problem: MOBILITY - ADULT  Goal: Maintain or return to baseline ADL function  Description: INTERVENTIONS:  -  Assess patient's ability to carry out ADLs; assess patient's baseline for ADL function and identify physical deficits which impact ability to perform ADLs (bathing, care of mouth/teeth, toileting, grooming, dressing, etc )  - Assess/evaluate cause of self-care deficits   - Assess range of motion  - Assess patient's mobility; develop plan if impaired  - Assess patient's need for assistive devices and provide as appropriate  - Encourage maximum independence but intervene and supervise when necessary  - Involve family in performance of ADLs  - Assess for home care needs following discharge   - Consider OT consult to assist with ADL evaluation and planning for discharge  - Provide patient education as appropriate  Outcome: Progressing  Goal: Maintains/Returns to pre admission functional level  Description: INTERVENTIONS:  - Perform BMAT or MOVE assessment daily    - Set and communicate daily mobility goal to care team and patient/family/caregiver  - Collaborate with rehabilitation services on mobility goals if consulted  - Perform Range of Motion 3 times a day  - Reposition patient every 2 hours    - Dangle patient 3 times a day  - Stand patient 3 times a day  - Ambulate patient 3 times a day  - Out of bed to chair 3 times a day   - Out of bed for meals 3 times a day  - Out of bed for toileting  - Record patient progress and toleration of activity level   Outcome: Progressing     Problem: Potential for Falls  Goal: Patient will remain free of falls  Description: INTERVENTIONS:  - Educate patient/family on patient safety including physical limitations  - Instruct patient to call for assistance with activity   - Consult OT/PT to assist with strengthening/mobility   - Keep Call bell within reach  - Keep bed low and locked with side rails adjusted as appropriate  - Keep care items and personal belongings within reach  - Initiate and maintain comfort rounds  - Make Fall Risk Sign visible to staff  - Apply yellow socks and bracelet for high fall risk patients  - Consider moving patient to room near nurses station  Outcome: Progressing     Problem: Prexisting or High Potential for Compromised Skin Integrity  Goal: Skin integrity is maintained or improved  Description: INTERVENTIONS:  - Identify patients at risk for skin breakdown  - Assess and monitor skin integrity  - Assess and monitor nutrition and hydration status  - Monitor labs   - Assess for incontinence   - Turn and reposition patient  - Assist with mobility/ambulation  - Relieve pressure over bony prominences  - Avoid friction and shearing  - Provide appropriate hygiene as needed including keeping skin clean and dry  - Evaluate need for skin moisturizer/barrier cream  - Collaborate with interdisciplinary team   - Patient/family teaching  - Consider wound care consult   Outcome: Progressing     Problem: PAIN - ADULT  Goal: Verbalizes/displays adequate comfort level or baseline comfort level  Description: Interventions:  - Encourage patient to monitor pain and request assistance  - Assess pain using appropriate pain scale  - Administer analgesics based on type and severity of pain and evaluate response  - Implement non-pharmacological measures as appropriate and evaluate response  - Consider cultural and social influences on pain and pain management  - Notify physician/advanced practitioner if interventions unsuccessful or patient reports new pain  Outcome: Progressing     Problem: INFECTION - ADULT  Goal: Absence or prevention of progression during hospitalization  Description: INTERVENTIONS:  - Assess and monitor for signs and symptoms of infection  - Monitor lab/diagnostic results  - Monitor all insertion sites, i e  indwelling lines, tubes, and drains  - Monitor endotracheal if appropriate and nasal secretions for changes in amount and color  - [place] appropriate cooling/warming therapies per order  - Administer medications as ordered  - Instruct and encourage patient and family to use good hand hygiene technique  - Identify and instruct in appropriate isolation precautions for identified infection/condition  Outcome: Progressing     Problem: SAFETY ADULT  Goal: Maintain or return to baseline ADL function  Description: INTERVENTIONS:  -  Assess patient's ability to carry out ADLs; assess patient's baseline for ADL function and identify physical deficits which impact ability to perform ADLs (bathing, care of mouth/teeth, toileting, grooming, dressing, etc )  - Assess/evaluate cause of self-care deficits   - Assess range of motion  - Assess patient's mobility; develop plan if impaired  - Assess patient's need for assistive devices and provide as appropriate  - Encourage maximum independence but intervene and supervise when necessary  - Involve family in performance of ADLs  - Assess for home care needs following discharge   - Consider OT consult to assist with ADL evaluation and planning for discharge  - Provide patient education as appropriate  Outcome: Progressing  Goal: Maintains/Returns to pre admission functional level  Description: INTERVENTIONS:  - Perform BMAT or MOVE assessment daily    - Set and communicate daily mobility goal to care team and patient/family/caregiver  - Collaborate with rehabilitation services on mobility goals if consulted  - Perform Range of Motion 3 times a day  - Reposition patient every 2 hours    - Dangle patient 3 times a day  - Stand patient 3 times a day  - Ambulate patient 3 times a day  - Out of bed to chair 3 times a day   - Out of bed for meals 3 times a day  - Out of bed for toileting  - Record patient progress and toleration of activity level   Outcome: Progressing  Goal: Patient will remain free of falls  Description: INTERVENTIONS:  - Educate patient/family on patient safety including physical limitations  - Instruct patient to call for assistance with activity   - Consult OT/PT to assist with strengthening/mobility   - Keep Call bell within reach  - Keep bed low and locked with side rails adjusted as appropriate  - Keep care items and personal belongings within reach  - Initiate and maintain comfort rounds  - Make Fall Risk Sign visible to staff  - Apply yellow socks and bracelet for high fall risk patients  - Consider moving patient to room near nurses station  Outcome: Progressing     Problem: DISCHARGE PLANNING  Goal: Discharge to home or other facility with appropriate resources  Description: INTERVENTIONS:  - Identify barriers to discharge w/patient and caregiver  - Arrange for needed discharge resources and transportation as appropriate  - Identify discharge learning needs (meds, wound care, etc )  - Arrange for interpretive services to assist at discharge as needed  - Refer to Case Management Department for coordinating discharge planning if the patient needs post-hospital services based on physician/advanced practitioner order or complex needs related to functional status, cognitive ability, or social support system  Outcome: Progressing     Problem: Knowledge Deficit  Goal: Patient/family/caregiver demonstrates understanding of disease process, treatment plan, medications, and discharge instructions  Description: Complete learning assessment and assess knowledge base  Interventions:  - Provide teaching at level of understanding  - Provide teaching via preferred learning methods  Outcome: Progressing     Problem: Nutrition/Hydration-ADULT  Goal: Nutrient/Hydration intake appropriate for improving, restoring or maintaining nutritional needs  Description: Monitor and assess patient's nutrition/hydration status for malnutrition  Collaborate with interdisciplinary team and initiate plan and interventions as ordered  Monitor patient's weight and dietary intake as ordered or per policy   Utilize nutrition screening tool and intervene as necessary  Determine patient's food preferences and provide high-protein, high-caloric foods as appropriate       INTERVENTIONS:  - Monitor oral intake, urinary output, labs, and treatment plans  - Assess nutrition and hydration status and recommend course of action  - Evaluate amount of meals eaten  - Assist patient with eating if necessary   - Allow adequate time for meals  - Recommend/ encourage appropriate diets, oral nutritional supplements, and vitamin/mineral supplements  - Order, calculate, and assess calorie counts as needed  - Recommend, monitor, and adjust tube feedings and TPN/PPN based on assessed needs  - Assess need for intravenous fluids  - Provide specific nutrition/hydration education as appropriate  - Include patient/family/caregiver in decisions related to nutrition  Outcome: Progressing

## 2022-09-26 NOTE — PLAN OF CARE
Problem: OCCUPATIONAL THERAPY ADULT  Goal: Performs self-care activities at highest level of function for planned discharge setting  See evaluation for individualized goals  Description: Treatment Interventions: ADL retraining, Functional transfer training, Endurance training, Cognitive reorientation, Patient/family training, Equipment evaluation/education, Compensatory technique education, Energy conservation, Activityengagement          See flowsheet documentation for full assessment, interventions and recommendations  Outcome: Progressing  Note: Limitation: Decreased UE ROM, Decreased ADL status, Decreased Safe judgement during ADL, Decreased cognition, Decreased endurance, Decreased self-care trans, Decreased high-level ADLs  Prognosis: Fair  Assessment: Patient participated in Skilled OT session 9/26/2022 with interventions consisting of ADL re training with the use of correct body mechnaics, deep breathing technique, safety awareness and fall prevention techniques, therapeutic exercise to: increase functional use of BUEs, increase BUE muscle strength ,  therapeutic activities to: increase activity tolerance, increase postural control and increase trunk control   Patient agreeable to OT treatment session, upon arrival patient was found supine in bed  In comparison to previous session, patient with improvements in EOB sitting tolerance and self feeding  Patient requiring verbal cues for safety, verbal cues for correct technique, verbal cues for pacing thru activity steps and frequent rest periods  Patient continues to be functioning below baseline level, occupational performance remains limited secondary to factors listed above and increased risk for falls and injury  From OT standpoint, recommendation at time of d/c would be Short Term Rehab when medically stable     Patient to benefit from continued Occupational Therapy treatment while in the hospital to address deficits as defined above and maximize level of functional independence with ADLs and functional mobility     OT Discharge Recommendation: Post acute rehabilitation services        Leroy Teran MS, OTR/L

## 2022-09-26 NOTE — WOUND OSTOMY CARE
Progress Note - Wound   Lisha Salvador 79 y o  female MRN: [id number]  [facility] Encounter: [id number]        Assessment:   Patient is 80 yo female admitted to B with T11 vertebral fracture  Patient is max assist with turning side to side  Patient is incontinent of bowel and bladder, purewick applied  Patient is independent with meals  1  Venous ulcers B/L legs- scattered partial thickness openings with pink tissue from edema in legs that have caused blistering, that have erupted  Hemosiderin staining to B/L legs  Small amount of serosanguineous drainage  Wounds much improved this week and right leg is nearly resolved       2  Skin tears B/L arms- large partial thickness skin loss wounds to B/L arms with beefy red and some yellow and white tissue and small amount of drainage  Arms are also very edematous and some drainage coming from wounds  There is improvement to the wounds on this assessment       3  POA DTI buttocks- now presents as ecchymosis, left GERARDO  Calazime ordered to sacral/buttocks for moisture and incontinence  No induration, fluctuance, odor, warmth/temperature differences, redness, or purulence noted to the above noted wounds and skin areas assessed  New dressings applied per orders listed below  Patient tolerated well- no s/s of non-verbal pain or discomfort observed during the encounter  Bedside nurse aware of plan of care  See flow sheets for more detailed assessment findings  Wound care will continue to follow  Skin care Plan:  1-Calazime paste to sacrum/buttocks TID and PRN  2-Turn/reposition q2h or when medically stable for pressure re-distribution on skin   3-Elevate heels to offload pressure  4-Moisturize skin daily with skin nourishing cream  5-Ehob cushion in chair when out of bed  6-Hydraguard to bilateral heels and bilateral ischium BID and PRN      7-Cleanse B/L arms/legs with normal saline, apply adaptic to right leg and arm and adaptic and maxorb to left arm and leg, cover with ABD, secure with kat and tape  Change every other day and PRN        Wound 09/18/22 Venous Ulcer Leg Right;Lateral (Active)   Wound Image   09/26/22 1249   Wound Description Epithelialization;Pink;Fragile 09/26/22 1249   Crystal-wound Assessment Edema 09/26/22 1249   Wound Length (cm) 5 cm 09/26/22 1249   Wound Width (cm) 3 cm 09/26/22 1249   Wound Depth (cm) 0 1 cm 09/26/22 1249   Wound Surface Area (cm^2) 15 cm^2 09/26/22 1249   Wound Volume (cm^3) 1 5 cm^3 09/26/22 1249   Calculated Wound Volume (cm^3) 1 5 cm^3 09/26/22 1249   Change in Wound Size % 47 92 09/26/22 1249   Tunneling 0 cm 09/26/22 1249   Tunneling in depth located at 0 09/26/22 1249   Undermining 0 09/26/22 1249   Undermining is depth extending from 0 09/26/22 1249   Wound Site Closure TAO 09/26/22 1249   Drainage Amount Scant 09/26/22 1249   Drainage Description Serous 09/26/22 1249   Non-staged Wound Description Partial thickness 09/26/22 1249   Treatments Cleansed 09/26/22 1249   Dressing ABD;Dry dressing;Non adherent 09/26/22 1249   Wound packed? No 09/26/22 1249   Packing- # removed 0 09/26/22 1249   Packing- # inserted 0 09/26/22 1249   Dressing Changed New 09/26/22 1249   Patient Tolerance Tolerated well 09/26/22 1249   Dressing Status Clean;Dry; Intact 09/26/22 1249       Wound 09/18/22 Venous Ulcer Pretibial Left;Lateral (Active)   Wound Image   09/26/22 1254   Wound Description Epithelialization;Pink 09/26/22 1254   Crystal-wound Assessment Edema 09/26/22 1254   Wound Length (cm) 3 cm 09/26/22 1254   Wound Width (cm) 2 5 cm 09/26/22 1254   Wound Depth (cm) 0 1 cm 09/26/22 1254   Wound Surface Area (cm^2) 7 5 cm^2 09/26/22 1254   Wound Volume (cm^3) 0 75 cm^3 09/26/22 1254   Calculated Wound Volume (cm^3) 0 75 cm^3 09/26/22 1254   Change in Wound Size % -25 09/26/22 1254   Tunneling 0 cm 09/26/22 1254   Tunneling in depth located at 0 09/26/22 1254   Undermining 0 09/26/22 1254   Undermining is depth extending from 0 09/26/22 1254   Wound Site Closure TAO 09/26/22 1254   Drainage Amount Small 09/26/22 1254   Drainage Description Serosanguineous 09/26/22 1254   Non-staged Wound Description Partial thickness 09/26/22 1254   Treatments Cleansed 09/26/22 1254   Dressing ABD;Dry dressing;Non adherent;Calcium Alginate 09/26/22 1254   Wound packed? No 09/26/22 1254   Packing- # removed 0 09/26/22 1254   Packing- # inserted 0 09/26/22 1254   Dressing Changed New 09/26/22 1254   Patient Tolerance Tolerated well 09/26/22 1254   Dressing Status Clean;Dry; Intact 09/26/22 1254       Wound 09/18/22 Skin Tear Arm Anterior;Left;Proximal;Inferior (Active)   Wound Image   09/26/22 1248   Wound Description Epithelialization;Light purple;Granulation tissue; White;Yellow 09/26/22 1248   Crystal-wound Assessment Edema;Purple 09/26/22 1248   Wound Length (cm) 11 cm 09/26/22 1248   Wound Width (cm) 2 5 cm 09/26/22 1248   Wound Depth (cm) 0 1 cm 09/26/22 1248   Wound Surface Area (cm^2) 27 5 cm^2 09/26/22 1248   Wound Volume (cm^3) 2 75 cm^3 09/26/22 1248   Calculated Wound Volume (cm^3) 2 75 cm^3 09/26/22 1248   Change in Wound Size % 12 7 09/26/22 1248   Tunneling 0 cm 09/26/22 1248   Tunneling in depth located at 0 09/26/22 1248   Undermining 0 09/26/22 1248   Undermining is depth extending from 0 09/26/22 1248   Wound Site Closure TAO 09/26/22 1248   Drainage Amount Small 09/26/22 1248   Drainage Description Serosanguineous 09/26/22 1248   Non-staged Wound Description Partial thickness 09/26/22 1248   Treatments Cleansed 09/26/22 1248   Dressing ABD;Calcium Alginate;Dry dressing;Non adherent 09/26/22 1248   Wound packed? No 09/26/22 1248   Packing- # removed 0 09/26/22 1248   Packing- # inserted 0 09/26/22 1248   Dressing Changed New 09/26/22 1248   Patient Tolerance Tolerated well 09/26/22 1248   Dressing Status Clean;Dry; Intact 09/26/22 1248       Wound 09/18/22 Pressure Injury Buttocks Right (Active)   Wound Image   09/26/22 1258   Wound Description Epithelialization;Fragile 09/26/22 1258   Pressure Injury Stage DTPI 09/21/22 1530   Crystal-wound Assessment Clean;Dry; Intact 09/26/22 1258   Wound Length (cm) 0 cm 09/26/22 1258   Wound Width (cm) 0 cm 09/26/22 1258   Wound Depth (cm) 0 cm 09/26/22 1258   Wound Surface Area (cm^2) 0 cm^2 09/26/22 1258   Wound Volume (cm^3) 0 cm^3 09/26/22 1258   Calculated Wound Volume (cm^3) 0 cm^3 09/26/22 1258   Tunneling 0 cm 09/26/22 1258   Tunneling in depth located at 0 09/26/22 1258   Undermining 0 09/26/22 1258   Undermining is depth extending from 0 09/26/22 1258   Wound Site Closure TAO 09/26/22 1258   Drainage Amount None 09/26/22 1258   Non-staged Wound Description Not applicable 00/84/65 1764   Treatments Cleansed 09/26/22 1258   Dressing Moisture barrier 09/26/22 1258   Wound packed? No 09/26/22 1258   Packing- # removed 0 09/26/22 1258   Packing- # inserted 0 09/26/22 1258   Dressing Changed New 09/26/22 1258   Patient Tolerance Tolerated well 09/26/22 1258   Dressing Status Clean;Dry; Intact 09/26/22 1258       Wound 09/19/22 Skin Tear Arm Right;Lateral (Active)   Wound Image   09/26/22 1254   Wound Description Beefy red;Epithelialization;Fragile 09/26/22 1254   Crystal-wound Assessment Fragile; Purple 09/26/22 1254   Wound Length (cm) 2 cm 09/26/22 1254   Wound Width (cm) 3 cm 09/26/22 1254   Wound Depth (cm) 0 1 cm 09/26/22 1254   Wound Surface Area (cm^2) 6 cm^2 09/26/22 1254   Wound Volume (cm^3) 0 6 cm^3 09/26/22 1254   Calculated Wound Volume (cm^3) 0 6 cm^3 09/26/22 1254   Change in Wound Size % 25 09/26/22 1254   Tunneling 0 cm 09/26/22 1254   Tunneling in depth located at 0 09/26/22 1254   Undermining 0 09/26/22 1254   Undermining is depth extending from 0 09/26/22 1254   Wound Site Closure TAO 09/26/22 1254   Drainage Amount Small 09/26/22 1254   Drainage Description Serosanguineous 09/26/22 1254   Non-staged Wound Description Partial thickness 09/26/22 1254   Treatments Cleansed 09/26/22 1254   Dressing ABD;Dry dressing;Non adherent 09/26/22 1254   Wound packed? No 09/26/22 1254   Packing- # removed 0 09/26/22 1254   Packing- # inserted 0 09/26/22 1254   Dressing Changed New 09/26/22 1254   Patient Tolerance Tolerated well 09/26/22 1254   Dressing Status Clean;Dry; Intact 09/26/22 1254       Wound Coccyx (Active)   Wound Description Epithelialization 09/26/22 1400   Pressure Injury Stage 2 09/21/22 2000   Crystal-wound Assessment Clean;Dry; Intact 09/26/22 1400   Wound Length (cm) 0 cm 09/26/22 1400   Wound Width (cm) 0 cm 09/26/22 1400   Wound Depth (cm) 0 cm 09/26/22 1400   Wound Surface Area (cm^2) 0 cm^2 09/26/22 1400   Wound Volume (cm^3) 0 cm^3 09/26/22 1400   Calculated Wound Volume (cm^3) 0 cm^3 09/26/22 1400   Tunneling 0 cm 09/26/22 1400   Tunneling in depth located at 0 09/26/22 1400   Undermining 0 09/26/22 1400   Undermining is depth extending from 0 09/26/22 1400   Wound Site Closure TAO 09/26/22 1400   Drainage Amount None 09/26/22 1400   Non-staged Wound Description Not applicable 73/78/35 5730   Treatments Cleansed 09/26/22 1400   Dressing Open to air 09/26/22 1400   Wound packed? No 09/26/22 1400   Packing- # removed 0 09/26/22 1400   Packing- # inserted 0 09/26/22 1400   Dressing Changed New 09/26/22 1400   Patient Tolerance Tolerated well 09/26/22 1400   Dressing Status Clean;Dry; Intact 09/26/22 1400         Lisa Espinoza RN, [facility]

## 2022-09-26 NOTE — PHYSICAL THERAPY NOTE
PHYSICAL THERAPY NOTE          Patient Name: Kate Yang  CNYZC'R Date: 9/26/2022 09/26/22 1433   PT Last Visit   PT Visit Date 09/26/22   Note Type   Note Type Treatment   End of Consult   Patient Position at End of Consult Supine   Pain Assessment   Pain Assessment Tool 0-10   Pain Score 10 - Worst Possible Pain   Patient's Stated Pain Goal No pain   Hospital Pain Intervention(s) Repositioned; Ambulation/increased activity   Restrictions/Precautions   Weight Bearing Precautions Per Order No   Braces or Orthoses (S)  TLSO   Other Precautions Cognitive; Chair Alarm; Bed Alarm;Pain; Fall Risk;Multiple lines;Telemetry;Spinal precautions   General   Chart Reviewed Yes   Family/Caregiver Present No   Cognition   Orientation Level Oriented X4   Subjective   Subjective Pt willing and agreeable to PT session   Bed Mobility   Rolling R 2  Maximal assistance   Additional items Assist x 2   Supine to Sit 2  Maximal assistance   Additional items Assist x 2   Sit to Supine 2  Maximal assistance   Additional items Assist x 2; Increased time required   Transfers   Sit to Stand 2  Maximal assistance   Additional items Assist x 2; Increased time required   Stand to Sit 2  Maximal assistance   Additional items Assist x 2   Ambulation/Elevation   Gait pattern Not appropriate   Balance   Static Sitting Poor   Dynamic Sitting Poor -   Static Standing Zero   Endurance Deficit   Endurance Deficit Yes   Endurance Deficit Description fatigue, pain   Activity Tolerance   Activity Tolerance Patient limited by fatigue;Patient limited by pain   Medical Staff Made Aware Pt seen with OT as pt currently requires Ax2 to complete all mobility   Exercises   Balance training  sitting static and dynamic to complete feeding   Assessment   Prognosis Fair   Problem List Decreased strength;Decreased endurance;Decreased range of motion; Impaired balance;Decreased mobility; Decreased coordination;Decreased safety awareness;Pain   Assessment Pt required increased encouragement to continue to participate 2* to pain and fatigue  REquired A to complete log roll during bed mobility, move LE to dependent position, and upright trunk  Poor sitting tolerance and balance requiring constant A to maintain upright  Required total A for LE management during back to bed with decreased LE strength  Pt will benefit from continued inpt skilled PT and rehab to maximize functional mobility & safety  Goals   Patient Goals To decrease pain   STG Expiration Date 10/05/22   Plan   Treatment/Interventions Spoke to nursing;Gait training;Bed mobility; Patient/family training; Endurance training;LE strengthening/ROM; Functional transfer training   Progress Slow progress, decreased activity tolerance   PT Frequency 3-5x/wk   Recommendation   PT Discharge Recommendation Post acute rehabilitation services   AM-PAC Basic Mobility Inpatient   Turning in Bed Without Bedrails 1   Lying on Back to Sitting on Edge of Flat Bed 1   Moving Bed to Chair 1   Standing Up From Chair 1   Walk in Room 1   Climb 3-5 Stairs 1   Basic Mobility Inpatient Raw Score 6   Turning Head Towards Sound 4   Follow Simple Instructions 3   Low Function Basic Mobility Raw Score 13   Low Function Basic Mobility Standardized Score 20 14   Highest Level Of Mobility   [facility] Goal 2: Bed activities/Dependent transfer     Oliver Amador, PT

## 2022-09-26 NOTE — OCCUPATIONAL THERAPY NOTE
Occupational Therapy Treatment Note      Kalee Robison    2022    Principal Problem:    T11 vertebral fracture (HCC)  Active Problems:    HFrEF (heart failure with reduced ejection fraction) (HCC)    Permanent atrial fibrillation (HCC)    Anemia    Fall    SIRS (systemic inflammatory response syndrome) (HCC)    COPD (chronic obstructive pulmonary disease) (HCC)    Acute-on-chronic kidney injury ([facility] [address] )    Acute on chronic respiratory failure with hypoxia (HCC)    DM (diabetes mellitus) ([facility] )      Past Medical History:   Diagnosis Date    A-fib [facility])     Cardiac pacemaker     CHF (congestive heart failure) (HCC)     Chronic cellulitis     COPD (chronic obstructive pulmonary disease) (HCC)     Diabetes mellitus (HCC)     Edema     High cholesterol     Hyperparathyroidism (HCC)     Hypertension     Hyperuricemia     Stage 4 chronic kidney disease ([facility] )        Past Surgical History:   Procedure Laterality Date    CARDIAC DEFIBRILLATOR PLACEMENT       SECTION      x 2    COLON SURGERY      HERNIA REPAIR          22 1432   OT Last Visit   OT Visit Date 22   Note Type   Note Type Treatment   Restrictions/Precautions   Weight Bearing Precautions Per Order No   Braces or Orthoses (S)  TLSO   Other Precautions Cognitive;Multiple lines;Telemetry; Fall Risk;Pain;O2;Spinal precautions   Lifestyle   Autonomy PT RPOERTS BEING I/MOD I  WITH ADLS/IADLS/DRIVING PTA   Reciprocal Relationships LIVES ALONE  PT REPORTS LOCAL FAMILY INCLUDING BROTHER AND SUPPORTIVE FRIENDS/NEIGHBORS   Service to Others RETIRED AIDE   Intrinsic Gratification ENJOYS COOKING   Pain Assessment   Pain Assessment Tool 0-10   Pain Score 10 - Worst Possible Pain   Pain Location/Orientation Location: Back   Pain Onset/Description Onset: Ongoing; Descriptor: Aching;Descriptor: Discomfort   Patient's Stated Pain Goal No pain   Hospital Pain Intervention(s) Repositioned; Ambulation/increased activity; Emotional support   ADL   Eating Assistance 4  Minimal Assistance   Eating Deficit Setup;Supervision/safety;Verbal cueing; Increased time to complete;Bringing food to mouth assist;Beverage management   Eating Comments Pt seated at side of bed to eat lunch, initially required VC for encouragement to participate  W/ initial hand over hand assist, pt able to grab fork w/ R hand, scoop food and bring to mouth  Once pt trialed motion, 1-2x/wk she became more confident and continued feeding w/ increased time and setup of items (assist to cut chicken and open/stir yogurt)   Bed Mobility   Rolling R 2  Maximal assistance   Additional items Assist x 2;Bedrails; Increased time required;Verbal cues;LE management   Supine to Sit 2  Maximal assistance   Additional items Assist x 2;HOB elevated; Bedrails; Increased time required;Verbal cues;LE management   Sit to Supine 2  Maximal assistance   Additional items Assist x 2; Increased time required;Verbal cues;LE management   Additional Comments Pt sat EOB w/ Max A for sitting balance/trunk control; neede total A to help don/doff TLSO  Pt reporting ongoing pain w/mvmt and EOB sitting  Unable to tolerate for longer than 10 mins while eating lunch  Returned to supine w/ Max A x2  Coordination   Dexterity poor grasp of R hand, able to hold fork w/ hand over hand assist to grasp  Cognition   Overall Cognitive Status Impaired   Arousal/Participation Responsive; Cooperative;Lethargic   Attention Attends with cues to redirect   Orientation Level Oriented X4   Memory Decreased recall of precautions   Following Commands Follows one step commands without difficulty   Comments Pt is cooperative; overall lethargic and soft spoken   Has decreased recall of spinal precautions, needs cues for safety and re-direction to task; needs frequent verbal cues for motivation to participate   Activity Tolerance   Activity Tolerance Patient limited by fatigue;Patient limited by pain   Medical Staff Made Aware PT, RN   Assessment   Assessment Patient participated in Skilled OT session 9/26/2022 with interventions consisting of ADL re training with the use of correct body mechnaics, deep breathing technique, safety awareness and fall prevention techniques, therapeutic exercise to: increase functional use of BUEs, increase BUE muscle strength ,  therapeutic activities to: increase activity tolerance, increase postural control and increase trunk control   Patient agreeable to OT treatment session, upon arrival patient was found supine in bed  In comparison to previous session, patient with improvements in EOB sitting tolerance and self feeding  Patient requiring verbal cues for safety, verbal cues for correct technique, verbal cues for pacing thru activity steps and frequent rest periods  Patient continues to be functioning below baseline level, occupational performance remains limited secondary to factors listed above and increased risk for falls and injury  From OT standpoint, recommendation at time of d/c would be Short Term Rehab when medically stable  Patient to benefit from continued Occupational Therapy treatment while in the hospital to address deficits as defined above and maximize level of functional independence with ADLs and functional mobility   Plan   Treatment Interventions ADL retraining;Functional transfer training;UE strengthening/ROM; Endurance training;Cognitive reorientation;Patient/family training;Equipment evaluation/education; Compensatory technique education; Fine motor coordination activities;Continued evaluation; Energy conservation; Activityengagement   Goal Expiration Date 10/05/22   OT Treatment Day 1   OT Frequency 3-5x/wk   Recommendation   OT Discharge Recommendation Post acute rehabilitation services   Additional Comments  The patient's raw score on the AM-PAC Daily Activity inpatient short form is 11, standardized score is 29 04, less than 39 4   Patients at this level are likely to benefit from discharge to post-acute rehabilitation services   Please refer to the recommendation of the Occupational Therapist for safe discharge planning   Additional Comments 2 Pt seen as a co-treatment due to the patient's co-morbidities, clinically unstable presentation, and present impairments which are a regression from the patient's baseline   AM-PAC Daily Activity Inpatient   Lower Body Dressing 1   Bathing 2   Toileting 1   Upper Body Dressing 2   Grooming 2   Eating 3   Daily Activity Raw Score 11   Daily Activity Standardized Score (Calc for Raw Score >=11) 29 04   AM-PAC Applied Cognition Inpatient   Following a Speech/Presentation 3   Understanding Ordinary Conversation 3   Taking Medications 3   Remembering Where Things Are Placed or Put Away 3   Remembering List of 4-5 Errands 3   Taking Care of Complicated Tasks 2   Applied Cognition Raw Score 17   Applied Cognition Standardized Score 36 52     Odalis Mcdonald MS, OTR/L

## 2022-09-26 NOTE — PROGRESS NOTES
Daily Progress Note - Critical Care   Lupe Jiménez 79 y o  female MRN: [id number]  Unit/Bed#: ICU 07 Encounter: [id number]        ----------------------------------------------------------------------------------------  HPI/24hr events: Lupe Jiménez is a 79 y o  female who presented to [address] [place] [address] with pre-syncopal fall from standing with negative, negative LOC   Found to have T11 vertebral body fracture with narrowing of the spinal canal at the T11 level   Presented to ED with hemoglobin of 5 6 given 2u PRBCs  Given Kcentra and Vit K for INR of 4 6       PMHx includes CKD 4, HFrEF-last EF of 40% w/ BiV dysfunction, moderate pulm HTN, CKD 4, DM2, Afib on Coumadin, Pacemaker status, COPD/XIOMARA on CPAP at home, hx of colon ca s/p colectomy  24hr events: Improved respiratory status, weaned to 2 5L NC, BiPAP this AM  She reports one BM overnight  Bumex diuresis 2mg TID, patient reports incontinence but urine output remains appropriate     ---------------------------------------------------------------------------------------  SUBJECTIVE  Patient reports feeling pretty well, rates pain overall as 4/10  She says she is having pain on her right side and her back but it is well controlled and better than yesterday  No chest pain, palpitations, SOB, abdominal pain, nausea, or vomiting  She states she had her BiPAP this AM  Feels ready to leave the critical care unit  Review of Systems  Review of systems was reviewed and negative unless stated above in HPI/24-hour events   ---------------------------------------------------------------------------------------  Assessment and Plan:    Neuro:   · Diagnosis: T 11 fx w/ canal narrowing, PAD  ? Plan:   ? Non-op per neurosx  ? Continue TLSO brace  ? Q4hr NC  ? Melatonin qHS, CAM-ICU per protocol  ?  Multimodal analgesia w/ Robaxin/neutontin/tylenol ATC     CV:   · Diagnosis: Heart failure with reduced ejection fraction-40% with biventricular dysfunction, moderate pulmonary hypertension, AFib on Coumadin, status post pacemaker, HLD  ? Plan:   ? Currently holding home entresto, Dilt XR 180mg, ASA 81 daily  § Restarted Dig 62  5mcg daily, Coreg 12 5mg BID   ? Continue lipitor 10mg daily  ? Continue bumex 2mg IV TID  ? On eliquis outpt--- HAS-BLED score is 4 which carries 8 9% risk of major bleeding and therefore may defer restarting DOAC atleast until PCP evaluation     Pulm:  · AHRF, COPD/XIOMARA   ? Plan:   ? Wears 2L NC PRN at home  ? Xopenox/atrovent RTC  § Switch to home symbicort if patient is able to utilize appropriately   ? S/p R sided thoracentesis (400cc) and L  Sided thoracentesis (500cc)  ? Weaned to traditional NC, continue to wean as able for goal SpO2 >90%  ? Continue nocturnal BiPAP as patient tolerates  ? Will require mobilization and OPEP/pulmonary toilet to improve atelectasis in conjunction w/ aggressive diuresis     GI:   · Diagnosis: Upper GI bleed, melena, history of colon cancer status post transverse colon resection, constipation  ? Plan:   ? 2 week hx of melanotic stools PTA, likely chronic GIB w/ Hb of 5 6 while hemodynamically stable  ? Given 2u PRBCs on admission, no further hemodynamic issues or transfusion needs  ? Given Kcentra/Vit K for supratherapeutic INR reversal   ? GI consulted and signed off  § EGD and flex sig negative but incomplete flex sig given stool burden, consider re-consulting if persistent melanotic stool  ? Continue bowel regimen, de-escalate as needed     :    · Diagnosis: KAYLEE on CKD  ? Plan:   ? Cr  1 29 from 1 41  ? Baseline 1 2-1 4  ? Diuretic dependence w/ lasix 40 BID PO and Farxiga at home  ? Continue bumex 2mg TID  ? Ball out  ? Monitor I/Os  ? Trend BUN/Cr     F/E/N:   · Plan:   ? F:No IVF  ? E: Replete PRN K>4 0, Phos>3 0, Mg>2 0  ? N: Cardiac diet, fluid restriction           Heme/Onc:   · Diagnosis: Anemia  ? Plan:   ? Hb stable at 7 8  ? Transfuse PRN for goal Hgb >7 0     Endo:   · Diagnosis: NIDDM  ?  Plan:   ? Continue ISS for goal -180     ID:   · Diagnosis: No acute issues  ? Plan:   ? Trend fever/wbc curve  ? Blood cx NGTD     MSK/Skin:   · Diagnosis: T12 fracture, multiple skin tears, venous stasis ulcers  ? Plan:   ? Wound care f/u for skin tears and venous stasis ulcerations  ? TLSO when out of bed, maintain T-spine precautions   ? Continue daily dressing changes to UE wound    Patient appropriate for transfer out of the ICU today?: Patient does not meet criteria for ICU Follow-up Clinic; referral has not been made  Disposition: Transfer to Med-Surg   Code Status: Level 3 - DNAR and DNI  ---------------------------------------------------------------------------------------  ICU CORE MEASURES    Prophylaxis   VTE Pharmacologic Prophylaxis: Heparin  VTE Mechanical Prophylaxis: sequential compression device  Stress Ulcer Prophylaxis: Prophylaxis Not Indicated     ABCDE Protocol (if indicated)  Plan to perform spontaneous awakening trial today? Not applicable  Plan to perform spontaneous breathing trial today? Not applicable  Obvious barriers to extubation? Not applicable  CAM-ICU: Negative    Invasive Devices Review  Invasive Devices  Report    Peripheral Intravenous Line  Duration           Peripheral IV 22 Right Antecubital <1 day          Drain  Duration           External Urinary Catheter 1 day              Can any invasive devices be discontinued today?  Not applicable  ---------------------------------------------------------------------------------------  OBJECTIVE    Vitals   Vitals:    22 0400 22 0500 22 0600 22 0701   BP: (!) 120/46 (!) 137/49  (!) 91/43   BP Location: Left arm      Pulse: 60 66  66   Resp: 15 15  18   Temp: 98 2 °F (36 8 °C)      TempSrc: Oral      SpO2: 100% 100%  95%   Weight:   98 7 kg (217 lb 9 5 oz)    Height:         Temp (24hrs), Av 6 °F (36 4 °C), Min:95 8 °F (35 4 °C), Max:98 5 °F (36 9 °C)  Current: Temperature: 98 2 °F (36 8 °C)  HR: 66  BP: 91/43  RR: 18  SpO2: 95    Respiratory:  SpO2 Activity: SpO2 Activity: At Rest  Nasal Cannula O2 Flow Rate (L/min): 4 L/min    Invasive/non-invasive ventilation settings   Respiratory  Report   Lab Data (Last 4 hours)    None         O2/Vent Data (Last 4 hours)      09/26 0405          Non-Invasive Ventilation Mode BiPAP                   Physical Exam  Vitals and nursing note reviewed  Constitutional:       General: She is not in acute distress  Appearance: Normal appearance  She is obese  She is not ill-appearing, toxic-appearing or diaphoretic  Comments: Patient is asleep but arousable to verbal stimuli  HENT:      Head: Normocephalic and atraumatic  Right Ear: External ear normal       Left Ear: External ear normal       Nose: Nose normal  No congestion or rhinorrhea  Mouth/Throat:      Mouth: Mucous membranes are moist       Pharynx: Oropharynx is clear  No oropharyngeal exudate or posterior oropharyngeal erythema  Eyes:      Extraocular Movements: Extraocular movements intact  Pupils: Pupils are equal, round, and reactive to light  Cardiovascular:      Rate and Rhythm: Normal rate and regular rhythm  Pulses: Normal pulses  Heart sounds: Normal heart sounds  No murmur heard  No friction rub  No gallop  Pulmonary:      Effort: Pulmonary effort is normal  No respiratory distress  Breath sounds: Normal breath sounds  No stridor  No wheezing or rales  Abdominal:      General: Abdomen is flat  Bowel sounds are normal  There is no distension  Palpations: Abdomen is soft  Tenderness: There is no abdominal tenderness  There is no guarding  Musculoskeletal:      Cervical back: Normal range of motion  No rigidity  Right lower leg: Edema present  Left lower leg: Edema present  Skin:     General: Skin is warm and dry  Coloration: Skin is not pale  Findings: Bruising present     Neurological:      Mental Status: She is alert and oriented to person, place, and time  Psychiatric:         Mood and Affect: Mood normal          Behavior: Behavior normal          Thought Content:  Thought content normal          Judgment: Judgment normal              Laboratory and Diagnostics:  Results from last 7 days   Lab Units 09/26/22  0553 09/25/22  1651 09/25/22  0541 09/24/22  0515 09/23/22  0438 09/22/22  2159 09/22/22  0634 09/21/22  0623 09/20/22  0629   WBC Thousand/uL 10 83* 10 29* 10 35* 12 57* 15 08*  --  16 99* 12 89* 13 30*   HEMOGLOBIN g/dL 8 1* 7 9* 7 9* 7 5* 8 4*  --  8 8* 8 4* 7 8*   I STAT HEMOGLOBIN g/dl  --   --   --   --   --  8 8*  --   --   --    HEMATOCRIT % 27 0* 26 5* 26 7* 25 3* 27 7*  --  28 7* 27 8* 25 5*   HEMATOCRIT, ISTAT %  --   --   --   --   --  26*  --   --   --    PLATELETS Thousands/uL 156 151 159 150 155  --  137* 153 149   NEUTROS PCT % 85*  --  86*  --   --   --  92* 88* 88*   MONOS PCT % 8  --  7  --   --   --  5 7 7   MONO PCT %  --   --   --  1*  --   --   --   --   --      Results from last 7 days   Lab Units 09/26/22  0553 09/25/22  0541 09/24/22  0515 09/23/22  0438 09/22/22 2159 09/22/22  1814 09/22/22  0534 09/21/22  0623   SODIUM mmol/L 139 141 141 141  --  143 143 140   POTASSIUM mmol/L 3 7 3 6 3 1* 4 2  --  4 1 3 5 4 0   CHLORIDE mmol/L 97 99 102 104  --  104 105 106   CO2 mmol/L 36* 35* 32 33*  --  34* 33* 30   CO2, I-STAT mmol/L  --   --   --   --  40*  --   --   --    ANION GAP mmol/L 6 7 7 4  --  5 5 4   BUN mg/dL 106* 108* 108* 105*  --  105* 111* 120*   CREATININE mg/dL 1 29 1 41* 1 39* 1 56*  --  1 39* 1 46* 1 81*   CALCIUM mg/dL 8 2* 8 4 8 1* 8 4  --  8 2* 8 1* 7 8*   GLUCOSE RANDOM mg/dL 113 120 119 135  --  152* 136 132   ALT U/L  --   --  20  --   --   --   --   --    AST U/L  --   --  14  --   --   --   --   --    ALK PHOS U/L  --   --  67  --   --   --   --   --    ALBUMIN g/dL  --   --  2 5*  --   --   --   --   --    TOTAL BILIRUBIN mg/dL  --   --  1 05*  --   --   --   --   --      Results from last 7 days Lab Units 09/26/22  0553 09/25/22  0541 09/24/22  0515 09/23/22  0438 09/22/22  0534   MAGNESIUM mg/dL 2 4 2 7* 2 7* 3 0* 2 9*   PHOSPHORUS mg/dL 4 1 4 3* 4 6* 4 2*  --                    ABG:    VBG:  Results from last 7 days   Lab Units 09/23/22  0929   PH WENDY  7 340   PCO2 WENDY mm Hg 65 2*   PO2 WENDY mm Hg 40 7   HCO3 WENDY mmol/L 34 4*   BASE EXC WENDY mmol/L 7 2           Micro  Results from last 7 days   Lab Units 09/23/22  0016   GRAM STAIN RESULT  No Polys or Bacteria seen   BODY FLUID CULTURE, STERILE  No growth         Intake and Output  I/O       09/24 0701 09/25 0700 09/25 0701 09/26 0700 09/26 0701 09/27 0700    P  O  1400 1220     I V  (mL/kg) 10 (0 1)      IV Piggyback 299 2 50     Total Intake(mL/kg) 1709 2 (17 5) 1270 (12 9)     Urine (mL/kg/hr) 1230 (0 5) 1920 (0 8)     Other       Stool 0 0     Total Output 1230 1920     Net +479 2 -650            Unmeasured Urine Occurrence 3 x      Unmeasured Stool Occurrence 4 x 1 x         UOP: 80 ml/hr     Height and Weights   Height: 4' 10" (147 3 cm)     Body mass index is 45 48 kg/m²  Weight (last 2 days)     Date/Time Weight    09/26/22 0600 98 7 (217 59)    09/25/22 0557 97 5 (214 95)            Nutrition       Diet Orders   (From admission, onward)             Start     Ordered    09/22/22 0717  Diet Cardiovascular; Cardiac; Fluid Restriction 1500 ML  Diet effective now        References:    Nutrtion Support Algorithm Enteral vs  Parenteral   Question Answer Comment   Diet Type Cardiovascular    Cardiac Cardiac    Other Restriction(s): Fluid Restriction 1500 ML    RD to adjust diet per protocol?  No        09/22/22 0717                  Active Medications  Scheduled Meds:  Current Facility-Administered Medications   Medication Dose Route Frequency Provider Last Rate    acetaminophen  975 mg Oral Q8H [address] Hulon Siad, PA-C      albuterol  2 puff Inhalation Q4H PRN Jorin Siad, PA-C      albuterol  2 5 mg Nebulization Q4H PRN Marvin Contreras MD      atorvastatin  10 mg Oral Daily Wali Garzon PA-C      bisacodyl  10 mg Rectal Daily PRN Yordy Norwood PA-C      bumetanide  2 mg Intravenous TID Nils López MD      carvedilol  12 5 mg Oral BID With Meals Wali Garzon, VICKY      digoxin  62 5 mcg Oral Daily Conrad Grullon PA-C      gabapentin  100 mg Oral TID Wali Garzon PA-C      heparin (porcine)  5,000 Units Subcutaneous [facility] Willjose c Garzon, VICKY      insulin lispro  1-5 Units Subcutaneous Q6H [address] Wali Garzon, VICKY      ipratropium  0 5 mg Nebulization TID Yordy Norwood PA-C      levalbuterol  1 25 mg Nebulization TID Yordy Norwood, VICKY      lidocaine  1 patch Topical Daily Wali Garzon, VICKY      melatonin  6 mg Oral HS Wali Garzon, VICKY      methocarbamol  500 mg Oral Q6H [address] Wali Garzon PA-C      oxyCODONE  2 5 mg Oral Q4H PRN Wali Garzon PA-C      oxyCODONE  5 mg Oral Q4H PRN Wali Garzon PA-C      pantoprazole  40 mg Oral BID AC Nisa R VICKY Miranda      polyethylene glycol  17 g Oral BID Fajardo Rickey Miranda PA-C      polyethylene glycol  17 g Oral Daily PRN Wali Garzon PA-C      senna-docusate sodium  1 tablet Oral BID Conrad Grullon PA-C      simethicone  80 mg Oral Q6H PRN Letitia Grullon PA-C       Continuous Infusions:     PRN Meds:   albuterol, 2 puff, Q4H PRN  albuterol, 2 5 mg, Q4H PRN  bisacodyl, 10 mg, Daily PRN  oxyCODONE, 2 5 mg, Q4H PRN  oxyCODONE, 5 mg, Q4H PRN  polyethylene glycol, 17 g, Daily PRN  simethicone, 80 mg, Q6H PRN        Allergies   No Known Allergies  ---------------------------------------------------------------------------------------  Advance Directive and Living Will:      Power of :    POLST:    ---------------------------------------------------------------------------------------  Care Time Delivered:     Sarahy Kaur MD      Portions of the record may have been created with voice recognition software  Occasional wrong word or "sound a like" substitutions may have occurred due to the inherent limitations of voice recognition software    Read the chart carefully and recognize, using context, where substitutions have occurred

## 2022-09-26 NOTE — PLAN OF CARE
Problem: Potential for Falls  Goal: Patient will remain free of falls  Description: INTERVENTIONS:  -  Assess patient's ability to carry out ADLs; assess patient's baseline for ADL function and identify physical deficits which impact ability to perform ADLs (bathing, care of mouth/teeth, toileting, grooming, dressing, etc )  - Assess/evaluate cause of self-care deficits   - Assess range of motion  - Assess patient's mobility; develop plan if impaired  - Assess patient's need for assistive devices and provide as appropriate  - Encourage maximum independence but intervene and supervise when necessary  - Involve family in performance of ADLs  - Assess for home care needs following discharge   - Consider OT consult to assist with ADL evaluation and planning for discharge  - Provide patient education as appropriate  Outcome: Progressing     Problem: PAIN - ADULT  Goal: Verbalizes/displays adequate comfort level or baseline comfort level  Description: Interventions:  - Encourage patient to monitor pain and request assistance  - Assess pain using appropriate pain scale  - Administer analgesics based on type and severity of pain and evaluate response  - Implement non-pharmacological measures as appropriate and evaluate response  - Consider cultural and social influences on pain and pain management  - Notify physician/advanced practitioner if interventions unsuccessful or patient reports new pain  Outcome: Progressing     Problem: INFECTION - ADULT  Goal: Absence or prevention of progression during hospitalization  Description: INTERVENTIONS:  - Assess and monitor for signs and symptoms of infection  - Monitor lab/diagnostic results  - Monitor all insertion sites, i e  indwelling lines, tubes, and drains  - Monitor endotracheal if appropriate and nasal secretions for changes in amount and color  - [place] appropriate cooling/warming therapies per order  - Administer medications as ordered  - Instruct and encourage patient and family to use good hand hygiene technique  - Identify and instruct in appropriate isolation precautions for identified infection/condition  Outcome: Progressing     Problem: DISCHARGE PLANNING  Goal: Discharge to home or other facility with appropriate resources  Description: INTERVENTIONS:  - Identify barriers to discharge w/patient and caregiver  - Arrange for needed discharge resources and transportation as appropriate  - Identify discharge learning needs (meds, wound care, etc )  - Arrange for interpretive services to assist at discharge as needed  - Refer to Case Management Department for coordinating discharge planning if the patient needs post-hospital services based on physician/advanced practitioner order or complex needs related to functional status, cognitive ability, or social support system  Outcome: Progressing     Problem: Knowledge Deficit  Goal: Patient/family/caregiver demonstrates understanding of disease process, treatment plan, medications, and discharge instructions  Description: Complete learning assessment and assess knowledge base    Interventions:  - Provide teaching at level of understanding  - Provide teaching via preferred learning methods  Outcome: Progressing

## 2022-09-27 LAB
ANION GAP SERPL CALCULATED.3IONS-SCNC: 8 MMOL/L (ref 4–13)
BUN SERPL-MCNC: 98 MG/DL (ref 5–25)
CALCIUM SERPL-MCNC: 8.6 MG/DL (ref 8.3–10.1)
CHLORIDE SERPL-SCNC: 98 MMOL/L (ref 96–108)
CO2 SERPL-SCNC: 35 MMOL/L (ref 21–32)
CREAT SERPL-MCNC: 1.16 MG/DL (ref 0.6–1.3)
DIGITOXIN SERPL-MCNC: <5 NG/ML (ref 10–25)
GFR SERPL CREATININE-BSD FRML MDRD: 43 ML/MIN/1.73SQ M
GLUCOSE SERPL-MCNC: 117 MG/DL (ref 65–140)
GLUCOSE SERPL-MCNC: 123 MG/DL (ref 65–140)
GLUCOSE SERPL-MCNC: 158 MG/DL (ref 65–140)
GLUCOSE SERPL-MCNC: 166 MG/DL (ref 65–140)
GLUCOSE SERPL-MCNC: 206 MG/DL (ref 65–140)
POTASSIUM SERPL-SCNC: 3.9 MMOL/L (ref 3.5–5.3)
SODIUM SERPL-SCNC: 141 MMOL/L (ref 135–147)

## 2022-09-27 PROCEDURE — 94668 MNPJ CHEST WALL SBSQ: CPT

## 2022-09-27 PROCEDURE — 80048 BASIC METABOLIC PNL TOTAL CA: CPT | Performed by: SURGERY

## 2022-09-27 PROCEDURE — 82948 REAGENT STRIP/BLOOD GLUCOSE: CPT

## 2022-09-27 PROCEDURE — 94660 CPAP INITIATION&MGMT: CPT

## 2022-09-27 PROCEDURE — 99232 SBSQ HOSP IP/OBS MODERATE 35: CPT | Performed by: INTERNAL MEDICINE

## 2022-09-27 PROCEDURE — 99232 SBSQ HOSP IP/OBS MODERATE 35: CPT | Performed by: SURGERY

## 2022-09-27 PROCEDURE — 99222 1ST HOSP IP/OBS MODERATE 55: CPT | Performed by: STUDENT IN AN ORGANIZED HEALTH CARE EDUCATION/TRAINING PROGRAM

## 2022-09-27 PROCEDURE — 94760 N-INVAS EAR/PLS OXIMETRY 1: CPT

## 2022-09-27 PROCEDURE — 94640 AIRWAY INHALATION TREATMENT: CPT

## 2022-09-27 RX ORDER — BUMETANIDE 2 MG/1
2 TABLET ORAL DAILY
Status: DISCONTINUED | OUTPATIENT
Start: 2022-09-28 | End: 2022-09-30 | Stop reason: HOSPADM

## 2022-09-27 RX ADMIN — LEVALBUTEROL HYDROCHLORIDE 1.25 MG: 1.25 SOLUTION, CONCENTRATE RESPIRATORY (INHALATION) at 08:27

## 2022-09-27 RX ADMIN — IPRATROPIUM BROMIDE 0.5 MG: 0.5 SOLUTION RESPIRATORY (INHALATION) at 08:27

## 2022-09-27 RX ADMIN — OXYCODONE HYDROCHLORIDE 5 MG: 5 TABLET ORAL at 16:46

## 2022-09-27 RX ADMIN — METHOCARBAMOL TABLETS 500 MG: 500 TABLET, COATED ORAL at 00:34

## 2022-09-27 RX ADMIN — INSULIN LISPRO 2 UNITS: 100 INJECTION, SOLUTION INTRAVENOUS; SUBCUTANEOUS at 17:25

## 2022-09-27 RX ADMIN — HEPARIN SODIUM 5000 UNITS: 5000 INJECTION INTRAVENOUS; SUBCUTANEOUS at 22:02

## 2022-09-27 RX ADMIN — LEVALBUTEROL HYDROCHLORIDE 1.25 MG: 1.25 SOLUTION, CONCENTRATE RESPIRATORY (INHALATION) at 19:47

## 2022-09-27 RX ADMIN — HEPARIN SODIUM 5000 UNITS: 5000 INJECTION INTRAVENOUS; SUBCUTANEOUS at 05:12

## 2022-09-27 RX ADMIN — METHOCARBAMOL TABLETS 500 MG: 500 TABLET, COATED ORAL at 12:30

## 2022-09-27 RX ADMIN — LEVALBUTEROL HYDROCHLORIDE 1.25 MG: 1.25 SOLUTION, CONCENTRATE RESPIRATORY (INHALATION) at 14:13

## 2022-09-27 RX ADMIN — CARVEDILOL 12.5 MG: 12.5 TABLET, FILM COATED ORAL at 16:46

## 2022-09-27 RX ADMIN — INSULIN LISPRO 1 UNITS: 100 INJECTION, SOLUTION INTRAVENOUS; SUBCUTANEOUS at 12:31

## 2022-09-27 RX ADMIN — OXYCODONE HYDROCHLORIDE 5 MG: 5 TABLET ORAL at 22:01

## 2022-09-27 RX ADMIN — SACUBITRIL AND VALSARTAN 1 TABLET: 24; 26 TABLET, FILM COATED ORAL at 17:25

## 2022-09-27 RX ADMIN — METHOCARBAMOL TABLETS 500 MG: 500 TABLET, COATED ORAL at 17:25

## 2022-09-27 RX ADMIN — ATORVASTATIN CALCIUM 10 MG: 10 TABLET, FILM COATED ORAL at 08:23

## 2022-09-27 RX ADMIN — SENNOSIDES AND DOCUSATE SODIUM 1 TABLET: 8.6; 5 TABLET ORAL at 08:23

## 2022-09-27 RX ADMIN — INSULIN LISPRO 1 UNITS: 100 INJECTION, SOLUTION INTRAVENOUS; SUBCUTANEOUS at 00:35

## 2022-09-27 RX ADMIN — CARVEDILOL 12.5 MG: 12.5 TABLET, FILM COATED ORAL at 08:23

## 2022-09-27 RX ADMIN — GABAPENTIN 100 MG: 100 CAPSULE ORAL at 16:47

## 2022-09-27 RX ADMIN — DIGOXIN 62.5 MCG: 125 TABLET ORAL at 08:24

## 2022-09-27 RX ADMIN — METHOCARBAMOL TABLETS 500 MG: 500 TABLET, COATED ORAL at 05:12

## 2022-09-27 RX ADMIN — PANTOPRAZOLE SODIUM 40 MG: 40 TABLET, DELAYED RELEASE ORAL at 16:46

## 2022-09-27 RX ADMIN — PANTOPRAZOLE SODIUM 40 MG: 40 TABLET, DELAYED RELEASE ORAL at 05:16

## 2022-09-27 RX ADMIN — ACETAMINOPHEN 975 MG: 325 TABLET, FILM COATED ORAL at 05:12

## 2022-09-27 RX ADMIN — POLYETHYLENE GLYCOL 3350 17 G: 17 POWDER, FOR SOLUTION ORAL at 08:25

## 2022-09-27 RX ADMIN — GABAPENTIN 100 MG: 100 CAPSULE ORAL at 22:01

## 2022-09-27 RX ADMIN — IPRATROPIUM BROMIDE 0.5 MG: 0.5 SOLUTION RESPIRATORY (INHALATION) at 14:13

## 2022-09-27 RX ADMIN — SENNOSIDES AND DOCUSATE SODIUM 1 TABLET: 8.6; 5 TABLET ORAL at 17:25

## 2022-09-27 RX ADMIN — HEPARIN SODIUM 5000 UNITS: 5000 INJECTION INTRAVENOUS; SUBCUTANEOUS at 13:55

## 2022-09-27 RX ADMIN — Medication 6 MG: at 22:01

## 2022-09-27 RX ADMIN — GABAPENTIN 100 MG: 100 CAPSULE ORAL at 08:23

## 2022-09-27 RX ADMIN — ACETAMINOPHEN 975 MG: 325 TABLET, FILM COATED ORAL at 22:01

## 2022-09-27 RX ADMIN — ACETAMINOPHEN 975 MG: 325 TABLET, FILM COATED ORAL at 13:55

## 2022-09-27 RX ADMIN — IPRATROPIUM BROMIDE 0.5 MG: 0.5 SOLUTION RESPIRATORY (INHALATION) at 19:47

## 2022-09-27 RX ADMIN — LIDOCAINE 5% 1 PATCH: 700 PATCH TOPICAL at 08:25

## 2022-09-27 RX ADMIN — BUMETANIDE 2 MG: 0.25 INJECTION, SOLUTION INTRAMUSCULAR; INTRAVENOUS at 08:38

## 2022-09-27 RX ADMIN — OXYCODONE HYDROCHLORIDE 5 MG: 5 TABLET ORAL at 12:34

## 2022-09-27 NOTE — PLAN OF CARE
Problem: MOBILITY - ADULT  Goal: Maintain or return to baseline ADL function  Description: INTERVENTIONS:  -  Assess patient's ability to carry out ADLs; assess patient's baseline for ADL function and identify physical deficits which impact ability to perform ADLs (bathing, care of mouth/teeth, toileting, grooming, dressing, etc )  - Assess/evaluate cause of self-care deficits   - Assess range of motion  - Assess patient's mobility; develop plan if impaired  - Assess patient's need for assistive devices and provide as appropriate  - Encourage maximum independence but intervene and supervise when necessary  - Involve family in performance of ADLs  - Assess for home care needs following discharge   - Consider OT consult to assist with ADL evaluation and planning for discharge  - Provide patient education as appropriate  Outcome: Progressing  Goal: Maintains/Returns to pre admission functional level  Description: INTERVENTIONS:  - Perform BMAT or MOVE assessment daily    - Set and communicate daily mobility goal to care team and patient/family/caregiver  - Collaborate with rehabilitation services on mobility goals if consulted  - Perform Range of Motion 3 times a day  - Reposition patient every 2 hours    - Dangle patient 3 times a day  - Stand patient 3 times a day  - Ambulate patient 3 times a day  - Out of bed to chair 3 times a day   - Out of bed for meals 3 times a day  - Out of bed for toileting  - Record patient progress and toleration of activity level   Outcome: Progressing     Problem: Potential for Falls  Goal: Patient will remain free of falls  Description: INTERVENTIONS:  - Educate patient/family on patient safety including physical limitations  - Instruct patient to call for assistance with activity   - Consult OT/PT to assist with strengthening/mobility   - Keep Call bell within reach  - Keep bed low and locked with side rails adjusted as appropriate  - Keep care items and personal belongings within reach  - Initiate and maintain comfort rounds  - Make Fall Risk Sign visible to staff  - Offer Toileting every *2** Hours, in advance of need  - Initiate/Maintain *bed/chair **alarm  - Obtain necessary fall risk management equipment: - Apply yellow socks and bracelet for high fall risk patients  - Consider moving patient to room near nurses station  Outcome: Progressing     Problem: Prexisting or High Potential for Compromised Skin Integrity  Goal: Skin integrity is maintained or improved  Description: INTERVENTIONS:  - Identify patients at risk for skin breakdown  - Assess and monitor skin integrity  - Assess and monitor nutrition and hydration status  - Monitor labs   - Assess for incontinence   - Turn and reposition patient  - Assist with mobility/ambulation  - Relieve pressure over bony prominences  - Avoid friction and shearing  - Provide appropriate hygiene as needed including keeping skin clean and dry  - Evaluate need for skin moisturizer/barrier cream  - Collaborate with interdisciplinary team   - Patient/family teaching  - Consider wound care consult   Outcome: Progressing     Problem: PAIN - ADULT  Goal: Verbalizes/displays adequate comfort level or baseline comfort level  Description: Interventions:  - Encourage patient to monitor pain and request assistance  - Assess pain using appropriate pain scale  - Administer analgesics based on type and severity of pain and evaluate response  - Implement non-pharmacological measures as appropriate and evaluate response  - Consider cultural and social influences on pain and pain management  - Notify physician/advanced practitioner if interventions unsuccessful or patient reports new pain  Outcome: Progressing     Problem: INFECTION - ADULT  Goal: Absence or prevention of progression during hospitalization  Description: INTERVENTIONS:  - Assess and monitor for signs and symptoms of infection  - Monitor lab/diagnostic results  - Monitor all insertion sites, i e  indwelling lines, tubes, and drains  - Monitor endotracheal if appropriate and nasal secretions for changes in amount and color  - [place] appropriate cooling/warming therapies per order  - Administer medications as ordered  - Instruct and encourage patient and family to use good hand hygiene technique  - Identify and instruct in appropriate isolation precautions for identified infection/condition  Outcome: Progressing     Problem: SAFETY ADULT  Goal: Maintain or return to baseline ADL function  Description: INTERVENTIONS:  -  Assess patient's ability to carry out ADLs; assess patient's baseline for ADL function and identify physical deficits which impact ability to perform ADLs (bathing, care of mouth/teeth, toileting, grooming, dressing, etc )  - Assess/evaluate cause of self-care deficits   - Assess range of motion  - Assess patient's mobility; develop plan if impaired  - Assess patient's need for assistive devices and provide as appropriate  - Encourage maximum independence but intervene and supervise when necessary  - Involve family in performance of ADLs  - Assess for home care needs following discharge   - Consider OT consult to assist with ADL evaluation and planning for discharge  - Provide patient education as appropriate  Outcome: Progressing  Goal: Maintains/Returns to pre admission functional level  Description: INTERVENTIONS:  - Perform BMAT or MOVE assessment daily    - Set and communicate daily mobility goal to care team and patient/family/caregiver  - Collaborate with rehabilitation services on mobility goals if consulted  - Perform Range of Motion 3 times a day  - Reposition patient every 2 hours    - Dangle patient 3 times a day  - Stand patient 3 times a day  - Ambulate patient 3 times a day  - Out of bed to chair 3 times a day   - Out of bed for meals 3 times a day  - Out of bed for toileting  - Record patient progress and toleration of activity level   Outcome: Progressing  Goal: Patient will remain free of falls  Description: INTERVENTIONS:  - Educate patient/family on patient safety including physical limitations  - Instruct patient to call for assistance with activity   - Consult OT/PT to assist with strengthening/mobility   - Keep Call bell within reach  - Keep bed low and locked with side rails adjusted as appropriate  - Keep care items and personal belongings within reach  - Initiate and maintain comfort rounds  - Make Fall Risk Sign visible to staff  - Offer Toileting every **2* Hours, in advance of need  - Initiate/Maintain *bed  chair**alarm  - Obtain necessary fall risk management equipment:   - Apply yellow socks and bracelet for high fall risk patients  - Consider moving patient to room near nurses station  Outcome: Progressing     Problem: DISCHARGE PLANNING  Goal: Discharge to home or other facility with appropriate resources  Description: INTERVENTIONS:  - Identify barriers to discharge w/patient and caregiver  - Arrange for needed discharge resources and transportation as appropriate  - Identify discharge learning needs (meds, wound care, etc )  - Arrange for interpretive services to assist at discharge as needed  - Refer to Case Management Department for coordinating discharge planning if the patient needs post-hospital services based on physician/advanced practitioner order or complex needs related to functional status, cognitive ability, or social support system  Outcome: Progressing     Problem: Knowledge Deficit  Goal: Patient/family/caregiver demonstrates understanding of disease process, treatment plan, medications, and discharge instructions  Description: Complete learning assessment and assess knowledge base    Interventions:  - Provide teaching at level of understanding  - Provide teaching via preferred learning methods  Outcome: Progressing     Problem: Nutrition/Hydration-ADULT  Goal: Nutrient/Hydration intake appropriate for improving, restoring or maintaining nutritional needs  Description: Monitor and assess patient's nutrition/hydration status for malnutrition  Collaborate with interdisciplinary team and initiate plan and interventions as ordered  Monitor patient's weight and dietary intake as ordered or per policy  Utilize nutrition screening tool and intervene as necessary  Determine patient's food preferences and provide high-protein, high-caloric foods as appropriate       INTERVENTIONS:  - Monitor oral intake, urinary output, labs, and treatment plans  - Assess nutrition and hydration status and recommend course of action  - Evaluate amount of meals eaten  - Assist patient with eating if necessary   - Allow adequate time for meals  - Recommend/ encourage appropriate diets, oral nutritional supplements, and vitamin/mineral supplements  - Order, calculate, and assess calorie counts as needed  - Recommend, monitor, and adjust tube feedings and TPN/PPN based on assessed needs  - Assess need for intravenous fluids  - Provide specific nutrition/hydration education as appropriate  - Include patient/family/caregiver in decisions related to nutrition  Outcome: Progressing

## 2022-09-27 NOTE — ASSESSMENT & PLAN NOTE
9/17 CT CAP: Fracture of the T11 vertebral body with narrowing of the spinal canal at this level  Evaluation is limited secondary to osteopenia/ankylosing spondylitis  Additional probable small fracture of the anterior endplate of F48     9/46 CT thoracic: Ddisruption of the anterior endplates/syndesmophytes of the T10 vertebral body and disruption of the anterior and posterior endplate of the Q72 vertebral bodies compatible with fracture  There is narrowing of the Spinal canal at the T11 level        Plan:  · Neurosurgery consult-- No neurosurgical intervention given neurologically intact and stable exam   · TLSO brace   · q4h neuro checks   · Multimodal analgesia with schedule tylenol, robaxin, and neurontin  · PT/OT

## 2022-09-27 NOTE — PROGRESS NOTES
[address] [facility]  Progress Note - Gerry Beaver 1940, 79 y o  female MRN: [id number]  Unit/Bed#: [facility] 627-01 Encounter: [id number]  Primary Care Provider: Tiffany Saleh DO   Date and time admitted to hospital: 9/17/2022  9:14 PM    DM (diabetes mellitus) [facility])  Assessment & Plan  Lab Results   Component Value Date    HGBA1C 6 6 (H) 06/08/2022       Recent Labs     09/26/22  1719 09/26/22  1750 09/27/22  0035 09/27/22  0508   POCGLU 209* 218* 166* 117       Blood Sugar Average: Last 72 hrs:  (P) 181 [id number]     · Continue SSI, goal -180    Acute on chronic respiratory failure with hypoxia (HCC)  Assessment & Plan  · Pt denies home O2 use  · Currently stable on 2L NC, will likely be patient's new baseline  · Significant improvement following diuresis and bilateral thoracentesis  · Encourage pulmonary hygiene, mobilization, OPEP  · Diuresis as above  · Continue nocturnal BiPAP    Acute-on-chronic kidney injury [facility])  Assessment & Plan  Lab Results   Component Value Date    EGFR 38 09/26/2022    EGFR 34 09/25/2022    EGFR 35 09/24/2022    CREATININE 1 29 09/26/2022    CREATININE 1 41 (H) 09/25/2022    CREATININE 1 39 (H) 09/24/2022     · Baseline creatinine:  1 2-1 4  · Admission creatinine:  1 79  · In the setting of acute blood loss anemia and cardiorenal in setting of CHF  · Cr now back at baseline  · Trend BUN/creatinine  · Monitor I/Os    COPD (chronic obstructive pulmonary disease) ([facility])  Assessment & Plan  · On 2 L nasal cannula p r n  At home  · SpO2 goal > 88%  · Continue home Symbicort, albuterol p r n    · Bipap QHS    SIRS (systemic inflammatory response syndrome) ([facility])  Assessment & Plan  · POA as evidenced by WBC 22 1, tachycardia 100, lactic 10 6  · WBC down-trending, tachycardia resolved  · No current concern for infectious etiology, SIRS secondary to GI bleed and anemia    Plan:  · Monitoring off antibiotics  · BCx NGTD  · Trend temp curve and WBC count  · Resolved    [address] from chair height, was sitting  Denies LOC, but was found down after pressing alert button    Plan:  - geriatrics consult  - Plan as above   - PT/OT  - Fall precautions    Anemia  Assessment & Plan  · Hgb 5 7 on admission with INR 4 66 on coumadin, received 2 U PRBC, stable in 7s  · SHANT positive for melanotic stool  · Reports 2 weeks of melena   · Elevated BUN  · UGI bleed suspected    Plan:  · Coumadin reversed with Evelia Nubia and Vitamin K   · Trend Hgb, stable  · PO PPI BID    · 9/19 EGD negative, flex sigmoidoscopy incomplete due to stool but not obvious source of bleed  · Hgb currently stable at 8  · If worsening anemia and continued melena, re-consult GI      Permanent atrial fibrillation (HCC)  Assessment & Plan  · On coumadin with INR 4 66 on admission   · Coumadin reversed with Kcentra and vitamin K on admission for acute blood loss anemia and received 2 PRBC  · Home regimen:  Digoxin 125 mcg, Coreg 25 mg b i d , diltiazem  mg    Plan:  · Currently rate controlled, continue holding home medication  · Continue coreg 12 5mg BID  · Continue hold AC---- HAS-BLED score is 4 which carries 8 9% risk of major bleeding and therefore may defer restarting DOAC atleast until PCP evaluation     HFrEF (heart failure with reduced ejection fraction) ([facility])  Assessment & Plan  Wt Readings from Last 3 Encounters:   09/27/22 96 3 kg (212 lb 3 2 oz)   11/12/21 110 kg (243 lb)   08/23/21 106 kg (234 lb 9 1 oz)     · Hx HF with biventricular dysfunction and EF 40% with PPM   · 9/18 Echo - EF 31%, diastolic dysfunction, mod AS, mod MR, RVSP 59  · Home regimen: Entresto, diltiazem  mg, digoxin 125 mcg, Coreg 25 mg b i d , aspirin 81 daily, lasix 40 mg BID     Plan:  · Telemetry monitoring  · Bumex 2mg IV TID  · Cardiology consulted, appreciate recommendations    * T11 vertebral fracture [facility])  Assessment & Plan  9/17 CT CAP: Fracture of the T11 vertebral body with narrowing of the spinal canal at this level  Evaluation is limited secondary to osteopenia/ankylosing spondylitis  Additional probable small fracture of the anterior endplate of Y84     4/15 CT thoracic: Ddisruption of the anterior endplates/syndesmophytes of the T10 vertebral body and disruption of the anterior and posterior endplate of the I42 vertebral bodies compatible with fracture  There is narrowing of the Spinal canal at the T11 level  Plan:  · Neurosurgery consult-- No neurosurgical intervention given neurologically intact and stable exam   · TLSO brace   · q4h neuro checks   · Multimodal analgesia with schedule tylenol, robaxin, and neurontin  · PT/OT        Disposition: Patient is doing well on 2 L O2 NC, medically improving for rehab placement    SUBJECTIVE:  Chief Complaint: "I'm hungry"    Subjective: Patient reports she has not eaten much during hospitalization and she is hungry this morning  I helped feed the patient at bedside  She reports back pain    OBJECTIVE:   Vitals:   Temp:  [97 7 °F (36 5 °C)-98 7 °F (37 1 °C)] 98 7 °F (37 1 °C)  HR:  [58-63] 58  Resp:  [14-18] 16  BP: (104-139)/(41-58) 129/41    Intake/Output:  I/O       09/25 0701  09/26 0700 09/26 0701  09/27 0700    P  O  1220     IV Piggyback 50     Total Intake(mL/kg) 1270 (12 9)     Urine (mL/kg/hr) 1920 (0 8) 2100 (0 9)    Stool 0 0    Total Output 1920 2100    Net -650 -2100          Unmeasured Urine Occurrence  1 x    Unmeasured Stool Occurrence 1 x 1 x         Nutrition: Diet Cardiovascular; Cardiac; Fluid Restriction 1500 ML  GI Proph/Bowel Reg: senokot s  VTE Prophylaxis:Sequential compression device (Venodyne)  and Heparin     Physical Exam:   GENERAL APPEARANCE: Patient in no acute distress  HEENT: NCAT; PERRL, EOMs intact; Mucous membranes moist  CV: Regular rate and rhythm; no murmur/gallops/rubs appreciated  CHEST / LUNGS: Clear to auscultation; no wheezes/rales/rhonci   Minimally decreased lung sounds in lung bases likely from atelectasis  ABD: NABS; soft; non-distended; non-tender  : Voiding  EXT: +2 pulses bilaterally upper & lower extremities; no edema  NEURO: GCS 15; no focal neurologic deficits; neurovascularly intact  SKIN: Warm, dry and well perfused; no rash; no jaundice  Invasive Devices  Report    Peripheral Intravenous Line  Duration           Peripheral IV 09/26/22 Right Antecubital 1 day          Drain  Duration           External Urinary Catheter 2 days                      Lab Results:   Results: I have personally reviewed all pertinent laboratory/tests results, BMP/CMP:   Lab Results   Component Value Date    SODIUM 141 09/27/2022    K 3 9 09/27/2022    CL 98 09/27/2022    CO2 35 (H) 09/27/2022    BUN 98 (H) 09/27/2022    CREATININE 1 16 09/27/2022    CALCIUM 8 6 09/27/2022    EGFR 43 09/27/2022    and CBC: No results found for: WBC, HGB, HCT, MCV, PLT, ADJUSTEDWBC, MCH, MCHC, RDW, MPV, NRBC  Imaging/EKG Studies: I have personally reviewed pertinent reports  see below  Other Studies:   XR chest portable ICU   Final Result by Pancho Bazan MD (09/25 1254)      Moderate cardiomegaly  Left base opacity which could be due to atelectasis or pneumonia  Workstation performed: [id number]         XR chest portable ICU   Final Result by Massimo Hamilton MD (09/23 1412)      Pulmonary vascular congestion with decreased size of the small to moderate left pleural effusion  Workstation performed: [id number]         XR chest portable   Final Result by Pancho Bazan MD (09/23 0102)      Improved right effusion and right base atelectasis with no pneumothorax  Persistent left effusion and left base atelectasis  Workstation performed: [id number]         XR chest portable ICU   Final Result by Pancho Bazan MD (09/22 4521)      Persistent moderate effusions and bibasilar atelectasis                    Workstation performed: [id number] XR chest portable   Final Result by Hamlet Johnson DO (09/21 1049)      Bibasilar opacity suggesting atelectasis and pleural effusions  Underlying infiltrate not excluded  Workstation performed: [id number]         XR chest portable   Final Result by Luiz Ulloa MD (09/19 1616)      No acute cardiopulmonary disease  Workstation performed: [id number]         XR chest portable ICU   Final Result by Luiz Ulloa MD (09/18 1800)      No acute cardiopulmonary disease                    Workstation performed: [id number]

## 2022-09-27 NOTE — ASSESSMENT & PLAN NOTE
· Hgb 5 7 on admission with INR 4 66 on coumadin, received 2 U PRBC, stable in 7s  · SHANT positive for melanotic stool  · Reports 2 weeks of melena   · Elevated BUN  · UGI bleed suspected    Plan:  · Coumadin reversed with Eleanora Angelina and Vitamin K   · Trend Hgb, stable  · PO PPI BID    · 9/19 EGD negative, flex sigmoidoscopy incomplete due to stool but not obvious source of bleed  · Hgb currently stable at 8  · If worsening anemia and continued melena, re-consult GI

## 2022-09-27 NOTE — PLAN OF CARE
Problem: MOBILITY - ADULT  Goal: Maintain or return to baseline ADL function  Description: INTERVENTIONS:  -  Assess patient's ability to carry out ADLs; assess patient's baseline for ADL function and identify physical deficits which impact ability to perform ADLs (bathing, care of mouth/teeth, toileting, grooming, dressing, etc )  - Assess/evaluate cause of self-care deficits   - Assess range of motion  - Assess patient's mobility; develop plan if impaired  - Assess patient's need for assistive devices and provide as appropriate  - Encourage maximum independence but intervene and supervise when necessary  - Involve family in performance of ADLs  - Assess for home care needs following discharge   - Consider OT consult to assist with ADL evaluation and planning for discharge  - Provide patient education as appropriate  Outcome: Progressing  Goal: Maintains/Returns to pre admission functional level  Description: INTERVENTIONS:  - Perform BMAT or MOVE assessment daily    - Set and communicate daily mobility goal to care team and patient/family/caregiver  - Collaborate with rehabilitation services on mobility goals if consulted  - Perform Range of Motion 3 times a day  - Reposition patient every 2 hours    - Dangle patient 3 times a day  - Stand patient 3 times a day  - Ambulate patient 3 times a day  - Out of bed to chair 3 times a day   - Out of bed for meals 3 times a day  - Out of bed for toileting  - Record patient progress and toleration of activity level   Outcome: Progressing     Problem: Potential for Falls  Goal: Patient will remain free of falls  Description: INTERVENTIONS:  - Educate patient/family on patient safety including physical limitations  - Instruct patient to call for assistance with activity   - Consult OT/PT to assist with strengthening/mobility   - Keep Call bell within reach  - Keep bed low and locked with side rails adjusted as appropriate  - Keep care items and personal belongings within reach  - Initiate and maintain comfort rounds  - Make Fall Risk Sign visible to staff  - Offer Toileting every 2 Hours, in advance of need  - Initiate/Maintain bedalarm  - Obtain necessary fall risk management equipment:   - Apply yellow socks and bracelet for high fall risk patients  - Consider moving patient to room near nurses station  Outcome: Progressing     Problem: Prexisting or High Potential for Compromised Skin Integrity  Goal: Skin integrity is maintained or improved  Description: INTERVENTIONS:  - Identify patients at risk for skin breakdown  - Assess and monitor skin integrity  - Assess and monitor nutrition and hydration status  - Monitor labs   - Assess for incontinence   - Turn and reposition patient  - Assist with mobility/ambulation  - Relieve pressure over bony prominences  - Avoid friction and shearing  - Provide appropriate hygiene as needed including keeping skin clean and dry  - Evaluate need for skin moisturizer/barrier cream  - Collaborate with interdisciplinary team   - Patient/family teaching  - Consider wound care consult   Outcome: Progressing     Problem: PAIN - ADULT  Goal: Verbalizes/displays adequate comfort level or baseline comfort level  Description: Interventions:  - Encourage patient to monitor pain and request assistance  - Assess pain using appropriate pain scale  - Administer analgesics based on type and severity of pain and evaluate response  - Implement non-pharmacological measures as appropriate and evaluate response  - Consider cultural and social influences on pain and pain management  - Notify physician/advanced practitioner if interventions unsuccessful or patient reports new pain  Outcome: Progressing     Problem: INFECTION - ADULT  Goal: Absence or prevention of progression during hospitalization  Description: INTERVENTIONS:  - Assess and monitor for signs and symptoms of infection  - Monitor lab/diagnostic results  - Monitor all insertion sites, i e  indwelling lines, tubes, and drains  - Monitor endotracheal if appropriate and nasal secretions for changes in amount and color  - [place] appropriate cooling/warming therapies per order  - Administer medications as ordered  - Instruct and encourage patient and family to use good hand hygiene technique  - Identify and instruct in appropriate isolation precautions for identified infection/condition  Outcome: Progressing     Problem: SAFETY ADULT  Goal: Maintain or return to baseline ADL function  Description: INTERVENTIONS:  -  Assess patient's ability to carry out ADLs; assess patient's baseline for ADL function and identify physical deficits which impact ability to perform ADLs (bathing, care of mouth/teeth, toileting, grooming, dressing, etc )  - Assess/evaluate cause of self-care deficits   - Assess range of motion  - Assess patient's mobility; develop plan if impaired  - Assess patient's need for assistive devices and provide as appropriate  - Encourage maximum independence but intervene and supervise when necessary  - Involve family in performance of ADLs  - Assess for home care needs following discharge   - Consider OT consult to assist with ADL evaluation and planning for discharge  - Provide patient education as appropriate  Outcome: Progressing  Goal: Maintains/Returns to pre admission functional level  Description: INTERVENTIONS:  - Perform BMAT or MOVE assessment daily    - Set and communicate daily mobility goal to care team and patient/family/caregiver  - Collaborate with rehabilitation services on mobility goals if consulted  - Perform Range of Motion 3 times a day  - Reposition patient every 2 hours    - Dangle patient 3 times a day  - Stand patient 3 times a day  - Ambulate patient 3 times a day  - Out of bed to chair 3 times a day   - Out of bed for meals 3 times a day  - Out of bed for toileting  - Record patient progress and toleration of activity level   Outcome: Progressing  Goal: Patient will remain free of falls  Description: INTERVENTIONS:  - Educate patient/family on patient safety including physical limitations  - Instruct patient to call for assistance with activity   - Consult OT/PT to assist with strengthening/mobility   - Keep Call bell within reach  - Keep bed low and locked with side rails adjusted as appropriate  - Keep care items and personal belongings within reach  - Initiate and maintain comfort rounds  - Make Fall Risk Sign visible to staff  - Offer Toileting every 2 Hours, in advance of need  - Initiate/Maintain bedalarm  - Obtain necessary fall risk management equipment:   - Apply yellow socks and bracelet for high fall risk patients  - Consider moving patient to room near nurses station  Outcome: Progressing     Problem: DISCHARGE PLANNING  Goal: Discharge to home or other facility with appropriate resources  Description: INTERVENTIONS:  - Identify barriers to discharge w/patient and caregiver  - Arrange for needed discharge resources and transportation as appropriate  - Identify discharge learning needs (meds, wound care, etc )  - Arrange for interpretive services to assist at discharge as needed  - Refer to Case Management Department for coordinating discharge planning if the patient needs post-hospital services based on physician/advanced practitioner order or complex needs related to functional status, cognitive ability, or social support system  Outcome: Progressing     Problem: Knowledge Deficit  Goal: Patient/family/caregiver demonstrates understanding of disease process, treatment plan, medications, and discharge instructions  Description: Complete learning assessment and assess knowledge base    Interventions:  - Provide teaching at level of understanding  - Provide teaching via preferred learning methods  Outcome: Progressing     Problem: Nutrition/Hydration-ADULT  Goal: Nutrient/Hydration intake appropriate for improving, restoring or maintaining nutritional needs  Description: Monitor and assess patient's nutrition/hydration status for malnutrition  Collaborate with interdisciplinary team and initiate plan and interventions as ordered  Monitor patient's weight and dietary intake as ordered or per policy  Utilize nutrition screening tool and intervene as necessary  Determine patient's food preferences and provide high-protein, high-caloric foods as appropriate       INTERVENTIONS:  - Monitor oral intake, urinary output, labs, and treatment plans  - Assess nutrition and hydration status and recommend course of action  - Evaluate amount of meals eaten  - Assist patient with eating if necessary   - Allow adequate time for meals  - Recommend/ encourage appropriate diets, oral nutritional supplements, and vitamin/mineral supplements  - Order, calculate, and assess calorie counts as needed  - Recommend, monitor, and adjust tube feedings and TPN/PPN based on assessed needs  - Assess need for intravenous fluids  - Provide specific nutrition/hydration education as appropriate  - Include patient/family/caregiver in decisions related to nutrition  Outcome: Progressing

## 2022-09-27 NOTE — ASSESSMENT & PLAN NOTE
· Pt denies home O2 use  · Currently stable on 2L NC, will likely be patient's new baseline  · Significant improvement following diuresis and bilateral thoracentesis  · Encourage pulmonary hygiene, mobilization, OPEP  · Diuresis as above  · Continue nocturnal BiPAP

## 2022-09-27 NOTE — ASSESSMENT & PLAN NOTE
· On 2 L nasal cannula p r n  At home  · SpO2 goal > 88%  · Continue home Symbicort, albuterol p r n    · Bipap QHS

## 2022-09-27 NOTE — ASSESSMENT & PLAN NOTE
Lab Results   Component Value Date    EGFR 38 09/26/2022    EGFR 34 09/25/2022    EGFR 35 09/24/2022    CREATININE 1 29 09/26/2022    CREATININE 1 41 (H) 09/25/2022    CREATININE 1 39 (H) 09/24/2022     · Baseline creatinine:  1 2-1 4  · Admission creatinine:  1 79  · In the setting of acute blood loss anemia and cardiorenal in setting of CHF  · Cr now back at baseline  · Trend BUN/creatinine  · Monitor I/Os

## 2022-09-27 NOTE — CONSULTS
PHYSICAL MEDICINE AND REHABILITATION CONSULT NOTE  Rafael Esqueda 79 y o  female MRN: [id number]  Unit/Bed#: [facility] 627-01 Encounter: [id number]    Requested by (Physician/Service): Padmini Diaz MD  Reason for Consultation:  Assessment of rehabilitation needs    Assessment:  Rehabilitation Diagnosis:    T11 vertebral body fracture    Impaired mobility and self care   Impaired cognition     Recommendations:  Rehabilitation Plan:   Continue PT/OT (SLP) while on acute care   Recommend sub-acute inpatient rehabilitation as patient will likely not tolerate a more aggressive program     Covid-19 Testing: [facility] inpatient rehabilitation units require testing within 48 hours of all potential admissions at this time  *Re-testing is NOT required for patients recovering from COVID-19 infection if isolation has been discontinued per CDC criteria  Medical Co-morbidities Plan:  · Atrial fibrillation   · Acute blood loss anemia   · Diabetes  · XIOMARA  · COPD  · CKD stage 3  · Hx of colon cancer s/p colectomy   · DVT ppx: heparin SQ and SCD    Thank you for this consultation  Do not hesitate to contact service with further questions  FIDELIA Roger  PM&R    History of Present Illness:  Rafael Esqueda is a 79 y o  female with a PMH of atrial fibrillation, CKD stage III, diabetes, XIOMARA, COPD, colon cancer s/p colectomy who [facility] after falling  She was found to have a T11 vertebral body fracture with narrowing of spinal canal  She was found to have a hemoglobin of 5 6 and was given 2 units of PRBCs as well as Kcentra and vitamin K for INR of 4 6  There was concern for GI bleed, EGD and flex sig were negative but incomplete flex sig given stool burden  She also required thoracentesis for difficulty breathing and hypoxia  Neurosurgery was consulted and recommended conservative management  She was placed in a TLSO brace   She was also treated for acute pain due to trauma and KAYLEE on CKD stage III  She continues on daily diuresis  She was transferred out of ICU on 9/25/22  PM&R are consulted for rehabilitation recommendations  The patient was seen in her room  She reports back pain and fatigue  She is on oxygent currently and was not on oxygen at home  She denies any SOB, chest pain, dizziness, numbness or tingling  Review of Systems: 10 point ROS negative except for what is noted in HPI    Function:  Prior level of function and living situation: The patient lives alone in a ranch style home with 1 RASHEED  She was independent for ADLs and has a walker, scooter and cane  She reports neighbors can check on her  Current level of function:  Physical Therapy: Maximal assist for bed mobility and transfers   Occupational Therapy: Minimal assist for eating, moderate assist for grooming, maximal assist for UB bathing/dressing, LB bathing, total assist for LB dressing and toileting  Physical Exam:  BP (!) 129/41   Pulse 58   Temp 98 7 °F (37 1 °C)   Resp 16   Ht 4' 10" (1 473 m)   Wt 96 3 kg (212 lb 3 2 oz)   SpO2 97%   BMI 44 35 kg/m²        Intake/Output Summary (Last 24 hours) at 9/27/2022 0831  Last data filed at 9/27/2022 0523  Gross per 24 hour   Intake --   Output 2100 ml   Net -2100 ml       Body mass index is 44 35 kg/m²  Physical Exam  Constitutional:       General: She is not in acute distress  Appearance: She is obese  She is ill-appearing  HENT:      Head: Normocephalic and atraumatic  Right Ear: External ear normal       Left Ear: External ear normal       Nose: Nose normal       Mouth/Throat:      Mouth: Mucous membranes are moist       Pharynx: Oropharynx is clear  Pulmonary:      Effort: Pulmonary effort is normal    Abdominal:      General: There is no distension  Palpations: Abdomen is soft  Musculoskeletal:      Comments: Generally weak throughout   Skin:     Coloration: Skin is pale        Findings: Bruising present  Neurological:      Mental Status: She is oriented to person, place, and time  Psychiatric:         Mood and Affect: Affect is flat  Social History:    Social History     Socioeconomic History    Marital status:      Spouse name: None    Number of children: None    Years of education: None    Highest education level: None   Occupational History    None   Tobacco Use    Smoking status: Never Smoker    Smokeless tobacco: Never Used   Vaping Use    Vaping Use: Never used   Substance and Sexual Activity    Alcohol use: Never    Drug use: Never    Sexual activity: Not Currently   Other Topics Concern    None   Social History Narrative    ** Merged History Encounter **          Social Determinants of Health     Financial Resource Strain: Not on file   Food Insecurity: No Food Insecurity    Worried About Running Out of Food in the Last Year: Never true    Courtney of Food in the Last Year: Never true   Transportation Needs: No Transportation Needs    Lack of Transportation (Medical): No    Lack of Transportation (Non-Medical):  No   Physical Activity: Not on file   Stress: Not on file   Social Connections: Not on file   Intimate Partner Violence: Not on file   Housing Stability: Low Risk     Unable to Pay for Housing in the Last Year: No    Number of Places Lived in the Last Year: 1    Unstable Housing in the Last Year: No        Family History:    Family History   Problem Relation Age of Onset    Hypertension Mother     Stroke Mother     Heart disease Father          Medications:     Current Facility-Administered Medications:     acetaminophen (TYLENOL) tablet 975 mg, 975 mg, Oral, Q8H [address], Duncan Rolle PA-C, 975 mg at 09/27/22 0512    albuterol (PROVENTIL HFA,VENTOLIN HFA) inhaler 2 puff, 2 puff, Inhalation, Q4H PRN, Peter Rolle PA-C, 2 puff at 09/20/22 2227    albuterol inhalation solution 2 5 mg, 2 5 mg, Nebulization, Q4H PRN, Anabella Doll VICKY    atorvastatin (LIPITOR) tablet 10 mg, 10 mg, Oral, Daily, Duncan Rolle PA-C, 10 mg at 09/27/22 4230    bisacodyl (DULCOLAX) rectal suppository 10 mg, 10 mg, Rectal, Daily PRN, Bernie Rolle PA-C    bumetanide (BUMEX) injection 2 mg, 2 mg, Intravenous, BID, Duncan Rolle PA-C, 2 mg at 09/26/22 2211    carvedilol (COREG) tablet 12 5 mg, 12 5 mg, Oral, BID With Meals, Bernie Rolle PA-C, 12 5 mg at 09/27/22 4847    digoxin (LANOXIN) tablet 62 5 mcg, 62 5 mcg, Oral, Daily, Duncan Rolle PA-C, 62 5 mcg at 09/27/22 0824    gabapentin (NEURONTIN) capsule 100 mg, 100 mg, Oral, TID, Duncan Rolle PA-C, 100 mg at 09/27/22 1236    heparin (porcine) subcutaneous injection 5,000 Units, 5,000 Units, Subcutaneous, Q8H [facility], Duncan Rolle PA-C, 5,000 Units at 09/27/22 0512    insulin lispro (HumaLOG) 100 units/mL subcutaneous injection 1-5 Units, 1-5 Units, Subcutaneous, Q6H [facility], 1 Units at 09/27/22 0035 **AND** Fingerstick Glucose (POCT), , , Q6H, Duncan Rolle PA-C    ipratropium (ATROVENT) 0 02 % inhalation solution 0 5 mg, 0 5 mg, Nebulization, TID, Duncan Rolle PA-C, 0 5 mg at 09/27/22 0827    levalbuterol (XOPENEX) inhalation solution 1 25 mg, 1 25 mg, Nebulization, TID, Duncan Rolle PA-C, 1 25 mg at 09/27/22 0827    lidocaine (LIDODERM) 5 % patch 1 patch, 1 patch, Topical, Daily, Duncan Rolle PA-C, 1 patch at 09/27/22 0825    melatonin tablet 6 mg, 6 mg, Oral, HS, Duncan Rolle PA-C, 6 mg at 09/26/22 2206    methocarbamol (ROBAXIN) tablet 500 mg, 500 mg, Oral, Q6H [facility], Duncan Rolle PA-C, 500 mg at 09/27/22 6771    oxyCODONE (ROXICODONE) IR tablet 2 5 mg, 2 5 mg, Oral, Q4H PRN, Bernie Rolle PA-C, 2 5 mg at 09/24/22 2202    oxyCODONE (ROXICODONE) IR tablet 5 mg, 5 mg, Oral, Q4H PRN, Mega Rolle PA-C, 5 mg at 09/26/22 1135    pantoprazole (PROTONIX) EC tablet 40 mg, 40 mg, Oral, BID AC, Duncan Rolle, PA-C, 40 mg at 22 0516    polyethylene glycol (MIRALAX) packet 17 g, 17 g, Oral, BID, Duncan RIDLEY Kenishakievics, PA-C, 17 g at 22 0825    polyethylene glycol (MIRALAX) packet 17 g, 17 g, Oral, Daily PRN, Raquel Marlyn Lukievics, PA-C    senna-docusate sodium (SENOKOT S) 8 6-50 mg per tablet 1 tablet, 1 tablet, Oral, BID, Raquel Marlyn Lukievics, PA-C, 1 tablet at 22 9228    simethicone (MYLICON) chewable tablet 80 mg, 80 mg, Oral, Q6H PRN, Raquel Marlyn Lukievics, PA-C, 80 mg at 22 1753    Past Medical History:     Past Medical History:   Diagnosis Date    A-fib [facility])     Cardiac pacemaker     [facility]est[facility]     Chronic cellulitis     COPD [facility]chronic obstructive pulmonary disease) ([facility]     Diabetes mellitus [facility]     Edema     High cholesterol     Hyperparathyroidism ([facility]     Hypertension     Hyperuricemia     [facility]         Past Surgical History:     Past Surgical History:   Procedure Laterality Date    CARDIAC DEFIBRILLATOR PLACEMENT       SECTION      x 2    COLON SURGERY      HERNIA REPAIR           Allergies:     No Known Allergies        LABORATORY RESULTS:      Lab Results   Component Value Date    HGB 8 1 (L) 2022    HCT 27 0 (L) 2022    WBC 10 83 (H) 2022     Lab Results   Component Value Date    BUN 98 (H) 2022    K 3 9 2022    CL 98 2022    GLUCOSE 143 (H) 2022    CREATININE 1 16 2022     Lab Results   Component Value Date    PROTIME 19 7 (H) 2022    INR 1 65 (H) 2022        DIAGNOSTIC STUDIES: Reviewed  EGD    Result Date: 2022  Impression: All observed locations appeared normal  Regular Z-line 39 cm from the incisors RECOMMENDATION: Proceed with planned flex sig  ATTENDING ATTESTATION:  I was present throughout the entire procedure from insertion to complete withdrawal of the scope  I performed all interventions myself or oversaw the fellow     Stefania Mendez, MD     XR chest portable    Result Date: 9/21/2022  Impression: Bibasilar opacity suggesting atelectasis and pleural effusions  Underlying infiltrate not excluded  Workstation performed: [id number]     XR chest portable    Result Date: 9/19/2022  Impression: No acute cardiopulmonary disease  Workstation performed: [id number]     XR elbow 3+ vw left    Result Date: 9/19/2022  Impression: No acute osseous abnormality  Workstation performed: [id number]     XR forearm 2 views LEFT    Result Date: 9/19/2022  Impression: No acute osseous abnormality  Workstation performed: [id number]     TRAUMA - CT head wo contrast    Result Date: 9/17/2022  Impression: No acute intracranial abnormality  The study was marked in EPIC for immediate notification given its trauma status  Workstation performed: [id number]     TRAUMA - CT spine cervical wo contrast    Result Date: 9/17/2022  Impression: No cervical spine fracture or traumatic malalignment  The study was marked in EPIC for immediate notification given its trauma status  Workstation performed: [id number]     TRAUMA - CT chest abdomen pelvis w contrast    Result Date: 9/17/2022  Impression: 1  Fracture of the T11 vertebral body with narrowing of the spinal canal at this level  Evaluation is limited secondary to osteopenia/ankylosing spondylitis  Additional probable small fracture of the anterior endplate of A53  Further evaluation with MRI should be considered  2   Scattered pulmonary nodules measuring up to 5 mm  Based on current Fleischner Society 2017 Guidelines on incidental pulmonary nodule, no routine follow-up is needed if the patient is low risk  If the patient is high risk, optional follow-up chest CT  at 12 months can be considered  3   Mildly prominent pretracheal lymph node measuring 11 mm  This too can be reassessed at time of follow-up chest CT  4   Bilateral cystic adnexal masses for which further evaluation with pelvic ultrasound is recommended  5   Cardiomegaly  I personally discussed this study with Kelly Maria on 9/17/2022 at 6:40 PM  The study was marked in [facility] for significant notification  Workstation performed: [id number]     XR chest portable ICU    Result Date: 9/22/2022  Impression: Persistent moderate effusions and bibasilar atelectasis  Workstation performed: [id number]     XR chest portable ICU    Result Date: 9/18/2022  Impression: No acute cardiopulmonary disease  Workstation performed: [id number]     CT recon only thoracolumbar    Result Date: 9/17/2022  Impression: 1  Evaluation is limited by osteopenia and ankylosing spondylitis  There is disruption of the anterior endplates/syndesmophytes of the T10 vertebral body and disruption of the anterior and posterior endplate of the F29 vertebral bodies compatible with fracture  There is narrowing of the Spinal canal at the T11 level  Further evaluation with MRI should be considered given spinal canal narrowing and exam limitations  2   Degenerative changes of the thoracolumbar spine  I personally discussed this study with Kelly Maria on 9/17/2022 at 6:40 PM  Workstation performed: [id number]     Flexible Sigmoidoscopy    Result Date: 9/19/2022  Impression: Dark stool present diffusely - procedure aborted No obvious source of bleeding identified RECOMMENDATION: Initiate bowel regimen (miralax daily) Trend Hg daily Transfuse for Hg < 7 ATTENDING ATTESTATION:  I was present throughout the entire procedure from insertion to complete withdrawal of the scope  I performed all interventions myself or oversaw the fellow     Laila Carrasquillo MD

## 2022-09-27 NOTE — PROGRESS NOTES
General Cardiology   Progress Note -  [facility] [employer]   Anne Jurist 79 y o  female MRN: [id number]    [facility] Encounter: [id number]    Assessment/ Plan    1  Acute on chronic hypoxic respiratory failure in the setting of acute on chronic systolic heart failure   On Bumex 2 mg IV BID  Patient received Bumex IV this morning  Will start Bumex 2 mg PO daily tomorrow   Patient takes furosemide 80 mg PO daily at home  S/p bilateral thoracentesis   Creatinine 1  16  Check BMP in am   Continue 2 gram Na diet   Monitor I/Os - 2 1 L in 24 hours but no input documented   Daily weights  212 lbs (bedscale)   Echocardiogram 9/18/2022 showed EF 65%, LA severely dilated, RA dilated, mild AI, moderate AS, moderate MR and mild TR       2  Cardiomyopathy   S/p ICD  Recommend resuming home medications: Entresto  for GDMT now that creatinine stable  Continue coreg      3  Acute on Chronic kidney disease stage III   Baseline creatinine 1 2-1 4  Creatinine 1 16 today      4  Chronic atrial fibrillation   On digoxin 62 5 mcg PO daily and coreg 12 5 mg PO BID  Patient takes digoxin 125 mcg PO daily and coreg 25 mg PO BID at home prior to admission   Currently off [address] due to GIB POA  Patient follows with Dr Kaye Rodney from [facility]        5  T11 Vertebral fracture  Followed by primary team and neurosurgery team   TLSO brace      6  Anemia   Hemoglobin 5 7 on admission   S/p transfusion   Hemoglobin 8  1  yesterday     Subjective  Patient resting in bed  She did not sleep well last night  No complaint of orthopnea, chest pain or SOB  She is having back pain from bed  No fever or chills  Review of Systems   Constitutional: Positive for malaise/fatigue  Negative for chills and fever  HENT: Negative for congestion  Cardiovascular: Negative for chest pain, dyspnea on exertion, leg swelling, orthopnea and palpitations  Respiratory: Negative for shortness of breath  Musculoskeletal: Positive for arthritis and back pain  Negative for falls  Gastrointestinal: Negative for bloating, nausea and vomiting  Neurological: Negative for dizziness and light-headedness  Psychiatric/Behavioral: Negative for altered mental status  All other systems reviewed and are negative  Objective:   Vitals: Blood pressure (!) 129/41, pulse 58, temperature 98 7 °F (37 1 °C), resp  rate 16, height 4' 10" (1 473 m), weight 96 3 kg (212 lb 3 2 oz), SpO2 94 %  ,       Body mass index is 44 35 kg/m²  ,     Systolic (27MMD), ZKA:748 , Min:104 , BDE:617     Diastolic (28MZP), SAW:40, Min:41, Max:58          Intake/Output Summary (Last 24 hours) at 9/27/2022 0934  Last data filed at 9/27/2022 0955  Gross per 24 hour   Intake --   Output 2100 ml   Net -2100 ml     Weight (last 2 days)     Date/Time Weight    09/27/22 0535 96 3 (212 2)    09/26/22 0600 98 7 (217 59)    09/25/22 0557 97 5 (214 95)            Telemetry Review: No telemetry     Physical Exam  Constitutional:       General: She is not in acute distress  Appearance: She is obese  HENT:      Head: Normocephalic  Mouth/Throat:      Mouth: Mucous membranes are moist    Cardiovascular:      Rate and Rhythm: Normal rate and regular rhythm  Pulses: Normal pulses  Pulmonary:      Effort: Pulmonary effort is normal  No respiratory distress  Breath sounds: Normal breath sounds  Comments: 2 L NC   Decreased in bases   Abdominal:      General: Bowel sounds are normal       Palpations: Abdomen is soft  Musculoskeletal:         General: No swelling  Normal range of motion  Skin:     General: Skin is warm and dry  Capillary Refill: Capillary refill takes less than 2 seconds  Neurological:      Mental Status: She is alert and oriented to person, place, and time  Mental status is at baseline     Psychiatric:         Mood and Affect: Mood normal          LABORATORY RESULTS      CBC with diff:   Results from last 7 days   Lab Units 09/26/22  0553 09/25/22  1651 09/25/22  0504 09/24/22  0515 09/23/22  0438 09/22/22 2159 09/22/22  0634 09/21/22  0623   WBC Thousand/uL 10 83* 10 29* 10 35* 12 57* 15 08*  --  16 99* 12 89*   HEMOGLOBIN g/dL 8 1* 7 9* 7 9* 7 5* 8 4*  --  8 8* 8 4*   I STAT HEMOGLOBIN g/dl  --   --   --   --   --  8 8*  --   --    HEMATOCRIT % 27 0* 26 5* 26 7* 25 3* 27 7*  --  28 7* 27 8*   HEMATOCRIT, ISTAT %  --   --   --   --   --  26*  --   --    MCV fL 101* 102* 102* 102* 102*  --  101* 101*   PLATELETS Thousands/uL 156 151 159 150 155  --  137* 153   MCH pg 30 3 30 4 30 2 30 1 30 8  --  31 1 30 4   MCHC g/dL 30 0* 29 8* 29 6* 29 6* 30 3*  --  30 7* 30 2*   RDW % 20 1* 20 1* 20 5* 20 5* 20 3*  --  20 7* 20 7*   MPV fL 11 0 10 9 10 9 10 7 10 3  --  10 3 10 2   NRBC AUTO /100 WBCs 0  --  0  --   --   --  0 0       CMP:  Results from last 7 days   Lab Units 09/27/22  0604 09/26/22  0553 09/25/22  0541 09/24/22  0515 09/23/22  0438 09/22/22 2159 09/22/22  1814 09/22/22  0534   POTASSIUM mmol/L 3 9 3 7 3 6 3 1* 4 2  --  4 1 3 5   CHLORIDE mmol/L 98 97 99 102 104  --  104 105   CO2 mmol/L 35* 36* 35* 32 33*  --  34* 33*   CO2, I-STAT mmol/L  --   --   --   --   --  40*  --   --    BUN mg/dL 98* 106* 108* 108* 105*  --  105* 111*   CREATININE mg/dL 1 16 1 29 1 41* 1 39* 1 56*  --  1 39* 1 46*   GLUCOSE, ISTAT mg/dl  --   --   --   --   --  143*  --   --    CALCIUM mg/dL 8 6 8 2* 8 4 8 1* 8 4  --  8 2* 8 1*   AST U/L  --   --   --  14  --   --   --   --    ALT U/L  --   --   --  20  --   --   --   --    ALK PHOS U/L  --   --   --  67  --   --   --   --    EGFR ml/min/1 73sq m 43 38 34 35 30  --  35 33       BMP:  Results from last 7 days   Lab Units 09/27/22  0604 09/26/22  0553 09/25/22  0541 09/24/22  0515 09/23/22  0438 09/22/22  2159 09/22/22  1814 09/22/22  0534   POTASSIUM mmol/L 3 9 3 7 3 6 3 1* 4 2  --  4 1 3 5   CHLORIDE mmol/L 98 97 99 102 104  --  104 105   CO2 mmol/L 35* 36* 35* 32 33*  --  34* 33*   CO2, I-STAT mmol/L  --   --   --   --   --  40*  --   --    BUN mg/dL 98* 106* 108* 108* 105*  --  105* 111*   CREATININE mg/dL 1 16 1 29 1 41* 1 39* 1 56*  --  1 39* 1 46*   GLUCOSE, ISTAT mg/dl  --   --   --   --   --  143*  --   --    CALCIUM mg/dL 8 6 8 2* 8 4 8 1* 8 4  --  8 2* 8 1*       Lab Results   Component Value Date    NTBNP 2,236 (H) 09/17/2022    NTBNP 1,583 (H) 11/11/2021             Results from last 7 days   Lab Units 09/26/22  0553 09/25/22  0541 09/24/22  0515 09/23/22  0438 09/22/22  0534   MAGNESIUM mg/dL 2 4 2 7* 2 7* 3 0* 2 9*                           Lipid Profile:   No results found for: CHOL  No results found for: HDL  No results found for: LDLCALC  No results found for: TRIG    Cardiac testing:   No results found for this or any previous visit  No results found for this or any previous visit  No results found for this or any previous visit  No valid procedures specified  No results found for this or any previous visit          Meds/Allergies   all current active meds have been reviewed  Medications Prior to Admission   Medication    albuterol (2 5 mg/3 mL) 0 083 % nebulizer solution    albuterol (PROVENTIL HFA,VENTOLIN HFA) 90 mcg/act inhaler    allopurinol (ZYLOPRIM) 300 mg tablet    aspirin 81 mg chewable tablet    atorvastatin (LIPITOR) 10 mg tablet    budesonide-formoterol (SYMBICORT) 160-4 5 mcg/act inhaler    carvedilol (COREG) 25 mg tablet    cloNIDine (CATAPRES) 0 1 mg tablet    colchicine (COLCRYS) 0 6 mg tablet    digoxin (LANOXIN) 0 25 mg tablet    diltiazem (CARDIZEM CD) 180 mg 24 hr capsule    FERROUS SULFATE PO    furosemide (LASIX) 40 mg tablet    gabapentin (NEURONTIN) 100 mg capsule    guaiFENesin 400 mg    Magnesium 400 MG TABS    meclizine (ANTIVERT) 25 mg tablet    metolazone (ZAROXOLYN) 5 mg tablet    Multiple Vitamin (MULTIVITAMIN) capsule    pantoprazole (PROTONIX) 40 mg tablet    sacubitril-valsartan (ENTRESTO)  MG TABS    spironolactone (ALDACTONE) 25 mg tablet    warfarin (COUMADIN) 2 mg tablet    warfarin (COUMADIN) 7 5 mg tablet            Assessment:  Principal Problem:    T11 vertebral fracture (HCC)  Active Problems:    HFrEF (heart failure with reduced ejection fraction) (HCC)    Permanent atrial fibrillation (HCC)    Anemia    Fall    SIRS (systemic inflammatory response syndrome) (HCC)    [facility]    Acute-on-chronic kidney injury ([facility] )    Acute on chronic respiratory failure with hypoxia ([facility])    DM (diabetes mellitus) ([facility] )        Counseling / Coordination of Care  Total floor / unit time spent today 20 minutes  Greater than 50% of total time was spent with the patient and / or family counseling and / or coordination of care  ** Please Note: Dragon 360 Dictation voice to text software may have been used in the creation of this document   **

## 2022-09-27 NOTE — ASSESSMENT & PLAN NOTE
Wt Readings from Last 3 Encounters:   09/27/22 96 3 kg (212 lb 3 2 oz)   11/12/21 110 kg (243 lb)   08/23/21 106 kg (234 lb 9 1 oz)     · Hx HF with biventricular dysfunction and EF 40% with PPM   · 9/18 Echo - EF 95%, diastolic dysfunction, mod AS, mod MR, RVSP 59  · Home regimen: Entresto, diltiazem  mg, digoxin 125 mcg, Coreg 25 mg b i d , aspirin 81 daily, lasix 40 mg BID     Plan:  · Telemetry monitoring  · Bumex 2mg IV TID  · Cardiology consulted, appreciate recommendations

## 2022-09-27 NOTE — ASSESSMENT & PLAN NOTE
Lab Results   Component Value Date    HGBA1C 6 6 (H) 06/08/2022       Recent Labs     09/26/22  1719 09/26/22  1750 09/27/22  0035 09/27/22  0508   POCGLU 209* 218* 166* 117       Blood Sugar Average: Last 72 hrs:  (P) 181 [id number]     · Continue SSI, goal -180

## 2022-09-27 NOTE — PROGRESS NOTES
Progress Note - Geriatric Medicine   Mace Lo 79 y o  female MRN: [id number]  Unit/Bed#: [facility] 627-01 Encounter: [id number]      Assessment/Plan:    Ambulatory dysfunction with fall  -injuries as below  -at least two additional falls in past 6 months markedly increases risk of having additional falls in future  -continue strict fall precautions  -assist w tx and maintain env free of fall hazards  -some degree of deconditioning/debility due to limited mobilization due to severity and physiology of acute injury/illness not unexpected and would likely benefit from rehab on hosp d/c   -PT/OT following     T11 vertebra fracture  -s/p fall as above  -denies acute pain  -neurovascular checks per protocol  -TLSO brace as directed    Acute pain due to trauma  -consider scheduling tylenol for better baseline pain control  -oxycodone 5mg x 1 past 24H, minimal use prior, consider d/c and continue 2 5mg dose for n  -continue multimodal treatment approach including topical lidocaine patch turning repositioning and offloading    Acute hypoxic respiratory failure  -multifactorial including COPD/XIOMARA and acute on chronic systolic heart  -respiratory status improving following aggressive diuresis and breathing tx regimen  -supplemental O2 actively being titrated down, pt denies use of supplemental O2 at baseline    Acute on chronic systolic heart failure  -Cardiology on board and managing diuresis - tentative transition to oral diuretic regimen today depending on clinical eval  -remains on 1500 mL fluid restriction, digoxin, and Coreg  -monitor electrolytes closely with aggressive diuresis    Permanent AFib  -home regimen includes rate control with digoxin and Coreg and anticoagulation with Coumadin  -rate remains well controlled    Class 3 obesity  -BMI recorded at 50 79 on admission  -encourage healthy lifestyle modifications and balance of caloric intake with energy expenditure    XIOMARA  -requires use of CPAP HS    Impaired mastication  -encourage use of dentures at appropriate times  -ensure meal consistency appropriate for abilities  -continue aspiration chronic    High risk developing delirium  -continue strict delirium precautions  -reorient frequently as appropriate  -monitor for fecal and urine retention  -ensure pain is controlled    Frailty syndrome in geriatric patient  -due to age and numerous advanced stage comorbidities patient has limited physiologic and metabolic reserves may be sensitive to even mild metabolic derangements  -continue to monitor closely and address acute derangements as arise  -ongoing optimization chronic conditions  -ensure treatments and interventions align with patient's and family's wishes and goals of care    Care coordination: rounded with Constantino Tomas (RN)    Subjective:     Otf Ramos is seen and examined at bedside where she is lying resting receiving breathing treatment with respiratory therapy  She was transferred out from ICU yesterday  She reports poor sleep overnight due to frequent monitoring, denies pain or dyspnea, she denies additional acute complaints, nursing reports no acute events overnight  Review of Systems   Constitutional: Negative  Negative for chills and fever  HENT: Negative  Eyes: Negative  Respiratory: Negative  Negative for shortness of breath  Cardiovascular: Negative  Gastrointestinal: Negative  Genitourinary: Negative  Musculoskeletal: Negative  Skin: Negative  Neurological: Negative for numbness  Hematological: Negative  Psychiatric/Behavioral: Positive for sleep disturbance  All other systems reviewed and are negative  Objective:     Vitals: Blood pressure (!) 129/41, pulse 58, temperature 98 7 °F (37 1 °C), resp  rate 16, height 4' 10" (1 473 m), weight 96 3 kg (212 lb 3 2 oz), SpO2 97 %  ,Body mass index is 44 35 kg/m²        Intake/Output Summary (Last 24 hours) at 9/27/2022 5637  Last data filed at 9/27/2022 0523  Gross per 24 hour Intake --   Output 2100 ml   Net -2100 ml     Current Medications: Reviewed    Physical Exam:   Physical Exam  Vitals and nursing note reviewed  Constitutional:       General: She is not in acute distress  Appearance: She is obese  She is not toxic-appearing  HENT:      Head: Normocephalic  Nose: Nose normal    Eyes:      General: No scleral icterus  Right eye: No discharge  Left eye: No discharge  Conjunctiva/sclera: Conjunctivae normal    Neck:      Comments: Trachea midline  Cardiovascular:      Rate and Rhythm: Bradycardia present  Comments: Distal pulses difficult to palpate due to body habitus  Pulmonary:      Effort: No respiratory distress  Breath sounds: Wheezing present  Comments: Actively receiving nebulizer treatment  Abdominal:      General: There is no distension  Palpations: Abdomen is soft  Tenderness: There is no abdominal tenderness  Musculoskeletal:      Cervical back: Neck supple  Right lower leg: No edema  Comments: Obese body habitus, reduced overall muscle mass      RUE swelling of forearm   Skin:     General: Skin is warm and dry  Coloration: Skin is pale  Comments: Large areas ecchymosis bilat upper extremities skin otherwise very dry   Neurological:      Mental Status: She is alert  Comments: Awake and alert, follows commands    Psychiatric:      Comments: Calm and cooperative        Invasive Devices  Report    Peripheral Intravenous Line  Duration           Peripheral IV 09/26/22 Right Antecubital 1 day          Drain  Duration           External Urinary Catheter 2 days              Lab, Imaging and other studies: I have personally reviewed pertinent reports

## 2022-09-28 LAB
ANION GAP SERPL CALCULATED.3IONS-SCNC: 3 MMOL/L (ref 4–13)
BASOPHILS # BLD AUTO: 0.01 THOUSANDS/ΜL (ref 0–0.1)
BASOPHILS NFR BLD AUTO: 0 % (ref 0–1)
BUN SERPL-MCNC: 88 MG/DL (ref 5–25)
CALCIUM SERPL-MCNC: 8.5 MG/DL (ref 8.3–10.1)
CHLORIDE SERPL-SCNC: 98 MMOL/L (ref 96–108)
CO2 SERPL-SCNC: 39 MMOL/L (ref 21–32)
CREAT SERPL-MCNC: 1.02 MG/DL (ref 0.6–1.3)
DIGOXIN SERPL-MCNC: 1.3 NG/ML (ref 0.8–2)
EOSINOPHIL # BLD AUTO: 0.13 THOUSAND/ΜL (ref 0–0.61)
EOSINOPHIL NFR BLD AUTO: 1 % (ref 0–6)
ERYTHROCYTE [DISTWIDTH] IN BLOOD BY AUTOMATED COUNT: 19.7 % (ref 11.6–15.1)
GFR SERPL CREATININE-BSD FRML MDRD: 51 ML/MIN/1.73SQ M
GLUCOSE SERPL-MCNC: 120 MG/DL (ref 65–140)
GLUCOSE SERPL-MCNC: 131 MG/DL (ref 65–140)
GLUCOSE SERPL-MCNC: 161 MG/DL (ref 65–140)
GLUCOSE SERPL-MCNC: 169 MG/DL (ref 65–140)
GLUCOSE SERPL-MCNC: 201 MG/DL (ref 65–140)
GLUCOSE SERPL-MCNC: 215 MG/DL (ref 65–140)
HCT VFR BLD AUTO: 29.7 % (ref 34.8–46.1)
HGB BLD-MCNC: 8.6 G/DL (ref 11.5–15.4)
IMM GRANULOCYTES # BLD AUTO: 0.09 THOUSAND/UL (ref 0–0.2)
IMM GRANULOCYTES NFR BLD AUTO: 1 % (ref 0–2)
LYMPHOCYTES # BLD AUTO: 0.48 THOUSANDS/ΜL (ref 0.6–4.47)
LYMPHOCYTES NFR BLD AUTO: 4 % (ref 14–44)
MCH RBC QN AUTO: 29.8 PG (ref 26.8–34.3)
MCHC RBC AUTO-ENTMCNC: 29 G/DL (ref 31.4–37.4)
MCV RBC AUTO: 103 FL (ref 82–98)
MONOCYTES # BLD AUTO: 0.94 THOUSAND/ΜL (ref 0.17–1.22)
MONOCYTES NFR BLD AUTO: 9 % (ref 4–12)
NEUTROPHILS # BLD AUTO: 9.22 THOUSANDS/ΜL (ref 1.85–7.62)
NEUTS SEG NFR BLD AUTO: 85 % (ref 43–75)
NRBC BLD AUTO-RTO: 0 /100 WBCS
PLATELET # BLD AUTO: 154 THOUSANDS/UL (ref 149–390)
PMV BLD AUTO: 10.9 FL (ref 8.9–12.7)
POTASSIUM SERPL-SCNC: 3.9 MMOL/L (ref 3.5–5.3)
RBC # BLD AUTO: 2.89 MILLION/UL (ref 3.81–5.12)
SARS-COV-2 RNA RESP QL NAA+PROBE: NEGATIVE
SODIUM SERPL-SCNC: 140 MMOL/L (ref 135–147)
WBC # BLD AUTO: 10.87 THOUSAND/UL (ref 4.31–10.16)

## 2022-09-28 PROCEDURE — 94668 MNPJ CHEST WALL SBSQ: CPT

## 2022-09-28 PROCEDURE — 94640 AIRWAY INHALATION TREATMENT: CPT

## 2022-09-28 PROCEDURE — 85025 COMPLETE CBC W/AUTO DIFF WBC: CPT | Performed by: SURGERY

## 2022-09-28 PROCEDURE — 99232 SBSQ HOSP IP/OBS MODERATE 35: CPT | Performed by: INTERNAL MEDICINE

## 2022-09-28 PROCEDURE — U0003 INFECTIOUS AGENT DETECTION BY NUCLEIC ACID (DNA OR RNA); SEVERE ACUTE RESPIRATORY SYNDROME CORONAVIRUS 2 (SARS-COV-2) (CORONAVIRUS DISEASE [COVID-19]), AMPLIFIED PROBE TECHNIQUE, MAKING USE OF HIGH THROUGHPUT TECHNOLOGIES AS DESCRIBED BY CMS-2020-01-R: HCPCS | Performed by: PHYSICIAN ASSISTANT

## 2022-09-28 PROCEDURE — 80162 ASSAY OF DIGOXIN TOTAL: CPT | Performed by: SURGERY

## 2022-09-28 PROCEDURE — 99232 SBSQ HOSP IP/OBS MODERATE 35: CPT | Performed by: PHYSICIAN ASSISTANT

## 2022-09-28 PROCEDURE — 80048 BASIC METABOLIC PNL TOTAL CA: CPT | Performed by: SURGERY

## 2022-09-28 PROCEDURE — 0001A HB IMMUNIZATION ADMIN COVID-19 30MCG/0.3ML FIRST DOSE: CPT | Performed by: PHYSICIAN ASSISTANT

## 2022-09-28 PROCEDURE — 94760 N-INVAS EAR/PLS OXIMETRY 1: CPT

## 2022-09-28 PROCEDURE — 91300 COVID-19 PFIZER VAC BIVALENT TRIS-SUCROSE 30 MCG/0.3 ML SUSP: CPT | Performed by: PHYSICIAN ASSISTANT

## 2022-09-28 PROCEDURE — 97530 THERAPEUTIC ACTIVITIES: CPT

## 2022-09-28 PROCEDURE — U0005 INFEC AGEN DETEC AMPLI PROBE: HCPCS | Performed by: PHYSICIAN ASSISTANT

## 2022-09-28 PROCEDURE — 97535 SELF CARE MNGMENT TRAINING: CPT

## 2022-09-28 PROCEDURE — 82948 REAGENT STRIP/BLOOD GLUCOSE: CPT

## 2022-09-28 RX ORDER — GABAPENTIN 300 MG/1
300 CAPSULE ORAL 3 TIMES DAILY
Status: DISCONTINUED | OUTPATIENT
Start: 2022-09-28 | End: 2022-09-30 | Stop reason: HOSPADM

## 2022-09-28 RX ORDER — INSULIN LISPRO 100 [IU]/ML
1-5 INJECTION, SOLUTION INTRAVENOUS; SUBCUTANEOUS
Status: DISCONTINUED | OUTPATIENT
Start: 2022-09-28 | End: 2022-09-30 | Stop reason: HOSPADM

## 2022-09-28 RX ORDER — METHOCARBAMOL 750 MG/1
750 TABLET, FILM COATED ORAL EVERY 6 HOURS SCHEDULED
Status: DISCONTINUED | OUTPATIENT
Start: 2022-09-28 | End: 2022-09-30 | Stop reason: HOSPADM

## 2022-09-28 RX ORDER — LIDOCAINE 50 MG/G
1 PATCH TOPICAL DAILY
Status: DISCONTINUED | OUTPATIENT
Start: 2022-09-28 | End: 2022-09-30 | Stop reason: HOSPADM

## 2022-09-28 RX ADMIN — SACUBITRIL AND VALSARTAN 1 TABLET: 24; 26 TABLET, FILM COATED ORAL at 09:29

## 2022-09-28 RX ADMIN — BNT162B2 ORIGINAL AND OMICRON BA.4/BA.5 0.3 ML: .1125; .1125 INJECTION, SUSPENSION INTRAMUSCULAR at 18:31

## 2022-09-28 RX ADMIN — INSULIN LISPRO 1 UNITS: 100 INJECTION, SOLUTION INTRAVENOUS; SUBCUTANEOUS at 18:31

## 2022-09-28 RX ADMIN — IPRATROPIUM BROMIDE 0.5 MG: 0.5 SOLUTION RESPIRATORY (INHALATION) at 08:12

## 2022-09-28 RX ADMIN — IPRATROPIUM BROMIDE 0.5 MG: 0.5 SOLUTION RESPIRATORY (INHALATION) at 20:21

## 2022-09-28 RX ADMIN — INSULIN LISPRO 1 UNITS: 100 INJECTION, SOLUTION INTRAVENOUS; SUBCUTANEOUS at 00:11

## 2022-09-28 RX ADMIN — HEPARIN SODIUM 5000 UNITS: 5000 INJECTION INTRAVENOUS; SUBCUTANEOUS at 14:38

## 2022-09-28 RX ADMIN — GABAPENTIN 300 MG: 300 CAPSULE ORAL at 21:32

## 2022-09-28 RX ADMIN — DIGOXIN 62.5 MCG: 125 TABLET ORAL at 09:25

## 2022-09-28 RX ADMIN — LIDOCAINE 5% 1 PATCH: 700 PATCH TOPICAL at 09:26

## 2022-09-28 RX ADMIN — HEPARIN SODIUM 5000 UNITS: 5000 INJECTION INTRAVENOUS; SUBCUTANEOUS at 05:36

## 2022-09-28 RX ADMIN — PANTOPRAZOLE SODIUM 40 MG: 40 TABLET, DELAYED RELEASE ORAL at 05:35

## 2022-09-28 RX ADMIN — SACUBITRIL AND VALSARTAN 1 TABLET: 24; 26 TABLET, FILM COATED ORAL at 18:31

## 2022-09-28 RX ADMIN — HEPARIN SODIUM 5000 UNITS: 5000 INJECTION INTRAVENOUS; SUBCUTANEOUS at 21:32

## 2022-09-28 RX ADMIN — BUMETANIDE 2 MG: 2 TABLET ORAL at 09:25

## 2022-09-28 RX ADMIN — METHOCARBAMOL TABLETS 500 MG: 500 TABLET, COATED ORAL at 00:11

## 2022-09-28 RX ADMIN — SENNOSIDES AND DOCUSATE SODIUM 1 TABLET: 8.6; 5 TABLET ORAL at 18:30

## 2022-09-28 RX ADMIN — GABAPENTIN 300 MG: 300 CAPSULE ORAL at 18:30

## 2022-09-28 RX ADMIN — LEVALBUTEROL HYDROCHLORIDE 1.25 MG: 1.25 SOLUTION, CONCENTRATE RESPIRATORY (INHALATION) at 13:14

## 2022-09-28 RX ADMIN — GABAPENTIN 100 MG: 100 CAPSULE ORAL at 09:25

## 2022-09-28 RX ADMIN — LEVALBUTEROL HYDROCHLORIDE 1.25 MG: 1.25 SOLUTION, CONCENTRATE RESPIRATORY (INHALATION) at 20:21

## 2022-09-28 RX ADMIN — Medication 6 MG: at 21:32

## 2022-09-28 RX ADMIN — OXYCODONE HYDROCHLORIDE 5 MG: 5 TABLET ORAL at 09:28

## 2022-09-28 RX ADMIN — METHOCARBAMOL TABLETS 500 MG: 500 TABLET, COATED ORAL at 05:36

## 2022-09-28 RX ADMIN — OXYCODONE HYDROCHLORIDE 5 MG: 5 TABLET ORAL at 02:04

## 2022-09-28 RX ADMIN — ACETAMINOPHEN 975 MG: 325 TABLET, FILM COATED ORAL at 21:32

## 2022-09-28 RX ADMIN — ATORVASTATIN CALCIUM 10 MG: 10 TABLET, FILM COATED ORAL at 09:25

## 2022-09-28 RX ADMIN — SENNOSIDES AND DOCUSATE SODIUM 1 TABLET: 8.6; 5 TABLET ORAL at 09:25

## 2022-09-28 RX ADMIN — PANTOPRAZOLE SODIUM 40 MG: 40 TABLET, DELAYED RELEASE ORAL at 18:30

## 2022-09-28 RX ADMIN — LIDOCAINE 5% 1 PATCH: 700 PATCH TOPICAL at 14:38

## 2022-09-28 RX ADMIN — INSULIN LISPRO 1 UNITS: 100 INJECTION, SOLUTION INTRAVENOUS; SUBCUTANEOUS at 21:32

## 2022-09-28 RX ADMIN — POLYETHYLENE GLYCOL 3350 17 G: 17 POWDER, FOR SOLUTION ORAL at 09:26

## 2022-09-28 RX ADMIN — CARVEDILOL 12.5 MG: 12.5 TABLET, FILM COATED ORAL at 09:28

## 2022-09-28 RX ADMIN — METHOCARBAMOL TABLETS 750 MG: 750 TABLET, COATED ORAL at 18:30

## 2022-09-28 RX ADMIN — LEVALBUTEROL HYDROCHLORIDE 1.25 MG: 1.25 SOLUTION, CONCENTRATE RESPIRATORY (INHALATION) at 08:12

## 2022-09-28 RX ADMIN — ACETAMINOPHEN 975 MG: 325 TABLET, FILM COATED ORAL at 14:38

## 2022-09-28 RX ADMIN — IPRATROPIUM BROMIDE 0.5 MG: 0.5 SOLUTION RESPIRATORY (INHALATION) at 13:13

## 2022-09-28 RX ADMIN — METHOCARBAMOL TABLETS 750 MG: 750 TABLET, COATED ORAL at 14:38

## 2022-09-28 RX ADMIN — ACETAMINOPHEN 975 MG: 325 TABLET, FILM COATED ORAL at 05:36

## 2022-09-28 NOTE — PLAN OF CARE
Problem: OCCUPATIONAL THERAPY ADULT  Goal: Performs self-care activities at highest level of function for planned discharge setting  See evaluation for individualized goals  Description: Treatment Interventions: ADL retraining, Functional transfer training, Endurance training, Cognitive reorientation, Patient/family training, Equipment evaluation/education, Compensatory technique education, Energy conservation, Activityengagement          See flowsheet documentation for full assessment, interventions and recommendations  Outcome: Progressing  Note: Limitation: Decreased UE ROM, Decreased ADL status, Decreased Safe judgement during ADL, Decreased cognition, Decreased endurance, Decreased self-care trans, Decreased high-level ADLs  Prognosis: Fair  Assessment: pt participated in am ot session and was seen focusing on adls in bed [facility] hob elevated   pt was able to roll r and l x 3 each direction with max asst x 1 and asst of second to place bedpan, change linnen etc  pt declined oob / eob secondary to overall pain including knees     OT Discharge Recommendation: Post acute rehabilitation services        April A Storm

## 2022-09-28 NOTE — PLAN OF CARE
Problem: MOBILITY - ADULT  Goal: Maintain or return to baseline ADL function  Description: INTERVENTIONS:  -  Assess patient's ability to carry out ADLs; assess patient's baseline for ADL function and identify physical deficits which impact ability to perform ADLs (bathing, care of mouth/teeth, toileting, grooming, dressing, etc )  - Assess/evaluate cause of self-care deficits   - Assess range of motion  - Assess patient's mobility; develop plan if impaired  - Assess patient's need for assistive devices and provide as appropriate  - Encourage maximum independence but intervene and supervise when necessary  - Involve family in performance of ADLs  - Assess for home care needs following discharge   - Consider OT consult to assist with ADL evaluation and planning for discharge  - Provide patient education as appropriate  Outcome: Progressing  Goal: Maintains/Returns to pre admission functional level  Description: INTERVENTIONS:  - Perform BMAT or MOVE assessment daily    - Set and communicate daily mobility goal to care team and patient/family/caregiver  - Collaborate with rehabilitation services on mobility goals if consulted  - Perform Range of Motion 3 times a day  - Reposition patient every 2 hours    - Dangle patient 3 times a day  - Stand patient 3 times a day  - Ambulate patient 3 times a day  - Out of bed to chair 3 times a day   - Out of bed for meals 3 times a day  - Out of bed for toileting  - Record patient progress and toleration of activity level   Outcome: Progressing     Problem: Potential for Falls  Goal: Patient will remain free of falls  Description: INTERVENTIONS:  - Educate patient/family on patient safety including physical limitations  - Instruct patient to call for assistance with activity   - Consult OT/PT to assist with strengthening/mobility   - Keep Call bell within reach  - Keep bed low and locked with side rails adjusted as appropriate  - Keep care items and personal belongings within reach  - Initiate and maintain comfort rounds  - Make Fall Risk Sign visible to staff  - Offer Toileting every  Hours, in advance of need  - Initiate/Maintain alarm  - Obtain necessary fall risk management equipment:   - Apply yellow socks and bracelet for high fall risk patients  - Consider moving patient to room near nurses station  Outcome: Progressing     Problem: Prexisting or High Potential for Compromised Skin Integrity  Goal: Skin integrity is maintained or improved  Description: INTERVENTIONS:  - Identify patients at risk for skin breakdown  - Assess and monitor skin integrity  - Assess and monitor nutrition and hydration status  - Monitor labs   - Assess for incontinence   - Turn and reposition patient  - Assist with mobility/ambulation  - Relieve pressure over bony prominences  - Avoid friction and shearing  - Provide appropriate hygiene as needed including keeping skin clean and dry  - Evaluate need for skin moisturizer/barrier cream  - Collaborate with interdisciplinary team   - Patient/family teaching  - Consider wound care consult   Outcome: Progressing     Problem: PAIN - ADULT  Goal: Verbalizes/displays adequate comfort level or baseline comfort level  Description: Interventions:  - Encourage patient to monitor pain and request assistance  - Assess pain using appropriate pain scale  - Administer analgesics based on type and severity of pain and evaluate response  - Implement non-pharmacological measures as appropriate and evaluate response  - Consider cultural and social influences on pain and pain management  - Notify physician/advanced practitioner if interventions unsuccessful or patient reports new pain  Outcome: Progressing     Problem: INFECTION - ADULT  Goal: Absence or prevention of progression during hospitalization  Description: INTERVENTIONS:  - Assess and monitor for signs and symptoms of infection  - Monitor lab/diagnostic results  - Monitor all insertion sites, i e  indwelling lines, tubes, and drains  - Monitor endotracheal if appropriate and nasal secretions for changes in amount and color  - [place] appropriate cooling/warming therapies per order  - Administer medications as ordered  - Instruct and encourage patient and family to use good hand hygiene technique  - Identify and instruct in appropriate isolation precautions for identified infection/condition  Outcome: Progressing     Problem: SAFETY ADULT  Goal: Maintain or return to baseline ADL function  Description: INTERVENTIONS:  -  Assess patient's ability to carry out ADLs; assess patient's baseline for ADL function and identify physical deficits which impact ability to perform ADLs (bathing, care of mouth/teeth, toileting, grooming, dressing, etc )  - Assess/evaluate cause of self-care deficits   - Assess range of motion  - Assess patient's mobility; develop plan if impaired  - Assess patient's need for assistive devices and provide as appropriate  - Encourage maximum independence but intervene and supervise when necessary  - Involve family in performance of ADLs  - Assess for home care needs following discharge   - Consider OT consult to assist with ADL evaluation and planning for discharge  - Provide patient education as appropriate  Outcome: Progressing  Goal: Maintains/Returns to pre admission functional level  Description: INTERVENTIONS:  - Perform BMAT or MOVE assessment daily    - Set and communicate daily mobility goal to care team and patient/family/caregiver  - Collaborate with rehabilitation services on mobility goals if consulted  - Perform Range of Motion 3 times a day  - Reposition patient every 2 hours    - Dangle patient 3 times a day  - Stand patient 3 times a day  - Ambulate patient 3 times a day  - Out of bed to chair 3 times a day   - Out of bed for meals 3 times a day  - Out of bed for toileting  - Record patient progress and toleration of activity level   Outcome: Progressing  Goal: Patient will remain free of falls  Description: INTERVENTIONS:  - Educate patient/family on patient safety including physical limitations  - Instruct patient to call for assistance with activity   - Consult OT/PT to assist with strengthening/mobility   - Keep Call bell within reach  - Keep bed low and locked with side rails adjusted as appropriate  - Keep care items and personal belongings within reach  - Initiate and maintain comfort rounds  - Make Fall Risk Sign visible to staff  - Offer Toileting every  Hours, in advance of need  - Initiate/Maintain alarm  - Obtain necessary fall risk management equipmen  - Apply yellow socks and bracelet for high fall risk patients  - Consider moving patient to room near nurses station  Outcome: Progressing     Problem: DISCHARGE PLANNING  Goal: Discharge to home or other facility with appropriate resources  Description: INTERVENTIONS:  - Identify barriers to discharge w/patient and caregiver  - Arrange for needed discharge resources and transportation as appropriate  - Identify discharge learning needs (meds, wound care, etc )  - Arrange for interpretive services to assist at discharge as needed  - Refer to Case Management Department for coordinating discharge planning if the patient needs post-hospital services based on physician/advanced practitioner order or complex needs related to functional status, cognitive ability, or social support system  Outcome: Progressing     Problem: Knowledge Deficit  Goal: Patient/family/caregiver demonstrates understanding of disease process, treatment plan, medications, and discharge instructions  Description: Complete learning assessment and assess knowledge base    Interventions:  - Provide teaching at level of understanding  - Provide teaching via preferred learning methods  Outcome: Progressing     Problem: Nutrition/Hydration-ADULT  Goal: Nutrient/Hydration intake appropriate for improving, restoring or maintaining nutritional needs  Description: Monitor and assess patient's nutrition/hydration status for malnutrition  Collaborate with interdisciplinary team and initiate plan and interventions as ordered  Monitor patient's weight and dietary intake as ordered or per policy  Utilize nutrition screening tool and intervene as necessary  Determine patient's food preferences and provide high-protein, high-caloric foods as appropriate       INTERVENTIONS:  - Monitor oral intake, urinary output, labs, and treatment plans  - Assess nutrition and hydration status and recommend course of action  - Evaluate amount of meals eaten  - Assist patient with eating if necessary   - Allow adequate time for meals  - Recommend/ encourage appropriate diets, oral nutritional supplements, and vitamin/mineral supplements  - Order, calculate, and assess calorie counts as needed  - Recommend, monitor, and adjust tube feedings and TPN/PPN based on assessed needs  - Assess need for intravenous fluids  - Provide specific nutrition/hydration education as appropriate  - Include patient/family/caregiver in decisions related to nutrition  Outcome: Progressing

## 2022-09-28 NOTE — OCCUPATIONAL THERAPY NOTE
Occupational Therapy Treatment Note:      09/28/22 1115   OT Last Visit   OT Visit Date 09/28/22   Note Type   Note Type Treatment   Restrictions/Precautions   Braces or Orthoses TLSO   Other Precautions Spinal precautions; Fluid restriction; Fall Risk;Pain;O2   ADL   Where Assessed Supine, bed   Grooming Assistance 4  Minimal Assistance   UB Bathing Assistance 4  Minimal Assistance   LB Bathing Assistance 4  Minimal Assistance   [address] 3  Moderate [address] 1  [address]  2  Maximal Assistance   Toileting Comments rolling for bed pan   Coordination   Fine Motor pt used r ue to hold washcloth inorder to wash self ie face chest and stomach  Cognition   Overall Cognitive Status Impaired   Arousal/Participation Responsive   Attention Attends with cues to redirect   Orientation Level Oriented X4  (was noted to use white board for date )   Memory Decreased recall of precautions   Following Commands Follows one step commands without difficulty   Activity Tolerance   Activity Tolerance Patient tolerated treatment well   Assessment   Assessment pt participated in am ot session and was seen focusing on adls in bed wiuth hob elevated  pt was able to roll r and l x 3 each direction with max asst x 1 and asst of second to place bedpan, change linnen etc  pt declined oob / eob secondary to overall pain including knees   Plan   Treatment Interventions ADL retraining;Functional transfer training; Endurance training;Patient/family training; Activityengagement   Goal Expiration Date 10/05/22   OT Treatment Day 2   OT Frequency 3-5x/wk   Recommendation   OT Discharge Recommendation Post acute rehabilitation services   AM-PAC Daily Activity Inpatient   Lower Body Dressing 1   Bathing 2   Toileting 2   Upper Body Dressing 3   Grooming 3   Eating 3   Daily Activity Raw Score 14   Daily Activity Standardized Score (Calc for Raw Score >=11) 33 39   AM-PAC Applied Cognition Inpatient   Following a Speech/Presentation 3   Understanding Ordinary Conversation 4   Taking Medications 3   Remembering Where Things Are Placed or Put Away 3   Remembering List of 4-5 Errands 3   Taking Care of Complicated Tasks 2   Applied Cognition Raw Score 18   Applied Cognition Standardized Score 38 07   April A Storm

## 2022-09-28 NOTE — ASSESSMENT & PLAN NOTE
Lab Results   Component Value Date    EGFR 51 09/28/2022    EGFR 43 09/27/2022    EGFR 38 09/26/2022    CREATININE 1 02 09/28/2022    CREATININE 1 16 09/27/2022    CREATININE 1 29 09/26/2022     · Baseline creatinine:  1 2-1 4  · Admission creatinine:  1 79  · In the setting of acute blood loss anemia and cardiorenal in setting of CHF  · Cr now back at baseline  · Trend BUN/creatinine  · Monitor I/Os

## 2022-09-28 NOTE — ASSESSMENT & PLAN NOTE
· Hgb 5 7 on admission with INR 4 66 on coumadin, received 2 U PRBC, stable in 7s  · SHANT positive for melanotic stool  · Reports 2 weeks of melena   · Elevated BUN  · UGI bleed suspected    Plan:  · Coumadin reversed with [place] and Vitamin K   · Trend Hgb, stable  · PO PPI BID    · 9/19 EGD negative, flex sigmoidoscopy incomplete due to stool but not obvious source of bleed  · Hgb currently stable at 8  · If worsening anemia and continued melena, re-consult GI

## 2022-09-28 NOTE — CASE MANAGEMENT
Case Management Discharge Planning Note    Patient name Trinidad Vegas  Location [facility] 627/[facility] 252-70 MRN [id number]  : 1940 Date 2022       Current Admission Date: 2022  Current Admission Diagnosis:T11 vertebral fracture [facility])   Patient Active Problem List    Diagnosis Date Noted    Acute on chronic respiratory failure with hypoxia ([facility] ) 2022    DM (diabetes mellitus) ([facility] ) 2022    Fall 2022    SIRS (systemic inflammatory response syndrome) ([facility] ) 2022    COPD (chronic obstructive pulmonary disease) ([facility] ) 2022    Acute-on-chronic kidney injury ([facility] ) 2022    T11 vertebral fracture ([place] [address] ) 2022    HFrEF (heart failure with reduced ejection fraction) ([place] [address] ) 2021    Permanent atrial fibrillation ([place] [address] ) 2021    Stage 3 chronic kidney disease ([place] [address] ) 2021    Anemia 2021      LOS (days): 11  Geometric Mean LOS (GMLOS) (days): 4 40  Days to GMLOS:-6 2     OBJECTIVE:  Risk of Unplanned Readmission Score: 27 74         Current admission status: Inpatient   Preferred Pharmacy:   [address] [address]  Phone: [phone] Fax: [phone]    Primary Care Provider: Marylou Deleon DO    Primary Insurance: [employer] [place] [address]: [address] Number: [facility] for patient to be transferred to [facility] - request with clinicals submitted via [facility]

## 2022-09-28 NOTE — PLAN OF CARE
Problem: MOBILITY - ADULT  Goal: Maintain or return to baseline ADL function  Description: INTERVENTIONS:  -  Assess patient's ability to carry out ADLs; assess patient's baseline for ADL function and identify physical deficits which impact ability to perform ADLs (bathing, care of mouth/teeth, toileting, grooming, dressing, etc )  - Assess/evaluate cause of self-care deficits   - Assess range of motion  - Assess patient's mobility; develop plan if impaired  - Assess patient's need for assistive devices and provide as appropriate  - Encourage maximum independence but intervene and supervise when necessary  - Involve family in performance of ADLs  - Assess for home care needs following discharge   - Consider OT consult to assist with ADL evaluation and planning for discharge  - Provide patient education as appropriate  Outcome: Progressing  Goal: Maintains/Returns to pre admission functional level  Description: INTERVENTIONS:  - Perform BMAT or MOVE assessment daily    - Set and communicate daily mobility goal to care team and patient/family/caregiver  - Collaborate with rehabilitation services on mobility goals if consulted  - Perform Range of Motion 3 times a day  - Reposition patient every 2 hours    - Dangle patient 3 times a day  - Stand patient 3 times a day  - Ambulate patient 3 times a day  - Out of bed to chair 3 times a day   - Out of bed for meals 3 times a day  - Out of bed for toileting  - Record patient progress and toleration of activity level   Outcome: Progressing     Problem: Potential for Falls  Goal: Patient will remain free of falls  Description: INTERVENTIONS:  - Educate patient/family on patient safety including physical limitations  - Instruct patient to call for assistance with activity   - Consult OT/PT to assist with strengthening/mobility   - Keep Call bell within reach  - Keep bed low and locked with side rails adjusted as appropriate  - Keep care items and personal belongings within reach  - Initiate and maintain comfort rounds  - Make Fall Risk Sign visible to staff  - Offer Toileting every 2 Hours, in advance of need  - Initiate/Maintain on alarm  - Obtain necessary fall risk management equipment:  Problem: Prexisting or High Potential for Compromised Skin Integrity  Goal: Skin integrity is maintained or improved  Description: INTERVENTIONS:  - Identify patients at risk for skin breakdown  - Assess and monitor skin integrity  - Assess and monitor nutrition and hydration status  - Monitor labs   - Assess for incontinence   - Turn and reposition patient  - Assist with mobility/ambulation  - Relieve pressure over bony prominences  - Avoid friction and shearing  - Provide appropriate hygiene as needed including keeping skin clean and dry  - Evaluate need for skin moisturizer/barrier cream  - Collaborate with interdisciplinary team   - Patient/family teaching  - Consider wound care consult   Outcome: Progressing     Problem: PAIN - ADULT  Goal: Verbalizes/displays adequate comfort level or baseline comfort level  Description: Interventions:  - Encourage patient to monitor pain and request assistance  - Assess pain using appropriate pain scale  - Administer analgesics based on type and severity of pain and evaluate response  - Implement non-pharmacological measures as appropriate and evaluate response  - Consider cultural and social influences on pain and pain management  - Notify physician/advanced practitioner if interventions unsuccessful or patient reports new pain  Outcome: Progressing     Problem: INFECTION - ADULT  Goal: Absence or prevention of progression during hospitalization  Description: INTERVENTIONS:  - Assess and monitor for signs and symptoms of infection  - Monitor lab/diagnostic results  - Monitor all insertion sites, i e  indwelling lines, tubes, and drains  - Monitor endotracheal if appropriate and nasal secretions for changes in amount and color  - [place] appropriate cooling/warming therapies per order  - Administer medications as ordered  - Instruct and encourage patient and family to use good hand hygiene technique  - Identify and instruct in appropriate isolation precautions for identified infection/condition  Outcome: Progressing     Problem: SAFETY ADULT  Goal: Maintain or return to baseline ADL function  Description: INTERVENTIONS:  -  Assess patient's ability to carry out ADLs; assess patient's baseline for ADL function and identify physical deficits which impact ability to perform ADLs (bathing, care of mouth/teeth, toileting, grooming, dressing, etc )  - Assess/evaluate cause of self-care deficits   - Assess range of motion  - Assess patient's mobility; develop plan if impaired  - Assess patient's need for assistive devices and provide as appropriate  - Encourage maximum independence but intervene and supervise when necessary  - Involve family in performance of ADLs  - Assess for home care needs following discharge   - Consider OT consult to assist with ADL evaluation and planning for discharge  - Provide patient education as appropriate  Outcome: Progressing  Goal: Maintains/Returns to pre admission functional level  Description: INTERVENTIONS:  - Perform BMAT or MOVE assessment daily    - Set and communicate daily mobility goal to care team and patient/family/caregiver  - Collaborate with rehabilitation services on mobility goals if consulted  - Perform Range of Motion 3 times a day  - Reposition patient every 2 hours    - Dangle patient 3 times a day  - Stand patient 3 times a day  - Ambulate patient 3 times a day  - Out of bed to chair 3 times a day   - Out of bed for meals 3 times a day  - Out of bed for toileting  - Record patient progress and toleration of activity level   Outcome: Progressing  Goal: Patient will remain free of falls  Description: INTERVENTIONS:  - Educate patient/family on patient safety including physical limitations  - Instruct patient to call for assistance with activity   - Consult OT/PT to assist with strengthening/mobility   - Keep Call bell within reach  - Keep bed low and locked with side rails adjusted as appropriate  - Keep care items and personal belongings within reach  - Initiate and maintain comfort rounds  - Make Fall Risk Sign visible to staff  - Offer Toileting every 2 Hours, in advance of need  - Initiate/Maintain on alarm  - Obtain necessary fall risk management equipment:   Problem: Knowledge Deficit  Goal: Patient/family/caregiver demonstrates understanding of disease process, treatment plan, medications, and discharge instructions  Description: Complete learning assessment and assess knowledge base  Interventions:  - Provide teaching at level of understanding  - Provide teaching via preferred learning methods  Outcome: Progressing     Problem: Nutrition/Hydration-ADULT  Goal: Nutrient/Hydration intake appropriate for improving, restoring or maintaining nutritional needs  Description: Monitor and assess patient's nutrition/hydration status for malnutrition  Collaborate with interdisciplinary team and initiate plan and interventions as ordered  Monitor patient's weight and dietary intake as ordered or per policy  Utilize nutrition screening tool and intervene as necessary  Determine patient's food preferences and provide high-protein, high-caloric foods as appropriate       INTERVENTIONS:  - Monitor oral intake, urinary output, labs, and treatment plans  - Assess nutrition and hydration status and recommend course of action  - Evaluate amount of meals eaten  - Assist patient with eating if necessary   - Allow adequate time for meals  - Recommend/ encourage appropriate diets, oral nutritional supplements, and vitamin/mineral supplements  - Order, calculate, and assess calorie counts as needed  - Recommend, monitor, and adjust tube feedings and TPN/PPN based on assessed needs  - Assess need for intravenous fluids  - Provide specific nutrition/hydration education as appropriate  - Include patient/family/caregiver in decisions related to nutrition  Outcome: Progressing     - Apply yellow socks and bracelet for high fall risk patients  - Consider moving patient to room near nurses station  Outcome: Progressing     - Apply yellow socks and bracelet for high fall risk patients  - Consider moving patient to room near nurses station  Outcome: Progressing

## 2022-09-28 NOTE — PROGRESS NOTES
[facility]  Progress Note - Teresa Net 1940, 79 y o  female MRN: [id number]  Unit/Bed#: [facility] 627-01 Encounter: [id number]  Primary Care Provider: Kindra Solares DO   Date and time admitted to hospital: 9/17/2022  9:14 PM    * T11 vertebral fracture [facility])  Assessment & Plan  9/17 CT CAP: Fracture of the T11 vertebral body with narrowing of the spinal canal at this level  Evaluation is limited secondary to osteopenia/ankylosing spondylitis  Additional probable small fracture of the anterior endplate of M61     4/11 CT thoracic: Ddisruption of the anterior endplates/syndesmophytes of the T10 vertebral body and disruption of the anterior and posterior endplate of the I90 vertebral bodies compatible with fracture  There is narrowing of the Spinal canal at the T11 level        Plan:  · Neurosurgery consult-- No neurosurgical intervention given neurologically intact and stable exam   · TLSO brace   · q4h neuro checks   · Multimodal analgesia with schedule tylenol, robaxin, and Neurontin -increased regimen today to help with pain control  · PT/OT    Anemia  Assessment & Plan  · Hgb 5 7 on admission with INR 4 66 on coumadin, received 2 U PRBC, stable in 7s  · SHANT positive for melanotic stool  · Reports 2 weeks of melena   · Elevated BUN  · UGI bleed suspected    Plan:  · Coumadin reversed with Nela Mages and Vitamin K   · Trend Hgb, stable  · PO PPI BID    · 9/19 EGD negative, flex sigmoidoscopy incomplete due to stool but not obvious source of bleed  · Hgb currently stable at 8  · If worsening anemia and continued melena, re-consult GI      HFrEF (heart failure with reduced ejection fraction) (HCC)  Assessment & Plan  Wt Readings from Last 3 Encounters:   09/28/22 95 9 kg (211 lb 6 4 oz)   11/12/21 110 kg (243 lb)   08/23/21 106 kg (234 lb 9 1 oz)     · Hx HF with biventricular dysfunction and EF 40% with PPM   · 9/18 Echo - EF 66%, diastolic dysfunction, mod AS, mod MR, RVSP 59  · Home regimen: Entresto, diltiazem  mg, digoxin 125 mcg, Coreg 25 mg b i d , aspirin 81 daily, lasix 40 mg BID     Plan:  · Telemetry monitoring  · Cardiology consulted, appreciate recommendations, adjusting medications, discussed directly with team on 9/28 she is medically ready for discharge, continue on bumex     Acute-on-chronic kidney injury [facility])  Assessment & Plan  Lab Results   Component Value Date    EGFR 51 09/28/2022    EGFR 43 09/27/2022    EGFR 38 09/26/2022    CREATININE 1 02 09/28/2022    CREATININE 1 16 09/27/2022    CREATININE 1 29 09/26/2022     · Baseline creatinine:  1 2-1 4  · Admission creatinine:  1 79  · In the setting of acute blood loss anemia and cardiorenal in setting of CHF  · Cr now back at baseline  · Trend BUN/creatinine  · Monitor I/Os    Permanent atrial fibrillation (HCC)  Assessment & Plan  · On coumadin with INR 4 66 on admission   · Coumadin reversed with Kcentra and vitamin K on admission for acute blood loss anemia and received 2 PRBC  · Home regimen:  Digoxin 125 mcg, Coreg 25 mg b i d , diltiazem  mg    Plan:  · Currently rate controlled  · Continue coreg 12 5mg BID  · Continue hold AC---- HAS-BLED score is 4 which carries 8 9% risk of major bleeding and therefore may defer restarting DOAC atleast until PCP evaluation     COPD (chronic obstructive pulmonary disease) (HCC)  Assessment & Plan  · On 2 L nasal cannula p r n  At home  · SpO2 goal > 88%  · Continue home Symbicort, albuterol p r n    · Bipap QHS    DM (diabetes mellitus) [facility])  Assessment & Plan  Lab Results   Component Value Date    HGBA1C 6 6 (H) 06/08/2022       Recent Labs     09/27/22  1634 09/28/22  0010 09/28/22  0530 09/28/22  1158   POCGLU 206* 161* 131 215*       Blood Sugar Average: Last 72 hrs:  (P) [phone]     · Continue SSI, goal -180    Acute on chronic respiratory failure with hypoxia (HCC)  Assessment & Plan  · Pt denies home O2 use  · Currently stable on 2L NC, will likely be patient's new baseline  · Significant improvement following diuresis and bilateral thoracentesis  · Encourage pulmonary hygiene, mobilization, OPEP  · Diuresis as above  · Continue nocturnal BiPAP    SIRS (systemic inflammatory response syndrome) (HCC)  Assessment & Plan  · POA as evidenced by WBC 22 1, tachycardia 100, lactic 10 6  · WBC down-trending, tachycardia resolved  · No current concern for infectious etiology, SIRS secondary to GI bleed and anemia    Plan:  · Monitoring off antibiotics  · BCx NGTD  · Trend temp curve and WBC count  · Resolved    Fall  Assessment & Plan  Fell from chair height, was sitting  Denies LOC, but was found down after pressing alert button    Plan:  - geriatrics consult  - Plan as above   - PT/OT  - Fall precautions              Disposition: rehab pending placement/authorization and bed availability   Social work following     SUBJECTIVE:  Chief Complaint: low back pain    Subjective: patient in bed this morning, seen right before breathing treatment  reports ongoing low back pain 8/10  Her oral meds this morning have not taken good effect yet  She has no shortness of breath, no chest pain, no nausea or vomiting  Tolerating meals  OBJECTIVE:   Vitals:   Temp:  [98 2 °F (36 8 °C)-98 5 °F (36 9 °C)] 98 2 °F (36 8 °C)  HR:  [63-67] 67  Resp:  [18] 18  BP: (127-143)/() 140/61    Intake/Output:  I/O       09/26 0701  09/27 0700 09/27 0701  09/28 0700 09/28 0701  09/29 0700    P  O   236     IV Piggyback       Total Intake(mL/kg)  236 (2 5)     Urine (mL/kg/hr) 2100 (0 9) 400 (0 2)     Stool 0      Total Output 2100 400     Net -2100 -164            Unmeasured Urine Occurrence 1 x 4 x     Unmeasured Stool Occurrence 1 x           Nutrition: Diet Cardiovascular; Cardiac; Fluid Restriction 1500 ML, Consistent Carbohydrate Diet Level 1 (4 carb servings/60 grams CHO/meal)  GI Proph/Bowel Reg: home protonix  VTE Prophylaxis:Sequential compression device (Venodyne)  and Heparin     Physical Exam:   GENERAL APPEARANCE: awake, alert, appears uncomfortable in bed  NEURO: low back pain to palpation   HEENT: normocephalic  CV: regular rate  LUNGS: decreased air movement, no wheezes or rales  GI: obese, soft, nontender  MSK: normal ROM  SKIN: no significant wounds or rashes visible    Invasive Devices  Report    Peripheral Intravenous Line  Duration           Peripheral IV 09/26/22 Right Antecubital 2 days          Drain  Duration           External Urinary Catheter 3 days                      Lab Results:   BMP/CMP:   Lab Results   Component Value Date    SODIUM 140 09/28/2022    K 3 9 09/28/2022    CL 98 09/28/2022    CO2 39 (H) 09/28/2022    BUN 88 (H) 09/28/2022    CREATININE 1 02 09/28/2022    CALCIUM 8 5 09/28/2022    EGFR 51 09/28/2022    and CBC:   Lab Results   Component Value Date    WBC 10 87 (H) 09/28/2022    HGB 8 6 (L) 09/28/2022    HCT 29 7 (L) 09/28/2022     (H) 09/28/2022     09/28/2022    MCH 29 8 09/28/2022    MCHC 29 0 (L) 09/28/2022    RDW 19 7 (H) 09/28/2022    MPV 10 9 09/28/2022    NRBC 0 09/28/2022     Imaging/EKG Studies: I have personally reviewed pertinent reports

## 2022-09-28 NOTE — ASSESSMENT & PLAN NOTE
Lab Results   Component Value Date    HGBA1C 6 6 (H) 06/08/2022       Recent Labs     09/27/22  1634 09/28/22  0010 09/28/22  0530 09/28/22  1158   POCGLU 206* 161* 131 215*       Blood Sugar Average: Last 72 hrs:  (P) 493 8361     · Continue SSI, goal -180

## 2022-09-28 NOTE — CASE MANAGEMENT
Case Management Discharge Planning Note    Patient name Denisse Ontiveros  Location [facility]27/[facility] 318-58 MRN [id number]  : 1940 Date 2022       Current Admission Date: 2022  Current Admission Diagnosis:T11 vertebral fracture [facility])   Patient Active Problem List    Diagnosis Date Noted    Acute on chronic respiratory failure with hypoxia ([facility] ) 2022    DM (diabetes mellitus) ([place] [address] ) 2022    Fall 2022    SIRS (systemic inflammatory response syndrome) ([facility] ) 2022    COPD (chronic obstructive pulmonary disease) ([place] [address] ) 2022    Acute-on-chronic kidney injury ([place] [address] ) 2022    T11 vertebral fracture ([place] [address] ) 2022    HFrEF (heart failure with reduced ejection fraction) ([place] [address] ) 2021    Permanent atrial fibrillation ([place] [address] ) 2021    Stage 3 chronic kidney disease ([place] [address] ) 2021    Anemia 2021      LOS (days): 11  Geometric Mean LOS (GMLOS) (days): 4 40  Days to GMLOS:-6 2     OBJECTIVE:  Risk of Unplanned Readmission Score: 27 74         Current admission status: Inpatient   Preferred Pharmacy:   [address] [place] [address]  Phone: [phone] Fax: [phone]    Primary Care Provider: Jair Art DO    Primary Insurance: [facility] [employer]  Secondary Insurance:     DISCHARGE DETAILS:    Pt able to d/c to [facility]     Prior to d/c, pt will need a COVID swab, COVID booster, and insurance approval    CM submitted for auth via CM support

## 2022-09-28 NOTE — ASSESSMENT & PLAN NOTE
9/17 CT CAP: Fracture of the T11 vertebral body with narrowing of the spinal canal at this level  Evaluation is limited secondary to osteopenia/ankylosing spondylitis  Additional probable small fracture of the anterior endplate of A47     7/97 CT thoracic: Ddisruption of the anterior endplates/syndesmophytes of the T10 vertebral body and disruption of the anterior and posterior endplate of the G65 vertebral bodies compatible with fracture  There is narrowing of the Spinal canal at the T11 level        Plan:  · Neurosurgery consult-- No neurosurgical intervention given neurologically intact and stable exam   · TLSO brace   · q4h neuro checks   · Multimodal analgesia with schedule tylenol, robaxin, and Neurontin -increased regimen today to help with pain control  · PT/OT

## 2022-09-28 NOTE — PHYSICAL THERAPY NOTE
Physical Therapy Progress Note     09/28/22 1058   PT Last Visit   PT Visit Date 09/28/22   Note Type   Note Type Treatment   Pain Assessment   Pain Assessment Tool 0-10   Pain Score 9   Pain Location/Orientation Location: Generalized;Orientation: Mid;Location: Back   Hospital Pain Intervention(s) Repositioned; Ambulation/increased activity; Rest   Restrictions/Precautions   Braces or Orthoses TLSO   Other Precautions Pain; Fall Risk;Spinal precautions   Subjective   Subjective Pt encountered supie in bed, reporting elevated pain despite timely pain meds pror to session  Pain is primarily located in back, but she also reports bilateral knee pain with activity  Pt declined further EOB mobility  Bed Mobility   Rolling R 2  Maximal assistance   Additional items Assist x 1; Increased time required;Verbal cues; Bedrails   Rolling L 2  Maximal assistance   Additional items Assist x 1; Increased time required; Bedrails   Activity Tolerance   Activity Tolerance Patient limited by fatigue;Patient limited by pain   Nurse MAUREEN Mcginnis   Assessment   Prognosis Fair   Problem List Decreased strength;Decreased endurance;Decreased range of motion; Impaired balance;Decreased mobility; Decreased coordination;Decreased safety awareness;Pain   Assessment Pt participated in sesison consisting of bed mobilty tasks for use of bed pan & washing up in supine  Pt was able to utilize extremities to assist with rooling, but required considerable posterior support to roll far enough to use bed pan & clean up after  Pt educated on importance & benefits of mobility & risks associated with avoiding it   pt continued to decline due to pain  pt repositioned post sessin with HOB >30 degress & all needs in reach  Pt will continue to benefit from therapy services at this time, but anticipate progress will be limited pending increased motivation to participate in tasks at this time  POC & d/c plan remain appropriate     Goals   Patient Goals to have less pain   STG Expiration Date 10/05/22   PT Treatment Day 1   Plan   Treatment/Interventions Functional transfer training;LE strengthening/ROM; Therapeutic exercise; Endurance training;Cognitive reorientation;Patient/family training;Equipment eval/education; Bed mobility   Progress Slow progress, decreased activity tolerance   PT Frequency 3-5x/wk   Recommendation   PT Discharge Recommendation Post acute rehabilitation services   Equipment Recommended Wheelchair   AM-PAC Basic Mobility Inpatient   Turning in Bed Without Bedrails 1   Lying on Back to Sitting on Edge of Flat Bed 1   Moving Bed to Chair 1   Standing Up From Chair 1   Walk in Room 1   Climb 3-5 Stairs 1   Basic Mobility Inpatient Raw Score 6   Turning Head Towards Sound 4   Follow Simple Instructions 3   Low Function Basic Mobility Raw Score 13   Low Function Basic Mobility Standardized Score 20 14   Highest Level Of Mobility   -HLM Goal 2: Bed activities/Dependent transfer   -HLM Achieved 2: Bed activities/Dependent transfer     Madelyn Sanches [facility] Basic Mobility Standardized Score of 40 78 suggests pt would benefit from post acute rehab

## 2022-09-28 NOTE — ASSESSMENT & PLAN NOTE
Wt Readings from Last 3 Encounters:   09/28/22 95 9 kg (211 lb 6 4 oz)   11/12/21 110 kg (243 lb)   08/23/21 106 kg (234 lb 9 1 oz)     · Hx HF with biventricular dysfunction and EF 40% with PPM   · 9/18 Echo - EF 22%, diastolic dysfunction, mod AS, mod MR, RVSP 59  · Home regimen: Entresto, diltiazem  mg, digoxin 125 mcg, Coreg 25 mg b i d , aspirin 81 daily, lasix 40 mg BID     Plan:  · Telemetry monitoring  · Cardiology consulted, appreciate recommendations, adjusting medications, discussed directly with team on 9/28 she is medically ready for discharge, continue on bumex

## 2022-09-28 NOTE — ASSESSMENT & PLAN NOTE
Janette Clifford from chair height, was sitting  Denies LOC, but was found down after pressing alert button    Plan:  - geriatrics consult  - Plan as above   - PT/OT  - Fall precautions

## 2022-09-28 NOTE — PROGRESS NOTES
General Cardiology   Progress Note -  [facility]   Cris Hernandez 79 y o  female MRN: [id number]    [facility] Encounter: [id number]    Assessment/ Plan    1  Acute on chronic hypoxic respiratory failure in the setting of acute on chronic systolic heart failure   On Bumex 2 mg PO daily   Patient takes furosemide 80 mg PO daily at home prior to admission   S/p bilateral thoracentesis   Creatinine 1 0 today  Continue 2 gram Na diet   Monitor I/Os - 164 ml in 24 hours but no input documented   Daily weights  211 lbs (bedscale)   Echocardiogram 9/18/2022 showed EF 65%, LA severely dilated, RA dilated, mild AI, moderate AS, moderate MR and mild TR       2  Cardiomyopathy   S/p ICD  On Entersto and coreg for GDMT     3  Acute on Chronic kidney disease stage III   Baseline creatinine 1 2-1 4  Creatinine 1 0 today      4  Chronic atrial fibrillation   On digoxin 62 5 mcg PO daily and coreg 12 5 mg PO BID  Patient takes digoxin 125 mcg PO daily and coreg 25 mg PO BID at home prior to admission   Currently off [address] due to GIB POA  Patient follows with Dr Brent Renee from [facility]  Follow up on [address] by primary cardiologist as outpatient  Concern for bleeding due to HAS BLED score 4     5  T11 Vertebral fracture  Followed by primary team and neurosurgery team   TLSO brace      6  Anemia in the setting of GIB (resolved now)  Hemoglobin 5 7 on admission   S/p transfusion   EGD 9/19 negative, flex sigmoidoscopy incomplete due to stool but not obvious source of bleed  Hemoglobin 8 6  today        Subjective  Patient reports she cannot sleep at night  No complaint of chest pain, SOB or palpitations  No fever or chills  Review of Systems   Constitutional: Negative for chills and fever  HENT: Negative for congestion  Cardiovascular: Negative for chest pain, dyspnea on exertion, leg swelling, orthopnea and palpitations  Respiratory: Negative for shortness of breath      Musculoskeletal: Positive for arthritis and back pain  Gastrointestinal: Negative for bloating, nausea and vomiting  Neurological: Negative for dizziness and light-headedness  Psychiatric/Behavioral: Negative for altered mental status  All other systems reviewed and are negative  Objective:   Vitals: Blood pressure 140/61, pulse 67, temperature 98 2 °F (36 8 °C), resp  rate 18, height 4' 10" (1 473 m), weight 95 9 kg (211 lb 6 4 oz), SpO2 100 %  ,       Body mass index is 44 18 kg/m²  ,     Systolic (04OYA), KBM:475 , Min:127 , SKD:819     Diastolic (53YTA), WVJ:56, Min:41, Max:103      Intake/Output Summary (Last 24 hours) at 9/28/2022 0930  Last data filed at 9/27/2022 1925  Gross per 24 hour   Intake 236 ml   Output 400 ml   Net -164 ml     Weight (last 2 days)     Date/Time Weight    09/28/22 0530 95 9 (211 4)    09/27/22 0535 96 3 (212 2)    09/26/22 0600 98 7 (217 59)        Telemetry Review: No telemetry     Physical Exam  Constitutional:       General: She is not in acute distress  Appearance: She is obese  HENT:      Head: Normocephalic  Mouth/Throat:      Mouth: Mucous membranes are moist    Cardiovascular:      Rate and Rhythm: Normal rate  Rhythm irregular  Pulses: Normal pulses  Pulmonary:      Effort: Pulmonary effort is normal  No respiratory distress  Comments: 2 L NC   Decreased in bases   Abdominal:      General: Bowel sounds are normal       Palpations: Abdomen is soft  Musculoskeletal:         General: No swelling  Normal range of motion  Cervical back: Neck supple  Skin:     General: Skin is warm and dry  Capillary Refill: Capillary refill takes less than 2 seconds  Neurological:      General: No focal deficit present  Mental Status: She is alert     Psychiatric:         Mood and Affect: Mood normal        LABORATORY RESULTS      CBC with diff:   Results from last 7 days   Lab Units 09/28/22  0455 09/26/22  0553 09/25/22  1651 09/25/22  0541 09/24/22  0515 09/23/22  2958 09/22/22  2159 09/22/22  0634   WBC Thousand/uL 10 87* 10 83* 10 29* 10 35* 12 57* 15 08*  --  16 99*   HEMOGLOBIN g/dL 8 6* 8 1* 7 9* 7 9* 7 5* 8 4*  --  8 8*   I STAT HEMOGLOBIN g/dl  --   --   --   --   --   --  8 8*  --    HEMATOCRIT % 29 7* 27 0* 26 5* 26 7* 25 3* 27 7*  --  28 7*   HEMATOCRIT, ISTAT %  --   --   --   --   --   --  26*  --    MCV fL 103* 101* 102* 102* 102* 102*  --  101*   PLATELETS Thousands/uL 154 156 151 159 150 155  --  137*   MCH pg 29 8 30 3 30 4 30 2 30 1 30 8  --  31 1   MCHC g/dL 29 0* 30 0* 29 8* 29 6* 29 6* 30 3*  --  30 7*   RDW % 19 7* 20 1* 20 1* 20 5* 20 5* 20 3*  --  20 7*   MPV fL 10 9 11 0 10 9 10 9 10 7 10 3  --  10 3   NRBC AUTO /100 WBCs 0 0  --  0  --   --   --  0       CMP:  Results from last 7 days   Lab Units 09/28/22  0455 09/27/22  0604 09/26/22  0553 09/25/22  0541 09/24/22  0515 09/23/22  0438 09/22/22  2159 09/22/22  1814   POTASSIUM mmol/L 3 9 3 9 3 7 3 6 3 1* 4 2  --  4 1   CHLORIDE mmol/L 98 98 97 99 102 104  --  104   CO2 mmol/L 39* 35* 36* 35* 32 33*  --  34*   CO2, I-STAT mmol/L  --   --   --   --   --   --  40*  --    BUN mg/dL 88* 98* 106* 108* 108* 105*  --  105*   CREATININE mg/dL 1 02 1 16 1 29 1 41* 1 39* 1 56*  --  1 39*   GLUCOSE, ISTAT mg/dl  --   --   --   --   --   --  143*  --    CALCIUM mg/dL 8 5 8 6 8 2* 8 4 8 1* 8 4  --  8 2*   AST U/L  --   --   --   --  14  --   --   --    ALT U/L  --   --   --   --  20  --   --   --    ALK PHOS U/L  --   --   --   --  67  --   --   --    EGFR ml/min/1 73sq m 51 43 38 34 35 30  --  35       BMP:  Results from last 7 days   Lab Units 09/28/22  0455 09/27/22  0604 09/26/22  0553 09/25/22  0541 09/24/22  0515 09/23/22  0438 09/22/22  2159 09/22/22  1814   POTASSIUM mmol/L 3 9 3 9 3 7 3 6 3 1* 4 2  --  4 1   CHLORIDE mmol/L 98 98 97 99 102 104  --  104   CO2 mmol/L 39* 35* 36* 35* 32 33*  --  34*   CO2, I-STAT mmol/L  --   --   --   --   --   --  40*  --    BUN mg/dL 88* 98* 106* 108* 108* 105*  --  105* CREATININE mg/dL 1 02 1 16 1 29 1 41* 1 39* 1 56*  --  1 39*   GLUCOSE, ISTAT mg/dl  --   --   --   --   --   --  143*  --    CALCIUM mg/dL 8 5 8 6 8 2* 8 4 8 1* 8 4  --  8 2*       Lab Results   Component Value Date    NTBNP 2,236 (H) 09/17/2022    NTBNP 1,583 (H) 11/11/2021             Results from last 7 days   Lab Units 09/26/22  0553 09/25/22  0541 09/24/22  0515 09/23/22  0438 09/22/22  0534   MAGNESIUM mg/dL 2 4 2 7* 2 7* 3 0* 2 9*                           Lipid Profile:   No results found for: CHOL  No results found for: HDL  No results found for: LDLCALC  No results found for: TRIG    Cardiac testing:   No results found for this or any previous visit  No results found for this or any previous visit  No results found for this or any previous visit  No valid procedures specified  No results found for this or any previous visit      Meds/Allergies   all current active meds have been reviewed and current meds:   Current Facility-Administered Medications   Medication Dose Route Frequency    acetaminophen (TYLENOL) tablet 975 mg  975 mg Oral Q8H [facility]    albuterol (PROVENTIL HFA,VENTOLIN HFA) inhaler 2 puff  2 puff Inhalation Q4H PRN    albuterol inhalation solution 2 5 mg  2 5 mg Nebulization Q4H PRN    atorvastatin (LIPITOR) tablet 10 mg  10 mg Oral Daily    bisacodyl (DULCOLAX) rectal suppository 10 mg  10 mg Rectal Daily PRN    bumetanide (BUMEX) tablet 2 mg  2 mg Oral Daily    carvedilol (COREG) tablet 12 5 mg  12 5 mg Oral BID With Meals    digoxin (LANOXIN) tablet 62 5 mcg  62 5 mcg Oral Daily    gabapentin (NEURONTIN) capsule 100 mg  100 mg Oral TID    heparin (porcine) subcutaneous injection 5,000 Units  5,000 Units Subcutaneous Q8H [facility]    insulin lispro (HumaLOG) 100 units/mL subcutaneous injection 1-5 Units  1-5 Units Subcutaneous Q6H [facility]    ipratropium (ATROVENT) 0 02 % inhalation solution 0 5 mg  0 5 mg Nebulization TID    levalbuterol (XOPENEX) inhalation solution 1 25 mg 1 25 mg Nebulization TID    lidocaine (LIDODERM) 5 % patch 1 patch  1 patch Topical Daily    melatonin tablet 6 mg  6 mg Oral HS    methocarbamol (ROBAXIN) tablet 500 mg  500 mg Oral Q6H [address]    oxyCODONE (ROXICODONE) IR tablet 2 5 mg  2 5 mg Oral Q4H PRN    oxyCODONE (ROXICODONE) IR tablet 5 mg  5 mg Oral Q4H PRN    pantoprazole (PROTONIX) EC tablet 40 mg  40 mg Oral BID AC    polyethylene glycol (MIRALAX) packet 17 g  17 g Oral BID    polyethylene glycol (MIRALAX) packet 17 g  17 g Oral Daily PRN    sacubitril-valsartan (ENTRESTO) 24-26 MG per tablet 1 tablet  1 tablet Oral BID    senna-docusate sodium (SENOKOT S) 8 6-50 mg per tablet 1 tablet  1 tablet Oral BID    simethicone (MYLICON) chewable tablet 80 mg  80 mg Oral Q6H PRN     Medications Prior to Admission   Medication    albuterol (2 5 mg/3 mL) 0 083 % nebulizer solution    albuterol (PROVENTIL HFA,VENTOLIN HFA) 90 mcg/act inhaler    allopurinol (ZYLOPRIM) 300 mg tablet    aspirin 81 mg chewable tablet    atorvastatin (LIPITOR) 10 mg tablet    budesonide-formoterol (SYMBICORT) 160-4 5 mcg/act inhaler    carvedilol (COREG) 25 mg tablet    cloNIDine (CATAPRES) 0 1 mg tablet    colchicine (COLCRYS) 0 6 mg tablet    digoxin (LANOXIN) 0 25 mg tablet    diltiazem (CARDIZEM CD) 180 mg 24 hr capsule    FERROUS SULFATE PO    furosemide (LASIX) 40 mg tablet    gabapentin (NEURONTIN) 100 mg capsule    guaiFENesin 400 mg    Magnesium 400 MG TABS    meclizine (ANTIVERT) 25 mg tablet    metolazone (ZAROXOLYN) 5 mg tablet    Multiple Vitamin (MULTIVITAMIN) capsule    pantoprazole (PROTONIX) 40 mg tablet    sacubitril-valsartan (ENTRESTO)  MG TABS    spironolactone (ALDACTONE) 25 mg tablet    warfarin (COUMADIN) 2 mg tablet    warfarin (COUMADIN) 7 5 mg tablet     Counseling / Coordination of Care  Total floor / unit time spent today 20 minutes    Greater than 50% of total time was spent with the patient and / or family counseling and / or coordination of care  ** Please Note: Dragon Double Fusion Dictation voice to text software may have been used in the creation of this document   **

## 2022-09-28 NOTE — ASSESSMENT & PLAN NOTE
· On coumadin with INR 4 66 on admission   · Coumadin reversed with Kcentra and vitamin K on admission for acute blood loss anemia and received 2 PRBC  · Home regimen:  Digoxin 125 mcg, Coreg 25 mg b i d , diltiazem  mg    Plan:  · Currently rate controlled  · Continue coreg 12 5mg BID  · Continue hold AC---- HAS-BLED score is 4 which carries 8 9% risk of major bleeding and therefore may defer restarting DOAC atleast until PCP evaluation

## 2022-09-28 NOTE — PROGRESS NOTES
Progress Note - Geriatric Medicine   Kalli Dennis 79 y o  female MRN: [id number]  Unit/Bed#: [facility] 627-01 Encounter: [id number]      Assessment/Plan:    Ambulatory dysfunction with fall   -injuries as below  -high risk recurrent falls, continue risk reduction strategies  -maintain environment free of fall hazards  -PT/OT following - may benefit from rehab for strengthening, conditioning and gait training on hospital d/c     T11 vertebra fracture  -s/p fall as outlined above  -pain well controlled   -thoracolumbar spine precautions  -TLSO brace as directed    Acute pain due to trauma  -continue acute multimodal pain control per geriatric pain protocol  -bowel regimen to reduce risk constipation    Acute on chronic systolic Congestive Heart Failure  -Cardiology on consult   -current regimen bumex, 2g sodium diet, and daily weights    Acute hypoxic respiratory failure  -multifactorial including COPD, XIOMARA, and acute on chronic systolic  -respiratory status markedly improved no longer conversational dyspnea or significant increased work of breathing  -high risk of abrupt decompensation - monitor closely    Permanent AFib  -rates continue to be well controlled on current regimen  -systemic AC on hold for GI bleed  -close o/p Cardiology f/u    Class 3 obesity  -BMI on admission reported 50 79  -encourage healthy lifestyle modifications and balance of caloric intake with energy expenditure    XIOMARA  -CPAP use HS    High risk developing delirium  -due to age and comorbidities  -continue to ensure that acute pain is well controlled  -maintain normal circadian  -reorient frequently as appropriate    Care coordination:  Rounded with Abraham Castle (RN)    Subjective:     Catheter was seen examined bedside where she is lying resting, she complains of back pain which is well controlled with her current medication regimen    Her appetite is poor but she is feeling somewhat better overall, denies shortness of breath or other acute complaints  Nursing reports no acute events overnight  Review of Systems   Constitutional: Positive for appetite change (Poor)  Negative for chills and fever  HENT: Negative  Eyes: Negative  Respiratory: Negative  Negative for shortness of breath  Cardiovascular: Negative  Gastrointestinal: Negative  Genitourinary: Negative  Musculoskeletal: Negative  Skin: Negative  Neurological: Negative for dizziness, weakness, light-headedness, numbness and headaches  Hematological: Negative  Psychiatric/Behavioral: Positive for sleep disturbance  All other systems reviewed and are negative  Objective:     Vitals: Blood pressure 140/61, pulse 67, temperature 98 2 °F (36 8 °C), resp  rate 18, height 4' 10" (1 473 m), weight 95 9 kg (211 lb 6 4 oz), SpO2 100 %  ,Body mass index is 44 18 kg/m²  Intake/Output Summary (Last 24 hours) at 9/28/2022 0931  Last data filed at 9/27/2022 1925  Gross per 24 hour   Intake 236 ml   Output 400 ml   Net -164 ml     Current Medications: Reviewed    Physical Exam:   Physical Exam  Vitals and nursing note reviewed  Constitutional:       General: She is not in acute distress  Appearance: Normal appearance  She is obese  She is not toxic-appearing  HENT:      Head: Normocephalic  Nose: Nose normal       Comments: O2 via NC     Mouth/Throat:      Mouth: Mucous membranes are dry  Eyes:      General: No scleral icterus  Right eye: No discharge  Left eye: No discharge  Conjunctiva/sclera: Conjunctivae normal    Neck:      Comments: Phonation normal  Cardiovascular:      Rate and Rhythm: Normal rate  Comments: Unable to palpate DP pulses  Pulmonary:      Effort: Pulmonary effort is normal  No respiratory distress  Breath sounds: No wheezing  Comments: Respirations shallow with no rales or rhonchi, no wheeze  Abdominal:      Palpations: Abdomen is soft  Tenderness: There is no abdominal tenderness  Comments: Obese   Musculoskeletal:      Cervical back: Neck supple  Right lower leg: No edema  Left lower leg: No edema  Comments: Obese body habitus, reduced overall muscle mass   Skin:     General: Skin is warm and dry  Comments: Bilateral chronic lower extremity venous stasis changes    Diffuse ecchymosis bilateral upper extremities    Skin diffusely dry    Left lower extremity clean dry dressing wrapped around calf   Neurological:      Mental Status: She is alert  Comments: Awake and alert, answers questions appropriate, follows commands   Psychiatric:      Comments: Mood affect flat somewhat withdrawn but remains polite and cooperative        Invasive Devices  Report    Peripheral Intravenous Line  Duration           Peripheral IV 09/26/22 Right Antecubital 2 days          Drain  Duration           External Urinary Catheter 3 days              Lab, Imaging and other studies: I have personally reviewed pertinent reports

## 2022-09-28 NOTE — PLAN OF CARE
Problem: PHYSICAL THERAPY ADULT  Goal: Performs mobility at highest level of function for planned discharge setting  See evaluation for individualized goals  Description: Treatment/Interventions: Functional transfer training, LE strengthening/ROM, Therapeutic exercise, Endurance training, Patient/family training, Equipment eval/education, Bed mobility, Spoke to nursing, Continued evaluation, OT  Equipment Recommended:  (continue to assess)       See flowsheet documentation for full assessment, interventions and recommendations  Outcome: Progressing  Note: Prognosis: Fair  Problem List: Decreased strength, Decreased endurance, Decreased range of motion, Impaired balance, Decreased mobility, Decreased coordination, Decreased safety awareness, Pain  Assessment: Pt participated in sesison consisting of bed mobilty tasks for use of bed pan & washing up in supine  Pt was able to utilize extremities to assist with rooling, but required considerable posterior support to roll far enough to use bed pan & clean up after  Pt educated on importance & benefits of mobility & risks associated with avoiding it   pt continued to decline due to pain  pt repositioned post sessin with HOB >30 degress & all needs in reach  Pt will continue to benefit from therapy services at this time, but anticipate progress will be limited pending increased motivation to participate in tasks at this time  POC & d/c plan remain appropriate  PT Discharge Recommendation: Post acute rehabilitation services    See flowsheet documentation for full assessment

## 2022-09-29 LAB
GLUCOSE SERPL-MCNC: 117 MG/DL (ref 65–140)
GLUCOSE SERPL-MCNC: 118 MG/DL (ref 65–140)
GLUCOSE SERPL-MCNC: 149 MG/DL (ref 65–140)
GLUCOSE SERPL-MCNC: 154 MG/DL (ref 65–140)
GLUCOSE SERPL-MCNC: 170 MG/DL (ref 65–140)
GLUCOSE SERPL-MCNC: 172 MG/DL (ref 65–140)

## 2022-09-29 PROCEDURE — 94668 MNPJ CHEST WALL SBSQ: CPT

## 2022-09-29 PROCEDURE — 94660 CPAP INITIATION&MGMT: CPT

## 2022-09-29 PROCEDURE — 82948 REAGENT STRIP/BLOOD GLUCOSE: CPT

## 2022-09-29 PROCEDURE — 97535 SELF CARE MNGMENT TRAINING: CPT

## 2022-09-29 PROCEDURE — 99232 SBSQ HOSP IP/OBS MODERATE 35: CPT | Performed by: NURSE PRACTITIONER

## 2022-09-29 PROCEDURE — 97530 THERAPEUTIC ACTIVITIES: CPT

## 2022-09-29 PROCEDURE — 99232 SBSQ HOSP IP/OBS MODERATE 35: CPT | Performed by: INTERNAL MEDICINE

## 2022-09-29 PROCEDURE — 94640 AIRWAY INHALATION TREATMENT: CPT

## 2022-09-29 PROCEDURE — 94640 AIRWAY INHALATION TREATMENT: CPT | Performed by: SOCIAL WORKER

## 2022-09-29 PROCEDURE — 94760 N-INVAS EAR/PLS OXIMETRY 1: CPT

## 2022-09-29 PROCEDURE — 94760 N-INVAS EAR/PLS OXIMETRY 1: CPT | Performed by: SOCIAL WORKER

## 2022-09-29 RX ADMIN — LEVALBUTEROL HYDROCHLORIDE 1.25 MG: 1.25 SOLUTION, CONCENTRATE RESPIRATORY (INHALATION) at 07:38

## 2022-09-29 RX ADMIN — SACUBITRIL AND VALSARTAN 1 TABLET: 24; 26 TABLET, FILM COATED ORAL at 08:43

## 2022-09-29 RX ADMIN — ATORVASTATIN CALCIUM 10 MG: 10 TABLET, FILM COATED ORAL at 08:39

## 2022-09-29 RX ADMIN — BUMETANIDE 2 MG: 2 TABLET ORAL at 08:39

## 2022-09-29 RX ADMIN — METHOCARBAMOL TABLETS 750 MG: 750 TABLET, COATED ORAL at 13:15

## 2022-09-29 RX ADMIN — HEPARIN SODIUM 5000 UNITS: 5000 INJECTION INTRAVENOUS; SUBCUTANEOUS at 05:20

## 2022-09-29 RX ADMIN — INSULIN LISPRO 1 UNITS: 100 INJECTION, SOLUTION INTRAVENOUS; SUBCUTANEOUS at 18:13

## 2022-09-29 RX ADMIN — GABAPENTIN 300 MG: 300 CAPSULE ORAL at 18:11

## 2022-09-29 RX ADMIN — CARVEDILOL 12.5 MG: 12.5 TABLET, FILM COATED ORAL at 18:11

## 2022-09-29 RX ADMIN — SACUBITRIL AND VALSARTAN 1 TABLET: 24; 26 TABLET, FILM COATED ORAL at 18:11

## 2022-09-29 RX ADMIN — METHOCARBAMOL TABLETS 750 MG: 750 TABLET, COATED ORAL at 18:11

## 2022-09-29 RX ADMIN — HEPARIN SODIUM 5000 UNITS: 5000 INJECTION INTRAVENOUS; SUBCUTANEOUS at 13:15

## 2022-09-29 RX ADMIN — LIDOCAINE 5% 1 PATCH: 700 PATCH TOPICAL at 08:52

## 2022-09-29 RX ADMIN — METHOCARBAMOL TABLETS 750 MG: 750 TABLET, COATED ORAL at 05:19

## 2022-09-29 RX ADMIN — PANTOPRAZOLE SODIUM 40 MG: 40 TABLET, DELAYED RELEASE ORAL at 08:39

## 2022-09-29 RX ADMIN — IPRATROPIUM BROMIDE 0.5 MG: 0.5 SOLUTION RESPIRATORY (INHALATION) at 07:38

## 2022-09-29 RX ADMIN — ACETAMINOPHEN 975 MG: 325 TABLET, FILM COATED ORAL at 21:40

## 2022-09-29 RX ADMIN — ACETAMINOPHEN 975 MG: 325 TABLET, FILM COATED ORAL at 13:15

## 2022-09-29 RX ADMIN — HEPARIN SODIUM 5000 UNITS: 5000 INJECTION INTRAVENOUS; SUBCUTANEOUS at 21:40

## 2022-09-29 RX ADMIN — LEVALBUTEROL HYDROCHLORIDE 1.25 MG: 1.25 SOLUTION, CONCENTRATE RESPIRATORY (INHALATION) at 14:04

## 2022-09-29 RX ADMIN — IPRATROPIUM BROMIDE 0.5 MG: 0.5 SOLUTION RESPIRATORY (INHALATION) at 19:07

## 2022-09-29 RX ADMIN — GABAPENTIN 300 MG: 300 CAPSULE ORAL at 08:39

## 2022-09-29 RX ADMIN — CARVEDILOL 12.5 MG: 12.5 TABLET, FILM COATED ORAL at 08:39

## 2022-09-29 RX ADMIN — LEVALBUTEROL HYDROCHLORIDE 1.25 MG: 1.25 SOLUTION, CONCENTRATE RESPIRATORY (INHALATION) at 19:07

## 2022-09-29 RX ADMIN — Medication 6 MG: at 21:40

## 2022-09-29 RX ADMIN — DIGOXIN 62.5 MCG: 125 TABLET ORAL at 08:39

## 2022-09-29 RX ADMIN — LIDOCAINE 5% 1 PATCH: 700 PATCH TOPICAL at 08:39

## 2022-09-29 RX ADMIN — ACETAMINOPHEN 975 MG: 325 TABLET, FILM COATED ORAL at 05:19

## 2022-09-29 RX ADMIN — GABAPENTIN 300 MG: 300 CAPSULE ORAL at 21:40

## 2022-09-29 RX ADMIN — INSULIN LISPRO 1 UNITS: 100 INJECTION, SOLUTION INTRAVENOUS; SUBCUTANEOUS at 13:15

## 2022-09-29 RX ADMIN — PANTOPRAZOLE SODIUM 40 MG: 40 TABLET, DELAYED RELEASE ORAL at 18:11

## 2022-09-29 RX ADMIN — IPRATROPIUM BROMIDE 0.5 MG: 0.5 SOLUTION RESPIRATORY (INHALATION) at 14:04

## 2022-09-29 NOTE — PLAN OF CARE
Problem: PHYSICAL THERAPY ADULT  Goal: Performs mobility at highest level of function for planned discharge setting  See evaluation for individualized goals  Description: Treatment/Interventions: Functional transfer training, LE strengthening/ROM, Therapeutic exercise, Endurance training, Patient/family training, Equipment eval/education, Bed mobility, Spoke to nursing, Continued evaluation, OT  Equipment Recommended:  (continue to assess)       See flowsheet documentation for full assessment, interventions and recommendations  Outcome: Progressing  Note: Prognosis: Fair  Problem List: Decreased strength, Decreased endurance, Decreased range of motion, Impaired balance, Decreased mobility, Decreased coordination, Decreased safety awareness, Pain  Assessment: pt continues to require Ax1-2 for all bed mobility & bed level transfers today  She was able to do so with less complaints of pain overall, but was unable to maintain upright sitting EOB due to weakness & complaints of pain  Required posterior trunk support to maintain safe sitting EOB since her feet could not reach the floor to allow pt to balance herself today  Will likely require use of curb step to allow pt to bear weight on LEs & progress to independence while sitting  Furthe transfers OOB inappropriate at this time  Pt maintained sitting EOB while brace donned & performed LE exercises to improve strength & AROM BLE in preparation for increased activity in upcoming sessions  At conclusion of session, pt returned back to bed with HOB ~30 degrees & all needs in reach  POC & d/c recommendations remain appropriate at this time  PT Discharge Recommendation: Post acute rehabilitation services    See flowsheet documentation for full assessment

## 2022-09-29 NOTE — ASSESSMENT & PLAN NOTE
· Hgb 5 7 on admission with INR 4 66 on coumadin, received 2 U PRBC, stable in 7s  · SHANT positive for melanotic stool  · Reports 2 weeks of melena   · Elevated BUN  · UGI bleed suspected    Plan:  · Coumadin reversed with Marques Moors and Vitamin K   · Trend Hgb, stable  · PO PPI BID    · 9/19 EGD negative, flex sigmoidoscopy incomplete due to stool but not obvious source of bleed  · Hgb currently stable at 8  · If worsening anemia and continued melena, re-consult GI

## 2022-09-29 NOTE — ASSESSMENT & PLAN NOTE
Lab Results   Component Value Date    HGBA1C 6 6 (H) 06/08/2022       Recent Labs     09/28/22  2131 09/29/22  0017 09/29/22  0534 09/29/22  0754   POCGLU 201* 172* 118 117       Blood Sugar Average: Last 72 hrs:  (P) 165 [id number]     · Continue SSI, goal -180

## 2022-09-29 NOTE — CASE MANAGEMENT
Case Management Discharge Planning Note    Patient name Zack Barker  Location [facility] 627/[facility] 766-41 MRN [id number]  : 1940 Date 2022       Current Admission Date: 2022  Current Admission Diagnosis:T11 vertebral fracture [facility])   Patient Active Problem List    Diagnosis Date Noted    Acute on chronic [facility] with hypoxia [facility] ) 2022    DM (diabetes mellitus) ([place] [address] ) 2022    Fall 2022    SIRS (systemic inflammatory response syndrome) ([facility] ) 2022    COPD (chronic obstructive pulmonary disease) ([place] [address] ) 2022    Acute-on-chronic kidney injury ([place] [address] ) 2022    T11 vertebral fracture ([place] [address] ) 2022    HFrEF (heart failure with reduced ejection fraction) ([place] [address] ) 2021    Permanent atrial fibrillation ([place] [address] ) 2021    Stage 3 chronic kidney disease ([place] [address] ) 2021    Anemia 2021      LOS (days): 12  Geometric Mean LOS (GMLOS) (days): 4 40  Days to GMLOS:-7 1     OBJECTIVE:  Risk of Unplanned Readmission Score: 25 9         Current admission status: Inpatient   Preferred Pharmacy:   [facility] [place] [address]  Phone: [phone] Fax: [phone]    Primary Care Provider: Eliseo Hernandes DO    Primary Insurance: [facility] [employer] [employer]  Secondary Insurance:     DISCHARGE DETAILS:                                                                                                               [address] [place] [address] Number: [place] Effective 22 for patient to be transferred to [facility]  SNF  NRD:10/3/22 Nurse Reviewer: Yoon Murillo: [phone] ext  [id number] OSE#[phone]   Any further questions regarding this authorization can be referred to [facility] @ [phone] ext  [id number] PRIMARY CARE PHYSICIAN:  Ramone Shah MD    No chief complaint on file.      INTERVAL HISTORY:  Emmett Arias is a 66 year old male who presents to clinic for pain management follow up visit. He reports ongoing chronic pain affecting his neck, upper back and low back with pain that radiates into his shoulders and arms bilaterally to his hands and into his right buttocks, thigh and calf.  He has chronic left shoulder pain after having fallen from a kitchen chair in 9/2020. He also has chronic bilateral knee pain that started approximately 11 years ago; patient attributes this pain due to having multiple strokes at that time. *** He gets muscle spasms in his right leg mostly at night.     Last visit to this department was on 02/10/2022.    Today, patient reports that *** make his pain worse. *** make his pain better. Medications provide *** analgesia and lead to functional improvements.     Since his last visit ***    PERTINENT HISTORY:   Patient lives independently in a senior apartment complex adjacent to [facility]. He mobilizes with a wheeled walker with brakes. He has a  through [facility]. He hasn't been able to exercise since COVID restrictions. He has history of cervical stenosis status post cervical laminectomy and as a result, he has decreased sensation in his hands.     I have reviewed the patient's medications and allergies, past medical, surgical, social and family history, updating these as appropriate.  See Histories section of the EMR for a display of this information.    BEHAVIORAL  HEALTH COMORBIDITIES:  Negative for Behavioral Health diagnoses  Most recent PHQ2 score: 0 on 5/10/2021.  Suicide Screen: Negative on 05/10/2021.    REVIEW OF SYSTEMS:  Positive for ***. Negative for ***    PAIN LEVEL and Functional Assessment:  Pain Rating Range: ***  (Previous: 5-8)  Current Pain Rating: ***  (Previous: 6.7)  BPI FUNCTIONAL INTERFERENCE SCORE: ***  (PREVIOUS: 7.3)  BPI MOOD  INTERFERENCE IN LIFE SCORE: ***/10   (PREVIOUS: 7/10)    CONTROLLED SUBSTANCE RISK ASSESSMENT:  SOAPP=4  Overall opioid risk is deemed to be Low  High-risk psychiatric conditions:  No  The PDMP drug monitoring program was reviewed.  Any aberrant behavior on the PDMP?:  No  Most recent UDS:  05/10/2022, consistent  Pill count in clinic today shows no aberrancy***       Has patient been prescribed naloxone for home use?  No, less than 50 MEDD.     Is patient a male?  Yes.  If so, date of testosterone discussion: 05/10/2022***.    The fact that having opioids continually present in your blood stream increases your annualized risk of dying from a heart problem by 65% was discussed with the patient. In light of this, did the patient want to continue their opioids? Yes***     CURRENT MEDICATIONS FOR PAIN MANAGEMENT:  Tramadol  mg tablet, 1 tablet per day.  Tramadol IR 50 mg tablet, 1 tablet two times daily PRN.  Cyclobenzaprine 10 mg tablet, 1/2 tablet 3 times daily PRN.  Gabapentin 600 mg tablet, 1 tablet 3 times daily *** BID (uses this only prn when needed due to side effects of tiredness).  Excedrine migraine     CURRENT MEDD:  20 max    CURRENT/PREVIOUS TREATMENTS:  02/02/2022 Left shoulder joint injection and bilateral knee joint injections.   11/15/2021 Left shoulder joint injection  Physical therapy  Chiropractic treatments  Trigger point injections (work well)  Joint injections to the knee (less about 4 months)  Gabapentin (caused negative side effects)  Percocet (caused rash)  Cervical laminectomy from C3 to C7.    PERTINENT LABS, IMAGING, AND DIAGNOSTIC STUDIES:  05/10/2021:Creatinine = 0.81; GFR = >90 (normal kidney function).  01/17/2021: Creatinine.= 1.36; GFR = 54 (moderate decrease in kidney function)     10/14/2020 XR Right Knee:  IMPRESSION: Right knee series showing moderate to severe medial joint space  narrowing, which has progressed since September 2017.    09/14/2017 XR Bilateral  Knees:  IMPRESSION:  Mild narrowing of the medial compartment of both knees left  slightly greater than right.     08/22/2017 XR Right Knee:  IMPRESSION:    1.  Degenerative change without acute fracture or dislocation.     11/02/2015 CT Cervical Spine:  FINDINGS:  There has been laminectomy from C3 to C7.  There is reversal of the normal cervical lordosis, likely due to patient  positioning or muscular spasm. Moderate marginal osteophytes are noted throughout the cervical spine. There is moderate narrowing of the C3-C4 and C6-7 disc spaces with associated endplate degenerative change. There is normal osseous mineralization. Mild posterior disc-osteophyte collections are identified at the cervical spine level. The spinal canal is patent status post decompression. Due to uncovertebral joint hypertrophy, there is moderate narrowing of the bilateral C3-C4 neural foramina. Uncovertebral joint hypertrophy is also noted the remainder of the cervical spine levels without associated significant foraminal narrowing. The prevertebral soft tissues are not thickened.  IMPRESSION:  1. Postoperative changes from multilevel laminectomy as described.  2. Moderate degenerative disease. Despite multiple posterior disc  osteophyte complexes, the spinal canal is not appreciably narrowed due to  the aforementioned multilevel laminectomy.  3. Uncovertebral joint hypertrophy results in moderate bilateral neural  foraminal narrowing at C3-C4.       CURRENT MEDICATIONS:  Current Outpatient Medications   Medication Sig Dispense Refill   • traMADol (ULTRAM) 50 MG tablet Take one tablet up to two times per day as needed for pain 60 tablet 0   • tramadol (ULTRAM-ER) 100 MG 24 hr tablet Take 1 tablet by mouth daily. 30 tablet 0   • gabapentin (NEURONTIN) 600 MG tablet TAKE 1 TABLET BY MOUTH THREE TIMES A DAY 90 tablet 0   • furosemide (LASIX) 20 MG tablet TAKE 1 TABLET BY MOUTH ON MONDAYS, WEDNESDAYS, FRIDAYS AND SUNDAYS. 48 tablet 3   •  Dulaglutide 4.5 MG/0.5ML Solution Pen-injector Inject 4.5 mg into the skin every 7 days. 6 mL 3   • Insulin Lispro-aabc, 1 U Dial, (Teri AvelarJayden) 100 UNIT/ML Solution Pen-injector Inject 8-14 Units into the skin 2 times daily (with meals). 15 mL 3   • insulin glargine (Lantus SoloStar) 100 UNIT/ML pen-injector Inject 22 Units into the skin every 24 hours. Prime 2 units before each dose. 30 mL 3   • Eliquis 5 MG Tab TAKE 1 TABLET BY MOUTH 2 TIMES DAILY. 180 tablet 1   • spironolactone (ALDACTONE) 50 MG tablet TAKE 1 TABLET BY MOUTH EVERY DAY 90 tablet 1   • fenofibrate 160 MG tablet TAKE 1 TABLET BY MOUTH EVERY DAY 90 tablet 1   • cyclobenzaprine (FLEXERIL) 10 MG tablet TAKE 1/2 TABLET BY MOUTH 3 TIMES DAILY AS NEEDED FOR MUSCLE SPASMS. 30 tablet 3   • Accu-Chek FastClix Lancets Misc TEST BLOOD SUGAR 3 TIMES DAILY AS DIRECTED 306 each 3   • nicotinic acid (NIACIN) 500 MG tablet TAKE 1/2 TABLET BY MOUTH EVERY OTHER  tablet 1   • Insulin Pen Needle (BD Pen Needle Micro U/F) 32G X 6 MM Misc Use to inject insulin three times daily. 300 each 12   • Icosapent Ethyl 1 g Cap TAKE 2 CAPSULES BY MOUTH TWICE A  capsule 3   • Aspirin-Acetaminophen-Caffeine (EXCEDRIN PO) 1-2 tablets daily     • carvedilol (COREG) 25 MG tablet TAKE 1 TABLET BY MOUTH TWICE A DAY WITH MEALS 180 tablet 3   • blood glucose test strip Test blood sugar 3 times daily as directed. Diagnosis: E11.9. Meter: Accu Check guide test strips 300 each 12   • rosuvastatin (CRESTOR) 10 MG tablet TAKE 1 TABLET BY MOUTH EVERY DAY 90 tablet 1   • Misc. Devices (Wheelchair Cushion) Misc Cushion to fit standard wheelchair. 1 each 0   • polyethylene glycol (MIRALAX) 17 g packet Take 17 g by mouth daily. 30 each 5   • Ascorbic Acid (vitamin C) 1000 MG tablet Take 1 tablet by mouth daily. 90 tablet 3   • Cholecalciferol (vitamin D3) 25 mcg (1,000 units) capsule Take 5 capsules by mouth daily. Take 5 tablets by mouth daily (Patient taking differently: Take  5,000 Units by mouth daily. Take 1 tablets by mouth daily of 5,000 units) 450 capsule 3   • selenium sulfide 2.25 % shampoo Massage shampoo into wet scalp then rinse thoroughly twice a week 180 mL 0   • nitroGLYcerin (NITROSTAT) 0.4 MG sublingual tablet Place 1 tablet under the tongue every 5 minutes as needed for Chest pain. 25 tablet 11   • sharps container To dispose of insulin needles. .00 1 each 5   • BIKTARVY -25 MG per tablet      • blood glucose meter Test blood sugar 4 times daily as directed. Diagnosis: E11.9. Meter: Accu-chek Compact Plus or similar meter with a drum 1 kit 0   • Multiple Vitamins-Minerals (MULTIVITAMIN ADULT) Tab Take 1 tablet by mouth daily. 90 tablet 3   • Coenzyme Q10 (COQ10) 50 MG Cap Take 1 capsule by mouth daily. 50 mg daily. 90 capsule 3     No current facility-administered medications for this visit.        ALLERGIES:   Allergen Reactions   • Losartan RASH   • Oxycontin RASH   • Metformin Other (See Comments)     Put to sleep       PHYSICAL EXAM:  There were no vitals taken for this visit.  General: Alert, oriented. Does not appear somnolent nor intoxicated.  In no apparent distress.  Psych: Cooperative; mood and affect appropriate for circumstances.  Head: Normocephalic, atraumatic.  Eyes: Conjunctivae non-injected; sclerae anicteric.  Skin: Pink, warm, dry. No rashes or lesions noted to exposed skin.  Respiratory: Respiratory effort is normal.   Cardiovascular: Regular rate and rhythm. No murmurs or abnormal heart sounds.  Peripheral Vascular: No edema to lower legs, feet bilaterally.  Musculoskeletal: ***  Neurologic: ***    ASSESSMENT:  No diagnosis found.   1. Chronic use of opiate for therapeutic purpose    2. Chronic neck pain    3. Spastic hemiparesis (CMS/HCC)    4. Chronic pain of both knees    5. Cervical spondylosis    6. DDD (degenerative disc disease), cervical    7. Chronic pain of multiple joints    8. Chronic midline low back pain without sciatica         Special considerations in this patient: ***    PLAN:  No orders of the defined types were placed in this encounter.  1. He will get UDS at [facility]. He is unable to urinate at present.   2. Opioid pain management agreement and informed consent reviewed and signed by the patient for this year. Discussed specific risks associated with chronic opiate use. Despite this, the patient wishes to continue current use of the opiate pain medication as it is helpful for pain and enhancing function.   3. Will continue current dosing of Tramadol ER and IR. Refills provided.   4. Previously discussed how Mindfulness can be helpful for managing chronic pain by changing unhelpful thoughts and behaviors and learning how to think constructively and develop personal coping strategies. Retrain Pain free online resource previously provided on AVS.  5. Recommend regular exercise in helping to manage chronic pain.   6. Patient following with PCP for left shoulder and bilateral knee pain.    7. Continue Gabapentin for neuropathy pain.    8. Consider topical compound for hand pain due to neuropathy. He would like to hold off on this for now.  9. Follow up in 3 months or sooner if needed.     The supervising physician for services performed today is ***.

## 2022-09-29 NOTE — PLAN OF CARE
Problem: OCCUPATIONAL THERAPY ADULT  Goal: Performs self-care activities at highest level of function for planned discharge setting  See evaluation for individualized goals  Description: Treatment Interventions: ADL retraining, Functional transfer training, Endurance training, Cognitive reorientation, Patient/family training, Equipment evaluation/education, Compensatory technique education, Energy conservation, Activityengagement          See flowsheet documentation for full assessment, interventions and recommendations  Outcome: Progressing  Note: Limitation: Decreased UE ROM, Decreased ADL status, Decreased Safe judgement during ADL, Decreased cognition, Decreased endurance, Decreased self-care trans, Decreased high-level ADLs  Prognosis: Fair  Assessment: pt participated in am ot session and was seen focusing on sitting tolerence, activity tolerence, bedm obility light grooming and tlso donning  pt was noted to tolerate sitting eob with impproved ability darshan tolereate opver 5 min  pt with limited arom b shoulders, swelling in limbs and pain in mid back with sitting  pt was agreeable for natalia today which is also improvement  pt  with difficulty using b ue's while balancing eob, recommend use of curb step for buiilt up floor next session as pts feet do not hit floor in sitting eob   will follow focusing on goals from ie     OT Discharge Recommendation: Post acute rehabilitation services     April A Storm

## 2022-09-29 NOTE — PROGRESS NOTES
[address] [facility]  Progress Note - Bhavesh Parisi 1940, 79 y o  female MRN: [id number]  [facility]: [id number]  Primary Care Provider: Toby Palacios DO   Date and time admitted to hospital: 9/17/2022  9:14 PM    DM (diabetes mellitus) [facility])  Assessment & Plan  Lab Results   Component Value Date    HGBA1C 6 6 (H) 06/08/2022       Recent Labs     09/28/22  2131 09/29/22  0017 09/29/22  0534 09/29/22  0754   POCGLU 201* 172* 118 117       Blood Sugar Average: Last 72 hrs:  (P) 165 [id number]     · Continue SSI, goal -180    Acute on chronic respiratory failure with hypoxia (HCC)  Assessment & Plan  · Pt denies home O2 use  · Currently stable on 2L NC, will likely be patient's new baseline  · Significant improvement following diuresis and bilateral thoracentesis  · Encourage pulmonary hygiene, mobilization, OPEP  · Diuresis as above  · Continue nocturnal BiPAP    Acute-on-chronic kidney injury [facility])  Assessment & Plan  Lab Results   Component Value Date    EGFR 51 09/28/2022    EGFR 43 09/27/2022    EGFR 38 09/26/2022    CREATININE 1 02 09/28/2022    CREATININE 1 16 09/27/2022    CREATININE 1 29 09/26/2022     · Baseline creatinine:  1 2-1 4  · Admission creatinine:  1 79  · In the setting of acute blood loss anemia and cardiorenal in setting of CHF  · Cr now back at baseline  · Trend BUN/creatinine improving normalized  · Monitor I/Os    COPD (chronic obstructive pulmonary disease) ([facility])  Assessment & Plan  · On 2 L nasal cannula p r n  At home  · SpO2 goal > 88%  · Continue home Symbicort, albuterol p r n    · Bipap QHS    SIRS (systemic inflammatory response syndrome) ([facility])  Assessment & Plan  · POA as evidenced by WBC 22 1, tachycardia 100, lactic 10 6  · WBC down-trending, tachycardia resolved  · No current concern for infectious etiology, SIRS secondary to GI bleed and anemia    Plan:  · Monitoring off antibiotics  · BCx NGTD  · Trend temp curve and WBC count  · Resolved    Fall  Assessment & Plan  Manuel Graves from chair height, was sitting  Denies LOC, but was found down after pressing alert button    Plan:  - geriatrics consult  - Plan as above   - PT/OT  - Fall precautions    Anemia  Assessment & Plan  · Hgb 5 7 on admission with INR 4 66 on coumadin, received 2 U PRBC, stable in 7s  · SHANT positive for melanotic stool  · Reports 2 weeks of melena   · Elevated BUN  · UGI bleed suspected    Plan:  · Coumadin reversed with Charlotta Madura and Vitamin K   · Trend Hgb, stable  · PO PPI BID    · 9/19 EGD negative, flex sigmoidoscopy incomplete due to stool but not obvious source of bleed  · Hgb currently stable at 8  · If worsening anemia and continued melena, re-consult GI      Permanent atrial fibrillation (HCC)  Assessment & Plan  · On coumadin with INR 4 66 on admission   · Coumadin reversed with Kcentra and vitamin K on admission for acute blood loss anemia and received 2 PRBC  · Home regimen:  Digoxin 125 mcg, Coreg 25 mg b i d , diltiazem  mg    Plan:  · Currently rate controlled  · Continue coreg 12 5mg BID  · Continue hold AC---- HAS-BLED score is 4 which carries 8 9% risk of major bleeding and therefore may defer restarting DOAC atleast until PCP evaluation     HFrEF (heart failure with reduced ejection fraction) ([facility])  Assessment & Plan  Wt Readings from Last 3 Encounters:   09/29/22 93 8 kg (206 lb 14 4 oz)   11/12/21 110 kg (243 lb)   08/23/21 106 kg (234 lb 9 1 oz)     · Hx HF with biventricular dysfunction and EF 40% with PPM   · 9/18 Echo - EF 80%, diastolic dysfunction, mod AS, mod MR, RVSP 59  · Home regimen: Entresto, diltiazem  mg, digoxin 125 mcg, Coreg 25 mg b i d , aspirin 81 daily, lasix 40 mg BID     Plan:  · Telemetry monitoring  · Cardiology consulted, appreciate recommendations, adjusting medications, discussed directly with team on 9/28 she is medically ready for discharge, continue on bumex     * T11 vertebral fracture [facility]  Assessment & Plan  9/17 CT CAP: Fracture of the T11 vertebral body with narrowing of the spinal canal at this level  Evaluation is limited secondary to osteopenia/ankylosing spondylitis  Additional probable small fracture of the anterior endplate of U02     1/31 CT thoracic: Ddisruption of the anterior endplates/syndesmophytes of the T10 vertebral body and disruption of the anterior and posterior endplate of the D50 vertebral bodies compatible with fracture  There is narrowing of the Spinal canal at the T11 level  Plan:  · Neurosurgery consult-- No neurosurgical intervention given neurologically intact and stable exam   · TLSO brace   · q4h neuro checks   · Multimodal analgesia with schedule tylenol, robaxin, and Neurontin -increased regimen today to help with pain control  · PT/OT          Disposition: Pending placement    SUBJECTIVE:  Chief Complaint:  Patient offers no new complaints today    Subjective:  Patient does endorse back pain with movement     Otherwise denies headache dizziness lightheadedness nausea vomiting chest pain shortness of breath or abdominal pain  OBJECTIVE:   Vitals:   Temp:  [97 7 °F (36 5 °C)-98 3 °F (36 8 °C)] 97 7 °F (36 5 °C)  HR:  [60-68] 68  Resp:  [16-20] 16  BP: ()/(47-91) 123/54    Intake/Output:  I/O       09/27 0701 09/28 0700 09/28 0701 09/29 0700 09/29 0701  09/30 0700    P  O  236 120     Total Intake(mL/kg) 236 (2 5) 120 (1 3)     Urine (mL/kg/hr) 400 (0 2) 850 (0 4)     Stool       Total Output 400 850     Net -164 -730            Unmeasured Urine Occurrence 4 x 1 x          Nutrition: Diet Cardiovascular; Cardiac; Fluid Restriction 1500 ML, Consistent Carbohydrate Diet Level 1 (4 carb servings/60 grams CHO/meal)  GI Proph/Bowel Reg:  Senna Colace and MiraLax  VTE Prophylaxis:Heparin     Physical Exam:   GENERAL APPEARANCE:  Not in any acute distress appears comfortable lying in bed  NEURO:  Intact GCS of 15 alert and oriented x3  HEENT: PERRL  CV: Regular rate and rhythm  LUNGS:  Clear to auscultation bilaterally throughout  GI:  Abdomen is soft obese positive bowel sounds nontender  :  Voiding on own  MSK:  Moves all extremities, upper extremity with ecchymosis bilaterally, lower extremity redness from peripheral vascular disease positive pulses bilaterally palpable  SKIN:  As above    Invasive Devices  Report    Peripheral Intravenous Line  Duration           Peripheral IV 09/26/22 Right Antecubital 3 days          Drain  Duration           External Urinary Catheter 4 days                      Lab Results: Results: I have personally reviewed all pertinent laboratory/tests results, BMP/CMP: No results found for: SODIUM, K, CL, CO2, ANIONGAP, BUN, CREATININE, GLUCOSE, CALCIUM, AST, ALT, ALKPHOS, PROT, BILITOT, EGFR and CBC: No results found for: WBC, HGB, HCT, MCV, PLT, ADJUSTEDWBC, MCH, MCHC, RDW, MPV, NRBC  Imaging/EKG Studies: I have personally reviewed pertinent reports     none  Other Studies: NOne

## 2022-09-29 NOTE — PLAN OF CARE
Problem: MOBILITY - ADULT  Goal: Maintain or return to baseline ADL function  Description: INTERVENTIONS:  -  Assess patient's ability to carry out ADLs; assess patient's baseline for ADL function and identify physical deficits which impact ability to perform ADLs (bathing, care of mouth/teeth, toileting, grooming, dressing, etc )  - Assess/evaluate cause of self-care deficits   - Assess range of motion  - Assess patient's mobility; develop plan if impaired  - Assess patient's need for assistive devices and provide as appropriate  - Encourage maximum independence but intervene and supervise when necessary  - Involve family in performance of ADLs  - Assess for home care needs following discharge   - Consider OT consult to assist with ADL evaluation and planning for discharge  - Provide patient education as appropriate  Outcome: Progressing  Goal: Maintains/Returns to pre admission functional level  Description: INTERVENTIONS:  - Perform BMAT or MOVE assessment daily    - Set and communicate daily mobility goal to care team and patient/family/caregiver  - Collaborate with rehabilitation services on mobility goals if consulted  - Perform Range of Motion 3 times a day  - Reposition patient every 2 hours    - Dangle patient 3 times a day  - Stand patient 3 times a day  - Ambulate patient 3 times a day  - Out of bed to chair 3 times a day   - Out of bed for meals 3 times a day  - Out of bed for toileting  - Record patient progress and toleration of activity level   Outcome: Progressing     Problem: Potential for Falls  Goal: Patient will remain free of falls  Description: INTERVENTIONS:  - Educate patient/family on patient safety including physical limitations  - Instruct patient to call for assistance with activity   - Consult OT/PT to assist with strengthening/mobility   - Keep Call bell within reach  - Keep bed low and locked with side rails adjusted as appropriate  - Keep care items and personal belongings within reach  - Initiate and maintain comfort rounds  - Make Fall Risk Sign visible to staff  - Offer Toileting every  Hours, in advance of need  - Initiate/Maintain alarm  - Obtain necessary fall risk management equipment:  - Apply yellow socks and bracelet for high fall risk patients  - Consider moving patient to room near nurses station  Outcome: Progressing     Problem: Prexisting or High Potential for Compromised Skin Integrity  Goal: Skin integrity is maintained or improved  Description: INTERVENTIONS:  - Identify patients at risk for skin breakdown  - Assess and monitor skin integrity  - Assess and monitor nutrition and hydration status  - Monitor labs   - Assess for incontinence   - Turn and reposition patient  - Assist with mobility/ambulation  - Relieve pressure over bony prominences  - Avoid friction and shearing  - Provide appropriate hygiene as needed including keeping skin clean and dry  - Evaluate need for skin moisturizer/barrier cream  - Collaborate with interdisciplinary team   - Patient/family teaching  - Consider wound care consult   Outcome: Progressing     Problem: PAIN - ADULT  Goal: Verbalizes/displays adequate comfort level or baseline comfort level  Description: Interventions:  - Encourage patient to monitor pain and request assistance  - Assess pain using appropriate pain scale  - Administer analgesics based on type and severity of pain and evaluate response  - Implement non-pharmacological measures as appropriate and evaluate response  - Consider cultural and social influences on pain and pain management  - Notify physician/advanced practitioner if interventions unsuccessful or patient reports new pain  Outcome: Progressing     Problem: INFECTION - ADULT  Goal: Absence or prevention of progression during hospitalization  Description: INTERVENTIONS:  - Assess and monitor for signs and symptoms of infection  - Monitor lab/diagnostic results  - Monitor all insertion sites, i e  indwelling lines, tubes, and drains  - Monitor endotracheal if appropriate and nasal secretions for changes in amount and color  - [place] appropriate cooling/warming therapies per order  - Administer medications as ordered  - Instruct and encourage patient and family to use good hand hygiene technique  - Identify and instruct in appropriate isolation precautions for identified infection/condition  Outcome: Progressing     Problem: SAFETY ADULT  Goal: Maintain or return to baseline ADL function  Description: INTERVENTIONS:  -  Assess patient's ability to carry out ADLs; assess patient's baseline for ADL function and identify physical deficits which impact ability to perform ADLs (bathing, care of mouth/teeth, toileting, grooming, dressing, etc )  - Assess/evaluate cause of self-care deficits   - Assess range of motion  - Assess patient's mobility; develop plan if impaired  - Assess patient's need for assistive devices and provide as appropriate  - Encourage maximum independence but intervene and supervise when necessary  - Involve family in performance of ADLs  - Assess for home care needs following discharge   - Consider OT consult to assist with ADL evaluation and planning for discharge  - Provide patient education as appropriate  Outcome: Progressing  Goal: Maintains/Returns to pre admission functional level  Description: INTERVENTIONS:  - Perform BMAT or MOVE assessment daily    - Set and communicate daily mobility goal to care team and patient/family/caregiver  - Collaborate with rehabilitation services on mobility goals if consulted  - Perform Range of Motion 3 times a day  - Reposition patient every 2 hours    - Dangle patient 3 times a day  - Stand patient 3 times a day  - Ambulate patient 3 times a day  - Out of bed to chair 3 times a day   - Out of bed for meals 3 times a day  - Out of bed for toileting  - Record patient progress and toleration of activity level   Outcome: Progressing  Goal: Patient will remain free of falls  Description: INTERVENTIONS:  - Educate patient/family on patient safety including physical limitations  - Instruct patient to call for assistance with activity   - Consult OT/PT to assist with strengthening/mobility   - Keep Call bell within reach  - Keep bed low and locked with side rails adjusted as appropriate  - Keep care items and personal belongings within reach  - Initiate and maintain comfort rounds  - Make Fall Risk Sign visible to staff  - Offer Toileting every  Hours, in advance of need  - Initiate/Maintain alarm  - Obtain necessary fall risk management equipment:   - Apply yellow socks and bracelet for high fall risk patients  - Consider moving patient to room near nurses station  Outcome: Progressing     Problem: DISCHARGE PLANNING  Goal: Discharge to home or other facility with appropriate resources  Description: INTERVENTIONS:  - Identify barriers to discharge w/patient and caregiver  - Arrange for needed discharge resources and transportation as appropriate  - Identify discharge learning needs (meds, wound care, etc )  - Arrange for interpretive services to assist at discharge as needed  - Refer to Case Management Department for coordinating discharge planning if the patient needs post-hospital services based on physician/advanced practitioner order or complex needs related to functional status, cognitive ability, or social support system  Outcome: Progressing     Problem: Knowledge Deficit  Goal: Patient/family/caregiver demonstrates understanding of disease process, treatment plan, medications, and discharge instructions  Description: Complete learning assessment and assess knowledge base    Interventions:  - Provide teaching at level of understanding  - Provide teaching via preferred learning methods  Outcome: Progressing     Problem: Nutrition/Hydration-ADULT  Goal: Nutrient/Hydration intake appropriate for improving, restoring or maintaining nutritional needs  Description: Monitor and assess patient's nutrition/hydration status for malnutrition  Collaborate with interdisciplinary team and initiate plan and interventions as ordered  Monitor patient's weight and dietary intake as ordered or per policy  Utilize nutrition screening tool and intervene as necessary  Determine patient's food preferences and provide high-protein, high-caloric foods as appropriate       INTERVENTIONS:  - Monitor oral intake, urinary output, labs, and treatment plans  - Assess nutrition and hydration status and recommend course of action  - Evaluate amount of meals eaten  - Assist patient with eating if necessary   - Allow adequate time for meals  - Recommend/ encourage appropriate diets, oral nutritional supplements, and vitamin/mineral supplements  - Order, calculate, and assess calorie counts as needed  - Recommend, monitor, and adjust tube feedings and TPN/PPN based on assessed needs  - Assess need for intravenous fluids  - Provide specific nutrition/hydration education as appropriate  - Include patient/family/caregiver in decisions related to nutrition  Outcome: Progressing

## 2022-09-29 NOTE — PROGRESS NOTES
General Cardiology   Progress Note -  [employer]   Zack Wakefield 79 y o  female MRN: [id number]    Unit/Bed#: [facility] 627-01 Encounter: [id number]    Assessment/ Plan    1  Acute on chronic hypoxic respiratory failure in the setting of acute on chronic systolic heart failure   On Bumex 2 mg PO daily   Patient takes furosemide 80 mg PO daily at home prior to admission   S/p bilateral thoracentesis   Creatinine 1 0 yesterday   Continue 2 gram Na diet   Monitor I/Os - 730 ml in 24 hours   Daily weights  206 lbs (bedscale)   Echocardiogram 9/18/2022 showed EF 65%, LA severely dilated, RA dilated, mild AI, moderate AS, moderate MR and mild TR       2  Cardiomyopathy   S/p ICD  On Entersto and coreg for GDMT      3  Acute on Chronic kidney disease stage III   Baseline creatinine 1 2-1 4  Creatinine 1 0 yesterday       4  Chronic atrial fibrillation   On digoxin 62 5 mcg PO daily and coreg 12 5 mg PO BID  Patient previously took digoxin 125 mcg PO daily and coreg 25 mg PO BID at home  Currently off [address] due to GIB POA  Patient follows with Dr Aleksandra Hinojosa from [facility]  Follow up on [address] by primary cardiologist as outpatient  Concern for bleeding due to HAS BLED score 4     5  T11 Vertebral fracture  Followed by primary team and neurosurgery team   TLSO brace      6  Anemia in the setting of GIB (resolved now)  Hemoglobin 5 7 on admission   S/p transfusion   EGD 9/19 negative, flex sigmoidoscopy incomplete due to stool but not obvious source of bleed  Hemoglobin 8 6 yesterday     Subjective  Patient resting in bed  She reports no complaint of chest pain, SOB or palpitations  She does feel fatigued and overall weak  Review of Systems   Constitutional: Positive for malaise/fatigue  Negative for chills and fever  HENT: Negative for congestion  Cardiovascular: Negative for chest pain, dyspnea on exertion, leg swelling and orthopnea  Respiratory: Negative for shortness of breath      Musculoskeletal: Positive for back pain  Negative for falls  Gastrointestinal: Negative for bloating, nausea and vomiting  Neurological: Negative for dizziness and light-headedness  Psychiatric/Behavioral: Negative for altered mental status  All other systems reviewed and are negative  Objective:   Vitals: Blood pressure 129/58, pulse 64, temperature 97 7 °F (36 5 °C), resp  rate 14, height 4' 10" (1 473 m), weight 93 8 kg (206 lb 14 4 oz), SpO2 95 %  ,       Body mass index is 43 24 kg/m²  ,     Systolic (82ARD), PUZ:814 , Min:96 , VVF:211     Diastolic (88DDE), G, Min:47, Max:91      Intake/Output Summary (Last 24 hours) at 2022 1013  Last data filed at 2022 1307  Gross per 24 hour   Intake --   Output 850 ml   Net -850 ml     Weight (last 2 days)     Date/Time Weight    22 0530 93 8 (206 9)    22 0530 95 9 (211 4)    22 0535 96 3 (212 2)        Telemetry Review: No telemetry     Physical Exam  Constitutional:       General: She is not in acute distress  HENT:      Head: Normocephalic  Mouth/Throat:      Mouth: Mucous membranes are moist    Cardiovascular:      Rate and Rhythm: Normal rate and regular rhythm  Pulses: Normal pulses  Pulmonary:      Effort: Pulmonary effort is normal  No respiratory distress  Breath sounds: Normal breath sounds  Abdominal:      General: Bowel sounds are normal       Palpations: Abdomen is soft  Musculoskeletal:         General: No swelling  Normal range of motion  Cervical back: Neck supple  Comments: PVD bilateral lower extremities    Skin:     General: Skin is warm and dry  Neurological:      General: No focal deficit present  Mental Status: She is alert     Psychiatric:         Mood and Affect: Mood normal          LABORATORY RESULTS      CBC with diff:   Results from last 7 days   Lab Units 22  0455 22  0553 22  1651 22  0541 22  0515 22  0438 22  2159   WBC Thousand/uL 10 87* 10 83* 10 29* 10 35* 12 57* 15 08*  --    HEMOGLOBIN g/dL 8 6* 8 1* 7 9* 7 9* 7 5* 8 4*  --    I STAT HEMOGLOBIN g/dl  --   --   --   --   --   --  8 8*   HEMATOCRIT % 29 7* 27 0* 26 5* 26 7* 25 3* 27 7*  --    HEMATOCRIT, ISTAT %  --   --   --   --   --   --  26*   MCV fL 103* 101* 102* 102* 102* 102*  --    PLATELETS Thousands/uL 154 156 151 159 150 155  --    MCH pg 29 8 30 3 30 4 30 2 30 1 30 8  --    MCHC g/dL 29 0* 30 0* 29 8* 29 6* 29 6* 30 3*  --    RDW % 19 7* 20 1* 20 1* 20 5* 20 5* 20 3*  --    MPV fL 10 9 11 0 10 9 10 9 10 7 10 3  --    NRBC AUTO /100 WBCs 0 0  --  0  --   --   --        CMP:  Results from last 7 days   Lab Units 09/28/22  0455 09/27/22  0604 09/26/22  0553 09/25/22  0541 09/24/22  0515 09/23/22  0438 09/22/22  2159 09/22/22  1814   POTASSIUM mmol/L 3 9 3 9 3 7 3 6 3 1* 4 2  --  4 1   CHLORIDE mmol/L 98 98 97 99 102 104  --  104   CO2 mmol/L 39* 35* 36* 35* 32 33*  --  34*   CO2, I-STAT mmol/L  --   --   --   --   --   --  40*  --    BUN mg/dL 88* 98* 106* 108* 108* 105*  --  105*   CREATININE mg/dL 1 02 1 16 1 29 1 41* 1 39* 1 56*  --  1 39*   GLUCOSE, ISTAT mg/dl  --   --   --   --   --   --  143*  --    CALCIUM mg/dL 8 5 8 6 8 2* 8 4 8 1* 8 4  --  8 2*   AST U/L  --   --   --   --  14  --   --   --    ALT U/L  --   --   --   --  20  --   --   --    ALK PHOS U/L  --   --   --   --  67  --   --   --    EGFR ml/min/1 73sq m 51 43 38 34 35 30  --  35       BMP:  Results from last 7 days   Lab Units 09/28/22  0455 09/27/22  0604 09/26/22  0553 09/25/22  0541 09/24/22  0515 09/23/22  0438 09/22/22  2159 09/22/22  1814   POTASSIUM mmol/L 3 9 3 9 3 7 3 6 3 1* 4 2  --  4 1   CHLORIDE mmol/L 98 98 97 99 102 104  --  104   CO2 mmol/L 39* 35* 36* 35* 32 33*  --  34*   CO2, I-STAT mmol/L  --   --   --   --   --   --  40*  --    BUN mg/dL 88* 98* 106* 108* 108* 105*  --  105*   CREATININE mg/dL 1 02 1 16 1 29 1 41* 1 39* 1 56*  --  1 39*   GLUCOSE, ISTAT mg/dl  --   --   --   --   --   --  143*  -- CALCIUM mg/dL 8 5 8 6 8 2* 8 4 8 1* 8 4  --  8 2*       Lab Results   Component Value Date    NTBNP 2,236 (H) 09/17/2022    NTBNP 1,583 (H) 11/11/2021             Results from last 7 days   Lab Units 09/26/22  0553 09/25/22  0541 09/24/22  0515 09/23/22  0438   MAGNESIUM mg/dL 2 4 2 7* 2 7* 3 0*                           Lipid Profile:   No results found for: CHOL  No results found for: HDL  No results found for: LDLCALC  No results found for: TRIG    Cardiac testing:   No results found for this or any previous visit  No results found for this or any previous visit  No results found for this or any previous visit  No valid procedures specified  No results found for this or any previous visit      Meds/Allergies   all current active meds have been reviewed and current meds:   Current Facility-Administered Medications   Medication Dose Route Frequency    acetaminophen (TYLENOL) tablet 975 mg  975 mg Oral Q8H [address]    albuterol (PROVENTIL HFA,VENTOLIN HFA) inhaler 2 puff  2 puff Inhalation Q4H PRN    albuterol inhalation solution 2 5 mg  2 5 mg Nebulization Q4H PRN    atorvastatin (LIPITOR) tablet 10 mg  10 mg Oral Daily    bisacodyl (DULCOLAX) rectal suppository 10 mg  10 mg Rectal Daily PRN    bumetanide (BUMEX) tablet 2 mg  2 mg Oral Daily    carvedilol (COREG) tablet 12 5 mg  12 5 mg Oral BID With Meals    digoxin (LANOXIN) tablet 62 5 mcg  62 5 mcg Oral Daily    gabapentin (NEURONTIN) capsule 300 mg  300 mg Oral TID    heparin (porcine) subcutaneous injection 5,000 Units  5,000 Units Subcutaneous Q8H [address]    insulin lispro (HumaLOG) 100 units/mL subcutaneous injection 1-5 Units  1-5 Units Subcutaneous 4x Daily (with meals and at bedtime)    ipratropium (ATROVENT) 0 02 % inhalation solution 0 5 mg  0 5 mg Nebulization TID    levalbuterol (XOPENEX) inhalation solution 1 25 mg  1 25 mg Nebulization TID    lidocaine (LIDODERM) 5 % patch 1 patch  1 patch Topical Daily    lidocaine (LIDODERM) 5 % patch 1 patch  1 patch Topical Daily    melatonin tablet 6 mg  6 mg Oral HS    methocarbamol (ROBAXIN) tablet 750 mg  750 mg Oral Q6H [facility]    oxyCODONE (ROXICODONE) IR tablet 2 5 mg  2 5 mg Oral Q4H PRN    oxyCODONE (ROXICODONE) IR tablet 5 mg  5 mg Oral Q4H PRN    pantoprazole (PROTONIX) EC tablet 40 mg  40 mg Oral BID AC    polyethylene glycol (MIRALAX) packet 17 g  17 g Oral BID    polyethylene glycol (MIRALAX) packet 17 g  17 g Oral Daily PRN    sacubitril-valsartan (ENTRESTO) 24-26 MG per tablet 1 tablet  1 tablet Oral BID    senna-docusate sodium (SENOKOT S) 8 6-50 mg per tablet 1 tablet  1 tablet Oral BID    simethicone (MYLICON) chewable tablet 80 mg  80 mg Oral Q6H PRN     Medications Prior to Admission   Medication    albuterol (2 5 mg/3 mL) 0 083 % nebulizer solution    albuterol (PROVENTIL HFA,VENTOLIN HFA) 90 mcg/act inhaler    allopurinol (ZYLOPRIM) 300 mg tablet    aspirin 81 mg chewable tablet    atorvastatin (LIPITOR) 10 mg tablet    budesonide-formoterol (SYMBICORT) 160-4 5 mcg/act inhaler    carvedilol (COREG) 25 mg tablet    cloNIDine (CATAPRES) 0 1 mg tablet    colchicine (COLCRYS) 0 6 mg tablet    digoxin (LANOXIN) 0 25 mg tablet    diltiazem (CARDIZEM CD) 180 mg 24 hr capsule    FERROUS SULFATE PO    furosemide (LASIX) 40 mg tablet    gabapentin (NEURONTIN) 100 mg capsule    guaiFENesin 400 mg    Magnesium 400 MG TABS    meclizine (ANTIVERT) 25 mg tablet    metolazone (ZAROXOLYN) 5 mg tablet    Multiple Vitamin (MULTIVITAMIN) capsule    pantoprazole (PROTONIX) 40 mg tablet    sacubitril-valsartan (ENTRESTO)  MG TABS    spironolactone (ALDACTONE) 25 mg tablet    warfarin (COUMADIN) 2 mg tablet    warfarin (COUMADIN) 7 5 mg tablet       Counseling / Coordination of Care  Total floor / unit time spent today 20 minutes  Greater than 50% of total time was spent with the patient and / or family counseling and / or coordination of care        ** Please Note: Dragon 360 Dictation voice to text software may have been used in the creation of this document   **

## 2022-09-29 NOTE — ASSESSMENT & PLAN NOTE
Wt Readings from Last 3 Encounters:   09/29/22 93 8 kg (206 lb 14 4 oz)   11/12/21 110 kg (243 lb)   08/23/21 106 kg (234 lb 9 1 oz)     · Hx HF with biventricular dysfunction and EF 40% with PPM   · 9/18 Echo - EF 77%, diastolic dysfunction, mod AS, mod MR, RVSP 59  · Home regimen: Entresto, diltiazem  mg, digoxin 125 mcg, Coreg 25 mg b i d , aspirin 81 daily, lasix 40 mg BID     Plan:  · Telemetry monitoring  · Cardiology consulted, appreciate recommendations, adjusting medications, discussed directly with team on 9/28 she is medically ready for discharge, continue on bumex

## 2022-09-29 NOTE — ASSESSMENT & PLAN NOTE
9/17 CT CAP: Fracture of the T11 vertebral body with narrowing of the spinal canal at this level  Evaluation is limited secondary to osteopenia/ankylosing spondylitis  Additional probable small fracture of the anterior endplate of K34     3/42 CT thoracic: Ddisruption of the anterior endplates/syndesmophytes of the T10 vertebral body and disruption of the anterior and posterior endplate of the V97 vertebral bodies compatible with fracture  There is narrowing of the Spinal canal at the T11 level        Plan:  · Neurosurgery consult-- No neurosurgical intervention given neurologically intact and stable exam   · TLSO brace   · q4h neuro checks   · Multimodal analgesia with schedule tylenol, robaxin, and Neurontin -increased regimen today to help with pain control  · PT/OT

## 2022-09-29 NOTE — PHYSICAL THERAPY NOTE
Physical Therapy Progress Note     09/29/22 1200   PT Last Visit   PT Visit Date 09/29/22   Note Type   Note Type Treatment   Pain Assessment   Pain Assessment Tool FLACC   Pain Rating: FLACC (Rest) - Face 0   Pain Rating: FLACC (Rest) - Legs 0   Pain Rating: FLACC (Rest) - Activity 1   Pain Rating: FLACC (Rest) - Cry 1   Pain Rating: FLACC (Rest) - Consolability 0   Score: FLACC (Rest) 2   Pain Rating: FLACC (Activity) - Face 2   Pain Rating: FLACC (Activity) - Legs 1   Pain Rating: FLACC (Activity) - Activity 1   Pain Rating: FLACC (Activity) - Cry 2   Pain Rating: FLACC (Activity) - Consolability 1   Score: FLACC (Activity) 7   Restrictions/Precautions   Braces or Orthoses TLSO   Other Precautions Pain; Fall Risk;Spinal precautions;O2;Cognitive   Subjective   Subjective Pt encountered supine in bed, resting & reporting improved pain overall  Agreeable to EOB activity  Reports increased pain with activity, but no other complaints offered  Pt talks quietly, requiring her to repeat herself to be heard  Bed Mobility   Rolling R 2  Maximal assistance   Additional items Assist x 1   Rolling L 2  Maximal assistance   Additional items Assist x 1   Supine to Sit 2  Maximal assistance   Additional items Assist x 2   Sit to Supine 2  Maximal assistance   Additional items Assist x 2   Transfers   Additional Comments max Ax 1-2 balance EOB ~10 mins   Balance   Static Sitting Poor -   Dynamic Sitting Poor -   Activity Tolerance   Activity Tolerance Patient limited by fatigue;Patient limited by pain   Nurse Made Aware yes   Exercises   Hip Abduction Supine;10 reps;AROM; Bilateral   Knee AROM Long Arc Quad Sitting;15 reps;AROM; Bilateral   Ankle Pumps 15 reps;Bilateral;AROM; Supine   Assessment   Prognosis Fair   Problem List Decreased strength;Decreased endurance;Decreased range of motion; Impaired balance;Decreased mobility; Decreased coordination;Decreased safety awareness;Pain   Assessment pt continues to require Ax1-2 for all bed mobility & bed level transfers today  She was able to do so with less complaints of pain overall, but was unable to maintain upright sitting EOB due to weakness & complaints of pain  Required posterior trunk support to maintain safe sitting EOB since her feet could not reach the floor to allow pt to balance herself today  Will likely require use of curb step to allow pt to bear weight on LEs & progress to independence while sitting  Furthe transfers OOB inappropriate at this time  Pt maintained sitting EOB while brace donned & performed LE exercises to improve strength & AROM BLE in preparation for increased activity in upcoming sessions  At conclusion of session, pt returned back to bed with HOB ~30 degrees & all needs in reach  POC & d/c recommendations remain appropriate at this time  Goals   Patient Goals to have less pain   STG Expiration Date 10/05/22   PT Treatment Day 2   Plan   Treatment/Interventions Functional transfer training;LE strengthening/ROM; Therapeutic exercise; Endurance training;Patient/family training;Equipment eval/education; Bed mobility   Progress Slow progress, cognitive deficits   PT Frequency 3-5x/wk   Recommendation   PT Discharge Recommendation Post acute rehabilitation services   Equipment Recommended Wheelchair     Jarrett Sanches [facility] Basic Mobility Standardized Score of 40 78 suggests pt would benefit from post acute rehab

## 2022-09-29 NOTE — ASSESSMENT & PLAN NOTE
Lab Results   Component Value Date    EGFR 51 09/28/2022    EGFR 43 09/27/2022    EGFR 38 09/26/2022    CREATININE 1 02 09/28/2022    CREATININE 1 16 09/27/2022    CREATININE 1 29 09/26/2022     · Baseline creatinine:  1 2-1 4  · Admission creatinine:  1 79  · In the setting of acute blood loss anemia and cardiorenal in setting of CHF  · Cr now back at baseline  · Trend BUN/creatinine improving normalized  · Monitor I/Os

## 2022-09-29 NOTE — CASE MANAGEMENT
Case Management Progress Note    Patient name Melisa Herrera  Location [facility] 627/[facility] 031-12 MRN [id number]  : 1940 Date 2022       LOS (days): 12  Geometric Mean LOS (GMLOS) (days): 4 40  Days to GMLOS:-7 3        OBJECTIVE:        Current admission status: Inpatient  Preferred Pharmacy:   [facility] [address] [id number]  Phone: [phone] Fax: [phone]    Primary Care Provider: Rossi Barragan DO    Primary Insurance: [facility] [employer]  Secondary Insurance:     PROGRESS NOTE:      Transport set for tomorrow @3705 via [address] [place] [address]  Facility made aware   Pt's d/c packet is at the charge desk

## 2022-09-29 NOTE — OCCUPATIONAL THERAPY NOTE
Occupational Therapy Treatment Note:      09/29/22 1500   OT Last Visit   OT Visit Date 09/29/22   Note Type   Note Type Treatment   Restrictions/Precautions   Braces or Orthoses TLSO   Other Precautions Chair Alarm; Bed Alarm;O2;Fall Risk;Pain;Fluid restriction;Spinal precautions  (soft spoken)   Pain Assessment   Pain Assessment Tool 0-10   Pain Score 5  (mid back)   ADL   Where Assessed Edge of bed   Eating Assistance 4  Minimal Assistance   Eating Deficit Increased time to complete;Bringing food to mouth assist  (to drink from straw  pt c/o thirst)   Grooming Assistance 2  Maximal Assistance   Grooming Comments hair combing eob   UB Dressing Assistance 2  Maximal Assistance  (ax2 sitting eob)   UB Dressing Comments tlso donning   Bed Mobility   Supine to Sit 2  Maximal assistance   Additional items Assist x 2   Sit to Supine 2  Maximal assistance   Additional items Assist x 2   Additional Comments pt s/c of lying flat  likes 25 degrees of hob elevation   Cognition   Overall Cognitive Status Impaired   Arousal/Participation Alert   Attention Attends with cues to redirect   Memory Decreased recall of precautions   Following Commands Follows one step commands without difficulty   Activity Tolerance   Activity Tolerance Patient tolerated treatment well   Assessment   Assessment pt participated in am ot session and was seen focusing on sitting tolerence, activity tolerence, bedm obility light grooming and tlso donning  pt was noted to tolerate sitting eob with impproved ability darshan tolereate opver 5 min  pt with limited arom b shoulders, swelling in limbs and pain in mid back with sitting  pt was agreeable for natalia today which is also improvement  pt  with difficulty using b ue's while balancing eob, recommend use of curb step for buiilt up floor next session as pts feet do not hit floor in sitting eob   will follow focusing on goals from ie   Plan   Treatment Interventions ADL retraining;Functional transfer training;Cognitive reorientation;Patient/family training; Activityengagement   Goal Expiration Date 10/05/22   OT Treatment Day 2   OT Frequency 3-5x/wk   Recommendation   OT Discharge Recommendation Post acute rehabilitation services   AM-PAC Daily Activity Inpatient   Lower Body Dressing 2   Bathing 2   Toileting 2   Upper Body Dressing 3   Grooming 3   Eating 3   Daily Activity Raw Score 15   Daily Activity Standardized Score (Calc for Raw Score >=11) 34 69   AM-PAC Applied Cognition Inpatient   Following a Speech/Presentation 2   Understanding Ordinary Conversation 4   Taking Medications 2   Remembering Where Things Are Placed or Put Away 3   Remembering List of 4-5 Errands 3   Taking Care of Complicated Tasks 2   Applied Cognition Raw Score 16   Applied Cognition Standardized Score 35 03   April A Storm

## 2022-09-29 NOTE — ASSESSMENT & PLAN NOTE
Maria Esther Yeung from chair height, was sitting  Denies LOC, but was found down after pressing alert button    Plan:  - geriatrics consult  - Plan as above   - PT/OT  - Fall precautions

## 2022-09-30 ENCOUNTER — TELEPHONE (OUTPATIENT)
Dept: NEUROSURGERY | Facility: CLINIC | Age: 82
End: 2022-09-30

## 2022-09-30 ENCOUNTER — APPOINTMENT (OUTPATIENT)
Dept: RADIOLOGY | Facility: HOSPITAL | Age: 82
DRG: 377 | End: 2022-09-30
Payer: COMMERCIAL

## 2022-09-30 VITALS
TEMPERATURE: 97.9 F | OXYGEN SATURATION: 98 % | RESPIRATION RATE: 20 BRPM | SYSTOLIC BLOOD PRESSURE: 138 MMHG | HEIGHT: 58 IN | WEIGHT: 210.98 LBS | HEART RATE: 66 BPM | DIASTOLIC BLOOD PRESSURE: 60 MMHG | BODY MASS INDEX: 44.29 KG/M2

## 2022-09-30 PROBLEM — N17.9 ACUTE-ON-CHRONIC KIDNEY INJURY (HCC): Status: RESOLVED | Noted: 2022-09-18 | Resolved: 2022-09-30

## 2022-09-30 PROBLEM — N18.9 ACUTE-ON-CHRONIC KIDNEY INJURY (HCC): Status: RESOLVED | Noted: 2022-09-18 | Resolved: 2022-09-30

## 2022-09-30 PROBLEM — R65.10 SIRS (SYSTEMIC INFLAMMATORY RESPONSE SYNDROME) (HCC): Status: RESOLVED | Noted: 2022-09-18 | Resolved: 2022-09-30

## 2022-09-30 LAB
GLUCOSE SERPL-MCNC: 107 MG/DL (ref 65–140)
GLUCOSE SERPL-MCNC: 122 MG/DL (ref 65–140)
GLUCOSE SERPL-MCNC: 144 MG/DL (ref 65–140)

## 2022-09-30 PROCEDURE — 82948 REAGENT STRIP/BLOOD GLUCOSE: CPT

## 2022-09-30 PROCEDURE — 72072 X-RAY EXAM THORAC SPINE 3VWS: CPT

## 2022-09-30 PROCEDURE — 94660 CPAP INITIATION&MGMT: CPT

## 2022-09-30 PROCEDURE — 99232 SBSQ HOSP IP/OBS MODERATE 35: CPT | Performed by: INTERNAL MEDICINE

## 2022-09-30 PROCEDURE — 99238 HOSP IP/OBS DSCHRG MGMT 30/<: CPT | Performed by: EMERGENCY MEDICINE

## 2022-09-30 PROCEDURE — 94760 N-INVAS EAR/PLS OXIMETRY 1: CPT

## 2022-09-30 PROCEDURE — 94640 AIRWAY INHALATION TREATMENT: CPT

## 2022-09-30 RX ORDER — CARVEDILOL 12.5 MG/1
12.5 TABLET ORAL 2 TIMES DAILY WITH MEALS
Refills: 0 | Status: ON HOLD
Start: 2022-09-30 | End: 2022-10-28 | Stop reason: SDUPTHER

## 2022-09-30 RX ORDER — BUMETANIDE 2 MG/1
2 TABLET ORAL DAILY
Refills: 0 | Status: ON HOLD
Start: 2022-09-30 | End: 2022-10-28 | Stop reason: SDUPTHER

## 2022-09-30 RX ORDER — OXYCODONE HYDROCHLORIDE 5 MG/1
2.5 TABLET ORAL EVERY 6 HOURS PRN
Qty: 10 TABLET | Refills: 0 | Status: SHIPPED | OUTPATIENT
Start: 2022-09-30 | End: 2022-10-07

## 2022-09-30 RX ORDER — ACETAMINOPHEN 325 MG/1
975 TABLET ORAL EVERY 8 HOURS SCHEDULED
Refills: 0
Start: 2022-09-30

## 2022-09-30 RX ORDER — POLYETHYLENE GLYCOL 3350 17 G/17G
17 POWDER, FOR SOLUTION ORAL DAILY PRN
Refills: 0
Start: 2022-09-30

## 2022-09-30 RX ORDER — METHOCARBAMOL 750 MG/1
750 TABLET, FILM COATED ORAL EVERY 6 HOURS SCHEDULED
Refills: 0
Start: 2022-09-30

## 2022-09-30 RX ORDER — AMOXICILLIN 250 MG
1 CAPSULE ORAL 2 TIMES DAILY
Refills: 0
Start: 2022-09-30

## 2022-09-30 RX ORDER — INSULIN LISPRO 100 [IU]/ML
1-5 INJECTION, SOLUTION INTRAVENOUS; SUBCUTANEOUS
Refills: 0
Start: 2022-09-30

## 2022-09-30 RX ORDER — SIMETHICONE 80 MG
80 TABLET,CHEWABLE ORAL EVERY 6 HOURS PRN
Qty: 30 TABLET | Refills: 0
Start: 2022-09-30

## 2022-09-30 RX ORDER — PANTOPRAZOLE SODIUM 40 MG/1
40 TABLET, DELAYED RELEASE ORAL
Refills: 0
Start: 2022-09-30

## 2022-09-30 RX ORDER — GABAPENTIN 300 MG/1
300 CAPSULE ORAL 3 TIMES DAILY
Refills: 0
Start: 2022-09-30

## 2022-09-30 RX ORDER — LIDOCAINE 50 MG/G
1 PATCH TOPICAL DAILY
Refills: 0
Start: 2022-09-30

## 2022-09-30 RX ORDER — DIGOXIN 0.06 MG/1
62.5 TABLET ORAL DAILY
Refills: 0
Start: 2022-09-30

## 2022-09-30 RX ORDER — LANOLIN ALCOHOL/MO/W.PET/CERES
6 CREAM (GRAM) TOPICAL
Refills: 0
Start: 2022-09-30

## 2022-09-30 RX ORDER — LEVALBUTEROL 1.25 MG/.5ML
1.25 SOLUTION, CONCENTRATE RESPIRATORY (INHALATION)
Refills: 0
Start: 2022-09-30

## 2022-09-30 RX ADMIN — SENNOSIDES AND DOCUSATE SODIUM 1 TABLET: 8.6; 5 TABLET ORAL at 08:53

## 2022-09-30 RX ADMIN — IPRATROPIUM BROMIDE 0.5 MG: 0.5 SOLUTION RESPIRATORY (INHALATION) at 07:14

## 2022-09-30 RX ADMIN — ACETAMINOPHEN 975 MG: 325 TABLET, FILM COATED ORAL at 13:28

## 2022-09-30 RX ADMIN — OXYCODONE HYDROCHLORIDE 5 MG: 5 TABLET ORAL at 13:29

## 2022-09-30 RX ADMIN — DIGOXIN 62.5 MCG: 125 TABLET ORAL at 08:53

## 2022-09-30 RX ADMIN — HEPARIN SODIUM 5000 UNITS: 5000 INJECTION INTRAVENOUS; SUBCUTANEOUS at 13:29

## 2022-09-30 RX ADMIN — BUMETANIDE 2 MG: 2 TABLET ORAL at 08:54

## 2022-09-30 RX ADMIN — METHOCARBAMOL TABLETS 750 MG: 750 TABLET, COATED ORAL at 13:28

## 2022-09-30 RX ADMIN — ACETAMINOPHEN 975 MG: 325 TABLET, FILM COATED ORAL at 05:55

## 2022-09-30 RX ADMIN — LEVALBUTEROL HYDROCHLORIDE 1.25 MG: 1.25 SOLUTION, CONCENTRATE RESPIRATORY (INHALATION) at 07:14

## 2022-09-30 RX ADMIN — METHOCARBAMOL TABLETS 750 MG: 750 TABLET, COATED ORAL at 05:55

## 2022-09-30 RX ADMIN — CARVEDILOL 12.5 MG: 12.5 TABLET, FILM COATED ORAL at 08:54

## 2022-09-30 RX ADMIN — POLYETHYLENE GLYCOL 3350 17 G: 17 POWDER, FOR SOLUTION ORAL at 08:54

## 2022-09-30 RX ADMIN — ATORVASTATIN CALCIUM 10 MG: 10 TABLET, FILM COATED ORAL at 08:53

## 2022-09-30 RX ADMIN — SACUBITRIL AND VALSARTAN 1 TABLET: 24; 26 TABLET, FILM COATED ORAL at 08:53

## 2022-09-30 RX ADMIN — GABAPENTIN 300 MG: 300 CAPSULE ORAL at 08:54

## 2022-09-30 RX ADMIN — PANTOPRAZOLE SODIUM 40 MG: 40 TABLET, DELAYED RELEASE ORAL at 06:14

## 2022-09-30 RX ADMIN — HEPARIN SODIUM 5000 UNITS: 5000 INJECTION INTRAVENOUS; SUBCUTANEOUS at 05:55

## 2022-09-30 NOTE — RESPIRATORY THERAPY NOTE
09/30/22 0714   Respiratory Assessment   Assessment Type During-treatment   General Appearance Sleeping   Respiratory Pattern Normal   Chest Assessment Chest expansion symmetrical   Bilateral Breath Sounds Clear;Diminished   Resp Comments Pt is on 2 LNC, bilateral BS clear/diminshed  No distress noted   No needs at this time   O2 Device nc   Additional Assessments   SpO2 99 %

## 2022-09-30 NOTE — PLAN OF CARE
Problem: Potential for Falls  Goal: Patient will remain free of falls  Description: INTERVENTIONS:  - Educate patient/family on patient safety including physical limitations  - Instruct patient to call for assistance with activity   - Consult OT/PT to assist with strengthening/mobility   - Keep Call bell within reach  - Keep bed low and locked with side rails adjusted as appropriate  - Keep care items and personal belongings within reach  - Initiate and maintain comfort rounds  - Make Fall Risk Sign visible to staff  - Offer Toileting every  two Hours, in advance of need  - Initiate/Maintain bed alarm  - Obtain necessary fall risk management equipment  - Apply yellow socks and bracelet for high fall risk patients  - Consider moving patient to room near nurses station  Outcome: Progressing     Problem: MOBILITY - ADULT  Goal: Maintain or return to baseline ADL function  Description: INTERVENTIONS:  -  Assess patient's ability to carry out ADLs; assess patient's baseline for ADL function and identify physical deficits which impact ability to perform ADLs (bathing, care of mouth/teeth, toileting, grooming, dressing, etc )  - Assess/evaluate cause of self-care deficits   - Assess range of motion  - Assess patient's mobility; develop plan if impaired  - Assess patient's need for assistive devices and provide as appropriate  - Encourage maximum independence but intervene and supervise when necessary  - Involve family in performance of ADLs  - Assess for home care needs following discharge   - Consider OT consult to assist with ADL evaluation and planning for discharge  - Provide patient education as appropriate  Outcome: Progressing     Problem: PAIN - ADULT  Goal: Verbalizes/displays adequate comfort level or baseline comfort level  Description: Interventions:  - Encourage patient to monitor pain and request assistance  - Assess pain using appropriate pain scale  - Administer analgesics based on type and severity of pain and evaluate response  - Implement non-pharmacological measures as appropriate and evaluate response  - Consider cultural and social influences on pain and pain management  - Notify physician/advanced practitioner if interventions unsuccessful or patient reports new pain  Outcome: Progressing     Problem: INFECTION - ADULT  Goal: Absence or prevention of progression during hospitalization  Description: INTERVENTIONS:  - Assess and monitor for signs and symptoms of infection  - Monitor lab/diagnostic results  - Monitor all insertion sites, i e  indwelling lines, tubes, and drains  - Monitor endotracheal if appropriate and nasal secretions for changes in amount and color  - [place] appropriate cooling/warming therapies per order  - Administer medications as ordered  - Instruct and encourage patient and family to use good hand hygiene technique  - Identify and instruct in appropriate isolation precautions for identified infection/condition  Outcome: Progressing     Problem: DISCHARGE PLANNING  Goal: Discharge to home or other facility with appropriate resources  Description: INTERVENTIONS:  - Identify barriers to discharge w/patient and caregiver  - Arrange for needed discharge resources and transportation as appropriate  - Identify discharge learning needs (meds, wound care, etc )  - Arrange for interpretive services to assist at discharge as needed  - Refer to Case Management Department for coordinating discharge planning if the patient needs post-hospital services based on physician/advanced practitioner order or complex needs related to functional status, cognitive ability, or social support system  Outcome: Progressing

## 2022-09-30 NOTE — DISCHARGE SUMMARY
[address] [facility]  Discharge- Arbie Seats 1940, 79 y o  female MRN: [id number]  Unit/Bed#: [facility] 627-01 Encounter: [id number]  Primary Care Provider: Guzman Flores DO   Date and time admitted to hospital: 9/17/2022  9:14 PM    DM (diabetes mellitus) [facility])  Assessment & Plan  Lab Results   Component Value Date    HGBA1C 6 6 (H) 06/08/2022       Recent Labs     09/29/22  1128 09/29/22  1644 09/29/22  2104 09/30/22  0020   POCGLU 154* 170* 149* 144*       Blood Sugar Average: Last 72 hrs:  (P) 159 25     · Continue SSI, goal -180    Acute on chronic respiratory failure with hypoxia (HCC)  Assessment & Plan  · Pt denies home O2 use  · Currently stable on 2L NC, will likely be patient's new baseline  · Significant improvement following diuresis and bilateral thoracentesis  · Encourage pulmonary hygiene, mobilization, OPEP  · Diuresis as above  · Continue nocturnal BiPAP    COPD (chronic obstructive pulmonary disease) (HCC)  Assessment & Plan  · On 2 L nasal cannula p r n  At home  · SpO2 goal > 88%  · Continue home Symbicort, albuterol p r n    · Bipap QHS    Fall  Assessment & Plan  Henri Grant from chair height, was sitting  Denies LOC, but was found down after pressing alert button    Plan:  - geriatrics consult  - Plan as above   - PT/OT  - Fall precautions    Anemia  Assessment & Plan  · Hgb 5 7 on admission with INR 4 66 on coumadin, received 2 U PRBC, stable in 7s  · SHANT positive for melanotic stool  · Reports 2 weeks of melena   · Elevated BUN  · UGI bleed suspected    Plan:  · Coumadin reversed with Elzie Lopez and Vitamin K   · Trend Hgb, stable  · PO PPI BID    · 9/19 EGD negative, flex sigmoidoscopy incomplete due to stool but not obvious source of bleed  · Hgb currently stable at 8  · If worsening anemia and continued melena, re-consult GI      Permanent atrial fibrillation (HCC)  Assessment & Plan  · On coumadin with INR 4 66 on admission   · Coumadin reversed with Kcentra and vitamin K on admission for acute blood loss anemia and received 2 PRBC  · Home regimen:  Digoxin 125 mcg, Coreg 25 mg b i d , diltiazem  mg    Plan:  · Currently rate controlled  · Continue coreg 12 5mg BID  · Continue hold AC---- HAS-BLED score is 4 which carries 8 9% risk of major bleeding and therefore may defer restarting DOAC atleast until PCP evaluation     HFrEF (heart failure with reduced ejection fraction) ([facility] [address] )  Assessment & Plan  Wt Readings from Last 3 Encounters:   09/30/22 95 7 kg (210 lb 15 7 oz)   11/12/21 110 kg (243 lb)   08/23/21 106 kg (234 lb 9 1 oz)     · Hx HF with biventricular dysfunction and EF 40% with PPM   · 9/18 Echo - EF 91%, diastolic dysfunction, mod AS, mod MR, RVSP 59  · Cardiology consulted, appreciate recommendations, adjusting medications, discussed directly with team on 9/28 she is medically ready for discharge, continue on bumex and Entresto    * T11 vertebral fracture [facility])  Assessment & Plan  9/17 CT CAP: Fracture of the T11 vertebral body with narrowing of the spinal canal at this level  Evaluation is limited secondary to osteopenia/ankylosing spondylitis  Additional probable small fracture of the anterior endplate of W96     9/16 CT thoracic: Ddisruption of the anterior endplates/syndesmophytes of the T10 vertebral body and disruption of the anterior and posterior endplate of the H32 vertebral bodies compatible with fracture  There is narrowing of the Spinal canal at the T11 level        Plan:  · Neurosurgery consult-- No neurosurgical intervention given neurologically intact and stable exam   · TLSO brace   · q4h neuro checks   · Multimodal analgesia with schedule tylenol, robaxin, and Neurontin -increased regimen today to help with pain control  · PT/OT    Acute-on-chronic kidney injury (HCC)-resolved as of 9/30/2022  Assessment & Plan  Lab Results   Component Value Date    EGFR 51 09/28/2022    EGFR 43 09/27/2022    EGFR 38 09/26/2022    CREATININE 1 02 09/28/2022    CREATININE 1 16 09/27/2022    CREATININE 1 29 09/26/2022     · Baseline creatinine:  1 2-1 4  · Admission creatinine:  1 79  · In the setting of acute blood loss anemia and cardiorenal in setting of CHF  · Cr now back at baseline  · Trend BUN/creatinine improving normalized  · Monitor I/Os    SIRS (systemic inflammatory response syndrome) (HCC)-resolved as of 9/30/2022  Assessment & Plan  · POA as evidenced by WBC 22 1, tachycardia 100, lactic 10 6  · WBC down-trending, tachycardia resolved  · No current concern for infectious etiology, SIRS secondary to GI bleed and anemia    Plan:  · Monitoring off antibiotics  · BCx NGTD  · Trend temp curve and WBC count  · Resolved      Disposition: Discharge to rehab    SUBJECTIVE:  Chief Complaint: "I'm okay"    Subjective: Patient is awake and oriented, reports that she feels okay and that her back is sore  She reports she feels her breathing is good and she is tolerating a diet with help  OBJECTIVE:   Vitals:   Temp:  [97 7 °F (36 5 °C)-98 6 °F (37 °C)] 97 9 °F (36 6 °C)  HR:  [61-78] 66  Resp:  [20] 20  BP: (110-138)/(48-60) 138/60    Intake/Output:  I/O       09/28 0701  09/29 0700 09/29 0701  09/30 0700 09/30 0701  10/01 0700    P  O  120 545     Total Intake(mL/kg) 120 (1 3) 545 (5 7)     Urine (mL/kg/hr) 850 (0 4) 975 (0 4)     Stool  0     Total Output 850 975     Net -730 -430            Unmeasured Urine Occurrence 1 x 2 x     Unmeasured Stool Occurrence  0 x          Nutrition: Diet Cardiovascular; Cardiac; Fluid Restriction 1500 ML, Consistent Carbohydrate Diet Level 1 (4 carb servings/60 grams CHO/meal)  Discharge Diet  GI Prophylaxis/Bowel Regimen: senokot s  VTE Prophylaxis:Sequential compression device (Venodyne)  and Heparin     Physical Exam:   GENERAL APPEARANCE: Patient in no acute distress  HEENT: NCAT; PERRL, EOMs intact; Mucous membranes moist  CV: Regular rate and rhythm; no murmur/gallops/rubs appreciated    CHEST / LUNGS: Minimal bibasilar rales  ABD: NABS; soft; non-distended; non-tender  : Voiding  EXT: +2 pulses bilaterally upper & lower extremities  NEURO: GCS 15; no focal neurologic deficits; neurovascularly intact  SKIN: Warm, dry and well perfused; no rash; no jaundice  Invasive Devices  Report    Peripheral Intravenous Line  Duration           Peripheral IV 09/26/22 Right Antecubital 4 days          Drain  Duration           External Urinary Catheter 5 days                      Lab Results: Results: I have personally reviewed all pertinent laboratory/tests results  Imaging/EKG Studies: I have personally reviewed pertinent reports  see below  Other Studies:   XR chest portable ICU   Final Result by Ibeth Boyce MD (09/25 1324)      Moderate cardiomegaly  Left base opacity which could be due to atelectasis or pneumonia  Workstation performed: [id number]         XR chest portable ICU   Final Result by Umm Poe MD (09/23 1412)      Pulmonary vascular congestion with decreased size of the small to moderate left pleural effusion  Workstation performed: [id number]         XR chest portable   Final Result by Ibeth Boyce MD (09/23 9925)      Improved right effusion and right base atelectasis with no pneumothorax  Persistent left effusion and left base atelectasis  Workstation performed: [id number]         XR chest portable ICU   Final Result by Ibeth Boyce MD (09/22 9525)      Persistent moderate effusions and bibasilar atelectasis  Workstation performed: [id number]         XR chest portable   Final Result by Faith Rivas DO (09/21 1049)      Bibasilar opacity suggesting atelectasis and pleural effusions  Underlying infiltrate not excluded  Workstation performed: [id number]         XR chest portable   Final Result by Ibeth Boyce MD (09/19 6046)      No acute cardiopulmonary disease  Workstation performed: [id number]         XR chest portable ICU   Final Result by Chapincito Carbajal MD (09/18 1800)      No acute cardiopulmonary disease  Workstation performed: [id number]                     Medical Problems             Resolved Problems  Date Reviewed: 9/30/2022          Resolved    SIRS (systemic inflammatory response syndrome) ([facility] [address] ) 9/30/2022     Resolved by  Aditya Sanchez PA-C    Acute-on-chronic kidney injury ([facility] [address] ) 9/30/2022     Resolved by  Aditya Sanchez PA-C                Admission Date:   Admission Orders (From admission, onward)     Ordered        09/17/22 2216  Inpatient Admission  Once                        Admitting Diagnosis: Melena [K92 1]  Fall [W19  XXXA]  Fall from chair, initial encounter [W07  XXXA]  Closed fracture of eleventh thoracic vertebra, unspecified fracture morphology, initial encounter [facility]) [W59 963Q]    HPI written by Ewelina Askew MD 9/17/22 "Cris Hernandez is a 79 y o  female who presented via life flight as a transfer after sustaining a fall  She said she was sitting in a chair when she fell from sitting height and pressed her alert button  She was found down after an unknown amount of time  Denies LOC and headstrike  Was found to have a T11 fracture in setting of ankylosing spondylitis  Prior to transfer, the patient had a hgb of 5 6 and was transfused with 1u pRBC on her way to hospitals  Received 1 more unit on arrival with 4u prepared and held  Was alerted because patient met SIRS criteria "    Procedures Performed: No orders of the defined types were placed in this encounter  Summary of Hospital Course: Please see above and reference hospital medical records  Significant Findings, Care, Treatment and Services Provided: Transferred to hospitals for trauma evaluation and admission due to T11 fx  NSG evaluated the patient and recommended nonoperative management  T11 fx stable in TLSO on upright imaging   Patient's primary problem remained acute on chronic CHF exacerbation  Patient required critical care management due to acute respiratory failure on admission and also once more when she was on the floor  Cardiology evaluated and treated the patient  Patient had bilateral thoracentesis 9/23 for pleural effusions and improved with that and IV diuretics  She was discharged to rehab when medically stable on PO meds  Complications: Acute respiratory failure    Condition at Discharge: stable         Discharge instructions/Information to patient and family:   See after visit summary for information provided to patient and family  Provisions for Follow-Up Care:  See after visit summary for information related to follow-up care and any pertinent home health orders  PCP: Kindra Solares DO    Disposition: Short-term rehab at [facility]    Planned Readmission: No but high risk due to severe heart failure    Discharge Statement   I spent 30 minutes discharging the patient  This time was spent on the day of discharge  I had direct contact with the patient on the day of discharge  Additional documentation is required if more than 30 minutes were spent on discharge  Discharge Medications:  See after visit summary for reconciled discharge medications provided to patient and family

## 2022-09-30 NOTE — ASSESSMENT & PLAN NOTE
Lab Results   Component Value Date    HGBA1C 6 6 (H) 06/08/2022       Recent Labs     09/29/22  1128 09/29/22  1644 09/29/22  2104 09/30/22  0020   POCGLU 154* 170* 149* 144*       Blood Sugar Average: Last 72 hrs:  (P) 159 25     · Continue SSI, goal -180

## 2022-09-30 NOTE — PROGRESS NOTES
Cardiology Progress Note - Kate Pean 79 y o  female MRN: [id number]    Unit/Bed#: [facility] 627-01 Encounter: [id number]      Assessment:  Principal Problem:    T11 vertebral fracture ([facility] )  Active Problems:    HFrEF (heart failure with reduced ejection fraction) ([facility])    Permanent atrial fibrillation ([facility])    Anemia    Fall    SIRS (systemic inflammatory response syndrome) ([facility])    COPD (chronic obstructive pulmonary disease) ([place] [address] )    Acute-on-chronic kidney injury ([place] [address] )    Acute on chronic respiratory failure with hypoxia ([facility])    DM (diabetes mellitus) ([place] [address] )      Plan:  No issue overnight  Patient has no chest pain or significant dyspnea  She continues on supplemental oxygen  She is on oral diuretic  She continues on rate control in reference to chronic atrial fibrillation  Off oral anticoagulant given GI bleed  HAS-BLED score is 4  No change in cardiac management  Subjective:   Patient seen and examined  No significant events overnight   negative  Objective:     Vitals: Blood pressure 138/60, pulse 66, temperature 97 9 °F (36 6 °C), resp  rate 20, height 4' 10" (1 473 m), weight 95 7 kg (210 lb 15 7 oz), SpO2 98 %  , Body mass index is 44 09 kg/m² ,   Orthostatic Blood Pressures    Flowsheet Row Most Recent Value   Blood Pressure 138/60 filed at 09/30/2022 5880   Patient Position - Orthostatic VS Lying filed at 09/27/2022 1149      ,      Intake/Output Summary (Last 24 hours) at 9/30/2022 7817  Last data filed at 9/30/2022 6183  Gross per 24 hour   Intake 545 ml   Output 975 ml   Net -430 ml           Physical Exam:    GEN: Kate Pean   NECK: supple, no carotid bruits, no JVD or HJR  HEART: normal rate, regular rhythm, normal S1 and S2, no murmurs, clicks, gallops or rubs   LUNGS: clear to auscultation bilaterally; no wheezes, rales, or rhonchi   ABDOMEN: normal bowel sounds, soft, no tenderness, no distention  EXTREMITIES: edema  SKIN: warm and well perfused, no suspicious lesions on exposed skin    Labs & Results:    No results displayed because visit has over 200 results  EGD    Result Date: 9/19/2022  Narrative: [facility] [place] [phone] DATE OF SERVICE: 9/19/22 PHYSICIAN(S): Attending: Christo Esposito MD Fellow: Marylee Shuck, MD INDICATION: Melena POST-OP DIAGNOSIS: See the impression below  PREPROCEDURE: Informed consent was obtained for the procedure, including sedation  Risks of perforation, hemorrhage, adverse drug reaction and aspiration were discussed  The patient was placed in the left lateral decubitus position  Patient was explained about the risks and benefits of the procedure  Risks including but not limited to bleeding, infection, and perforation were explained in detail  Also explained about less than 100% sensitivity with the exam and other alternatives  DETAILS OF PROCEDURE: Patient was taken to the procedure room where a time out was performed to confirm correct patient and correct procedure  The patient underwent monitored anesthesia care, which was administered by an anesthesia professional  The patient's blood pressure, heart rate, level of consciousness, respirations, oxygen and ETCO2 were monitored throughout the procedure  The scope was advanced to the second part of the duodenum  Retroflexion was performed in the fundus  The patient experienced no blood loss  The procedure was not difficult  The patient tolerated the procedure well  There were no apparent complications  ANESTHESIA INFORMATION: ASA: ASA status not filed in the log  Anesthesia Type: Anesthesia type not filed in the log   MEDICATIONS: No administrations occurring from 1259 to 1319 on 09/19/22 FINDINGS: All observed locations appeared normal  Regular Z-line 39 cm from the incisors SPECIMENS: * No specimens in log *     Impression: All observed locations appeared normal  Regular Z-line 39 cm from the incisors RECOMMENDATION: Proceed with planned flex sig  ATTENDING ATTESTATION:  I was present throughout the entire procedure from insertion to complete withdrawal of the scope  I performed all interventions myself or oversaw the fellow  Christo Esposito MD     Thoracentesis (Bedside)    Result Date: 9/23/2022  Narrative: Dorene Foster DO     9/23/2022  1:49 PM Thoracentesis (Bedside) Date/Time: 9/23/2022 1:47 PM Performed by: Dorene Foster DO Authorized by: Dorene Foster DO Patient location:  ICU Consent:   Consent obtained:  Written and verbal   Consent given by:  Patient and healthcare agent   Risks discussed:  Bleeding, incomplete drainage, nerve damage, infection, pain and pneumothorax   Alternatives discussed:  Observation, alternative treatment, delayed treatment and no treatment Universal protocol:   Procedure explained and questions answered to patient or proxy's satisfaction: yes    Relevant documents present and verified: yes    Test results available and properly labeled: yes    Radiology Images displayed and confirmed  If images not available, report reviewed: yes    Required blood products, implants, devices and special equipment available: yes    Site/side marked: yes    Immediately prior to procedure a time out was called: yes    Patient identity confirmed:  Arm band, verbally with patient and hospital-assigned identification number Indications:   Procedure Purpose: therapeutic    Indications: pleural effusion  Anesthesia (see MAR for exact dosages): Anesthesia method:  Local infiltration   Local anesthetic:  Lidocaine 1% w/o epi Procedure details:   Preparation: Patient was prepped and draped in usual sterile fashion    Standard thoracentesis cath kit used: Yes    Patient position:  R lateral decubitus   Laterality:  Left   Location:  Midscapular line   Intercostal space:  5th   Puncture method:  Over-the-needle catheter   Ultrasound guidance: yes    Reason for ultrasound: Identify fluid collection and guide catheter placement  Indwelling catheter placed: no    Number of attempts:  1   Needle gauge:  14   Catheter size:  8 Fr   Drainage characteristics:  Serosanguinous   Fluid removed amount:  500 cc Post-procedure details:   Chest x-ray performed: yes    Chest x-ray findings:  Pleural effusion improved   Patient tolerance of procedure: Tolerated well, no immediate complications    Thoracentesis (Bedside)    Result Date: 9/22/2022  Narrative: Ebenezer Briceño MD     9/23/2022  3:07 AM Thoracentesis (Bedside) Date/Time: 9/22/2022 11:45 PM Performed by: Ebenezer Briceño MD Authorized by: Ebenezer Briceño MD Patient location:  ICU and bedside Consent:   Consent obtained:  Verbal   Consent given by:  Patient   Risks discussed:  Pneumothorax, bleeding, incomplete drainage and pain   Alternatives discussed:  No treatment Universal protocol:   Procedure explained and questions answered to patient or proxy's satisfaction: yes    Relevant documents present and verified: yes    Test results available and properly labeled: yes    Radiology Images displayed and confirmed  If images not available, report reviewed: yes    Required blood products, implants, devices and special equipment available: yes    Immediately prior to procedure a time out was called: yes    Patient identity confirmed:  Hospital-assigned identification number Indications:   Procedure Purpose: therapeutic    Indications: pleural effusion  Anesthesia (see MAR for exact dosages): Anesthesia method:  Local infiltration   Local anesthetic:  Lidocaine 1% w/o epi Procedure details:   Preparation: Patient was prepped and draped in usual sterile fashion    Standard thoracentesis cath kit used: Yes    Patient position:  Sitting   Laterality:  Right   Location:  Posterior   Intercostal space:  6th   Puncture method:  Over-the-needle catheter   Ultrasound guidance: yes    Reason for ultrasound: Identify fluid collection and guide catheter placement      Ultrasound image availability:  Not saved   Sterile ultrasound techniques: Sterile gel and sterile probe covers were used    Indwelling catheter placed: no    Number of attempts:  2   Needle gauge:  18   Catheter size:  8 Fr   Drainage characteristics:  Serosanguinous and cloudy   Fluid removed amount:  400 Post-procedure details:   Chest x-ray performed: yes    Chest x-ray findings:  Pleural effusion improved   Patient tolerance of procedure: Tolerated well, no immediate complications    XR chest portable    Result Date: 9/23/2022  Narrative: CHEST INDICATION:   s/p thoracentesis  COMPARISON:  CXR 9/22/2022 and chest CT 9/17/2022  EXAM PERFORMED/VIEWS:  XR CHEST PORTABLE FINDINGS: Moderate cardiomegaly with left subclavian ICD leads in the right atrium, right ventricle, and cardiac vein  Improved right effusion and right base atelectasis  Persistent moderate left effusion and left base atelectasis  No pneumothorax  Osseous structures appear within normal limits for patient age  Impression: Improved right effusion and right base atelectasis with no pneumothorax  Persistent left effusion and left base atelectasis  Workstation performed: [id number]     XR chest portable    Result Date: 9/21/2022  Narrative: CHEST INDICATION:  Respiratory distress  COMPARISON:  9/19/2022 EXAM PERFORMED/VIEWS:  XR CHEST PORTABLE FINDINGS: Heart shadow is obscured by adjacent opacity  Left chest wall AICD device  Bibasilar opacity suggesting atelectasis and pleural effusions  Underlying infiltrate not excluded  No overt pulmonary edema  No pneumothorax  Osseous structures appear within normal limits for patient age  Impression: Bibasilar opacity suggesting atelectasis and pleural effusions  Underlying infiltrate not excluded  Workstation performed: [id number]     XR chest portable    Result Date: 9/19/2022  Narrative: CHEST INDICATION:   CHF   COMPARISON:  None EXAM PERFORMED/VIEWS:  XR CHEST PORTABLE FINDINGS: Moderate cardiomegaly with left subclavian ICD leads in the right atrium, right ventricle, and cardiac vein  Pulmonary artery enlargement  The lungs are clear  No pneumothorax or pleural effusion  Osseous structures appear within normal limits for patient age  Impression: No acute cardiopulmonary disease  Workstation performed: [id number]     XR elbow 3+ vw left    Result Date: 9/19/2022  Narrative: LEFT ELBOW INDICATION:   pain  COMPARISON:  None VIEWS:  XR ELBOW 3+ VW LEFT Images: 4 FINDINGS: There is no acute fracture or dislocation  There is no joint effusion  Moderate degenerative changes  No lytic or blastic osseous lesion  There are atherosclerotic calcifications  Soft tissues are otherwise unremarkable  Impression: No acute osseous abnormality  Workstation performed: [id number]     XR forearm 2 views LEFT    Result Date: 9/19/2022  Narrative: LEFT FOREARM INDICATION:   pain  COMPARISON:  None VIEWS:  XR FOREARM 2 VW LEFT Images: 4 FINDINGS: There is no acute fracture or dislocation  Degenerative changes in the elbow and wrist  No lytic or blastic osseous lesion  There are atherosclerotic calcifications  Soft tissues are otherwise unremarkable  Impression: No acute osseous abnormality  Workstation performed: [id number]     TRAUMA - CT head wo contrast    Result Date: 9/17/2022  Narrative: CT BRAIN - WITHOUT CONTRAST INDICATION:   TRAUMA  COMPARISON:  None  TECHNIQUE:  CT examination of the brain was performed  In addition to axial images, sagittal and coronal 2D reformatted images were created and submitted for interpretation  Radiation dose length product (DLP) for this visit:  855 mGy-cm   This examination, like all CT scans performed in the [facility], was performed utilizing techniques to minimize radiation dose exposure, including the use of iterative reconstruction and automated exposure control  IMAGE QUALITY:  Diagnostic   FINDINGS: PARENCHYMA: Decreased attenuation is noted in periventricular and subcortical white matter demonstrating an appearance that is statistically most likely to represent mild microangiopathic change  No CT signs of acute infarction  No intracranial mass, mass effect or midline shift  No acute parenchymal hemorrhage  VENTRICLES AND EXTRA-AXIAL SPACES:  Enlargement of ventricles and extra-axial CSF spaces, out of proportion to the patient's age most consistent with cerebral and cerebellar atrophy  VISUALIZED ORBITS AND PARANASAL SINUSES:  Unremarkable  CALVARIUM AND EXTRACRANIAL SOFT TISSUES:  Normal      Impression: No acute intracranial abnormality  The study was marked in EPIC for immediate notification given its trauma status  Workstation performed: [id number]     TRAUMA - CT spine cervical wo contrast    Result Date: 9/17/2022  Narrative: CT CERVICAL SPINE - WITHOUT CONTRAST INDICATION:   TRAUMA  COMPARISON:  CTA head and neck 8/23/2021 TECHNIQUE:  CT examination of the cervical spine was performed without intravenous contrast   Contiguous axial images were obtained  Sagittal and coronal reconstructions were performed  Radiation dose length product (DLP) for this visit:  415 mGy-cm   This examination, like all CT scans performed in the [facility], was performed utilizing techniques to minimize radiation dose exposure, including the use of iterative reconstruction and automated exposure control  IMAGE QUALITY:  Diagnostic  FINDINGS: ALIGNMENT:  Normal alignment of the cervical spine  No subluxation  VERTEBRAL BODIES:  No fracture  DEGENERATIVE CHANGES:  Mild multilevel cervical degenerative changes are noted without critical central canal stenosis  PREVERTEBRAL AND PARASPINAL SOFT TISSUES:  Unremarkable  Posterior soft tissue calcification is stable from prior exam  THORACIC INLET:  Please refer to the concurrent chest, abdomen, and pelvic CT report for description of the thoracic inlet findings       Impression: No cervical spine fracture or traumatic malalignment  The study was marked in EPIC for immediate notification given its trauma status  Workstation performed: [id number]     TRAUMA - CT chest abdomen pelvis w contrast    Result Date: 9/17/2022  Narrative: CT CHEST, ABDOMEN AND PELVIS WITH IV CONTRAST INDICATION:   TRAUMA  COMPARISON:  None  TECHNIQUE: CT examination of the chest, abdomen and pelvis was performed  Axial, sagittal, and coronal 2D reformatted images were created from the source data and submitted for interpretation  Radiation dose length product (DLP) for this visit:  1394 mGy-cm   This examination, like all CT scans performed in the [facility], was performed utilizing techniques to minimize radiation dose exposure, including the use of iterative reconstruction and automated exposure control  IV Contrast:  90 mL of iohexol (OMNIPAQUE) Enteric Contrast: Enteric contrast was administered  FINDINGS: CHEST LUNGS:  5 mm left upper lobe nodule (series 3 image 28)  4 mm right upper lobe nodule (series 3 image 48)  There is bibasilar dependent atelectasis  There is no tracheal or endobronchial lesion  PLEURA:  Unremarkable  HEART/GREAT VESSELS: The heart is enlarged  There is no thoracic aortic aneurysm  MEDIASTINUM AND JESSICA:  There is an 11 mm pretracheal lymph node  No additional enlarged mediastinal or hilar lymph nodes  CHEST WALL AND LOWER NECK:  There is a left-sided pacer generator device  ABDOMEN LIVER/BILIARY TREE:  Unremarkable  GALLBLADDER:  No calcified gallstones  No pericholecystic inflammatory change  SPLEEN:  Subcentimeter hypodensities in the spleen are too small to fully characterize but statistically likely represent cysts  PANCREAS:  Unremarkable  ADRENAL GLANDS:  Unremarkable  KIDNEYS/URETERS:  One or more simple renal cyst(s) is noted  Otherwise unremarkable kidneys  No hydronephrosis  STOMACH AND BOWEL:  There is a small hiatal hernia  There is no evidence of bowel obstruction   APPENDIX:  No findings to suggest appendicitis  ABDOMINOPELVIC CAVITY:  No ascites  No pneumoperitoneum  No lymphadenopathy  VESSELS:  Atherosclerotic changes are present  No evidence of aneurysm  PELVIS REPRODUCTIVE ORGANS:  There are multiple calcified uterine fibroids  Bilateral adnexal cystic lesions measuring up to 10 4 x 4 5 cm in the left adnexa  URINARY BLADDER:  Unremarkable  ABDOMINAL WALL/INGUINAL REGIONS:  Unremarkable  OSSEOUS STRUCTURES:  There is diffuse osteopenia and thin anterior syndesmophytes throughout the thoracic spine  There is disruption of the anterior and posterior cortex of the T11 vertebral body (series 602 image 112)  There is narrowing of the spinal  canal at this level  Additionally there is irregularity of the anterior endplate of O73 suspicious for small fracture  Impression: 1  Fracture of the T11 vertebral body with narrowing of the spinal canal at this level  Evaluation is limited secondary to osteopenia/ankylosing spondylitis  Additional probable small fracture of the anterior endplate of D45  Further evaluation with MRI should be considered  2   Scattered pulmonary nodules measuring up to 5 mm  Based on current Fleischner Society 2017 Guidelines on incidental pulmonary nodule, no routine follow-up is needed if the patient is low risk  If the patient is high risk, optional follow-up chest CT  at 12 months can be considered  3   Mildly prominent pretracheal lymph node measuring 11 mm  This too can be reassessed at time of follow-up chest CT  4   Bilateral cystic adnexal masses for which further evaluation with pelvic ultrasound is recommended  5   Cardiomegaly  I personally discussed this study with Emil Melton on 9/17/2022 at 6:40 PM  The study was marked in [facility] for significant notification  Workstation performed: [id number]     XR chest portable ICU    Result Date: 9/25/2022  Narrative: CHEST INDICATION:   wheezing  COMPARISON:  CXR 9/23/2022 and chest CT 9/17/2022  EXAM PERFORMED/VIEWS:  XR CHEST PORTABLE ICU FINDINGS: Moderate cardiomegaly with left subclavian ICD leads in the right atrium, right ventricle, and cardiac vein  Left base opacity with loss of the diaphragm  No pneumothorax  Osseous structures appear within normal limits for patient age  Impression: Moderate cardiomegaly  Left base opacity which could be due to atelectasis or pneumonia  Workstation performed: [id number]     XR chest portable ICU    Result Date: 9/23/2022  Narrative: CHEST INDICATION:   thoracentesis  COMPARISON:  Chest radiograph September 23, 2022 at 00:39 EXAM PERFORMED/VIEWS:  XR CHEST PORTABLE ICU FINDINGS:  Left-sided chest wall intracardiac device is identified  Leads are intact  Heart shadow is enlarged but unchanged from prior exam  Decreased size of the small-to-moderate left pleural effusion  Unchanged small right pleural effusion  Persistent prominence of the interstitial markings  No pneumothorax  Osseous structures appear within normal limits for patient age  Impression: Pulmonary vascular congestion with decreased size of the small to moderate left pleural effusion  Workstation performed: [id number]     XR chest portable ICU    Result Date: 9/22/2022  Narrative: CHEST INDICATION:   f/u chf  COMPARISON:  CXR 9/21/2022 and 9/19/2022, chest CT 9/17/2022  EXAM PERFORMED/VIEWS:  XR CHEST PORTABLE ICU FINDINGS: Mild cardiomegaly with left subclavian ICD leads in the right atrium, right ventricle, and cardiac vein  Persistent moderate effusions and bibasilar atelectasis  No pneumothorax  Osseous structures appear within normal limits for patient age  Impression: Persistent moderate effusions and bibasilar atelectasis  Workstation performed: [id number]     XR chest portable ICU    Result Date: 9/18/2022  Narrative: CHEST INDICATION:   f/u  COMPARISON:  CXR 11/19/2021 and chest CT 9/17/2022   EXAM PERFORMED/VIEWS:  XR CHEST PORTABLE ICU FINDINGS: Moderate cardiomegaly with left subclavian ICD leads in the right atrium, right ventricle, and cardiac vein  Pulmonary artery enlargement  The lungs are clear  No pneumothorax or pleural effusion  Osseous structures appear within normal limits for patient age  Impression: No acute cardiopulmonary disease  Workstation performed: [id number]     CT recon only thoracolumbar    Result Date: 9/17/2022  Narrative: CT THORACIC AND LUMBAR SPINE INDICATION:   fall  COMPARISON:  None TECHNIQUE: Axial CT examination of the thoracic and lumbar spine was obtained utilizing reconstructed images from CT of the chest abdomen and pelvis performed the same day  Images were reformatted in the sagittal and coronal planes  This examination, like all CT scans performed in the [facility], was performed utilizing techniques to minimize radiation dose exposure, including the use of iterative reconstruction and automated exposure control  FINDINGS: Evaluation is limited by osteopenia and thin anterior syndesmophytes throughout the thoracic spine, suggestive of ankylosing spondylitis  ALIGNMENT: Normal alignment of lumbar vertebral bodies  No subluxation  VERTEBRAL BODIES: There is disruption of the anterior endplate of the K64 vertebral body and the anterior and posterior endplate of the I06 vertebral body  There is narrowing of the spinal canal at T11  DEGENERATIVE CHANGES: There are endplate and facet joint degenerative changes throughout the thoracolumbar spine  PREVERTEBRAL AND PARASPINAL SOFT TISSUES: Normal      Impression: 1  Evaluation is limited by osteopenia and ankylosing spondylitis  There is disruption of the anterior endplates/syndesmophytes of the T10 vertebral body and disruption of the anterior and posterior endplate of the U66 vertebral bodies compatible with fracture  There is narrowing of the Spinal canal at the T11 level  Further evaluation with MRI should be considered given spinal canal narrowing and exam limitations   2  Degenerative changes of the thoracolumbar spine  I personally discussed this study with Kait Troy on 9/17/2022 at 6:40 PM  Workstation performed: [id number]     Flexible Sigmoidoscopy    Result Date: 9/19/2022  Narrative: [address] [facility] [id number] [address] [phone] [address]: 9/19/22 PHYSICIAN(S): Attending: Dhaval Almaraz MD Fellow: Yazan Liu MD INDICATION: Melena POST-OP DIAGNOSIS: See the impression below  HISTORY: Colonic resection, now with ileoanal anastamosis BOWEL PREPARATION:  PREPROCEDURE: Informed consent was obtained for the procedure, including sedation  Risks including but not limited to bleeding, infection, perforation, adverse drug reaction and aspiration were explained in detail  Also explained about less than 100% sensitivity with the exam and other alternatives  The patient was placed in the left lateral decubitus position  DETAILS OF PROCEDURE: Patient was taken to the procedure room where a time out was performed to confirm correct patient and correct procedure  The patient underwent monitored anesthesia care, which was administered by an anesthesia professional  The patient's blood pressure, heart rate, level of consciousness, oxygen, respirations and ETCO2 were monitored throughout the procedure  The scope was introduced through the anus and advanced to the rectum  The patient experienced no blood loss  The procedure was not difficult  The patient tolerated the procedure well  There were no apparent complications  ANESTHESIA INFORMATION: ASA: ASA status not filed in the log  Anesthesia Type: Anesthesia type not filed in the log  MEDICATIONS: No administrations occurring from 1259 to 1321 on 09/19/22 FINDINGS: Dark stool present  - procedure aborted   EVENTS: Procedure Events Event Event Time ENDO SCOPE OUT TIME 9/19/2022  1:14 PM ENDO SCOPE OUT TIME 9/19/2022  1:18 PM SPECIMENS: * No specimens in log * EQUIPMENT: Colonoscope - 0563     Impression: Dark stool present diffusely - procedure aborted No obvious source of bleeding identified RECOMMENDATION: Initiate bowel regimen (miralax daily) Trend Hg daily Transfuse for Hg < 7 ATTENDING ATTESTATION:  I was present throughout the entire procedure from insertion to complete withdrawal of the scope  I performed all interventions myself or oversaw the fellow  Bridget Mckeon MD      bedside procedure    Result Date: 9/22/2022  Narrative: [phone]  [phone] [id number]  4 1    Echo complete w/ contrast if indicated    Result Date: 9/18/2022  Narrative: Shanon Pall  Left Ventricle: Left ventricular cavity size is normal  Wall thickness is normal  The left ventricular ejection fraction is 65%  Systolic function is normal  Wall motion is normal  Diastolic function is abnormal   Left atrial filling pressure is elevated    Left Atrium: The atrium is severely dilated    Right Atrium: The atrium is dilated    Aortic Valve: The aortic valve is trileaflet  The leaflets are moderately thickened  The leaflets are mildly calcified  There is mild regurgitation  There is mild to moderate and There is moderate stenosis  The aortic valve peak velocity is 2 49 m/s  The aortic valve mean gradient is 13 mmHg  The dimensionless velocity index is 0 38  The aortic valve area is 1 10 cm2    Mitral Valve: There is moderate regurgitation    Tricuspid Valve: There is mild regurgitation  The right ventricular systolic pressure is moderately to severely elevated  The estimated right ventricular systolic pressure is 46 87 mmHg    Aorta: The aortic root is normal in size  The ascending aorta is mildly dilated  The aortic root is 3 40 cm  The ascending aorta is 3 8 cm    Pericardium: There is a trivial pericardial effusion  EKG personally reviewed by Helga Katz MD      Counseling / Coordination of Care  Total floor / unit time spent today 30 minutes    Greater than 50% of total time was spent with the patient and / or family counseling and / or coordination of care

## 2022-09-30 NOTE — TELEPHONE ENCOUNTER
10/05/202--CALLED [facility] FACILITY [phone], SPOKE TO LORENA, CONFIRMED 10/17/2022 APT IN [facility] OFFICE AND TO HAVE XRAY COMPLETED PRIOR TO APT        10/04/2022-PT DISCHARGED TO [facility]    09/30/2022-PT STILL IN HOSPITAL  10/17/2022 APT W/THORACOLUMBAR SPINE XRAY    FIDELIA Hirsch   Can you please schedule 2 week hospital follow up with thoracolumbar XR prior? Thanks!

## 2022-09-30 NOTE — INCIDENTAL FINDINGS
The following findings require follow up:  Radiographic finding   Findin  Scattered pulmonary nodules measuring up to 5 mm  Based on current Fleischner Society 2017 Guidelines on incidental pulmonary nodule, no routine follow-up is needed if the patient is low risk  If the patient is high risk, optional follow-up chest CT at 12 months can be considered  2  Mildly prominent pretracheal lymph node measuring 11 mm  This too can be reassessed at time of follow-up chest CT  3  Bilateral cystic adnexal masses for which further evaluation with pelvic ultrasound is recommended  Spoke with patient and her niece, Selene Taveras, regarding the findings  Follow up required: Routine   Follow up should be done within 1 month(s)    Please notify the following clinician to assist with the follow up:   Toby Palacios DO Phone   [phone]

## 2022-09-30 NOTE — ASSESSMENT & PLAN NOTE
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

## 2022-09-30 NOTE — PLAN OF CARE
Problem: MOBILITY - ADULT  Goal: Maintain or return to baseline ADL function  Description: INTERVENTIONS:  -  Assess patient's ability to carry out ADLs; assess patient's baseline for ADL function and identify physical deficits which impact ability to perform ADLs (bathing, care of mouth/teeth, toileting, grooming, dressing, etc )  - Assess/evaluate cause of self-care deficits   - Assess range of motion  - Assess patient's mobility; develop plan if impaired  - Assess patient's need for assistive devices and provide as appropriate  - Encourage maximum independence but intervene and supervise when necessary  - Involve family in performance of ADLs  - Assess for home care needs following discharge   - Consider OT consult to assist with ADL evaluation and planning for discharge  - Provide patient education as appropriate  Outcome: Progressing  Goal: Maintains/Returns to pre admission functional level  Description: INTERVENTIONS:  - Perform BMAT or MOVE assessment daily    - Set and communicate daily mobility goal to care team and patient/family/caregiver  - Collaborate with rehabilitation services on mobility goals if consulted  - Perform Range of Motion 3 times a day  - Reposition patient every 2 hours    - Dangle patient 3 times a day  - Stand patient 3 times a day  - Ambulate patient 3 times a day  - Out of bed to chair 3 times a day   - Out of bed for meals 3 times a day  - Out of bed for toileting  - Record patient progress and toleration of activity level   Outcome: Progressing     Problem: Potential for Falls  Goal: Patient will remain free of falls  Description: INTERVENTIONS:  - Educate patient/family on patient safety including physical limitations  - Instruct patient to call for assistance with activity   - Consult OT/PT to assist with strengthening/mobility   - Keep Call bell within reach  - Keep bed low and locked with side rails adjusted as appropriate  - Keep care items and personal belongings within reach  - Initiate and maintain comfort rounds  - Make Fall Risk Sign visible to staff  - Offer Toileting  - Obtain necessary fall risk management equipment  - Apply yellow socks and bracelet for high fall risk patients  - Consider moving patient to room near nurses station  Outcome: Progressing     Problem: Prexisting or High Potential for Compromised Skin Integrity  Goal: Skin integrity is maintained or improved  Description: INTERVENTIONS:  - Identify patients at risk for skin breakdown  - Assess and monitor skin integrity  - Assess and monitor nutrition and hydration status  - Monitor labs   - Assess for incontinence   - Turn and reposition patient  - Assist with mobility/ambulation  - Relieve pressure over bony prominences  - Avoid friction and shearing  - Provide appropriate hygiene as needed including keeping skin clean and dry  - Evaluate need for skin moisturizer/barrier cream  - Collaborate with interdisciplinary team   - Patient/family teaching  - Consider wound care consult   Outcome: Progressing     Problem: PAIN - ADULT  Goal: Verbalizes/displays adequate comfort level or baseline comfort level  Description: Interventions:  - Encourage patient to monitor pain and request assistance  - Assess pain using appropriate pain scale  - Administer analgesics based on type and severity of pain and evaluate response  - Implement non-pharmacological measures as appropriate and evaluate response  - Consider cultural and social influences on pain and pain management  - Notify physician/advanced practitioner if interventions unsuccessful or patient reports new pain  Outcome: Progressing     Problem: INFECTION - ADULT  Goal: Absence or prevention of progression during hospitalization  Description: INTERVENTIONS:  - Assess and monitor for signs and symptoms of infection  - Monitor lab/diagnostic results  - Monitor all insertion sites, i e  indwelling lines, tubes, and drains  - Monitor endotracheal if appropriate and nasal secretions for changes in amount and color  - [place] appropriate cooling/warming therapies per order  - Administer medications as ordered  - Instruct and encourage patient and family to use good hand hygiene technique  - Identify and instruct in appropriate isolation precautions for identified infection/condition  Outcome: Progressing     Problem: SAFETY ADULT  Goal: Maintain or return to baseline ADL function  Description: INTERVENTIONS:  -  Assess patient's ability to carry out ADLs; assess patient's baseline for ADL function and identify physical deficits which impact ability to perform ADLs (bathing, care of mouth/teeth, toileting, grooming, dressing, etc )  - Assess/evaluate cause of self-care deficits   - Assess range of motion  - Assess patient's mobility; develop plan if impaired  - Assess patient's need for assistive devices and provide as appropriate  - Encourage maximum independence but intervene and supervise when necessary  - Involve family in performance of ADLs  - Assess for home care needs following discharge   - Consider OT consult to assist with ADL evaluation and planning for discharge  - Provide patient education as appropriate  Outcome: Progressing  Goal: Maintains/Returns to pre admission functional level  Description: INTERVENTIONS:  - Perform BMAT or MOVE assessment daily    - Set and communicate daily mobility goal to care team and patient/family/caregiver  - Collaborate with rehabilitation services on mobility goals if consulted  - Perform Range of Motion 3 times a day  - Reposition patient every 2 hours    - Dangle patient 3 times a day  - Stand patient 3 times a day  - Ambulate patient 3 times a day  - Out of bed to chair 3 times a day   - Out of bed for meals 3 times a day  - Out of bed for toileting  - Record patient progress and toleration of activity level   Outcome: Progressing  Goal: Patient will remain free of falls  Description: INTERVENTIONS:  - Educate patient/family on patient safety including physical limitations  - Instruct patient to call for assistance with activity   - Consult OT/PT to assist with strengthening/mobility   - Keep Call bell within reach  - Keep bed low and locked with side rails adjusted as appropriate  - Keep care items and personal belongings within reach  - Initiate and maintain comfort rounds  - Make Fall Risk Sign visible to staff  - Offer Toileting  - Obtain necessary fall risk management equipment  - Apply yellow socks and bracelet for high fall risk patients  - Consider moving patient to room near nurses station  Outcome: Progressing     Problem: DISCHARGE PLANNING  Goal: Discharge to home or other facility with appropriate resources  Description: INTERVENTIONS:  - Identify barriers to discharge w/patient and caregiver  - Arrange for needed discharge resources and transportation as appropriate  - Identify discharge learning needs (meds, wound care, etc )  - Arrange for interpretive services to assist at discharge as needed  - Refer to Case Management Department for coordinating discharge planning if the patient needs post-hospital services based on physician/advanced practitioner order or complex needs related to functional status, cognitive ability, or social support system  Outcome: Progressing     Problem: Knowledge Deficit  Goal: Patient/family/caregiver demonstrates understanding of disease process, treatment plan, medications, and discharge instructions  Description: Complete learning assessment and assess knowledge base  Interventions:  - Provide teaching at level of understanding  - Provide teaching via preferred learning methods  Outcome: Progressing     Problem: Nutrition/Hydration-ADULT  Goal: Nutrient/Hydration intake appropriate for improving, restoring or maintaining nutritional needs  Description: Monitor and assess patient's nutrition/hydration status for malnutrition   Collaborate with interdisciplinary team and initiate plan and interventions as ordered  Monitor patient's weight and dietary intake as ordered or per policy  Utilize nutrition screening tool and intervene as necessary  Determine patient's food preferences and provide high-protein, high-caloric foods as appropriate       INTERVENTIONS:  - Monitor oral intake, urinary output, labs, and treatment plans  - Assess nutrition and hydration status and recommend course of action  - Evaluate amount of meals eaten  - Assist patient with eating if necessary   - Allow adequate time for meals  - Recommend/ encourage appropriate diets, oral nutritional supplements, and vitamin/mineral supplements  - Order, calculate, and assess calorie counts as needed  - Recommend, monitor, and adjust tube feed  - Provide specific nutrition/hydration education as appropriate  - Include patient/family/caregiver in decisions related to nutrition  Outcome: Progressing

## 2022-09-30 NOTE — ASSESSMENT & PLAN NOTE
Henirangela Grant from chair height, was sitting  Denies LOC, but was found down after pressing alert button    Plan:  - geriatrics consult  - Plan as above   - PT/OT  - Fall precautions

## 2022-09-30 NOTE — ASSESSMENT & PLAN NOTE
9/17 CT CAP: Fracture of the T11 vertebral body with narrowing of the spinal canal at this level  Evaluation is limited secondary to osteopenia/ankylosing spondylitis  Additional probable small fracture of the anterior endplate of C36     1/98 CT thoracic: Ddisruption of the anterior endplates/syndesmophytes of the T10 vertebral body and disruption of the anterior and posterior endplate of the X95 vertebral bodies compatible with fracture  There is narrowing of the Spinal canal at the T11 level        Plan:  · Neurosurgery consult-- No neurosurgical intervention given neurologically intact and stable exam   · TLSO brace   · q4h neuro checks   · Multimodal analgesia with schedule tylenol, robaxin, and Neurontin -increased regimen today to help with pain control  · PT/OT

## 2022-09-30 NOTE — QUICK NOTE
Full report given to Betsy Parisi RN from [facility] at this time  [facility] faxed over

## 2022-09-30 NOTE — ASSESSMENT & PLAN NOTE
· Hgb 5 7 on admission with INR 4 66 on coumadin, received 2 U PRBC, stable in 7s  · SHANT positive for melanotic stool  · Reports 2 weeks of melena   · Elevated BUN  · UGI bleed suspected    Plan:  · Coumadin reversed with Vanesa Pages and Vitamin K   · Trend Hgb, stable  · PO PPI BID    · 9/19 EGD negative, flex sigmoidoscopy incomplete due to stool but not obvious source of bleed  · Hgb currently stable at 8  · If worsening anemia and continued melena, re-consult GI

## 2022-10-03 NOTE — UTILIZATION REVIEW
Notification of Discharge   This is a Notification of Discharge from our facility [address]  Please be advised that this patient has been discharge from our facility  Below you will find the admission and discharge date and time including the patients disposition  UTILIZATION REVIEW CONTACT:  Jorge Luis Wesley  Utilization   Network Utilization Review Department  Phone: [phone] x carefully listen to the prompts  All voicemails are confidential   Email: [email or web address]     PHYSICIAN ADVISORY SERVICES:  FOR [id number] REVIEW - MEDICAL NECESSITY DENIAL  Phone: [phone]  Fax: [phone]  Email: [email or web address]     PRESENTATION DATE: 9/17/2022  9:14 PM  OBERVATION ADMISSION DATE:   INPATIENT ADMISSION DATE: 9/17/22 10:16 PM   DISCHARGE DATE: 9/30/2022  2:18 PM  DISPOSITION: Non [facility] SN[facility]/SNU Non [facility] SNF/TCU/SNU      IMPORTANT INFORMATION:  Send all requests for admission clinical reviews, approved or denied determinations and any other requests to dedicated fax number below belonging to the campus where the patient is receiving treatment   List of dedicated fax numbers:  [address] [place] [address] DENIALS (Administrative/Medical Necessity) [phone]   [address]  [facility] (Maternity/NICU/Pediatrics) [phone]   Romelia Sahu [phone]   Nela Garcia [phone]   [address] [place] [address] [phone]   [address] [place] [address],[address] [phone]   [facility]  [phone]   [address] [place] [address], [facility]  [address] [facility] [address] [facility] [phone]

## 2022-10-14 ENCOUNTER — TELEPHONE (OUTPATIENT)
Dept: NEUROSURGERY | Facility: CLINIC | Age: 82
End: 2022-10-14

## 2022-10-14 NOTE — TELEPHONE ENCOUNTER
Received a call from Samaria Peng from patient SNF  She reports they attempted to take her to [facility] to have her xray completed but they do not accept her insurance  She will come to there visit early on Monday so she can have xray done day of visit

## 2022-10-17 ENCOUNTER — HOSPITAL ENCOUNTER (OUTPATIENT)
Dept: RADIOLOGY | Facility: HOSPITAL | Age: 82
Discharge: HOME | End: 2022-10-17
Payer: COMMERCIAL

## 2022-10-17 ENCOUNTER — OFFICE VISIT (OUTPATIENT)
Dept: NEUROSURGERY | Facility: CLINIC | Age: 82
End: 2022-10-17
Payer: COMMERCIAL

## 2022-10-17 VITALS
SYSTOLIC BLOOD PRESSURE: 116 MMHG | TEMPERATURE: 97.4 F | DIASTOLIC BLOOD PRESSURE: 84 MMHG | HEART RATE: 67 BPM | OXYGEN SATURATION: 97 %

## 2022-10-17 DIAGNOSIS — S22.089A T11 VERTEBRAL FRACTURE (HCC): Primary | ICD-10-CM

## 2022-10-17 DIAGNOSIS — S22.089A T11 VERTEBRAL FRACTURE (HCC): ICD-10-CM

## 2022-10-17 PROCEDURE — 72080 X-RAY EXAM THORACOLMB 2/> VW: CPT

## 2022-10-17 PROCEDURE — 99213 OFFICE O/P EST LOW 20 MIN: CPT | Performed by: PHYSICIAN ASSISTANT

## 2022-10-17 RX ORDER — FLUTICASONE PROPIONATE AND SALMETEROL 100; 50 UG/1; UG/1
1 POWDER RESPIRATORY (INHALATION) 2 TIMES DAILY
COMMUNITY

## 2022-10-17 RX ORDER — OXYCODONE HYDROCHLORIDE 5 MG/1
2.5 TABLET ORAL EVERY 6 HOURS PRN
Status: ON HOLD | COMMUNITY
End: 2022-10-28 | Stop reason: SDUPTHER

## 2022-10-17 RX ORDER — BISACODYL 10 MG
10 SUPPOSITORY, RECTAL RECTAL DAILY PRN
COMMUNITY

## 2022-10-17 NOTE — PROGRESS NOTES
Neurosurgery Office Note  Valery Ahn 79 y o  female MRN: [id number]      Assessment/Plan     T11 vertebral fracture [facility])  Patient presents for 4 week hospital follow up for R05/14 fracture (TLICS 4 given distraction)  · S/p fall out of chair around 9/17/22  Hospitalized at that time for acute GI bleed, respiratory failure with bilateral thoracenteses  · Continues with 7/10 mid back pain that does not radiate  New left hip and leg pain x 2 weeks with weakness  Not ambulating, on a stretcher  Not wearing TLSO brace  · Exam:  RLE 4-/5, hip flexion, 4/5 plantar/dorsiflexion  LLE 2/5 hip flexion, 0/5 plantar/dorsiflexion  LLE proprioception intact to great toe  1+ right patellar reflex, 0 on the left  No clonus  Imaging:  • XR thoracolumbar 10/17/2022: Worsening appearance to fracture  Final report pending  Plan:  • Imaging reviewed with patient and Dr Sis Espinal  Her fracture appears worse on xray and she has continued pain  Unclear if her new LLE symptoms are related to her fracture  She is not interested in surgery at this time, and given her underlying comorbidities, including heart failure, osteopenia and ankylosing spondylitis, unsure if surgery would be of benefit to her in relieving her pain  · Continue TLSO brace with upright and OOB  • Recommend evaluation/imaging of left hip/loeg  • Reviewed red flag s/s  • Follow up in 4 weeks with xrays to see APs or sooner should patient develop worsening symptoms/red flag signs  Diagnoses and all orders for this visit:    T11 vertebral fracture (HCC)  -     XR spine thoracolumbar 2 vw; Future    Other orders  -     Fluticasone-Salmeterol (Advair) 100-50 mcg/dose inhaler; Inhale 1 puff 2 (two) times a day Rinse mouth after use  -     bisacodyl (DULCOLAX) 10 mg suppository; Insert 10 mg into the rectum daily as needed for constipation  -     magnesium hydroxide (MILK OF MAGNESIA) 400 mg/5 mL oral suspension;  Take 30 mL by mouth daily as needed for constipation  -     oxyCODONE (ROXICODONE) 5 immediate release tablet; Take 5 mg by mouth every 6 (six) hours as needed for moderate pain 0 5          I spent 20 minutes with the patient today in which >50% of the time was spent counseling/coordination of care regarding diagnosis, imaging review, symptoms and treatment plan  CHIEF COMPLAINT    Chief Complaint   Patient presents with   • Follow-up     From hospital admission   • Spine Injury     T11 vertebral fracture        HISTORY    History of Present Illness     79y o  year old female     79year old female seen 4 weeks post-op hospital follow up of a T10/T11 fracture she sustained after a fall from a chair  At the time of her fall she was also hospitalized for acute GI bleed as well as respiratory failure requiring bilateral thoracenteses  She complains of 7/10 mid back pain  It does not radiate  Currently on a stretcher and not wearing her brace  Prior to the fall, she was ambulating with a walker  PT is starting to work with her legs, but she is not walking  Has a rivera in place  Denies bowel incontinence  She also reports left hip and leg pain for about the last 2 weeks  She is not able to wiggle her toes to move her left leg that much, due to pain in the leg and hip  No right lower extremity complaints  Current x-ray shows the fracture is worsened  She has underlying osteopenia as well as ankylosing spondylitis  See Discussion    REVIEW OF SYSTEMS    Review of Systems   Constitutional: Negative  HENT: Negative  Eyes: Negative  Respiratory: Negative  Cardiovascular: Negative  Gastrointestinal: Negative  Endocrine: Negative  Genitourinary: Positive for difficulty urinating (Rivera)  Musculoskeletal: Positive for back pain (radiates down b/l legs to feet) and gait problem (weakness in b/l legs)  Skin: Negative  Allergic/Immunologic: Negative  Neurological: Positive for weakness (in PT, going well)  Hematological: Negative  Psychiatric/Behavioral: Positive for sleep disturbance (sometimes keeps her awake at night)  All other systems reviewed and are negative  Meds/Allergies     Current Outpatient Medications   Medication Sig Dispense Refill   • acetaminophen (TYLENOL) 325 mg tablet Take 3 tablets (975 mg total) by mouth every 8 (eight) hours  0   • albuterol (2 5 mg/3 mL) 0 083 % nebulizer solution Inhale 2 5 mg     • albuterol (PROVENTIL HFA,VENTOLIN HFA) 90 mcg/act inhaler Inhale 2 puffs     • atorvastatin (LIPITOR) 10 mg tablet Take 10 mg by mouth daily     • bisacodyl (DULCOLAX) 10 mg suppository Insert 10 mg into the rectum daily as needed for constipation     • bumetanide (BUMEX) 2 mg tablet Take 1 tablet (2 mg total) by mouth daily  0   • carvedilol (COREG) 12 5 mg tablet Take 1 tablet (12 5 mg total) by mouth 2 (two) times a day with meals  0   • digoxin 62 5 MCG TABS Take 1 tablet (62 5 mcg total) by mouth daily  0   • FERROUS SULFATE PO daily     • Fluticasone-Salmeterol (Advair) 100-50 mcg/dose inhaler Inhale 1 puff 2 (two) times a day Rinse mouth after use       • gabapentin (NEURONTIN) 300 mg capsule Take 1 capsule (300 mg total) by mouth 3 (three) times a day  0   • insulin lispro (HumaLOG) 100 units/mL injection Inject 1-5 Units under the skin 4 (four) times a day (with meals and at bedtime)  0   • ipratropium (ATROVENT) 0 02 % nebulizer solution Take 2 5 mL (0 5 mg total) by nebulization 3 (three) times a day  0   • levalbuterol (XOPENEX) 1 25 mg/0 5 mL nebulizer solution Take 0 5 mL (1 25 mg total) by nebulization 3 (three) times a day  0   • lidocaine (LIDODERM) 5 % Apply 1 patch topically daily Remove & Discard patch within 12 hours or as directed by MD  0   • magnesium hydroxide (MILK OF MAGNESIA) 400 mg/5 mL oral suspension Take 30 mL by mouth daily as needed for constipation     • melatonin 3 mg Take 2 tablets (6 mg total) by mouth daily at bedtime  0   • methocarbamol (ROBAXIN) 750 mg tablet Take 1 tablet (750 mg total) by mouth every 6 (six) hours  0   • oxyCODONE (ROXICODONE) 5 immediate release tablet Take 5 mg by mouth every 6 (six) hours as needed for moderate pain 0 5     • pantoprazole (PROTONIX) 40 mg tablet Take 1 tablet (40 mg total) by mouth 2 (two) times a day before meals  0   • polyethylene glycol (MIRALAX) 17 g packet Take 17 g by mouth daily as needed (Constipation)  0   • sacubitril-valsartan (ENTRESTO) 24-26 MG TABS Take 1 tablet by mouth 2 (two) times a day  0   • senna-docusate sodium (SENOKOT S) 8 6-50 mg per tablet Take 1 tablet by mouth 2 (two) times a day  0   • simethicone (MYLICON) 80 mg chewable tablet Chew 1 tablet (80 mg total) every 6 (six) hours as needed for flatulence 30 tablet 0   • budesonide-formoterol (SYMBICORT) 160-4 5 mcg/act inhaler Inhale 2 puffs 2 (two) times a day Rinse mouth after use  (Patient not taking: No sig reported)       No current facility-administered medications for this visit  No Known Allergies    PAST HISTORY    Past Medical History:   Diagnosis Date   • A-fib [facility])    • Cardiac pacemaker    • CHF (congestive heart failure) ([facility])    • Chronic cellulitis    • COPD (chronic obstructive pulmonary disease) ([facility])    • Diabetes mellitus ([facility])    • Edema    • High cholesterol    • Hyperparathyroidism ([address] )    • Hypertension    • Hyperuricemia    • Stage 4 chronic kidney disease ([facility])        Past Surgical History:   Procedure Laterality Date   • CARDIAC DEFIBRILLATOR PLACEMENT     •  SECTION      x 2   • COLON SURGERY     • HERNIA REPAIR         Social History     Tobacco Use   • Smoking status: Never Smoker   • Smokeless tobacco: Never Used   Vaping Use   • Vaping Use: Never used   Substance Use Topics   • Alcohol use: Never   • Drug use: Never       Family History   Problem Relation Age of Onset   • Hypertension Mother    • Stroke Mother    • Heart disease Father          Above history personally reviewed  EXAM    Vitals:Blood pressure 116/84, pulse 67, temperature (!) 97 4 °F (36 3 °C), temperature source Temporal, SpO2 97 %  ,There is no height or weight on file to calculate BMI  Physical Exam  Vitals reviewed  Constitutional:       General: She is awake  Appearance: Normal appearance  HENT:      Head: Normocephalic and atraumatic  Eyes:      Conjunctiva/sclera: Conjunctivae normal    Cardiovascular:      Rate and Rhythm: Normal rate  Pulmonary:      Effort: Pulmonary effort is normal    Musculoskeletal:      Right lower leg: Edema present  Left lower leg: Edema present  Comments: Unable to assess back due to being on stretcher and unable to safely roll  Not wearing TLSO brace  Skin:     General: Skin is warm and dry  Neurological:      Mental Status: She is alert and oriented to person, place, and time  Deep Tendon Reflexes:      Reflex Scores:       Patellar reflexes are 1+ on the right side and 0 on the left side  Psychiatric:         Attention and Perception: Attention and perception normal          Mood and Affect: Mood and affect normal          Speech: Speech normal          Behavior: Behavior normal  Behavior is cooperative  Thought Content: Thought content normal          Cognition and Memory: Cognition and memory normal          Judgment: Judgment normal          Neurologic Exam     Mental Status   Oriented to person, place, and time  Attention: normal    Speech: speech is normal   Level of consciousness: alert    Motor Exam   Bilateral upper extremities 4+/5  RLE 4-/5 hip flexion, 4/5 plantarflexion and dorsiflexion  LLE 2/5 hip flexion, 0/5 plantarflexion and dorsiflexion         Sensory Exam   Light touch normal    Intact LLE proprioception to left great toe        Gait, Coordination, and Reflexes     Tremor   Resting tremor: present    Reflexes   Right patellar: 1+  Left patellar: 0  Right ankle clonus: absent  Left ankle clonus: absent  Patient on stretcher  Unable to assess gait  MEDICAL DECISION MAKING    Imaging Studies:     EGD    Result Date: 9/19/2022  Narrative: [facility] [id number] [place] [address] [phone] DATE OF SERVICE: 9/19/22 PHYSICIAN(S): Attending: Jaiden Hernandez MD Fellow: Velma Issa MD INDICATION: Melena POST-OP DIAGNOSIS: See the impression below  PREPROCEDURE: Informed consent was obtained for the procedure, including sedation  Risks of perforation, hemorrhage, adverse drug reaction and aspiration were discussed  The patient was placed in the left lateral decubitus position  Patient was explained about the risks and benefits of the procedure  Risks including but not limited to bleeding, infection, and perforation were explained in detail  Also explained about less than 100% sensitivity with the exam and other alternatives  DETAILS OF PROCEDURE: Patient was taken to the procedure room where a time out was performed to confirm correct patient and correct procedure  The patient underwent monitored anesthesia care, which was administered by an anesthesia professional  The patient's blood pressure, heart rate, level of consciousness, respirations, oxygen and ETCO2 were monitored throughout the procedure  The scope was advanced to the second part of the duodenum  Retroflexion was performed in the fundus  The patient experienced no blood loss  The procedure was not difficult  The patient tolerated the procedure well  There were no apparent complications  ANESTHESIA INFORMATION: ASA: ASA status not filed in the log  Anesthesia Type: Anesthesia type not filed in the log   MEDICATIONS: No administrations occurring from 1259 to 1319 on 09/19/22 FINDINGS: All observed locations appeared normal  Regular Z-line 39 cm from the incisors SPECIMENS: * No specimens in log *     Impression: All observed locations appeared normal  Regular Z-line 39 cm from the incisors RECOMMENDATION: Proceed with planned flex sig  ATTENDING ATTESTATION:  I was present throughout the entire procedure from insertion to complete withdrawal of the scope  I performed all interventions myself or oversaw the fellow  Stefania Mendez MD     Thoracentesis (Bedside)    Result Date: 9/23/2022  Narrative: Rosendo Brandon DO     9/23/2022  1:49 PM Thoracentesis (Bedside) Date/Time: 9/23/2022 1:47 PM Performed by: Rosendo Brandon DO Authorized by: Rosendo Brandon DO Patient location:  ICU Consent:   Consent obtained:  Written and verbal   Consent given by:  Patient and healthcare agent   Risks discussed:  Bleeding, incomplete drainage, nerve damage, infection, pain and pneumothorax   Alternatives discussed:  Observation, alternative treatment, delayed treatment and no treatment Universal protocol:   Procedure explained and questions answered to patient or proxy's satisfaction: yes    Relevant documents present and verified: yes    Test results available and properly labeled: yes    Radiology Images displayed and confirmed  If images not available, report reviewed: yes    Required blood products, implants, devices and special equipment available: yes    Site/side marked: yes    Immediately prior to procedure a time out was called: yes    Patient identity confirmed:  Arm band, verbally with patient and hospital-assigned identification number Indications:   Procedure Purpose: therapeutic    Indications: pleural effusion  Anesthesia (see MAR for exact dosages): Anesthesia method:  Local infiltration   Local anesthetic:  Lidocaine 1% w/o epi Procedure details:   Preparation: Patient was prepped and draped in usual sterile fashion    Standard thoracentesis cath kit used: Yes    Patient position:  R lateral decubitus   Laterality:  Left   Location:  Midscapular line   Intercostal space:  5th   Puncture method:  Over-the-needle catheter   Ultrasound guidance: yes    Reason for ultrasound: Identify fluid collection and guide catheter placement      Indwelling catheter placed: no    Number of attempts:  1   Needle gauge:  14   Catheter size:  8 Fr   Drainage characteristics:  Serosanguinous   Fluid removed amount:  500 cc Post-procedure details:   Chest x-ray performed: yes    Chest x-ray findings:  Pleural effusion improved   Patient tolerance of procedure: Tolerated well, no immediate complications    Thoracentesis (Bedside)    Result Date: 9/22/2022  Narrative: Jacelyn Dakins, MD     9/23/2022  3:07 AM Thoracentesis (Bedside) Date/Time: 9/22/2022 11:45 PM Performed by: Jacelyn Dakins, MD Authorized by: Jacelyn Dakins, MD Patient location:  ICU and bedside Consent:   Consent obtained:  Verbal   Consent given by:  Patient   Risks discussed:  Pneumothorax, bleeding, incomplete drainage and pain   Alternatives discussed:  No treatment Universal protocol:   Procedure explained and questions answered to patient or proxy's satisfaction: yes    Relevant documents present and verified: yes    Test results available and properly labeled: yes    Radiology Images displayed and confirmed  If images not available, report reviewed: yes    Required blood products, implants, devices and special equipment available: yes    Immediately prior to procedure a time out was called: yes    Patient identity confirmed:  Hospital-assigned identification number Indications:   Procedure Purpose: therapeutic    Indications: pleural effusion  Anesthesia (see MAR for exact dosages): Anesthesia method:  Local infiltration   Local anesthetic:  Lidocaine 1% w/o epi Procedure details:   Preparation: Patient was prepped and draped in usual sterile fashion    Standard thoracentesis cath kit used: Yes    Patient position:  Sitting   Laterality:  Right   Location:  Posterior   Intercostal space:  6th   Puncture method:  Over-the-needle catheter   Ultrasound guidance: yes    Reason for ultrasound: Identify fluid collection and guide catheter placement      Ultrasound image availability: Not saved   Sterile ultrasound techniques: Sterile gel and sterile probe covers were used    Indwelling catheter placed: no    Number of attempts:  2   Needle gauge:  18   Catheter size:  8 Fr   Drainage characteristics:  Serosanguinous and cloudy   Fluid removed amount:  400 Post-procedure details:   Chest x-ray performed: yes    Chest x-ray findings:  Pleural effusion improved   Patient tolerance of procedure: Tolerated well, no immediate complications    XR chest portable    Result Date: 9/23/2022  Narrative: CHEST INDICATION:   s/p thoracentesis  COMPARISON:  CXR 9/22/2022 and chest CT 9/17/2022  EXAM PERFORMED/VIEWS:  XR CHEST PORTABLE FINDINGS: Moderate cardiomegaly with left subclavian ICD leads in the right atrium, right ventricle, and cardiac vein  Improved right effusion and right base atelectasis  Persistent moderate left effusion and left base atelectasis  No pneumothorax  Osseous structures appear within normal limits for patient age  Impression: Improved right effusion and right base atelectasis with no pneumothorax  Persistent left effusion and left base atelectasis  Workstation performed: [id number]     XR chest portable    Result Date: 9/21/2022  Narrative: CHEST INDICATION:  Respiratory distress  COMPARISON:  9/19/2022 EXAM PERFORMED/VIEWS:  XR CHEST PORTABLE FINDINGS: Heart shadow is obscured by adjacent opacity  Left chest wall AICD device  Bibasilar opacity suggesting atelectasis and pleural effusions  Underlying infiltrate not excluded  No overt pulmonary edema  No pneumothorax  Osseous structures appear within normal limits for patient age  Impression: Bibasilar opacity suggesting atelectasis and pleural effusions  Underlying infiltrate not excluded  Workstation performed: [id number]     XR chest portable    Result Date: 9/19/2022  Narrative: CHEST INDICATION:   CHF   COMPARISON:  None EXAM PERFORMED/VIEWS:  XR CHEST PORTABLE FINDINGS: Moderate cardiomegaly with left subclavian ICD leads in the right atrium, right ventricle, and cardiac vein  Pulmonary artery enlargement  The lungs are clear  No pneumothorax or pleural effusion  Osseous structures appear within normal limits for patient age  Impression: No acute cardiopulmonary disease  Workstation performed: [id number]     XR spine thoracic 3 vw    Result Date: 9/30/2022  Narrative: THORACIC SPINE INDICATION:   T10/11 fx  Study performed with patient sitting upright in brace COMPARISON:  CT 9/17/2022 VIEWS:  XR SPINE THORACIC 3 VW FINDINGS: Examination technically very difficult to perform and due to the presence of significant bony osteopenia, ankylosing spondylitis with cardiomegaly and pleural effusion, osseous detail very limited  There is no obvious subluxation  Presence or absence of change in vertebral height in the lower thoracic spine would be difficult to assess  Questionable increase in kyphosis at the thoracolumbar junction since previous examination although difficult to confirm As above, history of ankylosing spondylitis ICD transvenous pacemaker present Cardiac silhouette enlarged and bilateral pleural fluid noted     Impression: Very limited examination  No obvious subluxation  If further evaluation of osseous detail is clinically warranted, consider repeat CT of the thoracolumbar spine Workstation performed: [id number]     XR elbow 3+ vw left    Result Date: 9/19/2022  Narrative: LEFT ELBOW INDICATION:   pain  COMPARISON:  None VIEWS:  XR ELBOW 3+ VW LEFT Images: 4 FINDINGS: There is no acute fracture or dislocation  There is no joint effusion  Moderate degenerative changes  No lytic or blastic osseous lesion  There are atherosclerotic calcifications  Soft tissues are otherwise unremarkable  Impression: No acute osseous abnormality  Workstation performed: [id number]     XR forearm 2 views LEFT    Result Date: 9/19/2022  Narrative: LEFT FOREARM INDICATION:   pain   COMPARISON:  None VIEWS:  XR FOREARM 2 VW LEFT Images: 4 FINDINGS: There is no acute fracture or dislocation  Degenerative changes in the elbow and wrist  No lytic or blastic osseous lesion  There are atherosclerotic calcifications  Soft tissues are otherwise unremarkable  Impression: No acute osseous abnormality  Workstation performed: [id number]     TRAUMA - CT head wo contrast    Result Date: 9/17/2022  Narrative: CT BRAIN - WITHOUT CONTRAST INDICATION:   TRAUMA  COMPARISON:  None  TECHNIQUE:  CT examination of the brain was performed  In addition to axial images, sagittal and coronal 2D reformatted images were created and submitted for interpretation  Radiation dose length product (DLP) for this visit:  855 mGy-cm   This examination, like all CT scans performed in the [facility], was performed utilizing techniques to minimize radiation dose exposure, including the use of iterative reconstruction and automated exposure control  IMAGE QUALITY:  Diagnostic  FINDINGS: PARENCHYMA: Decreased attenuation is noted in periventricular and subcortical white matter demonstrating an appearance that is statistically most likely to represent mild microangiopathic change  No CT signs of acute infarction  No intracranial mass, mass effect or midline shift  No acute parenchymal hemorrhage  VENTRICLES AND EXTRA-AXIAL SPACES:  Enlargement of ventricles and extra-axial CSF spaces, out of proportion to the patient's age most consistent with cerebral and cerebellar atrophy  VISUALIZED ORBITS AND PARANASAL SINUSES:  Unremarkable  CALVARIUM AND EXTRACRANIAL SOFT TISSUES:  Normal      Impression: No acute intracranial abnormality  The study was marked in EPIC for immediate notification given its trauma status  Workstation performed: [id number]     TRAUMA - CT spine cervical wo contrast    Result Date: 9/17/2022  Narrative: CT CERVICAL SPINE - WITHOUT CONTRAST INDICATION:   TRAUMA   COMPARISON:  CTA head and neck 8/23/2021 TECHNIQUE:  CT examination of the cervical spine was performed without intravenous contrast   Contiguous axial images were obtained  Sagittal and coronal reconstructions were performed  Radiation dose length product (DLP) for this visit:  415 mGy-cm   This examination, like all CT scans performed in the [facility], was performed utilizing techniques to minimize radiation dose exposure, including the use of iterative reconstruction and automated exposure control  IMAGE QUALITY:  Diagnostic  FINDINGS: ALIGNMENT:  Normal alignment of the cervical spine  No subluxation  VERTEBRAL BODIES:  No fracture  DEGENERATIVE CHANGES:  Mild multilevel cervical degenerative changes are noted without critical central canal stenosis  PREVERTEBRAL AND PARASPINAL SOFT TISSUES:  Unremarkable  Posterior soft tissue calcification is stable from prior exam  THORACIC INLET:  Please refer to the concurrent chest, abdomen, and pelvic CT report for description of the thoracic inlet findings  Impression: No cervical spine fracture or traumatic malalignment  The study was marked in EPIC for immediate notification given its trauma status  Workstation performed: [id number]     TRAUMA - CT chest abdomen pelvis w contrast    Result Date: 9/17/2022  Narrative: CT CHEST, ABDOMEN AND PELVIS WITH IV CONTRAST INDICATION:   TRAUMA  COMPARISON:  None  TECHNIQUE: CT examination of the chest, abdomen and pelvis was performed  Axial, sagittal, and coronal 2D reformatted images were created from the source data and submitted for interpretation  Radiation dose length product (DLP) for this visit:  1394 mGy-cm   This examination, like all CT scans performed in the [facility], was performed utilizing techniques to minimize radiation dose exposure, including the use of iterative reconstruction and automated exposure control  IV Contrast:  90 mL of iohexol (OMNIPAQUE) Enteric Contrast: Enteric contrast was administered   FINDINGS: CHEST LUNGS:  5 mm left upper lobe nodule (series 3 image 28)  4 mm right upper lobe nodule (series 3 image 48)  There is bibasilar dependent atelectasis  There is no tracheal or endobronchial lesion  PLEURA:  Unremarkable  HEART/GREAT VESSELS: The heart is enlarged  There is no thoracic aortic aneurysm  MEDIASTINUM AND JESSICA:  There is an 11 mm pretracheal lymph node  No additional enlarged mediastinal or hilar lymph nodes  CHEST WALL AND LOWER NECK:  There is a left-sided pacer generator device  ABDOMEN LIVER/BILIARY TREE:  Unremarkable  GALLBLADDER:  No calcified gallstones  No pericholecystic inflammatory change  SPLEEN:  Subcentimeter hypodensities in the spleen are too small to fully characterize but statistically likely represent cysts  PANCREAS:  Unremarkable  ADRENAL GLANDS:  Unremarkable  KIDNEYS/URETERS:  One or more simple renal cyst(s) is noted  Otherwise unremarkable kidneys  No hydronephrosis  STOMACH AND BOWEL:  There is a small hiatal hernia  There is no evidence of bowel obstruction  APPENDIX:  No findings to suggest appendicitis  ABDOMINOPELVIC CAVITY:  No ascites  No pneumoperitoneum  No lymphadenopathy  VESSELS:  Atherosclerotic changes are present  No evidence of aneurysm  PELVIS REPRODUCTIVE ORGANS:  There are multiple calcified uterine fibroids  Bilateral adnexal cystic lesions measuring up to 10 4 x 4 5 cm in the left adnexa  URINARY BLADDER:  Unremarkable  ABDOMINAL WALL/INGUINAL REGIONS:  Unremarkable  OSSEOUS STRUCTURES:  There is diffuse osteopenia and thin anterior syndesmophytes throughout the thoracic spine  There is disruption of the anterior and posterior cortex of the T11 vertebral body (series 602 image 112)  There is narrowing of the spinal  canal at this level  Additionally there is irregularity of the anterior endplate of P27 suspicious for small fracture  Impression: 1  Fracture of the T11 vertebral body with narrowing of the spinal canal at this level  Evaluation is limited secondary to osteopenia/ankylosing spondylitis  Additional probable small fracture of the anterior endplate of S98  Further evaluation with MRI should be considered  2   Scattered pulmonary nodules measuring up to 5 mm  Based on current Fleischner Society 2017 Guidelines on incidental pulmonary nodule, no routine follow-up is needed if the patient is low risk  If the patient is high risk, optional follow-up chest CT  at 12 months can be considered  3   Mildly prominent pretracheal lymph node measuring 11 mm  This too can be reassessed at time of follow-up chest CT  4   Bilateral cystic adnexal masses for which further evaluation with pelvic ultrasound is recommended  5   Cardiomegaly  I personally discussed this study with Alondra Nelson on 9/17/2022 at 6:40 PM  The study was marked in [facility] for significant notification  Workstation performed: [id number]     XR chest portable ICU    Result Date: 9/25/2022  Narrative: CHEST INDICATION:   wheezing  COMPARISON:  CXR 9/23/2022 and chest CT 9/17/2022  EXAM PERFORMED/VIEWS:  XR CHEST PORTABLE ICU FINDINGS: Moderate cardiomegaly with left subclavian ICD leads in the right atrium, right ventricle, and cardiac vein  Left base opacity with loss of the diaphragm  No pneumothorax  Osseous structures appear within normal limits for patient age  Impression: Moderate cardiomegaly  Left base opacity which could be due to atelectasis or pneumonia  Workstation performed: [id number]     XR chest portable ICU    Result Date: 9/23/2022  Narrative: CHEST INDICATION:   thoracentesis  COMPARISON:  Chest radiograph September 23, 2022 at 00:39 EXAM PERFORMED/VIEWS:  XR CHEST PORTABLE ICU FINDINGS:  Left-sided chest wall intracardiac device is identified  Leads are intact  Heart shadow is enlarged but unchanged from prior exam  Decreased size of the small-to-moderate left pleural effusion  Unchanged small right pleural effusion   Persistent prominence of the interstitial markings  No pneumothorax  Osseous structures appear within normal limits for patient age  Impression: Pulmonary vascular congestion with decreased size of the small to moderate left pleural effusion  Workstation performed: [id number]     XR chest portable ICU    Result Date: 9/22/2022  Narrative: CHEST INDICATION:   f/u chf  COMPARISON:  CXR 9/21/2022 and 9/19/2022, chest CT 9/17/2022  EXAM PERFORMED/VIEWS:  XR CHEST PORTABLE ICU FINDINGS: Mild cardiomegaly with left subclavian ICD leads in the right atrium, right ventricle, and cardiac vein  Persistent moderate effusions and bibasilar atelectasis  No pneumothorax  Osseous structures appear within normal limits for patient age  Impression: Persistent moderate effusions and bibasilar atelectasis  Workstation performed: [id number]     XR chest portable ICU    Result Date: 9/18/2022  Narrative: CHEST INDICATION:   f/u  COMPARISON:  CXR 11/19/2021 and chest CT 9/17/2022  EXAM PERFORMED/VIEWS:  XR CHEST PORTABLE ICU FINDINGS: Moderate cardiomegaly with left subclavian ICD leads in the right atrium, right ventricle, and cardiac vein  Pulmonary artery enlargement  The lungs are clear  No pneumothorax or pleural effusion  Osseous structures appear within normal limits for patient age  Impression: No acute cardiopulmonary disease  Workstation performed: [id number]     CT recon only thoracolumbar    Result Date: 9/17/2022  Narrative: CT THORACIC AND LUMBAR SPINE INDICATION:   fall  COMPARISON:  None TECHNIQUE: Axial CT examination of the thoracic and lumbar spine was obtained utilizing reconstructed images from CT of the chest abdomen and pelvis performed the same day  Images were reformatted in the sagittal and coronal planes   This examination, like all CT scans performed in the [facility], was performed utilizing techniques to minimize radiation dose exposure, including the use of iterative reconstruction and automated exposure control  FINDINGS: Evaluation is limited by osteopenia and thin anterior syndesmophytes throughout the thoracic spine, suggestive of ankylosing spondylitis  ALIGNMENT: Normal alignment of lumbar vertebral bodies  No subluxation  VERTEBRAL BODIES: There is disruption of the anterior endplate of the Q17 vertebral body and the anterior and posterior endplate of the C02 vertebral body  There is narrowing of the spinal canal at T11  DEGENERATIVE CHANGES: There are endplate and facet joint degenerative changes throughout the thoracolumbar spine  PREVERTEBRAL AND PARASPINAL SOFT TISSUES: Normal      Impression: 1  Evaluation is limited by osteopenia and ankylosing spondylitis  There is disruption of the anterior endplates/syndesmophytes of the T10 vertebral body and disruption of the anterior and posterior endplate of the Z02 vertebral bodies compatible with fracture  There is narrowing of the Spinal canal at the T11 level  Further evaluation with MRI should be considered given spinal canal narrowing and exam limitations  2   Degenerative changes of the thoracolumbar spine  I personally discussed this study with Maryam Kang on 9/17/2022 at 6:40 PM  Workstation performed: [id number]     Flexible Sigmoidoscopy    Result Date: 9/19/2022  Narrative: [address] [place] [address] [phone] [address]: 9/19/22 PHYSICIAN(S): Attending: Vani Camacho MD Fellow: Surjit Childers MD INDICATION: Melena POST-OP DIAGNOSIS: See the impression below  HISTORY: Colonic resection, now with ileoanal anastamosis BOWEL PREPARATION:  PREPROCEDURE: Informed consent was obtained for the procedure, including sedation  Risks including but not limited to bleeding, infection, perforation, adverse drug reaction and aspiration were explained in detail  Also explained about less than 100% sensitivity with the exam and other alternatives   The patient was placed in the left lateral decubitus position  DETAILS OF PROCEDURE: Patient was taken to the procedure room where a time out was performed to confirm correct patient and correct procedure  The patient underwent monitored anesthesia care, which was administered by an anesthesia professional  The patient's blood pressure, heart rate, level of consciousness, oxygen, respirations and ETCO2 were monitored throughout the procedure  The scope was introduced through the anus and advanced to the rectum  The patient experienced no blood loss  The procedure was not difficult  The patient tolerated the procedure well  There were no apparent complications  ANESTHESIA INFORMATION: ASA: ASA status not filed in the log  Anesthesia Type: Anesthesia type not filed in the log  MEDICATIONS: No administrations occurring from 1259 to 1321 on 09/19/22 FINDINGS: Dark stool present  - procedure aborted  EVENTS: Procedure Events Event Event Time ENDO SCOPE OUT TIME 9/19/2022  1:14 PM ENDO SCOPE OUT TIME 9/19/2022  1:18 PM SPECIMENS: * No specimens in log * EQUIPMENT: Colonoscope - 6332     Impression: Dark stool present diffusely - procedure aborted No obvious source of bleeding identified RECOMMENDATION: Initiate bowel regimen (miralax daily) Trend Hg daily Transfuse for Hg < 7 ATTENDING ATTESTATION:  I was present throughout the entire procedure from insertion to complete withdrawal of the scope  I performed all interventions myself or oversaw the fellow  Gabrielle Interiano MD      bedside procedure    Result Date: 9/22/2022  Narrative: 3 [phone]  2 [phone] [id number]  4 1    Echo complete w/ contrast if indicated    Result Date: 9/18/2022  Narrative: •  Left Ventricle: Left ventricular cavity size is normal  Wall thickness is normal  The left ventricular ejection fraction is 65%  Systolic function is normal  Wall motion is normal  Diastolic function is abnormal   Left atrial filling pressure is elevated  •  Left Atrium: The atrium is severely dilated   • Right Atrium: The atrium is dilated  •  Aortic Valve: The aortic valve is trileaflet  The leaflets are moderately thickened  The leaflets are mildly calcified  There is mild regurgitation  There is mild to moderate and There is moderate stenosis  The aortic valve peak velocity is 2 49 m/s  The aortic valve mean gradient is 13 mmHg  The dimensionless velocity index is 0 38  The aortic valve area is 1 10 cm2  •  Mitral Valve: There is moderate regurgitation  •  Tricuspid Valve: There is mild regurgitation  The right ventricular systolic pressure is moderately to severely elevated  The estimated right ventricular systolic pressure is 21 55 mmHg  •  Aorta: The aortic root is normal in size  The ascending aorta is mildly dilated  The aortic root is 3 40 cm  The ascending aorta is 3 8 cm  •  Pericardium: There is a trivial pericardial effusion  I have personally reviewed pertinent reports     and I have personally reviewed pertinent films in PACS

## 2022-10-17 NOTE — ASSESSMENT & PLAN NOTE
Patient presents for 4 week hospital follow up for E09/50 fracture (TLICS 4 given distraction)  · S/p fall out of chair around 9/17/22  Hospitalized at that time for acute GI bleed, respiratory failure with bilateral thoracenteses  · Continues with 7/10 mid back pain that does not radiate  New left hip and leg pain x 2 weeks with weakness  Not ambulating, on a stretcher  Not wearing TLSO brace  · Exam:  RLE 4-/5, hip flexion, 4/5 plantar/dorsiflexion  LLE 2/5 hip flexion, 0/5 plantar/dorsiflexion  LLE proprioception intact to great toe  1+ right patellar reflex, 0 on the left  No clonus  Imaging:  • XR thoracolumbar 10/17/2022: Worsening appearance to fracture  Final report pending  Plan:  • Imaging reviewed with patient and Dr Maurice Buchanan  Her fracture appears worse on xray and she has continued pain  Unclear if her new LLE symptoms are related to her fracture  She is not interested in surgery at this time, and given her underlying comorbidities, including heart failure, osteopenia and ankylosing spondylitis, unsure if surgery would be of benefit to her in relieving her pain  · Continue TLSO brace with upright and OOB  • Recommend evaluation/imaging of left hip/loeg  • Reviewed red flag s/s  • Follow up in 4 weeks with xrays to see APs or sooner should patient develop worsening symptoms/red flag signs

## 2022-10-22 ENCOUNTER — APPOINTMENT (EMERGENCY)
Dept: RADIOLOGY | Facility: HOSPITAL | Age: 82
End: 2022-10-22
Payer: COMMERCIAL

## 2022-10-22 ENCOUNTER — HOSPITAL ENCOUNTER (INPATIENT)
Facility: HOSPITAL | Age: 82
LOS: 6 days | Discharge: NURSING, REHAB OR LONG-TERM CARE | End: 2022-10-28
Attending: EMERGENCY MEDICINE | Admitting: FAMILY MEDICINE
Payer: COMMERCIAL

## 2022-10-22 ENCOUNTER — APPOINTMENT (EMERGENCY)
Dept: CT IMAGING | Facility: HOSPITAL | Age: 82
End: 2022-10-22
Payer: COMMERCIAL

## 2022-10-22 DIAGNOSIS — E87.0 HYPERNATREMIA: ICD-10-CM

## 2022-10-22 DIAGNOSIS — L89.152 SACRAL DECUBITUS ULCER, STAGE II (HCC): ICD-10-CM

## 2022-10-22 DIAGNOSIS — I26.99 BILATERAL PULMONARY EMBOLISM (HCC): Primary | ICD-10-CM

## 2022-10-22 DIAGNOSIS — R09.02 HYPOXIA: ICD-10-CM

## 2022-10-22 DIAGNOSIS — S22.089A T11 VERTEBRAL FRACTURE (HCC): ICD-10-CM

## 2022-10-22 DIAGNOSIS — J44.1 COPD EXACERBATION (HCC): ICD-10-CM

## 2022-10-22 DIAGNOSIS — I50.9 CHF (CONGESTIVE HEART FAILURE) (HCC): ICD-10-CM

## 2022-10-22 DIAGNOSIS — J90 PLEURAL EFFUSION: ICD-10-CM

## 2022-10-22 DIAGNOSIS — I50.20 HFREF (HEART FAILURE WITH REDUCED EJECTION FRACTION) (HCC): ICD-10-CM

## 2022-10-22 PROBLEM — Z97.8 CHRONIC INDWELLING FOLEY CATHETER: Status: ACTIVE | Noted: 2022-10-22

## 2022-10-22 PROBLEM — I50.33 ACUTE ON CHRONIC DIASTOLIC CHF (CONGESTIVE HEART FAILURE) (HCC): Status: ACTIVE | Noted: 2022-10-22

## 2022-10-22 PROBLEM — N30.00 ACUTE CYSTITIS: Status: ACTIVE | Noted: 2022-10-22

## 2022-10-22 LAB
2HR DELTA HS TROPONIN: -6 NG/L
4HR DELTA HS TROPONIN: -2 NG/L
ALBUMIN SERPL BCP-MCNC: 2 G/DL (ref 3.5–5)
ALBUMIN SERPL BCP-MCNC: 2.1 G/DL (ref 3.5–5)
ALP SERPL-CCNC: 105 U/L (ref 46–116)
ALP SERPL-CCNC: 99 U/L (ref 46–116)
ALT SERPL W P-5'-P-CCNC: 12 U/L (ref 12–78)
ALT SERPL W P-5'-P-CCNC: 9 U/L (ref 12–78)
AMORPH URATE CRY URNS QL MICRO: ABNORMAL /HPF
ANION GAP SERPL CALCULATED.3IONS-SCNC: 1 MMOL/L (ref 4–13)
ANION GAP SERPL CALCULATED.3IONS-SCNC: 2 MMOL/L (ref 4–13)
ANION GAP SERPL CALCULATED.3IONS-SCNC: 3 MMOL/L (ref 4–13)
APTT PPP: 191 SECONDS (ref 23–37)
APTT PPP: 23 SECONDS (ref 23–37)
APTT PPP: 31 SECONDS (ref 23–37)
APTT PPP: >210 SECONDS (ref 23–37)
APTT PPP: >210 SECONDS (ref 23–37)
AST SERPL W P-5'-P-CCNC: 11 U/L (ref 5–45)
AST SERPL W P-5'-P-CCNC: 11 U/L (ref 5–45)
BACTERIA UR QL AUTO: ABNORMAL /HPF
BASOPHILS # BLD AUTO: 0.03 THOUSANDS/ÂΜL (ref 0–0.1)
BASOPHILS NFR BLD AUTO: 1 % (ref 0–1)
BILIRUB SERPL-MCNC: 0.46 MG/DL (ref 0.2–1)
BILIRUB SERPL-MCNC: 0.55 MG/DL (ref 0.2–1)
BILIRUB UR QL STRIP: NEGATIVE
BUN SERPL-MCNC: 53 MG/DL (ref 5–25)
BUN SERPL-MCNC: 55 MG/DL (ref 5–25)
BUN SERPL-MCNC: 60 MG/DL (ref 5–25)
CALCIUM ALBUM COR SERPL-MCNC: 10 MG/DL (ref 8.3–10.1)
CALCIUM ALBUM COR SERPL-MCNC: 9.3 MG/DL (ref 8.3–10.1)
CALCIUM SERPL-MCNC: 7.8 MG/DL (ref 8.3–10.1)
CALCIUM SERPL-MCNC: 8.2 MG/DL (ref 8.3–10.1)
CALCIUM SERPL-MCNC: 8.4 MG/DL (ref 8.3–10.1)
CARDIAC TROPONIN I PNL SERPL HS: 28 NG/L
CARDIAC TROPONIN I PNL SERPL HS: 32 NG/L
CARDIAC TROPONIN I PNL SERPL HS: 34 NG/L
CARDIAC TROPONIN I PNL SERPL HS: 34 NG/L (ref 8–18)
CHLORIDE SERPL-SCNC: 105 MMOL/L (ref 96–108)
CHLORIDE SERPL-SCNC: 106 MMOL/L (ref 96–108)
CHLORIDE SERPL-SCNC: 109 MMOL/L (ref 96–108)
CLARITY UR: ABNORMAL
CO2 SERPL-SCNC: 36 MMOL/L (ref 21–32)
CO2 SERPL-SCNC: 37 MMOL/L (ref 21–32)
CO2 SERPL-SCNC: 37 MMOL/L (ref 21–32)
COLOR UR: YELLOW
CREAT SERPL-MCNC: 0.9 MG/DL (ref 0.6–1.3)
CREAT SERPL-MCNC: 0.92 MG/DL (ref 0.6–1.3)
CREAT SERPL-MCNC: 1.04 MG/DL (ref 0.6–1.3)
D DIMER PPP FEU-MCNC: 9.48 UG/ML FEU
DIGOXIN SERPL-MCNC: 1 NG/ML (ref 0.8–2)
EOSINOPHIL # BLD AUTO: 0.12 THOUSAND/ÂΜL (ref 0–0.61)
EOSINOPHIL NFR BLD AUTO: 2 % (ref 0–6)
ERYTHROCYTE [DISTWIDTH] IN BLOOD BY AUTOMATED COUNT: 16.9 % (ref 11.6–15.1)
ERYTHROCYTE [DISTWIDTH] IN BLOOD BY AUTOMATED COUNT: 17.2 % (ref 11.6–15.1)
FLUAV RNA RESP QL NAA+PROBE: NEGATIVE
FLUBV RNA RESP QL NAA+PROBE: NEGATIVE
GFR SERPL CREATININE-BSD FRML MDRD: 50 ML/MIN/1.73SQ M
GFR SERPL CREATININE-BSD FRML MDRD: 58 ML/MIN/1.73SQ M
GFR SERPL CREATININE-BSD FRML MDRD: 59 ML/MIN/1.73SQ M
GLUCOSE SERPL-MCNC: 111 MG/DL (ref 65–140)
GLUCOSE SERPL-MCNC: 164 MG/DL (ref 65–140)
GLUCOSE SERPL-MCNC: 172 MG/DL (ref 65–140)
GLUCOSE SERPL-MCNC: 193 MG/DL (ref 65–140)
GLUCOSE SERPL-MCNC: 217 MG/DL (ref 65–140)
GLUCOSE SERPL-MCNC: 219 MG/DL (ref 65–140)
GLUCOSE SERPL-MCNC: 228 MG/DL (ref 65–140)
GLUCOSE UR STRIP-MCNC: NEGATIVE MG/DL
HCT VFR BLD AUTO: 30.7 % (ref 34.8–46.1)
HCT VFR BLD AUTO: 32 % (ref 34.8–46.1)
HGB BLD-MCNC: 9.1 G/DL (ref 11.5–15.4)
HGB BLD-MCNC: 9.3 G/DL (ref 11.5–15.4)
HGB UR QL STRIP.AUTO: ABNORMAL
IMM GRANULOCYTES # BLD AUTO: 0.04 THOUSAND/UL (ref 0–0.2)
IMM GRANULOCYTES NFR BLD AUTO: 1 % (ref 0–2)
INR PPP: 1.21 (ref 0.84–1.19)
INR PPP: 1.34 (ref 0.84–1.19)
KETONES UR STRIP-MCNC: NEGATIVE MG/DL
LACTATE SERPL-SCNC: 1 MMOL/L (ref 0.5–2)
LACTATE SERPL-SCNC: 1.6 MMOL/L (ref 0.5–2)
LEUKOCYTE ESTERASE UR QL STRIP: ABNORMAL
LYMPHOCYTES # BLD AUTO: 0.72 THOUSANDS/ÂΜL (ref 0.6–4.47)
LYMPHOCYTES NFR BLD AUTO: 12 % (ref 14–44)
MAGNESIUM SERPL-MCNC: 1.9 MG/DL (ref 1.6–2.6)
MCH RBC QN AUTO: 28.7 PG (ref 26.8–34.3)
MCH RBC QN AUTO: 29 PG (ref 26.8–34.3)
MCHC RBC AUTO-ENTMCNC: 29.1 G/DL (ref 31.4–37.4)
MCHC RBC AUTO-ENTMCNC: 29.6 G/DL (ref 31.4–37.4)
MCV RBC AUTO: 98 FL (ref 82–98)
MCV RBC AUTO: 99 FL (ref 82–98)
MONOCYTES # BLD AUTO: 0.45 THOUSAND/ÂΜL (ref 0.17–1.22)
MONOCYTES NFR BLD AUTO: 7 % (ref 4–12)
NEUTROPHILS # BLD AUTO: 4.82 THOUSANDS/ÂΜL (ref 1.85–7.62)
NEUTS SEG NFR BLD AUTO: 77 % (ref 43–75)
NITRITE UR QL STRIP: NEGATIVE
NON-SQ EPI CELLS URNS QL MICRO: ABNORMAL /HPF
NRBC BLD AUTO-RTO: 0 /100 WBCS
NT-PROBNP SERPL-MCNC: 3397 PG/ML
OSMOLALITY UR: 381 MMOL/KG
OTHER STN SPEC: ABNORMAL
PH UR STRIP.AUTO: 5.5 [PH]
PHOSPHATE SERPL-MCNC: 3.5 MG/DL (ref 2.3–4.1)
PLATELET # BLD AUTO: 132 THOUSANDS/UL (ref 149–390)
PLATELET # BLD AUTO: 143 THOUSANDS/UL (ref 149–390)
PMV BLD AUTO: 10.3 FL (ref 8.9–12.7)
PMV BLD AUTO: 10.4 FL (ref 8.9–12.7)
POTASSIUM SERPL-SCNC: 4.2 MMOL/L (ref 3.5–5.3)
POTASSIUM SERPL-SCNC: 4.2 MMOL/L (ref 3.5–5.3)
POTASSIUM SERPL-SCNC: 4.8 MMOL/L (ref 3.5–5.3)
PROCALCITONIN SERPL-MCNC: 0.13 NG/ML
PROT SERPL-MCNC: 5.4 G/DL (ref 6.4–8.4)
PROT SERPL-MCNC: 5.6 G/DL (ref 6.4–8.4)
PROT UR STRIP-MCNC: NEGATIVE MG/DL
PROTHROMBIN TIME: 15.4 SECONDS (ref 11.6–14.5)
PROTHROMBIN TIME: 16.7 SECONDS (ref 11.6–14.5)
RBC # BLD AUTO: 3.14 MILLION/UL (ref 3.81–5.12)
RBC # BLD AUTO: 3.24 MILLION/UL (ref 3.81–5.12)
RBC #/AREA URNS AUTO: ABNORMAL /HPF
RSV RNA RESP QL NAA+PROBE: NEGATIVE
SARS-COV-2 RNA RESP QL NAA+PROBE: NEGATIVE
SODIUM 24H UR-SCNC: 20 MOL/L
SODIUM SERPL-SCNC: 143 MMOL/L (ref 135–147)
SODIUM SERPL-SCNC: 145 MMOL/L (ref 135–147)
SODIUM SERPL-SCNC: 148 MMOL/L (ref 135–147)
SP GR UR STRIP.AUTO: 1.01 (ref 1–1.03)
UROBILINOGEN UR QL STRIP.AUTO: 0.2 E.U./DL
WBC # BLD AUTO: 6.14 THOUSAND/UL (ref 4.31–10.16)
WBC # BLD AUTO: 6.18 THOUSAND/UL (ref 4.31–10.16)
WBC #/AREA URNS AUTO: ABNORMAL /HPF

## 2022-10-22 PROCEDURE — 71275 CT ANGIOGRAPHY CHEST: CPT

## 2022-10-22 PROCEDURE — 90662 IIV NO PRSV INCREASED AG IM: CPT | Performed by: FAMILY MEDICINE

## 2022-10-22 PROCEDURE — 94760 N-INVAS EAR/PLS OXIMETRY 1: CPT

## 2022-10-22 PROCEDURE — 83935 ASSAY OF URINE OSMOLALITY: CPT

## 2022-10-22 PROCEDURE — 71045 X-RAY EXAM CHEST 1 VIEW: CPT

## 2022-10-22 PROCEDURE — 83880 ASSAY OF NATRIURETIC PEPTIDE: CPT | Performed by: EMERGENCY MEDICINE

## 2022-10-22 PROCEDURE — 84100 ASSAY OF PHOSPHORUS: CPT | Performed by: NURSE PRACTITIONER

## 2022-10-22 PROCEDURE — 80053 COMPREHEN METABOLIC PANEL: CPT | Performed by: EMERGENCY MEDICINE

## 2022-10-22 PROCEDURE — 85027 COMPLETE CBC AUTOMATED: CPT | Performed by: NURSE PRACTITIONER

## 2022-10-22 PROCEDURE — 85730 THROMBOPLASTIN TIME PARTIAL: CPT | Performed by: NURSE PRACTITIONER

## 2022-10-22 PROCEDURE — 87081 CULTURE SCREEN ONLY: CPT | Performed by: FAMILY MEDICINE

## 2022-10-22 PROCEDURE — 85379 FIBRIN DEGRADATION QUANT: CPT | Performed by: EMERGENCY MEDICINE

## 2022-10-22 PROCEDURE — 85730 THROMBOPLASTIN TIME PARTIAL: CPT | Performed by: EMERGENCY MEDICINE

## 2022-10-22 PROCEDURE — 36415 COLL VENOUS BLD VENIPUNCTURE: CPT | Performed by: EMERGENCY MEDICINE

## 2022-10-22 PROCEDURE — 87040 BLOOD CULTURE FOR BACTERIA: CPT | Performed by: EMERGENCY MEDICINE

## 2022-10-22 PROCEDURE — 81001 URINALYSIS AUTO W/SCOPE: CPT | Performed by: EMERGENCY MEDICINE

## 2022-10-22 PROCEDURE — 84484 ASSAY OF TROPONIN QUANT: CPT | Performed by: EMERGENCY MEDICINE

## 2022-10-22 PROCEDURE — NC001 PR NO CHARGE: Performed by: NURSE PRACTITIONER

## 2022-10-22 PROCEDURE — 94640 AIRWAY INHALATION TREATMENT: CPT

## 2022-10-22 PROCEDURE — 85730 THROMBOPLASTIN TIME PARTIAL: CPT | Performed by: FAMILY MEDICINE

## 2022-10-22 PROCEDURE — 87077 CULTURE AEROBIC IDENTIFY: CPT | Performed by: EMERGENCY MEDICINE

## 2022-10-22 PROCEDURE — 93005 ELECTROCARDIOGRAM TRACING: CPT

## 2022-10-22 PROCEDURE — 99222 1ST HOSP IP/OBS MODERATE 55: CPT | Performed by: FAMILY MEDICINE

## 2022-10-22 PROCEDURE — 0241U HB NFCT DS VIR RESP RNA 4 TRGT: CPT | Performed by: EMERGENCY MEDICINE

## 2022-10-22 PROCEDURE — 80162 ASSAY OF DIGOXIN TOTAL: CPT

## 2022-10-22 PROCEDURE — 87186 SC STD MICRODIL/AGAR DIL: CPT | Performed by: EMERGENCY MEDICINE

## 2022-10-22 PROCEDURE — 94664 DEMO&/EVAL PT USE INHALER: CPT

## 2022-10-22 PROCEDURE — G0008 ADMIN INFLUENZA VIRUS VAC: HCPCS | Performed by: FAMILY MEDICINE

## 2022-10-22 PROCEDURE — 87086 URINE CULTURE/COLONY COUNT: CPT | Performed by: EMERGENCY MEDICINE

## 2022-10-22 PROCEDURE — G1004 CDSM NDSC: HCPCS

## 2022-10-22 PROCEDURE — 80048 BASIC METABOLIC PNL TOTAL CA: CPT | Performed by: FAMILY MEDICINE

## 2022-10-22 PROCEDURE — 80053 COMPREHEN METABOLIC PANEL: CPT | Performed by: NURSE PRACTITIONER

## 2022-10-22 PROCEDURE — 84145 PROCALCITONIN (PCT): CPT

## 2022-10-22 PROCEDURE — 84484 ASSAY OF TROPONIN QUANT: CPT | Performed by: NURSE PRACTITIONER

## 2022-10-22 PROCEDURE — 83605 ASSAY OF LACTIC ACID: CPT | Performed by: EMERGENCY MEDICINE

## 2022-10-22 PROCEDURE — 82948 REAGENT STRIP/BLOOD GLUCOSE: CPT

## 2022-10-22 PROCEDURE — 84300 ASSAY OF URINE SODIUM: CPT

## 2022-10-22 PROCEDURE — 94660 CPAP INITIATION&MGMT: CPT

## 2022-10-22 PROCEDURE — 83605 ASSAY OF LACTIC ACID: CPT | Performed by: NURSE PRACTITIONER

## 2022-10-22 PROCEDURE — 85610 PROTHROMBIN TIME: CPT | Performed by: NURSE PRACTITIONER

## 2022-10-22 PROCEDURE — 85610 PROTHROMBIN TIME: CPT | Performed by: EMERGENCY MEDICINE

## 2022-10-22 PROCEDURE — 83735 ASSAY OF MAGNESIUM: CPT | Performed by: NURSE PRACTITIONER

## 2022-10-22 PROCEDURE — 99285 EMERGENCY DEPT VISIT HI MDM: CPT | Performed by: EMERGENCY MEDICINE

## 2022-10-22 PROCEDURE — 85025 COMPLETE CBC W/AUTO DIFF WBC: CPT | Performed by: EMERGENCY MEDICINE

## 2022-10-22 RX ORDER — FUROSEMIDE 10 MG/ML
80 INJECTION INTRAMUSCULAR; INTRAVENOUS
Status: DISCONTINUED | OUTPATIENT
Start: 2022-10-22 | End: 2022-10-23

## 2022-10-22 RX ORDER — INSULIN LISPRO 100 [IU]/ML
1-6 INJECTION, SOLUTION INTRAVENOUS; SUBCUTANEOUS
Status: DISCONTINUED | OUTPATIENT
Start: 2022-10-22 | End: 2022-10-28 | Stop reason: HOSPADM

## 2022-10-22 RX ORDER — NYSTATIN 100000 [USP'U]/G
POWDER TOPICAL 2 TIMES DAILY
Status: DISCONTINUED | OUTPATIENT
Start: 2022-10-22 | End: 2022-10-28 | Stop reason: HOSPADM

## 2022-10-22 RX ORDER — DEXTROSE MONOHYDRATE 50 MG/ML
100 INJECTION, SOLUTION INTRAVENOUS CONTINUOUS
Status: DISCONTINUED | OUTPATIENT
Start: 2022-10-22 | End: 2022-10-22

## 2022-10-22 RX ORDER — METHOCARBAMOL 750 MG/1
750 TABLET, FILM COATED ORAL EVERY 6 HOURS SCHEDULED
Status: DISCONTINUED | OUTPATIENT
Start: 2022-10-22 | End: 2022-10-28 | Stop reason: HOSPADM

## 2022-10-22 RX ORDER — ATORVASTATIN CALCIUM 10 MG/1
10 TABLET, FILM COATED ORAL DAILY
Status: DISCONTINUED | OUTPATIENT
Start: 2022-10-22 | End: 2022-10-28 | Stop reason: HOSPADM

## 2022-10-22 RX ORDER — LIDOCAINE 50 MG/G
1 PATCH TOPICAL DAILY
Status: DISCONTINUED | OUTPATIENT
Start: 2022-10-22 | End: 2022-10-28 | Stop reason: HOSPADM

## 2022-10-22 RX ORDER — ONDANSETRON 2 MG/ML
4 INJECTION INTRAMUSCULAR; INTRAVENOUS EVERY 6 HOURS PRN
Status: DISCONTINUED | OUTPATIENT
Start: 2022-10-22 | End: 2022-10-28 | Stop reason: HOSPADM

## 2022-10-22 RX ORDER — HEPARIN SODIUM 10000 [USP'U]/100ML
3-30 INJECTION, SOLUTION INTRAVENOUS
Status: DISCONTINUED | OUTPATIENT
Start: 2022-10-22 | End: 2022-10-24

## 2022-10-22 RX ORDER — CARVEDILOL 12.5 MG/1
12.5 TABLET ORAL 2 TIMES DAILY WITH MEALS
Status: DISCONTINUED | OUTPATIENT
Start: 2022-10-22 | End: 2022-10-23

## 2022-10-22 RX ORDER — SIMETHICONE 80 MG
80 TABLET,CHEWABLE ORAL EVERY 6 HOURS PRN
Status: DISCONTINUED | OUTPATIENT
Start: 2022-10-22 | End: 2022-10-28 | Stop reason: HOSPADM

## 2022-10-22 RX ORDER — CEFTRIAXONE 1 G/50ML
1000 INJECTION, SOLUTION INTRAVENOUS EVERY 24 HOURS
Status: DISCONTINUED | OUTPATIENT
Start: 2022-10-22 | End: 2022-10-26

## 2022-10-22 RX ORDER — HEPARIN SODIUM 1000 [USP'U]/ML
7600 INJECTION, SOLUTION INTRAVENOUS; SUBCUTANEOUS
Status: DISCONTINUED | OUTPATIENT
Start: 2022-10-22 | End: 2022-10-24

## 2022-10-22 RX ORDER — AMOXICILLIN 250 MG
1 CAPSULE ORAL 2 TIMES DAILY
Status: DISCONTINUED | OUTPATIENT
Start: 2022-10-22 | End: 2022-10-28 | Stop reason: HOSPADM

## 2022-10-22 RX ORDER — LEVALBUTEROL 1.25 MG/.5ML
1.25 SOLUTION, CONCENTRATE RESPIRATORY (INHALATION)
Status: DISCONTINUED | OUTPATIENT
Start: 2022-10-22 | End: 2022-10-28 | Stop reason: HOSPADM

## 2022-10-22 RX ORDER — OXYCODONE HYDROCHLORIDE 5 MG/1
2.5 TABLET ORAL EVERY 6 HOURS PRN
Status: DISCONTINUED | OUTPATIENT
Start: 2022-10-22 | End: 2022-10-22

## 2022-10-22 RX ORDER — GABAPENTIN 300 MG/1
300 CAPSULE ORAL 3 TIMES DAILY
Status: DISCONTINUED | OUTPATIENT
Start: 2022-10-22 | End: 2022-10-28 | Stop reason: HOSPADM

## 2022-10-22 RX ORDER — PANTOPRAZOLE SODIUM 40 MG/1
40 TABLET, DELAYED RELEASE ORAL
Status: DISCONTINUED | OUTPATIENT
Start: 2022-10-22 | End: 2022-10-28 | Stop reason: HOSPADM

## 2022-10-22 RX ORDER — FUROSEMIDE 10 MG/ML
80 INJECTION INTRAMUSCULAR; INTRAVENOUS
Status: DISCONTINUED | OUTPATIENT
Start: 2022-10-22 | End: 2022-10-22

## 2022-10-22 RX ORDER — HEPARIN SODIUM 1000 [USP'U]/ML
3800 INJECTION, SOLUTION INTRAVENOUS; SUBCUTANEOUS
Status: DISCONTINUED | OUTPATIENT
Start: 2022-10-22 | End: 2022-10-24

## 2022-10-22 RX ORDER — IPRATROPIUM BROMIDE AND ALBUTEROL SULFATE 2.5; .5 MG/3ML; MG/3ML
3 SOLUTION RESPIRATORY (INHALATION) ONCE
Status: COMPLETED | OUTPATIENT
Start: 2022-10-22 | End: 2022-10-22

## 2022-10-22 RX ORDER — POLYETHYLENE GLYCOL 3350 17 G/17G
17 POWDER, FOR SOLUTION ORAL DAILY PRN
Status: DISCONTINUED | OUTPATIENT
Start: 2022-10-22 | End: 2022-10-28 | Stop reason: HOSPADM

## 2022-10-22 RX ORDER — INSULIN LISPRO 100 [IU]/ML
1-6 INJECTION, SOLUTION INTRAVENOUS; SUBCUTANEOUS
Status: DISCONTINUED | OUTPATIENT
Start: 2022-10-22 | End: 2022-10-24

## 2022-10-22 RX ORDER — DEXAMETHASONE SODIUM PHOSPHATE 4 MG/ML
10 INJECTION, SOLUTION INTRA-ARTICULAR; INTRALESIONAL; INTRAMUSCULAR; INTRAVENOUS; SOFT TISSUE ONCE
Status: COMPLETED | OUTPATIENT
Start: 2022-10-22 | End: 2022-10-22

## 2022-10-22 RX ORDER — BUMETANIDE 0.25 MG/ML
1 INJECTION, SOLUTION INTRAMUSCULAR; INTRAVENOUS EVERY 12 HOURS
Status: DISCONTINUED | OUTPATIENT
Start: 2022-10-22 | End: 2022-10-22

## 2022-10-22 RX ORDER — BUMETANIDE 0.25 MG/ML
1 INJECTION, SOLUTION INTRAMUSCULAR; INTRAVENOUS ONCE
Status: COMPLETED | OUTPATIENT
Start: 2022-10-22 | End: 2022-10-22

## 2022-10-22 RX ORDER — ACETAMINOPHEN 325 MG/1
650 TABLET ORAL EVERY 4 HOURS PRN
Status: DISCONTINUED | OUTPATIENT
Start: 2022-10-22 | End: 2022-10-28 | Stop reason: HOSPADM

## 2022-10-22 RX ORDER — HEPARIN SODIUM 1000 [USP'U]/ML
7600 INJECTION, SOLUTION INTRAVENOUS; SUBCUTANEOUS ONCE
Status: COMPLETED | OUTPATIENT
Start: 2022-10-22 | End: 2022-10-22

## 2022-10-22 RX ORDER — LANOLIN ALCOHOL/MO/W.PET/CERES
6 CREAM (GRAM) TOPICAL
Status: DISCONTINUED | OUTPATIENT
Start: 2022-10-22 | End: 2022-10-28 | Stop reason: HOSPADM

## 2022-10-22 RX ORDER — OXYCODONE HYDROCHLORIDE 5 MG/1
5 TABLET ORAL EVERY 6 HOURS PRN
Status: DISCONTINUED | OUTPATIENT
Start: 2022-10-22 | End: 2022-10-28 | Stop reason: HOSPADM

## 2022-10-22 RX ADMIN — PANTOPRAZOLE SODIUM 40 MG: 40 TABLET, DELAYED RELEASE ORAL at 06:21

## 2022-10-22 RX ADMIN — HEPARIN SODIUM 18 UNITS/KG/HR: 10000 INJECTION, SOLUTION INTRAVENOUS at 05:01

## 2022-10-22 RX ADMIN — METHOCARBAMOL TABLETS 750 MG: 750 TABLET, COATED ORAL at 06:21

## 2022-10-22 RX ADMIN — LIDOCAINE 5% 1 PATCH: 700 PATCH TOPICAL at 07:49

## 2022-10-22 RX ADMIN — SACUBITRIL AND VALSARTAN 1 TABLET: 24; 26 TABLET, FILM COATED ORAL at 18:33

## 2022-10-22 RX ADMIN — FUROSEMIDE 80 MG: 10 INJECTION, SOLUTION INTRAMUSCULAR; INTRAVENOUS at 11:07

## 2022-10-22 RX ADMIN — INSULIN LISPRO 2 UNITS: 100 INJECTION, SOLUTION INTRAVENOUS; SUBCUTANEOUS at 15:58

## 2022-10-22 RX ADMIN — IOHEXOL 100 ML: 350 INJECTION, SOLUTION INTRAVENOUS at 02:29

## 2022-10-22 RX ADMIN — INSULIN LISPRO 2 UNITS: 100 INJECTION, SOLUTION INTRAVENOUS; SUBCUTANEOUS at 11:24

## 2022-10-22 RX ADMIN — PANTOPRAZOLE SODIUM 40 MG: 40 TABLET, DELAYED RELEASE ORAL at 15:52

## 2022-10-22 RX ADMIN — GABAPENTIN 300 MG: 300 CAPSULE ORAL at 15:52

## 2022-10-22 RX ADMIN — LEVALBUTEROL HYDROCHLORIDE 1.25 MG: 1.25 SOLUTION, CONCENTRATE RESPIRATORY (INHALATION) at 13:23

## 2022-10-22 RX ADMIN — LEVALBUTEROL HYDROCHLORIDE 1.25 MG: 1.25 SOLUTION, CONCENTRATE RESPIRATORY (INHALATION) at 21:29

## 2022-10-22 RX ADMIN — METHOCARBAMOL TABLETS 750 MG: 750 TABLET, COATED ORAL at 18:32

## 2022-10-22 RX ADMIN — METHOCARBAMOL TABLETS 750 MG: 750 TABLET, COATED ORAL at 11:07

## 2022-10-22 RX ADMIN — OXYCODONE HYDROCHLORIDE 2.5 MG: 5 TABLET ORAL at 06:32

## 2022-10-22 RX ADMIN — DOCUSATE SODIUM AND SENNOSIDES 1 TABLET: 8.6; 5 TABLET, FILM COATED ORAL at 07:48

## 2022-10-22 RX ADMIN — ATORVASTATIN CALCIUM 10 MG: 10 TABLET, FILM COATED ORAL at 07:48

## 2022-10-22 RX ADMIN — GABAPENTIN 300 MG: 300 CAPSULE ORAL at 20:43

## 2022-10-22 RX ADMIN — FLUTICASONE FUROATE AND VILANTEROL TRIFENATATE 1 PUFF: 100; 25 POWDER RESPIRATORY (INHALATION) at 08:17

## 2022-10-22 RX ADMIN — IPRATROPIUM BROMIDE 0.5 MG: 0.5 SOLUTION RESPIRATORY (INHALATION) at 07:16

## 2022-10-22 RX ADMIN — CARVEDILOL 12.5 MG: 12.5 TABLET, FILM COATED ORAL at 15:51

## 2022-10-22 RX ADMIN — INFLUENZA A VIRUS A/VICTORIA/2570/2019 IVR-215 (H1N1) ANTIGEN (FORMALDEHYDE INACTIVATED), INFLUENZA A VIRUS A/DARWIN/9/2021 SAN-010 (H3N2) ANTIGEN (FORMALDEHYDE INACTIVATED), INFLUENZA B VIRUS B/PHUKET/3073/2013 ANTIGEN (FORMALDEHYDE INACTIVATED), AND INFLUENZA B VIRUS B/MICHIGAN/01/2021 ANTIGEN (FORMALDEHYDE INACTIVATED) 0.7 ML: 60; 60; 60; 60 INJECTION, SUSPENSION INTRAMUSCULAR at 10:47

## 2022-10-22 RX ADMIN — GABAPENTIN 300 MG: 300 CAPSULE ORAL at 07:48

## 2022-10-22 RX ADMIN — LIDOCAINE 5% 1 PATCH: 700 PATCH TOPICAL at 10:19

## 2022-10-22 RX ADMIN — ACETAMINOPHEN 650 MG: 325 TABLET ORAL at 20:43

## 2022-10-22 RX ADMIN — NYSTATIN: 100000 POWDER TOPICAL at 18:32

## 2022-10-22 RX ADMIN — NYSTATIN: 100000 POWDER TOPICAL at 10:20

## 2022-10-22 RX ADMIN — ACETAMINOPHEN 650 MG: 325 TABLET ORAL at 08:25

## 2022-10-22 RX ADMIN — MELATONIN 6 MG: 3 TAB ORAL at 21:44

## 2022-10-22 RX ADMIN — FUROSEMIDE 80 MG: 10 INJECTION, SOLUTION INTRAMUSCULAR; INTRAVENOUS at 15:53

## 2022-10-22 RX ADMIN — AZITHROMYCIN MONOHYDRATE 500 MG: 500 INJECTION, POWDER, LYOPHILIZED, FOR SOLUTION INTRAVENOUS at 02:35

## 2022-10-22 RX ADMIN — DOCUSATE SODIUM AND SENNOSIDES 1 TABLET: 8.6; 5 TABLET, FILM COATED ORAL at 18:32

## 2022-10-22 RX ADMIN — HEPARIN SODIUM 12 UNITS/KG/HR: 10000 INJECTION, SOLUTION INTRAVENOUS at 21:49

## 2022-10-22 RX ADMIN — INSULIN LISPRO 1 UNITS: 100 INJECTION, SOLUTION INTRAVENOUS; SUBCUTANEOUS at 08:17

## 2022-10-22 RX ADMIN — BUMETANIDE 1 MG: 0.25 INJECTION INTRAMUSCULAR; INTRAVENOUS at 01:46

## 2022-10-22 RX ADMIN — DEXTROSE 100 ML/HR: 5 SOLUTION INTRAVENOUS at 07:36

## 2022-10-22 RX ADMIN — SODIUM CHLORIDE 500 ML: 0.9 INJECTION, SOLUTION INTRAVENOUS at 22:45

## 2022-10-22 RX ADMIN — CEFTRIAXONE 1000 MG: 1 INJECTION, SOLUTION INTRAVENOUS at 08:17

## 2022-10-22 RX ADMIN — IPRATROPIUM BROMIDE 0.5 MG: 0.5 SOLUTION RESPIRATORY (INHALATION) at 21:29

## 2022-10-22 RX ADMIN — OXYCODONE HYDROCHLORIDE 5 MG: 5 TABLET ORAL at 16:07

## 2022-10-22 RX ADMIN — LEVALBUTEROL HYDROCHLORIDE 1.25 MG: 1.25 SOLUTION, CONCENTRATE RESPIRATORY (INHALATION) at 07:16

## 2022-10-22 RX ADMIN — INSULIN LISPRO 2 UNITS: 100 INJECTION, SOLUTION INTRAVENOUS; SUBCUTANEOUS at 21:44

## 2022-10-22 RX ADMIN — IPRATROPIUM BROMIDE 0.5 MG: 0.5 SOLUTION RESPIRATORY (INHALATION) at 13:23

## 2022-10-22 RX ADMIN — HEPARIN SODIUM 7600 UNITS: 1000 INJECTION INTRAVENOUS; SUBCUTANEOUS at 05:01

## 2022-10-22 RX ADMIN — SODIUM CHLORIDE 500 ML: 0.9 INJECTION, SOLUTION INTRAVENOUS at 02:01

## 2022-10-22 RX ADMIN — IPRATROPIUM BROMIDE AND ALBUTEROL SULFATE 3 ML: 2.5; .5 SOLUTION RESPIRATORY (INHALATION) at 01:13

## 2022-10-22 RX ADMIN — DEXAMETHASONE SODIUM PHOSPHATE 10 MG: 4 INJECTION, SOLUTION INTRAMUSCULAR; INTRAVENOUS at 00:46

## 2022-10-22 NOTE — CASE MANAGEMENT
Case Management Assessment & Discharge Planning Note    Patient name Diana Godfrey  Location /-15 MRN [id number]  : 1940 Date 10/22/2022       Current Admission Date: 10/22/2022  Current Admission Diagnosis:Bilateral pulmonary embolism [facility])   Patient Active Problem List    Diagnosis Date Noted   • Acute on chronic diastolic CHF (congestive heart failure) ([address] ) 10/22/2022   • Bilateral pulmonary embolism ([address] ) 10/22/2022   • Pleural effusion 10/22/2022   • Chronic indwelling Ball catheter 10/22/2022   • Acute cystitis 10/22/2022   • Hypernatremia 10/22/2022   • Acute on chronic respiratory failure with hypoxia ([address] ) 2022   • Type 2 diabetes mellitus ([address] ) 2022   • Fall 2022   • COPD (chronic obstructive pulmonary disease) ([address] ) 2022   • T11 vertebral fracture ([facility] ) 2022   • HFrEF (heart failure with reduced ejection fraction) ([facility] ) 2021   • Permanent atrial fibrillation ([facility] ) 2021   • Stage 3 chronic kidney disease ([facility] ) 2021   • Chronic anemia 2021      LOS (days): 0  Geometric Mean LOS (GMLOS) (days):   Days to GMLOS:     OBJECTIVE:  PATIENT READMITTED TO HOSPITAL  Risk of Unplanned Readmission Score: 36 32         Current admission status: Inpatient       Preferred Pharmacy:   [facility] [address]  Phone: [phone] Fax: [phone]    Primary Care Provider: Paresh Collado DO    Primary Insurance: [facility] [employer]  Secondary Insurance:     ASSESSMENT:  Aniket Cody [address] [facility] Phone: [phone] (Mobile)               Advance Directives  Does patient have a [address] [place] [address] Avenue?: No  Was patient offered paperwork?: Yes (patient declined)  Does patient currently have a Health Care decision maker?: Yes, please see Health Care Proxy section  Does patient have Advance Directives?: No  Was patient offered paperwork?: Yes (patient declined)  Primary Contact: Saad Knowles, brother or Yao Roman, niece         Readmission Root Cause  30 Day Readmission: No    Patient Information  Admitted from[de-identified] Facility  Mental Status: Alert  During Assessment patient was accompanied by: Not accompanied during assessment  Assessment information provided by[de-identified] Patient  Primary Caregiver: Other (Comment)  Caregiver's Name[de-identified] [facility] staff  Caregiver's Relationship to Patient[de-identified] Other (Specify)  Caregiver's Telephone Number[de-identified] [phone]  Support Systems: Family members  [place] of Residence: [facility] do you live in?: [address] entry access options  Select all that apply : No steps to enter home  Type of Current Residence: Facility  Upon entering residence, is there a bedroom on the main floor (no further steps)?: Yes  Upon entering residence, is there a bathroom on the main floor (no further steps)?: Yes  In the last 12 months, was there a time when you were not able to pay the mortgage or rent on time?: No  In the last 12 months, how many places have you lived?: 2  In the last 12 months, was there a time when you did not have a steady place to sleep or slept in a shelter (including now)?: No  Homeless/housing insecurity resource given?: N/A  Living Arrangements: Other (Comment) [facility])  Is patient a ?: No    Activities of Daily Living Prior to Admission  Functional Status: Total dependent  Completes ADLs independently?: No  Level of ADL dependence: Total Dependent  Ambulates independently?: No  Level of ambulatory dependence:  Total Dependent  Does patient use assisted devices?: Yes  Assisted Devices (DME) used: Yadi Reeks lift, Wheelchair  Does patient currently own DME?: No  Does patient have a history of Outpatient Therapy (PT/OT)?: No  Does the patient have a history of Short-Term Rehab?: Yes [facility])  Does patient have a history of HHC?: No  Does patient currently have [facility]?: No         Patient Information Continued  Income Source: SSI/SSD  Does patient have prescription coverage?: No  Within the past 12 months, you worried that your food would run out before you got the money to buy more : Never true  Within the past 12 months, the food you bought just didn't last and you didn't have money to get more : Never true  Food insecurity resource given?: N/A  Does patient receive dialysis treatments?: No  Does patient have a history of substance abuse?: No  Does patient have a history of Mental Health Diagnosis?: No         Means of Transportation  Means of Transport to Appts[de-identified] Other (Comment) [facility] staff)  In the past 12 months, has lack of transportation kept you from medical appointments or from getting medications?: No  In the past 12 months, has lack of transportation kept you from meetings, work, or from getting things needed for daily living?: No  Was application for public transport provided?: N/A        DISCHARGE DETAILS:    Discharge planning discussed with[de-identified] patient  Freedom of Choice: Yes  Comments - Freedom of Choice: return to [facility] contacted family/caregiver?: No- see comments (patient declined)                  [address]         Is the patient interested in [facility] at discharge?: No    DME Referral Provided  Referral made for DME?: No    Other Referral/Resources/Interventions Provided:  Interventions: Facility Return  Referral Comments: [facility]         CM met with patient at the bedside, baseline information was obtained  CM discussed the role of CM in helping the patient develop a discharge plan and assist the patient in carry out their plan  Patient from [facility], there since 9/30/22  Patient suffered T11 and T12 vertebra fracture from fall  Patient indicated she receives therapy at [facility] and facility utilizes julia lift to get her out of bed and for therapy purposes    Chart review indicated patient previously lived in ran home and ambulated with RW or electric scooter prior to [facility] admission  CM submitted AIDIN referral to [facility] for facility return  CM to follow for medical clearance and PT/OT to eval for skill days for patient

## 2022-10-22 NOTE — PLAN OF CARE
Problem: Potential for Falls  Goal: Patient will remain free of falls  Description: INTERVENTIONS:  - Educate patient/family on patient safety including physical limitations  - Instruct patient to call for assistance with activity   - Consult OT/PT to assist with strengthening/mobility   - Keep Call bell within reach  - Keep bed low and locked with side rails adjusted as appropriate  - Keep care items and personal belongings within reach  - Initiate and maintain comfort rounds  - Make Fall Risk Sign visible to staff  - Offer Toileting every  Hours, in advance of need  - Initiate/Maintain alarm  - Obtain necessary fall risk management equipment:   - Apply yellow socks and bracelet for high fall risk patients  - Consider moving patient to room near nurses station  Outcome: Progressing     Problem: MOBILITY - ADULT  Goal: Maintain or return to baseline ADL function  Description: INTERVENTIONS:  -  Assess patient's ability to carry out ADLs; assess patient's baseline for ADL function and identify physical deficits which impact ability to perform ADLs (bathing, care of mouth/teeth, toileting, grooming, dressing, etc )  - Assess/evaluate cause of self-care deficits   - Assess range of motion  - Assess patient's mobility; develop plan if impaired  - Assess patient's need for assistive devices and provide as appropriate  - Encourage maximum independence but intervene and supervise when necessary  - Involve family in performance of ADLs  - Assess for home care needs following discharge   - Consider OT consult to assist with ADL evaluation and planning for discharge  - Provide patient education as appropriate  Outcome: Progressing  Goal: Maintains/Returns to pre admission functional level  Description: INTERVENTIONS:  - Perform BMAT or MOVE assessment daily    - Set and communicate daily mobility goal to care team and patient/family/caregiver     - Collaborate with rehabilitation services on mobility goals if consulted  - Perform Range of Motion  times a day  - Reposition patient every  hours  - Dangle patient  times a day  - Stand patient  times a day  - Ambulate patient  times a day  - Out of bed to chair  times a day   - Out of bed for meals  times a day  - Out of bed for toileting  - Record patient progress and toleration of activity level   Outcome: Progressing     Problem: Prexisting or High Potential for Compromised Skin Integrity  Goal: Skin integrity is maintained or improved  Description: INTERVENTIONS:  - Identify patients at risk for skin breakdown  - Assess and monitor skin integrity  - Assess and monitor nutrition and hydration status  - Monitor labs   - Assess for incontinence   - Turn and reposition patient  - Assist with mobility/ambulation  - Relieve pressure over bony prominences  - Avoid friction and shearing  - Provide appropriate hygiene as needed including keeping skin clean and dry  - Evaluate need for skin moisturizer/barrier cream  - Collaborate with interdisciplinary team   - Patient/family teaching  - Consider wound care consult   Outcome: Progressing     Problem: Nutrition/Hydration-ADULT  Goal: Nutrient/Hydration intake appropriate for improving, restoring or maintaining nutritional needs  Description: Monitor and assess patient's nutrition/hydration status for malnutrition  Collaborate with interdisciplinary team and initiate plan and interventions as ordered  Monitor patient's weight and dietary intake as ordered or per policy  Utilize nutrition screening tool and intervene as necessary  Determine patient's food preferences and provide high-protein, high-caloric foods as appropriate       INTERVENTIONS:  - Monitor oral intake, urinary output, labs, and treatment plans  - Assess nutrition and hydration status and recommend course of action  - Evaluate amount of meals eaten  - Assist patient with eating if necessary   - Allow adequate time for meals  - Recommend/ encourage appropriate diets, oral nutritional supplements, and vitamin/mineral supplements  - Order, calculate, and assess calorie counts as needed  - Recommend, monitor, and adjust tube feedings and TPN/PPN based on assessed needs  - Assess need for intravenous fluids  - Provide specific nutrition/hydration education as appropriate  - Include patient/family/caregiver in decisions related to nutrition  Outcome: Progressing     Problem: NEUROSENSORY - ADULT  Goal: Achieves stable or improved neurological status  Description: INTERVENTIONS  - Monitor and report changes in neurological status  - Monitor vital signs such as temperature, blood pressure, glucose, and any other labs ordered   - Initiate measures to prevent increased intracranial pressure  - Monitor for seizure activity and implement precautions if appropriate      Outcome: Progressing  Goal: Remains free of injury related to seizures activity  Description: INTERVENTIONS  - Maintain airway, patient safety  and administer oxygen as ordered  - Monitor patient for seizure activity, document and report duration and description of seizure to physician/advanced practitioner  - If seizure occurs,  ensure patient safety during seizure  - Reorient patient post seizure  - Seizure pads on all 4 side rails  - Instruct patient/family to notify RN of any seizure activity including if an aura is experienced  - Instruct patient/family to call for assistance with activity based on nursing assessment  - Administer anti-seizure medications if ordered    Outcome: Progressing  Goal: Achieves maximal functionality and self care  Description: INTERVENTIONS  - Monitor swallowing and airway patency with patient fatigue and changes in neurological status  - Encourage and assist patient to increase activity and self care     - Encourage visually impaired, hearing impaired and aphasic patients to use assistive/communication devices  Outcome: Progressing     Problem: CARDIOVASCULAR - ADULT  Goal: Maintains optimal cardiac output and hemodynamic stability  Description: INTERVENTIONS:  - Monitor I/O, vital signs and rhythm  - Monitor for S/S and trends of decreased cardiac output  - Administer and titrate ordered vasoactive medications to optimize hemodynamic stability  - Assess quality of pulses, skin color and temperature  - Assess for signs of decreased coronary artery perfusion  - Instruct patient to report change in severity of symptoms  Outcome: Progressing  Goal: Absence of cardiac dysrhythmias or at baseline rhythm  Description: INTERVENTIONS:  - Continuous cardiac monitoring, vital signs, obtain 12 lead EKG if ordered  - Administer antiarrhythmic and heart rate control medications as ordered  - Monitor electrolytes and administer replacement therapy as ordered  Outcome: Progressing     Problem: RESPIRATORY - ADULT  Goal: Achieves optimal ventilation and oxygenation  Description: INTERVENTIONS:  - Assess for changes in respiratory status  - Assess for changes in mentation and behavior  - Position to facilitate oxygenation and minimize respiratory effort  - Oxygen administered by appropriate delivery if ordered  - Initiate smoking cessation education as indicated  - Encourage broncho-pulmonary hygiene including cough, deep breathe, Incentive Spirometry  - Assess the need for suctioning and aspirate as needed  - Assess and instruct to report SOB or any respiratory difficulty  - Respiratory Therapy support as indicated  Outcome: Progressing     Problem: GENITOURINARY - ADULT  Goal: Maintains or returns to baseline urinary function  Description: INTERVENTIONS:  - Assess urinary function  - Encourage oral fluids to ensure adequate hydration if ordered  - Administer IV fluids as ordered to ensure adequate hydration  - Administer ordered medications as needed  - Offer frequent toileting  - Follow urinary retention protocol if ordered  Outcome: Progressing  Goal: Absence of urinary retention  Description: INTERVENTIONS:  - Assess patient’s ability to void and empty bladder  - Monitor I/O  - Bladder scan as needed  - Discuss with physician/AP medications to alleviate retention as needed  - Discuss catheterization for long term situations as appropriate  Outcome: Progressing  Goal: Urinary catheter remains patent  Description: INTERVENTIONS:  - Assess patency of urinary catheter  - If patient has a chronic rivera, consider changing catheter if non-functioning  - Follow guidelines for intermittent irrigation of non-functioning urinary catheter  Outcome: Progressing     Problem: METABOLIC, FLUID AND ELECTROLYTES - ADULT  Goal: Electrolytes maintained within normal limits  Description: INTERVENTIONS:  - Monitor labs and assess patient for signs and symptoms of electrolyte imbalances  - Administer electrolyte replacement as ordered  - Monitor response to electrolyte replacements, including repeat lab results as appropriate  - Instruct patient on fluid and nutrition as appropriate  Outcome: Progressing  Goal: Fluid balance maintained  Description: INTERVENTIONS:  - Monitor labs   - Monitor I/O and WT  - Instruct patient on fluid and nutrition as appropriate  - Assess for signs & symptoms of volume excess or deficit  Outcome: Progressing  Goal: Glucose maintained within target range  Description: INTERVENTIONS:  - Monitor Blood Glucose as ordered  - Assess for signs and symptoms of hyperglycemia and hypoglycemia  - Administer ordered medications to maintain glucose within target range  - Assess nutritional intake and initiate nutrition service referral as needed  Outcome: Progressing     Problem: SKIN/TISSUE INTEGRITY - ADULT  Goal: Skin Integrity remains intact(Skin Breakdown Prevention)  Description: Assess:  -Perform Drew assessment every   -Clean and moisturize skin every   -Inspect skin when repositioning, toileting, and assisting with ADLS  -Assess under medical devices such as  every   -Assess extremities for adequate circulation and sensation     Bed Management:  -Have minimal linens on bed & keep smooth, unwrinkled  -Change linens as needed when moist or perspiring  -Avoid sitting or lying in one position for more than  hours while in bed  -Keep HOB at degrees     Toileting:  -Offer bedside commode  -Assess for incontinence every   -Use incontinent care products after each incontinent episode such as     Activity:  -Mobilize patient  times a day  -Encourage activity and walks on unit  -Encourage or provide ROM exercises   -Turn and reposition patient every  Hours  -Use appropriate equipment to lift or move patient in bed  -Instruct/ Assist with weight shifting every  when out of bed in chair  -Consider limitation of chair time  hour intervals    Skin Care:  -Avoid use of baby powder, tape, friction and shearing, hot water or constrictive clothing  -Relieve pressure over bony prominences using   -Do not massage red bony areas    Next Steps:  -Teach patient strategies to minimize risks such as    -Consider consults to  interdisciplinary teams such as   Outcome: Progressing  Goal: Incision(s), wounds(s) or drain site(s) healing without S/S of infection  Description: INTERVENTIONS  - Assess and document dressing, incision, wound bed, drain sites and surrounding tissue  - Provide patient and family education  - Perform skin care/dressing changes every   Outcome: Progressing  Goal: Pressure injury heals and does not worsen  Description: Interventions:  - Implement low air loss mattress or specialty surface (Criteria met)  - Apply silicone foam dressing  - Instruct/assist with weight shifting every minutes when in chair   - Limit chair time to  hour intervals  - Use special pressure reducing interventions such as  when in chair   - Apply fecal or urinary incontinence containment device   - Perform passive or active ROM every  - Turn and reposition patient & offload bony prominences every  hours   - Utilize friction reducing device or surface for transfers   - Consider consults to  interdisciplinary teams such as   - Use incontinent care products after each incontinent episode such as   - Consider nutrition services referral as needed  Outcome: Progressing     Problem: HEMATOLOGIC - ADULT  Goal: Maintains hematologic stability  Description: INTERVENTIONS  - Assess for signs and symptoms of bleeding or hemorrhage  - Monitor labs  - Administer supportive blood products/factors as ordered and appropriate  Outcome: Progressing     Problem: MUSCULOSKELETAL - ADULT  Goal: Maintain or return mobility to safest level of function  Description: INTERVENTIONS:  - Assess patient's ability to carry out ADLs; assess patient's baseline for ADL function and identify physical deficits which impact ability to perform ADLs (bathing, care of mouth/teeth, toileting, grooming, dressing, etc )  - Assess/evaluate cause of self-care deficits   - Assess range of motion  - Assess patient's mobility  - Assess patient's need for assistive devices and provide as appropriate  - Encourage maximum independence but intervene and supervise when necessary  - Involve family in performance of ADLs  - Assess for home care needs following discharge   - Consider OT consult to assist with ADL evaluation and planning for discharge  - Provide patient education as appropriate  Outcome: Progressing  Goal: Maintain proper alignment of affected body part  Description: INTERVENTIONS:  - Support, maintain and protect limb and body alignment  - Provide patient/ family with appropriate education  Outcome: Progressing     Problem: PAIN - ADULT  Goal: Verbalizes/displays adequate comfort level or baseline comfort level  Description: Interventions:  - Encourage patient to monitor pain and request assistance  - Assess pain using appropriate pain scale  - Administer analgesics based on type and severity of pain and evaluate response  - Implement non-pharmacological measures as appropriate and evaluate response  - Consider cultural and social influences on pain and pain management  - Notify physician/advanced practitioner if interventions unsuccessful or patient reports new pain  Outcome: Progressing     Problem: INFECTION - ADULT  Goal: Absence or prevention of progression during hospitalization  Description: INTERVENTIONS:  - Assess and monitor for signs and symptoms of infection  - Monitor lab/diagnostic results  - Monitor all insertion sites, i e  indwelling lines, tubes, and drains  - Monitor endotracheal if appropriate and nasal secretions for changes in amount and color  - [place] appropriate cooling/warming therapies per order  - Administer medications as ordered  - Instruct and encourage patient and family to use good hand hygiene technique  - Identify and instruct in appropriate isolation precautions for identified infection/condition  Outcome: Progressing  Goal: Absence of fever/infection during neutropenic period  Description: INTERVENTIONS:  - Monitor WBC    Outcome: Progressing     Problem: SAFETY ADULT  Goal: Patient will remain free of falls  Description: INTERVENTIONS:  - Educate patient/family on patient safety including physical limitations  - Instruct patient to call for assistance with activity   - Consult OT/PT to assist with strengthening/mobility   - Keep Call bell within reach  - Keep bed low and locked with side rails adjusted as appropriate  - Keep care items and personal belongings within reach  - Initiate and maintain comfort rounds  - Make Fall Risk Sign visible to staff  - Offer Toileting every  Hours, in advance of need  - Initiate/Maintain alarm  - Obtain necessary fall risk management equipment:   - Apply yellow socks and bracelet for high fall risk patients  - Consider moving patient to room near nurses station  Outcome: Progressing  Goal: Maintain or return to baseline ADL function  Description: INTERVENTIONS:  -  Assess patient's ability to carry out ADLs; assess patient's baseline for ADL function and identify physical deficits which impact ability to perform ADLs (bathing, care of mouth/teeth, toileting, grooming, dressing, etc )  - Assess/evaluate cause of self-care deficits   - Assess range of motion  - Assess patient's mobility; develop plan if impaired  - Assess patient's need for assistive devices and provide as appropriate  - Encourage maximum independence but intervene and supervise when necessary  - Involve family in performance of ADLs  - Assess for home care needs following discharge   - Consider OT consult to assist with ADL evaluation and planning for discharge  - Provide patient education as appropriate  Outcome: Progressing  Goal: Maintains/Returns to pre admission functional level  Description: INTERVENTIONS:  - Perform BMAT or MOVE assessment daily    - Set and communicate daily mobility goal to care team and patient/family/caregiver  - Collaborate with rehabilitation services on mobility goals if consulted  - Perform Range of Motion times a day  - Reposition patient every  hours    - Dangle patient times a day  - Stand patie times a day  - Ambulate patient  times a day  - Out of bed to chair times a day   - Out of bed for meals  times a day  - Out of bed for toileting  - Record patient progress and toleration of activity level   Outcome: Progressing     Problem: DISCHARGE PLANNING  Goal: Discharge to home or other facility with appropriate resources  Description: INTERVENTIONS:  - Identify barriers to discharge w/patient and caregiver  - Arrange for needed discharge resources and transportation as appropriate  - Identify discharge learning needs (meds, wound care, etc )  - Arrange for interpretive services to assist at discharge as needed  - Refer to Case Management Department for coordinating discharge planning if the patient needs post-hospital services based on physician/advanced practitioner order or complex needs related to functional status, cognitive ability, or social support system  Outcome: Progressing     Problem: Knowledge Deficit  Goal: Patient/family/caregiver demonstrates understanding of disease process, treatment plan, medications, and discharge instructions  Description: Complete learning assessment and assess knowledge base    Interventions:  - Provide teaching at level of understanding  - Provide teaching via preferred learning methods  Outcome: Progressing

## 2022-10-22 NOTE — ED NOTES
Pt has been repositioned x 5 since arrival, pillows under hips and heels for comfort       [address], RN  10/22/22 [id number]

## 2022-10-22 NOTE — ASSESSMENT & PLAN NOTE
· Wears 2 L nasal cannula baseline     · Found to be hypoxic 85% on 2 L NC, now requiring 6 L NC  · Multifactorial in the setting of bilateral PE's, pleural effusion, CHF- continue treatment as outlined below  · Wean oxygen to keep oxygen saturation greater than 90%

## 2022-10-22 NOTE — ASSESSMENT & PLAN NOTE
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

## 2022-10-22 NOTE — ASSESSMENT & PLAN NOTE
· Suspect secondary to chronic indwelling Ball catheter  · UA abnormal, showing leukocytes pyuria and bacteriuria  · Does not meet sepsis criteria  · Follow-up urine culture, start Rocephin

## 2022-10-22 NOTE — ASSESSMENT & PLAN NOTE
· CTA showing moderate left and small right pleural effusions with subjacent compressive atelectasis    · Consult placed to Critical Care for evaluation

## 2022-10-22 NOTE — ASSESSMENT & PLAN NOTE
Lab Results   Component Value Date    HGBA1C 6 6 (H) 06/08/2022       No results for input(s): POCGLU in the last 72 hours      Blood Sugar Average: Last 72 hrs:  ·  Uses Humalog sliding scale at nursing home, continue sliding scale insulin while inpatient  · Hypoglycemia protocol

## 2022-10-22 NOTE — PLAN OF CARE
Problem: CARDIOVASCULAR - ADULT  Goal: Maintains optimal cardiac output and hemodynamic stability  Description: INTERVENTIONS:  - Monitor I/O, vital signs and rhythm SOB, CP, tachycardia  - Monitor for S/S and trends of decreased cardiac output  - Administer and titrate ordered vasoactive medications to optimize hemodynamic stability  - Assess quality of pulses, skin color and temperature  - Assess for signs of decreased coronary artery perfusion  - Instruct patient to report change in severity of symptoms  Outcome: Progressing

## 2022-10-22 NOTE — ASSESSMENT & PLAN NOTE
· Presents from SNF with 3 days of worsening shortness of breath  Per EMS found to be 85% on baseline 2L , now requiring up to 6L NC    · No prior history of blood clots  · Per chart review was previously on Eliquis for PAF, had admission at [facility] 2022 for T11 fracture, anemia in which it appears Eliquis was stopped and advised to review DOAC with PCP at discharge  Not currently on anticoagulation per nursing home records, patient unable to recall if it was discussed  · CTA pe study showing acute bilateral pulmonary embolism, borderline right heart strain, bilateral pleural effusions  · D-dimer > 9 on admission   · COVID negative   · Troponins flat   · Provoked recent fracture/immobility ?  Did not undergo surgery   · Currently on 6L NC, hemodynamically stable  · ED provider discussed with critical care who recommended admit to medicine service  · Continue heparin VTE protocol   · Check echocardiogram

## 2022-10-22 NOTE — H&P
[address] Jillian Mehta  H&P- Briana Ding 1940, 79 y o  female MRN: [id number]  Unit/Bed#: -01 Encounter: [id number]  Primary Care Provider: Norma Naylor DO   Date and time admitted to hospital: 10/22/2022 12:28 AM    * Bilateral pulmonary embolism [facility])  Assessment & Plan  · Presents from SNF with 3 days of worsening shortness of breath  Per EMS found to be 85% on baseline 2L , now requiring up to 6L NC    · No prior history of blood clots  · Per chart review was previously on Eliquis for PAF, had admission at [facility] September 2022 for T11 fracture, anemia in which it appears Eliquis was stopped and advised to review DOAC with PCP at discharge  Not currently on anticoagulation per nursing home records, patient unable to recall if it was discussed  · CTA pe study showing acute bilateral pulmonary embolism, borderline right heart strain, bilateral pleural effusions  · D-dimer > 9 on admission   · COVID negative   · Troponins flat   · Provoked recent fracture/immobility ? Did not undergo surgery   · Currently on 6L NC, hemodynamically stable  · ED provider discussed with critical care who recommended admit to medicine service  · Continue heparin VTE protocol   · Check echocardiogram     Pleural effusion  Assessment & Plan  · CTA showing moderate left and small right pleural effusions with subjacent compressive atelectasis  · Consult placed to Critical Care for evaluation    Acute on chronic respiratory failure with hypoxia (HCC)  Assessment & Plan  · Wears 2 L nasal cannula baseline     · Found to be hypoxic 85% on 2 L NC, now requiring 6 L NC  · Multifactorial in the setting of bilateral PE's, pleural effusion, CHF- continue treatment as outlined below  · Wean oxygen to keep oxygen saturation greater than 90%    Acute on chronic diastolic CHF (congestive heart failure) (HCC)  Assessment & Plan  Wt Readings from Last 3 Encounters:   10/22/22 98 6 kg (217 lb 6 oz)   09/30/22 95 7 kg (210 lb 15 7 oz)   11/12/21 110 kg (243 lb)       · Last echo in September 2022 shows Ef 92% , diastolic dysfunction   · Takes Bumex 1 mg daily at home  · Suspect component of acute CHF - CTA showing pleural effusions along with elevated BNP, lower extremity edema  · Received 1mg IV Bumex in the ED- will continue IV Bumex 1 mg BID for now  · Continue Entresto , Coreg  Check digoxin level, resume if normal          Acute cystitis  Assessment & Plan  · Suspect secondary to chronic indwelling Rivera catheter  · UA abnormal, showing leukocytes pyuria and bacteriuria  · Does not meet sepsis criteria  · Follow-up urine culture, start Rocephin      Hypernatremia  Assessment & Plan  · Na 148 on admission    · Blood glucose 111 on admission   · Calculated 2 7 L free water deficit  · Check urine electrolytes  · Start D5W  · Repeat BMP at noon     Chronic indwelling Rivera catheter  Assessment & Plan  · Present on admission  Unclear when catheter was placed originally,  suspect on prior admission in September 2022  · UA showing acute cystitis  · Recommending to change out Rivera catheter    Type 2 diabetes mellitus ([facility] [address] )  Assessment & Plan  Lab Results   Component Value Date    HGBA1C 6 6 (H) 06/08/2022       No results for input(s): POCGLU in the last 72 hours  Blood Sugar Average: Last 72 hrs:  ·  Uses Humalog sliding scale at nursing home, continue sliding scale insulin while inpatient  · Hypoglycemia protocol    COPD (chronic obstructive pulmonary disease) ([facility])  Assessment & Plan  · Not appear to be in acute exacerbation  · Continue home inhaler , neb treatments    T11 vertebral fracture ([facility])  Assessment & Plan  · Recent admission at [facility] after fall , found to have T11 fracture  · Complaining of left leg pain, appears chronic  Has chronic rivera catheter in place     · Per chart review : Has been following with neurosurgery outpatient, medically managing due to poor surgical candidate and patient electing to not undergo surgery   · CTA incidentally showing "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 with chronic fractures of the T10 and T11 vertebral bodies in spinal canal narrowing at this level which appears progressed compared to prior study"  · Continue TLSO brace  · Consult neurosurgery for further recs    Chronic anemia  Assessment & Plan  · Hgb 9 3 (baseline 8-9)  · Stable at baseline     VTE Pharmacologic Prophylaxis:   Moderate Risk (Score 3-4) - Pharmacological DVT Prophylaxis Ordered: heparin drip  Code Status: Level 3 - DNAR and DNI polst says full code, however discussed with patient and would like to be DNR/I   Discussion with family: Patient declined call to   Anticipated Length of Stay: Patient will be admitted on an inpatient basis with an anticipated length of stay of greater than 2 midnights secondary to acute pes , cystitis - hep gtt, abx   Total Time for Visit, including Counseling / Coordination of Care: 70 minutes Greater than 50% of this total time spent on direct patient counseling and coordination of care  Chief Complaint: shortness of breath    History of Present Illness:  Mary Dubose is a 79 y o  female with a PMH of atrial fibrillation- no AC, CHF, COPD who presents from SNF with increased shortness of breath x 3 days  Per EMS patient found to be 85% on baseline 2L nC  Per patient, no prior history of blood clots  Patient recalls she was on Eliquis but unable to recall when it was stopped and/or why ? Reports currently no shortness of breath  Denies fevers, dysuria, chest pain, GI complaints  Review of Systems:  Review of Systems   Constitutional: Negative for chills and fever  Respiratory: Positive for shortness of breath  Negative for cough  Cardiovascular: Positive for leg swelling  Negative for chest pain  Gastrointestinal: Negative for abdominal pain, constipation, diarrhea, nausea and vomiting  Genitourinary: Negative for dysuria and flank pain  Chronic rievra in place   Musculoskeletal: Positive for arthralgias and back pain  Neurological: Negative for dizziness, weakness, light-headedness and headaches  All other systems reviewed and are negative  Past Medical and Surgical History:   Past Medical History:   Diagnosis Date   • A-fib [facility])    • Cardiac pacemaker    • CHF (congestive heart failure) ([facility])    • Chronic cellulitis    • COPD (chronic obstructive pulmonary disease) ([facility])    • Diabetes mellitus ([facility])    • Edema    • High cholesterol    • Hyperparathyroidism ([address] )    • Hypertension    • Hyperuricemia    • Stage 4 chronic kidney disease ([facility])        Past Surgical History:   Procedure Laterality Date   • CARDIAC DEFIBRILLATOR PLACEMENT     •  SECTION      x 2   • COLON SURGERY     • HERNIA REPAIR         Meds/Allergies:  Prior to Admission medications    Medication Sig Start Date End Date Taking? Authorizing Provider   acetaminophen (TYLENOL) 325 mg tablet Take 3 tablets (975 mg total) by mouth every 8 (eight) hours 22   La Rolle PA-C   albuterol (2 5 mg/3 mL) 0 083 % nebulizer solution Inhale 2 5 mg 19   Historical Provider, MD   albuterol (PROVENTIL HFA,VENTOLIN HFA) 90 mcg/act inhaler Inhale 2 puffs 18   Historical Provider, MD   atorvastatin (LIPITOR) 10 mg tablet Take 10 mg by mouth daily 18   Historical Provider, MD   bisacodyl (DULCOLAX) 10 mg suppository Insert 10 mg into the rectum daily as needed for constipation    Historical Provider, MD   budesonide-formoterol (SYMBICORT) 160-4 5 mcg/act inhaler Inhale 2 puffs 2 (two) times a day Rinse mouth after use    Patient not taking: No sig reported    Historical Provider, MD   bumetanide (BUMEX) 2 mg tablet Take 1 tablet (2 mg total) by mouth daily 22   La Rolle PA-C   carvedilol (COREG) 12 5 mg tablet Take 1 tablet (12 5 mg total) by mouth 2 (two) times a day with meals 9/30/22   Kasi Rolle PA-C   digoxin 62 5 MCG TABS Take 1 tablet (62 5 mcg total) by mouth daily 9/30/22   Concha Alexandre PA-C   FERROUS SULFATE PO daily  Patient not taking: Reported on 10/22/2022 9/2/19   Historical Provider, MD   Fluticasone-Salmeterol (Advair) 100-50 mcg/dose inhaler Inhale 1 puff 2 (two) times a day Rinse mouth after use      Historical Provider, MD   gabapentin (NEURONTIN) 300 mg capsule Take 1 capsule (300 mg total) by mouth 3 (three) times a day 9/30/22   Kasi Rolle PA-C   insulin lispro (HumaLOG) 100 units/mL injection Inject 1-5 Units under the skin 4 (four) times a day (with meals and at bedtime)  Patient not taking: Reported on 10/22/2022 9/30/22   Kasi Rolle PA-C   ipratropium (ATROVENT) 0 02 % nebulizer solution Take 2 5 mL (0 5 mg total) by nebulization 3 (three) times a day 9/30/22   Kasi Rolle PA-C   levalbuterol (XOPENEX) 1 25 mg/0 5 mL nebulizer solution Take 0 5 mL (1 25 mg total) by nebulization 3 (three) times a day 9/30/22   Kasi Rolle PA-C   lidocaine (LIDODERM) 5 % Apply 1 patch topically daily Remove & Discard patch within 12 hours or as directed by MD 9/30/22   Kasi Rolle PA-C   magnesium hydroxide (MILK OF MAGNESIA) 400 mg/5 mL oral suspension Take 30 mL by mouth daily as needed for constipation    Historical Provider, MD   melatonin 3 mg Take 2 tablets (6 mg total) by mouth daily at bedtime 9/30/22   Kasi Rolle PA-C   methocarbamol (ROBAXIN) 750 mg tablet Take 1 tablet (750 mg total) by mouth every 6 (six) hours 9/30/22   Kasi Rolle PA-C   oxyCODONE (ROXICODONE) 5 immediate release tablet Take 2 5 mg by mouth every 6 (six) hours as needed for moderate pain 0 5    Historical Provider, MD   pantoprazole (PROTONIX) 40 mg tablet Take 1 tablet (40 mg total) by mouth 2 (two) times a day before meals 9/30/22   Duncan Rolle PA-C   polyethylene glycol (MIRALAX) 17 g packet Take 17 g by mouth daily as needed (Constipation) 9/30/22   Bebe Rolle PA-C   sacubitril-valsartan (ENTRESTO) 24-26 MG TABS Take 1 tablet by mouth 2 (two) times a day 9/30/22   Timothy Ortez PA-C   senna-docusate sodium (SENOKOT S) 8 6-50 mg per tablet Take 1 tablet by mouth 2 (two) times a day 9/30/22   Bebe Rolle PA-C   simethicone (MYLICON) 80 mg chewable tablet Chew 1 tablet (80 mg total) every 6 (six) hours as needed for flatulence 9/30/22   Timothy Ortez PA-C     I have reveiwed home medications using records provided by [facility]  Allergies: No Known Allergies    Social History:  Marital Status:   Patient Pre-hospital Living Situation: [facility]: [facility]  Patient Pre-hospital Level of Mobility: unable to be assessed at time of evaluation  Patient Pre-hospital Diet Restrictions: none  Substance Use History:   Social History     Substance and Sexual Activity   Alcohol Use Never     Social History     Tobacco Use   Smoking Status Never Smoker   Smokeless Tobacco Never Used     Social History     Substance and Sexual Activity   Drug Use Never       Family History:  Family History   Problem Relation Age of Onset   • Hypertension Mother    • Stroke Mother    • Heart disease Father        Physical Exam:     Vitals:   Blood Pressure: 135/85 (10/22/22 0557)  Pulse: 76 (10/22/22 0557)  Temperature: 97 7 °F (36 5 °C) (10/22/22 0557)  Temp Source: Temporal (10/22/22 0032)  Respirations: 20 (10/22/22 0557)  Height: 5' 4" (162 6 cm) (10/22/22 0032)  Weight - Scale: 98 6 kg (217 lb 6 oz) (10/22/22 0032)  SpO2: 99 % (10/22/22 0557)    Physical Exam  Vitals and nursing note reviewed  Constitutional:       General: She is not in acute distress  Appearance: She is obese  She is ill-appearing  She is not toxic-appearing  Comments: Chronically ill appearing     HENT:      Head: Normocephalic  Cardiovascular:      Rate and Rhythm: Normal rate  Pulses: Normal pulses        Heart sounds: Normal heart sounds  Pulmonary:      Effort: Pulmonary effort is normal  No respiratory distress  Breath sounds: Rhonchi present  No wheezing or rales  Comments: Course , diminished at bases  Abdominal:      General: Bowel sounds are normal  There is no distension  Palpations: Abdomen is soft  Tenderness: There is no abdominal tenderness  There is no guarding  Musculoskeletal:         General: Normal range of motion  Cervical back: Normal range of motion  Right lower leg: Edema present  Left lower leg: Edema present  Comments: Warm to touch , + 1 pulse   Skin:     General: Skin is warm and dry  Neurological:      General: No focal deficit present  Mental Status: She is alert  Additional Data:     Lab Results:  Results from last 7 days   Lab Units 10/22/22  0049   WBC Thousand/uL 6 18   HEMOGLOBIN g/dL 9 3*   HEMATOCRIT % 32 0*   PLATELETS Thousands/uL 132*   NEUTROS PCT % 77*   LYMPHS PCT % 12*   MONOS PCT % 7   EOS PCT % 2     Results from last 7 days   Lab Units 10/22/22  0049   SODIUM mmol/L 148*   POTASSIUM mmol/L 4 2   CHLORIDE mmol/L 109*   CO2 mmol/L 36*   BUN mg/dL 55*   CREATININE mg/dL 0 90   ANION GAP mmol/L 3*   CALCIUM mg/dL 8 4   ALBUMIN g/dL 2 0*   TOTAL BILIRUBIN mg/dL 0 55   ALK PHOS U/L 105   ALT U/L 9*   AST U/L 11   GLUCOSE RANDOM mg/dL 111     Results from last 7 days   Lab Units 10/22/22  0049   INR  1 21*             Results from last 7 days   Lab Units 10/22/22  0049   LACTIC ACID mmol/L 1 0       Imaging: Reviewed radiology reports from this admission including: chest CT scan  CTA ED chest PE study   Final Result by Tom Pacheco MD (10/22 9316)      Acute bilateral pulmonary emboli as above  The calculated RV/LV ratio is approximately 0 9 suggesting borderline right heart strain  Correlate clinically  Moderate left and small right pleural effusions with subjacent compressive atelectasis  Cardiomegaly  Redemonstrated diffuse osteopenia/ankylosing spondylitis within the visualized thoracic spine with chronic fractures at the T10 and T11 vertebral bodies and spinal canal narrowing at this level which appears progressed compared to prior study dated    9/17/2022 although suboptimally evaluated  Consider further evaluation with dedicated imaging as clinically warranted  Above findings discussed with Dr Valentino Generous at 4:30 AM on 10/22/2022  Workstation performed: [id number]         XR chest 1 view portable   ED Interpretation by Anderson Davis DO (10/22 0052)   Limited due to rotation and body habitus moderate left pleural effusion and atelectasis versus infiltrate          EKG and Other Studies Reviewed on Admission:   · EKG: Paced rhythm  HR 83     ** Please Note: This note has been constructed using a voice recognition system   **

## 2022-10-22 NOTE — ED PROVIDER NOTES
History  Chief Complaint   Patient presents with   • Breathing Difficulty     From [facility] reports SOB x 3 days - staff reported worsening difficulty breathing since 2100 tonight  Per EMS SpO2 85% 2L NC, 96% on 8L NRB  Given 1 albuterol tx at nursing home with improvement  Patient is a an 60-year-old female with history of atrial fibrillation CHF and COPD recent T11 fracture presents the emergency department due to shortness of breath progressively worsening over the past 3 days  ems reportedly found patient's O2 saturation to be 85% on 2 L nasal canula  Patient placed on 8 L non-rebreather mask with improvement given 1 albuterol neb treatment as well  Patient denies any chest pain or fevers or cough  Patient has been complaining of left leg pain  History provided by:  Patient, nursing home and EMS personnel  Shortness of Breath  Severity:  Moderate  Onset quality:  Gradual  Duration:  3 days  Timing:  Constant  Progression:  Worsening  Chronicity:  Recurrent  Associated symptoms: no abdominal pain, no chest pain, no cough, no ear pain, no fever, no headaches, no rash, no sore throat, no vomiting and no wheezing        Prior to Admission Medications   Prescriptions Last Dose Informant Patient Reported? Taking? [address]  (Specify) Yes No   Sig: daily   Fluticasone-Salmeterol (Advair) 100-50 mcg/dose inhaler  Outside Facility (Specify) Yes No   Sig: Inhale 1 puff 2 (two) times a day Rinse mouth after use     acetaminophen (TYLENOL) 325 mg tablet  Outside Facility (Specify) No No   Sig: Take 3 tablets (975 mg total) by mouth every 8 (eight) hours   albuterol (2 5 mg/3 mL) 0 083 % nebulizer solution  Outside Facility (Specify) Yes No   Sig: Inhale 2 5 mg   albuterol (PROVENTIL HFA,VENTOLIN HFA) 90 mcg/act inhaler  Outside Facility (Specify) Yes No   Sig: Inhale 2 puffs   atorvastatin (LIPITOR) 10 mg tablet  Outside Facility (Specify) Yes No   Sig: Take 10 mg by mouth daily   bisacodyl (DULCOLAX) 10 mg suppository  Outside Facility (Specify) Yes No   Sig: Insert 10 mg into the rectum daily as needed for constipation   budesonide-formoterol (SYMBICORT) 160-4 5 mcg/act inhaler  Outside Facility (Specify) Yes No   Sig: Inhale 2 puffs 2 (two) times a day Rinse mouth after use     Patient not taking: No sig reported   bumetanide (BUMEX) 2 mg tablet  Outside Facility (Specify) No No   Sig: Take 1 tablet (2 mg total) by mouth daily   carvedilol (COREG) 12 5 mg tablet  Outside Facility (Specify) No No   Sig: Take 1 tablet (12 5 mg total) by mouth 2 (two) times a day with meals   digoxin 62 5 MCG TABS  Outside Facility (Specify) No No   Sig: Take 1 tablet (62 5 mcg total) by mouth daily   gabapentin (NEURONTIN) 300 mg capsule  Outside Facility (Specify) No No   Sig: Take 1 capsule (300 mg total) by mouth 3 (three) times a day   insulin lispro (HumaLOG) 100 units/mL injection  Outside Facility (Specify) No No   Sig: Inject 1-5 Units under the skin 4 (four) times a day (with meals and at bedtime)   ipratropium (ATROVENT) 0 02 % nebulizer solution  Outside Facility (Specify) No No   Sig: Take 2 5 mL (0 5 mg total) by nebulization 3 (three) times a day   levalbuterol (XOPENEX) 1 25 mg/0 5 mL nebulizer solution  Outside Facility (Specify) No No   Sig: Take 0 5 mL (1 25 mg total) by nebulization 3 (three) times a day   lidocaine (LIDODERM) 5 %  Outside Facility (Specify) No No   Sig: Apply 1 patch topically daily Remove & Discard patch within 12 hours or as directed by MD   magnesium hydroxide (MILK OF MAGNESIA) 400 mg/5 mL oral suspension  Outside Facility (Specify) Yes No   Sig: Take 30 mL by mouth daily as needed for constipation   melatonin 3 mg  Outside Facility (Specify) No No   Sig: Take 2 tablets (6 mg total) by mouth daily at bedtime   methocarbamol (ROBAXIN) 750 mg tablet  Outside Facility (Specify) No No   Sig: Take 1 tablet (750 mg total) by mouth every 6 (six) hours oxyCODONE (ROXICODONE) 5 immediate release tablet  Outside Facility (Specify) Yes No   Sig: Take 5 mg by mouth every 6 (six) hours as needed for moderate pain 0 5   pantoprazole (PROTONIX) 40 mg tablet  Outside Facility (Specify) No No   Sig: Take 1 tablet (40 mg total) by mouth 2 (two) times a day before meals   polyethylene glycol (MIRALAX) 17 g packet  Outside Facility (Specify) No No   Sig: Take 17 g by mouth daily as needed (Constipation)   sacubitril-valsartan (ENTRESTO) 24-26 MG TABS  Outside Facility (Specify) No No   Sig: Take 1 tablet by mouth 2 (two) times a day   senna-docusate sodium (SENOKOT S) 8 6-50 mg per tablet  Outside Facility (Specify) No No   Sig: Take 1 tablet by mouth 2 (two) times a day   simethicone (MYLICON) 80 mg chewable tablet  Outside Facility (Specify) No No   Sig: Chew 1 tablet (80 mg total) every 6 (six) hours as needed for flatulence      Facility-Administered Medications: None       Past Medical History:   Diagnosis Date   • A-fib ([facility])    • Cardiac pacemaker    • CHF (congestive heart failure) ([facility])    • Chronic cellulitis    • COPD (chronic obstructive pulmonary disease) ([facility])    • Diabetes mellitus ([facility])    • Edema    • High cholesterol    • Hyperparathyroidism ([facility])    • Hypertension    • Hyperuricemia    • Stage 4 chronic kidney disease ([facility])        Past Surgical History:   Procedure Laterality Date   • CARDIAC DEFIBRILLATOR PLACEMENT     •  SECTION      x 2   • COLON SURGERY     • HERNIA REPAIR         Family History   Problem Relation Age of Onset   • Hypertension Mother    • Stroke Mother    • Heart disease Father      I have reviewed and agree with the history as documented      E-Cigarette/Vaping   • E-Cigarette Use Never User      E-Cigarette/Vaping Substances   • Nicotine No    • THC No    • CBD No    • Flavoring No      Social History     Tobacco Use   • Smoking status: Never Smoker   • Smokeless tobacco: Never Used   Vaping Use   • Vaping Use: Never used Substance Use Topics   • Alcohol use: Never   • Drug use: Never       Review of Systems   Constitutional: Negative for activity change, appetite change, chills, fatigue and fever  HENT: Negative for congestion, ear pain, rhinorrhea and sore throat  Eyes: Negative for discharge, redness and visual disturbance  Respiratory: Positive for shortness of breath  Negative for cough, chest tightness and wheezing  Cardiovascular: Positive for leg swelling  Negative for chest pain and palpitations  Left leg pain   Gastrointestinal: Negative for abdominal pain, constipation, diarrhea, nausea and vomiting  Endocrine: Negative for polydipsia and polyuria  Genitourinary: Negative for difficulty urinating, dysuria, frequency, hematuria and urgency  Musculoskeletal: Negative for arthralgias and myalgias  Left leg pain   Skin: Negative for color change, pallor and rash  Neurological: Negative for dizziness, weakness, light-headedness, numbness and headaches  Hematological: Negative for adenopathy  Does not bruise/bleed easily  All other systems reviewed and are negative  Physical Exam  Physical Exam  Vitals and nursing note reviewed  Constitutional:       Appearance: She is well-developed  HENT:      Head: Normocephalic and atraumatic  Right Ear: External ear normal       Left Ear: External ear normal       Nose: Nose normal    Eyes:      Conjunctiva/sclera: Conjunctivae normal       Pupils: Pupils are equal, round, and reactive to light  Cardiovascular:      Rate and Rhythm: Normal rate  Rhythm irregularly irregular  Heart sounds: Normal heart sounds  Pulmonary:      Effort: Pulmonary effort is normal  No respiratory distress  Breath sounds: Examination of the right-lower field reveals decreased breath sounds  Examination of the left-lower field reveals decreased breath sounds  Decreased breath sounds present  No wheezing or rales     Chest:      Chest wall: No tenderness  Abdominal:      General: Bowel sounds are normal  There is no distension  Palpations: Abdomen is soft  Tenderness: There is no abdominal tenderness  There is no guarding  Musculoskeletal:         General: Normal range of motion  Cervical back: Normal range of motion and neck supple  Right lower leg: Edema present  Left lower leg: Edema present  Skin:     General: Skin is warm and dry  Neurological:      Mental Status: She is alert and oriented to person, place, and time  Cranial Nerves: No cranial nerve deficit  Sensory: No sensory deficit           Vital Signs  ED Triage Vitals   Temperature Pulse Respirations Blood Pressure SpO2   10/22/22 0032 10/22/22 0032 10/22/22 0032 10/22/22 0032 10/22/22 0031   97 6 °F (36 4 °C) 84 (!) 27 113/56 99 %      Temp Source Heart Rate Source Patient Position - Orthostatic VS BP Location FiO2 (%)   10/22/22 0032 10/22/22 0032 10/22/22 0032 10/22/22 0032 --   Temporal Monitor Lying Right arm       Pain Score       10/22/22 0032       No Pain           Vitals:    10/22/22 0145 10/22/22 0330 10/22/22 0345 10/22/22 0400   BP: 108/53 135/63 121/64 126/59   Pulse: 73 74 71 73   Patient Position - Orthostatic VS:             Visual Acuity      ED Medications  Medications   heparin (porcine) injection 7,600 Units (has no administration in time range)   heparin (porcine) 25,000 units in 0 45% NaCl 250 mL infusion (premix) (has no administration in time range)   heparin (porcine) injection 7,600 Units (has no administration in time range)   heparin (porcine) injection 3,800 Units (has no administration in time range)   dexamethasone (DECADRON) injection 10 mg (10 mg Intravenous Given 10/22/22 0046)   ipratropium-albuterol (DUO-NEB) 0 5-2 5 mg/3 mL inhalation solution 3 mL (3 mL Nebulization Given 10/22/22 0113)   bumetanide (BUMEX) injection 1 mg (1 mg Intravenous Given 10/22/22 0146)   azithromycin (ZITHROMAX) 500 mg in sodium chloride 0 9 % 250 mL IVPB (0 mg Intravenous Stopped 10/22/22 0335)   sodium chloride 0 9 % bolus 500 mL (0 mL Intravenous Stopped 10/22/22 0429)   iohexol (OMNIPAQUE) 350 MG/ML injection (SINGLE-DOSE) 100 mL (100 mL Intravenous Given 10/22/22 0229)       Diagnostic Studies  Results Reviewed     Procedure Component Value Units Date/Time    APTT [[id number]] Collected: 10/22/22 0437    Lab Status: In process Specimen: Blood from Arm, Left Updated: 10/22/22 0449    HS Troponin I 4hr [[id number]] Collected: 10/22/22 0437    Lab Status: In process Specimen: Blood from Arm, Left Updated: 10/22/22 0449    HS Troponin I 2hr [[id number]]  (Normal) Collected: 10/22/22 0244    Lab Status: Final result Specimen: Blood from Arm, Right Updated: 10/22/22 0320     hs TnI 2hr 28 ng/L      Delta 2hr hsTnI -6 ng/L     Urine Microscopic [[id number]]  (Abnormal) Collected: 10/22/22 0054    Lab Status: Final result Specimen: Urine, Indwelling Ball Catheter Updated: 10/22/22 0219     RBC, UA None Seen /hpf      WBC, UA Innumerable /hpf      Epithelial Cells Moderate /hpf      Bacteria, UA Innumerable /hpf      AMORPH URATES Moderate /hpf      OTHER OBSERVATIONS Renal Tubule Epithelial Cells Present    Urine culture [[id number]] Collected: 10/22/22 0054    Lab Status: In process Specimen: Urine, Indwelling Ball Catheter Updated: 10/22/22 0218    FLU/RSV/COVID - if FLU/RSV clinically relevant [[id number]]  (Normal) Collected: 10/22/22 0049    Lab Status: Final result Specimen: Nares from Nasopharyngeal Swab Updated: 10/22/22 0205     SARS-CoV-2 Negative     INFLUENZA A PCR Negative     INFLUENZA B PCR Negative     RSV PCR Negative    Narrative:      FOR PEDIATRIC PATIENTS - copy/paste COVID Guidelines URL to browser: [email or web address]    SARS-CoV-2 assay is a Nucleic Acid Amplification assay intended for the  qualitative detection of nucleic acid from SARS-CoV-2 in nasopharyngeal  swabs   Results are for the presumptive identification of SARS-CoV-2 RNA  Positive results are indicative of infection with SARS-CoV-2, the virus  causing COVID-19, but do not rule out bacterial infection or co-infection  with other viruses  Laboratories within the [place] and its  territories are required to report all positive results to the appropriate  public health authorities  Negative results do not preclude SARS-CoV-2  infection and should not be used as the sole basis for treatment or other  patient management decisions  Negative results must be combined with  clinical observations, patient history, and epidemiological information  This test has not been FDA cleared or approved  This test has been authorized by FDA under an Emergency Use Authorization  (EUA)  This test is only authorized for the duration of time the  declaration that circumstances exist justifying the authorization of the  emergency use of an in vitro diagnostic tests for detection of SARS-CoV-2  virus and/or diagnosis of COVID-19 infection under section 564(b)(1) of  the Act, 21 U  S C  164BOD-1(N)(6), unless the authorization is terminated  or revoked sooner  The test has been validated but independent review by FDA  and CLIA is pending  Test performed using [facility] GeneXpert: This RT-PCR assay targets N2,  a region unique to SARS-CoV-2  A conserved region in the E-gene was chosen  for pan-Sarbecovirus detection which includes SARS-CoV-2  According to CMS-2020-01-R, this platform meets the definition of high-throughput technology  D-Dimer [[id number]]  (Abnormal) Collected: 10/22/22 0049    Lab Status: Final result Specimen: Blood from Arm, Right Updated: 10/22/22 0144     D-Dimer, Quant 9 48 ug/ml FEU     Narrative:       In the evaluation for possible pulmonary embolism, in the appropriate (Well's Score of 4 or less) patient, the age adjusted d-dimer cutoff for this patient can be calculated as:    Age x 0 01 (in ug/mL) for Age-adjusted D-dimer exclusion threshold for a patient over 50 years  UA w Reflex to Microscopic w Reflex to Culture [[id number]]  (Abnormal) Collected: 10/22/22 0054    Lab Status: Final result Specimen: Urine, Indwelling Ball Catheter Updated: 10/22/22 0138     Color, UA Yellow     Clarity, UA Cloudy     Specific [place], UA 1 010     pH, UA 5 5     Leukocytes, UA Large     Nitrite, UA Negative     Protein, UA Negative mg/dl      Glucose, UA Negative mg/dl      Ketones, UA Negative mg/dl      Urobilinogen, UA 0 2 E U /dl      Bilirubin, UA Negative     Occult Blood, UA Moderate    Lactic acid [[id number]]  (Normal) Collected: 10/22/22 0049    Lab Status: Final result Specimen: Blood from Arm, Right Updated: 10/22/22 0136     LACTIC ACID 1 0 mmol/L     Narrative:      Result may be elevated if tourniquet was used during collection      Protime-INR [[id number]]  (Abnormal) Collected: 10/22/22 0049    Lab Status: Final result Specimen: Blood from Arm, Right Updated: 10/22/22 0136     Protime 15 4 seconds      INR 1 21    APTT [[id number]]  (Normal) Collected: 10/22/22 0049    Lab Status: Final result Specimen: Blood from Arm, Right Updated: 10/22/22 0136     PTT 31 seconds     NT-BNP PRO [[id number]]  (Abnormal) Collected: 10/22/22 0049    Lab Status: Final result Specimen: Blood from Arm, Right Updated: 10/22/22 0127     NT-proBNP 3,397 pg/mL     HS Troponin 0hr (reflex protocol) [[id number]]  (Normal) Collected: 10/22/22 0049    Lab Status: Final result Specimen: Blood from Arm, Right Updated: 10/22/22 0126     hs TnI 0hr 34 ng/L     Comprehensive metabolic panel [[id number]]  (Abnormal) Collected: 10/22/22 0049    Lab Status: Final result Specimen: Blood from Arm, Right Updated: 10/22/22 0124     Sodium 148 mmol/L      Potassium 4 2 mmol/L      Chloride 109 mmol/L      CO2 36 mmol/L      ANION GAP 3 mmol/L      BUN 55 mg/dL      Creatinine 0 90 mg/dL      Glucose 111 mg/dL      Calcium 8 4 mg/dL      Corrected Calcium 10 0 mg/dL      AST 11 U/L      ALT 9 U/L      Alkaline Phosphatase 105 U/L      Total Protein 5 6 g/dL      Albumin 2 0 g/dL      Total Bilirubin 0 55 mg/dL      eGFR 59 ml/min/1 73sq m     Narrative:      [place] guidelines for Chronic Kidney Disease (CKD):   •  Stage 1 with normal or high GFR (GFR > 90 mL/min/1 73 square meters)  •  Stage 2 Mild CKD (GFR = 60-89 mL/min/1 73 square meters)  •  Stage 3A Moderate CKD (GFR = 45-59 mL/min/1 73 square meters)  •  Stage 3B Moderate CKD (GFR = 30-44 mL/min/1 73 square meters)  •  Stage 4 Severe CKD (GFR = 15-29 mL/min/1 73 square meters)  •  Stage 5 End Stage CKD (GFR <15 mL/min/1 73 square meters)  Note: GFR calculation is accurate only with a steady state creatinine    Blood culture #1 [[id number]] Collected: 10/22/22 0106    Lab Status: In process Specimen: Blood from Hand, Right Updated: 10/22/22 0111    CBC and differential [[id number]]  (Abnormal) Collected: 10/22/22 0049    Lab Status: Final result Specimen: Blood from Arm, Right Updated: 10/22/22 0059     WBC 6 18 Thousand/uL      RBC 3 24 Million/uL      Hemoglobin 9 3 g/dL      Hematocrit 32 0 %      MCV 99 fL      MCH 28 7 pg      MCHC 29 1 g/dL      RDW 17 2 %      MPV 10 3 fL      Platelets 600 Thousands/uL      nRBC 0 /100 WBCs      Neutrophils Relative 77 %      Immat GRANS % 1 %      Lymphocytes Relative 12 %      Monocytes Relative 7 %      Eosinophils Relative 2 %      Basophils Relative 1 %      Neutrophils Absolute 4 82 Thousands/µL      Immature Grans Absolute 0 04 Thousand/uL      Lymphocytes Absolute 0 72 Thousands/µL      Monocytes Absolute 0 45 Thousand/µL      Eosinophils Absolute 0 12 Thousand/µL      Basophils Absolute 0 03 Thousands/µL     Blood culture #2 [[id number]] Collected: 10/22/22 0048    Lab Status:  In process Specimen: Blood from Arm, Right Updated: 10/22/22 9802                 CTA ED chest PE study   Final Result by Eriberto Holley MD (10/22 4345)      Acute bilateral pulmonary emboli as above  The calculated RV/LV ratio is approximately 0 9 suggesting borderline right heart strain  Correlate clinically  Moderate left and small right pleural effusions with subjacent compressive atelectasis  Cardiomegaly  Redemonstrated diffuse osteopenia/ankylosing spondylitis within the visualized thoracic spine with chronic fractures at the T10 and T11 vertebral bodies and spinal canal narrowing at this level which appears progressed compared to prior study dated    9/17/2022 although suboptimally evaluated  Consider further evaluation with dedicated imaging as clinically warranted  Above findings discussed with Dr Randi Goldstein at 4:30 AM on 10/22/2022  Workstation performed: [id number]         XR chest 1 view portable   ED Interpretation by Igor Zelaya DO (10/22 0052)   Limited due to rotation and body habitus moderate left pleural effusion and atelectasis versus infiltrate                 Procedures  ECG 12 Lead Documentation Only    Date/Time: 10/22/2022 12:38 AM  Performed by: Igor Zelaya DO  Authorized by: Igor Zelaya DO     ECG reviewed by me, the ED Provider: yes    Patient location:  ED  Quality:     Tracing quality:  Limited by artifact  Rate:     ECG rate:  83    ECG rate assessment: normal    Rhythm:     Rhythm: paced    QRS:     QRS axis:  Left    QRS intervals: Wide  Conduction:     Conduction: abnormal      Abnormal conduction: non-specific intraventricular conduction delay    ST segments:     ST segments:  Non-specific  T waves:     T waves: non-specific               ED Course  ED Course as of 10/22/22 0452   Sat Oct 22, 2022   0440 Spoke with Dr Natasha Lam critical Care on-call reviewed case and findings in the emergency department and management thus far  She recommends place patient on heparin drip which has been ordered and admit to [facility] service    Spoke with hospitalist advanced practitioner on-call Eliseo Song reviewed case and findings in the emergency department management thus far and critical care recommendation she accepts for admission on behalf Dr Genesis Zheng  SBIRT 20yo+    Flowsheet Row Most Recent Value   SBIRT (25 yo +)    In order to provide better care to our patients, we are screening all of our patients for alcohol and drug use  Would it be okay to ask you these screening questions? Yes Filed at: 10/22/2022 5575   Initial Alcohol Screen: US AUDIT-C     1  How often do you have a drink containing alcohol? 0 Filed at: 10/22/2022 0112   2  How many drinks containing alcohol do you have on a typical day you are drinking? 0 Filed at: 10/22/2022 0112   3b  FEMALE Any Age, or MALE 65+: How often do you have 4 or more drinks on one occassion? 0 Filed at: 10/22/2022 0112   Audit-C Score 0 Filed at: 10/22/2022 1720   JASON: How many times in the past year have you    Used an illegal drug or used a prescription medication for non-medical reasons?  Never Filed at: 10/22/2022 0112                    MDM  Number of Diagnoses or Management Options  Bilateral pulmonary embolism [facility]): new and requires workup  CHF (congestive heart failure) ([facility] [address] ): new and requires workup  COPD exacerbation ([facility] [address] ): new and requires workup  Hypernatremia: new and requires workup  Hypoxia: new and requires workup     Amount and/or Complexity of Data Reviewed  Clinical lab tests: ordered and reviewed  Tests in the radiology section of CPT®: ordered and reviewed  Tests in the medicine section of CPT®: reviewed and ordered  Decide to obtain previous medical records or to obtain history from someone other than the patient: yes  Review and summarize past medical records: yes  Independent visualization of images, tracings, or specimens: yes    Risk of Complications, Morbidity, and/or Mortality  Presenting problems: moderate  Diagnostic procedures: moderate  Management options: moderate    Patient Progress  Patient progress: stable      Disposition  Final diagnoses:   COPD exacerbation ([facility] [address] )   Hypoxia   CHF (congestive heart failure) ([facility])   Hypernatremia   B[facility] pulmonary embol[facility] ([facility]     Time reflects when diagnosis was documented in both MDM as applicable and the Disposition within this note     Time User Action Codes Description Comment    10/22/2022  1:31 AM Alvin Blackbird Add [J44 1] COPD exacerbation ([facility] [address] )     10/22/2022  1:32 AM Harry Curiel Add [R09 02] Hypoxia     10/22/2022  1:32 AM Harry Curiel Add [I50 9] CHF (congestive heart failure) ([facility] [address] )     10/22/2022  2:04 AM Alvin Blackbird Add [R49 21] Hypernasality     10/22/2022  2:04 AM Alvin Blackbird Remove [R49 21] Hypernasality     10/22/2022  2:04 AM Harry Gibbons Add [E87 0] Hypernatremia     10/22/2022  4:38 AM Alvin Blackbird Add [I26 99] Bilateral pulmonary embolism ([facility] [address] )     10/22/2022  4:38 AM Alvin Blackbird Modify [J44 1] COPD exacerbation ([facility] )     10/22/2022  4:38 AM Alvin Blackbird Modify [I26 99] Bilateral pulmonary embolism [facility])       ED Disposition     ED Disposition   Admit    Condition   Stable    Date/Time   Sat Oct 22, 2022  4:38 AM    Comment   Case was discussed with Dr Lelo Da Silva and the patient's admission status was agreed to be Admission Status: inpatient status to the service of Dr Lelo Da Silva  Follow-up Information    None         Patient's Medications   Discharge Prescriptions    No medications on file       No discharge procedures on file      PDMP Review       Value Time User    PDMP Reviewed  Yes 9/30/2022  7:50 AM Sally Sanches PA-C          ED Provider  Electronically Signed by           Janes Coffey DO  10/22/22 0403

## 2022-10-22 NOTE — ASSESSMENT & PLAN NOTE
· Na 148 on admission    · Blood glucose 111 on admission   · Calculated 2 7 L free water deficit  · Check urine electrolytes  · Start D5W  · Repeat BMP at noon

## 2022-10-22 NOTE — ASSESSMENT & PLAN NOTE
· Present on admission  Unclear when catheter was placed originally,  suspect on prior admission in September 2022    · UA showing acute cystitis  · Recommending to change out Ball catheter

## 2022-10-22 NOTE — CASE MANAGEMENT
Case Management Discharge Planning Note    Patient name Edmond Mcneill  Location /-90 MRN [id number]  : 1940 Date 10/22/2022       Current Admission Date: 10/22/2022  Current Admission Diagnosis:Bilateral pulmonary embolism [facility])   Patient Active Problem List    Diagnosis Date Noted   • Acute on chronic diastolic CHF (congestive heart failure) ([address] ) 10/22/2022   • Bilateral pulmonary embolism ([address] ) 10/22/2022   • Pleural effusion 10/22/2022   • Chronic indwelling Ball catheter 10/22/2022   • Acute cystitis 10/22/2022   • Hypernatremia 10/22/2022   • Acute on chronic respiratory failure with hypoxia ([address] ) 2022   • Type 2 diabetes mellitus ([address] ) 2022   • Fall 2022   • COPD (chronic obstructive pulmonary disease) ([facility] ) 2022   • T11 vertebral fracture ([facility] ) 2022   • HFrEF (heart failure with reduced ejection fraction) ([facility] ) 2021   • Permanent atrial fibrillation ([facility] ) 2021   • Stage 3 chronic kidney disease ([facility] ) 2021   • Chronic anemia 2021      LOS (days): 0  Geometric Mean LOS (GMLOS) (days):   Days to GMLOS:     OBJECTIVE:  Risk of Unplanned Readmission Score: 36 32         Current admission status: Inpatient   Preferred Pharmacy:   [facility] [address] [id number]  Phone: [phone] Fax: [phone]    Primary Care Provider: Yadira Daniels DO    Primary Insurance: [facility] [employer] [employer]  Secondary Insurance:     DISCHARGE DETAILS:     CM submitted [address] referral to [facility] as it appears patient has been at facility since 22

## 2022-10-22 NOTE — ASSESSMENT & PLAN NOTE
Wt Readings from Last 3 Encounters:   10/22/22 98 6 kg (217 lb 6 oz)   09/30/22 95 7 kg (210 lb 15 7 oz)   11/12/21 110 kg (243 lb)       · Last echo in September 2022 shows Ef 55% , diastolic dysfunction   · Takes Bumex 1 mg daily at home  · Suspect component of acute CHF - CTA showing pleural effusions along with elevated BNP, lower extremity edema  · Received 1mg IV Bumex in the ED- will continue IV Bumex 1 mg BID for now  · Continue Entresto , Coreg   Check digoxin level, resume if normal

## 2022-10-23 LAB
ANION GAP SERPL CALCULATED.3IONS-SCNC: 3 MMOL/L (ref 4–13)
APTT PPP: 192 SECONDS (ref 23–37)
APTT PPP: 60 SECONDS (ref 23–37)
APTT PPP: 76 SECONDS (ref 23–37)
ATRIAL RATE: 468 BPM
ATRIAL RATE: 75 BPM
BUN SERPL-MCNC: 54 MG/DL (ref 5–25)
CALCIUM SERPL-MCNC: 8 MG/DL (ref 8.3–10.1)
CHLORIDE SERPL-SCNC: 107 MMOL/L (ref 96–108)
CO2 SERPL-SCNC: 35 MMOL/L (ref 21–32)
CREAT SERPL-MCNC: 0.83 MG/DL (ref 0.6–1.3)
ERYTHROCYTE [DISTWIDTH] IN BLOOD BY AUTOMATED COUNT: 16.9 % (ref 11.6–15.1)
GFR SERPL CREATININE-BSD FRML MDRD: 65 ML/MIN/1.73SQ M
GLUCOSE SERPL-MCNC: 107 MG/DL (ref 65–140)
GLUCOSE SERPL-MCNC: 110 MG/DL (ref 65–140)
GLUCOSE SERPL-MCNC: 114 MG/DL (ref 65–140)
GLUCOSE SERPL-MCNC: 127 MG/DL (ref 65–140)
GLUCOSE SERPL-MCNC: 243 MG/DL (ref 65–140)
HCT VFR BLD AUTO: 32.4 % (ref 34.8–46.1)
HGB BLD-MCNC: 9.4 G/DL (ref 11.5–15.4)
MAGNESIUM SERPL-MCNC: 1.9 MG/DL (ref 1.6–2.6)
MCH RBC QN AUTO: 28.7 PG (ref 26.8–34.3)
MCHC RBC AUTO-ENTMCNC: 29 G/DL (ref 31.4–37.4)
MCV RBC AUTO: 99 FL (ref 82–98)
MRSA NOSE QL CULT: NORMAL
P AXIS: 12 DEGREES
PLATELET # BLD AUTO: 126 THOUSANDS/UL (ref 149–390)
PMV BLD AUTO: 10.4 FL (ref 8.9–12.7)
POTASSIUM SERPL-SCNC: 4.2 MMOL/L (ref 3.5–5.3)
QRS AXIS: -66 DEGREES
QRS AXIS: 158 DEGREES
QRSD INTERVAL: 118 MS
QRSD INTERVAL: 126 MS
QT INTERVAL: 354 MS
QT INTERVAL: 412 MS
QTC INTERVAL: 415 MS
QTC INTERVAL: 447 MS
RBC # BLD AUTO: 3.27 MILLION/UL (ref 3.81–5.12)
SODIUM SERPL-SCNC: 145 MMOL/L (ref 135–147)
T WAVE AXIS: 208 DEGREES
T WAVE AXIS: 5 DEGREES
VENTRICULAR RATE: 71 BPM
VENTRICULAR RATE: 83 BPM
WBC # BLD AUTO: 5.41 THOUSAND/UL (ref 4.31–10.16)

## 2022-10-23 PROCEDURE — 94640 AIRWAY INHALATION TREATMENT: CPT

## 2022-10-23 PROCEDURE — 94760 N-INVAS EAR/PLS OXIMETRY 1: CPT

## 2022-10-23 PROCEDURE — 99232 SBSQ HOSP IP/OBS MODERATE 35: CPT | Performed by: FAMILY MEDICINE

## 2022-10-23 PROCEDURE — 85027 COMPLETE CBC AUTOMATED: CPT | Performed by: FAMILY MEDICINE

## 2022-10-23 PROCEDURE — 83735 ASSAY OF MAGNESIUM: CPT | Performed by: FAMILY MEDICINE

## 2022-10-23 PROCEDURE — 80048 BASIC METABOLIC PNL TOTAL CA: CPT | Performed by: FAMILY MEDICINE

## 2022-10-23 PROCEDURE — 82948 REAGENT STRIP/BLOOD GLUCOSE: CPT

## 2022-10-23 PROCEDURE — 85730 THROMBOPLASTIN TIME PARTIAL: CPT | Performed by: FAMILY MEDICINE

## 2022-10-23 PROCEDURE — 94660 CPAP INITIATION&MGMT: CPT

## 2022-10-23 RX ORDER — CARVEDILOL 6.25 MG/1
6.25 TABLET ORAL 2 TIMES DAILY WITH MEALS
Status: DISCONTINUED | OUTPATIENT
Start: 2022-10-23 | End: 2022-10-24

## 2022-10-23 RX ORDER — FUROSEMIDE 10 MG/ML
40 INJECTION INTRAMUSCULAR; INTRAVENOUS
Status: DISCONTINUED | OUTPATIENT
Start: 2022-10-23 | End: 2022-10-28 | Stop reason: HOSPADM

## 2022-10-23 RX ORDER — DIGOXIN 125 MCG
62.5 TABLET ORAL DAILY
Status: DISCONTINUED | OUTPATIENT
Start: 2022-10-23 | End: 2022-10-28 | Stop reason: HOSPADM

## 2022-10-23 RX ADMIN — DOCUSATE SODIUM AND SENNOSIDES 1 TABLET: 8.6; 5 TABLET, FILM COATED ORAL at 17:44

## 2022-10-23 RX ADMIN — CARVEDILOL 12.5 MG: 12.5 TABLET, FILM COATED ORAL at 07:38

## 2022-10-23 RX ADMIN — NYSTATIN: 100000 POWDER TOPICAL at 07:41

## 2022-10-23 RX ADMIN — SACUBITRIL AND VALSARTAN 1 TABLET: 24; 26 TABLET, FILM COATED ORAL at 07:43

## 2022-10-23 RX ADMIN — GABAPENTIN 300 MG: 300 CAPSULE ORAL at 07:38

## 2022-10-23 RX ADMIN — DIGOXIN 62.5 MCG: 125 TABLET ORAL at 11:50

## 2022-10-23 RX ADMIN — MELATONIN 6 MG: 3 TAB ORAL at 20:08

## 2022-10-23 RX ADMIN — IPRATROPIUM BROMIDE 0.5 MG: 0.5 SOLUTION RESPIRATORY (INHALATION) at 21:06

## 2022-10-23 RX ADMIN — LIDOCAINE 5% 1 PATCH: 700 PATCH TOPICAL at 07:39

## 2022-10-23 RX ADMIN — INSULIN LISPRO 3 UNITS: 100 INJECTION, SOLUTION INTRAVENOUS; SUBCUTANEOUS at 11:53

## 2022-10-23 RX ADMIN — LEVALBUTEROL HYDROCHLORIDE 1.25 MG: 1.25 SOLUTION, CONCENTRATE RESPIRATORY (INHALATION) at 13:03

## 2022-10-23 RX ADMIN — FLUTICASONE FUROATE AND VILANTEROL TRIFENATATE 1 PUFF: 100; 25 POWDER RESPIRATORY (INHALATION) at 07:41

## 2022-10-23 RX ADMIN — GABAPENTIN 300 MG: 300 CAPSULE ORAL at 16:44

## 2022-10-23 RX ADMIN — LEVALBUTEROL HYDROCHLORIDE 1.25 MG: 1.25 SOLUTION, CONCENTRATE RESPIRATORY (INHALATION) at 21:06

## 2022-10-23 RX ADMIN — DOCUSATE SODIUM AND SENNOSIDES 1 TABLET: 8.6; 5 TABLET, FILM COATED ORAL at 07:38

## 2022-10-23 RX ADMIN — PANTOPRAZOLE SODIUM 40 MG: 40 TABLET, DELAYED RELEASE ORAL at 07:38

## 2022-10-23 RX ADMIN — ACETAMINOPHEN 650 MG: 325 TABLET ORAL at 20:40

## 2022-10-23 RX ADMIN — POLYETHYLENE GLYCOL 3350 17 G: 17 POWDER, FOR SOLUTION ORAL at 12:31

## 2022-10-23 RX ADMIN — GABAPENTIN 300 MG: 300 CAPSULE ORAL at 20:08

## 2022-10-23 RX ADMIN — ATORVASTATIN CALCIUM 10 MG: 10 TABLET, FILM COATED ORAL at 07:39

## 2022-10-23 RX ADMIN — IPRATROPIUM BROMIDE 0.5 MG: 0.5 SOLUTION RESPIRATORY (INHALATION) at 07:09

## 2022-10-23 RX ADMIN — CEFTRIAXONE 1000 MG: 1 INJECTION, SOLUTION INTRAVENOUS at 06:24

## 2022-10-23 RX ADMIN — PANTOPRAZOLE SODIUM 40 MG: 40 TABLET, DELAYED RELEASE ORAL at 16:44

## 2022-10-23 RX ADMIN — IPRATROPIUM BROMIDE 0.5 MG: 0.5 SOLUTION RESPIRATORY (INHALATION) at 13:03

## 2022-10-23 RX ADMIN — LEVALBUTEROL HYDROCHLORIDE 1.25 MG: 1.25 SOLUTION, CONCENTRATE RESPIRATORY (INHALATION) at 07:09

## 2022-10-23 RX ADMIN — NYSTATIN: 100000 POWDER TOPICAL at 17:39

## 2022-10-23 NOTE — RESPIRATORY THERAPY NOTE
RT Protocol Note  Sarah Sizer 79 y o  female MRN: [id number]  Unit/Bed#: -01 Encounter: [id number]    Assessment    Principal Problem:    Bilateral pulmonary embolism ([facility] )  Active Problems:    Chronic anemia    T11 vertebral fracture (HCC)    COPD (chronic obstructive pulmonary disease) ([facility])    Acute on chronic respiratory failure with hypoxia (HCC)    Type 2 diabetes mellitus (HCC)    Acute on chronic diastolic CHF (congestive heart failure) ([facility])    Pleural effusion    Chronic indwelling Ball catheter    Acute cystitis    Hypernatremia      Home Pulmonary Medications:         Past Medical History:   Diagnosis Date   • A-fib [facility])    • Cardiac pacemaker    • CHF (congestive heart failure) ([facility])    • Chronic cellulitis    • COPD (chronic obstructive pulmonary disease) ([facility])    • Diabetes mellitus ([facility])    • Edema    • High cholesterol    • Hyperparathyroidism ([facility] )    • Hypertension    • Hyperuricemia    • Stage 4 chronic kidney disease ([facility])      Social History     Socioeconomic History   • Marital status:      Spouse name: None   • Number of children: None   • Years of education: None   • Highest education level: None   Occupational History   • None   Tobacco Use   • Smoking status: Never Smoker   • Smokeless tobacco: Never Used   Vaping Use   • Vaping Use: Never used   Substance and Sexual Activity   • Alcohol use: Never   • Drug use: Never   • Sexual activity: Not Currently   Other Topics Concern   • None   Social History Narrative    ** Merged History Encounter **          Social Determinants of Health     Financial Resource Strain: Not on file   Food Insecurity: No Food Insecurity   • Worried About Running Out of Food in the Last Year: Never true   • Ran Out of Food in the Last Year: Never true   Transportation Needs: No Transportation Needs   • Lack of Transportation (Medical): No   • Lack of Transportation (Non-Medical):  No   Physical Activity: Not on file   Stress: Not on file   Social Connections: Not on file   Intimate Partner Violence: Not on file   Housing Stability: Low Risk    • Unable to Pay for Housing in the Last Year: No   • Number of Places Lived in the Last Year: 2   • Unstable Housing in the Last Year: No       Subjective         Objective    Physical Exam:        Vitals:  Blood pressure 124/57, pulse 64, temperature (!) 97 2 °F (36 2 °C), temperature source Temporal, resp  rate 20, height 5' 4" (1 626 m), weight 99 3 kg (218 lb 14 7 oz), SpO2 99 %  Imaging and other studies: I have personally reviewed pertinent reports        O2 Device: 5L     Plan    Respiratory Plan: Home Bronchodilator Patient pathway        Resp Comments: Pt denies SOB, resting comfortably

## 2022-10-23 NOTE — ASSESSMENT & PLAN NOTE
· Wears 2 L nasal cannula baseline     · Found to be hypoxic 85% on 2 L NC, now requiring 5-6 L NC  · Multifactorial in the setting of bilateral PE's, pleural effusion, CHF- continue treatment as outlined below  · Wean oxygen to keep oxygen saturation greater than 90%

## 2022-10-23 NOTE — PLAN OF CARE
Problem: Potential for Falls  Goal: Patient will remain free of falls  Description: INTERVENTIONS:  - Educate patient/family on patient safety including physical limitations  - Instruct patient to call for assistance with activity   - Consult OT/PT to assist with strengthening/mobility   - Keep Call bell within reach  - Keep bed low and locked with side rails adjusted as appropriate  - Keep care items and personal belongings within reach  - Initiate and maintain comfort rounds  - Make Fall Risk Sign visible to staff  - Offer Toileting every 2 Hours, in advance of need  - Initiate/Maintain bed alarm  - Obtain necessary fall risk management equipment: fall bracelet, bed alarm, non-skid socks  - Apply yellow socks and bracelet for high fall risk patients  - Consider moving patient to room near nurses station  Outcome: Progressing     Problem: MOBILITY - ADULT  Goal: Maintain or return to baseline ADL function  Description: INTERVENTIONS:  -  Assess patient's ability to carry out ADLs; assess patient's baseline for ADL function and identify physical deficits which impact ability to perform ADLs (bathing, care of mouth/teeth, toileting, grooming, dressing, etc )  - Assess/evaluate cause of self-care deficits   - Assess range of motion  - Assess patient's mobility; develop plan if impaired  - Assess patient's need for assistive devices and provide as appropriate  - Encourage maximum independence but intervene and supervise when necessary  - Involve family in performance of ADLs  - Assess for home care needs following discharge   - Consider OT consult to assist with ADL evaluation and planning for discharge  - Provide patient education as appropriate  Outcome: Progressing  Goal: Maintains/Returns to pre admission functional level  Description: INTERVENTIONS:  - Perform BMAT or MOVE assessment daily    - Set and communicate daily mobility goal to care team and patient/family/caregiver     - Collaborate with rehabilitation services on mobility goals if consulted  - Perform Range of Motion 3 times a day  - Reposition patient every 2 hours  - Dangle patient 3 times a day  - Stand patient 3 times a day  - Ambulate patient 3 times a day  - Out of bed to chair 3 times a day   - Out of bed for meals 3 times a day  - Out of bed for toileting  - Record patient progress and toleration of activity level   Outcome: Progressing     Problem: Prexisting or High Potential for Compromised Skin Integrity  Goal: Skin integrity is maintained or improved  Description: INTERVENTIONS:  - Identify patients at risk for skin breakdown  - Assess and monitor skin integrity  - Assess and monitor nutrition and hydration status  - Monitor labs   - Assess for incontinence   - Turn and reposition patient  - Assist with mobility/ambulation  - Relieve pressure over bony prominences  - Avoid friction and shearing  - Provide appropriate hygiene as needed including keeping skin clean and dry  - Evaluate need for skin moisturizer/barrier cream  - Collaborate with interdisciplinary team   - Patient/family teaching  - Consider wound care consult   Outcome: Progressing     Problem: Nutrition/Hydration-ADULT  Goal: Nutrient/Hydration intake appropriate for improving, restoring or maintaining nutritional needs  Description: Monitor and assess patient's nutrition/hydration status for malnutrition  Collaborate with interdisciplinary team and initiate plan and interventions as ordered  Monitor patient's weight and dietary intake as ordered or per policy  Utilize nutrition screening tool and intervene as necessary  Determine patient's food preferences and provide high-protein, high-caloric foods as appropriate       INTERVENTIONS:  - Monitor oral intake, urinary output, labs, and treatment plans  - Assess nutrition and hydration status and recommend course of action  - Evaluate amount of meals eaten  - Assist patient with eating if necessary   - Allow adequate time for meals  - Recommend/ encourage appropriate diets, oral nutritional supplements, and vitamin/mineral supplements  - Order, calculate, and assess calorie counts as needed  - Recommend, monitor, and adjust tube feedings and TPN/PPN based on assessed needs  - Assess need for intravenous fluids  - Provide specific nutrition/hydration education as appropriate  - Include patient/family/caregiver in decisions related to nutrition  Outcome: Progressing     Problem: NEUROSENSORY - ADULT  Goal: Achieves stable or improved neurological status  Description: INTERVENTIONS  - Monitor and report changes in neurological status  - Monitor vital signs such as temperature, blood pressure, glucose, and any other labs ordered   - Initiate measures to prevent increased intracranial pressure  - Monitor for seizure activity and implement precautions if appropriate      Outcome: Progressing  Goal: Remains free of injury related to seizures activity  Description: INTERVENTIONS  - Maintain airway, patient safety  and administer oxygen as ordered  - Monitor patient for seizure activity, document and report duration and description of seizure to physician/advanced practitioner  - If seizure occurs,  ensure patient safety during seizure  - Reorient patient post seizure  - Seizure pads on all 4 side rails  - Instruct patient/family to notify RN of any seizure activity including if an aura is experienced  - Instruct patient/family to call for assistance with activity based on nursing assessment  - Administer anti-seizure medications if ordered    Outcome: Progressing  Goal: Achieves maximal functionality and self care  Description: INTERVENTIONS  - Monitor swallowing and airway patency with patient fatigue and changes in neurological status  - Encourage and assist patient to increase activity and self care     - Encourage visually impaired, hearing impaired and aphasic patients to use assistive/communication devices  Outcome: Progressing     Problem: CARDIOVASCULAR - ADULT  Goal: Maintains optimal cardiac output and hemodynamic stability  Description: INTERVENTIONS:  - Monitor I/O, vital signs and rhythm SOB, CP, tachycardia  - Monitor for S/S and trends of decreased cardiac output  - Administer and titrate ordered vasoactive medications to optimize hemodynamic stability  - Assess quality of pulses, skin color and temperature  - Assess for signs of decreased coronary artery perfusion  - Instruct patient to report change in severity of symptoms  Outcome: Progressing  Goal: Absence of cardiac dysrhythmias or at baseline rhythm  Description: INTERVENTIONS:  - Continuous cardiac monitoring, vital signs, obtain 12 lead EKG if ordered  - Administer antiarrhythmic and heart rate control medications as ordered  - Monitor electrolytes and administer replacement therapy as ordered  Outcome: Progressing     Problem: RESPIRATORY - ADULT  Goal: Achieves optimal ventilation and oxygenation  Description: INTERVENTIONS:  - Assess for changes in respiratory status  - Assess for changes in mentation and behavior  - Position to facilitate oxygenation and minimize respiratory effort  - Oxygen administered by appropriate delivery if ordered  - Initiate smoking cessation education as indicated  - Encourage broncho-pulmonary hygiene including cough, deep breathe, Incentive Spirometry  - Assess the need for suctioning and aspirate as needed  - Assess and instruct to report SOB or any respiratory difficulty  - Respiratory Therapy support as indicated  Outcome: Progressing     Problem: GENITOURINARY - ADULT  Goal: Maintains or returns to baseline urinary function  Description: INTERVENTIONS:  - Assess urinary function  - Encourage oral fluids to ensure adequate hydration if ordered  - Administer IV fluids as ordered to ensure adequate hydration  - Administer ordered medications as needed  - Offer frequent toileting  - Follow urinary retention protocol if ordered  Outcome: Progressing  Goal: Absence of urinary retention  Description: INTERVENTIONS:  - Assess patient’s ability to void and empty bladder  - Monitor I/O  - Bladder scan as needed  - Discuss with physician/AP medications to alleviate retention as needed  - Discuss catheterization for long term situations as appropriate  Outcome: Progressing  Goal: Urinary catheter remains patent  Description: INTERVENTIONS:  - Assess patency of urinary catheter  - If patient has a chronic rivera, consider changing catheter if non-functioning  - Follow guidelines for intermittent irrigation of non-functioning urinary catheter  Outcome: Progressing     Problem: METABOLIC, FLUID AND ELECTROLYTES - ADULT  Goal: Electrolytes maintained within normal limits  Description: INTERVENTIONS:  - Monitor labs and assess patient for signs and symptoms of electrolyte imbalances  - Administer electrolyte replacement as ordered  - Monitor response to electrolyte replacements, including repeat lab results as appropriate  - Instruct patient on fluid and nutrition as appropriate  Outcome: Progressing  Goal: Fluid balance maintained  Description: INTERVENTIONS:  - Monitor labs   - Monitor I/O and WT  - Instruct patient on fluid and nutrition as appropriate  - Assess for signs & symptoms of volume excess or deficit  Outcome: Progressing  Goal: Glucose maintained within target range  Description: INTERVENTIONS:  - Monitor Blood Glucose as ordered  - Assess for signs and symptoms of hyperglycemia and hypoglycemia  - Administer ordered medications to maintain glucose within target range  - Assess nutritional intake and initiate nutrition service referral as needed  Outcome: Progressing     Problem: SKIN/TISSUE INTEGRITY - ADULT  Goal: Skin Integrity remains intact(Skin Breakdown Prevention)  Description: Assess:  -Perform Drew assessment every shift  -Clean and moisturize skin every prn, daily  -Inspect skin when repositioning, toileting, and assisting with ADLS  -Assess under medical devices such as andrés every shift    -Assess extremities for adequate circulation and sensation     Bed Management:  -Have minimal linens on bed & keep smooth, unwrinkled  -Change linens as needed when moist or perspiring  -Avoid sitting or lying in one position for more than 2 hours while in bed  -Keep HOB at 30degrees     Toileting:  -Offer bedside commode  -Assess for incontinence every 2 hrs    Activity:  -Mobilize patient 3 times a day  -Encourage activity and walks on unit  -Encourage or provide ROM exercises   -Turn and reposition patient every 2 Hours  -Use appropriate equipment to lift or move patient in bed  -Instruct/ Assist with weight shifting every 2hrs when out of bed in chair  -Consider limitation of chair time 2 hour intervals    Skin Care:  -Avoid use of baby powder, tape, friction and shearing, hot water or constrictive clothing  -Relieve pressure over bony prominences using foam wedges  -Do not massage red bony areas  Outcome: Progressing  Goal: Incision(s), wounds(s) or drain site(s) healing without S/S of infection  Description: INTERVENTIONS  - Assess and document dressing, incision, wound bed, drain sites and surrounding tissue  - Provide patient and family education  - Perform skin care/dressing changes every day, prn  Outcome: Progressing  Goal: Pressure injury heals and does not worsen  Description: Interventions:  - Implement low air loss mattress or specialty surface (Criteria met)  - Apply silicone foam dressing  - Instruct/assist with weight shifting every 120 minutes when in chair   - Limit chair time to 2 hour intervals  - Apply fecal or urinary incontinence containment device   - Perform passive or active ROM every 2 hrs  - Turn and reposition patient & offload bony prominences every 2 hours   - Utilize friction reducing device or surface for transfers   - Consider nutrition services referral as needed  Outcome: Progressing     Problem: HEMATOLOGIC - ADULT  Goal: Maintains hematologic stability  Description: INTERVENTIONS  - Assess for signs and symptoms of bleeding or hemorrhage  - Monitor labs  - Administer supportive blood products/factors as ordered and appropriate  Outcome: Progressing     Problem: MUSCULOSKELETAL - ADULT  Goal: Maintain or return mobility to safest level of function  Description: INTERVENTIONS:  - Assess patient's ability to carry out ADLs; assess patient's baseline for ADL function and identify physical deficits which impact ability to perform ADLs (bathing, care of mouth/teeth, toileting, grooming, dressing, etc )  - Assess/evaluate cause of self-care deficits   - Assess range of motion  - Assess patient's mobility  - Assess patient's need for assistive devices and provide as appropriate  - Encourage maximum independence but intervene and supervise when necessary  - Involve family in performance of ADLs  - Assess for home care needs following discharge   - Consider OT consult to assist with ADL evaluation and planning for discharge  - Provide patient education as appropriate  Outcome: Progressing  Goal: Maintain proper alignment of affected body part  Description: INTERVENTIONS:  - Support, maintain and protect limb and body alignment  - Provide patient/ family with appropriate education  Outcome: Progressing     Problem: PAIN - ADULT  Goal: Verbalizes/displays adequate comfort level or baseline comfort level  Description: Interventions:  - Encourage patient to monitor pain and request assistance  - Assess pain using appropriate pain scale  - Administer analgesics based on type and severity of pain and evaluate response  - Implement non-pharmacological measures as appropriate and evaluate response  - Consider cultural and social influences on pain and pain management  - Notify physician/advanced practitioner if interventions unsuccessful or patient reports new pain  Outcome: Progressing     Problem: INFECTION - ADULT  Goal: Absence or prevention of progression during hospitalization  Description: INTERVENTIONS:  - Assess and monitor for signs and symptoms of infection  - Monitor lab/diagnostic results  - Monitor all insertion sites, i e  indwelling lines, tubes, and drains  - Monitor endotracheal if appropriate and nasal secretions for changes in amount and color  - [place] appropriate cooling/warming therapies per order  - Administer medications as ordered  - Instruct and encourage patient and family to use good hand hygiene technique  - Identify and instruct in appropriate isolation precautions for identified infection/condition  Outcome: Progressing  Goal: Absence of fever/infection during neutropenic period  Description: INTERVENTIONS:  - Monitor WBC    Outcome: Progressing     Problem: SAFETY ADULT  Goal: Patient will remain free of falls  Description: INTERVENTIONS:  - Educate patient/family on patient safety including physical limitations  - Instruct patient to call for assistance with activity   - Consult OT/PT to assist with strengthening/mobility   - Keep Call bell within reach  - Keep bed low and locked with side rails adjusted as appropriate  - Keep care items and personal belongings within reach  - Initiate and maintain comfort rounds  - Make Fall Risk Sign visible to staff  - Offer Toileting every 2 Hours, in advance of need  - Initiate/Maintain bed alarm  - Obtain necessary fall risk management equipment: fall bracelet, non-skid socks  - Apply yellow socks and bracelet for high fall risk patients  - Consider moving patient to room near nurses station  Outcome: Progressing  Goal: Maintain or return to baseline ADL function  Description: INTERVENTIONS:  -  Assess patient's ability to carry out ADLs; assess patient's baseline for ADL function and identify physical deficits which impact ability to perform ADLs (bathing, care of mouth/teeth, toileting, grooming, dressing, etc )  - Assess/evaluate cause of self-care deficits   - Assess range of motion  - Assess patient's mobility; develop plan if impaired  - Assess patient's need for assistive devices and provide as appropriate  - Encourage maximum independence but intervene and supervise when necessary  - Involve family in performance of ADLs  - Assess for home care needs following discharge   - Consider OT consult to assist with ADL evaluation and planning for discharge  - Provide patient education as appropriate  Outcome: Progressing  Goal: Maintains/Returns to pre admission functional level  Description: INTERVENTIONS:  - Perform BMAT or MOVE assessment daily    - Set and communicate daily mobility goal to care team and patient/family/caregiver  - Collaborate with rehabilitation services on mobility goals if consulted  - Perform Range of Motion 3 times a day  - Reposition patient every 2 hours    - Dangle patient 3 times a day  - Stand patient 3 times a day  - Ambulate patient 3 times a day  - Out of bed to chair 3 times a day   - Out of bed for meals 3 times a day  - Out of bed for toileting  - Record patient progress and toleration of activity level   Outcome: Progressing     Problem: DISCHARGE PLANNING  Goal: Discharge to home or other facility with appropriate resources  Description: INTERVENTIONS:  - Identify barriers to discharge w/patient and caregiver  - Arrange for needed discharge resources and transportation as appropriate  - Identify discharge learning needs (meds, wound care, etc )  - Arrange for interpretive services to assist at discharge as needed  - Refer to Case Management Department for coordinating discharge planning if the patient needs post-hospital services based on physician/advanced practitioner order or complex needs related to functional status, cognitive ability, or social support system  Outcome: Progressing     Problem: Knowledge Deficit  Goal: Patient/family/caregiver demonstrates understanding of disease process, treatment plan, medications, and discharge instructions  Description: Complete learning assessment and assess knowledge base    Interventions:  - Provide teaching at level of understanding  - Provide teaching via preferred learning methods  Outcome: Progressing

## 2022-10-23 NOTE — RESPIRATORY THERAPY NOTE
RT Ventilator Management Note  Sarah Sizer 79 y o  female MRN: [id number]  Unit/Bed#: -01 Encounter: [id number]      Daily Screen    No data found in the last 10 encounters  Physical Exam:          Resp Comments: Patient placed on hours of sleep BiPAP at 2125  Patient olerated BiPAP well  A rapid response was called that was not respiratory related    Patient fell back to sleep and slept through the night

## 2022-10-23 NOTE — RESPIRATORY THERAPY NOTE
Responded to overhead page and tiger text for rapid response   Critical care assessment determined not a respiratory issue

## 2022-10-23 NOTE — RAPID RESPONSE
Rapid Response Note  Liz Carcamo 79 y o  female MRN: [id number]  Unit/Bed#: -01 Encounter: [id number]    Rapid Response Notification(s):   Response called date/time:  10/22/2022 10:25 PM  Response team arrival date/time:  10/22/2022 10:25 PM  Response end date/time:  10/22/2022 10:35 PM  [facility] of care:  [facility]  Rapid response location:  [facility] unit  Primary reason for rapid response call:  Acute change in BP    Rapid Response Intervention(s):   Airway:  None  Breathing:  None  Circulation:  None  Fluids administered:  Normal saline  Medications administered:  None       Assessment:   · Hypotension likely secondary to over diuresis    Plan:   ·  ml bolus  · Cont to monitor BP closely  · Consider repeat bolus if BP remains low  · STAT labs: CBC, CMP, Mg, Phos, PT/INR, APTT, Troponin, Lactic acid  · Check EKG     Rapid Response Outcome:   Transfer:  Remain on floor  Code Status: Level 1 (Full Code)      Family notified of transfer: n/a  Family member contacted:      Background/Situation:   Liz Carcamo is a 79 y o  female who was admitted to med-surg early this am for acute bilateral pulmonary embolism  Also found to have acute hyponatremia, acute on chronic diastolic heart failure and UTI  Pt was started on IV diuresis with lasix 80 mg IV BID  Tonight RRT was called for hypotension as the pt was found to have manual BP 78/40  Pt was found in no acute distress and without complaint  Chart reviewed  UOP 1 4 L since 0700  Net fluid balance currently negative 100 ml  500 ml NSS bolus initiated  BP rechecked during bolus and had returned to normal limits  Review of Systems   Constitutional: Negative  HENT: Negative  Eyes: Negative  Respiratory: Negative for shortness of breath  Cardiovascular: Negative  Negative for chest pain  Gastrointestinal: Negative  Genitourinary: Negative  Musculoskeletal: Negative  Neurological: Negative    Negative for weakness, light-headedness, numbness and headaches  Psychiatric/Behavioral: Negative  Objective:   Vitals:    10/22/22 1838 10/22/22 2125 10/22/22 2130 10/22/22 2217   BP: (!) 112/46   (!) 78/40   BP Location: Right leg   Right arm   Pulse: 71   65   Resp:    18   Temp:    98 2 °F (36 8 °C)   TempSrc:    Temporal   SpO2:  94% 94% 93%   Weight:       Height:         Physical Exam  Vitals reviewed  Constitutional:       General: She is not in acute distress  Appearance: She is ill-appearing  She is not diaphoretic  Comments: BiPAP   HENT:      Head: Normocephalic and atraumatic  Mouth/Throat:      Mouth: Mucous membranes are moist       Pharynx: Oropharynx is clear  Eyes:      Conjunctiva/sclera: Conjunctivae normal       Pupils: Pupils are equal, round, and reactive to light  Cardiovascular:      Pulses: Normal pulses  Comments: Paced with underlying atrial fibrillation on EKG  Pulmonary:      Effort: Pulmonary effort is normal  No respiratory distress  Breath sounds: Normal breath sounds  Abdominal:      General: Bowel sounds are normal  There is no distension  Palpations: Abdomen is soft  Musculoskeletal:      Cervical back: Neck supple  Right lower leg: Edema present  Left lower leg: Edema present  Skin:     General: Skin is warm and dry  Capillary Refill: Capillary refill takes less than 2 seconds  Neurological:      General: No focal deficit present  Mental Status: She is alert  GCS: GCS eye subscore is 4  GCS verbal subscore is 4  GCS motor subscore is 6  Comments: Oriented to self, person, place ("hospital')  Disoriented to date  Psychiatric:         Mood and Affect: Mood normal          Behavior: Behavior is cooperative  Portions of the record may have been created with voice recognition software    Occasional wrong word or "sound a like" substitutions may have occurred due to the inherent limitations of voice recognition software  Read the chart carefully and recognize, using context, where substitutions have occurred      Roopa Arana

## 2022-10-23 NOTE — PLAN OF CARE
Problem: Nutrition/Hydration-ADULT  Goal: Nutrient/Hydration intake appropriate for improving, restoring or maintaining nutritional needs  Description: Monitor and assess patient's nutrition/hydration status for malnutrition  Collaborate with interdisciplinary team and initiate plan and interventions as ordered  Monitor patient's weight and dietary intake as ordered or per policy  Utilize nutrition screening tool and intervene as necessary  Determine patient's food preferences and provide high-protein, high-caloric foods as appropriate       INTERVENTIONS:  - Monitor oral intake, urinary output, labs, and treatment plans  - Assess nutrition and hydration status and recommend course of action  - Evaluate amount of meals eaten  - Assist patient with eating if necessary   - Allow adequate time for meals  - Recommend/ encourage appropriate diets, oral nutritional supplements, and vitamin/mineral supplements  - Order, calculate, and assess calorie counts as needed  -  - encourage snacks protein drinks inbetween meals  - Provide specific nutrition/hydration education as appropriate  - Include patient/family/caregiver in decisions related to nutrition  Outcome: Not Progressing

## 2022-10-23 NOTE — ASSESSMENT & PLAN NOTE
· Recent admission at [facility] after fall , found to have T11 fracture  · Complaining of left leg pain, appears chronic  Has chronic rivera catheter in place     · Per chart review : Has been following with neurosurgery outpatient, medically managing due to poor surgical candidate and patient electing to not undergo surgery   · CTA incidentally showing "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 with chronic fractures of the T10 and T11 vertebral bodies in spinal canal narrowing at this level which appears progressed compared to prior study"  · Continue TLSO brace

## 2022-10-23 NOTE — PROGRESS NOTES
[address]  Progress Note - Maria Figueroa 1940, 79 y o  female MRN: [id number]  Unit/Bed#: -01 Encounter: [id number]  Primary Care Provider: Charly Velazquez DO   Date and time admitted to hospital: 10/22/2022 12:28 AM    Acute on chronic respiratory failure with hypoxia ([facility] [address] )  Assessment & Plan  · Wears 2 L nasal cannula baseline  · Found to be hypoxic 85% on 2 L NC, now requiring 5-6 L NC  · Multifactorial in the setting of bilateral PE's, pleural effusion, CHF- continue treatment as outlined below  · Wean oxygen to keep oxygen saturation greater than 90%    * Bilateral pulmonary embolism (HCC)  Assessment & Plan  · Presents from SNF with 3 days of worsening shortness of breath  Per EMS found to be 85% on baseline 2L , now requiring up to 6L NC    · No prior history of blood clots  · Per chart review was previously on Eliquis for PAF, had admission at [facility] September 2022 for T11 fracture, anemia in which it appears Eliquis was stopped and advised to review DOAC with PCP at discharge  Not currently on anticoagulation per nursing home records, patient unable to recall if it was discussed  · CTA pe study showing acute bilateral pulmonary embolism, borderline right heart strain, bilateral pleural effusions  · D-dimer > 9 on admission   · COVID negative   · Troponins flat   · Provoked recent fracture/immobility ?  Did not undergo surgery   · Continue heparin VTE protocol   · Check echocardiogram     Acute on chronic diastolic CHF (congestive heart failure) ([facility] [address] )  Assessment & Plan  Wt Readings from Last 3 Encounters:   10/23/22 99 3 kg (218 lb 14 7 oz)   09/30/22 95 7 kg (210 lb 15 7 oz)   11/12/21 110 kg (243 lb)       · Last echo in September 2022 shows Ef 79% , diastolic dysfunction   · Takes Bumex 1 mg daily at home  · Suspect component of acute CHF - CTA showing pleural effusions along with elevated BNP, lower extremity edema  · Received 1mg IV Bumex in the ED- will continue with Lasix 80 mg IV b i d  however patient unable to tolerated and had bout of hypotension and rapid response yesterday  Will decrease Lasix to 40 mg IV b i d  and monitor  Continue Entresto , decrease dose of Coreg due to hypotension  normal digoxin level, resume digoxin  · Repeat chest x-ray tomorrow  If still showing bilateral pleural effusions consult placed to IR for thoracentesis  Hold heparin drip temporarily for thoracentesis tomorrow as needed         Acute cystitis  Assessment & Plan  · Suspect secondary to chronic indwelling Rivera catheter  · UA abnormal, showing leukocytes pyuria and bacteriuria  · Does not meet sepsis criteria  · Follow-up urine culture, start Rocephin      Chronic indwelling Rivera catheter  Assessment & Plan  · Present on admission  Unclear when catheter was placed originally,  suspect on prior admission in September 2022  · UA showing acute cystitis  · Recommending to change out Rivera catheter    Type 2 diabetes mellitus [facility])  Assessment & Plan  Lab Results   Component Value Date    HGBA1C 6 6 (H) 06/08/2022       Recent Labs     10/22/22  1116 10/22/22  1555 10/22/22  2047 10/23/22  0730   POCGLU 228* 219* 193* 114       Blood Sugar Average: Last 72 hrs:  · (P) 185 2 Uses Humalog sliding scale at nursing home, continue sliding scale insulin while inpatient  · Hypoglycemia protocol    COPD (chronic obstructive pulmonary disease) ([facility])  Assessment & Plan  · Not appear to be in acute exacerbation  · Continue home inhaler , neb treatments    T11 vertebral fracture ([facility])  Assessment & Plan  · Recent admission at [facility] after fall , found to have T11 fracture  · Complaining of left leg pain, appears chronic  Has chronic rivera catheter in place     · Per chart review : Has been following with neurosurgery outpatient, medically managing due to poor surgical candidate and patient electing to not undergo surgery   · CTA incidentally showing "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 with chronic fractures of the T10 and T11 vertebral bodies in spinal canal narrowing at this level which appears progressed compared to prior study"  · Continue TLSO brace    Chronic anemia  Assessment & Plan  · Hgb 9 3 (baseline 8-9)  · Stable at baseline       VTE Pharmacologic Prophylaxis:   Pharmacologic: Heparin Drip  Mechanical VTE Prophylaxis in Place: Yes    Patient Centered Rounds: I have performed bedside rounds with nursing staff today  Discussions with Specialists or Other Care Team Provider: none    Education and Discussions with Family / Patient:  Discussed with patient bedside and updated brother    Time Spent for Care: 30 minutes  More than 50% of total time spent on counseling and coordination of care as described above  Current Length of Stay: 1 day(s)    Current Patient Status: Inpatient   Certification Statement: The patient will continue to require additional inpatient hospital stay due to Acute pulmonary embolism    Discharge Plan:  Anticipate discharge back to rehab after 2-3 days once stabilized    Code Status: Level 1 - Full Code      Subjective:   Patient states that she is feeling better today  Yesterday had a rapid response due to low blood pressure  Currently denies any chest pain or shortness of breath but still requiring 5 L of oxygen to keep her comfortable    Objective:     Vitals:   Temp (24hrs), Av °F (36 7 °C), Min:97 2 °F (36 2 °C), Max:98 2 °F (36 8 °C)    Temp:  [97 2 °F (36 2 °C)-98 2 °F (36 8 °C)] 97 2 °F (36 2 °C)  HR:  [63-82] 75  Resp:  [18-20] 20  BP: ()/(38-57) 107/38  SpO2:  [93 %-100 %] 99 %  Body mass index is 37 58 kg/m²  Input and Output Summary (last 24 hours):        Intake/Output Summary (Last 24 hours) at 10/23/2022 0953  Last data filed at 10/23/2022 0820  Gross per 24 hour   Intake 1652 52 ml   Output 1950 ml   Net -297 48 ml       Physical Exam:     Physical Exam  Vitals and nursing note reviewed  Constitutional:       Appearance: Normal appearance  HENT:      Head: Normocephalic and atraumatic  Right Ear: External ear normal       Left Ear: External ear normal       Nose: Nose normal       Mouth/Throat:      Pharynx: Oropharynx is clear  Eyes:      Pupils: Pupils are equal, round, and reactive to light  Cardiovascular:      Rate and Rhythm: Normal rate and regular rhythm  Heart sounds: Normal heart sounds  Pulmonary:      Effort: Pulmonary effort is normal       Comments: Moderate air entry bilaterally with diminished breath sounds on the bases  Abdominal:      General: Bowel sounds are normal       Palpations: Abdomen is soft  Tenderness: There is no abdominal tenderness  Musculoskeletal:         General: Normal range of motion  Cervical back: Normal range of motion and neck supple  Skin:     General: Skin is warm and dry  Capillary Refill: Capillary refill takes less than 2 seconds  Neurological:      General: No focal deficit present  Mental Status: She is alert and oriented to person, place, and time  Psychiatric:         Mood and Affect: Mood normal            Additional Data:     Labs:    Results from last 7 days   Lab Units 10/23/22  0405 10/22/22  2231 10/22/22  0049   WBC Thousand/uL 5 41   < > 6 18   HEMOGLOBIN g/dL 9 4*   < > 9 3*   HEMATOCRIT % 32 4*   < > 32 0*   PLATELETS Thousands/uL 126*   < > 132*   NEUTROS PCT %  --   --  77*   LYMPHS PCT %  --   --  12*   MONOS PCT %  --   --  7   EOS PCT %  --   --  2    < > = values in this interval not displayed       Results from last 7 days   Lab Units 10/23/22  0405 10/22/22  2231   SODIUM mmol/L 145 143   POTASSIUM mmol/L 4 2 4 2   CHLORIDE mmol/L 107 105   CO2 mmol/L 35* 37*   BUN mg/dL 54* 60*   CREATININE mg/dL 0 83 1 04   ANION GAP mmol/L 3* 1*   CALCIUM mg/dL 8 0* 7 8*   ALBUMIN g/dL  --  2 1*   TOTAL BILIRUBIN mg/dL  --  0 46   ALK PHOS U/L  --  99   ALT U/L  --  12   AST U/L  --  11   GLUCOSE RANDOM mg/dL 107 164*     Results from last 7 days   Lab Units 10/22/22  2235   INR  1 34*     Results from last 7 days   Lab Units 10/23/22  0730 10/22/22  2047 10/22/22  1555 10/22/22  1116 10/22/22  0733   POC GLUCOSE mg/dl 114 193* 219* 228* 172*         Results from last 7 days   Lab Units 10/22/22  2231 10/22/22  0049   LACTIC ACID mmol/L 1 6 1 0   PROCALCITONIN ng/ml  --  0 13           * I Have Reviewed All Lab Data Listed Above  * Additional Pertinent Lab Tests Reviewed: [address] Admission Reviewed    Imaging:    Imaging Reports Reviewed Today Include:  CT chest  Imaging Personally Reviewed by Myself Includes:  CT chest    Recent Cultures (last 7 days):     Results from last 7 days   Lab Units 10/22/22  0106 10/22/22  0048   BLOOD CULTURE  Received in Microbiology Lab  Culture in Progress  Received in Microbiology Lab  Culture in Progress         Last 24 Hours Medication List:   Current Facility-Administered Medications   Medication Dose Route Frequency Provider Last Rate   • acetaminophen  650 mg Oral Q4H PRN FIDELIA Rodrigues     • atorvastatin  10 mg Oral Daily FIDELIA Rodrigues     • carvedilol  6 25 mg Oral BID With Meals Shiloh Bethea MD     • cefTRIAXone  1,000 mg Intravenous Q24H FIDELIA Rodrigues 1,000 mg (10/23/22 4048)   • Digoxin  62 5 mcg Oral Daily Shiloh Bethea MD     • fluticasone-vilanterol  1 puff Inhalation Daily FIDELIA Rodrigues     • furosemide  40 mg Intravenous BID (diuretic) Shiloh Bethea MD     • gabapentin  300 mg Oral TID FIDELIA Rodrigues     • heparin (porcine)  3-30 Units/kg/hr (Order-Specific) Intravenous Titrated Eva Oh DO 9 Units/kg/hr (10/23/22 1199)   • heparin (porcine)  3,800 Units Intravenous Q1H PRN Bray Quincy Remaley, DO     • heparin (porcine)  7,600 Units Intravenous Q1H PRN Bray Quincy Remaley, DO     • insulin lispro  1-6 Units Subcutaneous TID [facility] FIDELIA Rodrigues     • insulin lispro  1-6 Units Subcutaneous [facility] Bari CRNP     • ipratropium  0 5 mg Nebulization TID Jann Simple, CRNP     • levalbuterol  1 25 mg Nebulization TID Jann Simple, CRNP     • lidocaine  1 patch Topical Daily Jann Simple, CRNP     • lidocaine  1 patch Topical Daily John Malagon MD     • melatonin  6 mg Oral HS Jann Simple, CRNP     • methocarbamol  750 mg Oral Q6H [facility], CRNP     • nystatin   Topical BID John Malagon MD     • ondansetron  4 mg Intravenous Q6H PRN Jann Simple, CRNP     • oxyCODONE  5 mg Oral Q6H PRN John Malagon MD     • pantoprazole  40 mg Oral BID AC Jann Simple, CRNP     • polyethylene glycol  17 g Oral Daily PRN Jann Simple, CRNP     • sacubitril-valsartan  1 tablet Oral BID Jann Simple, CRNP     • senna-docusate sodium  1 tablet Oral BID Jann Simple, CRNP     • simethicone  80 mg Oral Q6H PRN Jann Simple, CRNP          Today, Patient Was Seen By: John Malagon    ** Please Note: Dictation voice to text software may have been used in the creation of this document   **

## 2022-10-23 NOTE — ASSESSMENT & PLAN NOTE
Lab Results   Component Value Date    HGBA1C 6 6 (H) 06/08/2022       Recent Labs     10/22/22  1116 10/22/22  1555 10/22/22  2047 10/23/22  0730   POCGLU 228* 219* 193* 114       Blood Sugar Average: Last 72 hrs:  · (P) 185 2 Uses Humalog sliding scale at nursing home, continue sliding scale insulin while inpatient  · Hypoglycemia protocol

## 2022-10-23 NOTE — ASSESSMENT & PLAN NOTE
Wt Readings from Last 3 Encounters:   10/23/22 99 3 kg (218 lb 14 7 oz)   09/30/22 95 7 kg (210 lb 15 7 oz)   11/12/21 110 kg (243 lb)       · Last echo in September 2022 shows Ef 68% , diastolic dysfunction   · Takes Bumex 1 mg daily at home  · Suspect component of acute CHF - CTA showing pleural effusions along with elevated BNP, lower extremity edema  · Received 1mg IV Bumex in the ED- will continue with Lasix 80 mg IV b i d  however patient unable to tolerated and had bout of hypotension and rapid response yesterday  Will decrease Lasix to 40 mg IV b i d  and monitor  Continue Entresto , decrease dose of Coreg due to hypotension  normal digoxin level, resume digoxin  · Repeat chest x-ray tomorrow  If still showing bilateral pleural effusions consult placed to IR for thoracentesis    Hold heparin drip temporarily for thoracentesis tomorrow as needed

## 2022-10-23 NOTE — ASSESSMENT & PLAN NOTE
· Presents from SNF with 3 days of worsening shortness of breath  Per EMS found to be 85% on baseline 2L , now requiring up to 6L NC    · No prior history of blood clots  · Per chart review was previously on Eliquis for PAF, had admission at [facility] September 2022 for T11 fracture, anemia in which it appears Eliquis was stopped and advised to review DOAC with PCP at discharge  Not currently on anticoagulation per nursing home records, patient unable to recall if it was discussed  · CTA pe study showing acute bilateral pulmonary embolism, borderline right heart strain, bilateral pleural effusions  · D-dimer > 9 on admission   · COVID negative   · Troponins flat   · Provoked recent fracture/immobility ?  Did not undergo surgery   · Continue heparin VTE protocol   · Check echocardiogram

## 2022-10-24 ENCOUNTER — APPOINTMENT (INPATIENT)
Dept: NON INVASIVE DIAGNOSTICS | Facility: HOSPITAL | Age: 82
End: 2022-10-24
Payer: COMMERCIAL

## 2022-10-24 ENCOUNTER — APPOINTMENT (INPATIENT)
Dept: RADIOLOGY | Facility: HOSPITAL | Age: 82
End: 2022-10-24
Payer: COMMERCIAL

## 2022-10-24 ENCOUNTER — APPOINTMENT (INPATIENT)
Dept: INTERVENTIONAL RADIOLOGY/VASCULAR | Facility: HOSPITAL | Age: 82
End: 2022-10-24
Attending: FAMILY MEDICINE
Payer: COMMERCIAL

## 2022-10-24 LAB
ANION GAP SERPL CALCULATED.3IONS-SCNC: 0 MMOL/L (ref 4–13)
AORTIC ROOT: 3.2 CM
AORTIC VALVE MEAN VELOCITY: 17.1 M/S
APICAL FOUR CHAMBER EJECTION FRACTION: 78 %
APPEARANCE FLD: ABNORMAL
APTT PPP: 68 SECONDS (ref 23–37)
ASCENDING AORTA: 3.1 CM
AV AREA BY CONTINUOUS VTI: 1.1 CM2
AV AREA PEAK VELOCITY: 1.1 CM2
AV LVOT MEAN GRADIENT: 1 MMHG
AV LVOT PEAK GRADIENT: 3 MMHG
AV MEAN GRADIENT: 13 MMHG
AV PEAK GRADIENT: 23 MMHG
AV REGURGITATION PRESSURE HALF TIME: 582 MS
AV VALVE AREA: 1.13 CM2
AV VELOCITY RATIO: 0.36
BUN SERPL-MCNC: 57 MG/DL (ref 5–25)
CALCIUM SERPL-MCNC: 8.2 MG/DL (ref 8.3–10.1)
CHLORIDE SERPL-SCNC: 107 MMOL/L (ref 96–108)
CO2 SERPL-SCNC: 37 MMOL/L (ref 21–32)
COLOR FLD: ABNORMAL
CREAT SERPL-MCNC: 1.13 MG/DL (ref 0.6–1.3)
DOP CALC AO PEAK VEL: 2.39 M/S
DOP CALC AO VTI: 44.37 CM
DOP CALC LVOT AREA: 3.14 CM2
DOP CALC LVOT DIAMETER: 2 CM
DOP CALC LVOT PEAK VEL VTI: 15.96 CM
DOP CALC LVOT PEAK VEL: 0.87 M/S
DOP CALC LVOT STROKE INDEX: 23.5 ML/M2
DOP CALC LVOT STROKE VOLUME: 50.11
ERYTHROCYTE [DISTWIDTH] IN BLOOD BY AUTOMATED COUNT: 17.2 % (ref 11.6–15.1)
FRACTIONAL SHORTENING: 46 (ref 28–44)
GFR SERPL CREATININE-BSD FRML MDRD: 45 ML/MIN/1.73SQ M
GLUCOSE SERPL-MCNC: 145 MG/DL (ref 65–140)
GLUCOSE SERPL-MCNC: 192 MG/DL (ref 65–140)
GLUCOSE SERPL-MCNC: 93 MG/DL (ref 65–140)
GLUCOSE SERPL-MCNC: 93 MG/DL (ref 65–140)
HCT VFR BLD AUTO: 32.2 % (ref 34.8–46.1)
HGB BLD-MCNC: 9.4 G/DL (ref 11.5–15.4)
HISTIOCYTES NFR FLD: 4 %
INTERVENTRICULAR SEPTUM IN DIASTOLE (PARASTERNAL SHORT AXIS VIEW): 1.3 CM
INTERVENTRICULAR SEPTUM: 1.3 CM (ref 0.6–1.1)
LAAS-AP4: 34.4 CM2
LDH FLD L TO P-CCNC: 145 U/L
LEFT ATRIUM SIZE: 4.1 CM
LEFT INTERNAL DIMENSION IN SYSTOLE: 2.1 CM (ref 2.1–4)
LEFT VENTRICULAR INTERNAL DIMENSION IN DIASTOLE: 3.9 CM (ref 3.5–6)
LEFT VENTRICULAR POSTERIOR WALL IN END DIASTOLE: 1.4 CM
LEFT VENTRICULAR STROKE VOLUME: 50 ML
LVSV (TEICH): 50 ML
LYMPHOCYTES NFR BLD AUTO: 35 %
MCH RBC QN AUTO: 29.6 PG (ref 26.8–34.3)
MCHC RBC AUTO-ENTMCNC: 29.2 G/DL (ref 31.4–37.4)
MCV RBC AUTO: 101 FL (ref 82–98)
MONO+MESO NFR FLD MANUAL: 1 %
MONOCYTES NFR BLD AUTO: 8 %
MV STENOSIS PRESSURE HALF TIME: 75 MS
MV VALVE AREA P 1/2 METHOD: 2.93
NEUTS SEG NFR BLD AUTO: 50 %
PLATELET # BLD AUTO: 134 THOUSANDS/UL (ref 149–390)
PMV BLD AUTO: 10.4 FL (ref 8.9–12.7)
POTASSIUM SERPL-SCNC: 4.5 MMOL/L (ref 3.5–5.3)
PROT FLD-MCNC: 2.2 G/DL
PV PEAK GRADIENT: 9 MMHG
RA PRESSURE ESTIMATED: 8 MMHG
RBC # BLD AUTO: 3.18 MILLION/UL (ref 3.81–5.12)
RIGHT ATRIUM AREA SYSTOLE A4C: 21.9 CM2
RIGHT VENTRICLE ID DIMENSION: 3.8 CM
RV PSP: 68 MMHG
SITE: ABNORMAL
SL CV AV DECELERATION TIME RETROGRADE: 2006 MS
SL CV AV PEAK GRADIENT RETROGRADE: 49 MMHG
SL CV LV EF: 65
SL CV PED ECHO LEFT VENTRICLE DIASTOLIC VOLUME (MOD BIPLANE) 2D: 65 ML
SL CV PED ECHO LEFT VENTRICLE SYSTOLIC VOLUME (MOD BIPLANE) 2D: 15 ML
SODIUM SERPL-SCNC: 144 MMOL/L (ref 135–147)
TOTAL CELLS COUNTED SPEC: 100
TR MAX PG: 60 MMHG
TR PEAK VELOCITY: 3.9 M/S
TRICUSPID VALVE PEAK REGURGITATION VELOCITY: 3.88 M/S
WBC # BLD AUTO: 5.68 THOUSAND/UL (ref 4.31–10.16)
WBC # FLD MANUAL: 300 /UL
WBC OTHER NFR FLD MANUAL: 2 %

## 2022-10-24 PROCEDURE — 94760 N-INVAS EAR/PLS OXIMETRY 1: CPT

## 2022-10-24 PROCEDURE — 0W9B3ZZ DRAINAGE OF LEFT PLEURAL CAVITY, PERCUTANEOUS APPROACH: ICD-10-PCS | Performed by: RADIOLOGY

## 2022-10-24 PROCEDURE — 93321 DOPPLER ECHO F-UP/LMTD STD: CPT | Performed by: INTERNAL MEDICINE

## 2022-10-24 PROCEDURE — 89051 BODY FLUID CELL COUNT: CPT | Performed by: FAMILY MEDICINE

## 2022-10-24 PROCEDURE — 80048 BASIC METABOLIC PNL TOTAL CA: CPT | Performed by: FAMILY MEDICINE

## 2022-10-24 PROCEDURE — 88112 CYTOPATH CELL ENHANCE TECH: CPT | Performed by: PATHOLOGY

## 2022-10-24 PROCEDURE — 93308 TTE F-UP OR LMTD: CPT | Performed by: INTERNAL MEDICINE

## 2022-10-24 PROCEDURE — 99233 SBSQ HOSP IP/OBS HIGH 50: CPT | Performed by: FAMILY MEDICINE

## 2022-10-24 PROCEDURE — 93308 TTE F-UP OR LMTD: CPT

## 2022-10-24 PROCEDURE — 71045 X-RAY EXAM CHEST 1 VIEW: CPT

## 2022-10-24 PROCEDURE — 32555 ASPIRATE PLEURA W/ IMAGING: CPT

## 2022-10-24 PROCEDURE — 83615 LACTATE (LD) (LDH) ENZYME: CPT | Performed by: FAMILY MEDICINE

## 2022-10-24 PROCEDURE — 88305 TISSUE EXAM BY PATHOLOGIST: CPT | Performed by: PATHOLOGY

## 2022-10-24 PROCEDURE — 87070 CULTURE OTHR SPECIMN AEROBIC: CPT | Performed by: FAMILY MEDICINE

## 2022-10-24 PROCEDURE — 94640 AIRWAY INHALATION TREATMENT: CPT

## 2022-10-24 PROCEDURE — 94660 CPAP INITIATION&MGMT: CPT

## 2022-10-24 PROCEDURE — 93325 DOPPLER ECHO COLOR FLOW MAPG: CPT | Performed by: INTERNAL MEDICINE

## 2022-10-24 PROCEDURE — 85730 THROMBOPLASTIN TIME PARTIAL: CPT | Performed by: FAMILY MEDICINE

## 2022-10-24 PROCEDURE — 87205 SMEAR GRAM STAIN: CPT | Performed by: FAMILY MEDICINE

## 2022-10-24 PROCEDURE — 82948 REAGENT STRIP/BLOOD GLUCOSE: CPT

## 2022-10-24 PROCEDURE — 84157 ASSAY OF PROTEIN OTHER: CPT | Performed by: FAMILY MEDICINE

## 2022-10-24 PROCEDURE — 85027 COMPLETE CBC AUTOMATED: CPT | Performed by: FAMILY MEDICINE

## 2022-10-24 RX ORDER — CARVEDILOL 3.12 MG/1
3.12 TABLET ORAL 2 TIMES DAILY WITH MEALS
Status: DISCONTINUED | OUTPATIENT
Start: 2022-10-24 | End: 2022-10-28 | Stop reason: HOSPADM

## 2022-10-24 RX ORDER — LIDOCAINE HYDROCHLORIDE 10 MG/ML
INJECTION, SOLUTION EPIDURAL; INFILTRATION; INTRACAUDAL; PERINEURAL CODE/TRAUMA/SEDATION MEDICATION
Status: COMPLETED | OUTPATIENT
Start: 2022-10-24 | End: 2022-10-24

## 2022-10-24 RX ORDER — MIDODRINE HYDROCHLORIDE 5 MG/1
5 TABLET ORAL
Status: DISCONTINUED | OUTPATIENT
Start: 2022-10-24 | End: 2022-10-28 | Stop reason: HOSPADM

## 2022-10-24 RX ADMIN — CARVEDILOL 3.12 MG: 3.12 TABLET, FILM COATED ORAL at 16:01

## 2022-10-24 RX ADMIN — GABAPENTIN 300 MG: 300 CAPSULE ORAL at 21:10

## 2022-10-24 RX ADMIN — DOCUSATE SODIUM AND SENNOSIDES 1 TABLET: 8.6; 5 TABLET, FILM COATED ORAL at 08:56

## 2022-10-24 RX ADMIN — ACETAMINOPHEN 650 MG: 325 TABLET ORAL at 06:30

## 2022-10-24 RX ADMIN — OXYCODONE HYDROCHLORIDE 5 MG: 5 TABLET ORAL at 22:05

## 2022-10-24 RX ADMIN — LIDOCAINE HYDROCHLORIDE 5 ML: 10 INJECTION, SOLUTION EPIDURAL; INFILTRATION; INTRACAUDAL; PERINEURAL at 11:06

## 2022-10-24 RX ADMIN — METHOCARBAMOL TABLETS 750 MG: 750 TABLET, COATED ORAL at 17:13

## 2022-10-24 RX ADMIN — IPRATROPIUM BROMIDE 0.5 MG: 0.5 SOLUTION RESPIRATORY (INHALATION) at 08:08

## 2022-10-24 RX ADMIN — DIGOXIN 62.5 MCG: 125 TABLET ORAL at 08:56

## 2022-10-24 RX ADMIN — CEFTRIAXONE 1000 MG: 1 INJECTION, SOLUTION INTRAVENOUS at 06:30

## 2022-10-24 RX ADMIN — NYSTATIN: 100000 POWDER TOPICAL at 17:15

## 2022-10-24 RX ADMIN — METHOCARBAMOL TABLETS 750 MG: 750 TABLET, COATED ORAL at 12:12

## 2022-10-24 RX ADMIN — LEVALBUTEROL HYDROCHLORIDE 1.25 MG: 1.25 SOLUTION, CONCENTRATE RESPIRATORY (INHALATION) at 14:13

## 2022-10-24 RX ADMIN — LIDOCAINE 5% 1 PATCH: 700 PATCH TOPICAL at 08:55

## 2022-10-24 RX ADMIN — INSULIN LISPRO 2 UNITS: 100 INJECTION, SOLUTION INTRAVENOUS; SUBCUTANEOUS at 16:25

## 2022-10-24 RX ADMIN — LIDOCAINE 5% 1 PATCH: 700 PATCH TOPICAL at 09:04

## 2022-10-24 RX ADMIN — MIDODRINE HYDROCHLORIDE 5 MG: 5 TABLET ORAL at 16:02

## 2022-10-24 RX ADMIN — FLUTICASONE FUROATE AND VILANTEROL TRIFENATATE 1 PUFF: 100; 25 POWDER RESPIRATORY (INHALATION) at 09:11

## 2022-10-24 RX ADMIN — NYSTATIN: 100000 POWDER TOPICAL at 09:05

## 2022-10-24 RX ADMIN — GABAPENTIN 300 MG: 300 CAPSULE ORAL at 08:56

## 2022-10-24 RX ADMIN — FUROSEMIDE 40 MG: 10 INJECTION, SOLUTION INTRAMUSCULAR; INTRAVENOUS at 16:02

## 2022-10-24 RX ADMIN — POLYETHYLENE GLYCOL 3350 17 G: 17 POWDER, FOR SOLUTION ORAL at 16:05

## 2022-10-24 RX ADMIN — ATORVASTATIN CALCIUM 10 MG: 10 TABLET, FILM COATED ORAL at 08:56

## 2022-10-24 RX ADMIN — LEVALBUTEROL HYDROCHLORIDE 1.25 MG: 1.25 SOLUTION, CONCENTRATE RESPIRATORY (INHALATION) at 20:16

## 2022-10-24 RX ADMIN — IPRATROPIUM BROMIDE 0.5 MG: 0.5 SOLUTION RESPIRATORY (INHALATION) at 14:13

## 2022-10-24 RX ADMIN — LEVALBUTEROL HYDROCHLORIDE 1.25 MG: 1.25 SOLUTION, CONCENTRATE RESPIRATORY (INHALATION) at 08:08

## 2022-10-24 RX ADMIN — MELATONIN 6 MG: 3 TAB ORAL at 21:10

## 2022-10-24 RX ADMIN — PANTOPRAZOLE SODIUM 40 MG: 40 TABLET, DELAYED RELEASE ORAL at 16:01

## 2022-10-24 RX ADMIN — PANTOPRAZOLE SODIUM 40 MG: 40 TABLET, DELAYED RELEASE ORAL at 06:30

## 2022-10-24 RX ADMIN — APIXABAN 10 MG: 5 TABLET, FILM COATED ORAL at 13:55

## 2022-10-24 RX ADMIN — IPRATROPIUM BROMIDE 0.5 MG: 0.5 SOLUTION RESPIRATORY (INHALATION) at 20:16

## 2022-10-24 RX ADMIN — MORPHINE SULFATE 2 MG: 2 INJECTION, SOLUTION INTRAMUSCULAR; INTRAVENOUS at 14:59

## 2022-10-24 RX ADMIN — GABAPENTIN 300 MG: 300 CAPSULE ORAL at 16:01

## 2022-10-24 RX ADMIN — HEPARIN SODIUM 9 UNITS/KG/HR: 10000 INJECTION, SOLUTION INTRAVENOUS at 02:12

## 2022-10-24 NOTE — PLAN OF CARE
Problem: Potential for Falls  Goal: Patient will remain free of falls  Description: INTERVENTIONS:  - Educate patient/family on patient safety including physical limitations  - Instruct patient to call for assistance with activity   - Consult OT/PT to assist with strengthening/mobility   - Keep Call bell within reach  - Keep bed low and locked with side rails adjusted as appropriate  - Keep care items and personal belongings within reach  - Initiate and maintain comfort rounds  - Make Fall Risk Sign visible to staff  - Offer Toileting every 2 Hours, in advance of need  - Initiate/Maintain bed alarm  - Obtain necessary fall risk management equipment: fall bracelet, bed alarm, non-skid socks  - Apply yellow socks and bracelet for high fall risk patients  - Consider moving patient to room near nurses station  Outcome: Progressing     Problem: MOBILITY - ADULT  Goal: Maintain or return to baseline ADL function  Description: INTERVENTIONS:  -  Assess patient's ability to carry out ADLs; assess patient's baseline for ADL function and identify physical deficits which impact ability to perform ADLs (bathing, care of mouth/teeth, toileting, grooming, dressing, etc )  - Assess/evaluate cause of self-care deficits   - Assess range of motion  - Assess patient's mobility; develop plan if impaired  - Assess patient's need for assistive devices and provide as appropriate  - Encourage maximum independence but intervene and supervise when necessary  - Involve family in performance of ADLs  - Assess for home care needs following discharge   - Consider OT consult to assist with ADL evaluation and planning for discharge  - Provide patient education as appropriate  Outcome: Progressing  Goal: Maintains/Returns to pre admission functional level  Description: INTERVENTIONS:  - Perform BMAT or MOVE assessment daily    - Set and communicate daily mobility goal to care team and patient/family/caregiver     - Collaborate with rehabilitation services on mobility goals if consulted  - Perform Range of Motion 3 times a day  - Reposition patient every 2 hours  - Dangle patient 3 times a day  - Stand patient 3 times a day  - Ambulate patient 3 times a day  - Out of bed to chair 3 times a day   - Out of bed for meals 3 times a day  - Out of bed for toileting  - Record patient progress and toleration of activity level   Outcome: Progressing     Problem: Prexisting or High Potential for Compromised Skin Integrity  Goal: Skin integrity is maintained or improved  Description: INTERVENTIONS:  - Identify patients at risk for skin breakdown  - Assess and monitor skin integrity  - Assess and monitor nutrition and hydration status  - Monitor labs   - Assess for incontinence   - Turn and reposition patient  - Assist with mobility/ambulation  - Relieve pressure over bony prominences  - Avoid friction and shearing  - Provide appropriate hygiene as needed including keeping skin clean and dry  - Evaluate need for skin moisturizer/barrier cream  - Collaborate with interdisciplinary team   - Patient/family teaching  - Consider wound care consult   Outcome: Progressing     Problem: Nutrition/Hydration-ADULT  Goal: Nutrient/Hydration intake appropriate for improving, restoring or maintaining nutritional needs  Description: Monitor and assess patient's nutrition/hydration status for malnutrition  Collaborate with interdisciplinary team and initiate plan and interventions as ordered  Monitor patient's weight and dietary intake as ordered or per policy  Utilize nutrition screening tool and intervene as necessary  Determine patient's food preferences and provide high-protein, high-caloric foods as appropriate       INTERVENTIONS:  - Monitor oral intake, urinary output, labs, and treatment plans  - Assess nutrition and hydration status and recommend course of action  - Evaluate amount of meals eaten  - Assist patient with eating if necessary   - Allow adequate time for meals  - Recommend/ encourage appropriate diets, oral nutritional supplements, and vitamin/mineral supplements  - Order, calculate, and assess calorie counts as needed  -  - encourage snacks protein drinks inbetween meals  - Provide specific nutrition/hydration education as appropriate  - Include patient/family/caregiver in decisions related to nutrition  Outcome: Progressing     Problem: NEUROSENSORY - ADULT  Goal: Achieves stable or improved neurological status  Description: INTERVENTIONS  - Monitor and report changes in neurological status  - Monitor vital signs such as temperature, blood pressure, glucose, and any other labs ordered   - Initiate measures to prevent increased intracranial pressure  - Monitor for seizure activity and implement precautions if appropriate      Outcome: Progressing  Goal: Remains free of injury related to seizures activity  Description: INTERVENTIONS  - Maintain airway, patient safety  and administer oxygen as ordered  - Monitor patient for seizure activity, document and report duration and description of seizure to physician/advanced practitioner  - If seizure occurs,  ensure patient safety during seizure  - Reorient patient post seizure  - Seizure pads on all 4 side rails  - Instruct patient/family to notify RN of any seizure activity including if an aura is experienced  - Instruct patient/family to call for assistance with activity based on nursing assessment  - Administer anti-seizure medications if ordered    Outcome: Progressing  Goal: Achieves maximal functionality and self care  Description: INTERVENTIONS  - Monitor swallowing and airway patency with patient fatigue and changes in neurological status  - Encourage and assist patient to increase activity and self care     - Encourage visually impaired, hearing impaired and aphasic patients to use assistive/communication devices  Outcome: Progressing     Problem: CARDIOVASCULAR - ADULT  Goal: Maintains optimal cardiac output and hemodynamic stability  Description: INTERVENTIONS:  - Monitor I/O, vital signs and rhythm SOB, CP, tachycardia  - Monitor for S/S and trends of decreased cardiac output  - Administer and titrate ordered vasoactive medications to optimize hemodynamic stability  - Assess quality of pulses, skin color and temperature  - Assess for signs of decreased coronary artery perfusion  - Instruct patient to report change in severity of symptoms  Outcome: Progressing  Goal: Absence of cardiac dysrhythmias or at baseline rhythm  Description: INTERVENTIONS:  - Continuous cardiac monitoring, vital signs, obtain 12 lead EKG if ordered  - Administer antiarrhythmic and heart rate control medications as ordered  - Monitor electrolytes and administer replacement therapy as ordered  Outcome: Progressing     Problem: RESPIRATORY - ADULT  Goal: Achieves optimal ventilation and oxygenation  Description: INTERVENTIONS:  - Assess for changes in respiratory status  - Assess for changes in mentation and behavior  - Position to facilitate oxygenation and minimize respiratory effort  - Oxygen administered by appropriate delivery if ordered  - Initiate smoking cessation education as indicated  - Encourage broncho-pulmonary hygiene including cough, deep breathe, Incentive Spirometry  - Assess the need for suctioning and aspirate as needed  - Assess and instruct to report SOB or any respiratory difficulty  - Respiratory Therapy support as indicated  Outcome: Progressing     Problem: GENITOURINARY - ADULT  Goal: Maintains or returns to baseline urinary function  Description: INTERVENTIONS:  - Assess urinary function  - Encourage oral fluids to ensure adequate hydration if ordered  - Administer IV fluids as ordered to ensure adequate hydration  - Administer ordered medications as needed  - Offer frequent toileting  - Follow urinary retention protocol if ordered  Outcome: Progressing  Goal: Absence of urinary retention  Description: INTERVENTIONS:  - Assess patient’s ability to void and empty bladder  - Monitor I/O  - Bladder scan as needed  - Discuss with physician/AP medications to alleviate retention as needed  - Discuss catheterization for long term situations as appropriate  Outcome: Progressing  Goal: Urinary catheter remains patent  Description: INTERVENTIONS:  - Assess patency of urinary catheter  - If patient has a chronic rivera, consider changing catheter if non-functioning  - Follow guidelines for intermittent irrigation of non-functioning urinary catheter  Outcome: Progressing     Problem: METABOLIC, FLUID AND ELECTROLYTES - ADULT  Goal: Electrolytes maintained within normal limits  Description: INTERVENTIONS:  - Monitor labs and assess patient for signs and symptoms of electrolyte imbalances  - Administer electrolyte replacement as ordered  - Monitor response to electrolyte replacements, including repeat lab results as appropriate  - Instruct patient on fluid and nutrition as appropriate  Outcome: Progressing  Goal: Fluid balance maintained  Description: INTERVENTIONS:  - Monitor labs   - Monitor I/O and WT  - Instruct patient on fluid and nutrition as appropriate  - Assess for signs & symptoms of volume excess or deficit  Outcome: Progressing  Goal: Glucose maintained within target range  Description: INTERVENTIONS:  - Monitor Blood Glucose as ordered  - Assess for signs and symptoms of hyperglycemia and hypoglycemia  - Administer ordered medications to maintain glucose within target range  - Assess nutritional intake and initiate nutrition service referral as needed  Outcome: Progressing     Problem: SKIN/TISSUE INTEGRITY - ADULT  Goal: Skin Integrity remains intact(Skin Breakdown Prevention)  Description: Assess:  -Perform Drew assessment every shift, prn  -Clean and moisturize skin every daily, prn  -Inspect skin when repositioning, toileting, and assisting with ADLS  -Assess under medical devices such as andrés every shift  -Assess extremities for adequate circulation and sensation     Bed Management:  -Have minimal linens on bed & keep smooth, unwrinkled  -Change linens as needed when moist or perspiring  -Avoid sitting or lying in one position for more than 2 hours while in bed  -Keep HOB at 30 degrees     Toileting:  -Offer bedside commode  -Assess for incontinence every 2 hrs  -Use incontinent care products after each incontinent episode such as calazime cream, alevyn foam    Activity:  -Mobilize patient 3 times a day  -Encourage activity and walks on unit  -Encourage or provide ROM exercises   -Turn and reposition patient every 2 Hours  -Use appropriate equipment to lift or move patient in bed  -Instruct/ Assist with weight shifting every 2 when out of bed in chair  -Consider limitation of chair time 2 hour intervals    Skin Care:  -Avoid use of baby powder, tape, friction and shearing, hot water or constrictive clothing  -Relieve pressure over bony prominences using green foam wedge  -Do not massage red bony areas  Outcome: Progressing  Goal: Incision(s), wounds(s) or drain site(s) healing without S/S of infection  Description: INTERVENTIONS  - Assess and document dressing, incision, wound bed, drain sites and surrounding tissue  - Provide patient and family education  - Perform skin care/dressing changes every shift, prn  Outcome: Progressing  Goal: Pressure injury heals and does not worsen  Description: Interventions:  - Implement low air loss mattress or specialty surface (Criteria met)  - Apply silicone foam dressing  - Instruct/assist with weight shifting every 120 minutes when in chair   - Limit chair time to 2 hour intervals  - Use special pressure reducing interventions such as green foam wedges when in chair   - Apply fecal or urinary incontinence containment device   - Perform passive or active ROM every 2hrs  - Turn and reposition patient & offload bony prominences every 2 hours   - Utilize friction reducing device or surface for transfers   - Consider consults to  interdisciplinary teams such as pt/ot  - Use incontinent care products after each incontinent episode such as calazime cream, alevyn   - Consider nutrition services referral as needed  Outcome: Progressing     Problem: HEMATOLOGIC - ADULT  Goal: Maintains hematologic stability  Description: INTERVENTIONS  - Assess for signs and symptoms of bleeding or hemorrhage  - Monitor labs  - Administer supportive blood products/factors as ordered and appropriate  Outcome: Progressing     Problem: MUSCULOSKELETAL - ADULT  Goal: Maintain or return mobility to safest level of function  Description: INTERVENTIONS:  - Assess patient's ability to carry out ADLs; assess patient's baseline for ADL function and identify physical deficits which impact ability to perform ADLs (bathing, care of mouth/teeth, toileting, grooming, dressing, etc )  - Assess/evaluate cause of self-care deficits   - Assess range of motion  - Assess patient's mobility  - Assess patient's need for assistive devices and provide as appropriate  - Encourage maximum independence but intervene and supervise when necessary  - Involve family in performance of ADLs  - Assess for home care needs following discharge   - Consider OT consult to assist with ADL evaluation and planning for discharge  - Provide patient education as appropriate  Outcome: Progressing  Goal: Maintain proper alignment of affected body part  Description: INTERVENTIONS:  - Support, maintain and protect limb and body alignment  - Provide patient/ family with appropriate education  Outcome: Progressing     Problem: PAIN - ADULT  Goal: Verbalizes/displays adequate comfort level or baseline comfort level  Description: Interventions:  - Encourage patient to monitor pain and request assistance  - Assess pain using appropriate pain scale  - Administer analgesics based on type and severity of pain and evaluate response  - Implement non-pharmacological measures as appropriate and evaluate response  - Consider cultural and social influences on pain and pain management  - Notify physician/advanced practitioner if interventions unsuccessful or patient reports new pain  Outcome: Progressing     Problem: INFECTION - ADULT  Goal: Absence or prevention of progression during hospitalization  Description: INTERVENTIONS:  - Assess and monitor for signs and symptoms of infection  - Monitor lab/diagnostic results  - Monitor all insertion sites, i e  indwelling lines, tubes, and drains  - Monitor endotracheal if appropriate and nasal secretions for changes in amount and color  - [place] appropriate cooling/warming therapies per order  - Administer medications as ordered  - Instruct and encourage patient and family to use good hand hygiene technique  - Identify and instruct in appropriate isolation precautions for identified infection/condition  Outcome: Progressing  Goal: Absence of fever/infection during neutropenic period  Description: INTERVENTIONS:  - Monitor WBC    Outcome: Progressing     Problem: SAFETY ADULT  Goal: Maintain or return to baseline ADL function  Description: INTERVENTIONS:  -  Assess patient's ability to carry out ADLs; assess patient's baseline for ADL function and identify physical deficits which impact ability to perform ADLs (bathing, care of mouth/teeth, toileting, grooming, dressing, etc )  - Assess/evaluate cause of self-care deficits   - Assess range of motion  - Assess patient's mobility; develop plan if impaired  - Assess patient's need for assistive devices and provide as appropriate  - Encourage maximum independence but intervene and supervise when necessary  - Involve family in performance of ADLs  - Assess for home care needs following discharge   - Consider OT consult to assist with ADL evaluation and planning for discharge  - Provide patient education as appropriate  Outcome: Progressing  Goal: Maintains/Returns to pre admission functional level  Description: INTERVENTIONS:  - Perform BMAT or MOVE assessment daily    - Set and communicate daily mobility goal to care team and patient/family/caregiver  - Collaborate with rehabilitation services on mobility goals if consulted  - Perform Range of Motion 3 times a day  - Reposition patient every 2 hours  - Dangle patient 3 times a day  - Stand patient 3 times a day  - Ambulate patient 3 times a day  - Out of bed to chair 3 times a day   - Out of bed for meals 3 times a day  - Out of bed for toileting  - Record patient progress and toleration of activity level   Outcome: Progressing     Problem: DISCHARGE PLANNING  Goal: Discharge to home or other facility with appropriate resources  Description: INTERVENTIONS:  - Identify barriers to discharge w/patient and caregiver  - Arrange for needed discharge resources and transportation as appropriate  - Identify discharge learning needs (meds, wound care, etc )  - Arrange for interpretive services to assist at discharge as needed  - Refer to Case Management Department for coordinating discharge planning if the patient needs post-hospital services based on physician/advanced practitioner order or complex needs related to functional status, cognitive ability, or social support system  Outcome: Progressing     Problem: Knowledge Deficit  Goal: Patient/family/caregiver demonstrates understanding of disease process, treatment plan, medications, and discharge instructions  Description: Complete learning assessment and assess knowledge base    Interventions:  - Provide teaching at level of understanding  - Provide teaching via preferred learning methods  Outcome: Progressing

## 2022-10-24 NOTE — PROGRESS NOTES
[address] Mehta  Progress Note - Shira Wilkins 1940, 79 y o  female MRN: [id number]  Unit/Bed#: -01 Encounter: [id number]  Primary Care Provider: Mahogany Preciado DO   Date and time admitted to hospital: 10/22/2022 12:28 AM    Acute on chronic respiratory failure with hypoxia ([address] )  Assessment & Plan  · Wears 2 L nasal cannula baseline  · Found to be hypoxic 85% on 2 L NC, now requiring 5-6 L NC  Today wean down to 3 L nasal cannula  · Multifactorial in the setting of bilateral PE's, pleural effusion, CHF- continue treatment as outlined below  · Wean oxygen to keep oxygen saturation greater than 90%  · Plan for thoracentesis today as patient is quite hypotensive and not tolerating diuretics too well    * Bilateral pulmonary embolism (HCC)  Assessment & Plan  · Presents from SNF with 3 days of worsening shortness of breath  Per EMS found to be 85% on baseline 2L , now requiring up to 6L NC  Decreased to 3 L nasal cannula  · No prior history of blood clots  · Per chart review was previously on Eliquis for PAF, had admission at [facility] September 2022 for T11 fracture, anemia in which it appears Eliquis was stopped and advised to review DOAC with PCP at discharge  Not currently on anticoagulation per nursing home records, patient unable to recall if it was discussed  · CTA pe study showing acute bilateral pulmonary embolism, borderline right heart strain, bilateral pleural effusions  · D-dimer > 9 on admission   · COVID negative   · Troponins flat   · Provoked recent fracture/immobility ? Did not undergo surgery   · Continue heparin VTE protocol until this morning  Stop heparin drip  Patient will then undergo thoracentesis and then start on Eliquis 10 mg oral b i d  Starting this afternoon for total of 7 days and then transition to Eliquis 5 mg b i d   Following that  · Check echocardiogram to evaluate LV and RV function    Acute on chronic diastolic CHF (congestive heart failure) [facility])  Assessment & Plan  Wt Readings from Last 3 Encounters:   10/24/22 99 3 kg (219 lb)   09/30/22 95 7 kg (210 lb 15 7 oz)   11/12/21 110 kg (243 lb)       · Last echo in September 2022 shows Ef 60% , diastolic dysfunction   · Takes Bumex 1 mg daily at home  · Suspect component of acute CHF - CTA showing pleural effusions along with elevated BNP, lower extremity edema  · Received 1mg IV Bumex in the ED- will continue with Lasix 80 mg IV b i d  however patient unable to tolerated and had bout of hypotension and rapid response 10/22/2022  then decrease Lasix to 40 mg IV b i d  and monitor  Continue Entresto , decrease dose of Coreg due to hypotension from 12 5 mg b i d  To 3 125 mg b i d  normal digoxin level, cont digoxin  · Repeat chest x-ray done today still shows pleural effusion  Plan for thoracentesis today  · 2D echo done today to evaluate LV function and RV function        Chronic indwelling Ball catheter  Assessment & Plan  · Present on admission  Unclear when catheter was placed originally,  suspect on prior admission in September 2022  · UA showing acute cystitis  · Already changed Ball catheter this admission    Pleural effusion  Assessment & Plan  · CTA showing moderate left and small right pleural effusions with subjacent compressive atelectasis    · Consult placed to ir for thoracentesis today    Type 2 diabetes mellitus [facility])  Assessment & Plan  Lab Results   Component Value Date    HGBA1C 6 6 (H) 06/08/2022       Recent Labs     10/23/22  1620 10/23/22  2127 10/24/22  0753 10/24/22  1101   POCGLU 127 110 93 145*       Blood Sugar Average: Last 72 hrs:  · (P) 164 4 Uses Humalog sliding scale at nursing home, continue sliding scale insulin while inpatient  · Hypoglycemia protocol    COPD (chronic obstructive pulmonary disease) ([facility])  Assessment & Plan  · Not appear to be in acute exacerbation  · Continue home inhaler , neb treatments    T11 vertebral fracture ([facility])  Assessment & Plan  · Recent admission at [facility] after fall , found to have T11 fracture  · Complaining of left leg pain, appears chronic  Has chronic rivera catheter in place  · Per chart review : Has been following with neurosurgery outpatient, medically managing due to poor surgical candidate and patient electing to not undergo surgery   · CTA incidentally showing "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 with chronic fractures of the T10 and T11 vertebral bodies in spinal canal narrowing at this level which appears progressed compared to prior study"  · Continue TLSO brace    Chronic anemia  Assessment & Plan  · Hgb 9 3 (baseline 8-9)  · Stable at baseline       VTE Pharmacologic Prophylaxis:   Pharmacologic: Apixaban (Eliquis)  Mechanical VTE Prophylaxis in Place: Yes    Patient Centered Rounds: I have performed bedside rounds with nursing staff today  Discussions with Specialists or Other Care Team Provider:  Discussed with IR    Education and Discussions with Family / Patient:  Discussed with patient at bedside and will update family    Time Spent for Care: 45 minutes  More than 50% of total time spent on counseling and coordination of care as described above  Current Length of Stay: 2 day(s)    Current Patient Status: Inpatient   Certification Statement: The patient will continue to require additional inpatient hospital stay due to Acute bilateral pulmonary embolism    Discharge Plan:  Patient will probably be in the hospital at least another 48 hours  Once medically cleared will be discharged back to rehab    Code Status: Level 1 - Full Code      Subjective:   Patient denies any chest pain today or shortness of breath she is comfortable on 5l  Denies any nausea vomiting diarrhea or abdominal pain    No bleeding reported    Objective:     Vitals:   Temp (24hrs), Av 8 °F (36 6 °C), Min:97 5 °F (36 4 °C), Max:98 1 °F (36 7 °C)    Temp:  [97 5 °F (36 4 °C)-98 1 °F (36 7 °C)] 97 9 °F (36 6 °C)  HR:  [68-84] 84  Resp:  [18-24] 20  BP: ()/(34-59) 99/59  SpO2:  [95 %-99 %] 95 %  Body mass index is 37 59 kg/m²  Input and Output Summary (last 24 hours): Intake/Output Summary (Last 24 hours) at 10/24/2022 1112  Last data filed at 10/24/2022 0934  Gross per 24 hour   Intake 748 62 ml   Output 700 ml   Net 48 62 ml       Physical Exam:     Physical Exam  Vitals and nursing note reviewed  Constitutional:       Appearance: She is obese  She is ill-appearing  HENT:      Head: Normocephalic and atraumatic  Right Ear: External ear normal       Left Ear: External ear normal       Nose: Nose normal       Mouth/Throat:      Pharynx: Oropharynx is clear  Eyes:      Pupils: Pupils are equal, round, and reactive to light  Cardiovascular:      Rate and Rhythm: Normal rate and regular rhythm  Heart sounds: Normal heart sounds  Pulmonary:      Effort: Pulmonary effort is normal       Comments: Moderate air entry bilaterally mild decreased breath sounds bases  Abdominal:      General: Bowel sounds are normal       Palpations: Abdomen is soft  Tenderness: There is no abdominal tenderness  Musculoskeletal:         General: Normal range of motion  Cervical back: Normal range of motion and neck supple  Skin:     General: Skin is warm and dry  Capillary Refill: Capillary refill takes less than 2 seconds  Neurological:      General: No focal deficit present  Mental Status: She is alert and oriented to person, place, and time     Psychiatric:         Mood and Affect: Mood normal            Additional Data:     Labs:    Results from last 7 days   Lab Units 10/24/22  0535 10/22/22  2231 10/22/22  0049   WBC Thousand/uL 5 68   < > 6 18   HEMOGLOBIN g/dL 9 4*   < > 9 3*   HEMATOCRIT % 32 2*   < > 32 0*   PLATELETS Thousands/uL 134*   < > 132*   NEUTROS PCT %  --   --  77*   LYMPHS PCT %  --   --  12*   MONOS PCT %  --   --  7   EOS PCT %  --   --  2 < > = values in this interval not displayed  Results from last 7 days   Lab Units 10/24/22  0535 10/23/22  0405 10/22/22  2231   SODIUM mmol/L 144   < > 143   POTASSIUM mmol/L 4 5   < > 4 2   CHLORIDE mmol/L 107   < > 105   CO2 mmol/L 37*   < > 37*   BUN mg/dL 57*   < > 60*   CREATININE mg/dL 1 13   < > 1 04   ANION GAP mmol/L 0*   < > 1*   CALCIUM mg/dL 8 2*   < > 7 8*   ALBUMIN g/dL  --   --  2 1*   TOTAL BILIRUBIN mg/dL  --   --  0 46   ALK PHOS U/L  --   --  99   ALT U/L  --   --  12   AST U/L  --   --  11   GLUCOSE RANDOM mg/dL 93   < > 164*    < > = values in this interval not displayed  Results from last 7 days   Lab Units 10/22/22  2235   INR  1 34*     Results from last 7 days   Lab Units 10/24/22  1101 10/24/22  0753 10/23/22  2127 10/23/22  1620 10/23/22  1115 10/23/22  0730 10/22/22  2047 10/22/22  1555 10/22/22  1116 10/22/22  0733   POC GLUCOSE mg/dl 145* 93 110 127 243* 114 193* 219* 228* 172*         Results from last 7 days   Lab Units 10/22/22  2231 10/22/22  0049   LACTIC ACID mmol/L 1 6 1 0   PROCALCITONIN ng/ml  --  0 13           * I Have Reviewed All Lab Data Listed Above  * Additional Pertinent Lab Tests Reviewed: [address] Admission Reviewed    Imaging:    Imaging Reports Reviewed Today Include:  Chest x-ray  Imaging Personally Reviewed by Myself Includes:  Chest x-ray    Recent Cultures (last 7 days):     Results from last 7 days   Lab Units 10/22/22  0106 10/22/22  0054 10/22/22  0048   BLOOD CULTURE  No Growth at 24 hrs   --  No Growth at 24 hrs  URINE CULTURE   --  Culture results to follow    --        Last 24 Hours Medication List:   Current Facility-Administered Medications   Medication Dose Route Frequency Provider Last Rate   • acetaminophen  650 mg Oral Q4H PRN FIDELIA Callaway     • apixaban  10 mg Oral BID Cheryl Davis MD     • atorvastatin  10 mg Oral Daily FIDELIA Callaway     • carvedilol  3 125 mg Oral BID With Meals Cheryl Davis MD     • cefTRIAXone  1,000 mg Intravenous Q24H FIDELIA Rodrigues Stopped (10/24/22 1304)   • digoxin  62 5 mcg Oral Daily Shiloh Bethea MD     • fluticasone-vilanterol  1 puff Inhalation Daily FIDELIA Rodrigues     • furosemide  40 mg Intravenous BID (diuretic) Shiloh Bethea MD     • gabapentin  300 mg Oral TID FIDELIA Rodrigues     • insulin lispro  1-6 Units Subcutaneous TID [address] [place] [address], FIDELIA     • insulin lispro  1-6 Units Subcutaneous HS FIDELIA Rodrigues     • ipratropium  0 5 mg Nebulization TID FIDELIA Rodrigues     • levalbuterol  1 25 mg Nebulization TID FIDELIA Rodrigues     • lidocaine  1 patch Topical Daily FIDELIA Rodrigues     • lidocaine  1 patch Topical Daily Shiloh Bethea MD     • melatonin  6 mg Oral HS FIDELIA Rodrigues     • methocarbamol  750 mg Oral Q6H [facility], FIDELIA     • nystatin   Topical BID Shiloh Bethea MD     • ondansetron  4 mg Intravenous Q6H PRN FIDELIA Rodrigues     • oxyCODONE  5 mg Oral Q6H PRN Shiloh Bethea MD     • pantoprazole  40 mg Oral BID AC FIDELIA Rodrigues     • polyethylene glycol  17 g Oral Daily PRN FIDELIA Rodrigues     • sacubitril-valsartan  1 tablet Oral BID FIDELIA Rodrigues     • senna-docusate sodium  1 tablet Oral BID FIDELIA Rodrigues     • simethicone  80 mg Oral Q6H PRN FIDELIA Rodrigues          Today, Patient Was Seen By: Shiloh Bethea    ** Please Note: Dictation voice to text software may have been used in the creation of this document   **

## 2022-10-24 NOTE — ASSESSMENT & PLAN NOTE
Lab Results   Component Value Date    HGBA1C 6 6 (H) 06/08/2022       Recent Labs     10/23/22  1620 10/23/22  2127 10/24/22  0753 10/24/22  1101   POCGLU 127 110 93 145*       Blood Sugar Average: Last 72 hrs:  · (P) 164 4 Uses Humalog sliding scale at nursing home, continue sliding scale insulin while inpatient  · Hypoglycemia protocol

## 2022-10-24 NOTE — ASSESSMENT & PLAN NOTE
· Wears 2 L nasal cannula baseline  · Found to be hypoxic 85% on 2 L NC, now requiring 5-6 L NC    Today wean down to 3 L nasal cannula  · Multifactorial in the setting of bilateral PE's, pleural effusion, CHF- continue treatment as outlined below  · Wean oxygen to keep oxygen saturation greater than 90%  · Plan for thoracentesis today as patient is quite hypotensive and not tolerating diuretics too well

## 2022-10-24 NOTE — ASSESSMENT & PLAN NOTE
· Recent admission at [facility] after fall , found to have T11 fracture  · Complaining of left leg pain, appears chronic  Has chronic rivera catheter in place     · Per chart review : Has been following with neurosurgery outpatient, medically managing due to poor surgical candidate and patient electing to not undergo surgery   · CTA incidentally showing "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 with chronic fractures of the T10 and T11 vertebral bodies in spinal canal narrowing at this level which appears progressed compared to prior study"  · Continue TLSO brace

## 2022-10-24 NOTE — WOUND OSTOMY CARE
Consult Note - Wound   Briana Ding 79 y o  female MRN: [id number]  Unit/Bed#: -Raiza Encounter: [id number]        History and Present Illness:  79year old female admitted with bilateral embolism  Presented from [facility] with difficulty breathing  PMH T11 vertebral fracture COPD Type 2 DM, pleural effusion  CKD  Ball catheter for bladder management, max assist to turn and reposition  Cardiac Diet with fluid restriction  Assessment Findings:   1)Left posterior / lateral heel DTI present on admission  DTI may evolve to a stage 3 4 or unstageable wound  Deep purple maroon, non blanchable slightly boggy  Painful on palpation  Allevyn heel foam applied, also applied foam Prevalon boots bilaterally  2)Coccyx  POA stage 2 pressure injury bed oval shaped  pink Edges well defined  periwound hyperpigmented and scaly, cleansed and calazime applied  Pt incont of bowel   Positioned on side with 2 foam wedges  3)Mid sacrum, partial thickness skin loss 100% pink wound bed  Edges well defined  Skin care plans:  1-Calazime to coccyx mid-sacrum, buttocks TID and PRN  2-Allevyn foam to heels, lauren w/P on right heel for preventon ,and T for treatment of DTI to left heel, peel foam check skin integrity q-shift  Change q3d  3-Elevate heels to offload pressure using Prevalon foam boots   4-Ehob cushion when out of bed    5-Turn/repoisiton q2h or when medically stable for pressure re-distribution on skin use green foam wedges   6-Moisturize skin daily with skin nourishing cream          Wounds:  Wound Coccyx (Active)   Wound Image   10/24/22 1231   Wound Description Beefy red;Dry 10/24/22 1231   Pressure Injury Stage 2 10/24/22 1231   Crystal-wound Assessment Scaly;Scar Tissue 10/24/22 1231   Wound Length (cm) 0 5 cm 10/24/22 1231   Wound Width (cm) 0 2 cm 10/24/22 1231   Wound Depth (cm) 0 1 cm 10/24/22 1231   Wound Surface Area (cm^2) 0 1 cm^2 10/24/22 1231   Wound Volume (cm^3) 0 01 cm^3 10/24/22 1231 Calculated Wound Volume (cm^3) 0 01 cm^3 10/24/22 1231   Drainage Amount None 10/24/22 1231   Treatments Cleansed;Site care 10/24/22 1231   Dressing Moisture barrier 10/24/22 1231   Dressing Changed Changed 10/24/22 1231   Patient Tolerance Tolerated well 10/24/22 1231   Dressing Status Remoistened 10/24/22 1231       Wound 10/22/22 Pressure Injury Sacrum Mid (Active)   Wound Image   10/22/22 0825   Wound Description Dry; Intact;Fragile;Pink 10/24/22 1231   Pressure Injury Stage 2 10/24/22 1231   Crystal-wound Assessment Dry; Intact 10/24/22 1231   Wound Length (cm) 1 cm 10/24/22 1231   Wound Width (cm) 0 2 cm 10/24/22 1231   Wound Depth (cm) 0 1 cm 10/24/22 1231   Wound Surface Area (cm^2) 0 2 cm^2 10/24/22 1231   Wound Volume (cm^3) 0 02 cm^3 10/24/22 1231   Calculated Wound Volume (cm^3) 0 02 cm^3 10/24/22 1231   Drainage Amount None 10/24/22 1231   Drainage Description TAO 10/23/22 1951   Non-staged Wound Description Partial thickness 10/24/22 1231   Treatments Cleansed;Site care 10/24/22 1231   Dressing Moisture barrier 10/24/22 1231   Dressing Changed New 10/24/22 1231   Patient Tolerance Tolerated well 10/24/22 1231   Dressing Status Remoistened 10/24/22 1231       Wound 10/22/22 Pressure Injury Heel Left (Active)   Wound Image   10/24/22 1213   Wound Description Dry;Light purple;Non-blanchable erythema 10/24/22 1213   Pressure Injury Stage DTPI 10/22/22 0825   Crystal-wound Assessment Edema; Erythema;Fragile; Swelling; Purple 10/24/22 1213   Wound Length (cm) 4 cm 10/24/22 1213   Wound Width (cm) 6 cm 10/24/22 1213   Wound Surface Area (cm^2) 24 cm^2 10/24/22 1213   Drainage Amount None 10/24/22 1213   Treatments Site care;Elevated 10/24/22 1213   Dressing Foam, Silicon (eg  Allevyn, etc) 10/24/22 1213   Dressing Changed Changed 10/24/22 1213   Patient Tolerance Tolerated well 10/24/22 1213   Dressing Status Clean;Dry; Intact 10/24/22 1213     Call or tigertext with any questions  Wound Care will continue to follow    Sariah CRISTOBALN RN

## 2022-10-24 NOTE — DISCHARGE INSTRUCTIONS
Thoracentesis   WHAT YOU NEED TO KNOW:   A thoracentesis is a procedure to remove extra fluid or air from between your lungs and your inner chest wall  Air or fluid buildup may make it hard for you to breathe  A thoracentesis allows your lungs to expand fully so you can breathe more easily  DISCHARGE INSTRUCTIONS:   Medicines:   Pain medicine: You may be given a prescription medicine to decrease pain  Do not wait until the pain is severe before you take your medicine  Antibiotics: This medicine helps fight or prevent an infection  Take your medicine as directed  Call your healthcare provider if you think your medicine is not helping or if you have side effects  Tell him if you are allergic to any medicine  Keep a list of the medicines, vitamins, and herbs you take  Include the amounts, and when and why you take them  Bring the list or the pill bottles to follow-up visits  Carry your medicine list with you in case of an emergency  Follow up with your healthcare provider as directed:  Write down your questions so you remember to ask them during your visits  Rest:  Rest when you feel it is needed  Slowly start to do more each day  Return to your daily activities as directed  Do not smoke: If you smoke, it is never too late to quit  Ask for information about how to stop smoking if you need help  Contact your healthcare provider if:   You have a fever  Your puncture site is red, warm, swollen, or draining pus  You have questions or concerns about your procedure, medicine, or care  If you have any questions regarding, call the IR department @ [phone]  Seek care immediately or call 1 if:   Blood soaks through your bandage  There is blood in your spit

## 2022-10-24 NOTE — RESPIRATORY THERAPY NOTE
RT Ventilator Management Note  Shira Wilkins 79 y o  female MRN: [id number]  Unit/Bed#: -01 Encounter: [id number]      Daily Screen    No data found in the last 10 encounters  Physical Exam:   Subjective Data: mask adjusted      Resp Comments: Patient placed on hours of sleep BiPAP at 2107  Patient tolerated BiPAP well, sleeping through the night    Nurse reports patient removed mask on her own at 0500 wishing to start her day

## 2022-10-24 NOTE — PROGRESS NOTES
Patient left floor and came back post thoracentesis in IR  Per report patient tolerated procedure well  Medication tolerated whole with water  Patient turned and propped in bed with assistance  Prevalon booties in place bilateral  Patient complaining of severe discomfort in lower back in afternoon  Dr Rodriguez bills texted and made aware, requested for prn pain medication for severe pain  Alarms are on and functioning no incautious behavior

## 2022-10-24 NOTE — ASSESSMENT & PLAN NOTE
Wt Readings from Last 3 Encounters:   10/24/22 99 3 kg (219 lb)   09/30/22 95 7 kg (210 lb 15 7 oz)   11/12/21 110 kg (243 lb)       · Last echo in September 2022 shows Ef 89% , diastolic dysfunction   · Takes Bumex 1 mg daily at home  · Suspect component of acute CHF - CTA showing pleural effusions along with elevated BNP, lower extremity edema  · Received 1mg IV Bumex in the ED- will continue with Lasix 80 mg IV b i d  however patient unable to tolerated and had bout of hypotension and rapid response 10/22/2022  then decrease Lasix to 40 mg IV b i d  and monitor  Continue Entresto , decrease dose of Coreg due to hypotension from 12 5 mg b i d  To 3 125 mg b i d  normal digoxin level, cont digoxin  · Repeat chest x-ray done today still shows pleural effusion  Plan for thoracentesis today      · 2D echo done today to evaluate LV function and RV function

## 2022-10-24 NOTE — ASSESSMENT & PLAN NOTE
· Presents from SNF with 3 days of worsening shortness of breath  Per EMS found to be 85% on baseline 2L , now requiring up to 6L NC  Decreased to 3 L nasal cannula  · No prior history of blood clots  · Per chart review was previously on Eliquis for PAF, had admission at [facility] September 2022 for T11 fracture, anemia in which it appears Eliquis was stopped and advised to review DOAC with PCP at discharge  Not currently on anticoagulation per nursing home records, patient unable to recall if it was discussed  · CTA pe study showing acute bilateral pulmonary embolism, borderline right heart strain, bilateral pleural effusions  · D-dimer > 9 on admission   · COVID negative   · Troponins flat   · Provoked recent fracture/immobility ? Did not undergo surgery   · Continue heparin VTE protocol until this morning  Stop heparin drip  Patient will then undergo thoracentesis and then start on Eliquis 10 mg oral b i d  Starting this afternoon for total of 7 days and then transition to Eliquis 5 mg b i d   Following that  · Check echocardiogram to evaluate LV and RV function

## 2022-10-24 NOTE — ASSESSMENT & PLAN NOTE
· Present on admission  Unclear when catheter was placed originally,  suspect on prior admission in September 2022    · UA showing acute cystitis  · Already changed Ball catheter this admission

## 2022-10-24 NOTE — UTILIZATION REVIEW
Initial Clinical Review    Admission: Date/Time/Statement:   Admission Orders (From admission, onward)     Ordered        10/22/22 0448  INPATIENT ADMISSION  Once                      Orders Placed This Encounter   Procedures   • INPATIENT ADMISSION     Standing Status:   Standing     Number of Occurrences:   1     Order Specific Question:   Level of Care     Answer:   Med Surg [16]     Order Specific Question:   Estimated length of stay     Answer:   More than 2 Midnights     Order Specific Question:   Certification     Answer:   I certify that inpatient services are medically necessary for this patient for a duration of greater than two midnights  See H&P and MD Progress Notes for additional information about the patient's course of treatment  ED Arrival Information     Expected   -    Arrival   10/22/2022 00:28    Acuity   Urgent            Means of arrival   Ambulance    Escorted by   [facility]    Service   Hospitalist    Admission type   Emergency            Arrival complaint   Difficulty breathing           Chief Complaint   Patient presents with   • Breathing Difficulty     From [facility] reports SOB x 3 days - staff reported worsening difficulty breathing since 2100 tonight  Per EMS SpO2 85% 2L NC, 96% on 8L NRB  Given 1 albuterol tx at nursing home with improvement  Initial Presentation: 79 y o  female with a PMH of atrial fibrillation- not on AC, CHF, COPD, chronic indwelling Ball,  who presents to the ED from a SNF with increased shortness of breath x 3 days  Per EMS patient found to be 85% on baseline 2L NC   ED CTA revealed acute B/L PE with borderline R heart strain and B/L pleural effusions  PE:  Alert   Rhonchi,  coarse, diminished breath sounds, B/L LE      10/22 Plan: Inpatient admission for evaluation and treatment of B/L pulmonary embolism, pleural effusion, acute on chronic respiratory failure with hypoxia, acute on chronic diastolic CHF, acute cystitis and hypernatremia: Continue heparin gtt, check ECHO, consult Critical Care for evaluation of pleural effusion  Wean oxygen to keep saturation > 90%  Continue IV Bumex BID  Start IV Rocephin, follow urine culture, change Ball catheter  Start D5W, check urine electrolytes  10/23 Internal Medicine: Currently requiring 5L O2  Patient unable to tolerate Lasix 80 mg IV BID due to hypotension, decrease Lasix 40 mg IV BID and monitor  Hold heparin drip temporarily for thoracentesis tomorrow as needed  PE: AOx3  Moderate air entry B/L with diminished breath sounds in bases  10/24 Internal Medicine: Patient denies chest pain or SOB,  comfortable on 5L, wean down to 3L today  Plan for thoracentesis today, then start Eliquis 10 mg oral BID  2D ECHO done today  PE: AOx3, moderate air entry B/L with mild decreased breath sounds in bases  Interventional Radiology: Left thoracentesis yielding 550 ml clear, luis miguel fluid           ED Triage Vitals   Temperature Pulse Respirations Blood Pressure SpO2   10/22/22 0032 10/22/22 0032 10/22/22 0032 10/22/22 0032 10/22/22 0031   97 6 °F (36 4 °C) 84 (!) 27 113/56 99 % on 8L NRB      Temp Source Heart Rate Source Patient Position - Orthostatic VS BP Location FiO2 (%)   10/22/22 0032 10/22/22 0032 10/22/22 0032 10/22/22 0032 --   Temporal Monitor Lying Right arm       Pain Score       10/22/22 0032       No Pain          Wt Readings from Last 1 Encounters:   10/24/22 99 3 kg (219 lb)     Additional Vital Signs:        Date/Time Temp Pulse Resp BP MAP (mmHg) SpO2 Calculated FIO2 (%) - Nasal Cannula O2 Flow Rate  Nasal Cannula O2 Flow Rate (L/min) O2 Device   10/24/22 15:11:48 97 9 °F (36 6 °C) 80 20 140/64 89 93 % -- -- -- --   10/24/22 1413 -- -- -- -- -- 99 % 32 -- 3 L/min Nasal cannula   10/24/22 1300 -- -- -- -- -- -- 32 -- 3 L/min Nasal cannula   10/24/22 11:42:29 -- 80 20 108/47 Abnormal  -- 97 % -- -- -- --   10/24/22 1108 -- 84 -- 99/59 -- -- -- -- -- --   10/24/22 0915 -- -- -- -- -- -- 32 -- 3 L/min Nasal cannula   10/24/22 0900 97 9 °F (36 6 °C) 80 -- 99/59 72 95 % -- -- -- --   10/24/22 0808 -- -- -- -- -- 99 % 40 -- 5 L/min Nasal cannula   10/24/22 06:29:51 97 9 °F (36 6 °C) 71 20 127/55 79 95 % -- -- -- --   10/24/22 0237 -- -- -- -- -- 96 % -- -- -- --   10/23/22 21:31:11 98 1 °F (36 7 °C) 71 18 102/45 Abnormal  64 Abnormal  97 % -- -- -- --   10/23/22 2107 -- -- -- -- -- 99 % -- -- -- --   10/23/22 17:42:58 -- 72 -- 102/45 Abnormal  64 Abnormal  99 % -- -- -- --   10/23/22 15:29:16 97 7 °F (36 5 °C) 68 18 102/50 67 96 % -- -- -- --   10/23/22 14:40:47 97 7 °F (36 5 °C) 70 21 109/50 70 98 % -- -- -- --   10/23/22 14:34:56 97 5 °F (36 4 °C) 78 24 Abnormal  89/47 Abnormal  61 Abnormal  97 % -- -- -- --   10/23/22 11:58:27 -- 70 -- 106/34 Abnormal  58 Abnormal  97 % -- -- -- --   10/23/22 09:26:27 -- 75 -- 107/38 Abnormal  61 Abnormal  99 % -- -- -- --   10/23/22 07:29:52 97 2 °F (36 2 °C) Abnormal  64 20 124/57 79 99 % -- -- -- --   10/23/22 07:29:22 -- 63 -- 124/57 79 100 % -- -- -- --   10/23/22 0700 -- -- -- -- -- -- -- 5 L/min -- Nasal cannula   10/23/22 0244 -- -- -- -- -- 95 % -- -- -- --   10/22/22 2349 -- -- -- 109/52 75 -- -- -- -- --   10/22/22 2217 98 2 °F (36 8 °C) 65 18 78/40 Abnormal  -- 93 % -- -- -- --   10/22/22 2130 -- -- -- -- -- 94 % -- -- -- --   10/22/22 2125 -- -- -- -- -- 94 % -- -- -- --   10/22/22 1838 -- 71 -- 112/46 Abnormal  -- -- -- -- -- --   10/22/22 14:18:11 98 2 °F (36 8 °C) 67 20 113/43 Abnormal  66 96 % -- -- -- --   10/22/22 12:06:20 98 2 °F (36 8 °C) 82 20 110/54 73 94 % -- -- -- --   10/22/22 11:11:35 -- 69 -- 121/55 77 97 % -- -- -- --   10/22/22 0825 -- -- -- -- -- -- 40 -- 5 L/min Nasal cannula   10/22/22 07:36:29 97 3 °F (36 3 °C) Abnormal  76 22 104/70 81 98 % -- -- -- --   10/22/22 05:57:41 97 7 °F (36 5 °C) 76 20 135/85 102 99 % -- -- -- --   10/22/22 0500 -- 76 34 Abnormal  137/61 88 95 % -- -- -- --   10/22/22 0400 -- 73 31 Abnormal  126/59 85 97 % -- -- -- --   10/22/22 0345 -- 71 39 Abnormal  121/64 86 98 % -- -- -- --   10/22/22 0330 -- 74 38 Abnormal  135/63 90 97 % -- -- -- --   10/22/22 0156 -- -- -- -- -- -- -- 6 L/min -- Nasal cannula   10/22/22 0145 -- 73 32 Abnormal  108/53 77 93 % -- -- -- --   10/22/22 0130 -- 75 26 Abnormal  129/58 84 99 % -- -- -- --   10/22/22 0115 -- 91 36 Abnormal  128/58 84 96 % -- -- -- --   10/22/22 0100 -- 76 36 Abnormal  141/65 94 100 % 44 -- 6 L/min Nasal cannula   10/22/22 0032 97 6 °F (36 4 °C) 84 27 Abnormal  113/56 81 98 % 44 -- 6 L/min Nasal cannula         Pertinent Labs/Diagnostic Test Results:       IR IN-Patient Thoracentesis   Final Result by Chad Pineda MD (10/24 9251)   Impression:   1  Successful ultrasound-guided thoracentesis yielding 550 mL of clear luis miguel-colored left pleural fluid  CTA ED chest PE study   Final Result by Henry Roe MD (10/22 3608)      Acute bilateral pulmonary emboli as above  The calculated RV/LV ratio is approximately 0 9 suggesting borderline right heart strain  Correlate clinically  Moderate left and small right pleural effusions with subjacent compressive atelectasis  Cardiomegaly  Redemonstrated diffuse osteopenia/ankylosing spondylitis within the visualized thoracic spine with chronic fractures at the T10 and T11 vertebral bodies and spinal canal narrowing at this level which appears progressed compared to prior study dated    9/17/2022 although suboptimally evaluated  Consider further evaluation with dedicated imaging as clinically warranted  Above findings discussed with Dr Obdulia Huang at 4:30 AM on 10/22/2022        Workstation performed: [id number]         XR chest 1 view portable   ED Interpretation by Sunita Barraza DO (10/22 7952)   Limited due to rotation and body habitus moderate left pleural effusion and atelectasis versus infiltrate      Final Result by Ricky Tobias MD (85/92 5957)      Moderate effusions and bibasilar atelectasis  10/22 repeat EKG:    Ventricular-paced rhythm  Abnormal ECG  When compared with ECG of 22-OCT-2022 00:34, (unconfirmed)  Vent  rate has decreased BY  12 BPM    10/22 EKG:    Ventricular-paced rhythm  Biventricular pacemaker detected  Abnormal ECG  When compared with ECG of 18-SEP-2022 06:01,  Vent   rate has increased BY  14 BPM    Results from last 7 days   Lab Units 10/22/22  0049   SARS-COV-2  Negative     Results from last 7 days   Lab Units 10/24/22  0535 10/23/22  0405 10/22/22  2231 10/22/22  0049   WBC Thousand/uL 5 68 5 41 6 14 6 18   HEMOGLOBIN g/dL 9 4* 9 4* 9 1* 9 3*   HEMATOCRIT % 32 2* 32 4* 30 7* 32 0*   PLATELETS Thousands/uL 134* 126* 143* 132*   NEUTROS ABS Thousands/µL  --   --   --  4 82         Results from last 7 days   Lab Units 10/24/22  0535 10/23/22  0405 10/22/22  2231 10/22/22  1120 10/22/22  0049   SODIUM mmol/L 144 145 143 145 148*   POTASSIUM mmol/L 4 5 4 2 4 2 4 8 4 2   CHLORIDE mmol/L 107 107 105 106 109*   CO2 mmol/L 37* 35* 37* 37* 36*   ANION GAP mmol/L 0* 3* 1* 2* 3*   BUN mg/dL 57* 54* 60* 53* 55*   CREATININE mg/dL 1 13 0 83 1 04 0 92 0 90   EGFR ml/min/1 73sq m 45 65 50 58 59   CALCIUM mg/dL 8 2* 8 0* 7 8* 8 2* 8 4   MAGNESIUM mg/dL  --  1 9 1 9  --   --    PHOSPHORUS mg/dL  --   --  3 5  --   --      Results from last 7 days   Lab Units 10/22/22  2231 10/22/22  0049   AST U/L 11 11   ALT U/L 12 9*   ALK PHOS U/L 99 105   TOTAL PROTEIN g/dL 5 4* 5 6*   ALBUMIN g/dL 2 1* 2 0*   TOTAL BILIRUBIN mg/dL 0 46 0 55     Results from last 7 days   Lab Units 10/24/22  1608 10/24/22  1101 10/24/22  0753 10/23/22  2127 10/23/22  1620 10/23/22  1115 10/23/22  0730 10/22/22  2047 10/22/22  1555 10/22/22  1116 10/22/22  0733   POC GLUCOSE mg/dl 192* 145* 93 110 127 243* 114 193* 219* 228* 172*     Results from last 7 days   Lab Units 10/24/22  0535 10/23/22  0405 10/22/22  2231 10/22/22  1120 10/22/22  0049   GLUCOSE RANDOM mg/dL 93 107 164* 217* 111         Results from last 7 days   Lab Units 10/22/22  0437 10/22/22  0244 10/22/22  0049   HS TNI 0HR ng/L  --   --  34   HS TNI 2HR ng/L  --  28  --    HSTNI D2 ng/L  --  -6  --    HS TNI 4HR ng/L 32  --   --    HSTNI D4 ng/L -2  --   --      Results from last 7 days   Lab Units 10/22/22  0049   D-DIMER QUANTITATIVE ug/ml FEU 9 48*     Results from last 7 days   Lab Units 10/24/22  0746 10/23/22  1848 10/23/22  1235 10/23/22  0406 10/22/22  2235 10/22/22  0437 10/22/22  0049   PROTIME seconds  --   --   --   --  16 7*  --  15 4*   INR   --   --   --   --  1 34*  --  1 21*   PTT seconds 68* 60* 76*   < > 191*   < > 31    < > = values in this interval not displayed           Results from last 7 days   Lab Units 10/22/22  0049   PROCALCITONIN ng/ml 0 13     Results from last 7 days   Lab Units 10/22/22  2231 10/22/22  0049   LACTIC ACID mmol/L 1 6 1 0         Results from last 7 days   Lab Units 10/22/22  0049   DIGOXIN LVL ng/mL 1 0     Results from last 7 days   Lab Units 10/22/22  0049   NT-PRO BNP pg/mL 3,397*             Results from last 7 days   Lab Units 10/22/22  0838   OSMO UR mmol/     Results from last 7 days   Lab Units 10/22/22  0838 10/22/22  0054   CLARITY UA   --  Cloudy   COLOR UA   --  Yellow   SPEC GRAV UA   --  1 010   PH UA   --  5 5   GLUCOSE UA mg/dl  --  Negative   KETONES UA mg/dl  --  Negative   BLOOD UA   --  Moderate*   PROTEIN UA mg/dl  --  Negative   NITRITE UA   --  Negative   BILIRUBIN UA   --  Negative   UROBILINOGEN UA E U /dl  --  0 2   LEUKOCYTES UA   --  Large*   WBC UA /hpf  --  Innumerable*   RBC UA /hpf  --  None Seen   BACTERIA UA /hpf  --  Innumerable*   EPITHELIAL CELLS WET PREP /hpf  --  Moderate*   SODIUM UR  20  --      Results from last 7 days   Lab Units 10/22/22  0049   INFLUENZA A PCR  Negative   INFLUENZA B PCR  Negative   RSV PCR  Negative             Results from last 7 days   Lab Units 10/22/22  0106 10/22/22  0054 10/22/22  0048   BLOOD CULTURE  No Growth at 24 hrs   --  No Growth at 24 hrs     URINE CULTURE   --  >100,000 cfu/ml Klebsiella pneumoniae*  >100,000 cfu/ml Enterococcus faecalis*  20,000-29,000 cfu/ml   --      Results from last 7 days   Lab Units 10/24/22  1147   TOTAL COUNTED  100   WBC FLUID /ul 300           ED Treatment:   Medication Administration from 10/21/2022 2338 to 10/22/2022 0542       Date/Time Order Dose Route Action     10/22/2022 0046 dexamethasone (DECADRON) injection 10 mg 10 mg Intravenous Given     10/22/2022 0113 ipratropium-albuterol (DUO-NEB) 0 5-2 5 mg/3 mL inhalation solution 3 mL 3 mL Nebulization Given     10/22/2022 0146 bumetanide (BUMEX) injection 1 mg 1 mg Intravenous Given     10/22/2022 0335 azithromycin (ZITHROMAX) 500 mg in sodium chloride 0 9 % 250 mL IVPB 0 mg Intravenous Stopped     10/22/2022 0235 azithromycin (ZITHROMAX) 500 mg in sodium chloride 0 9 % 250 mL IVPB 500 mg Intravenous New Bag     10/22/2022 0429 sodium chloride 0 9 % bolus 500 mL 0 mL Intravenous Stopped     10/22/2022 0201 sodium chloride 0 9 % bolus 500 mL 500 mL Intravenous New Bag     10/22/2022 0229 iohexol (OMNIPAQUE) 350 MG/ML injection (SINGLE-DOSE) 100 mL 100 mL Intravenous Given     10/22/2022 0501 heparin (porcine) injection 7,600 Units 7,600 Units Intravenous Given     10/22/2022 0501 heparin (porcine) 25,000 units in 0 45% NaCl 250 mL infusion (premix) 18 Units/kg/hr Intravenous New Bag        Past Medical History:   Diagnosis Date   • A-fib [facility])    • Cardiac pacemaker    • CHF (congestive heart failure) ([facility])    • Chronic cellulitis    • COPD (chronic obstructive pulmonary disease) ([facility] )    • Diabetes mellitus ([facility] )    • Edema    • High cholesterol    • Hyperparathyroidism ([facility] )    • Hypertension    • Hyperuricemia    • Stage 4 chronic kidney disease ([facility] )      Present on Admission:  • T11 vertebral fracture ([facility])  • Type 2 diabetes mellitus ([facility])  • Chronic anemia  • Acute on chronic respiratory failure with hypoxia ([facility] )  • COPD (chronic obstructive pulmonary disease) ([place] [address] )      Admitting Diagnosis: CHF (congestive heart failure) ([facility]) [I50 9]  Hypernatremia [E87 0]  Hypoxia [R09 02]  Difficulty breathing [R06 89]  COPD exacerbation ([place] [address] ) [J44 1]  Bilateral pulmonary embolism ([place] [address] ) [I26 99]  Age/Sex: 79 y o  female       Admission Orders: Nasal cannula oxygen, daily weight, I/O, SCD, 1500 ml fluid restriction  Scheduled Medications:  apixaban, 10 mg, Oral, BID  atorvastatin, 10 mg, Oral, Daily  carvedilol, 3 125 mg, Oral, BID With Meals  cefTRIAXone, 1,000 mg, Intravenous, Q24H  digoxin, 62 5 mcg, Oral, Daily  fluticasone-vilanterol, 1 puff, Inhalation, Daily  furosemide, 40 mg, Intravenous, BID (diuretic)  gabapentin, 300 mg, Oral, TID  insulin lispro, 1-6 Units, Subcutaneous, TID AC  ipratropium, 0 5 mg, Nebulization, TID  levalbuterol, 1 25 mg, Nebulization, TID  lidocaine, 1 patch, Topical, Daily  lidocaine, 1 patch, Topical, Daily  melatonin, 6 mg, Oral, HS  methocarbamol, 750 mg, Oral, Q6H DONRTELL  midodrine, 5 mg, Oral, TID AC  nystatin, , Topical, BID  pantoprazole, 40 mg, Oral, BID AC  sacubitril-valsartan, 1 tablet, Oral, BID  senna-docusate sodium, 1 tablet, Oral, BID      furosemide (LASIX) injection 40 mg  Dose: 40 mg  Freq: 2 times daily (diuretic) Route: IV  Start: 10/23/22 1600    furosemide (LASIX) injection 80 mg  Dose: 80 mg  Freq: 2 times daily (diuretic) Route: IV  Start: 10/22/22 1100 End: 10/23/22 0944    Continuous IV Infusions: None currently         heparin (porcine) 25,000 units in 0 45% NaCl 250 mL infusion (premix)  Rate: 2 9-28 5 mL/hr Dose: 3-30 Units/kg/hr  Weight Dosing Info: 95 kg (Order-Specific)  Freq: Titrated Route: IV  Last Dose: Stopped (10/24/22 0934)  Start: 10/22/22 0445 End: 10/24/22 1104    dextrose 5 % infusion  Rate: 100 mL/hr Dose: 100 mL/hr  Freq: Continuous Route: IV  Last Dose: Stopped (10/22/22 1235)  Start: 10/22/22 0645 End: 10/22/22 1038       PRN Meds:  acetaminophen, 650 mg, Oral, Q4H PRN  morphine injection, 2 mg, Intravenous, Q4H PRN  ondansetron, 4 mg, Intravenous, Q6H PRN  oxyCODONE, 5 mg, Oral, Q6H PRN  polyethylene glycol, 17 g, Oral, Daily PRN  simethicone, 80 mg, Oral, Q6H PRN        IP CONSULT TO NUTRITION SERVICES    Network Utilization Review Department  ATTENTION: Please call with any questions or concerns to [phone] and carefully listen to the prompts so that you are directed to the right person  All voicemails are confidential   Evelyn Crowe all requests for admission clinical reviews, approved or denied determinations and any other requests to dedicated fax number below belonging to the campus where the patient is receiving treatment   List of dedicated fax numbers for the Facilities:  [address] DENIALS (Administrative/Medical Necessity) [phone]   [address] (Maternity/NICU/Pediatrics) [phone]   911 Zainab Willis [phone]   [facility] [phone]   [address] [place] [address] [facility] [place] [place] [address] Yakelin Jeffrey [facility] 28 [phone]   [address] [address] [facility] 134 815 [facility] [phone]

## 2022-10-24 NOTE — ASSESSMENT & PLAN NOTE
· CTA showing moderate left and small right pleural effusions with subjacent compressive atelectasis    · Consult placed to ir for thoracentesis today

## 2022-10-24 NOTE — DISCHARGE INSTR - OTHER ORDERS
Skin care plans:  1-Calazime to coccyx mid-sacrum, buttocks TID and PRN  2-Allevyn foam to heels, lauren w/P on right heel for preventon ,and T for treatment of DTI to left heel, peel foam check skin integrity q-shift  Change q3d  3-Elevate heels to offload pressure using Prevalon foam boots   4-Ehob cushion when out of bed    5-Turn/repoisiton q2h or when medically stable for pressure re-distribution on skin use green foam wedges   6-Moisturize skin daily with skin nourishing cream

## 2022-10-25 ENCOUNTER — APPOINTMENT (INPATIENT)
Dept: RADIOLOGY | Facility: HOSPITAL | Age: 82
End: 2022-10-25
Payer: COMMERCIAL

## 2022-10-25 ENCOUNTER — APPOINTMENT (INPATIENT)
Dept: CT IMAGING | Facility: HOSPITAL | Age: 82
End: 2022-10-25
Payer: COMMERCIAL

## 2022-10-25 LAB
ANION GAP SERPL CALCULATED.3IONS-SCNC: 0 MMOL/L (ref 4–13)
BACTERIA UR CULT: ABNORMAL
BUN SERPL-MCNC: 46 MG/DL (ref 5–25)
CALCIUM SERPL-MCNC: 8.3 MG/DL (ref 8.3–10.1)
CHLORIDE SERPL-SCNC: 107 MMOL/L (ref 96–108)
CO2 SERPL-SCNC: 38 MMOL/L (ref 21–32)
CREAT SERPL-MCNC: 0.9 MG/DL (ref 0.6–1.3)
ERYTHROCYTE [DISTWIDTH] IN BLOOD BY AUTOMATED COUNT: 17 % (ref 11.6–15.1)
GFR SERPL CREATININE-BSD FRML MDRD: 59 ML/MIN/1.73SQ M
GLUCOSE SERPL-MCNC: 100 MG/DL (ref 65–140)
GLUCOSE SERPL-MCNC: 134 MG/DL (ref 65–140)
GLUCOSE SERPL-MCNC: 165 MG/DL (ref 65–140)
GLUCOSE SERPL-MCNC: 213 MG/DL (ref 65–140)
GLUCOSE SERPL-MCNC: 96 MG/DL (ref 65–140)
HCT VFR BLD AUTO: 32.8 % (ref 34.8–46.1)
HGB BLD-MCNC: 9.4 G/DL (ref 11.5–15.4)
MCH RBC QN AUTO: 28.6 PG (ref 26.8–34.3)
MCHC RBC AUTO-ENTMCNC: 28.7 G/DL (ref 31.4–37.4)
MCV RBC AUTO: 100 FL (ref 82–98)
PLATELET # BLD AUTO: 131 THOUSANDS/UL (ref 149–390)
PMV BLD AUTO: 10.1 FL (ref 8.9–12.7)
POTASSIUM SERPL-SCNC: 4.4 MMOL/L (ref 3.5–5.3)
RBC # BLD AUTO: 3.29 MILLION/UL (ref 3.81–5.12)
SODIUM SERPL-SCNC: 145 MMOL/L (ref 135–147)
WBC # BLD AUTO: 5.38 THOUSAND/UL (ref 4.31–10.16)

## 2022-10-25 PROCEDURE — 94760 N-INVAS EAR/PLS OXIMETRY 1: CPT

## 2022-10-25 PROCEDURE — 94660 CPAP INITIATION&MGMT: CPT

## 2022-10-25 PROCEDURE — 97110 THERAPEUTIC EXERCISES: CPT

## 2022-10-25 PROCEDURE — 80048 BASIC METABOLIC PNL TOTAL CA: CPT | Performed by: FAMILY MEDICINE

## 2022-10-25 PROCEDURE — 94640 AIRWAY INHALATION TREATMENT: CPT

## 2022-10-25 PROCEDURE — 70450 CT HEAD/BRAIN W/O DYE: CPT

## 2022-10-25 PROCEDURE — 99232 SBSQ HOSP IP/OBS MODERATE 35: CPT | Performed by: INTERNAL MEDICINE

## 2022-10-25 PROCEDURE — 71045 X-RAY EXAM CHEST 1 VIEW: CPT

## 2022-10-25 PROCEDURE — 97163 PT EVAL HIGH COMPLEX 45 MIN: CPT

## 2022-10-25 PROCEDURE — 97167 OT EVAL HIGH COMPLEX 60 MIN: CPT

## 2022-10-25 PROCEDURE — G1004 CDSM NDSC: HCPCS

## 2022-10-25 PROCEDURE — 82948 REAGENT STRIP/BLOOD GLUCOSE: CPT

## 2022-10-25 PROCEDURE — 85027 COMPLETE CBC AUTOMATED: CPT | Performed by: FAMILY MEDICINE

## 2022-10-25 RX ADMIN — ACETAMINOPHEN 650 MG: 325 TABLET ORAL at 15:46

## 2022-10-25 RX ADMIN — LEVALBUTEROL HYDROCHLORIDE 1.25 MG: 1.25 SOLUTION, CONCENTRATE RESPIRATORY (INHALATION) at 07:07

## 2022-10-25 RX ADMIN — NYSTATIN: 100000 POWDER TOPICAL at 17:05

## 2022-10-25 RX ADMIN — METHOCARBAMOL TABLETS 750 MG: 750 TABLET, COATED ORAL at 16:54

## 2022-10-25 RX ADMIN — MIDODRINE HYDROCHLORIDE 5 MG: 5 TABLET ORAL at 11:48

## 2022-10-25 RX ADMIN — INSULIN LISPRO 2 UNITS: 100 INJECTION, SOLUTION INTRAVENOUS; SUBCUTANEOUS at 11:44

## 2022-10-25 RX ADMIN — IPRATROPIUM BROMIDE 0.5 MG: 0.5 SOLUTION RESPIRATORY (INHALATION) at 07:07

## 2022-10-25 RX ADMIN — DIGOXIN 62.5 MCG: 125 TABLET ORAL at 08:01

## 2022-10-25 RX ADMIN — IPRATROPIUM BROMIDE 0.5 MG: 0.5 SOLUTION RESPIRATORY (INHALATION) at 13:15

## 2022-10-25 RX ADMIN — LEVALBUTEROL HYDROCHLORIDE 1.25 MG: 1.25 SOLUTION, CONCENTRATE RESPIRATORY (INHALATION) at 13:15

## 2022-10-25 RX ADMIN — MELATONIN 6 MG: 3 TAB ORAL at 21:36

## 2022-10-25 RX ADMIN — IPRATROPIUM BROMIDE 0.5 MG: 0.5 SOLUTION RESPIRATORY (INHALATION) at 19:33

## 2022-10-25 RX ADMIN — OXYCODONE HYDROCHLORIDE 5 MG: 5 TABLET ORAL at 10:21

## 2022-10-25 RX ADMIN — LIDOCAINE 5% 1 PATCH: 700 PATCH TOPICAL at 08:08

## 2022-10-25 RX ADMIN — LIDOCAINE 5% 1 PATCH: 700 PATCH TOPICAL at 08:09

## 2022-10-25 RX ADMIN — CARVEDILOL 3.12 MG: 3.12 TABLET, FILM COATED ORAL at 07:56

## 2022-10-25 RX ADMIN — OXYCODONE HYDROCHLORIDE 5 MG: 5 TABLET ORAL at 19:16

## 2022-10-25 RX ADMIN — GABAPENTIN 300 MG: 300 CAPSULE ORAL at 21:36

## 2022-10-25 RX ADMIN — FUROSEMIDE 40 MG: 10 INJECTION, SOLUTION INTRAMUSCULAR; INTRAVENOUS at 16:54

## 2022-10-25 RX ADMIN — FLUTICASONE FUROATE AND VILANTEROL TRIFENATATE 1 PUFF: 100; 25 POWDER RESPIRATORY (INHALATION) at 08:03

## 2022-10-25 RX ADMIN — GABAPENTIN 300 MG: 300 CAPSULE ORAL at 08:01

## 2022-10-25 RX ADMIN — ATORVASTATIN CALCIUM 10 MG: 10 TABLET, FILM COATED ORAL at 08:01

## 2022-10-25 RX ADMIN — PANTOPRAZOLE SODIUM 40 MG: 40 TABLET, DELAYED RELEASE ORAL at 16:54

## 2022-10-25 RX ADMIN — CARVEDILOL 3.12 MG: 3.12 TABLET, FILM COATED ORAL at 16:54

## 2022-10-25 RX ADMIN — SACUBITRIL AND VALSARTAN 1 TABLET: 24; 26 TABLET, FILM COATED ORAL at 08:04

## 2022-10-25 RX ADMIN — MIDODRINE HYDROCHLORIDE 5 MG: 5 TABLET ORAL at 05:41

## 2022-10-25 RX ADMIN — MIDODRINE HYDROCHLORIDE 5 MG: 5 TABLET ORAL at 16:54

## 2022-10-25 RX ADMIN — PANTOPRAZOLE SODIUM 40 MG: 40 TABLET, DELAYED RELEASE ORAL at 05:41

## 2022-10-25 RX ADMIN — DOCUSATE SODIUM AND SENNOSIDES 1 TABLET: 8.6; 5 TABLET, FILM COATED ORAL at 16:54

## 2022-10-25 RX ADMIN — SACUBITRIL AND VALSARTAN 1 TABLET: 24; 26 TABLET, FILM COATED ORAL at 17:04

## 2022-10-25 RX ADMIN — LEVALBUTEROL HYDROCHLORIDE 1.25 MG: 1.25 SOLUTION, CONCENTRATE RESPIRATORY (INHALATION) at 19:33

## 2022-10-25 RX ADMIN — APIXABAN 10 MG: 5 TABLET, FILM COATED ORAL at 16:54

## 2022-10-25 RX ADMIN — NYSTATIN: 100000 POWDER TOPICAL at 08:02

## 2022-10-25 RX ADMIN — MORPHINE SULFATE 2 MG: 2 INJECTION, SOLUTION INTRAMUSCULAR; INTRAVENOUS at 13:08

## 2022-10-25 RX ADMIN — GABAPENTIN 300 MG: 300 CAPSULE ORAL at 16:54

## 2022-10-25 RX ADMIN — METHOCARBAMOL TABLETS 750 MG: 750 TABLET, COATED ORAL at 11:47

## 2022-10-25 RX ADMIN — APIXABAN 10 MG: 5 TABLET, FILM COATED ORAL at 08:01

## 2022-10-25 RX ADMIN — CEFTRIAXONE 1000 MG: 1 INJECTION, SOLUTION INTRAVENOUS at 05:14

## 2022-10-25 RX ADMIN — FUROSEMIDE 40 MG: 10 INJECTION, SOLUTION INTRAMUSCULAR; INTRAVENOUS at 07:55

## 2022-10-25 RX ADMIN — DOCUSATE SODIUM AND SENNOSIDES 1 TABLET: 8.6; 5 TABLET, FILM COATED ORAL at 08:01

## 2022-10-25 NOTE — PLAN OF CARE
Problem: OCCUPATIONAL THERAPY ADULT  Goal: Performs self-care activities at highest level of function for planned discharge setting  See evaluation for individualized goals  Description: Treatment Interventions: ADL retraining, Functional transfer training, UE strengthening/ROM, Endurance training, Patient/family training, Equipment evaluation/education, Neuromuscular reeducation, Compensatory technique education, Continued evaluation, Energy conservation, Activityengagement          See flowsheet documentation for full assessment, interventions and recommendations  Note: Limitation: Decreased ADL status, Decreased UE strength, Decreased endurance, Decreased Safe judgement during ADL, Decreased self-care trans, Decreased high-level ADLs  Prognosis: Good  Assessment: Pt is a 79 y o  female, admitted to [address] [place] [address] 10/22/2022 d/t experiencing increased SOB  Dx: bilateral PE  Pt with PMHx impacting their performance during ADL tasks, including: a-fib, CHF, COPD with baseline 2L, cardiac pacemaker, chronic cellulitis, edema, HTN  Prior to admission to the hospital (and prior to initial fall, when Pt was at home) Pt was performing ADLs without physical assistance  IADLs without physical assistance  Functional transfers/ambulation without physical assistance  Cognitive status was PTA was intact  Pt was [facility] to [facility] with a T11 fracture  TLSO ordered at that time  Pt was d/c'ed to SNF and now re-admitted to hospital with SOB and B PE  OT order placed to assess Pt's ADLs, cognitive status, and performance during functional tasks in order to maximize safety and independence while making most appropriate d/c recommendations   Pt's clinical presentation is currently unstable/unpredictable given new onset deficits that effect Pt's occupational performance and ability to safely return to OF including decrease activity tolerance, decrease standing balance, decrease sitting balance, decrease performance during ADL tasks, decrease safety awareness , decrease UB MS, increased pain, decrease generalized strength, decrease activity engagement, decrease performance during functional transfers, limited family support, high fall risk and limited insight to deficits combined with medical complications of hypotension, pain impacting overall mobility status, abnormal H&H, abnormal CBC, abnormal sodium values, edema/swelling, abnormal D-dimer, elevated BNP, decreased skin integrity, multiple readmissions, incontinence, fear/retreat and need for input for mobility technique/safety  Personal factors affecting Pt at time of initial evaluation include: anxiety, availability as recommended, limited home support, past experience, inability to perform current job functions, inability to perform IADLs, inability to perform ADLs, new need for AD, inability to ambulate household distances, inability to navigate community distances, limited insight into impairments, decreased initiation and engagement and recent fall(s)/fall history    post acute rehab upon d/c      OT Discharge Recommendation: Post acute rehabilitation services

## 2022-10-25 NOTE — CASE MANAGEMENT
Case Management Discharge Planning Note    Patient name Linden Leroying  Location /-93 MRN [id number]  : 1940 Date 10/25/2022       Current Admission Date: 10/22/2022  Current Admission Diagnosis:Bilateral pulmonary embolism [facility])   Patient Active Problem List    Diagnosis Date Noted   • Acute on chronic diastolic CHF (congestive heart failure) ([address] ) 10/22/2022   • Bilateral pulmonary embolism ([address] ) 10/22/2022   • Pleural effusion 10/22/2022   • Chronic indwelling Ball catheter 10/22/2022   • Acute cystitis 10/22/2022   • Hypernatremia 10/22/2022   • Acute on chronic respiratory failure with hypoxia ([address] ) 2022   • Type 2 diabetes mellitus ([address] ) 2022   • Fall 2022   • COPD (chronic obstructive pulmonary disease) ([facility] ) 2022   • T11 vertebral fracture ([facility] ) 2022   • HFrEF (heart failure with reduced ejection fraction) ([facility] ) 2021   • Permanent atrial fibrillation ([facility] ) 2021   • Stage 3 chronic kidney disease ([facility] ) 2021   • Chronic anemia 2021      LOS (days): 3  Geometric Mean LOS (GMLOS) (days): 3 90  Days to GMLOS:0 5     OBJECTIVE:  Risk of Unplanned Readmission Score: 36 19         Current admission status: Inpatient   Preferred Pharmacy:   [facility] [address] [place] [address]  Phone: [phone] Fax: [phone]    Primary Care Provider: Yamilka Marie DO    Primary Insurance: [facility]  Secondary Insurance:     DISCHARGE DETAILS:        1000 CM contacted [facility] to inquire if patients back brace at facility as PT/OT requested DME for proper eval   Brace at facility but facility unable to bring brace to hospital

## 2022-10-25 NOTE — RESPIRATORY THERAPY NOTE
RT Protocol Note  Arian Harper 79 y o  female MRN: [id number]  Unit/Bed#: -01 Encounter: [id number]    Assessment    Principal Problem:    Bilateral pulmonary embolism ([address] )  Active Problems:    Chronic anemia    T11 vertebral fracture (HCC)    COPD (chronic obstructive pulmonary disease) (HCC)    Acute on chronic respiratory failure with hypoxia (HCC)    Type 2 diabetes mellitus (HCC)    Acute on chronic diastolic CHF (congestive heart failure) (HCC)    Pleural effusion    Chronic indwelling Ball catheter      Home Pulmonary Medications:         Past Medical History:   Diagnosis Date   • A-fib [facility])    • Cardiac pacemaker    • CHF (congestive heart failure) ([facility])    • Chronic cellulitis    • COPD (chronic obstructive pulmonary disease) ([facility])    • Diabetes mellitus ([facility])    • Edema    • High cholesterol    • Hyperparathyroidism ([facility] )    • Hypertension    • Hyperuricemia    • Stage 4 chronic kidney disease (HCC)      Social History     Socioeconomic History   • Marital status:      Spouse name: None   • Number of children: None   • Years of education: None   • Highest education level: None   Occupational History   • None   Tobacco Use   • Smoking status: Never Smoker   • Smokeless tobacco: Never Used   Vaping Use   • Vaping Use: Never used   Substance and Sexual Activity   • Alcohol use: Never   • Drug use: Never   • Sexual activity: Not Currently   Other Topics Concern   • None   Social History Narrative    ** Merged History Encounter **          Social Determinants of Health     Financial Resource Strain: Not on file   Food Insecurity: No Food Insecurity   • Worried About Running Out of Food in the Last Year: Never true   • Ran Out of Food in the Last Year: Never true   Transportation Needs: No Transportation Needs   • Lack of Transportation (Medical): No   • Lack of Transportation (Non-Medical):  No   Physical Activity: Not on file   Stress: Not on file   Social Connections: Not on file Intimate Partner Violence: Not on file   Housing Stability: Low Risk    • Unable to Pay for Housing in the Last Year: No   • Number of Places Lived in the Last Year: 2   • Unstable Housing in the Last Year: No       Subjective    Subjective Data: mask adjusted    Objective    Physical Exam:        Vitals:  Blood pressure 151/56, pulse 69, temperature 98 4 °F (36 9 °C), resp  rate 18, height 5' 4" (1 626 m), weight 103 kg (226 lb 10 1 oz), SpO2 96 %  Imaging and other studies: I have personally reviewed pertinent reports        O2 Device: 5L     Plan    Respiratory Plan: Home Bronchodilator Patient pathway        Resp Comments: PT PLACED ON BIPAP HS

## 2022-10-25 NOTE — ASSESSMENT & PLAN NOTE
· Recent admission at [facility] after fall , found to have T11 fracture  · Complaining of left leg pain, appears chronic  Has chronic rivera catheter in place  · Per chart review : Has been following with neurosurgery outpatient, medically managing due to poor surgical candidate and patient electing to not undergo surgery   · CTA incidentally showing "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 with chronic fractures of the T10 and T11 vertebral bodies in spinal canal narrowing at this level which appears progressed compared to prior study"  · Continue TLSO brace  · Pain control with scheduled Tylenol, gabapentin, Robaxin, p r n   Oxycodone

## 2022-10-25 NOTE — PHYSICAL THERAPY NOTE
PHYSICAL THERAPY EVALUATION  NAME:  Timothy Mcdowell  DATE: 10/25/22    AGE:   79 y o  Mrn:   [id number]  ADMIT DX:  CHF (congestive heart failure) (HCC) [I50 9]  Hypernatremia [E87 0]  Hypoxia [R09 02]  Difficulty breathing [R06 89]  COPD exacerbation (HCC) [J44 1]  Bilateral pulmonary embolism (HCC) [I26 99]    Past Medical History:   Diagnosis Date   • A-fib [facility])    • Cardiac pacemaker    • CHF (congestive heart failure) (HCC)    • Chronic cellulitis    • COPD (chronic obstructive pulmonary disease) ([facility])    • Diabetes mellitus ([facility] )    • Edema    • High cholesterol    • Hyperparathyroidism ([facility] )    • Hypertension    • Hyperuricemia    • Stage 4 chronic kidney disease ([facility] )      Length Of Stay: 3  Performed at least 2 patient identifiers during session: Name and Birthday  PHYSICAL THERAPY EVALUATION :    10/25/22 1014   PT Last Visit   PT Visit Date 10/25/22   Note Type   Note type Evaluation   Pain Assessment   Pain Assessment Tool FLACC   Pain Score 6   Pain Location/Orientation Orientation: Lower; Location: Back   Pain Rating: FLACC (Rest) - Face 1   Pain Rating: FLACC (Rest) - Legs 0   Pain Rating: FLACC (Rest) - Activity 0   Pain Rating: FLACC (Rest) - Cry 1   Pain Rating: FLACC (Rest) - Consolability 0   Score: FLACC (Rest) 2   Pain Rating: FLACC (Activity) - Face 1   Pain Rating: FLACC (Activity) - Legs 1   Pain Rating: FLACC (Activity) - Activity 0   Pain Rating: FLACC (Activity) - Cry 1   Pain Rating: FLACC (Activity) - Consolability 1   Score: FLACC (Activity) 4   Restrictions/Precautions   Braces or Orthoses TLSO;Other (Comment)  (T10 and T11 compression fractures  Brace not present at facility at this time  PRAFO boot R heel and Multipodus boot L heel to facilitate skin integrity and ankle DF ROM)   Other Precautions Bed Alarm; Fall Risk;Pain;Spinal precautions   Home Living   Type of Home House  (no RASHEED)   Home Layout One level;Performs ADLs on one level; Able to live on main level with bedroom/bathroom   Bathroom Shower/Tub Walk-in shower   Bathroom Toilet Raised   Bathroom Equipment Shower chair;Grab bars in [facility]   Additional Comments Pt reports Mirian Carney in a [facility] with no RASHEED and using RW PTA  Prior Function   Level of [place] Independent with ADLs; Independent with functional mobility; Independent with IADLS   Lives With Alone   Receives Help From Neighbor   IADLs Independent with meal prep; Independent with medication management; Family/Friend/Other provides transportation   Falls in the last 6 months 1 to 4   Comments Reports being independent with mobility, ADLs and IADLs  Has assistance from a neighbor as neede d   General   Additional Pertinent History CTA chest showing: acute B PEs, There is compressive atelectasis in the bilateral lower lobes   secondary to pleural effusions,  Redemonstrated diffuse osteopenia/ankylosing spondylitis within the visualized thoracic spine with chronic fractures at the T10 and T11 vertebral bodies and spinal canal narrowing at this level which appears progressed compared to prior study dated   9/17/2022  Pt with pressure injury L heel and sacrum   Cognition   Orientation Level Oriented X4   Following Commands Follows one step commands without difficulty   Comments fearful of movement due to pain   Subjective   Subjective "I need my pain medication first "   RLE Assessment   RLE Assessment WFL  (supine AROM WFL  strength 2+/5)   LLE Assessment   LLE Assessment X   LLE Overall AROM   L Hip Flexion hip flexion minimal  2-/5   L Hip ABduction minimal AROM, 2-/5   L Knee Flexion minimal AROM, 2-/5   L Knee Extension trace contraction knee extension, 1/5   L Ankle Dorsiflexion no AROM ankle DF, able to move toes minimally  0/5   Light Touch   RLE Light Touch Grossly intact   LLE Light Touch Grossly intact   Bed Mobility   Rolling R 2  Maximal assistance   Additional items Assist x 1; Increased time required;Verbal cues; Bedrails   Supine to Sit 2  Maximal assistance   Additional items Assist x 2; Increased time required;Verbal cues;LE management  (trunk management)   Sit to Supine 2  Maximal assistance   Additional items Assist x 2; Increased time required;Verbal cues;LE management  (trunk management)   Additional Comments HOB falt, use of bedrail, prn  rolling to right with maxAx1 wiht manual cues for trunk and LE management  required maxAx2 to achieve sitting at EOB with manual and verbal cues for sequence and technique  increased time and effort  log rolling to complete  inc pain sititng EOB  able to sit EOB ~6 minutes with mod/minAx1 with inc posteiror lean initially  verbal and manual cues for ant wt shift for upright posture and pt only requiring minAx1 to maintain sitting balance  retunred to supine due to inc pain and decreased activity tolerance  required maxAx2  Transfers   Sit to Stand Unable to assess   Additional Comments Pt in significant c/o pain and fatigue requesting to return to supine  However transfers also unable ot be safely assessed as TLSO brace not present  Educated patient on when/where to don brace and wear  Pt verbalized understanding  Ambulation/Elevation   Distance pt unable   Balance   Static Sitting Poor +   Dynamic Sitting Poor   Endurance Deficit   Endurance Deficit Yes   Endurance Deficit Description fatigue, pain   Activity Tolerance   Activity Tolerance Patient limited by fatigue;Patient limited by pain   Medical Staff Made Aware OT, Lindie Meigs   Nurse Made Aware RNElisabeth   Assessment   Prognosis Fair   Problem List Decreased strength;Decreased range of motion;Decreased endurance; Impaired balance;Decreased mobility; Decreased safety awareness; Obesity; Decreased skin integrity;Orthopedic restrictions;Pain   Barriers to Discharge Decreased caregiver support; Inaccessible home environment   Barriers to Discharge Comments requires assistance to complete all mobility   Goals   Patient Goals none stated   STG Expiration Date 11/08/22   PT Treatment Day 0   Plan   Treatment/Interventions ADL retraining;Functional transfer training;LE strengthening/ROM; Therapeutic exercise; Endurance training;Patient/family training;Equipment eval/education; Bed mobility; Compensatory technique education;Spoke to nursing;Spoke to case management;OT;Gait training   PT Frequency 3-5x/wk   Recommendation   PT Discharge Recommendation Post acute rehabilitation services   Equipment Recommended   (TBD by rehab)   Additional Comments CM called to see if TLSO brace can be delivered to patient, facility unable  AM-PAC Basic Mobility Inpatient   Turning in Bed Without Bedrails 2   Lying on Back to Sitting on Edge of Flat Bed 1   Moving Bed to Chair 1   Standing Up From Chair 1   Walk in Room 1   Climb 3-5 Stairs 1   Basic Mobility Inpatient Raw Score 7   Turning Head Towards Sound 4   Follow Simple Instructions 4   Low Function Basic Mobility Raw Score 15   Low Function Basic Mobility Standardized Score 23 9   Highest Level Of Mobility   JH-HLM Goal 2: Bed activities/Dependent transfer   JH-HLM Achieved 3: Sit at edge of bed   End of Consult   Patient Position at End of Consult Supine; All needs within reach   End of Consult Comments patient positioned with wedge on left side, slightly rolled to right with bedrail on right up  Pt reporting Left LE weakness new since being at SNF  [facility], L LE strength 3-/5 which is significantly changed at this time  Pt also with subtle left facial droop  RN and Dr Cameron Fofana made aware  Treatment completed from [id number] consisting of supine TE  R LE AROM ankle DF/PF, heel slide and hip abd/add, isometric quad and glut set  Then completed PROM L ankle DF, AAROM heel slide and hip abd/add, tactile cues for quad set with trace contraction and tactile cues for glut set  Pt with multipodus boot applied L LE at end of session for skin integrity and optimal ankle DF ROM   Kickstand out to prevent L LE ER  positioned quarter turn back with wedge with patient facing to right  Pt tolerated treatment fairly with manual cues for completion of L LE TE and verbal cues for R LE  She is limited by decreased strength, ROM and inc pain  She will continue to benefit from PT services to maximize LOF      (Please find full objective findings from PT assessment regarding body systems outlined above)  Assessment: Pt is a 79 y o  female seen for PT evaluation s/p admission to [address] [place] [address] on 10/22/2022 with Bilateral pulmonary embolism ([address] )  Order placed for PT services  Upon evaluation: Pt is presenting with impaired functional mobility due to pain, decreased strength, decreased ROM, decreased endurance, impaired balance, decreased safety awareness, fall risk, LE edema, and impaired skin integrity requiring  maximal of 1 to 2 assistance for bed mobility, mechanical conveyance from nursing standpoint for OOB mobility, and moderate to minimal assistance to maintain unsupported sitting balance at EOB, unable to trial transfer safely due to need for TLSO brace    Pt's clinical presentation is currently unpredictable given the functional mobility deficits above, especially weakness, decreased ROM, edema of extremities, decreased skin integrity, decreased endurance, pain, decreased activity tolerance, decreased functional mobility tolerance, decreased safety awareness, and orthopedic restrictions, coupled with fall risks as indicated by AM-PAC 6-Clicks: 7/17 as well as hx of falls, impaired balance, polypharmacy, and decreased safety awareness and combined with medical complications of pain impacting overall mobility status, abnormal renal lab values, abnormal H&H, abnormal blood sugars, low SpO2 values, new onset O2 use, multiple readmissions, fear/retreat, need for input for mobility technique/safety and acute B PE with eleavted D dimer requiring 5-6 lpm O2 initially now at 3-4 lpm (pt reports not using O2 prior), moderate left and small right pleural effusion requiring thoracentesis on Left 10/24/22, acute CHF  Pt's PMHx and comorbidities that may affect physical performance and progress include: COPD, DM and chronic anemia, T11 vertebral compression fracture requiring TLSO brace s/p fall September 2022  Personal factors affecting pt at time of IE include: inaccessible home environment, step(s) to enter environment, limited home support, advanced age, inability to perform IADLs, inability to perform ADLs, inability to navigate level surfaces without external assistance, inability to ambulate household distances and recent fall(s)/fall history  Pt will benefit from continued skilled PT services to address deficits as defined above and to maximize level of functional mobility to facilitate return toward PLOF and improved QOL  From PT/mobility standpoint, recommendation at time of d/c would be Short term rehab pending progress in order to reduce fall risk and maximize pt's functional independence and consistency with mobility in order to facilitate return to PLOF  Recommend trial with walker next 1-2 sessions and ther ex next 1-2 sessions  The patient's AM-PAC Basic Mobility Inpatient Short Form Raw Score is 7  A Raw score of less than or equal to 16 suggests the patient may benefit from discharge to post-acute rehabilitation services  Please also refer to the recommendation of the Physical Therapist for safe discharge planning  Goals: Pt will: Perform bed mobility tasks with consistent modAx1 to reposition in bed and prepare for transfers  Pt will perform transfers with maxAx2 with TLSO brace on to decrease burden of care, decrease risk for falls, improve ease of transfers and improve activity tolerance and prepare for ambulation  Pt will sit EOB with Supervision >/= 10' to prepare for transfers   Pt will ambulate with RW or LRAD/technique for >/= 10' with  maxAx2  to decrease burden of care, decrease risk for falls, improve ease of transfers, improve activity tolerance and improve gait quality and to access home environment  Pt will don TLSO brace with minAx1 to prepare for transfers and ambulation  Pt will increase B LE strength >/= 1/2 MMT grade to facilitate functional mobility        Sultana Wayne

## 2022-10-25 NOTE — ASSESSMENT & PLAN NOTE
· Presents from SNF with 3 days of worsening shortness of breath  Per EMS found to be 85% on baseline 2L , now requiring up to 6L NC  Decreased to 3 L nasal cannula  · No prior history of blood clots  · Per chart review was previously on Eliquis for PAF, had admission at [facility] September 2022 for T11 fracture, anemia in which it appears Eliquis was stopped and advised to review DOAC with PCP at discharge  Not currently on anticoagulation per nursing home records, patient unable to recall if it was discussed  · CTA pe study showing acute bilateral pulmonary embolism, borderline right heart strain, bilateral pleural effusions  · D-dimer > 9 on admission   · COVID negative   · Troponins flat   · Provoked recent fracture/immobility ? Did not undergo surgery   · Initially started on heparin GTT  Subsequently transition to Eliquis 10 mg b i d  For 7 days followed by 5 mg b i d  Echocardiogram without RV strain

## 2022-10-25 NOTE — ASSESSMENT & PLAN NOTE
Wt Readings from Last 3 Encounters:   10/25/22 103 kg (226 lb 10 1 oz)   09/30/22 95 7 kg (210 lb 15 7 oz)   11/12/21 110 kg (243 lb)       · Last echo in September 2022 shows Ef 43% , diastolic dysfunction   · Takes Bumex 1 mg daily at home  · Suspect component of acute CHF - CTA showing pleural effusions along with elevated BNP, lower extremity edema  · Received 1mg IV Bumex in the ED- will continue with Lasix 80 mg IV b i d  however patient unable to tolerated and had bout of hypotension and rapid response 10/22/2022  then decrease Lasix to 40 mg IV b i d  and monitor  Continue Entresto , decrease dose of Coreg due to hypotension from 12 5 mg b i d  To 3 125 mg b i d  normal digoxin level, cont digoxin  · Repeat chest x-ray done today still shows pleural effusion  She is status post thoracentesis yesterday  · 2D echo with normal left ventricular ejection fraction    Mild aortic stenosis  · Continue with current diuretic regimen, daily weight, sodium and fluid restricted diet

## 2022-10-25 NOTE — OCCUPATIONAL THERAPY NOTE
Occupational Therapy Evaluation     Evaluation: 7297-1245  Treatment: 3472-0301    Patient Name: Tuan Tipton  Today's Date: 10/25/2022  Problem List  Principal Problem:    Bilateral pulmonary embolism (HCC)  Active Problems:    Chronic anemia    T11 vertebral fracture (HCC)    COPD (chronic obstructive pulmonary disease) (HCC)    Acute on chronic respiratory failure with hypoxia (HCC)    Type 2 diabetes mellitus (HCC)    Acute on chronic diastolic CHF (congestive heart failure) (HCC)    Pleural effusion    Chronic indwelling Ball catheter    Past Medical History  Past Medical History:   Diagnosis Date    A-fib [facility])     Cardiac pacemaker     CHF (congestive heart failure) (HCC)     Chronic cellulitis     COPD (chronic obstructive pulmonary disease) (HCC)     Diabetes mellitus (HCC)     Edema     High cholesterol     Hyperparathyroidism (HCC)     Hypertension     Hyperuricemia     Stage 4 chronic kidney disease (HCC)      Past Surgical History  Past Surgical History:   Procedure Laterality Date    CARDIAC DEFIBRILLATOR PLACEMENT       SECTION      x 2    COLON SURGERY      HERNIA REPAIR      IR THORACENTESIS  10/24/2022           10/25/22 1015   Note Type   Note type Evaluation   Pain Assessment   Pain Assessment Tool FLACC   Pain Score 6   Pain Location/Orientation Orientation: Lower; Location: Back   Pain Rating: FLACC (Rest) - Face 1   Pain Rating: FLACC (Rest) - Legs 0   Pain Rating: FLACC (Rest) - Activity 0   Pain Rating: FLACC (Rest) - Cry 1   Pain Rating: FLACC (Rest) - Consolability 0   Score: FLACC (Rest) 2   Pain Rating: FLACC (Activity) - Face 1   Pain Rating: FLACC (Activity) - Legs 1   Pain Rating: FLACC (Activity) - Activity 0   Pain Rating: FLACC (Activity) - Cry 1   Pain Rating: FLACC (Activity) - Consolability 0   Score: FLACC (Activity) 3   Restrictions/Precautions   Braces or Orthoses TLSO  (Pt ordered TLSO for T11 fx  Pt did not come to hospital with brace at this time  Evaluation assessment limited 2* to not being able to progress past sitting at EOB)   Other Precautions Spinal precautions; Bed Alarm; Fall Risk;Pain   Home Living   Type of Home House  (0 RASHEED)   Home Layout One level;Performs ADLs on one level; Able to live on main level with bedroom/bathroom   Bathroom Shower/Tub Walk-in shower   Bathroom Toilet Raised   Bathroom Equipment Shower chair;Grab bars in [facility] 124   Additional Comments Pt reports prior to initial fall she was living in a 1 story home with 0 RASHEED  Pt ambulated with RW at baseline   Prior Function   Level of [place] Independent with ADLs; Independent with functional mobility; Independent with IADLS   Lives With Alone   Receives Help From Neighbor   IADLs Independent with meal prep; Independent with medication management; Family/Friend/Other provides transportation   Falls in the last 6 months 1 to 4   Comments Prior to initial fall, Pt was completing ADLs, IADLs and functional ambulation @ mod I  Pt has assistance from her neighbor and her brother PRN   ADL   Grooming Assistance 5  Supervision/Setup   Grooming Deficit Setup;Verbal cueing;Supervision/safety; Increased time to complete  (while seated upright in bed)   UB Dressing Assistance 3  Moderate Assistance   UB Dressing Deficit Verbal cueing;Supervision/safety; Increased time to complete; Thread RUE; Thread LUE;Pull over head;Pull around back  (sitting at EOB)   LB Dressing Assistance 1  Total Assistance   LB Dressing Deficit Verbal cueing;Supervision/safety; Increased time to complete; Don/doff R sock; Don/doff L sock; Thread RLE into pants; Thread LLE into pants;Pull up over hips   Additional Comments Pt completing ADL tasks while sated at EOB  UB Dressing @ Mod A for threading BUE and donning shirt over head  LB Dressing @ Total A for donning socks/pants around feet and for CVM around waist  Pt unable to stand at this time 2* to not having back brace   CM donned around waist while side rolling in bed  grooming tasks @ S after set-up while seated upright in bed   Bed Mobility   Rolling R 2  Maximal assistance   Additional items Assist x 1;Bedrails; Increased time required;Verbal cues   Supine to Sit 2  Maximal assistance   Additional items Assist x 2; Increased time required;Verbal cues;LE management  (trunk management)   Sit to Supine 2  Maximal assistance   Additional items Assist x 2; Increased time required;Verbal cues;LE management  (trunk management)   Additional Comments Pt completing supine<>sit @ max x's 2 for trunk and LE managmenet and to ensure proper log roll technique  Pt tolerating well   Pt then sitting at EOB for 6-7 minutes while maintaining Poor unsupported balance  pt reuqiring Min-Mod A to maintain balance   Transfers   Sit to Stand Unable to assess   Additional Comments functional transfers not assesseed at this time d/t Pt not having TLSO present  will continue to assess as able   Balance   Static Sitting Poor +   Dynamic Sitting Poor   Activity Tolerance   Activity Tolerance Patient limited by fatigue;Patient limited by pain   Medical Staff Made Aware Spoke with PTRoxana   Nurse Made Aware Spoke with RNElisabeth Assessment   RUE Assessment WFL   LUE Assessment   LUE Assessment WFL   Hand Function   Gross Motor Coordination Functional   Fine Motor Coordination Functional   Sensation   Light Touch No apparent deficits   Cognition   Overall Cognitive Status WFL   Arousal/Participation Alert; Responsive; Cooperative   Attention Attends with cues to redirect   Orientation Level Oriented X4   Memory Within functional limits   Following Commands Follows one step commands without difficulty   Assessment   Limitation Decreased ADL status; Decreased UE strength;Decreased endurance;Decreased Safe judgement during ADL;Decreased self-care trans;Decreased high-level ADLs   Prognosis Good   Assessment Pt is a 79 y o  female, admitted to [address] [place] [address] 10/22/2022 d/t experiencing increased SOB   Dx: bilateral PE  Pt with PMHx impacting their performance during ADL tasks, including: a-fib, CHF, COPD with baseline 2L, cardiac pacemaker, chronic cellulitis, edema, HTN  Prior to admission to the hospital (and prior to initial fall, when Pt was at home) Pt was performing ADLs without physical assistance  IADLs without physical assistance  Functional transfers/ambulation without physical assistance  Cognitive status was PTA was intact  Pt was admitted to [facility] with a T11 fracture  TLSO ordered at that time  Pt was d/c'ed to SNF and now re-admitted to hospital with SOB and B PE  OT order placed to assess Pt's ADLs, cognitive status, and performance during functional tasks in order to maximize safety and independence while making most appropriate d/c recommendations  Pt's clinical presentation is currently unstable/unpredictable given new onset deficits that effect Pt's occupational performance and ability to safely return to PLOF including decrease activity tolerance, decrease standing balance, decrease sitting balance, decrease performance during ADL tasks, decrease safety awareness , decrease UB MS, increased pain, decrease generalized strength, decrease activity engagement, decrease performance during functional transfers, limited family support, high fall risk and limited insight to deficits combined with medical complications of hypotension, pain impacting overall mobility status, abnormal H&H, abnormal CBC, abnormal sodium values, edema/swelling, abnormal D-dimer, elevated BNP, decreased skin integrity, multiple readmissions, incontinence, fear/retreat and need for input for mobility technique/safety   Personal factors affecting Pt at time of initial evaluation include: anxiety, availability as recommended, limited home support, past experience, inability to perform current job functions, inability to perform IADLs, inability to perform ADLs, new need for AD, inability to ambulate household distances, inability to navigate community distances, limited insight into impairments, decreased initiation and engagement and recent fall(s)/fall history  Pt will benefit from continued skilled OT services to address deficits as defined above and to maximize level independence/participation during ADLs and functional tasks to facilitate return toward PLOF and improved quality of life  From an occupational therapy standpoint, recommendation at time of d/c would be post acute rehab  Plan   Treatment Interventions ADL retraining;Functional transfer training;UE strengthening/ROM; Endurance training;Patient/family training;Equipment evaluation/education; Neuromuscular reeducation; Compensatory technique education;Continued evaluation; Energy conservation; Activityengagement   Goal Expiration Date 11/08/22   OT Treatment Day 1   OT Frequency 3-5x/wk   Recommendation   OT Discharge Recommendation Post acute rehabilitation services   AM-PAC Daily Activity Inpatient   Lower Body Dressing 1   Bathing 2   Toileting 1   Upper Body Dressing 2   Grooming 3   Eating 3   Daily Activity Raw Score 12   Daily Activity Standardized Score (Calc for Raw Score >=11) 30 6   AM-PAC Applied Cognition Inpatient   Following a Speech/Presentation 3   Understanding Ordinary Conversation 4   Taking Medications 3   Remembering Where Things Are Placed or Put Away 4   Remembering List of 4-5 Errands 3   Taking Care of Complicated Tasks 3   Applied Cognition Raw Score 20   Applied Cognition Standardized Score 41 76   Additional Treatment Session   Start Time 1040   End Time 1050   Treatment Assessment Pt competing BUE HEP while seated upright sat 35* in bed  Pt completing 2 sets of 10 with exercises focusing on biceps, triceps, and shoulder/chest  Pt tolerating exercises well with Min vc'ing for maintaining proper movements and speed   Exercises completed this date to maximize overall BUE MS and increased safety and independence during ADLs and functional transfers  Pt verbalizes understanding and reports they will complete daily  Pt would benefit from continued review to ensure proper carryover upon d/c from [address] [place] [address] [place]  At end of session, Pt supine in bed with  call bell in reach and all needs met at this time  Additional Treatment Day 1      The patient's raw score on the AM-PAC Daily Activity inpatient short form is 12, standardized score is 30 6, less than 39 4  Patients at this level are likely to benefit from DC to post-acute rehabilitation services  Please refer to the recommendation of the Occupational Therapist for safe DC planning  Pt goals to be met by 11/8/2022    Pt will demonstrate ability to complete supine<>sit @ Min A in order to increase safety and independence during ADL tasks  Pt will demonstrate ability to complete UB ADLs including washing/dressing @ S in order to increase performance and participation during meaningful tasks  Pt will demonstrate ability to complete LB dressing @ Mod A in order to increase safety and independence during meaningful tasks  Pt will demonstrate ability to fran/doff socks/shoes while sitting EOB @ Mod A in order to increase safety and independence during meaningful tasks  Pt will demonstrate ability to complete toileting tasks including CM and pericare @ Mod A in order to increase safety and independence during meaningful tasks  Pt will demonstrate ability to complete EOB, chair, toilet/commode transfers @ Mod A in order to increase performance and participation during functional tasks  Pt will demonstrate ability to stand for 1-2 minutes while maintaining F+ balance with use of RW for UB support PRN  Pt will demonstrate ability to tolerate 30-35 minute OT session with no vc'ing for deep breathing or use of energy conservation techniques in order to increase activity tolerance during functional tasks     Pt will demonstrate Good carryover of use of energy conservation/compensatory strategies during ADLs and functional tasks in order to increase safety and reduce risk for falls  Pt will demonstrate Good attention and participation in continued evaluation of functional ambulation house hold distances in order to assist with safe d/c planning  Pt will attend to continued cognitive assessments 100% of the time in order to provide most appropriate d/c recommendations  Pt will follow 100% simple 2-step commands and be A&O x4 consistently with environmental cues to increase participation in functional activities  Pt will identify 3 areas of interest/hobbies and 1 intervention on how to incorporate into daily life in order to increase interaction with environment and peers as well as increase participation in meaningful tasks  Pt will demonstrate 100% carryover of BUE HEP in order to increase BUE MS and increase performance during functional tasks upon d/c home      Saad Feliciano OTR/L

## 2022-10-25 NOTE — ASSESSMENT & PLAN NOTE
Lab Results   Component Value Date    HGBA1C 6 6 (H) 06/08/2022       Recent Labs     10/24/22  1101 10/24/22  1608 10/25/22  0743 10/25/22  1040   POCGLU 145* 192* 96 213*       Blood Sugar Average: Last 72 hrs:  · (P) 165 Uses Humalog sliding scale at nursing home, continue sliding scale insulin while inpatient  · Hypoglycemia protocol

## 2022-10-25 NOTE — ASSESSMENT & PLAN NOTE
· Wears 2 L nasal cannula baseline     · Found to be hypoxic 85% on 2 L NC, now requiring 3-4L NC     · Multifactorial in the setting of bilateral PE's, pleural effusion, CHF- continue treatment as outlined below  · Wean oxygen to keep oxygen saturation greater than 90%  · S/P Left thoracentesis

## 2022-10-25 NOTE — PROGRESS NOTES
[address]  Progress Note - Arian Harper 1940, 79 y o  female MRN: [id number]  Unit/Bed#: -[facility] Encounter: [id number]  Primary Care Provider: Nydia Huang DO   Date and time admitted to hospital: 10/22/2022 12:28 AM    * Bilateral pulmonary embolism [facility])  Assessment & Plan  · Presents from SNF with 3 days of worsening shortness of breath  Per EMS found to be 85% on baseline 2L , now requiring up to 6L NC  Decreased to 3 L nasal cannula  · No prior history of blood clots  · Per chart review was previously on Eliquis for PAF, had admission at [facility] September 2022 for T11 fracture, anemia in which it appears Eliquis was stopped and advised to review DOAC with PCP at discharge  Not currently on anticoagulation per nursing home records, patient unable to recall if it was discussed  · CTA pe study showing acute bilateral pulmonary embolism, borderline right heart strain, bilateral pleural effusions  · D-dimer > 9 on admission   · COVID negative   · Troponins flat   · Provoked recent fracture/immobility ? Did not undergo surgery   · Initially started on heparin GTT  Subsequently transition to Eliquis 10 mg b i d  For 7 days followed by 5 mg b i d  Echocardiogram without RV strain  Chronic indwelling Ball catheter  Assessment & Plan  · Present on admission  Unclear when catheter was placed originally,  suspect on prior admission in September 2022  · UA showing acute cystitis  · Already changed Ball catheter this admission    Pleural effusion  Assessment & Plan  · CTA showing moderate left and small right pleural effusions with subjacent compressive atelectasis  · Status post left-sided thoracentesis yesterday  Preliminary fluid analysis transudative likely secondary to congestive heart failure    Follow-up on culture results    Acute on chronic diastolic CHF (congestive heart failure) ([facility])  Assessment & Plan  Wt Readings from Last 3 Encounters:   10/25/22 103 kg (226 lb 10 1 oz)   09/30/22 95 7 kg (210 lb 15 7 oz)   11/12/21 110 kg (243 lb)       · Last echo in September 2022 shows Ef 36% , diastolic dysfunction   · Takes Bumex 1 mg daily at home  · Suspect component of acute CHF - CTA showing pleural effusions along with elevated BNP, lower extremity edema  · Received 1mg IV Bumex in the ED- will continue with Lasix 80 mg IV b i d  however patient unable to tolerated and had bout of hypotension and rapid response 10/22/2022  then decrease Lasix to 40 mg IV b i d  and monitor  Continue Entresto , decrease dose of Coreg due to hypotension from 12 5 mg b i d  To 3 125 mg b i d  normal digoxin level, cont digoxin  · Repeat chest x-ray done today still shows pleural effusion  She is status post thoracentesis yesterday  · 2D echo with normal left ventricular ejection fraction  Mild aortic stenosis  · Continue with current diuretic regimen, daily weight, sodium and fluid restricted diet        Type 2 diabetes mellitus [facility])  Assessment & Plan  Lab Results   Component Value Date    HGBA1C 6 6 (H) 06/08/2022       Recent Labs     10/24/22  1101 10/24/22  1608 10/25/22  0743 10/25/22  1040   POCGLU 145* 192* 96 213*       Blood Sugar Average: Last 72 hrs:  · (P) 165 Uses Humalog sliding scale at nursing home, continue sliding scale insulin while inpatient  · Hypoglycemia protocol    Acute on chronic respiratory failure with hypoxia (HCC)  Assessment & Plan  · Wears 2 L nasal cannula baseline     · Found to be hypoxic 85% on 2 L NC, now requiring 3-4L NC     · Multifactorial in the setting of bilateral PE's, pleural effusion, CHF- continue treatment as outlined below  · Wean oxygen to keep oxygen saturation greater than 90%  · S/P Left thoracentesis     COPD (chronic obstructive pulmonary disease) ([facility])  Assessment & Plan  · Not appear to be in acute exacerbation  · Continue home inhaler , neb treatments    T11 vertebral fracture ([facility])  Assessment & Plan  · Recent admission at [facility] after fall , found to have T11 fracture  · Complaining of left leg pain, appears chronic  Has chronic rivera catheter in place  · Per chart review : Has been following with neurosurgery outpatient, medically managing due to poor surgical candidate and patient electing to not undergo surgery   · CTA incidentally showing "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 with chronic fractures of the T10 and T11 vertebral bodies in spinal canal narrowing at this level which appears progressed compared to prior study"  · Continue TLSO brace  · Pain control with scheduled Tylenol, gabapentin, Robaxin, p r n  Oxycodone    Chronic anemia  Assessment & Plan  · Hgb 9 3 (baseline 8-9)  · Stable at baseline           VTE Pharmacologic Prophylaxis: VTE Score: 7 High Risk (Score >/= 5) - Pharmacological DVT Prophylaxis Ordered: apixaban (Eliquis)  Sequential Compression Devices Ordered  Patient Centered Rounds: I performed bedside rounds with nursing staff today  Discussions with Specialists or Other Care Team Provider:  Discussed with nursing    Education and Discussions with Family / Patient: Attempted to update  (brother) via phone  Unable to contact  Time Spent for Care: 30 minutes  More than 50% of total time spent on counseling and coordination of care as described above  Current Length of Stay: 3 day(s)  Current Patient Status: Inpatient   Certification Statement: The patient will continue to require additional inpatient hospital stay due to Ongoing IV diuresis  Discharge Plan: Anticipate discharge in 24-48 hrs to rehab facility  Code Status: Level 1 - Full Code    Subjective:   Patient seen and examined at bedside  Denies any worsening shortness of breath  Denies any chest discomfort  Currently on 4 L of supplemental oxygen      Objective:     Vitals:   Temp (24hrs), Av 1 °F (36 7 °C), Min:97 9 °F (36 6 °C), Max:98 2 °F (36 8 °C)    Temp: [97 9 °F (36 6 °C)-98 2 °F (36 8 °C)] 98 2 °F (36 8 °C)  HR:  [70-80] 80  Resp:  [18-20] 18  BP: ()/(39-64) 117/57  SpO2:  [91 %-98 %] 94 %  Body mass index is 38 9 kg/m²  Input and Output Summary (last 24 hours): Intake/Output Summary (Last 24 hours) at 10/25/2022 1431  Last data filed at 10/25/2022 1316  Gross per 24 hour   Intake 660 ml   Output 1600 ml   Net -940 ml       Physical Exam:   Physical Exam  Constitutional:       General: She is not in acute distress  Appearance: She is obese  She is ill-appearing  HENT:      Head: Normocephalic  Nose: Nose normal       Mouth/Throat:      Mouth: Mucous membranes are moist    Eyes:      Extraocular Movements: Extraocular movements intact  Pupils: Pupils are equal, round, and reactive to light  Cardiovascular:      Rate and Rhythm: Normal rate  Rhythm irregular  Pulmonary:      Effort: Pulmonary effort is normal       Comments: Diminished breath sounds bilaterally left greater than right  Abdominal:      General: Bowel sounds are normal  There is no distension  Palpations: Abdomen is soft  There is no mass  Tenderness: There is no abdominal tenderness  Musculoskeletal:      Cervical back: Neck supple  Right lower leg: Edema present  Left lower leg: Edema present  Comments: Right foot drop   Neurological:      Mental Status: She is alert and oriented to person, place, and time  Mental status is at baseline        Comments: Strength Right lower extremity 3/ 5, left lower extremity 5/5     Psychiatric:         Mood and Affect: Mood normal          Additional Data:     Labs:  Results from last 7 days   Lab Units 10/25/22  0523 10/22/22  2231 10/22/22  0049   WBC Thousand/uL 5 38   < > 6 18   HEMOGLOBIN g/dL 9 4*   < > 9 3*   HEMATOCRIT % 32 8*   < > 32 0*   PLATELETS Thousands/uL 131*   < > 132*   NEUTROS PCT %  --   --  77*   LYMPHS PCT %  --   --  12*   MONOS PCT %  --   --  7   EOS PCT %  --   --  2    < > = values in this interval not displayed  Results from last 7 days   Lab Units 10/25/22  0523 10/23/22  0405 10/22/22  2231   SODIUM mmol/L 145   < > 143   POTASSIUM mmol/L 4 4   < > 4 2   CHLORIDE mmol/L 107   < > 105   CO2 mmol/L 38*   < > 37*   BUN mg/dL 46*   < > 60*   CREATININE mg/dL 0 90   < > 1 04   ANION GAP mmol/L 0*   < > 1*   CALCIUM mg/dL 8 3   < > 7 8*   ALBUMIN g/dL  --   --  2 1*   TOTAL BILIRUBIN mg/dL  --   --  0 46   ALK PHOS U/L  --   --  99   ALT U/L  --   --  12   AST U/L  --   --  11   GLUCOSE RANDOM mg/dL 100   < > 164*    < > = values in this interval not displayed  Results from last 7 days   Lab Units 10/22/22  2235   INR  1 34*     Results from last 7 days   Lab Units 10/25/22  1040 10/25/22  0743 10/24/22  1608 10/24/22  1101 10/24/22  0753 10/23/22  2127 10/23/22  1620 10/23/22  1115 10/23/22  0730 10/22/22  2047 10/22/22  1555 10/22/22  1116   POC GLUCOSE mg/dl 213* 96 192* 145* 93 110 127 243* 114 193* 219* 228*         Results from last 7 days   Lab Units 10/22/22  2231 10/22/22  0049   LACTIC ACID mmol/L 1 6 1 0   PROCALCITONIN ng/ml  --  0 13       Lines/Drains:  Invasive Devices  Report    Peripheral Intravenous Line  Duration           Peripheral IV 10/22/22 Dorsal (posterior); Left Hand 3 days          Drain  Duration           External Urinary Catheter 30 days    Urethral Catheter Double-lumen 16 Fr  3 days              Urinary Catheter:  Goal for removal: N/A - Chronic Ball               Imaging: Reviewed radiology reports from this admission including: ECHO    Recent Cultures (last 7 days):   Results from last 7 days   Lab Units 10/24/22  1147 10/22/22  0106 10/22/22  0054 10/22/22  0048   BLOOD CULTURE   --  No Growth at 48 hrs  --  No Growth at 48 hrs     GRAM STAIN RESULT  1+ Polys  No bacteria seen  --   --   --    URINE CULTURE   --   --  >100,000 cfu/ml Klebsiella pneumoniae*  >100,000 cfu/ml Enterococcus faecalis*  20,000-29,000 cfu/ml   --    BODY FLUID CULTURE, STERILE  No growth  --   --   --        Last 24 Hours Medication List:   Current Facility-Administered Medications   Medication Dose Route Frequency Provider Last Rate   • acetaminophen  650 mg Oral Q4H PRN Lee Abigail, CRNP     • apixaban  10 mg Oral BID Gerson Hoyt MD     • atorvastatin  10 mg Oral Daily Lee Abigail, CRNP     • carvedilol  3 125 mg Oral BID With Meals Gerson Hoyt MD     • cefTRIAXone  1,000 mg Intravenous Q24H Lee Abigail, CRNP 1,000 mg (10/25/22 0514)   • digoxin  62 5 mcg Oral Daily Gerson Hoyt MD     • fluticasone-vilanterol  1 puff Inhalation Daily Lee Abigail, CRNP     • furosemide  40 mg Intravenous BID (diuretic) Gerson Hoyt MD     • gabapentin  300 mg Oral TID Lee Abigail, CRNP     • insulin lispro  1-6 Units Subcutaneous TID 2776 [facility], CRNP     • ipratropium  0 5 mg Nebulization TID Lee Abigail, CRNP     • levalbuterol  1 25 mg Nebulization TID Lee Abigail, CRNP     • lidocaine  1 patch Topical Daily Lee Abigail, CRNP     • lidocaine  1 patch Topical Daily Gerson Hoyt MD     • melatonin  6 mg Oral HS Lee Abigail, CRNP     • methocarbamol  750 mg Oral Q6H 94 [facility], CRNP     • midodrine  5 mg Oral TID AC Gerson Hoyt MD     • morphine injection  2 mg Intravenous Q4H PRN Gerson Hoyt MD     • nystatin   Topical BID Gerson Hoyt MD     • ondansetron  4 mg Intravenous Q6H PRN Lee Abigail, CRNP     • oxyCODONE  5 mg Oral Q6H PRN Gerson Hoyt MD     • pantoprazole  40 mg Oral BID AC Lee Abigail, CRNP     • polyethylene glycol  17 g Oral Daily PRN Lee Abigail, CRNP     • sacubitril-valsartan  1 tablet Oral BID Lee Abigail, CRNP     • senna-docusate sodium  1 tablet Oral BID Lee Abigail, CRNP     • simethicone  80 mg Oral Q6H PRN Lee Abigail, CRNP          Today, Patient Was Seen By: Cheo Deutsch    **Please Note: This note may have been constructed using a voice recognition system  **

## 2022-10-25 NOTE — PLAN OF CARE
Problem: RESPIRATORY - ADULT  Goal: Achieves optimal ventilation and oxygenation  Description: INTERVENTIONS:  - Assess for changes in respiratory status    SOB BEARD  - Assess for changes in mentation and behavior  - Position to facilitate oxygenation and minimize respiratory effort  - Oxygen administered by appropriate delivery if ordered  - Initiate smoking cessation education as indicated  - Encourage broncho-pulmonary hygiene including cough, deep breathe, Incentive Spirometry  - Assess the need for suctioning and aspirate as needed  - Assess and instruct to report SOB or any respiratory difficulty  - Respiratory Therapy support as indicated  10/25/2022 0134 by Tisha Sanches RN  Outcome: Progressing  10/25/2022 0134 by Tisha Sanches RN  Outcome: Progressing

## 2022-10-25 NOTE — PLAN OF CARE
Problem: Potential for Falls  Goal: Patient will remain free of falls  Description: INTERVENTIONS:  - Educate patient/family on patient safety including physical limitations  - Instruct patient to call for assistance with activity   - Consult OT/PT to assist with strengthening/mobility   - Keep Call bell within reach  - Keep bed low and locked with side rails adjusted as appropriate  - Keep care items and personal belongings within reach  - Initiate and maintain comfort rounds  - Make Fall Risk Sign visible to staff  - Offer Toileting every 2 Hours, in advance of need  - Initiate/Maintain bed alarm  - Apply yellow socks and bracelet for high fall risk patients  - Consider moving patient to room near nurses station  Outcome: Progressing     Problem: MOBILITY - ADULT  Goal: Maintain or return to baseline ADL function  Description: INTERVENTIONS:  -  Assess patient's ability to carry out ADLs; assess patient's baseline for ADL function and identify physical deficits which impact ability to perform ADLs (bathing, care of mouth/teeth, toileting, grooming, dressing, etc )  - Assess/evaluate cause of self-care deficits   - Assess range of motion  - Assess patient's mobility; develop plan if impaired  - Assess patient's need for assistive devices and provide as appropriate  - Encourage maximum independence but intervene and supervise when necessary  - Involve family in performance of ADLs  - Assess for home care needs following discharge   - Consider OT consult to assist with ADL evaluation and planning for discharge  - Provide patient education as appropriate  Outcome: Progressing  Goal: Maintains/Returns to pre admission functional level  Description: INTERVENTIONS:  - Perform BMAT or MOVE assessment daily    - Set and communicate daily mobility goal to care team and patient/family/caregiver     - Collaborate with rehabilitation services on mobility goals if consulted  - Out of bed for toileting  - Record patient progress and toleration of activity level   Outcome: Progressing     Problem: Prexisting or High Potential for Compromised Skin Integrity  Goal: Skin integrity is maintained or improved  Description: INTERVENTIONS:  - Identify patients at risk for skin breakdown  - Assess and monitor skin integrity  - Assess and monitor nutrition and hydration status  - Monitor labs   - Assess for incontinence   - Turn and reposition patient  - Assist with mobility/ambulation  - Relieve pressure over bony prominences  - Avoid friction and shearing  - Provide appropriate hygiene as needed including keeping skin clean and dry  - Evaluate need for skin moisturizer/barrier cream  - Collaborate with interdisciplinary team   - Patient/family teaching  - Consider wound care consult   Outcome: Progressing     Problem: Nutrition/Hydration-ADULT  Goal: Nutrient/Hydration intake appropriate for improving, restoring or maintaining nutritional needs  Description: Monitor and assess patient's nutrition/hydration status for malnutrition  Collaborate with interdisciplinary team and initiate plan and interventions as ordered  Monitor patient's weight and dietary intake as ordered or per policy  Utilize nutrition screening tool and intervene as necessary  Determine patient's food preferences and provide high-protein, high-caloric foods as appropriate       INTERVENTIONS:  - Monitor oral intake, urinary output, labs, and treatment plans  - Assess nutrition and hydration status and recommend course of action  - Evaluate amount of meals eaten  - Assist patient with eating if necessary   - Allow adequate time for meals  - Recommend/ encourage appropriate diets, oral nutritional supplements, and vitamin/mineral supplements  - Order, calculate, and assess calorie counts as needed  -  - encourage snacks protein drinks inbetween meals  - Provide specific nutrition/hydration education as appropriate  - Include patient/family/caregiver in decisions related to nutrition  Outcome: Progressing     Problem: NEUROSENSORY - ADULT  Goal: Achieves stable or improved neurological status  Description: INTERVENTIONS  - Monitor and report changes in neurological status  - Monitor vital signs such as temperature, blood pressure, glucose, and any other labs ordered   - Initiate measures to prevent increased intracranial pressure  - Monitor for seizure activity and implement precautions if appropriate      Outcome: Progressing  Goal: Remains free of injury related to seizures activity  Description: INTERVENTIONS  - Maintain airway, patient safety  and administer oxygen as ordered  - Monitor patient for seizure activity, document and report duration and description of seizure to physician/advanced practitioner  - If seizure occurs,  ensure patient safety during seizure  - Reorient patient post seizure  - Seizure pads on all 4 side rails  - Instruct patient/family to notify RN of any seizure activity including if an aura is experienced  - Instruct patient/family to call for assistance with activity based on nursing assessment  - Administer anti-seizure medications if ordered    Outcome: Progressing  Goal: Achieves maximal functionality and self care  Description: INTERVENTIONS  - Monitor swallowing and airway patency with patient fatigue and changes in neurological status  - Encourage and assist patient to increase activity and self care     - Encourage visually impaired, hearing impaired and aphasic patients to use assistive/communication devices  Outcome: Progressing     Problem: CARDIOVASCULAR - ADULT  Goal: Maintains optimal cardiac output and hemodynamic stability  Description: INTERVENTIONS:  - Monitor I/O, vital signs and rhythm SOB, CP, tachycardia  - Monitor for S/S and trends of decreased cardiac output  - Administer and titrate ordered vasoactive medications to optimize hemodynamic stability  - Assess quality of pulses, skin color and temperature  - Assess for signs of decreased coronary artery perfusion  - Instruct patient to report change in severity of symptoms  Outcome: Progressing  Goal: Absence of cardiac dysrhythmias or at baseline rhythm  Description: INTERVENTIONS:  - Continuous cardiac monitoring, vital signs, obtain 12 lead EKG if ordered  - Administer antiarrhythmic and heart rate control medications as ordered  - Monitor electrolytes and administer replacement therapy as ordered  Outcome: Progressing     Problem: RESPIRATORY - ADULT  Goal: Achieves optimal ventilation and oxygenation  Description: INTERVENTIONS:  - Assess for changes in respiratory status  - Assess for changes in mentation and behavior  - Position to facilitate oxygenation and minimize respiratory effort  - Oxygen administered by appropriate delivery if ordered  - Initiate smoking cessation education as indicated  - Encourage broncho-pulmonary hygiene including cough, deep breathe, Incentive Spirometry  - Assess the need for suctioning and aspirate as needed  - Assess and instruct to report SOB or any respiratory difficulty  - Respiratory Therapy support as indicated  Outcome: Progressing     Problem: GENITOURINARY - ADULT  Goal: Maintains or returns to baseline urinary function  Description: INTERVENTIONS:  - Assess urinary function  - Encourage oral fluids to ensure adequate hydration if ordered  - Administer IV fluids as ordered to ensure adequate hydration  - Administer ordered medications as needed  - Offer frequent toileting  - Follow urinary retention protocol if ordered  Outcome: Progressing  Goal: Absence of urinary retention  Description: INTERVENTIONS:  - Assess patient’s ability to void and empty bladder  - Monitor I/O  - Bladder scan as needed  - Discuss with physician/AP medications to alleviate retention as needed  - Discuss catheterization for long term situations as appropriate  Outcome: Progressing  Goal: Urinary catheter remains patent  Description: INTERVENTIONS:  - Assess patency of urinary catheter  - If patient has a chronic rivera, consider changing catheter if non-functioning  - Follow guidelines for intermittent irrigation of non-functioning urinary catheter  Outcome: Progressing     Problem: METABOLIC, FLUID AND ELECTROLYTES - ADULT  Goal: Electrolytes maintained within normal limits  Description: INTERVENTIONS:  - Monitor labs and assess patient for signs and symptoms of electrolyte imbalances  - Administer electrolyte replacement as ordered  - Monitor response to electrolyte replacements, including repeat lab results as appropriate  - Instruct patient on fluid and nutrition as appropriate  Outcome: Progressing  Goal: Fluid balance maintained  Description: INTERVENTIONS:  - Monitor labs   - Monitor I/O and WT  - Instruct patient on fluid and nutrition as appropriate  - Assess for signs & symptoms of volume excess or deficit  Outcome: Progressing  Goal: Glucose maintained within target range  Description: INTERVENTIONS:  - Monitor Blood Glucose as ordered  - Assess for signs and symptoms of hyperglycemia and hypoglycemia  - Administer ordered medications to maintain glucose within target range  - Assess nutritional intake and initiate nutrition service referral as needed  Outcome: Progressing     Problem: SKIN/TISSUE INTEGRITY - ADULT  Goal: Skin Integrity remains intact(Skin Breakdown Prevention)  Description: Assess:  -Assess extremities for adequate circulation and sensation     Bed Management:  -Have minimal linens on bed & keep smooth, unwrinkled  -Change linens as needed when moist or perspiring    Toileting:  -Offer bedside commode    Activity:  -Encourage activity and walks on unit  -Encourage or provide ROM exercises   -Turn and reposition patient every 2 Hours  -Use appropriate equipment to lift or move patient in bed  Skin Care:  -Avoid use of baby powder, tape, friction and shearing, hot water or constrictive clothing  -Relieve pressure over bony prominences using pillows and wedges   -Do not massage red bony areas    Outcome: Progressing  Goal: Incision(s), wounds(s) or drain site(s) healing without S/S of infection  Description: INTERVENTIONS  - Assess and document dressing, incision, wound bed, drain sites and surrounding tissue  - Provide patient and family education  Outcome: Progressing  Goal: Pressure injury heals and does not worsen  Description: Interventions:  - Implement low air loss mattress or specialty surface (Criteria met)  - Apply silicone foam dressing  - Apply fecal or urinary incontinence containment device   - Perform passive or active ROM every ***  - Turn and reposition patient & offload bony prominences every *** hours   - Utilize friction reducing device or surface for transfers   - Consider consults to  interdisciplinary teams such as ***  - Use incontinent care products after each incontinent episode such as ***  - Consider nutrition services referral as needed  Outcome: Progressing     Problem: HEMATOLOGIC - ADULT  Goal: Maintains hematologic stability  Description: INTERVENTIONS  - Assess for signs and symptoms of bleeding or hemorrhage  - Monitor labs  - Administer supportive blood products/factors as ordered and appropriate  Outcome: Progressing     Problem: MUSCULOSKELETAL - ADULT  Goal: Maintain or return mobility to safest level of function  Description: INTERVENTIONS:  - Assess patient's ability to carry out ADLs; assess patient's baseline for ADL function and identify physical deficits which impact ability to perform ADLs (bathing, care of mouth/teeth, toileting, grooming, dressing, etc )  - Assess/evaluate cause of self-care deficits   - Assess range of motion  - Assess patient's mobility  - Assess patient's need for assistive devices and provide as appropriate  - Encourage maximum independence but intervene and supervise when necessary  - Involve family in performance of ADLs  - Assess for home care needs following discharge   - Consider OT consult to assist with ADL evaluation and planning for discharge  - Provide patient education as appropriate  Outcome: Progressing  Goal: Maintain proper alignment of affected body part  Description: INTERVENTIONS:  - Support, maintain and protect limb and body alignment  - Provide patient/ family with appropriate education  Outcome: Progressing     Problem: PAIN - ADULT  Goal: Verbalizes/displays adequate comfort level or baseline comfort level  Description: Interventions:  - Encourage patient to monitor pain and request assistance  - Assess pain using appropriate pain scale  - Administer analgesics based on type and severity of pain and evaluate response  - Implement non-pharmacological measures as appropriate and evaluate response  - Consider cultural and social influences on pain and pain management  - Notify physician/advanced practitioner if interventions unsuccessful or patient reports new pain  Outcome: Progressing     Problem: INFECTION - ADULT  Goal: Absence or prevention of progression during hospitalization  Description: INTERVENTIONS:  - Assess and monitor for signs and symptoms of infection  - Monitor lab/diagnostic results  - Monitor all insertion sites, i e  indwelling lines, tubes, and drains  - Monitor endotracheal if appropriate and nasal secretions for changes in amount and color  - [place] appropriate cooling/warming therapies per order  - Administer medications as ordered  - Instruct and encourage patient and family to use good hand hygiene technique  - Identify and instruct in appropriate isolation precautions for identified infection/condition  Outcome: Progressing  Goal: Absence of fever/infection during neutropenic period  Description: INTERVENTIONS:  - Monitor WBC    Outcome: Progressing     Problem: SAFETY ADULT  Goal: Patient will remain free of falls  Description: INTERVENTIONS:  - Educate patient/family on patient safety including physical limitations  - Instruct patient to call for assistance with activity   - Consult OT/PT to assist with strengthening/mobility   - Keep Call bell within reach  - Keep bed low and locked with side rails adjusted as appropriate  - Keep care items and personal belongings within reach  - Initiate and maintain comfort rounds  - Make Fall Risk Sign visible to staff  - Offer Toileting every *** Hours, in advance of need  - Initiate/Maintain ***alarm  - Obtain necessary fall risk management equipment: ***  - Apply yellow socks and bracelet for high fall risk patients  - Consider moving patient to room near nurses station  Outcome: Progressing  Goal: Maintain or return to baseline ADL function  Description: INTERVENTIONS:  -  Assess patient's ability to carry out ADLs; assess patient's baseline for ADL function and identify physical deficits which impact ability to perform ADLs (bathing, care of mouth/teeth, toileting, grooming, dressing, etc )  - Assess/evaluate cause of self-care deficits   - Assess range of motion  - Assess patient's mobility; develop plan if impaired  - Assess patient's need for assistive devices and provide as appropriate  - Encourage maximum independence but intervene and supervise when necessary  - Involve family in performance of ADLs  - Assess for home care needs following discharge   - Consider OT consult to assist with ADL evaluation and planning for discharge  - Provide patient education as appropriate  Outcome: Progressing  Goal: Maintains/Returns to pre admission functional level  Description: INTERVENTIONS:  - Perform BMAT or MOVE assessment daily    - Set and communicate daily mobility goal to care team and patient/family/caregiver  - Collaborate with rehabilitation services on mobility goals if consulted  - Perform Range of Motion *** times a day  - Reposition patient every *** hours    - Dangle patient *** times a day  - Stand patient *** times a day  - Ambulate patient *** times a day  - Out of bed to chair *** times a day   - Out of bed for meals *** times a day  - Out of bed for toileting  - Record patient progress and toleration of activity level   Outcome: Progressing     Problem: DISCHARGE PLANNING  Goal: Discharge to home or other facility with appropriate resources  Description: INTERVENTIONS:  - Identify barriers to discharge w/patient and caregiver  - Arrange for needed discharge resources and transportation as appropriate  - Identify discharge learning needs (meds, wound care, etc )  - Arrange for interpretive services to assist at discharge as needed  - Refer to Case Management Department for coordinating discharge planning if the patient needs post-hospital services based on physician/advanced practitioner order or complex needs related to functional status, cognitive ability, or social support system  Outcome: Progressing     Problem: Knowledge Deficit  Goal: Patient/family/caregiver demonstrates understanding of disease process, treatment plan, medications, and discharge instructions  Description: Complete learning assessment and assess knowledge base    Interventions:  - Provide teaching at level of understanding  - Provide teaching via preferred learning methods  Outcome: Progressing

## 2022-10-25 NOTE — PLAN OF CARE
Problem: PHYSICAL THERAPY ADULT  Goal: Performs mobility at highest level of function for planned discharge setting  See evaluation for individualized goals  Description: Treatment/Interventions: ADL retraining, Functional transfer training, LE strengthening/ROM, Therapeutic exercise, Endurance training, Patient/family training, Equipment eval/education, Bed mobility, Compensatory technique education, Spoke to nursing, Spoke to case management, OT, Gait training  Equipment Recommended:  (TBD by rehab)       See flowsheet documentation for full assessment, interventions and recommendations  Note: Prognosis: Fair  Problem List: Decreased strength, Decreased range of motion, Decreased endurance, Impaired balance, Decreased mobility, Decreased safety awareness, Obesity, Decreased skin integrity, Orthopedic restrictions, Pain  Assessment: Pt is a 79 y o  female seen for PT evaluation s/p admission to [address] [place] [address] on 10/22/2022 with [facility]  Order placed for PT services  Upon evaluation: Pt is presenting with impaired functional mobility due to pain, decreased strength, decreased ROM, decreased endurance, impaired balance, decreased safety awareness, fall risk, LE edema, and impaired skin integrity requiring  maximal of 1 to 2 assistance for bed mobility, mechanical conveyance from nursing standpoint for OOB mobility, and moderate to minimal assistance to maintain unsupported sitting balance at EOB, unable to trial transfer safely due to need for TLSO brace    Pt's clinical presentation is currently unpredictable given the functional mobility deficits above, especially weakness, decreased ROM, edema of extremities, decreased skin integrity, decreased endurance, pain, decreased activity tolerance, decreased functional mobility tolerance, decreased safety awareness, and orthopedic restrictions, coupled with fall risks as indicated by AM-PAC 6-Clicks: 0/99 as well as hx of falls, impaired balance, polypharmacy, and decreased safety awareness and combined with medical complications of pain impacting overall mobility status, abnormal renal lab values, abnormal H&H, abnormal blood sugars, low SpO2 values, new onset O2 use, multiple readmissions, fear/retreat, need for input for mobility technique/safety and acute B PE with eleavted D dimer requiring 5-6 lpm O2 initially now at 3-4 lpm (pt reports not using O2 prior), moderate left and small right pleural effusion requiring thoracentesis on Left 10/24/22, acute CHF  Pt's PMHx and comorbidities that may affect physical performance and progress include: COPD, DM and chronic anemia, T11 vertebral compression fracture requiring TLSO brace s/p fall September 2022  Personal factors affecting pt at time of IE include: inaccessible home environment, step(s) to enter environment, limited home support, advanced age, inability to perform IADLs, inability to perform ADLs, inability to navigate level surfaces without external assistance, inability to ambulate household distances and recent fall(s)/fall history  Pt will benefit from continued skilled PT services to address deficits as defined above and to maximize level of functional mobility to facilitate return toward PLOF and improved QOL  From PT/mobility standpoint, recommendation at time of d/c would be Short term rehab pending progress in order to  Barriers to Discharge: Decreased caregiver support, Inaccessible home environment  Barriers to Discharge Comments: requires assistance to complete all mobility  PT Discharge Recommendation: Post acute rehabilitation services    See flowsheet documentation for full assessment

## 2022-10-25 NOTE — ASSESSMENT & PLAN NOTE
· CTA showing moderate left and small right pleural effusions with subjacent compressive atelectasis  · Status post left-sided thoracentesis yesterday  Preliminary fluid analysis transudative likely secondary to congestive heart failure    Follow-up on culture results

## 2022-10-25 NOTE — NURSING NOTE
Administered oxycodone 5 mg  Placed medicine cup to patients mouth  Patient closed mouth and than stated " she did not get medicine"  Bed was searched for medication but not found  Both the PCA and charge nurse assisted looking for medication but still was not found  Informed patient the pills are very small and she may have taken it  Pharmacy and supervisor made aware of situation  Will assess pain in 30 minutes

## 2022-10-26 PROBLEM — Z71.89 GOALS OF CARE, COUNSELING/DISCUSSION: Status: ACTIVE | Noted: 2022-10-26

## 2022-10-26 LAB
ANION GAP SERPL CALCULATED.3IONS-SCNC: 1 MMOL/L (ref 4–13)
BASOPHILS # BLD AUTO: 0.01 THOUSANDS/ÂΜL (ref 0–0.1)
BASOPHILS NFR BLD AUTO: 0 % (ref 0–1)
BUN SERPL-MCNC: 34 MG/DL (ref 5–25)
CALCIUM SERPL-MCNC: 8.3 MG/DL (ref 8.3–10.1)
CHLORIDE SERPL-SCNC: 106 MMOL/L (ref 96–108)
CO2 SERPL-SCNC: 38 MMOL/L (ref 21–32)
CREAT SERPL-MCNC: 0.85 MG/DL (ref 0.6–1.3)
EOSINOPHIL # BLD AUTO: 0.11 THOUSAND/ÂΜL (ref 0–0.61)
EOSINOPHIL NFR BLD AUTO: 2 % (ref 0–6)
ERYTHROCYTE [DISTWIDTH] IN BLOOD BY AUTOMATED COUNT: 17 % (ref 11.6–15.1)
GFR SERPL CREATININE-BSD FRML MDRD: 64 ML/MIN/1.73SQ M
GLUCOSE SERPL-MCNC: 114 MG/DL (ref 65–140)
GLUCOSE SERPL-MCNC: 118 MG/DL (ref 65–140)
GLUCOSE SERPL-MCNC: 149 MG/DL (ref 65–140)
GLUCOSE SERPL-MCNC: 154 MG/DL (ref 65–140)
HCT VFR BLD AUTO: 32.9 % (ref 34.8–46.1)
HGB BLD-MCNC: 9.6 G/DL (ref 11.5–15.4)
IMM GRANULOCYTES # BLD AUTO: 0.03 THOUSAND/UL (ref 0–0.2)
IMM GRANULOCYTES NFR BLD AUTO: 1 % (ref 0–2)
LYMPHOCYTES # BLD AUTO: 0.65 THOUSANDS/ÂΜL (ref 0.6–4.47)
LYMPHOCYTES NFR BLD AUTO: 11 % (ref 14–44)
MCH RBC QN AUTO: 29 PG (ref 26.8–34.3)
MCHC RBC AUTO-ENTMCNC: 29.2 G/DL (ref 31.4–37.4)
MCV RBC AUTO: 99 FL (ref 82–98)
MONOCYTES # BLD AUTO: 0.46 THOUSAND/ÂΜL (ref 0.17–1.22)
MONOCYTES NFR BLD AUTO: 8 % (ref 4–12)
NEUTROPHILS # BLD AUTO: 4.9 THOUSANDS/ÂΜL (ref 1.85–7.62)
NEUTS SEG NFR BLD AUTO: 78 % (ref 43–75)
NRBC BLD AUTO-RTO: 0 /100 WBCS
PLATELET # BLD AUTO: 141 THOUSANDS/UL (ref 149–390)
PMV BLD AUTO: 10.7 FL (ref 8.9–12.7)
POTASSIUM SERPL-SCNC: 4.2 MMOL/L (ref 3.5–5.3)
RBC # BLD AUTO: 3.31 MILLION/UL (ref 3.81–5.12)
SODIUM SERPL-SCNC: 145 MMOL/L (ref 135–147)
WBC # BLD AUTO: 6.16 THOUSAND/UL (ref 4.31–10.16)

## 2022-10-26 PROCEDURE — 94640 AIRWAY INHALATION TREATMENT: CPT

## 2022-10-26 PROCEDURE — 99232 SBSQ HOSP IP/OBS MODERATE 35: CPT | Performed by: INTERNAL MEDICINE

## 2022-10-26 PROCEDURE — 85025 COMPLETE CBC W/AUTO DIFF WBC: CPT | Performed by: INTERNAL MEDICINE

## 2022-10-26 PROCEDURE — 99497 ADVNCD CARE PLAN 30 MIN: CPT | Performed by: INTERNAL MEDICINE

## 2022-10-26 PROCEDURE — 94660 CPAP INITIATION&MGMT: CPT

## 2022-10-26 PROCEDURE — 94760 N-INVAS EAR/PLS OXIMETRY 1: CPT

## 2022-10-26 PROCEDURE — 82948 REAGENT STRIP/BLOOD GLUCOSE: CPT

## 2022-10-26 PROCEDURE — 80048 BASIC METABOLIC PNL TOTAL CA: CPT | Performed by: INTERNAL MEDICINE

## 2022-10-26 RX ADMIN — MIDODRINE HYDROCHLORIDE 5 MG: 5 TABLET ORAL at 17:08

## 2022-10-26 RX ADMIN — METHOCARBAMOL TABLETS 750 MG: 750 TABLET, COATED ORAL at 04:56

## 2022-10-26 RX ADMIN — POLYETHYLENE GLYCOL 3350 17 G: 17 POWDER, FOR SOLUTION ORAL at 10:21

## 2022-10-26 RX ADMIN — APIXABAN 10 MG: 5 TABLET, FILM COATED ORAL at 17:08

## 2022-10-26 RX ADMIN — NYSTATIN: 100000 POWDER TOPICAL at 08:33

## 2022-10-26 RX ADMIN — INSULIN LISPRO 1 UNITS: 100 INJECTION, SOLUTION INTRAVENOUS; SUBCUTANEOUS at 11:52

## 2022-10-26 RX ADMIN — METHOCARBAMOL TABLETS 750 MG: 750 TABLET, COATED ORAL at 17:08

## 2022-10-26 RX ADMIN — FLUTICASONE FUROATE AND VILANTEROL TRIFENATATE 1 PUFF: 100; 25 POWDER RESPIRATORY (INHALATION) at 08:50

## 2022-10-26 RX ADMIN — NYSTATIN: 100000 POWDER TOPICAL at 17:09

## 2022-10-26 RX ADMIN — DOCUSATE SODIUM AND SENNOSIDES 1 TABLET: 8.6; 5 TABLET, FILM COATED ORAL at 08:33

## 2022-10-26 RX ADMIN — METHOCARBAMOL TABLETS 750 MG: 750 TABLET, COATED ORAL at 11:50

## 2022-10-26 RX ADMIN — LEVALBUTEROL HYDROCHLORIDE 1.25 MG: 1.25 SOLUTION, CONCENTRATE RESPIRATORY (INHALATION) at 13:41

## 2022-10-26 RX ADMIN — LIDOCAINE 5% 1 PATCH: 700 PATCH TOPICAL at 08:37

## 2022-10-26 RX ADMIN — OXYCODONE HYDROCHLORIDE 5 MG: 5 TABLET ORAL at 17:08

## 2022-10-26 RX ADMIN — OXYCODONE HYDROCHLORIDE 5 MG: 5 TABLET ORAL at 10:21

## 2022-10-26 RX ADMIN — GABAPENTIN 300 MG: 300 CAPSULE ORAL at 17:08

## 2022-10-26 RX ADMIN — MIDODRINE HYDROCHLORIDE 5 MG: 5 TABLET ORAL at 11:50

## 2022-10-26 RX ADMIN — IPRATROPIUM BROMIDE 0.5 MG: 0.5 SOLUTION RESPIRATORY (INHALATION) at 19:41

## 2022-10-26 RX ADMIN — IPRATROPIUM BROMIDE 0.5 MG: 0.5 SOLUTION RESPIRATORY (INHALATION) at 07:16

## 2022-10-26 RX ADMIN — GABAPENTIN 300 MG: 300 CAPSULE ORAL at 21:37

## 2022-10-26 RX ADMIN — MIDODRINE HYDROCHLORIDE 5 MG: 5 TABLET ORAL at 05:04

## 2022-10-26 RX ADMIN — IPRATROPIUM BROMIDE 0.5 MG: 0.5 SOLUTION RESPIRATORY (INHALATION) at 13:40

## 2022-10-26 RX ADMIN — LEVALBUTEROL HYDROCHLORIDE 1.25 MG: 1.25 SOLUTION, CONCENTRATE RESPIRATORY (INHALATION) at 07:16

## 2022-10-26 RX ADMIN — SACUBITRIL AND VALSARTAN 1 TABLET: 24; 26 TABLET, FILM COATED ORAL at 17:09

## 2022-10-26 RX ADMIN — OXYCODONE HYDROCHLORIDE 5 MG: 5 TABLET ORAL at 03:38

## 2022-10-26 RX ADMIN — ATORVASTATIN CALCIUM 10 MG: 10 TABLET, FILM COATED ORAL at 08:33

## 2022-10-26 RX ADMIN — DOCUSATE SODIUM AND SENNOSIDES 1 TABLET: 8.6; 5 TABLET, FILM COATED ORAL at 17:08

## 2022-10-26 RX ADMIN — MELATONIN 6 MG: 3 TAB ORAL at 21:37

## 2022-10-26 RX ADMIN — CARVEDILOL 3.12 MG: 3.12 TABLET, FILM COATED ORAL at 08:33

## 2022-10-26 RX ADMIN — DIGOXIN 62.5 MCG: 125 TABLET ORAL at 08:34

## 2022-10-26 RX ADMIN — FUROSEMIDE 40 MG: 10 INJECTION, SOLUTION INTRAMUSCULAR; INTRAVENOUS at 08:32

## 2022-10-26 RX ADMIN — CARVEDILOL 3.12 MG: 3.12 TABLET, FILM COATED ORAL at 17:08

## 2022-10-26 RX ADMIN — FUROSEMIDE 40 MG: 10 INJECTION, SOLUTION INTRAMUSCULAR; INTRAVENOUS at 17:08

## 2022-10-26 RX ADMIN — PANTOPRAZOLE SODIUM 40 MG: 40 TABLET, DELAYED RELEASE ORAL at 05:05

## 2022-10-26 RX ADMIN — APIXABAN 10 MG: 5 TABLET, FILM COATED ORAL at 08:33

## 2022-10-26 RX ADMIN — PANTOPRAZOLE SODIUM 40 MG: 40 TABLET, DELAYED RELEASE ORAL at 17:09

## 2022-10-26 RX ADMIN — CEFTRIAXONE 1000 MG: 1 INJECTION, SOLUTION INTRAVENOUS at 04:56

## 2022-10-26 RX ADMIN — SACUBITRIL AND VALSARTAN 1 TABLET: 24; 26 TABLET, FILM COATED ORAL at 08:37

## 2022-10-26 RX ADMIN — GABAPENTIN 300 MG: 300 CAPSULE ORAL at 08:34

## 2022-10-26 RX ADMIN — LEVALBUTEROL HYDROCHLORIDE 1.25 MG: 1.25 SOLUTION, CONCENTRATE RESPIRATORY (INHALATION) at 19:41

## 2022-10-26 NOTE — CASE MANAGEMENT
Case Management Discharge Planning Note    Patient name Maria Figueroa  Location /-06 MRN [id number]  : 1940 Date 10/26/2022       Current Admission Date: 10/22/2022  Current Admission Diagnosis:Bilateral pulmonary embolism [facility])   Patient Active Problem List    Diagnosis Date Noted   • Goals of care, counseling/discussion 10/26/2022   • Acute on chronic diastolic CHF (congestive heart failure) ([address] ) 10/22/2022   • Bilateral pulmonary embolism ([facility] ) 10/22/2022   • Pleural effusion 10/22/2022   • Chronic indwelling Ball catheter 10/22/2022   • Acute cystitis 10/22/2022   • Hypernatremia 10/22/2022   • Acute on chronic respiratory failure with hypoxia ([facility] ) 2022   • Type 2 diabetes mellitus ([facility] ) 2022   • Fall 2022   • COPD (chronic obstructive pulmonary disease) ([facility] ) 2022   • T11 vertebral fracture ([facility] ) 2022   • HFrEF (heart failure with reduced ejection fraction) ([facility] ) 2021   • Permanent atrial fibrillation ([facility] ) 2021   • Stage 3 chronic kidney disease ([facility] ) 2021   • Chronic anemia 2021      LOS (days): 4  Geometric Mean LOS (GMLOS) (days): 3 90  Days to GMLOS:-0 4     OBJECTIVE:  Risk of Unplanned Readmission Score: 31 22         Current admission status: Inpatient   Preferred Pharmacy:   [address] , [place] [address]  Phone: [phone] Fax: [phone]    Primary Care Provider: Charly Velazquez DO    Primary Insurance: [facility] [employer] [employer]  Secondary Insurance:     DISCHARGE DETAILS:     CM submitted [employer] insurance [address] request for patient to return to [facility]       CM discussed Advance Care Planning with primary provider based on scores of Goals of Care systems list

## 2022-10-26 NOTE — ASSESSMENT & PLAN NOTE
· Present on admission  Unclear when catheter was placed originally,  suspect on prior admission in September 2022  · UA showing acute cystitis  · Already changed Ball catheter this admission  Patient afebrile without leukocytosis  Continue to monitor off antibiotics

## 2022-10-26 NOTE — ASSESSMENT & PLAN NOTE
· Presents from SNF with 3 days of worsening shortness of breath  Per EMS found to be 85% on baseline 2L , now requiring up to 6L NC  Decreased to 3 L nasal cannula  · No prior history of blood clots  · Per chart review was previously on Eliquis for PAF, had admission at [facility] September 2022 for T11 fracture, anemia in which it appears Eliquis was stopped and advised to review DOAC with PCP at discharge  Not currently on anticoagulation per nursing home records, patient unable to recall if it was discussed  · CTA pe study showing acute bilateral pulmonary embolism, borderline right heart strain, bilateral pleural effusions  · D-dimer > 9 on admission   · COVID negative   · Troponins flat   · Provoked recent fracture/immobility ? Did not undergo surgery   · Initially started on heparin GTT  Subsequently transition to Eliquis 10 mg b i d  For 7 days followed by 5 mg b i d  Echocardiogram without RV strain

## 2022-10-26 NOTE — ASSESSMENT & PLAN NOTE
Lab Results   Component Value Date    HGBA1C 6 6 (H) 06/08/2022       Recent Labs     10/25/22  1559 10/25/22  2136 10/26/22  0805 10/26/22  1115   POCGLU 134 165* 118 154*       Blood Sugar Average: Last 72 hrs:  · (P) 826 [id number] Uses Humalog sliding scale at nursing home, continue sliding scale insulin while inpatient  · Hypoglycemia protocol

## 2022-10-26 NOTE — ASSESSMENT & PLAN NOTE
· Recent admission at [facility] after fall , found to have T11 fracture  · Complaining of left leg pain, appears chronic  Has chronic rivera catheter in place  · Per chart review : Has been following with neurosurgery outpatient, medically managing due to poor surgical candidate and patient electing to not undergo surgery   · CTA incidentally showing "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 with chronic fractures of the T10 and T11 vertebral bodies in spinal canal narrowing at this level which appears progressed compared to prior study"  · Continue TLSO brace  · Pain control with scheduled Tylenol, gabapentin, Robaxin, p r n   Oxycodone

## 2022-10-26 NOTE — ACP (ADVANCE CARE PLANNING)
Serious Illness Conversation    1  What is your understanding now of where you are with your illness? Prognostic Understanding: appropriate understanding of prognosis     2  How much information about what is likely to be ahead with your illness would you like to have? Information: patient wants to be fully informed     3  What did you (clinician) communicate to the patient? Prognostic Communication: Uncertain - It can be difficult to predict what will happen with your illness  I hope you will continue to live well for a long time but I’m worried that you could get sick quickly, and I think it is important to prepare for that possibility  4  If your health situation worsens, what are your most important goals? Goals: be mentally aware, be physically comfortable, be emotionally at peace     5  What are the biggest fears and worries about the future and your health? Fears/Worries: loss of control, loss of dignity, pain     6  What abilities are so critical to your life that you cannot imagine living without them? Unacceptable Function: being chronically confused or not being myself, being in pain or very uncomfortable, being unable to interact with others, being unconscious, being in chronic severe pain, being unable to communicate effectively     7  What gives you strength as you think about the future with your illness? 8  If you become sicker, how much are you willing to go through for the possibility of gaining more time? Be in the hospital: Yes Have a feeding tube: No   Be in the ICU: Yes Live in a nursing home: Yes   Be on a ventilator: Yes Be uncomfortable: No   Be on dialysis: Yes Undergo aggressive test and/or procedures: Yes   9  How much does your proxy and family know about your priorities and wishes? Discussion Discussion: no discussion but plans to address these issues     Edie Moyer heard you say that not being in pain is really important to you   Keeping that in mind, and what we know about your illness, I recommend that we heavy see palliative Medicine as an outpatient  This will help us make sure that your treatment plans reflect what’s important to you  How does this plan sound to you? I will do everything I can to help you through this    Patient verbalized understanding of the plan     I have spent 35minutes speaking with my patient on advanced care planning today or during this visit     Advanced directives  Five Wishes: Patient does not have Five Wishes- would like information

## 2022-10-26 NOTE — ASSESSMENT & PLAN NOTE
Extensive discussion regarding patient's comorbidities and goals of care  The patient has chronic medical condition including  HFrEF, Afib, Pulm HTN, COPD/XIOMARA, Hx of colon cancer, ambulatory dysfunction secondary to T11 fracture now further complicated by the following acute conditions:, Acute on chronic HFrEF, bilateral pulmonary embolism and recurrent pleural effusion and respiratory failure  Patient is aware that because of these chronic conditions she may require frequent hospitalization  She does not have a dedicated power of  but defers to her brother for medical decision making if she does not have capacity  Patients prognosis discussed with her at length at bedside in the presence of her nurse  At present patient wants to continue current level of care and wants to be a full code  She understands that she may need required frequent /prolonged hospitalization and need for further procedures like thoracentesis

## 2022-10-26 NOTE — PROGRESS NOTES
[address]  Progress Note - Sarah Sizer 1940, 79 y o  female MRN: [id number]  Unit/Bed#: -Raiza Encounter: [id number]  Primary Care Provider: Estrella Paul DO   Date and time admitted to hospital: 10/22/2022 12:28 AM    * Bilateral pulmonary embolism [facility])  Assessment & Plan  · Presents from SNF with 3 days of worsening shortness of breath  Per EMS found to be 85% on baseline 2L , now requiring up to 6L NC  Decreased to 3 L nasal cannula  · No prior history of blood clots  · Per chart review was previously on Eliquis for PAF, had admission at [facility] September 2022 for T11 fracture, anemia in which it appears Eliquis was stopped and advised to review DOAC with PCP at discharge  Not currently on anticoagulation per nursing home records, patient unable to recall if it was discussed  · CTA pe study showing acute bilateral pulmonary embolism, borderline right heart strain, bilateral pleural effusions  · D-dimer > 9 on admission   · COVID negative   · Troponins flat   · Provoked recent fracture/immobility ? Did not undergo surgery   · Initially started on heparin GTT  Subsequently transition to Eliquis 10 mg b i d  For 7 days followed by 5 mg b i d  Echocardiogram without RV strain  Goals of care, counseling/discussion  Assessment & Plan  Extensive discussion regarding patient's comorbidities and goals of care  The patient has chronic medical condition including  HFrEF, Afib, Pulm HTN, COPD/XIOMARA, Hx of colon cancer, ambulatory dysfunction secondary to T11 fracture now further complicated by the following acute conditions:, Acute on chronic HFrEF, bilateral pulmonary embolism and recurrent pleural effusion and respiratory failure  Patient is aware that because of these chronic conditions she may require frequent hospitalization  She does not have a dedicated power of  but defers to her brother for medical decision making if she does not have capacity    Patients prognosis discussed with her at length at bedside in the presence of her nurse  At present patient wants to continue current level of care and wants to be a full code  She understands that she may need required frequent /prolonged hospitalization and need for further procedures like thoracentesis  Chronic indwelling Ball catheter  Assessment & Plan  · Present on admission  Unclear when catheter was placed originally,  suspect on prior admission in September 2022  · UA showing acute cystitis  · Already changed Ball catheter this admission  Patient afebrile without leukocytosis  Continue to monitor off antibiotics  Pleural effusion  Assessment & Plan  · CTA showing moderate left and small right pleural effusions with subjacent compressive atelectasis  · Status post left-sided thoracentesis yesterday  Preliminary fluid analysis transudative likely secondary to congestive heart failure  Culture negative  Continue with serial x-ray on diuresis  Acute on chronic diastolic CHF (congestive heart failure) (HCC)  Assessment & Plan  Wt Readings from Last 3 Encounters:   10/26/22 98 7 kg (217 lb 9 5 oz)   09/30/22 95 7 kg (210 lb 15 7 oz)   11/12/21 110 kg (243 lb)       · Last echo in September 2022 shows Ef 60% , diastolic dysfunction   · Takes Bumex 1 mg daily at home  · Suspect component of acute CHF - CTA showing pleural effusions along with elevated BNP, lower extremity edema  · Received 1mg IV Bumex in the ED- will continue with Lasix 80 mg IV b i d  however patient unable to tolerated and had bout of hypotension and rapid response 10/22/2022  then decrease Lasix to 40 mg IV b i d  and monitor  Continue Entresto , decrease dose of Coreg due to hypotension from 12 5 mg b i d  To 3 125 mg b i d  normal digoxin level, cont digoxin  · Repeat chest x-ray done today still shows pleural effusion  She is status post thoracentesis yesterday  · 2D echo with normal left ventricular ejection fraction  Mild aortic stenosis  · Continue with current diuretic regimen, daily weight, sodium and fluid restricted diet  · Pt is -1 4 L in the last 24 hours and -4 L since admission  Weight decreased from 226->217 lb since admission  Continue with current diuretic regimen        Type 2 diabetes mellitus [facility])  Assessment & Plan  Lab Results   Component Value Date    HGBA1C 6 6 (H) 06/08/2022       Recent Labs     10/25/22  1559 10/25/22  2136 10/26/22  0805 10/26/22  1115   POCGLU 134 165* 118 154*       Blood Sugar Average: Last 72 hrs:  · (P) 530 [id number] Uses Humalog sliding scale at nursing home, continue sliding scale insulin while inpatient  · Hypoglycemia protocol    Acute on chronic respiratory failure with hypoxia (HCC)  Assessment & Plan  · Wears 2 L nasal cannula baseline  · Found to be hypoxic 85% on 2 L NC, now requiring 3-4L NC     · Multifactorial in the setting of bilateral PE's, pleural effusion, CHF- continue treatment as outlined below  · Wean oxygen to keep oxygen saturation greater than 90%  · S/P Left thoracentesis     COPD (chronic obstructive pulmonary disease) ([facility])  Assessment & Plan  · Not appear to be in acute exacerbation  · Continue home inhaler , neb treatments    T11 vertebral fracture ([facility])  Assessment & Plan  · Recent admission at [facility] after fall , found to have T11 fracture  · Complaining of left leg pain, appears chronic  Has chronic rivera catheter in place  · Per chart review : Has been following with neurosurgery outpatient, medically managing due to poor surgical candidate and patient electing to not undergo surgery   · CTA incidentally showing "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 with chronic fractures of the T10 and T11 vertebral bodies in spinal canal narrowing at this level which appears progressed compared to prior study"  · Continue TLSO brace  · Pain control with scheduled Tylenol, gabapentin, Robaxin, p r n  Oxycodone    Chronic anemia  Assessment & Plan  · Hgb 9 3 (baseline 8-9)  · Stable at baseline           VTE Pharmacologic Prophylaxis: VTE Score: 7 High Risk (Score >/= 5) - Pharmacological DVT Prophylaxis Ordered: apixaban (Eliquis)  Sequential Compression Devices Ordered  Patient Centered Rounds: I performed bedside rounds with nursing staff today  Discussions with Specialists or Other Care Team Provider:  Discussed with nursing    Education and Discussions with Family / Patient: Attempted to update  (brother) via phone  Unable to contact  Time Spent for Care: 30 minutes  More than 50% of total time spent on counseling and coordination of care as described above  Current Length of Stay: 4 day(s)  Current Patient Status: Inpatient   Certification Statement: The patient will continue to require additional inpatient hospital stay due to Ongoing IV diuresis  Discharge Plan: Anticipate discharge in 24-48 hrs to rehab facility  Code Status: Level 1 - Full Code    Subjective:     Seen and examined at bedside  Denies any worsening shortness of breath, chest discomfort  No acute events overnight  Tolerating  IV diuresis well  Objective:     Vitals:   Temp (24hrs), Av °F (36 7 °C), Min:97 7 °F (36 5 °C), Max:98 4 °F (36 9 °C)    Temp:  [97 7 °F (36 5 °C)-98 4 °F (36 9 °C)] 97 7 °F (36 5 °C)  HR:  [69-80] 74  Resp:  [17-18] 18  BP: (110-151)/(45-62) 142/62  SpO2:  [94 %-99 %] 97 %  Body mass index is 37 35 kg/m²  Input and Output Summary (last 24 hours): Intake/Output Summary (Last 24 hours) at 10/26/2022 1211  Last data filed at 10/26/2022 1022  Gross per 24 hour   Intake 730 ml   Output 1475 ml   Net -745 ml       Physical Exam:   Physical Exam  Constitutional:       General: She is not in acute distress  Appearance: She is obese  She is ill-appearing  HENT:      Head: Normocephalic        Nose: Nose normal       Mouth/Throat:      Mouth: Mucous membranes are moist    Eyes: Extraocular Movements: Extraocular movements intact  Pupils: Pupils are equal, round, and reactive to light  Cardiovascular:      Rate and Rhythm: Normal rate and regular rhythm  Pulmonary:      Comments: Diminished breath sounds at base  Abdominal:      General: Abdomen is flat  Bowel sounds are normal  There is no distension  Palpations: Abdomen is soft  Tenderness: There is no abdominal tenderness  Musculoskeletal:      Cervical back: Neck supple  Right lower leg: Edema present  Left lower leg: Edema present  Comments: Right foot multi Podus port in place   Skin:     General: Skin is warm  Neurological:      General: No focal deficit present  Mental Status: She is alert and oriented to person, place, and time  Mental status is at baseline  Psychiatric:         Mood and Affect: Mood normal          Additional Data:     Labs:  Results from last 7 days   Lab Units 10/26/22  0453   WBC Thousand/uL 6 16   HEMOGLOBIN g/dL 9 6*   HEMATOCRIT % 32 9*   PLATELETS Thousands/uL 141*   NEUTROS PCT % 78*   LYMPHS PCT % 11*   MONOS PCT % 8   EOS PCT % 2     Results from last 7 days   Lab Units 10/26/22  0453 10/23/22  0405 10/22/22  2231   SODIUM mmol/L 145   < > 143   POTASSIUM mmol/L 4 2   < > 4 2   CHLORIDE mmol/L 106   < > 105   CO2 mmol/L 38*   < > 37*   BUN mg/dL 34*   < > 60*   CREATININE mg/dL 0 85   < > 1 04   ANION GAP mmol/L 1*   < > 1*   CALCIUM mg/dL 8 3   < > 7 8*   ALBUMIN g/dL  --   --  2 1*   TOTAL BILIRUBIN mg/dL  --   --  0 46   ALK PHOS U/L  --   --  99   ALT U/L  --   --  12   AST U/L  --   --  11   GLUCOSE RANDOM mg/dL 114   < > 164*    < > = values in this interval not displayed       Results from last 7 days   Lab Units 10/22/22  2235   INR  1 34*     Results from last 7 days   Lab Units 10/26/22  1115 10/26/22  0805 10/25/22  2136 10/25/22  1559 10/25/22  1040 10/25/22  0743 10/24/22  1608 10/24/22  1101 10/24/22  0753 10/23/22  2127 10/23/22  1620 10/23/22  1115   POC GLUCOSE mg/dl 154* 118 165* 134 213* 96 192* 145* 93 110 127 243*         Results from last 7 days   Lab Units 10/22/22  2231 10/22/22  0049   LACTIC ACID mmol/L 1 6 1 0   PROCALCITONIN ng/ml  --  0 13       Lines/Drains:  Invasive Devices  Report    Peripheral Intravenous Line  Duration           Peripheral IV 10/25/22 Dorsal (posterior); Right Hand <1 day          Drain  Duration           External Urinary Catheter 31 days    Urethral Catheter Double-lumen 16 Fr  3 days              Urinary Catheter:  Goal for removal: N/A - Chronic Ball               Imaging: No pertinent imaging reviewed  Recent Cultures (last 7 days):   Results from last 7 days   Lab Units 10/24/22  1147 10/22/22  0106 10/22/22  0054 10/22/22  0048   BLOOD CULTURE   --  No Growth at 72 hrs   --  No Growth at 72 hrs     GRAM STAIN RESULT  1+ Polys  No bacteria seen  --   --   --    URINE CULTURE   --   --  >100,000 cfu/ml Klebsiella pneumoniae*  >100,000 cfu/ml Enterococcus faecalis*  20,000-29,000 cfu/ml   --    BODY FLUID CULTURE, STERILE  No growth  --   --   --        Last 24 Hours Medication List:   Current Facility-Administered Medications   Medication Dose Route Frequency Provider Last Rate   • acetaminophen  650 mg Oral Q4H PRN FIDELIA Aranda     • apixaban  10 mg Oral BID Dany Gee MD     • atorvastatin  10 mg Oral Daily FIDELIA Aranda     • carvedilol  3 125 mg Oral BID With Meals Dany Gee MD     • cefTRIAXone  1,000 mg Intravenous Q24H FIDELIA Aranda 1,000 mg (10/26/22 0456)   • digoxin  62 5 mcg Oral Daily Dany Gee MD     • fluticasone-vilanterol  1 puff Inhalation Daily FIDELIA Aranda     • furosemide  40 mg Intravenous BID (diuretic) Dany Gee MD     • gabapentin  300 mg Oral TID FIDELIA Aradna     • insulin lispro  1-6 Units Subcutaneous TID [facility] FIDELIA Aranda     • ipratropium  0 5 mg Nebulization TID FIDELIA Aranda     • levalbuterol  1 25 mg Nebulization TID [place] Dillon FIDELIA Candelaria     • lidocaine  1 patch Topical Daily Mitul Floyd, CRNP     • lidocaine  1 patch Topical Daily Samuel Lipscomb MD     • melatonin  6 mg Oral HS Mitul Floyd, CRNP     • methocarbamol  750 mg Oral Q6H [address], CRNP     • midodrine  5 mg Oral TID AC Samuel Lipscomb MD     • morphine injection  2 mg Intravenous Q4H PRN Samuel Lipscomb MD     • nystatin   Topical BID Samuel Lipscomb MD     • ondansetron  4 mg Intravenous Q6H PRN Mitul Floyd CRNP     • oxyCODONE  5 mg Oral Q6H PRN Samuel Lipscomb MD     • pantoprazole  40 mg Oral BID AC FIDELIA Baldwin     • polyethylene glycol  17 g Oral Daily PRN Mitul Floyd, PAULIENP     • sacubitril-valsartan  1 tablet Oral BID Mitul Floyd, CRNP     • senna-docusate sodium  1 tablet Oral BID Mitul Floyd, FIDELIA     • simethicone  80 mg Oral Q6H PRN FIDELIA Baldwin          Today, Patient Was Seen By: Farheen Alvarado    **Please Note: This note may have been constructed using a voice recognition system  **

## 2022-10-26 NOTE — ASSESSMENT & PLAN NOTE
· CTA showing moderate left and small right pleural effusions with subjacent compressive atelectasis  · Status post left-sided thoracentesis yesterday  Preliminary fluid analysis transudative likely secondary to congestive heart failure  Culture negative  Continue with serial x-ray on diuresis

## 2022-10-26 NOTE — CASE MANAGEMENT
Case Management Discharge Planning Note    Patient name Briana Ding  Location /-13 MRN [id number]  : 1940 Date 10/26/2022       Current Admission Date: 10/22/2022  Current Admission Diagnosis:Bilateral pulmonary embolism [facility])   Patient Active Problem List    Diagnosis Date Noted   • Goals of care, counseling/discussion 10/26/2022   • Acute on chronic diastolic CHF (congestive heart failure) ([facility] ) 10/22/2022   • Bilateral pulmonary embolism ([facility] ) 10/22/2022   • Pleural effusion 10/22/2022   • Chronic indwelling Ball catheter 10/22/2022   • Acute cystitis 10/22/2022   • Hypernatremia 10/22/2022   • Acute on chronic respiratory failure with hypoxia ([facility] ) 2022   • Type 2 diabetes mellitus ([facility] ) 2022   • Fall 2022   • COPD (chronic obstructive pulmonary disease) ([facility] ) 2022   • T11 vertebral fracture ([facility] ) 2022   • HFrEF (heart failure with reduced ejection fraction) ([facility] ) 2021   • Permanent atrial fibrillation ([facility] ) 2021   • Stage 3 chronic kidney disease ([facility] ) 2021   • Chronic anemia 2021      LOS (days): 4  Geometric Mean LOS (GMLOS) (days): 3 90  Days to GMLOS:-0 4     OBJECTIVE:  Risk of Unplanned Readmission Score: 31 22         Current admission status: Inpatient   Preferred Pharmacy:   [facility] [address] [place] [id number]  Phone: [phone] Fax: [phone]    Primary Care Provider: Norma Naylor DO    Primary Insurance: [facility] [employer]  Secondary Insurance:     [address] [place] [address] Number: Ellen Pending Authorization#[id number] for patient to be transferred to [facility] - request with clinicals submited via [employer]

## 2022-10-26 NOTE — PLAN OF CARE
Problem: Potential for Falls  Goal: Patient will remain free of falls  Description: INTERVENTIONS:  - Educate patient/family on patient safety including physical limitations  - Instruct patient to call for assistance with activity   - Consult OT/PT to assist with strengthening/mobility   - Keep Call bell within reach  - Keep bed low and locked with side rails adjusted as appropriate  - Keep care items and personal belongings within reach  - Initiate and maintain comfort rounds  - Make Fall Risk Sign visible to staff  - Offer Toileting every 2 Hours, in advance of need  - Initiate/Maintain bed alarm  - Obtain necessary fall risk management equipment:   - Apply yellow socks and bracelet for high fall risk patients  - Consider moving patient to room near nurses station  Outcome: Progressing     Problem: SAFETY ADULT  Goal: Patient will remain free of falls  Description: INTERVENTIONS:  - Educate patient/family on patient safety including physical limitations  - Instruct patient to call for assistance with activity   - Consult OT/PT to assist with strengthening/mobility   - Keep Call bell within reach  - Keep bed low and locked with side rails adjusted as appropriate  - Keep care items and personal belongings within reach  - Initiate and maintain comfort rounds  - Make Fall Risk Sign visible to staff  - Offer Toileting every 2 Hours, in advance of need  - Initiate/Maintain bed alarm  - Obtain necessary fall risk management equipment:  - Apply yellow socks and bracelet for high fall risk patients  - Consider moving patient to room near nurses station  Outcome: Progressing

## 2022-10-26 NOTE — ASSESSMENT & PLAN NOTE
Wt Readings from Last 3 Encounters:   10/26/22 98 7 kg (217 lb 9 5 oz)   09/30/22 95 7 kg (210 lb 15 7 oz)   11/12/21 110 kg (243 lb)       · Last echo in September 2022 shows Ef 75% , diastolic dysfunction   · Takes Bumex 1 mg daily at home  · Suspect component of acute CHF - CTA showing pleural effusions along with elevated BNP, lower extremity edema  · Received 1mg IV Bumex in the ED- will continue with Lasix 80 mg IV b i d  however patient unable to tolerated and had bout of hypotension and rapid response 10/22/2022  then decrease Lasix to 40 mg IV b i d  and monitor  Continue Entresto , decrease dose of Coreg due to hypotension from 12 5 mg b i d  To 3 125 mg b i d  normal digoxin level, cont digoxin  · Repeat chest x-ray done today still shows pleural effusion  She is status post thoracentesis yesterday  · 2D echo with normal left ventricular ejection fraction  Mild aortic stenosis  · Continue with current diuretic regimen, daily weight, sodium and fluid restricted diet  · Pt is -1 4 L in the last 24 hours and -4 L since admission  Weight decreased from 226->217 lb since admission    Continue with current diuretic regimen

## 2022-10-27 ENCOUNTER — APPOINTMENT (INPATIENT)
Dept: RADIOLOGY | Facility: HOSPITAL | Age: 82
End: 2022-10-27
Payer: COMMERCIAL

## 2022-10-27 PROBLEM — L89.152 SACRAL DECUBITUS ULCER, STAGE II (HCC): Status: ACTIVE | Noted: 2022-10-27

## 2022-10-27 LAB
ANION GAP SERPL CALCULATED.3IONS-SCNC: 3 MMOL/L (ref 4–13)
BACTERIA BLD CULT: NORMAL
BACTERIA BLD CULT: NORMAL
BACTERIA SPEC BFLD CULT: NO GROWTH
BUN SERPL-MCNC: 23 MG/DL (ref 5–25)
CALCIUM SERPL-MCNC: 8.4 MG/DL (ref 8.3–10.1)
CHLORIDE SERPL-SCNC: 103 MMOL/L (ref 96–108)
CO2 SERPL-SCNC: 38 MMOL/L (ref 21–32)
CREAT SERPL-MCNC: 0.69 MG/DL (ref 0.6–1.3)
GFR SERPL CREATININE-BSD FRML MDRD: 81 ML/MIN/1.73SQ M
GLUCOSE SERPL-MCNC: 125 MG/DL (ref 65–140)
GLUCOSE SERPL-MCNC: 127 MG/DL (ref 65–140)
GLUCOSE SERPL-MCNC: 158 MG/DL (ref 65–140)
GLUCOSE SERPL-MCNC: 174 MG/DL (ref 65–140)
GLUCOSE SERPL-MCNC: 192 MG/DL (ref 65–140)
GRAM STN SPEC: NORMAL
GRAM STN SPEC: NORMAL
MAGNESIUM SERPL-MCNC: 1.7 MG/DL (ref 1.6–2.6)
POTASSIUM SERPL-SCNC: 4.1 MMOL/L (ref 3.5–5.3)
SODIUM SERPL-SCNC: 144 MMOL/L (ref 135–147)

## 2022-10-27 PROCEDURE — 99232 SBSQ HOSP IP/OBS MODERATE 35: CPT | Performed by: INTERNAL MEDICINE

## 2022-10-27 PROCEDURE — 94640 AIRWAY INHALATION TREATMENT: CPT

## 2022-10-27 PROCEDURE — 94760 N-INVAS EAR/PLS OXIMETRY 1: CPT

## 2022-10-27 PROCEDURE — 94660 CPAP INITIATION&MGMT: CPT

## 2022-10-27 PROCEDURE — 71045 X-RAY EXAM CHEST 1 VIEW: CPT

## 2022-10-27 PROCEDURE — 80048 BASIC METABOLIC PNL TOTAL CA: CPT | Performed by: INTERNAL MEDICINE

## 2022-10-27 PROCEDURE — 83735 ASSAY OF MAGNESIUM: CPT | Performed by: INTERNAL MEDICINE

## 2022-10-27 PROCEDURE — 82948 REAGENT STRIP/BLOOD GLUCOSE: CPT

## 2022-10-27 RX ADMIN — FLUTICASONE FUROATE AND VILANTEROL TRIFENATATE 1 PUFF: 100; 25 POWDER RESPIRATORY (INHALATION) at 09:04

## 2022-10-27 RX ADMIN — IPRATROPIUM BROMIDE 0.5 MG: 0.5 SOLUTION RESPIRATORY (INHALATION) at 13:00

## 2022-10-27 RX ADMIN — LIDOCAINE 5% 1 PATCH: 700 PATCH TOPICAL at 09:02

## 2022-10-27 RX ADMIN — METHOCARBAMOL TABLETS 750 MG: 750 TABLET, COATED ORAL at 11:47

## 2022-10-27 RX ADMIN — MIDODRINE HYDROCHLORIDE 5 MG: 5 TABLET ORAL at 16:06

## 2022-10-27 RX ADMIN — ATORVASTATIN CALCIUM 10 MG: 10 TABLET, FILM COATED ORAL at 08:58

## 2022-10-27 RX ADMIN — INSULIN LISPRO 2 UNITS: 100 INJECTION, SOLUTION INTRAVENOUS; SUBCUTANEOUS at 17:09

## 2022-10-27 RX ADMIN — APIXABAN 10 MG: 5 TABLET, FILM COATED ORAL at 17:10

## 2022-10-27 RX ADMIN — FUROSEMIDE 40 MG: 10 INJECTION, SOLUTION INTRAMUSCULAR; INTRAVENOUS at 16:07

## 2022-10-27 RX ADMIN — METHOCARBAMOL TABLETS 750 MG: 750 TABLET, COATED ORAL at 17:10

## 2022-10-27 RX ADMIN — GABAPENTIN 300 MG: 300 CAPSULE ORAL at 22:00

## 2022-10-27 RX ADMIN — FUROSEMIDE 40 MG: 10 INJECTION, SOLUTION INTRAMUSCULAR; INTRAVENOUS at 09:01

## 2022-10-27 RX ADMIN — LEVALBUTEROL HYDROCHLORIDE 1.25 MG: 1.25 SOLUTION, CONCENTRATE RESPIRATORY (INHALATION) at 13:00

## 2022-10-27 RX ADMIN — MIDODRINE HYDROCHLORIDE 5 MG: 5 TABLET ORAL at 06:07

## 2022-10-27 RX ADMIN — LEVALBUTEROL HYDROCHLORIDE 1.25 MG: 1.25 SOLUTION, CONCENTRATE RESPIRATORY (INHALATION) at 07:32

## 2022-10-27 RX ADMIN — OXYCODONE HYDROCHLORIDE 5 MG: 5 TABLET ORAL at 11:47

## 2022-10-27 RX ADMIN — GABAPENTIN 300 MG: 300 CAPSULE ORAL at 16:07

## 2022-10-27 RX ADMIN — SACUBITRIL AND VALSARTAN 1 TABLET: 24; 26 TABLET, FILM COATED ORAL at 17:25

## 2022-10-27 RX ADMIN — NYSTATIN: 100000 POWDER TOPICAL at 17:25

## 2022-10-27 RX ADMIN — CARVEDILOL 3.12 MG: 3.12 TABLET, FILM COATED ORAL at 16:06

## 2022-10-27 RX ADMIN — LEVALBUTEROL HYDROCHLORIDE 1.25 MG: 1.25 SOLUTION, CONCENTRATE RESPIRATORY (INHALATION) at 20:11

## 2022-10-27 RX ADMIN — OXYCODONE HYDROCHLORIDE 5 MG: 5 TABLET ORAL at 22:00

## 2022-10-27 RX ADMIN — GABAPENTIN 300 MG: 300 CAPSULE ORAL at 08:58

## 2022-10-27 RX ADMIN — MELATONIN 6 MG: 3 TAB ORAL at 22:00

## 2022-10-27 RX ADMIN — IPRATROPIUM BROMIDE 0.5 MG: 0.5 SOLUTION RESPIRATORY (INHALATION) at 20:11

## 2022-10-27 RX ADMIN — NYSTATIN: 100000 POWDER TOPICAL at 09:00

## 2022-10-27 RX ADMIN — INSULIN LISPRO 1 UNITS: 100 INJECTION, SOLUTION INTRAVENOUS; SUBCUTANEOUS at 11:48

## 2022-10-27 RX ADMIN — METHOCARBAMOL TABLETS 750 MG: 750 TABLET, COATED ORAL at 00:44

## 2022-10-27 RX ADMIN — LIDOCAINE 5% 1 PATCH: 700 PATCH TOPICAL at 09:03

## 2022-10-27 RX ADMIN — METHOCARBAMOL TABLETS 750 MG: 750 TABLET, COATED ORAL at 06:07

## 2022-10-27 RX ADMIN — SACUBITRIL AND VALSARTAN 1 TABLET: 24; 26 TABLET, FILM COATED ORAL at 09:22

## 2022-10-27 RX ADMIN — DIGOXIN 62.5 MCG: 125 TABLET ORAL at 08:59

## 2022-10-27 RX ADMIN — MIDODRINE HYDROCHLORIDE 5 MG: 5 TABLET ORAL at 11:47

## 2022-10-27 RX ADMIN — APIXABAN 10 MG: 5 TABLET, FILM COATED ORAL at 08:58

## 2022-10-27 RX ADMIN — CARVEDILOL 3.12 MG: 3.12 TABLET, FILM COATED ORAL at 09:05

## 2022-10-27 RX ADMIN — IPRATROPIUM BROMIDE 0.5 MG: 0.5 SOLUTION RESPIRATORY (INHALATION) at 07:32

## 2022-10-27 RX ADMIN — PANTOPRAZOLE SODIUM 40 MG: 40 TABLET, DELAYED RELEASE ORAL at 16:07

## 2022-10-27 RX ADMIN — PANTOPRAZOLE SODIUM 40 MG: 40 TABLET, DELAYED RELEASE ORAL at 06:07

## 2022-10-27 NOTE — CASE MANAGEMENT
Case Management Discharge Planning Note    Patient name Anirudh Herron  Location /-90 MRN [id number]  : 1940 Date 10/27/2022       Current Admission Date: 10/22/2022  Current Admission Diagnosis:Bilateral pulmonary embolism [facility])   Patient Active Problem List    Diagnosis Date Noted   • Sacral decubitus ulcer, stage II ([facility] ) 10/27/2022   • Goals of care, counseling/discussion 10/26/2022   • Acute on chronic diastolic CHF (congestive heart failure) ([facility] ) 10/22/2022   • Bilateral pulmonary embolism ([facility] ) 10/22/2022   • Pleural effusion 10/22/2022   • Chronic indwelling Ball catheter 10/22/2022   • Acute cystitis 10/22/2022   • Hypernatremia 10/22/2022   • Acute on chronic respiratory failure with hypoxia ([facility] ) 2022   • Type 2 diabetes mellitus ([facility] ) 2022   • Fall 2022   • COPD (chronic obstructive pulmonary disease) ([facility] ) 2022   • T11 vertebral fracture ([facility] ) 2022   • HFrEF (heart failure with reduced ejection fraction) ([facility] ) 2021   • Permanent atrial fibrillation ([facility] ) 2021   • Stage 3 chronic kidney disease ([facility] ) 2021   • Chronic anemia 2021      LOS (days): 5  Geometric Mean LOS (GMLOS) (days): 3 90  Days to GMLOS:-1 6     OBJECTIVE:  Risk of Unplanned Readmission Score: 28 33         Current admission status: Inpatient   Preferred Pharmacy:   [address] , [place] [address]  Phone: [phone] Fax: [phone]    Primary Care Provider: Pk Hartmann DO    Primary Insurance: [facility]  Secondary Insurance:     [id number] [facility] Number: Call Received from [address] Peer to Peer Review - "Patient was denied for SNF per CMS Guidelines"  An appeal can be done by calling [phone]    Juan Beltre informed of denial

## 2022-10-27 NOTE — ASSESSMENT & PLAN NOTE
Present on admission  Continue with frequent repositioning and turning every 2 hourly  Continue with wound care

## 2022-10-27 NOTE — ASSESSMENT & PLAN NOTE
· CTA showing moderate left and small right pleural effusions with subjacent compressive atelectasis  · Status post left-sided thoracentesis yesterday  Preliminary fluid analysis transudative likely secondary to congestive heart failure  Culture negative  Repeat chest x-ray shows improved pleural effusion  No further indication for thoracentesis  She is back to home regimen of 2 L of supplemental oxygen via nasal cannula

## 2022-10-27 NOTE — PROGRESS NOTES
[address]  Progress Note - Nancy Lugo 1940, 79 y o  female MRN: [id number]  Unit/Bed#: -01 Encounter: [id number]  Primary Care Provider: Malini Carney DO   Date and time admitted to hospital: 10/22/2022 12:28 AM    * Bilateral pulmonary embolism [facility])  Assessment & Plan  · Presents from SNF with 3 days of worsening shortness of breath  Per EMS found to be 85% on baseline 2L , now requiring up to 6L NC  Decreased to 3 L nasal cannula  · No prior history of blood clots  · Per chart review was previously on Eliquis for PAF, had admission at [facility] September 2022 for T11 fracture, anemia in which it appears Eliquis was stopped and advised to review DOAC with PCP at discharge  Not currently on anticoagulation per nursing home records, patient unable to recall if it was discussed  · CTA pe study showing acute bilateral pulmonary embolism, borderline right heart strain, bilateral pleural effusions  · D-dimer > 9 on admission   · COVID negative   · Troponins flat   · Provoked recent fracture/immobility ? Did not undergo surgery   · Initially started on heparin GTT  Subsequently transitioned to Eliquis 10 mg b i d  For 7 days followed by 5 mg b i d  Echocardiogram without RV strain  Sacral decubitus ulcer, stage II [facility])  Assessment & Plan  Present on admission  Continue with frequent repositioning and turning every 2 hourly  Continue with wound care  Goals of care, counseling/discussion  Assessment & Plan  Extensive discussion regarding patient's comorbidities and goals of care  The patient has chronic medical condition including  HFrEF, Afib, Pulm HTN, COPD/XIOMARA, Hx of colon cancer, ambulatory dysfunction secondary to T11 fracture now further complicated by the following acute conditions:, Acute on chronic HFrEF, bilateral pulmonary embolism and recurrent pleural effusion and respiratory failure    Patient is aware that because of these chronic conditions she may require frequent hospitalization  She does not have a dedicated power of  but defers to her brother for medical decision making if she does not have capacity  Patients prognosis discussed with her at length at bedside in the presence of her nurse  At present patient wants to continue current level of care and wants to be a full code  She understands that she may need required frequent /prolonged hospitalization and need for further procedures like thoracentesis  Chronic indwelling Ball catheter  Assessment & Plan  · Present on admission  Unclear when catheter was placed originally,  suspect on prior admission in September 2022  · UA showing acute cystitis  · Already changed Ball catheter this admission  Patient afebrile without leukocytosis  Continue to monitor off antibiotics  Pleural effusion  Assessment & Plan  · CTA showing moderate left and small right pleural effusions with subjacent compressive atelectasis  · Status post left-sided thoracentesis yesterday  Preliminary fluid analysis transudative likely secondary to congestive heart failure  Culture negative  Repeat chest x-ray shows improved pleural effusion  No further indication for thoracentesis  She is back to home regimen of 2 L of supplemental oxygen via nasal cannula  Acute on chronic diastolic CHF (congestive heart failure) ([address] )  Assessment & Plan  Wt Readings from Last 3 Encounters:   10/27/22 97 9 kg (215 lb 13 3 oz)   09/30/22 95 7 kg (210 lb 15 7 oz)   11/12/21 110 kg (243 lb)       · Last echo in September 2022 shows Ef 73% , diastolic dysfunction   · Takes Bumex 1 mg daily at home  · Suspect component of acute CHF - CTA showing pleural effusions along with elevated BNP, lower extremity edema  · Received 1mg IV Bumex in the ED- will continue with Lasix 80 mg IV b i d  however patient unable to tolerated and had bout of hypotension and rapid response 10/22/2022   then decrease Lasix to 40 mg IV b i d  and monitor  Continue Entresto , decrease dose of Coreg due to hypotension from 12 5 mg b i d  To 3 125 mg b i d  normal digoxin level, cont digoxin  · Patient had thoracentesis on 10/24  Repeat chest x-ray today shows improvement in effusions  · 2D echo with normal left ventricular ejection fraction  Mild aortic stenosis  · Continue with current diuretic regimen, daily weight, sodium and fluid restricted diet  Likely can transition to oral diuretics in the next 24 hours  · Pt is -1 2 L isince admission  Weight decreased from 226->215 lb since admission  Continue with current diuretic regimen  Stable for transition to oral diuretics in the next 24 hours  Type 2 diabetes mellitus [facility])  Assessment & Plan  Lab Results   Component Value Date    HGBA1C 6 6 (H) 06/08/2022       Recent Labs     10/26/22  0805 10/26/22  1115 10/26/22  1548 10/27/22  0738   POCGLU 118 154* 149* 125       Blood Sugar Average: Last 72 hrs:  · (P) 144 Uses Humalog sliding scale at nursing home, continue sliding scale insulin while inpatient  · Hypoglycemia protocol    Acute on chronic respiratory failure with hypoxia (HCC)  Assessment & Plan  · Wears 2 L nasal cannula baseline  · Found to be hypoxic 85% on 2 L NC, now requiring 3-4L NC  Currently weaned down to 2 L of home dose of oxygen  · Multifactorial in the setting of bilateral PE's, pleural effusion, CHF- continue treatment as outlined below  · Wean oxygen to keep oxygen saturation greater than 90%  · S/P Left thoracentesis   Repeat BP chest x-ray done on 10/20 7th shows improvement in pleural effusion and pulmonary vascular congestion      COPD (chronic obstructive pulmonary disease) ([facility] )  Assessment & Plan  · Patient does Not appear to be in acute exacerbation  · Continue home inhaler , neb treatments  · Received flu shot on 10/22    T11 vertebral fracture ([address] )  Assessment & Plan  · Recent admission [facility] after fall , found to have T11 fracture  · Complaining of left leg pain, appears chronic  Has chronic rivera catheter in place  · Per chart review : Has been following with neurosurgery outpatient, medically managing due to poor surgical candidate and patient electing to not undergo surgery   · CTA incidentally showing "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 with chronic fractures of the T10 and T11 vertebral bodies in spinal canal narrowing at this level which appears progressed compared to prior study"  · Continue TLSO brace at rehab facility upon discharge  · Pain control with scheduled Tylenol, gabapentin, Robaxin, p r n  Oxycodone    Chronic anemia  Assessment & Plan  · Hgb 9 3 (baseline 8-9)  · Stable at baseline         VTE Pharmacologic Prophylaxis: VTE Score: 7 High Risk (Score >/= 5) - Pharmacological DVT Prophylaxis Ordered: apixaban (Eliquis)  Sequential Compression Devices Ordered  Patient Centered Rounds: I performed bedside rounds with nursing staff today  Discussions with Specialists or Other Care Team Provider:  Discussed with case management    Education and Discussions with Family / Patient: Updated  (brother) via phone  Time Spent for Care: 45 minutes  More than 50% of total time spent on counseling and coordination of care as described above  Current Length of Stay: 5 day(s)  Current Patient Status: Inpatient   Certification Statement: The patient will continue to require additional inpatient hospital stay due to await STR  Discharge Plan: Anticipate discharge tomorrow to rehab facility  Code Status: Level 1 - Full Code    Subjective:   Patient seen and examined  Denies any shortness of breath  No acute events overnight  Denies any significant discomfort in her back or legs  Currently on 2 L of supplemental oxygen   Objective:     Vitals:   Temp (24hrs), Av °F (36 7 °C), Min:97 9 °F (36 6 °C), Max:98 1 °F (36 7 °C)    Temp:  [97 9 °F (36 6 °C)-98 1 °F (36 7 °C)] 97 9 °F (36 6 °C)  HR:  [81-88] 82  Resp:  [18-21] 21  BP: ()/(62-88) 114/79  SpO2:  [95 %-100 %] 100 %  Body mass index is 37 05 kg/m²  Input and Output Summary (last 24 hours): Intake/Output Summary (Last 24 hours) at 10/27/2022 1047  Last data filed at 10/27/2022 0900  Gross per 24 hour   Intake 300 ml   Output 1700 ml   Net -1400 ml       Physical Exam:   Physical Exam  Constitutional:       General: She is not in acute distress  Appearance: She is obese  She is ill-appearing  HENT:      Head: Normocephalic  Right Ear: Tympanic membrane normal       Nose: Nose normal       Mouth/Throat:      Mouth: Mucous membranes are moist    Eyes:      Pupils: Pupils are equal, round, and reactive to light  Cardiovascular:      Rate and Rhythm: Normal rate and regular rhythm  Pulmonary:      Comments: Diminished breath sounds at bases  Abdominal:      General: Abdomen is flat  Bowel sounds are normal  There is no distension  Palpations: Abdomen is soft  Genitourinary:     Comments: Chronic indwelling Ball catheter  Musculoskeletal:      Cervical back: Neck supple  Comments: Improved bilateral lower extremity edema   stage II sacral decubitus ulcer  DT I bilateral heel   Neurological:      General: No focal deficit present  Mental Status: She is alert and oriented to person, place, and time  Mental status is at baseline           Additional Data:     Labs:  Results from last 7 days   Lab Units 10/26/22  0453   WBC Thousand/uL 6 16   HEMOGLOBIN g/dL 9 6*   HEMATOCRIT % 32 9*   PLATELETS Thousands/uL 141*   NEUTROS PCT % 78*   LYMPHS PCT % 11*   MONOS PCT % 8   EOS PCT % 2     Results from last 7 days   Lab Units 10/27/22  0513 10/23/22  0405 10/22/22  2231   SODIUM mmol/L 144   < > 143   POTASSIUM mmol/L 4 1   < > 4 2   CHLORIDE mmol/L 103   < > 105   CO2 mmol/L 38*   < > 37*   BUN mg/dL 23   < > 60*   CREATININE mg/dL 0 69   < > 1 04   ANION GAP mmol/L 3*   < > 1*   CALCIUM mg/dL 8 4   < > 7 8*   ALBUMIN g/dL  --   --  2 1*   TOTAL BILIRUBIN mg/dL  --   --  0 46   ALK PHOS U/L  --   --  99   ALT U/L  --   --  12   AST U/L  --   --  11   GLUCOSE RANDOM mg/dL 127   < > 164*    < > = values in this interval not displayed  Results from last 7 days   Lab Units 10/22/22  2235   INR  1 34*     Results from last 7 days   Lab Units 10/27/22  0738 10/26/22  1548 10/26/22  1115 10/26/22  0805 10/25/22  2136 10/25/22  1559 10/25/22  1040 10/25/22  0743 10/24/22  1608 10/24/22  1101 10/24/22  0753 10/23/22  2127   POC GLUCOSE mg/dl 125 149* 154* 118 165* 134 213* 96 192* 145* 93 110         Results from last 7 days   Lab Units 10/22/22  2231 10/22/22  0049   LACTIC ACID mmol/L 1 6 1 0   PROCALCITONIN ng/ml  --  0 13       Lines/Drains:  Invasive Devices  Report    Peripheral Intravenous Line  Duration           Peripheral IV 10/25/22 Dorsal (posterior); Right Hand 1 day          Drain  Duration           External Urinary Catheter 32 days    Urethral Catheter Double-lumen 16 Fr  4 days              Urinary Catheter:  Goal for removal: N/A - Chronic Ball               Imaging: No pertinent imaging reviewed  Recent Cultures (last 7 days):   Results from last 7 days   Lab Units 10/24/22  1147 10/22/22  0106 10/22/22  0054 10/22/22  0048   BLOOD CULTURE   --  No Growth After 4 Days  --  No Growth After 4 Days     GRAM STAIN RESULT  1+ Polys  No bacteria seen  --   --   --    URINE CULTURE   --   --  >100,000 cfu/ml Klebsiella pneumoniae*  >100,000 cfu/ml Enterococcus faecalis*  20,000-29,000 cfu/ml   --    BODY FLUID CULTURE, STERILE  No growth  --   --   --        Last 24 Hours Medication List:   Current Facility-Administered Medications   Medication Dose Route Frequency Provider Last Rate   • acetaminophen  650 mg Oral Q4H PRN FIDELIA Arguelles     • apixaban  10 mg Oral BID Adamaris Bocanegra MD     • atorvastatin  10 mg Oral Daily FIDELIA Arguelles     • carvedilol  3 125 mg Oral BID With Meals Adamaris Bocanegra MD • digoxin  62 5 mcg Oral Daily Samuel Lipscomb MD     • fluticasone-vilanterol  1 puff Inhalation Daily Mitul Floyd, CRNP     • furosemide  40 mg Intravenous BID (diuretic) Samuel Lipscomb MD     • gabapentin  300 mg Oral TID Mitul Ramírezd, CRNP     • insulin lispro  1-6 Units Subcutaneous TID [facility], CRNP     • ipratropium  0 5 mg Nebulization TID Mitul Floyd, CRNP     • levalbuterol  1 25 mg Nebulization TID Mitul Floyd, CRNP     • lidocaine  1 patch Topical Daily Mitul Floyd, CRNP     • lidocaine  1 patch Topical Daily Samuel Lipscomb MD     • melatonin  6 mg Oral HS Mitul Floyd, CRNP     • methocarbamol  750 mg Oral Q6H [facility], CRNP     • midodrine  5 mg Oral TID AC Samuel Lipscomb MD     • morphine injection  2 mg Intravenous Q4H PRN Samuel Lipscomb MD     • nystatin   Topical BID Samuel Lipscomb MD     • ondansetron  4 mg Intravenous Q6H PRN Mitul Floyd, CRNP     • oxyCODONE  5 mg Oral Q6H PRN Samuel Lipscomb MD     • pantoprazole  40 mg Oral BID AC Mitul Floyd, CRNP     • polyethylene glycol  17 g Oral Daily PRN Mitul Floyd, CRNP     • sacubitril-valsartan  1 tablet Oral BID Mituljordan Ramírezd, CRNP     • senna-docusate sodium  1 tablet Oral BID Mitul Floyd, CRNP     • simethicone  80 mg Oral Q6H PRN Mitul Floyd, CRNP          Today, Patient Was Seen By: Farheen Alvarado    **Please Note: This note may have been constructed using a voice recognition system  **

## 2022-10-27 NOTE — ASSESSMENT & PLAN NOTE
Wt Readings from Last 3 Encounters:   10/27/22 97 9 kg (215 lb 13 3 oz)   09/30/22 95 7 kg (210 lb 15 7 oz)   11/12/21 110 kg (243 lb)       · Last echo in September 2022 shows Ef 37% , diastolic dysfunction   · Takes Bumex 1 mg daily at home  · Suspect component of acute CHF - CTA showing pleural effusions along with elevated BNP, lower extremity edema  · Received 1mg IV Bumex in the ED- will continue with Lasix 80 mg IV b i d  however patient unable to tolerated and had bout of hypotension and rapid response 10/22/2022  then decrease Lasix to 40 mg IV b i d  and monitor  Continue Entresto , decrease dose of Coreg due to hypotension from 12 5 mg b i d  To 3 125 mg b i d  normal digoxin level, cont digoxin  · Patient had thoracentesis on 10/24  Repeat chest x-ray today shows improvement in effusions  · 2D echo with normal left ventricular ejection fraction  Mild aortic stenosis  · Continue with current diuretic regimen, daily weight, sodium and fluid restricted diet  Likely can transition to oral diuretics in the next 24 hours  · Pt is -1 2 L isince admission  Weight decreased from 226->215 lb since admission  Continue with current diuretic regimen  Stable for transition to oral diuretics in the next 24 hours

## 2022-10-27 NOTE — CASE MANAGEMENT
Case Management Discharge Planning Note    Patient name Sebas Cohen  Location /-76 MRN [id number]  : 1940 Date 10/27/2022       Current Admission Date: 10/22/2022  Current Admission Diagnosis:Bilateral pulmonary embolism [facility])   Patient Active Problem List    Diagnosis Date Noted   • Sacral decubitus ulcer, stage II ([address] ) 10/27/2022   • Goals of care, counseling/discussion 10/26/2022   • Acute on chronic diastolic CHF (congestive heart failure) ([address] ) 10/22/2022   • Bilateral pulmonary embolism ([facility] ) 10/22/2022   • Pleural effusion 10/22/2022   • Chronic indwelling Ball catheter 10/22/2022   • Acute cystitis 10/22/2022   • Hypernatremia 10/22/2022   • Acute on chronic respiratory failure with hypoxia ([address] ) 2022   • Type 2 diabetes mellitus ([address] ) 2022   • Fall 2022   • COPD (chronic obstructive pulmonary disease) ([address] ) 2022   • T11 vertebral fracture ([address] ) 2022   • HFrEF (heart failure with reduced ejection fraction) ([facility] ) 2021   • Permanent atrial fibrillation ([address] ) 2021   • Stage 3 chronic kidney disease ([address] ) 2021   • Chronic anemia 2021      LOS (days): 5  Geometric Mean LOS (GMLOS) (days): 3 90  Days to GMLOS:-1 5     OBJECTIVE:  Risk of Unplanned Readmission Score: 28 33         Current admission status: Inpatient   Preferred Pharmacy:   [address] , [place] [address]  Phone: [phone] Fax: [phone]    Primary Care Provider: Gayatri Irvin DO    Primary Insurance: [facility] [employer]  Secondary Insurance:     DISCHARGE DETAILS:                                                                                                               [facility] [address] [place] [address] Number: Phone Call Received from Job @ [facility] - Peer to Peer has been requested by Medical Director at [facility] prior to determination to transfer patient to [facility]! [employer]   Peer to Peer can be scheduled by calling [phone] (anyone can call and schedule Peer to Peer with Pending [facility]#[phone])  Medical Director will then call [facility] [address] Physician to discuss  Peer to Peer must be scheduled within 72hrs or a denial will be sent by insurance  Notified  Ginger Puentes of above

## 2022-10-27 NOTE — PLAN OF CARE
Problem: Potential for Falls  Goal: Patient will remain free of falls  Description: INTERVENTIONS:  - Educate patient/family on patient safety including physical limitations  - Instruct patient to call for assistance with activity   - Consult OT/PT to assist with strengthening/mobility   - Keep Call bell within reach  - Keep bed low and locked with side rails adjusted as appropriate  - Keep care items and personal belongings within reach  - Initiate and maintain comfort rounds  - Make Fall Risk Sign visible to staff  - Offer Toileting every 2 Hours, in advance of need  - Initiate/Maintain bed alarm  - Apply yellow socks and bracelet for high fall risk patients  - Consider moving patient to room near nurses station  Outcome: Progressing     Problem: MOBILITY - ADULT  Goal: Maintain or return to baseline ADL function  Description: INTERVENTIONS:  -  Assess patient's ability to carry out ADLs; assess patient's baseline for ADL function and identify physical deficits which impact ability to perform ADLs (bathing, care of mouth/teeth, toileting, grooming, dressing, etc )  - Assess/evaluate cause of self-care deficits   - Assess range of motion  - Assess patient's mobility; develop plan if impaired  - Assess patient's need for assistive devices and provide as appropriate  - Encourage maximum independence but intervene and supervise when necessary  - Involve family in performance of ADLs  - Assess for home care needs following discharge   - Consider OT consult to assist with ADL evaluation and planning for discharge  - Provide patient education as appropriate  Outcome: Progressing  Goal: Maintains/Returns to pre admission functional level  Description: INTERVENTIONS:  - Perform BMAT or MOVE assessment daily    - Set and communicate daily mobility goal to care team and patient/family/caregiver     - Collaborate with rehabilitation services on mobility goals if consulted  - Out of bed for toileting  - Record patient progress and toleration of activity level   Outcome: Progressing     Problem: Prexisting or High Potential for Compromised Skin Integrity  Goal: Skin integrity is maintained or improved  Description: INTERVENTIONS:  - Identify patients at risk for skin breakdown  - Assess and monitor skin integrity  - Assess and monitor nutrition and hydration status  - Monitor labs   - Assess for incontinence   - Turn and reposition patient  - Assist with mobility/ambulation  - Relieve pressure over bony prominences  - Avoid friction and shearing  - Provide appropriate hygiene as needed including keeping skin clean and dry  - Evaluate need for skin moisturizer/barrier cream  - Collaborate with interdisciplinary team   - Patient/family teaching  - Consider wound care consult   Outcome: Progressing     Problem: Nutrition/Hydration-ADULT  Goal: Nutrient/Hydration intake appropriate for improving, restoring or maintaining nutritional needs  Description: Monitor and assess patient's nutrition/hydration status for malnutrition  Collaborate with interdisciplinary team and initiate plan and interventions as ordered  Monitor patient's weight and dietary intake as ordered or per policy  Utilize nutrition screening tool and intervene as necessary  Determine patient's food preferences and provide high-protein, high-caloric foods as appropriate       INTERVENTIONS:  - Monitor oral intake, urinary output, labs, and treatment plans  - Assess nutrition and hydration status and recommend course of action  - Evaluate amount of meals eaten  - Assist patient with eating if necessary   - Allow adequate time for meals  - Recommend/ encourage appropriate diets, oral nutritional supplements, and vitamin/mineral supplements  - Order, calculate, and assess calorie counts as needed  -  - encourage snacks protein drinks inbetween meals  - Provide specific nutrition/hydration education as appropriate  - Include patient/family/caregiver in decisions related to nutrition  Outcome: Progressing     Problem: NEUROSENSORY - ADULT  Goal: Achieves stable or improved neurological status  Description: INTERVENTIONS  - Monitor and report changes in neurological status  - Monitor vital signs such as temperature, blood pressure, glucose, and any other labs ordered   - Initiate measures to prevent increased intracranial pressure  - Monitor for seizure activity and implement precautions if appropriate      Outcome: Progressing  Goal: Remains free of injury related to seizures activity  Description: INTERVENTIONS  - Maintain airway, patient safety  and administer oxygen as ordered  - Monitor patient for seizure activity, document and report duration and description of seizure to physician/advanced practitioner  - If seizure occurs,  ensure patient safety during seizure  - Reorient patient post seizure  - Seizure pads on all 4 side rails  - Instruct patient/family to notify RN of any seizure activity including if an aura is experienced  - Instruct patient/family to call for assistance with activity based on nursing assessment  - Administer anti-seizure medications if ordered    Outcome: Progressing  Goal: Achieves maximal functionality and self care  Description: INTERVENTIONS  - Monitor swallowing and airway patency with patient fatigue and changes in neurological status  - Encourage and assist patient to increase activity and self care     - Encourage visually impaired, hearing impaired and aphasic patients to use assistive/communication devices  Outcome: Progressing     Problem: CARDIOVASCULAR - ADULT  Goal: Maintains optimal cardiac output and hemodynamic stability  Description: INTERVENTIONS:  - Monitor I/O, vital signs and rhythm SOB, CP, tachycardia  - Monitor for S/S and trends of decreased cardiac output  - Administer and titrate ordered vasoactive medications to optimize hemodynamic stability  - Assess quality of pulses, skin color and temperature  - Assess for signs of decreased coronary artery perfusion  - Instruct patient to report change in severity of symptoms  Outcome: Progressing  Goal: Absence of cardiac dysrhythmias or at baseline rhythm  Description: INTERVENTIONS:  - Continuous cardiac monitoring, vital signs, obtain 12 lead EKG if ordered  - Administer antiarrhythmic and heart rate control medications as ordered  - Monitor electrolytes and administer replacement therapy as ordered  Outcome: Progressing     Problem: RESPIRATORY - ADULT  Goal: Achieves optimal ventilation and oxygenation  Description: INTERVENTIONS:  - Assess for changes in respiratory status    SOB BEARD  - Assess for changes in mentation and behavior  - Position to facilitate oxygenation and minimize respiratory effort  - Oxygen administered by appropriate delivery if ordered  - Initiate smoking cessation education as indicated  - Encourage broncho-pulmonary hygiene including cough, deep breathe, Incentive Spirometry  - Assess the need for suctioning and aspirate as needed  - Assess and instruct to report SOB or any respiratory difficulty  - Respiratory Therapy support as indicated  Outcome: Progressing     Problem: GENITOURINARY - ADULT  Goal: Maintains or returns to baseline urinary function  Description: INTERVENTIONS:  - Assess urinary function  - Encourage oral fluids to ensure adequate hydration if ordered  - Administer IV fluids as ordered to ensure adequate hydration  - Administer ordered medications as needed  - Offer frequent toileting  - Follow urinary retention protocol if ordered  Outcome: Progressing  Goal: Absence of urinary retention  Description: INTERVENTIONS:  - Assess patient’s ability to void and empty bladder  - Monitor I/O  - Bladder scan as needed  - Discuss with physician/AP medications to alleviate retention as needed  - Discuss catheterization for long term situations as appropriate  Outcome: Progressing  Goal: Urinary catheter remains patent  Description: INTERVENTIONS:  - Assess patency of urinary catheter  - If patient has a chronic rivera, consider changing catheter if non-functioning  - Follow guidelines for intermittent irrigation of non-functioning urinary catheter  Outcome: Progressing     Problem: METABOLIC, FLUID AND ELECTROLYTES - ADULT  Goal: Electrolytes maintained within normal limits  Description: INTERVENTIONS:  - Monitor labs and assess patient for signs and symptoms of electrolyte imbalances  - Administer electrolyte replacement as ordered  - Monitor response to electrolyte replacements, including repeat lab results as appropriate  - Instruct patient on fluid and nutrition as appropriate  Outcome: Progressing  Goal: Fluid balance maintained  Description: INTERVENTIONS:  - Monitor labs   - Monitor I/O and WT  - Instruct patient on fluid and nutrition as appropriate  - Assess for signs & symptoms of volume excess or deficit  Outcome: Progressing  Goal: Glucose maintained within target range  Description: INTERVENTIONS:  - Monitor Blood Glucose as ordered  - Assess for signs and symptoms of hyperglycemia and hypoglycemia  - Administer ordered medications to maintain glucose within target range  - Assess nutritional intake and initiate nutrition service referral as needed  Outcome: Progressing     Problem: SKIN/TISSUE INTEGRITY - ADULT  Goal: Skin Integrity remains intact(Skin Breakdown Prevention)  Description: Assess:  -Perform Drew assessment every shift   -Inspect skin when repositioning, toileting, and assisting with ADLS  -Assess extremities for adequate circulation and sensation     Bed Management:  -Have minimal linens on bed & keep smooth, unwrinkled  -Change linens as needed when moist or perspiring  -Avoid sitting or lying in one position for more than 2 hours while in bed      Toileting:  -Offer bedside commode  -Assess for incontinence every 2 hours     Activity:  -Encourage activity and walks on unit  -Encourage or provide ROM exercises   -Turn and reposition patient every 2 Hours  -Use appropriate equipment to lift or move patient in bed    Skin Care:  -Avoid use of baby powder, tape, friction and shearing, hot water or constrictive clothing  -Relieve pressure over bony prominences using pillows and wedges   -Do not massage red bony areas    Outcome: Progressing  Goal: Incision(s), wounds(s) or drain site(s) healing without S/S of infection  Description: INTERVENTIONS  - Assess and document dressing, incision, wound bed, drain sites and surrounding tissue  - Provide patient and family education  Outcome: Progressing  Goal: Pressure injury heals and does not worsen  Description: Interventions:  - Implement low air loss mattress or specialty surface (Criteria met)  - Apply silicone foam dressing  - Apply fecal or urinary incontinence containment device   - Utilize friction reducing device or surface for transfers   - Consider nutrition services referral as needed  Outcome: Progressing     Problem: HEMATOLOGIC - ADULT  Goal: Maintains hematologic stability  Description: INTERVENTIONS  - Assess for signs and symptoms of bleeding or hemorrhage  - Monitor labs  - Administer supportive blood products/factors as ordered and appropriate  Outcome: Progressing     Problem: MUSCULOSKELETAL - ADULT  Goal: Maintain or return mobility to safest level of function  Description: INTERVENTIONS:  - Assess patient's ability to carry out ADLs; assess patient's baseline for ADL function and identify physical deficits which impact ability to perform ADLs (bathing, care of mouth/teeth, toileting, grooming, dressing, etc )  - Assess/evaluate cause of self-care deficits   - Assess range of motion  - Assess patient's mobility  - Assess patient's need for assistive devices and provide as appropriate  - Encourage maximum independence but intervene and supervise when necessary  - Involve family in performance of ADLs  - Assess for home care needs following discharge   - Consider OT consult to assist with ADL evaluation and planning for discharge  - Provide patient education as appropriate  Outcome: Progressing  Goal: Maintain proper alignment of affected body part  Description: INTERVENTIONS:  - Support, maintain and protect limb and body alignment  - Provide patient/ family with appropriate education  Outcome: Progressing     Problem: PAIN - ADULT  Goal: Verbalizes/displays adequate comfort level or baseline comfort level  Description: Interventions:  - Encourage patient to monitor pain and request assistance  - Assess pain using appropriate pain scale  - Administer analgesics based on type and severity of pain and evaluate response  - Implement non-pharmacological measures as appropriate and evaluate response  - Consider cultural and social influences on pain and pain management  - Notify physician/advanced practitioner if interventions unsuccessful or patient reports new pain  Outcome: Progressing     Problem: INFECTION - ADULT  Goal: Absence or prevention of progression during hospitalization  Description: INTERVENTIONS:  - Assess and monitor for signs and symptoms of infection  - Monitor lab/diagnostic results  - Monitor all insertion sites, i e  indwelling lines, tubes, and drains  - Monitor endotracheal if appropriate and nasal secretions for changes in amount and color  - [place] appropriate cooling/warming therapies per order  - Administer medications as ordered  - Instruct and encourage patient and family to use good hand hygiene technique  - Identify and instruct in appropriate isolation precautions for identified infection/condition  Outcome: Progressing  Goal: Absence of fever/infection during neutropenic period  Description: INTERVENTIONS:  - Monitor WBC    Outcome: Progressing     Problem: SAFETY ADULT  Goal: Patient will remain free of falls  Description: INTERVENTIONS:  - Educate patient/family on patient safety including physical limitations  - Instruct patient to call for assistance with activity   - Consult OT/PT to assist with strengthening/mobility   - Keep Call bell within reach  - Keep bed low and locked with side rails adjusted as appropriate  - Keep care items and personal belongings within reach  - Initiate and maintain comfort rounds  - Make Fall Risk Sign visible to staff  - Apply yellow socks and bracelet for high fall risk patients  - Consider moving patient to room near nurses station  Outcome: Progressing  Goal: Maintain or return to baseline ADL function  Description: INTERVENTIONS:  -  Assess patient's ability to carry out ADLs; assess patient's baseline for ADL function and identify physical deficits which impact ability to perform ADLs (bathing, care of mouth/teeth, toileting, grooming, dressing, etc )  - Assess/evaluate cause of self-care deficits   - Assess range of motion  - Assess patient's mobility; develop plan if impaired  - Assess patient's need for assistive devices and provide as appropriate  - Encourage maximum independence but intervene and supervise when necessary  - Involve family in performance of ADLs  - Assess for home care needs following discharge   - Consider OT consult to assist with ADL evaluation and planning for discharge  - Provide patient education as appropriate  Outcome: Progressing  Goal: Maintains/Returns to pre admission functional level  Description: INTERVENTIONS:  - Perform BMAT or MOVE assessment daily    - Set and communicate daily mobility goal to care team and patient/family/caregiver     - Collaborate with rehabilitation services on mobility goals if consulted  - Out of bed for toileting  - Record patient progress and toleration of activity level   Outcome: Progressing     Problem: DISCHARGE PLANNING  Goal: Discharge to home or other facility with appropriate resources  Description: INTERVENTIONS:  - Identify barriers to discharge w/patient and caregiver  - Arrange for needed discharge resources and transportation as appropriate  - Identify discharge learning needs (meds, wound care, etc )  - Arrange for interpretive services to assist at discharge as needed  - Refer to Case Management Department for coordinating discharge planning if the patient needs post-hospital services based on physician/advanced practitioner order or complex needs related to functional status, cognitive ability, or social support system  Outcome: Progressing     Problem: Knowledge Deficit  Goal: Patient/family/caregiver demonstrates understanding of disease process, treatment plan, medications, and discharge instructions  Description: Complete learning assessment and assess knowledge base    Interventions:  - Provide teaching at level of understanding  - Provide teaching via preferred learning methods  Outcome: Progressing

## 2022-10-27 NOTE — ASSESSMENT & PLAN NOTE
Extensive discussion regarding patient's comorbidities and goals of care  The patient has chronic medical condition including  HFrEF, Afib, Pulm HTN, COPD/XIOMARA, Hx of colon cancer, ambulatory dysfunction secondary to T11 fracture now further complicated by the following acute conditions:, Acute on chronic HFrEF, bilateral pulmonary embolism and recurrent pleural effusion and respiratory failure  Patient is aware that because of these chronic conditions she may require frequent hospitalization  She does not have a dedicated power of  but defers to her brother for medical decision making if she does not have capacity  Patients prognosis discussed with her at length at bedside in the presence of her nurse  At present patient wants to continue current level of care and wants to be a full code  She understands that she may need required frequent /prolonged hospitalization and need for further procedures like thoracentesis  Picato Pregnancy And Lactation Text: This medication is Pregnancy Category C. It is unknown if this medication is excreted in breast milk.

## 2022-10-27 NOTE — ASSESSMENT & PLAN NOTE
Lab Results   Component Value Date    HGBA1C 6 6 (H) 06/08/2022       Recent Labs     10/26/22  0805 10/26/22  1115 10/26/22  1548 10/27/22  0738   POCGLU 118 154* 149* 125       Blood Sugar Average: Last 72 hrs:  · (P) 144 Uses Humalog sliding scale at nursing home, continue sliding scale insulin while inpatient  · Hypoglycemia protocol

## 2022-10-27 NOTE — ASSESSMENT & PLAN NOTE
· Patient does Not appear to be in acute exacerbation  · Continue home inhaler , neb treatments  · Received flu shot on 10/22

## 2022-10-27 NOTE — ASSESSMENT & PLAN NOTE
· Wears 2 L nasal cannula baseline  · Found to be hypoxic 85% on 2 L NC, now requiring 3-4L NC  Currently weaned down to 2 L of home dose of oxygen  · Multifactorial in the setting of bilateral PE's, pleural effusion, CHF- continue treatment as outlined below  · Wean oxygen to keep oxygen saturation greater than 90%  · S/P Left thoracentesis   Repeat BP chest x-ray done on 10/20 7th shows improvement in pleural effusion and pulmonary vascular congestion

## 2022-10-27 NOTE — CASE MANAGEMENT
Case Management Discharge Planning Note    Patient name Hina Steen  Location /-47 MRN [id number]  : 1940 Date 10/27/2022       Current Admission Date: 10/22/2022  Current Admission Diagnosis:Bilateral pulmonary embolism [facility])   Patient Active Problem List    Diagnosis Date Noted   • Sacral decubitus ulcer, stage II ([address] ) 10/27/2022   • Goals of care, counseling/discussion 10/26/2022   • Acute on chronic diastolic CHF (congestive heart failure) ([address] ) 10/22/2022   • Bilateral pulmonary embolism ([address] ) 10/22/2022   • Pleural effusion 10/22/2022   • Chronic indwelling Ball catheter 10/22/2022   • Acute cystitis 10/22/2022   • Hypernatremia 10/22/2022   • Acute on chronic respiratory failure with hypoxia ([address] ) 2022   • Type 2 diabetes mellitus ([address] ) 2022   • Fall 2022   • COPD (chronic obstructive pulmonary disease) ([address] ) 2022   • T11 vertebral fracture ([address] ) 2022   • HFrEF (heart failure with reduced ejection fraction) ([facility] ) 2021   • Permanent atrial fibrillation ([address] ) 2021   • Stage 3 chronic kidney disease ([address] ) 2021   • Chronic anemia 2021      LOS (days): 5  Geometric Mean LOS (GMLOS) (days): 3 90  Days to GMLOS:-1 5     OBJECTIVE:  Risk of Unplanned Readmission Score: 28 33         Current admission status: Inpatient   [facility] [address] [place] [address]  Phone: [phone] Fax: [phone]    Primary Care Provider: Reina Hansen DO    Primary Insurance: [facility]  Secondary Insurance:     DISCHARGE DETAILS:        TIFFANIE received information indicating Ellen is requesting Peer to Peer for [facility] approval  CM contacted Ellen to schedule Peer to Peer ([phone]) in reference to Dionne Garcia8 [phone]  Peer to Peer scheduled for today at 3:00 between Dr Mattie Londono, and Dr Sharon Valenzuela, [address] [place] [address]

## 2022-10-27 NOTE — ASSESSMENT & PLAN NOTE
· Presents from SNF with 3 days of worsening shortness of breath  Per EMS found to be 85% on baseline 2L , now requiring up to 6L NC  Decreased to 3 L nasal cannula  · No prior history of blood clots  · Per chart review was previously on Eliquis for PAF, had admission at [facility] September 2022 for T11 fracture, anemia in which it appears Eliquis was stopped and advised to review DOAC with PCP at discharge  Not currently on anticoagulation per nursing home records, patient unable to recall if it was discussed  · CTA pe study showing acute bilateral pulmonary embolism, borderline right heart strain, bilateral pleural effusions  · D-dimer > 9 on admission   · COVID negative   · Troponins flat   · Provoked recent fracture/immobility ? Did not undergo surgery   · Initially started on heparin GTT  Subsequently transitioned to Eliquis 10 mg b i d  For 7 days followed by 5 mg b i d  Echocardiogram without RV strain

## 2022-10-27 NOTE — ASSESSMENT & PLAN NOTE
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

## 2022-10-28 VITALS
BODY MASS INDEX: 36.51 KG/M2 | HEIGHT: 64 IN | SYSTOLIC BLOOD PRESSURE: 126 MMHG | HEART RATE: 90 BPM | TEMPERATURE: 98.6 F | DIASTOLIC BLOOD PRESSURE: 60 MMHG | WEIGHT: 213.85 LBS | RESPIRATION RATE: 19 BRPM | OXYGEN SATURATION: 97 %

## 2022-10-28 LAB
GLUCOSE SERPL-MCNC: 132 MG/DL (ref 65–140)
GLUCOSE SERPL-MCNC: 252 MG/DL (ref 65–140)

## 2022-10-28 PROCEDURE — 99239 HOSP IP/OBS DSCHRG MGMT >30: CPT | Performed by: INTERNAL MEDICINE

## 2022-10-28 PROCEDURE — 82948 REAGENT STRIP/BLOOD GLUCOSE: CPT

## 2022-10-28 PROCEDURE — 94640 AIRWAY INHALATION TREATMENT: CPT

## 2022-10-28 PROCEDURE — 94760 N-INVAS EAR/PLS OXIMETRY 1: CPT

## 2022-10-28 RX ORDER — CARVEDILOL 3.12 MG/1
3.12 TABLET ORAL 2 TIMES DAILY WITH MEALS
Start: 2022-10-28 | End: 2022-11-05

## 2022-10-28 RX ORDER — OXYCODONE HYDROCHLORIDE 5 MG/1
5 TABLET ORAL EVERY 6 HOURS PRN
Qty: 20 TABLET | Refills: 0 | Status: SHIPPED | OUTPATIENT
Start: 2022-10-28 | End: 2022-11-05

## 2022-10-28 RX ORDER — POTASSIUM CHLORIDE 750 MG/1
10 CAPSULE, EXTENDED RELEASE ORAL DAILY
Refills: 0
Start: 2022-10-28 | End: 2022-11-05

## 2022-10-28 RX ORDER — MIDODRINE HYDROCHLORIDE 5 MG/1
5 TABLET ORAL
Refills: 0
Start: 2022-10-28 | End: 2022-11-05

## 2022-10-28 RX ORDER — NYSTATIN 100000 [USP'U]/G
POWDER TOPICAL 2 TIMES DAILY
Qty: 15 G | Refills: 0
Start: 2022-10-28 | End: 2022-11-05

## 2022-10-28 RX ORDER — BUMETANIDE 2 MG/1
2 TABLET ORAL 2 TIMES DAILY
Refills: 0
Start: 2022-10-28 | End: 2022-11-05

## 2022-10-28 RX ADMIN — INSULIN LISPRO 3 UNITS: 100 INJECTION, SOLUTION INTRAVENOUS; SUBCUTANEOUS at 11:39

## 2022-10-28 RX ADMIN — IPRATROPIUM BROMIDE 0.5 MG: 0.5 SOLUTION RESPIRATORY (INHALATION) at 07:15

## 2022-10-28 RX ADMIN — APIXABAN 10 MG: 5 TABLET, FILM COATED ORAL at 09:05

## 2022-10-28 RX ADMIN — GABAPENTIN 300 MG: 300 CAPSULE ORAL at 09:06

## 2022-10-28 RX ADMIN — METHOCARBAMOL TABLETS 750 MG: 750 TABLET, COATED ORAL at 06:18

## 2022-10-28 RX ADMIN — NYSTATIN 1 APPLICATION: 100000 POWDER TOPICAL at 09:07

## 2022-10-28 RX ADMIN — PANTOPRAZOLE SODIUM 40 MG: 40 TABLET, DELAYED RELEASE ORAL at 06:18

## 2022-10-28 RX ADMIN — LEVALBUTEROL HYDROCHLORIDE 1.25 MG: 1.25 SOLUTION, CONCENTRATE RESPIRATORY (INHALATION) at 07:15

## 2022-10-28 RX ADMIN — MIDODRINE HYDROCHLORIDE 5 MG: 5 TABLET ORAL at 11:40

## 2022-10-28 RX ADMIN — ATORVASTATIN CALCIUM 10 MG: 10 TABLET, FILM COATED ORAL at 09:05

## 2022-10-28 RX ADMIN — LIDOCAINE 5% 1 PATCH: 700 PATCH TOPICAL at 09:07

## 2022-10-28 RX ADMIN — LIDOCAINE 5% 1 PATCH: 700 PATCH TOPICAL at 09:05

## 2022-10-28 RX ADMIN — CARVEDILOL 3.12 MG: 3.12 TABLET, FILM COATED ORAL at 09:06

## 2022-10-28 RX ADMIN — METHOCARBAMOL TABLETS 750 MG: 750 TABLET, COATED ORAL at 11:40

## 2022-10-28 RX ADMIN — DIGOXIN 62.5 MCG: 125 TABLET ORAL at 09:05

## 2022-10-28 RX ADMIN — FLUTICASONE FUROATE AND VILANTEROL TRIFENATATE 1 PUFF: 100; 25 POWDER RESPIRATORY (INHALATION) at 09:06

## 2022-10-28 RX ADMIN — SACUBITRIL AND VALSARTAN 1 TABLET: 24; 26 TABLET, FILM COATED ORAL at 09:05

## 2022-10-28 RX ADMIN — MIDODRINE HYDROCHLORIDE 5 MG: 5 TABLET ORAL at 06:18

## 2022-10-28 RX ADMIN — FUROSEMIDE 40 MG: 10 INJECTION, SOLUTION INTRAMUSCULAR; INTRAVENOUS at 09:06

## 2022-10-28 RX ADMIN — DOCUSATE SODIUM AND SENNOSIDES 1 TABLET: 8.6; 5 TABLET, FILM COATED ORAL at 09:05

## 2022-10-28 RX ADMIN — OXYCODONE HYDROCHLORIDE 5 MG: 5 TABLET ORAL at 09:04

## 2022-10-28 RX ADMIN — ACETAMINOPHEN 650 MG: 325 TABLET ORAL at 11:40

## 2022-10-28 NOTE — ASSESSMENT & PLAN NOTE
· Present on admission  Unclear when catheter was placed originally,  suspect on prior admission in September 2022  · UA showing acute cystitis  Urine culture was positive however patient had no symptoms of urinary tract infection and this appears to be likely chronic colonization  · Already changed Ball catheter this admission  Patient afebrile without leukocytosis  Continue to monitor off antibiotics

## 2022-10-28 NOTE — ASSESSMENT & PLAN NOTE
Extensive discussion regarding patient's comorbidities and goals of care  The patient has chronic medical condition including  HFrEF, Afib, Pulm HTN, COPD/XIOMARA, Hx of colon cancer, ambulatory dysfunction secondary to T11 fracture now further complicated by the following acute conditions:, Acute on chronic HFrEF, bilateral pulmonary embolism and recurrent pleural effusion and respiratory failure  Patient is aware that because of these chronic conditions she may require frequent hospitalization  She does not have a dedicated power of  but defers to her brother for medical decision making if she does not have capacity  Patients prognosis discussed with her at length at bedside in the presence of her nurse  At present patient wants to continue current level of care and wants to be a full code  She understands that she may need required frequent /prolonged hospitalization and need for further procedures like thoracentesis  Stable for transition to rehab today    Will recommend outpatient palliative Medicine referral on discharge

## 2022-10-28 NOTE — PLAN OF CARE
Problem: Potential for Falls  Goal: Patient will remain free of falls  Description: INTERVENTIONS:  - Educate patient/family on patient safety including physical limitations  - Instruct patient to call for assistance with activity   - Consult OT/PT to assist with strengthening/mobility   - Keep Call bell within reach  - Keep bed low and locked with side rails adjusted as appropriate  - Keep care items and personal belongings within reach  - Initiate and maintain comfort rounds  - Make Fall Risk Sign visible to staff  - Offer Toileting every 2 Hours, in advance of need  - Initiate/Maintain bed alarm  - Apply yellow socks and bracelet for high fall risk patients  - Consider moving patient to room near nurses station  Outcome: Progressing     Problem: MOBILITY - ADULT  Goal: Maintain or return to baseline ADL function  Description: INTERVENTIONS:  -  Assess patient's ability to carry out ADLs; assess patient's baseline for ADL function and identify physical deficits which impact ability to perform ADLs (bathing, care of mouth/teeth, toileting, grooming, dressing, etc )  - Assess/evaluate cause of self-care deficits   - Assess range of motion  - Assess patient's mobility; develop plan if impaired  - Assess patient's need for assistive devices and provide as appropriate  - Encourage maximum independence but intervene and supervise when necessary  - Involve family in performance of ADLs  - Assess for home care needs following discharge   - Consider OT consult to assist with ADL evaluation and planning for discharge  - Provide patient education as appropriate  Outcome: Progressing  Goal: Maintains/Returns to pre admission functional level  Description: INTERVENTIONS:  - Perform BMAT or MOVE assessment daily    - Set and communicate daily mobility goal to care team and patient/family/caregiver     - Collaborate with rehabilitation services on mobility goals if consulted  - Out of bed for toileting  - Record patient progress and toleration of activity level   Outcome: Progressing     Problem: Prexisting or High Potential for Compromised Skin Integrity  Goal: Skin integrity is maintained or improved  Description: INTERVENTIONS:  - Identify patients at risk for skin breakdown  - Assess and monitor skin integrity  - Assess and monitor nutrition and hydration status  - Monitor labs   - Assess for incontinence   - Turn and reposition patient  - Assist with mobility/ambulation  - Relieve pressure over bony prominences  - Avoid friction and shearing  - Provide appropriate hygiene as needed including keeping skin clean and dry  - Evaluate need for skin moisturizer/barrier cream  - Collaborate with interdisciplinary team   - Patient/family teaching  - Consider wound care consult   Outcome: Progressing     Problem: Nutrition/Hydration-ADULT  Goal: Nutrient/Hydration intake appropriate for improving, restoring or maintaining nutritional needs  Description: Monitor and assess patient's nutrition/hydration status for malnutrition  Collaborate with interdisciplinary team and initiate plan and interventions as ordered  Monitor patient's weight and dietary intake as ordered or per policy  Utilize nutrition screening tool and intervene as necessary  Determine patient's food preferences and provide high-protein, high-caloric foods as appropriate       INTERVENTIONS:  - Monitor oral intake, urinary output, labs, and treatment plans  - Assess nutrition and hydration status and recommend course of action  - Evaluate amount of meals eaten  - Assist patient with eating if necessary   - Allow adequate time for meals  - Recommend/ encourage appropriate diets, oral nutritional supplements, and vitamin/mineral supplements  - Order, calculate, and assess calorie counts as needed  -  - encourage snacks protein drinks inbetween meals  - Provide specific nutrition/hydration education as appropriate  - Include patient/family/caregiver in decisions related to nutrition  Outcome: Progressing     Problem: NEUROSENSORY - ADULT  Goal: Achieves stable or improved neurological status  Description: INTERVENTIONS  - Monitor and report changes in neurological status  - Monitor vital signs such as temperature, blood pressure, glucose, and any other labs ordered   - Initiate measures to prevent increased intracranial pressure  - Monitor for seizure activity and implement precautions if appropriate      Outcome: Progressing  Goal: Remains free of injury related to seizures activity  Description: INTERVENTIONS  - Maintain airway, patient safety  and administer oxygen as ordered  - Monitor patient for seizure activity, document and report duration and description of seizure to physician/advanced practitioner  - If seizure occurs,  ensure patient safety during seizure  - Reorient patient post seizure  - Seizure pads on all 4 side rails  - Instruct patient/family to notify RN of any seizure activity including if an aura is experienced  - Instruct patient/family to call for assistance with activity based on nursing assessment  - Administer anti-seizure medications if ordered    Outcome: Progressing  Goal: Achieves maximal functionality and self care  Description: INTERVENTIONS  - Monitor swallowing and airway patency with patient fatigue and changes in neurological status  - Encourage and assist patient to increase activity and self care     - Encourage visually impaired, hearing impaired and aphasic patients to use assistive/communication devices  Outcome: Progressing     Problem: CARDIOVASCULAR - ADULT  Goal: Maintains optimal cardiac output and hemodynamic stability  Description: INTERVENTIONS:  - Monitor I/O, vital signs and rhythm SOB, CP, tachycardia  - Monitor for S/S and trends of decreased cardiac output  - Administer and titrate ordered vasoactive medications to optimize hemodynamic stability  - Assess quality of pulses, skin color and temperature  - Assess for signs of decreased coronary artery perfusion  - Instruct patient to report change in severity of symptoms  Outcome: Progressing  Goal: Absence of cardiac dysrhythmias or at baseline rhythm  Description: INTERVENTIONS:  - Continuous cardiac monitoring, vital signs, obtain 12 lead EKG if ordered  - Administer antiarrhythmic and heart rate control medications as ordered  - Monitor electrolytes and administer replacement therapy as ordered  Outcome: Progressing     Problem: RESPIRATORY - ADULT  Goal: Achieves optimal ventilation and oxygenation  Description: INTERVENTIONS:  - Assess for changes in respiratory status    SOB BEARD  - Assess for changes in mentation and behavior  - Position to facilitate oxygenation and minimize respiratory effort  - Oxygen administered by appropriate delivery if ordered  - Initiate smoking cessation education as indicated  - Encourage broncho-pulmonary hygiene including cough, deep breathe, Incentive Spirometry  - Assess the need for suctioning and aspirate as needed  - Assess and instruct to report SOB or any respiratory difficulty  - Respiratory Therapy support as indicated  Outcome: Progressing     Problem: GENITOURINARY - ADULT  Goal: Maintains or returns to baseline urinary function  Description: INTERVENTIONS:  - Assess urinary function  - Encourage oral fluids to ensure adequate hydration if ordered  - Administer IV fluids as ordered to ensure adequate hydration  - Administer ordered medications as needed  - Offer frequent toileting  - Follow urinary retention protocol if ordered  Outcome: Progressing  Goal: Absence of urinary retention  Description: INTERVENTIONS:  - Assess patient’s ability to void and empty bladder  - Monitor I/O  - Bladder scan as needed  - Discuss with physician/AP medications to alleviate retention as needed  - Discuss catheterization for long term situations as appropriate  Outcome: Progressing  Goal: Urinary catheter remains patent  Description: INTERVENTIONS:  - Assess patency of urinary catheter  - If patient has a chronic rivera, consider changing catheter if non-functioning  - Follow guidelines for intermittent irrigation of non-functioning urinary catheter  Outcome: Progressing     Problem: METABOLIC, FLUID AND ELECTROLYTES - ADULT  Goal: Electrolytes maintained within normal limits  Description: INTERVENTIONS:  - Monitor labs and assess patient for signs and symptoms of electrolyte imbalances  - Administer electrolyte replacement as ordered  - Monitor response to electrolyte replacements, including repeat lab results as appropriate  - Instruct patient on fluid and nutrition as appropriate  Outcome: Progressing  Goal: Fluid balance maintained  Description: INTERVENTIONS:  - Monitor labs   - Monitor I/O and WT  - Instruct patient on fluid and nutrition as appropriate  - Assess for signs & symptoms of volume excess or deficit  Outcome: Progressing  Goal: Glucose maintained within target range  Description: INTERVENTIONS:  - Monitor Blood Glucose as ordered  - Assess for signs and symptoms of hyperglycemia and hypoglycemia  - Administer ordered medications to maintain glucose within target range  - Assess nutritional intake and initiate nutrition service referral as needed  Outcome: Progressing     Problem: SKIN/TISSUE INTEGRITY - ADULT  Goal: Skin Integrity remains intact(Skin Breakdown Prevention)  Description: Assess:  -Perform Drew assessment every shift   -Inspect skin when repositioning, toileting, and assisting with ADLS  -Assess extremities for adequate circulation and sensation     Bed Management:  -Have minimal linens on bed & keep smooth, unwrinkled  -Change linens as needed when moist or perspiring  -Avoid sitting or lying in one position for more than 2 hours while in bed  -Keep HOB at 30degrees     Toileting:  -Offer bedside commode  -Assess for incontinence every 2 hours     Activity:  -Encourage activity and walks on unit  -Encourage or provide ROM exercises   -Turn and reposition patient every 2 Hours  -Use appropriate equipment to lift or move patient in bed    Skin Care:  -Avoid use of baby powder, tape, friction and shearing, hot water or constrictive clothing  -Relieve pressure over bony prominences using pillows and green wedges   -Do not massage red bony areas    Outcome: Progressing  Goal: Incision(s), wounds(s) or drain site(s) healing without S/S of infection  Description: INTERVENTIONS  - Assess and document dressing, incision, wound bed, drain sites and surrounding tissue  - Provide patient and family education  Outcome: Progressing  Goal: Pressure injury heals and does not worsen  Description: Interventions:  - Implement low air loss mattress or specialty surface (Criteria met)  - Apply silicone foam dressing  - Apply fecal or urinary incontinence containment device   - Utilize friction reducing device or surface for transfers   - Consider nutrition services referral as needed  Outcome: Progressing     Problem: HEMATOLOGIC - ADULT  Goal: Maintains hematologic stability  Description: INTERVENTIONS  - Assess for signs and symptoms of bleeding or hemorrhage  - Monitor labs  - Administer supportive blood products/factors as ordered and appropriate  Outcome: Progressing     Problem: MUSCULOSKELETAL - ADULT  Goal: Maintain or return mobility to safest level of function  Description: INTERVENTIONS:  - Assess patient's ability to carry out ADLs; assess patient's baseline for ADL function and identify physical deficits which impact ability to perform ADLs (bathing, care of mouth/teeth, toileting, grooming, dressing, etc )  - Assess/evaluate cause of self-care deficits   - Assess range of motion  - Assess patient's mobility  - Assess patient's need for assistive devices and provide as appropriate  - Encourage maximum independence but intervene and supervise when necessary  - Involve family in performance of ADLs  - Assess for home care needs following discharge   - Consider OT consult to assist with ADL evaluation and planning for discharge  - Provide patient education as appropriate  Outcome: Progressing  Goal: Maintain proper alignment of affected body part  Description: INTERVENTIONS:  - Support, maintain and protect limb and body alignment  - Provide patient/ family with appropriate education  Outcome: Progressing     Problem: PAIN - ADULT  Goal: Verbalizes/displays adequate comfort level or baseline comfort level  Description: Interventions:  - Encourage patient to monitor pain and request assistance  - Assess pain using appropriate pain scale  - Administer analgesics based on type and severity of pain and evaluate response  - Implement non-pharmacological measures as appropriate and evaluate response  - Consider cultural and social influences on pain and pain management  - Notify physician/advanced practitioner if interventions unsuccessful or patient reports new pain  Outcome: Progressing     Problem: INFECTION - ADULT  Goal: Absence or prevention of progression during hospitalization  Description: INTERVENTIONS:  - Assess and monitor for signs and symptoms of infection  - Monitor lab/diagnostic results  - Monitor all insertion sites, i e  indwelling lines, tubes, and drains  - Monitor endotracheal if appropriate and nasal secretions for changes in amount and color  - [place] appropriate cooling/warming therapies per order  - Administer medications as ordered  - Instruct and encourage patient and family to use good hand hygiene technique  - Identify and instruct in appropriate isolation precautions for identified infection/condition  Outcome: Progressing  Goal: Absence of fever/infection during neutropenic period  Description: INTERVENTIONS:  - Monitor WBC    Outcome: Progressing     Problem: SAFETY ADULT  Goal: Patient will remain free of falls  Description: INTERVENTIONS:  - Educate patient/family on patient safety including physical limitations  - Instruct patient to call for assistance with activity   - Consult OT/PT to assist with strengthening/mobility   - Keep Call bell within reach  - Keep bed low and locked with side rails adjusted as appropriate  - Keep care items and personal belongings within reach  - Initiate and maintain comfort rounds  - Make Fall Risk Sign visible to staff  - Apply yellow socks and bracelet for high fall risk patients  - Consider moving patient to room near nurses station  Outcome: Progressing  Goal: Maintain or return to baseline ADL function  Description: INTERVENTIONS:  -  Assess patient's ability to carry out ADLs; assess patient's baseline for ADL function and identify physical deficits which impact ability to perform ADLs (bathing, care of mouth/teeth, toileting, grooming, dressing, etc )  - Assess/evaluate cause of self-care deficits   - Assess range of motion  - Assess patient's mobility; develop plan if impaired  - Assess patient's need for assistive devices and provide as appropriate  - Encourage maximum independence but intervene and supervise when necessary  - Involve family in performance of ADLs  - Assess for home care needs following discharge   - Consider OT consult to assist with ADL evaluation and planning for discharge  - Provide patient education as appropriate  Outcome: Progressing  Goal: Maintains/Returns to pre admission functional level  Description: INTERVENTIONS:  - Perform BMAT or MOVE assessment daily    - Set and communicate daily mobility goal to care team and patient/family/caregiver     - Collaborate with rehabilitation services on mobility goals if consulted  - Out of bed for toileting  - Record patient progress and toleration of activity level   Outcome: Progressing     Problem: DISCHARGE PLANNING  Goal: Discharge to home or other facility with appropriate resources  Description: INTERVENTIONS:  - Identify barriers to discharge w/patient and caregiver  - Arrange for needed discharge resources and transportation as appropriate  - Identify discharge learning needs (meds, wound care, etc )  - Arrange for interpretive services to assist at discharge as needed  - Refer to Case Management Department for coordinating discharge planning if the patient needs post-hospital services based on physician/advanced practitioner order or complex needs related to functional status, cognitive ability, or social support system  Outcome: Progressing     Problem: Knowledge Deficit  Goal: Patient/family/caregiver demonstrates understanding of disease process, treatment plan, medications, and discharge instructions  Description: Complete learning assessment and assess knowledge base    Interventions:  - Provide teaching at level of understanding  - Provide teaching via preferred learning methods  Outcome: Progressing

## 2022-10-28 NOTE — ASSESSMENT & PLAN NOTE
Wt Readings from Last 3 Encounters:   10/28/22 97 kg (213 lb 13 5 oz)   09/30/22 95 7 kg (210 lb 15 7 oz)   11/12/21 110 kg (243 lb)       · Last echo in September 2022 shows Ef 42% , diastolic dysfunction   · Takes Bumex 1 mg daily at home  · Suspect component of acute CHF - CTA showing pleural effusions along with elevated BNP, lower extremity edema  · Received 1mg IV Bumex in the ED- will continue with Lasix 80 mg IV b i d  however patient unable to tolerated and had bout of hypotension and rapid response 10/22/2022  then decrease Lasix to 40 mg IV b i d  and monitor  Continue Entresto , decrease dose of Coreg due to hypotension from 12 5 mg b i d  To 3 125 mg b i d  normal digoxin level, cont digoxin  · Patient had thoracentesis on 10/24  Repeat chest x-ray (10/27 )shows improvement in effusions  · 2D echo with normal left ventricular ejection fraction  Mild aortic stenosis  · Pt is -2 5 L isince admission  Weight decreased from 226->213 lb since admission  · Stable for transition to oral diuretics on discharge    · Continue with sodium and fluid restricted diet and strict intake output monitoring  · Outpatient cardiology follow-up on discharge

## 2022-10-28 NOTE — DISCHARGE SUMMARY
[address]  Discharge- Timothy Mcdowell 1940, 79 y o  female MRN: [id number]  Unit/Bed#: -01 Encounter: [id number]  Primary Care Provider: Rajni Cerna DO   Date and time admitted to hospital: 10/22/2022 12:28 AM    * Bilateral pulmonary embolism [facility])  Assessment & Plan  · Presents from SNF with 3 days of worsening shortness of breath  Per EMS found to be 85% on baseline 2L , now requiring up to 6L NC  Decreased to 3 L nasal cannula  · No prior history of blood clots  · Per chart review was previously on Eliquis for PAF, had admission at [facility] September 2022 for T11 fracture, anemia in which it appears Eliquis was stopped and advised to review DOAC with PCP at discharge  Not currently on anticoagulation per nursing home records, patient unable to recall if it was discussed  · CTA pe study showing acute bilateral pulmonary embolism, borderline right heart strain, bilateral pleural effusions  · D-dimer > 9 on admission   · COVID negative   · Troponins flat   · Provoked recent fracture/immobility ? Did not undergo surgery   · Initially started on heparin GTT  Subsequently transitioned to Eliquis 10 mg b i d  For 7 days followed by 5 mg b i d  Echocardiogram without RV strain  Goals of care, counseling/discussion  Assessment & Plan  Extensive discussion regarding patient's comorbidities and goals of care  The patient has chronic medical condition including  HFrEF, Afib, Pulm HTN, COPD/XIOMARA, Hx of colon cancer, ambulatory dysfunction secondary to T11 fracture now further complicated by the following acute conditions:, Acute on chronic HFrEF, bilateral pulmonary embolism and recurrent pleural effusion and respiratory failure  Patient is aware that because of these chronic conditions she may require frequent hospitalization  She does not have a dedicated power of  but defers to her brother for medical decision making if she does not have capacity    Patients prognosis discussed with her at length at bedside in the presence of her nurse  At present patient wants to continue current level of care and wants to be a full code  She understands that she may need required frequent /prolonged hospitalization and need for further procedures like thoracentesis  Stable for transition to rehab today  Will recommend outpatient palliative Medicine referral on discharge    Chronic indwelling Ball catheter  Assessment & Plan  · Present on admission  Unclear when catheter was placed originally,  suspect on prior admission in September 2022  · UA showing acute cystitis  Urine culture was positive however patient had no symptoms of urinary tract infection and this appears to be likely chronic colonization  · Already changed Ball catheter this admission  Patient afebrile without leukocytosis  Continue to monitor off antibiotics  Pleural effusion  Assessment & Plan  · CTA showing moderate left and small right pleural effusions with subjacent compressive atelectasis  · Status post left-sided thoracentesis yesterday  Preliminary fluid analysis transudative likely secondary to congestive heart failure  Culture negative  Repeat chest x-ray shows improved pleural effusion  No further indication for thoracentesis  She is back to home regimen of 2 L of supplemental oxygen via nasal cannula      Acute on chronic diastolic CHF (congestive heart failure) ([address] )  Assessment & Plan  Wt Readings from Last 3 Encounters:   10/28/22 97 kg (213 lb 13 5 oz)   09/30/22 95 7 kg (210 lb 15 7 oz)   11/12/21 110 kg (243 lb)       · Last echo in September 2022 shows Ef 59% , diastolic dysfunction   · Takes Bumex 1 mg daily at home  · Suspect component of acute CHF - CTA showing pleural effusions along with elevated BNP, lower extremity edema  · Received 1mg IV Bumex in the ED- will continue with Lasix 80 mg IV b i d  however patient unable to tolerated and had bout of hypotension and rapid response 10/22/2022  then decrease Lasix to 40 mg IV b i d  and monitor  Continue Entresto , decrease dose of Coreg due to hypotension from 12 5 mg b i d  To 3 125 mg b i d  normal digoxin level, cont digoxin  · Patient had thoracentesis on 10/24  Repeat chest x-ray (10/27 )shows improvement in effusions  · 2D echo with normal left ventricular ejection fraction  Mild aortic stenosis  · Pt is -2 5 L isince admission  Weight decreased from 226->213 lb since admission  · Stable for transition to oral diuretics on discharge  · Continue with sodium and fluid restricted diet and strict intake output monitoring  · Outpatient cardiology follow-up on discharge      Acute on chronic respiratory failure with hypoxia (HCC)  Assessment & Plan  · Wears 2 L nasal cannula baseline  · Found to be hypoxic 85% on 2 L NC, now requiring 3-4L NC  Currently weaned down to 2 L of home dose of oxygen  · Multifactorial in the setting of bilateral PE's, pleural effusion, CHF- continue treatment as outlined below  · Wean oxygen to keep oxygen saturation greater than 90%  · S/P Left thoracentesis   Repeat BP chest x-ray done on 10/27shows improvement in pleural effusion and pulmonary vascular congestion    · Continue with 2 L of supplemental oxygen at rest on discharge    COPD (chronic obstructive pulmonary disease) ([facility] )  Assessment & Plan  · Patient does Not appear to be in acute exacerbation  · Continue home inhaler , neb treatments  · Received flu shot on 10/22    Chronic anemia  Assessment & Plan  · Hgb 9 6 (baseline 8-9)  · Stable at baseline         Medical Problems             Resolved Problems  Date Reviewed: 10/24/2022   None               Discharging Physician / Practitioner: Eric Tripathi  PCP: April Whitten DO  Admission Date:   Admission Orders (From admission, onward)     Ordered        10/22/22 0448  INPATIENT ADMISSION  Once                      Discharge Date: 10/28/22    Disposition:    Other Skilled Nursing Facility at [facility]    Reason for Admission:  Shortness of breath    Discharge Diagnoses:   Please see assessment and plan section above for further details regarding discharge diagnoses  Consultations During Hospital Stay:  · PT OT    Procedures Performed:   · Thoracentesis left side  · CTA chest acute bilateral pulmonary embolism with moderate bilateral pleural effusion and cardiomegaly  · Echocardiogram normal LV systolic function ,mild aortic stenosis  · CT head no acute intracranial abnormality    Significant Findings / Test Results:   · See above    Incidental Findings:   · None    Test Results Pending at Discharge (will require follow up): · None     Outpatient Tests Requested:  · Cardiology follow-up    Complications:  None    Hospital Course:      Sebas Cohen is a 79 y o  female patient who originally presented to the hospital on 10/22/2022 due to shortness of breath  Was diagnosed with acute pulmonary embolism and acute on chronic heart failure  Was initially started on heparin GTT and subsequently transition to Eliquis  Echocardiogram was negative for RV strain  She was maintained on IV diuretics and strict intake output monitoring and sodium and fluid restricted diet was instituted  Subsequently showed improvement in her cardio respiratory status  She also underwent left-sided thoracentesis and fluid analysis was negative for infection  Thoracentesis and fluid analysis was negative for infection  Upon improvement in her respiratory status, she was transition to oral diuretics and was deemed stable for did return back to the nursing facility  Prolonged goals of care discussion was held with patient regarding chronic comorbidities and frequent hospitalization  Patient continued to remain a full code  Outpatient palliative Medicine referral was provided on discharge  Condition at Discharge: stable    Discharge Day Visit / Exam:   Subjective:  Seen and examined    Denies any shortness of breath  No acute events overnight  Remains afebrile and hemodynamically stable  Vitals: Blood Pressure: 126/60 (10/28/22 0735)  Pulse: 90 (10/28/22 0735)  Temperature: 98 6 °F (37 °C) (10/28/22 0735)  Temp Source: Temporal (10/23/22 0729)  Respirations: 19 (10/28/22 0735)  Height: 5' 4" (162 6 cm) (10/24/22 1108)  Weight - Scale: 97 kg (213 lb 13 5 oz) (10/28/22 0515)  SpO2: 97 % (10/28/22 0735)  Exam:   Physical Exam  Constitutional:       General: She is not in acute distress  Appearance: She is ill-appearing  HENT:      Head: Normocephalic and atraumatic  Right Ear: Tympanic membrane normal       Nose: Nose normal       Mouth/Throat:      Mouth: Mucous membranes are moist    Eyes:      Pupils: Pupils are equal, round, and reactive to light  Cardiovascular:      Rate and Rhythm: Normal rate and regular rhythm  Pulmonary:      Effort: Pulmonary effort is normal       Comments: Poor respiratory effort with diminished breath sounds at bases  Abdominal:      General: Abdomen is flat  Bowel sounds are normal       Palpations: Abdomen is soft  Musculoskeletal:      Cervical back: Neck supple  Right lower leg: No edema  Left lower leg: No edema  Comments: DTI bilateral heel  Stage II sacral decubitus ulcer   Neurological:      General: No focal deficit present  Mental Status: She is alert and oriented to person, place, and time  Mental status is at baseline  Psychiatric:         Mood and Affect: Mood normal           Discussion with Family:  Discussed with patient's brother over the phone    Medication Adjustments and Discharge Medications:  · Discharge Medication List: See after visit summary for reconciled discharge medications  · Medication Dosing Tapers - Please refer to Discharge Medication List for details on any medication dosing tapers (if applicable to patient)     · Summary of Medication Adjustments made as a result of this hospitalization:  See discharge med rec  · Medications being temporarily held (include recommended restart time):  See med rec    Wound Care Recommendations:  When applicable, please see wound care section of After Visit Summary  Instructions for any Catheters / Lines Present at Discharge (including removal date, if applicable):  Chronic indwelling Ball catheter  Needs to be exchanged every 4 weeks  Diet Recommendations at Discharge:  Diet -        Diet Orders   (From admission, onward)             Start     Ordered    10/22/22 0612  Diet Cardiovascular; Cardiac; Fluid Restriction 1500 ML  Diet effective now        References:    Nutrtion Support Algorithm Enteral vs  Parenteral   Question Answer Comment   Diet Type Cardiovascular    Cardiac Cardiac    Other Restriction(s): Fluid Restriction 1500 ML    RD to adjust diet per protocol? Yes        10/22/22 0612                Goals of Care Discussions:  · Code Status at Discharge: Level 1 - Full Code  · Palliative Medicine referral on discharge    Discharge instructions/Information to patient and family:   See after visit summary section titled Discharge Instructions for information provided to patient and family  Planned Readmission: no   Discharge Statement:  I spent 40 minutes discharging the patient  This time was spent on the day of discharge  I had direct contact with the patient on the day of discharge  Greater than 50% of the total time was spent examining patient, answering all patient questions, arranging and discussing plan of care with patient as well as directly providing post-discharge instructions  Additional time then spent on discharge activities  **Please Note: This note may have been constructed using a voice recognition system  **

## 2022-10-28 NOTE — ASSESSMENT & PLAN NOTE
· Presents from SNF with 3 days of worsening shortness of breath  Per EMS found to be 85% on baseline 2L , now requiring up to 6L NC  Decreased to 3 L nasal cannula  · No prior history of blood clots  · Per chart review was previously on Eliquis for PAF, had admission at [facility] September 2022 for T11 fracture, anemia in which it appears Eliquis was stopped and advised to review DOAC with PCP at discharge  Not currently on anticoagulation per nursing home records, patient unable to recall if it was discussed  · CTA pe study showing acute bilateral pulmonary embolism, borderline right heart strain, bilateral pleural effusions  · D-dimer > 9 on admission   · COVID negative   · Troponins flat   · Provoked recent fracture/immobility ? Did not undergo surgery   · Initially started on heparin GTT  Subsequently transitioned to Eliquis 10 mg b i d  For 7 days followed by 5 mg b i d  Echocardiogram without RV strain

## 2022-10-28 NOTE — RESPIRATORY THERAPY NOTE
RT Ventilator Management Note  Francesco Pack 79 y o  female MRN: [id number]  Unit/Bed#: -01 Encounter: [id number]      Daily Screen    No data found in the last 10 encounters  Physical Exam:          Resp Comments: patient placed on hours of sleep BiPAP at 2127    Patient tolerated BiPAp well sleeping though th enight without incident

## 2022-10-28 NOTE — CASE MANAGEMENT
Case Management Discharge Planning Note    Patient name Nancy Lugo  Location /-02 MRN [id number]  : 1940 Date 10/28/2022       Current Admission Date: 10/22/2022  Current Admission Diagnosis:Bilateral pulmonary embolism [facility])   Patient Active Problem List    Diagnosis Date Noted   • Sacral decubitus ulcer, stage II ([address] ) 10/27/2022   • Goals of care, counseling/discussion 10/26/2022   • Acute on chronic diastolic CHF (congestive heart failure) ([address] ) 10/22/2022   • Bilateral pulmonary embolism ([address] ) 10/22/2022   • Pleural effusion 10/22/2022   • Chronic indwelling Ball catheter 10/22/2022   • Acute cystitis 10/22/2022   • Hypernatremia 10/22/2022   • Acute on chronic respiratory failure with hypoxia ([address] ) 2022   • Type 2 diabetes mellitus ([address] ) 2022   • Fall 2022   • COPD (chronic obstructive pulmonary disease) ([address] ) 2022   • T11 vertebral fracture ([address] ) 2022   • HFrEF (heart failure with reduced ejection fraction) ([facility] ) 2021   • Permanent atrial fibrillation ([address] ) 2021   • Stage 3 chronic kidney disease ([address] ) 2021   • Chronic anemia 2021      LOS (days): 6  Geometric Mean LOS (GMLOS) (days): 3 90  Days to GMLOS:-2 3     OBJECTIVE:  Risk of Unplanned Readmission Score: 28 63         Current admission status: Inpatient   Preferred Pharmacy:   [facility] , [address] [address]  Phone: [phone] Fax: [phone]    Primary Care Provider: Malini Carney DO    Primary Insurance: [facility]  Secondary Insurance:     DISCHARGE DETAILS:    Discharge planning discussed with[de-identified] patient  Freedom of Choice: Yes  Comments - Freedom of Choice: [facility] contacted family/caregiver?: Yes Paris Sessions, brother)  Were Treatment Team discharge recommendations reviewed with patient/caregiver?: Yes  Did patient/caregiver verbalize understanding of patient care needs?: Yes  Were patient/caregiver advised of the risks associated with not following Treatment Team discharge recommendations?: Yes    Contacts  Patient Contacts: Bi Ferris, brother  Relationship to Patient[de-identified] Family  Contact Method: Phone  Phone Number: see face sheet  Reason/Outcome: Continuity of [address]         Is the patient interested in [address] at discharge?: No    DME Referral Provided  Referral made for DME?: No    Other Referral/Resources/Interventions Provided:  Interventions: Facility Return  Referral Comments: [facility]    Would you like to participate in our [facility] service program?  : No - Declined    Treatment Team Recommendation: Facility Return  Discharge Destination Plan[de-identified] Facility Return  Transport at Discharge : BLS Ambulance            IMM Given (Date):: 10/28/22  IMM Given to[de-identified] Patient  Family notified[de-identified] appeal rights provided to patient        CM communicated with [facility] regarding patients return  Patient not approved for SNF but is MA bed hold so patient is clear for return to facility  CM met with patient to discuss return to [facility] today  Patient thankful  CM was unaware of discharge today  CM spoke to patient at the bedside, reviewed DC IMM with patient and informed that patient can stay an additional 4 hours for reconsidering appealing the discharge as the medicare rights were review on the day of discharge  Pt verbalized understanding and feels ready to go SNF and does not intend to stay 4 hours to reconsider  IMM placed in bin for filing  CM contacted brother, Bi Ferris, to let him know patient will be returning to facility today  Bi Ferris inquired if patient will receive therapy at facility  CM explained patient denied SNF level for return  CM submitted [facility] referral for BLS transport

## 2022-10-28 NOTE — ASSESSMENT & PLAN NOTE
· Wears 2 L nasal cannula baseline  · Found to be hypoxic 85% on 2 L NC, now requiring 3-4L NC  Currently weaned down to 2 L of home dose of oxygen  · Multifactorial in the setting of bilateral PE's, pleural effusion, CHF- continue treatment as outlined below  · Wean oxygen to keep oxygen saturation greater than 90%  · S/P Left thoracentesis   Repeat BP chest x-ray done on 10/27shows improvement in pleural effusion and pulmonary vascular congestion    · Continue with 2 L of supplemental oxygen at rest on discharge

## 2022-10-31 NOTE — UTILIZATION REVIEW
NOTIFICATION OF ADMISSION DISCHARGE   This is a Notification of Discharge from [facility]  Please be advised that this patient has been discharge from our facility  Below you will find the admission and discharge date and time including the patient’s disposition  UTILIZATION REVIEW CONTACT:  [address] [place]  Utilization   Network Utilization Review Department  Phone: [phone] x carefully listen to the prompts  All voicemails are confidential   Email: [email or web address]     ADMISSION INFORMATION  PRESENTATION DATE: 10/22/2022 12:28 AM  OBERVATION ADMISSION DATE:   INPATIENT ADMISSION DATE: 10/22/22  4:48 AM   DISCHARGE DATE: 10/28/2022  2:22 PM  DISPOSITION: Non [facility] SNF/TCU/SNU Non [facility] SNF/TCU/SNU      IMPORTANT INFORMATION:  Send all requests for admission clinical reviews, approved or denied determinations and any other requests to dedicated fax number below belonging to the campus where the patient is receiving treatment   List of dedicated fax numbers:  [address] DENIALS (Administrative/Medical Necessity) [phone]   [address] (Maternity/NICU/Pediatrics) [phone]   [facility] [phone]   [place] [address] [phone]   [place] [address] [phone]   220 [facility] [phone]   90 [facility] [phone]   [address] [place], [place] [address] [address] [phone]   [facility]  [phone]   [facility] [phone]   [address] [place] [address] [facility] [phone]

## 2022-11-04 PROBLEM — U07.1 COVID-19: Status: ACTIVE | Noted: 2022-01-01

## 2022-11-04 PROBLEM — J96.12 CHRONIC RESPIRATORY FAILURE WITH HYPOXIA AND HYPERCAPNIA (HCC): Status: ACTIVE | Noted: 2022-01-01

## 2022-11-04 PROBLEM — J96.22 ACUTE ON CHRONIC RESPIRATORY FAILURE WITH HYPOXIA AND HYPERCAPNIA (HCC): Status: ACTIVE | Noted: 2022-01-01

## 2022-11-04 PROBLEM — J96.11 CHRONIC RESPIRATORY FAILURE WITH HYPOXIA AND HYPERCAPNIA (HCC): Status: ACTIVE | Noted: 2022-01-01

## 2022-11-04 NOTE — ASSESSMENT & PLAN NOTE
· Paced rhythm on EKG  · Continue PTA digoxin 62 5 mcg daily- digoxin level on 11/04 is 0 9  · Continue PTA carvedilol 3 125 mg b i d   · Continue anticoagulation with Eliquis 5 mg b i d

## 2022-11-04 NOTE — ED NOTES
From [facility] - Onset of SOB at 0330  SpO2 in the 70-70's on 3L NC  Pt tested Covid negative but her roommate is Covid +  Full Code, Baseline: Oriented        Nguyen Tucker RN  11/04/22 2004

## 2022-11-04 NOTE — MALNUTRITION/BMI
This medical record reflects one or more clinical indicators suggestive of malnutrition  Malnutrition Findings:   Adult Malnutrition type: Chronic illness  Adult Degree of Malnutrition: Malnutrition of moderate degree  Malnutrition Characteristics: Inadequate energy, Weight loss                  360 Statement: Chronic moderate malnutrition related to decreased appetite, T11 vertebral fx, COVID as evidenced by < 75% estimated energy intake > 1 mo, 13 3% weight loss x 8 mo (3/17/22 240lb, 11/4/22 208lb)  Treated with: Liberalize diet to CCD 2, 4gm MYRNA, fluid restriction per MD  Will add Maciel BID for wound healing, mix in diet vanilla pudding due to fluid restriction  Daily weights for nutrition monitoring  BMI Findings: Body mass index is 35 87 kg/m²  See Nutrition note dated 11/4/22 for additional details  Completed nutrition assessment is viewable in the nutrition documentation

## 2022-11-04 NOTE — ASSESSMENT & PLAN NOTE
Background:cough, congestion from SNF- from a COVID positive at [facility]  · COVID19- PCR positive 11/4/22  · Vaccinated x2  · Supplemental O2 with 2 LNC saturating  92 %; wean as tolerated to maintain saturations >88%  ?  Chronically on 2 L supplemental oxygen  · Imaging   CXR venous congestion, bilateral GGO  · Started on mild COVID pathway for treatment  · AC with Eliquis b i d   · Decadron 6 mg x10 days  · Daily CBC and CMP  · Continue check inflammatory markers as indicated  · Remdesivir 200 mg IV day 1 and 100 mg IV on day 2-5   · Encourage ISP/flutter valve  · Encourage proning and early mobilization  · Blood cultures pending  · Trend procalcitonin  · Airborne/Droplet precautions ordered

## 2022-11-04 NOTE — PROGRESS NOTES
Pt is from [facility]  Pt on COVID isolation, unable to speak with pt at this time  Spoke with SNF RN Liliane Coelho over the phone  Reports pt had a poor oral intake ever since she fell and had vertebral fx in September  Reports that pt was originally a total feed due to back pain and difficulties with sitting up pt for her meals, reports that if pt has finger food options she seems to have better intake/able to self feed  Pt needs encouragement to eat/feed herself  Was eating 25-50% of meals  Was on fluid restricted diet at facility  Chart review of weight hx: 3/17/22 240lb, 4/26/22 237lb, 7/26/22 221lb, 9/28/22 211lb, 11/4/22 208lb  13 3% weight loss x 8 mo, significant, suspect partly related to fluid loss with CHF  Pt meets criteria for chronic moderate malnutrition  Liberalize diet to CCD 2, 4gm MYRNA, fluid restriction per MD    Will add Maciel BID for wound healing, mix in diet vanilla pudding due to fluid restriction  Daily weights for nutrition monitoring

## 2022-11-04 NOTE — H&P
[address] Jillian Mehta  H&P- Marry Barboza 1940, 79 y o  female MRN: [id number]  Unit/Bed#: -01 Encounter: [id number]  Primary Care Provider: Duncan Gómez DO   Date and time admitted to hospital: 11/4/2022  4:28 AM    * Acute on chronic respiratory failure with hypoxia and hypercapnia ([address] )  Assessment & Plan  · POA from [facility] nursing [facility] where she was found to have pulse oximetry 74% while on chronic nocturnal BiPAP with BP 84/46  · EMS arrival patient was not hypoxic on baseline oxygen  · COVID positive in the ED, comorbidities with systolic CHF, COPD and bilateral pulmonary embolism   · Chronically wears 2 L at baseline- continue with pulse oximetry 90-93% on admission  · Continue chronic nocturnal BiPAP  · Monitor respiratory status closely with COVID infection    COVID-19  Assessment & Plan  Background:cough, congestion from SNF- from a COVID positive at SNF  · COVID19- PCR positive 11/4/22  · Vaccinated x2  · Supplemental O2 with 2 LNC saturating  92 %; wean as tolerated to maintain saturations >88%  ? Chronically on 2 L supplemental oxygen  · Imaging   CXR venous congestion, bilateral GGO  · Started on mild COVID pathway for treatment  · AC with Eliquis b i d   · Decadron 6 mg x10 days  · Daily CBC and CMP  · Continue check inflammatory markers as indicated  · Remdesivir 200 mg IV day 1 and 100 mg IV on day 2-5   · Encourage ISP/flutter valve  · Encourage proning and early mobilization  · Blood cultures pending  · Trend procalcitonin  · Airborne/Droplet precautions ordered    Sacral decubitus ulcer, stage II [facility])  Assessment & Plan  Wound care consultation    Chronic indwelling Ball catheter  Assessment & Plan  · Continue Ball catheter for urinary retention  · Catheter last changed 10/22/2022    History bilateral pulmonary embolism  Assessment & Plan  · Continue PTA Eliquis 5 mg b i d    · Was chronically anticoagulated with warfarin for atrial fibrillation, warfarin was discontinued after fall and upper GI bleed in September  · Developed bilateral pulmonary embolism while off anticoagulation October with acute respiratory failure- then initiated on Eliquis  · Pulmonary status at baseline    Type 2 diabetes mellitus ([facility] [address] )  Assessment & Plan  Lab Results   Component Value Date    HGBA1C 6 6 (H) 06/08/2022       No results for input(s): POCGLU in the last 72 hours  Blood Sugar Average: Last 72 hrs:  ·  update hemoglobin A1c  · Sliding scale insulin coverage with Accu-Cheks  · Hypoglycemia protocol  · Carbohydrate controlled diet    COPD (chronic obstructive pulmonary disease) ([facility])  Assessment & Plan  · No acute exacerbation  · Oxygen requirement at baseline- 2 L at rest and nocturnal BiPAP 40%  · Continue inhaled corticosteroid daily  · Continue Atrovent/bronchodilator q i d  with MDI due to active COVID infection  · Monitor respiratory status closely    T11 vertebral fracture ([facility])  Assessment & Plan  · S/p fall in September  · Continue TLSO brace    Chronic anemia  Assessment & Plan  · Chronic anemia  · Hemoglobin at baseline  · Monitor    Stage 3 chronic kidney disease [facility])  Assessment & Plan  Lab Results   Component Value Date    EGFR 46 11/04/2022    EGFR 81 10/27/2022    EGFR 64 10/26/2022    CREATININE 1 10 11/04/2022    CREATININE 0 69 10/27/2022    CREATININE 0 85 10/26/2022     · CKD 3  · Creatinine close to baseline  · Trend creatinine  · Renally dose medication  · Maintain Ball catheter  · Check urinalysis    Permanent atrial fibrillation (HCC)  Assessment & Plan  · Paced rhythm on EKG  · Continue PTA digoxin 62 5 mcg daily- digoxin level on 11/04 is 0 9  · Continue PTA carvedilol 3 125 mg b i d   · Continue anticoagulation with Eliquis 5 mg b i d      Systolic congestive heart failure  Assessment & Plan  Wt Readings from Last 3 Encounters:   11/04/22 94 8 kg (208 lb 15 9 oz)   10/28/22 97 kg (213 lb 13 5 oz)   09/30/22 95 7 kg (210 lb 15 7 oz) · Chronic systolic congestive heart failure  · AICD in place- biventricular  · EF September 2022- 65%  · Continue PTA Bumex 2 mg b i d   · Continue PTA Entresto, digoxin and carvedilol  · 2 g sodium diet with fluid restriction  · Intake output, daily weight    VTE Pharmacologic Prophylaxis: VTE Score: 10 High Risk (Score >/= 5) - Pharmacological DVT Prophylaxis Ordered: apixaban (Eliquis)  Sequential Compression Devices Ordered  Code Status: Level 1 - Full Code     Anticipated Length of Stay: Patient will be admitted on an inpatient basis with an anticipated length of stay of greater than 2 midnights secondary to Respiratory failure, COVID  Total Time for Visit, including Counseling / Coordination of Care: 30 minutes Greater than 50% of this total time spent on direct patient counseling and coordination of care  Chief Complaint:  Respiratory failure    History of Present Illness:  Josselyn Khan is a 79 y o  female with a PMH of systolic CHF with biventricular AICD, COPD, morbid obesity, chronic respiratory failure on nocturnal BiPAP and 2 L supplemental oxygen, atrial fibrillation chronically anticoagulated on Eliquis, bilateral PEs developed while off anticoagulation after recent GI bleed in September and trauma with T11 fracture who resides [facility]  Presents from skilled nursing facility after found to be dyspneic while on nocturnal BiPAP with pulse oximetry 74% and blood pressure 84/46  EMS reports nursing staff removed BiPAP and placed on oxygen, after time of arrival patient was no longer in distress  In the emergency department patient found to have baseline oxygen requirements but COVID positive  Presented to Medical Service for further evaluation  Review of Systems:  Review of Systems   Constitutional: Negative for chills and fever  HENT: Positive for congestion  Negative for ear pain and sore throat  Eyes: Negative for pain and visual disturbance     Respiratory: Positive for cough and shortness of breath  Cardiovascular: Negative for chest pain and palpitations  Gastrointestinal: Negative for abdominal pain and vomiting  Genitourinary: Negative for dysuria and hematuria  Musculoskeletal: Negative for arthralgias and back pain  Skin: Negative for color change and rash  Neurological: Negative for seizures and syncope  All other systems reviewed and are negative  Past Medical and Surgical History:   Past Medical History:   Diagnosis Date   • A-fib [facility])    • Cardiac pacemaker    • CHF (congestive heart failure) ([facility])    • Chronic cellulitis    • COPD (chronic obstructive pulmonary disease) ([facility])    • Diabetes mellitus (HCC)    • Edema    • High cholesterol    • Hyperparathyroidism ([facility] [address] )    • Hypertension    • Hyperuricemia    • Pulmonary emboli ([facility])    • Stage 4 chronic kidney disease ([facility])        Past Surgical History:   Procedure Laterality Date   • CARDIAC DEFIBRILLATOR PLACEMENT     •  SECTION      x 2   • COLON SURGERY     • HERNIA REPAIR     • IR THORACENTESIS  10/24/2022       Meds/Allergies:  Prior to Admission medications    Medication Sig Start Date End Date Taking? Authorizing Provider   acetaminophen (TYLENOL) 325 mg tablet Take 3 tablets (975 mg total) by mouth every 8 (eight) hours 22   Dinorah Rolle PA-C   apixaban (ELIQUIS) 5 mg Take 2 tablets (10 mg total) by mouth 2 (two) times a day for 2 days, THEN 1 tablet (5 mg total) 2 (two) times a day  10/28/22 11/29/22  Loraine Alanis MD   atorvastatin (LIPITOR) 10 mg tablet Take 10 mg by mouth daily 18   Historical Provider, MD   bisacodyl (DULCOLAX) 10 mg suppository Insert 10 mg into the rectum daily as needed for constipation    Historical Provider, MD   budesonide-formoterol (SYMBICORT) 160-4 5 mcg/act inhaler Inhale 2 puffs 2 (two) times a day Rinse mouth after use    Patient not taking: No sig reported    Historical Provider, MD   bumetanide (BUMEX) 2 mg tablet Take 1 tablet (2 mg total) by mouth 2 (two) times a day 10/28/22   Tim Duran MD   carvedilol (COREG) 3 125 mg tablet Take 1 tablet (3 125 mg total) by mouth 2 (two) times a day with meals 10/28/22   Tim Duran MD   digoxin 62 5 MCG TABS Take 1 tablet (62 5 mcg total) by mouth daily 9/30/22   Lottie Rolle PA-C   FERROUS SULFATE  mg daily 9/2/19   Historical Provider, MD   Fluticasone-Salmeterol (Advair) 100-50 mcg/dose inhaler Inhale 1 puff 2 (two) times a day Rinse mouth after use      Historical Provider, MD   gabapentin (NEURONTIN) 300 mg capsule Take 1 capsule (300 mg total) by mouth 3 (three) times a day 9/30/22   Lottie Rolle PA-C   insulin lispro (HumaLOG) 100 units/mL injection Inject 1-5 Units under the skin 4 (four) times a day (with meals and at bedtime)  Patient not taking: No sig reported 9/30/22   Lottie Rolle PA-C   ipratropium (ATROVENT) 0 02 % nebulizer solution Take 2 5 mL (0 5 mg total) by nebulization 3 (three) times a day 9/30/22   Lottie Rolle PA-C   levalbuterol (XOPENEX) 1 25 mg/0 5 mL nebulizer solution Take 0 5 mL (1 25 mg total) by nebulization 3 (three) times a day 9/30/22   Lottie Rolle PA-C   lidocaine (LIDODERM) 5 % Apply 1 patch topically daily Remove & Discard patch within 12 hours or as directed by MD  Patient taking differently: Apply 2 patches topically daily Remove & Discard patch within 12 hours or as directed by MD 4% one patch to left hip, 1 patch to back 9/30/22   Jean Rolle PA-C   magnesium hydroxide (MILK OF MAGNESIA) 400 mg/5 mL oral suspension Take 30 mL by mouth daily as needed for constipation    Historical Provider, MD   melatonin 3 mg Take 2 tablets (6 mg total) by mouth daily at bedtime 9/30/22   Lottie Rolle PA-C   methocarbamol (ROBAXIN) 750 mg tablet Take 1 tablet (750 mg total) by mouth every 6 (six) hours 9/30/22   Lottie Rolle PA-C   midodrine (PROAMATINE) 5 mg tablet Take 1 tablet (5 mg total) by mouth 3 (three) times a day before meals 10/28/22   Kiley Rincon MD   nystatin (MYCOSTATIN) powder Apply topically 2 (two) times a day 10/28/22   Kiley Rincon MD   oxyCODONE (ROXICODONE) 5 immediate release tablet Take 1 tablet (5 mg total) by mouth every 6 (six) hours as needed for moderate pain for up to 10 days 0 5 Max Daily Amount: 20 mg 10/28/22 11/7/22  Kiley Rincon MD   pantoprazole (PROTONIX) 40 mg tablet Take 1 tablet (40 mg total) by mouth 2 (two) times a day before meals 9/30/22   Anna Rolle PA-C   polyethylene glycol (MIRALAX) 17 g packet Take 17 g by mouth daily as needed (Constipation) 9/30/22   Anna Rolle PA-C   potassium chloride (MICRO-K) 10 MEQ CR capsule Take 1 capsule (10 mEq total) by mouth daily 10/28/22   Kiley Rincon MD   sacubitril-valsartan (ENTRESTO) 24-26 MG TABS Take 1 tablet by mouth 2 (two) times a day 9/30/22   Anna Rolle PA-C   senna-docusate sodium (SENOKOT S) 8 6-50 mg per tablet Take 1 tablet by mouth 2 (two) times a day 9/30/22   Anna Rolle PA-C   simethicone (MYLICON) 80 mg chewable tablet Chew 1 tablet (80 mg total) every 6 (six) hours as needed for flatulence 9/30/22   Roopa Connor PA-C     I have reveiwed home medications using records provided by [facility]  Allergies: No Known Allergies    Social History:  Marital Status:     Occupation: retired  Patient Pre-hospital Living Situation: [facility]  Patient Pre-hospital Level of Mobility: unable to be assessed at time of evaluation  Patient Pre-hospital Diet Restrictions: none  Substance Use History:   Social History     Substance and Sexual Activity   Alcohol Use Never     Social History     Tobacco Use   Smoking Status Never Smoker   Smokeless Tobacco Never Used     Social History     Substance and Sexual Activity   Drug Use Never       Family History:  Family History   Problem Relation Age of Onset   • Hypertension Mother    • Stroke Mother    • Heart disease Father        Physical Exam:     Vitals:   Blood Pressure: 118/57 (11/04/22 0620)  Pulse: 95 (11/04/22 0620)  Temperature: 97 9 °F (36 6 °C) (11/04/22 0620)  Temp Source: Temporal (11/04/22 0430)  Respirations: 22 (11/04/22 0620)  Height: 5' 4" (162 6 cm) (11/04/22 0430)  Weight - Scale: 94 8 kg (208 lb 15 9 oz) (11/04/22 0430)  SpO2: 95 % (11/04/22 8506)    Physical Exam  Vitals and nursing note reviewed  Constitutional:       General: She is not in acute distress  Appearance: She is well-developed  She is obese  She is ill-appearing  HENT:      Head: Normocephalic and atraumatic  Mouth/Throat:      Mouth: Mucous membranes are moist    Eyes:      Conjunctiva/sclera: Conjunctivae normal    Cardiovascular:      Rate and Rhythm: Normal rate and regular rhythm  Heart sounds: No murmur heard  Pulmonary:      Effort: Pulmonary effort is normal  No respiratory distress  Breath sounds: Wheezing and rales present  Abdominal:      Palpations: Abdomen is soft  Tenderness: There is no abdominal tenderness  Musculoskeletal:      Cervical back: Neck supple  Right lower leg: Edema present  Left lower leg: Edema present  Skin:     General: Skin is warm and dry  Capillary Refill: Capillary refill takes less than 2 seconds  Comments: Sacral and heel wounds   Neurological:      General: No focal deficit present  Mental Status: She is alert and oriented to person, place, and time     Psychiatric:         Mood and Affect: Mood normal          Behavior: Behavior normal         Additional Data:     Lab Results:  Results from last 7 days   Lab Units 11/04/22  0443   WBC Thousand/uL 8 24   HEMOGLOBIN g/dL 10 0*   HEMATOCRIT % 35 3   PLATELETS Thousands/uL 282   NEUTROS PCT % 83*   LYMPHS PCT % 9*   MONOS PCT % 6   EOS PCT % 1     Results from last 7 days   Lab Units 11/04/22  0443   SODIUM mmol/L 146   POTASSIUM mmol/L 4 2   CHLORIDE mmol/L 104 CO2 mmol/L 40*   BUN mg/dL 47*   CREATININE mg/dL 1 10   ANION GAP mmol/L 2*   CALCIUM mg/dL 8 4   ALBUMIN g/dL 1 9*   TOTAL BILIRUBIN mg/dL 0 41   ALK PHOS U/L 117*   ALT U/L 21   AST U/L 30   GLUCOSE RANDOM mg/dL 204*     Results from last 7 days   Lab Units 11/04/22  0443   INR  1 64*     Results from last 7 days   Lab Units 10/28/22  1131   POC GLUCOSE mg/dl 252*         Results from last 7 days   Lab Units 11/04/22  0443   LACTIC ACID mmol/L 2 3*       Imaging: Reviewed radiology reports from this admission including: chest xray  XR chest 1 view portable   ED Interpretation by Mc Ramirez MD (11/04 8091)   Pulmonary vascular congestion  Increased left pleural effusion  Atelectasis versus fluid in the fissure on the right  Final Result by Adore Galloway MD (11/04 9225)      Pulmonary venous congestion with small effusions and left base atelectasis  Workstation performed: [id number]             EKG and Other Studies Reviewed on Admission:   · EKG: Paced rhythm    ** Please Note: This note has been constructed using a voice recognition system   **

## 2022-11-04 NOTE — ASSESSMENT & PLAN NOTE
Wt Readings from Last 3 Encounters:   11/04/22 94 8 kg (208 lb 15 9 oz)   10/28/22 97 kg (213 lb 13 5 oz)   09/30/22 95 7 kg (210 lb 15 7 oz)     · Chronic systolic congestive heart failure  · AICD in place- biventricular  · EF September 2022- 65%  · Continue PTA Bumex 2 mg b i d   · Continue PTA Entresto, digoxin and carvedilol  · 2 g sodium diet with fluid restriction  · Intake output, daily weight

## 2022-11-04 NOTE — DISCHARGE INSTR - OTHER ORDERS
Skin care plans:  1-Calazime to sacrum, buttocks TID and PRN  2  Hydraguard to right heel bid and prn  3-Elevate heels to offload pressure  4-Ehob cushion when out of bed  5-Turn/repoisiton q2h or when medically stable for pressure re-distribution on skin  6-Moisturize skin daily with skin nourishing cream  7-Apply Allevyn foam to left heel for treatment and lauren with a 'T"  Change every 3 days and as needed  Check wound beneath foam daily

## 2022-11-04 NOTE — ASSESSMENT & PLAN NOTE
· POA from [facility] nursing facility where she was found to have pulse oximetry 74% while on chronic nocturnal BiPAP with BP 84/46  · EMS arrival patient was not hypoxic on baseline oxygen  · COVID positive in the ED, comorbidities with systolic CHF, COPD and bilateral pulmonary embolism   · Chronically wears 2 L at baseline- continue with pulse oximetry 90-93% on admission  · Continue chronic nocturnal BiPAP  · Monitor respiratory status closely with COVID infection

## 2022-11-04 NOTE — NURSING NOTE
Pt to [employer] and fed for dinner meal; did well and ate entire madhavi pudding; nephew to window to see pt and lifted pt's spirits

## 2022-11-04 NOTE — CASE MANAGEMENT
Case Management Assessment & Discharge Planning Note    Patient name Lorenza Sicard  Location /-92 MRN [id number]  : 1940 Date 2022       Current Admission Date: 2022  Current Admission Diagnosis:Acute on chronic respiratory failure with hypoxia and hypercapnia [facility])   Patient Active Problem List    Diagnosis Date Noted   • COVID-19 2022   • Sacral decubitus ulcer, stage II ([address] ) 10/27/2022   • Goals of care, counseling/discussion 10/26/2022   • Acute on chronic diastolic CHF (congestive heart failure) ([address] ) 10/22/2022   • History bilateral pulmonary embolism 10/22/2022   • Pleural effusion 10/22/2022   • Chronic indwelling Ball catheter 10/22/2022   • Acute cystitis 10/22/2022   • Hypernatremia 10/22/2022   • Acute on chronic respiratory failure with hypoxia and hypercapnia ([facility] ) 2022   • Type 2 diabetes mellitus ([address] ) 2022   • Fall 2022   • COPD (chronic obstructive pulmonary disease) ([address] ) 2022   • T11 vertebral fracture ([address] )    • Systolic congestive heart failure 2021   • Permanent atrial fibrillation ([address] ) 2021   • Stage 3 chronic kidney disease ([address] ) 2021   • Chronic anemia 2021      LOS (days): 0  Geometric Mean LOS (GMLOS) (days): 5 20  Days to GMLOS:4 8     OBJECTIVE:  PATIENT READMITTED TO HOSPITAL  Risk of Unplanned Readmission Score: 38 98         Current admission status: Inpatient       Preferred Pharmacy:   [facility] [address] [place] [address]  Phone: [phone] Fax: [phone]    Primary Care Provider: Teresa Monterroso DO    Primary Insurance: [facility]  Secondary Insurance:     ASSESSMENT:  Aniket Cody [address], [address] Phone: [phone] (Mobile)               Advance Directives  Does patient have a [address] [place] [address] Avenue?: No  Was patient offered paperwork?: No (patient previosly declined, lethargic at moment)  Does patient currently have a Health Care decision maker?: Yes, please see Health Care Proxy section  Does patient have Advance Directives?: No  Was patient offered paperwork?: No (patient previously declined, lethargic at moment)  Primary Contact: Virginie Wolf, brother         Readmission Root Cause  30 Day Readmission: Yes  Who directed you to return to the hospital?: Other (comment) [facility] staff)  Did you understand whom to contact if you had questions or problems?: Yes  Did you get your prescriptions before you left the hospital?: Yes  Were you able to get your prescriptions filled when you left the hospital?: Yes  Did you take your medications as prescribed?: Yes  Were you able to get to your follow-up appointments?: Yes  During previous admission, was a post-acute recommendation made?: Yes  What post-acute resources were offered?: STR  Patient was readmitted due to: respiratory failure, COVID  Action Plan: remdesivir    Patient Information  Admitted from[de-identified] Facility  Mental Status: Other (Comment)  During Assessment patient was accompanied by: Other-Comment [facility] and Chart review)  Assessment information provided by[de-identified] Other - please comment [facility] and Chart Review)  Primary Caregiver: Other (Comment)  [facility]'s [facility] staff  Caregiver's Relationship to Patient[de-identified] Other (Specify)  Caregiver's Telephone Number[de-identified] [phone]  Support Systems: Family members  [place] of Residence: [facility] do you live in?: [facility] entry access options   Select all that apply : No steps to enter home  Type of Current Residence: Other (Comment) (Nursing Home)  In the last 12 months, was there a time when you were not able to pay the mortgage or rent on time?: No  In the last 12 months, how many places have you lived?: 2  In the last 12 months, was there a time when you did not have a steady place to sleep or slept in a shelter (including now)?: No  Homeless/housing insecurity resource given?: N/A  Living Arrangements: Other (Comment) [facility])  Is patient a ?: No    Activities of Daily Living Prior to Admission  Functional Status: Total dependent  Completes ADLs independently?: No  Level of ADL dependence: Total Dependent  Ambulates independently?: No  Level of ambulatory dependence:  Total Dependent  Does patient use assisted devices?: Yes  Assisted Devices (DME) used: Home Oxygen concentrator, Wheelchair, Camelia lift  O2 Rate(s): 2L  Does patient currently own DME?: Yes  What DME does the patient currently own?: Wheelchair, Lesly Brownlee, Home Oxygen concentrator, Wendy Linger lift  Does patient have a history of Outpatient Therapy (PT/OT)?: No  Does the patient have a history of Short-Term Rehab?: Yes [facility]  Does patient have a history of HHC?: No  Does patient currently have [facility]?: No         Patient Information Continued  Income Source: SSI/SSD  Does patient have prescription coverage?: Yes  Within the past 12 months, you worried that your food would run out before you got the money to buy more : Never true  Within the past 12 months, the food you bought just didn't last and you didn't have money to get more : Never true  Food insecurity resource given?: N/A  Does patient receive dialysis treatments?: No  Does patient have a history of substance abuse?: No  Does patient have a history of Mental Health Diagnosis?: No         Means of Transportation  Means of Transport to Appts[de-identified] Other (Comment) [facility])  In the past 12 months, has lack of transportation kept you from medical appointments or from getting medications?: No  In the past 12 months, has lack of transportation kept you from meetings, work, or from getting things needed for daily living?: No  Was application for public transport provided?: N/A        DISCHARGE DETAILS:    Discharge planning discussed with[de-identified] [facility] and Chart Review  Freedom of Choice: Yes  Comments - [place] of Choice: [facility] return  CM contacted family/caregiver?: Yes        Contacts  Patient Contacts: brother San  Relationship to Patient[de-identified] Family  Contact Method: Phone  Phone Number: see face sheet  Reason/Outcome: Continuity of Care, Discharge Planning         Other Referral/Resources/Interventions Provided:  Interventions: Facility Return  Referral Comments: [address] contacted brother Mady Rocha, reviewed cahrt and received information from [facility], baseline information was obtained  CM discussed the role of CM in helping the patient develop a discharge plan and assist the patient in carry out their plan      Patient from [facility], there since 9/30/22  Patient suffered T11 and T12 vertebra fracture from fall at home  Patient MA bed hold at [facility], patient utilizes julia lift  Chart review showed patient previously lived in [facility] home and ambulated with RW or electric scooter prior to [facility] admission      CM submitted AIDIN referral to [facility] for facility return      Brother, Mady Rocha, indicated concern for patients return to [facility] however indicated patient desires to return      CM discussed Advance Care Planning with primary provider based on scores of Goals of Care systems list

## 2022-11-04 NOTE — PLAN OF CARE
Problem: Potential for Falls  Goal: Patient will remain free of falls  Description: INTERVENTIONS:  - Educate patient/family on patient safety including physical limitations  - Instruct patient to call for assistance with activity   - Consult OT/PT to assist with strengthening/mobility   - Keep Call bell within reach  - Keep bed low and locked with side rails adjusted as appropriate  - Keep care items and personal belongings within reach  - Initiate and maintain comfort rounds  - Make Fall Risk Sign visible to staff  -- Apply yellow socks and bracelet for high fall risk patients  - Consider moving patient to room near nurses station  11/4/2022 1436 by Shira Arriola RN  Outcome: Progressing  11/4/2022 1436 by Shira Arriola RN  Outcome: Progressing     Problem: Nutrition/Hydration-ADULT  Goal: Nutrient/Hydration intake appropriate for improving, restoring or maintaining nutritional needs  Description: Monitor and assess patient's nutrition/hydration status for malnutrition  Collaborate with interdisciplinary team and initiate plan and interventions as ordered  Monitor patient's weight and dietary intake as ordered or per policy  Utilize nutrition screening tool and intervene as necessary  Determine patient's food preferences and provide high-protein, high-caloric foods as appropriate       INTERVENTIONS:  - Monitor oral intake, urinary output, labs, and treatment plans  - Assess nutrition and hydration status and recommend course of action  - Evaluate amount of meals eaten  - Assist patient with eating if necessary   - Allow adequate time for meals  - Recommend/ encourage appropriate diets, oral nutritional supplements, and vitamin/mineral supplements  - Order, calculate, and assess calorie counts as needed  - Recommend, monitor, and adjust tube feedings and TPN/PPN based on assessed needs  - Assess need for intravenous fluids  - Provide specific nutrition/hydration education as appropriate  - Include patient/family/caregiver in decisions related to nutrition  11/4/2022 1436 by Breezy Mojica RN  Outcome: Progressing  11/4/2022 1436 by Breezy Mojica RN  Outcome: Progressing     Problem: Prexisting or High Potential for Compromised Skin Integrity  Goal: Skin integrity is maintained or improved  Description: INTERVENTIONS:  - Identify patients at risk for skin breakdown  - Assess and monitor skin integrity  - Assess and monitor nutrition and hydration status  - Monitor labs   - Assess for incontinence   - Turn and reposition patient  - Assist with mobility/ambulation  - Relieve pressure over bony prominences  - Avoid friction and shearing  - Provide appropriate hygiene as needed including keeping skin clean and dry  - Evaluate need for skin moisturizer/barrier cream  - Collaborate with interdisciplinary team   - Patient/family teaching  - Consider wound care consult   11/4/2022 1436 by Breezy Mojica RN  Outcome: Progressing  11/4/2022 1436 by Breezy Mojica RN  Outcome: Progressing     Problem: PAIN - ADULT  Goal: Verbalizes/displays adequate comfort level or baseline comfort level  Description: Interventions:  - Encourage patient to monitor pain and request assistance  - Assess pain using appropriate pain scale  - Administer analgesics based on type and severity of pain and evaluate response  - Implement non-pharmacological measures as appropriate and evaluate response  - Consider cultural and social influences on pain and pain management  - Notify physician/advanced practitioner if interventions unsuccessful or patient reports new pain  Outcome: Progressing     Problem: INFECTION - ADULT  Goal: Absence or prevention of progression during hospitalization  Description: INTERVENTIONS:  - Assess and monitor for signs and symptoms of infection  - Monitor lab/diagnostic results  - Monitor all insertion sites, i e  indwelling lines, tubes, and drains  - Monitor endotracheal if appropriate and nasal secretions for changes in amount and color  - [place] appropriate cooling/warming therapies per order  - Administer medications as ordered  - Instruct and encourage patient and family to use good hand hygiene technique  - Identify and instruct in appropriate isolation precautions for identified infection/condition  Outcome: Progressing  Goal: Absence of fever/infection during neutropenic period  Description: INTERVENTIONS:  - Monitor WBC    Outcome: Progressing     Problem: SAFETY ADULT  Goal: Patient will remain free of falls  Description: INTERVENTIONS:  - Educate patient/family on patient safety including physical limitations  - Instruct patient to call for assistance with activity   - Consult OT/PT to assist with strengthening/mobility   - Keep Call bell within reach  - Keep bed low and locked with side rails adjusted as appropriate  - Keep care items and personal belongings within reach  - Initiate and maintain comfort rounds  - Make Fall Risk Sign visible to staff  - Offer Toileting every 2 Hours, in advance of need  - Initiate/Maintain bed/chair alarm  - Obtain necessary fall risk management equipment: non slid socks    - Apply yellow socks and bracelet for high fall risk patients  - Consider moving patient to room near nurses station  11/4/2022 1436 by Yasmine Rivera RN  Outcome: Progressing  11/4/2022 1436 by Yasmine Rivera RN  Outcome: Progressing  Goal: Maintain or return to baseline ADL function  Description: INTERVENTIONS:  -  Assess patient's ability to carry out ADLs; assess patient's baseline for ADL function and identify physical deficits which impact ability to perform ADLs (bathing, care of mouth/teeth, toileting, grooming, dressing, etc )  - Assess/evaluate cause of self-care deficits   - Assess range of motion  - Assess patient's mobility; develop plan if impaired  - Assess patient's need for assistive devices and provide as appropriate  - Encourage maximum independence but intervene and supervise when necessary  - Involve family in performance of ADLs  - Assess for home care needs following discharge   - Consider OT consult to assist with ADL evaluation and planning for discharge  - Provide patient education as appropriate  Outcome: Progressing  Goal: Maintains/Returns to pre admission functional level  Description: INTERVENTIONS:  - Perform BMAT or MOVE assessment daily    - Set and communicate daily mobility goal to care team and patient/family/caregiver  - Collaborate with rehabilitation services on mobility goals if consulted  - Perform Range of Motion 3 times a day  - Reposition patient every 2 hours  -- Out of bed for meals 3 times a day  - Out of bed for toileting  - Record patient progress and toleration of activity level   Outcome: Progressing     Problem: DISCHARGE PLANNING  Goal: Discharge to home or other facility with appropriate resources  Description: INTERVENTIONS:  - Identify barriers to discharge w/patient and caregiver  - Arrange for needed discharge resources and transportation as appropriate  - Identify discharge learning needs (meds, wound care, etc )  - Arrange for interpretive services to assist at discharge as needed  - Refer to Case Management Department for coordinating discharge planning if the patient needs post-hospital services based on physician/advanced practitioner order or complex needs related to functional status, cognitive ability, or social support system  Outcome: Progressing     Problem: Knowledge Deficit  Goal: Patient/family/caregiver demonstrates understanding of disease process, treatment plan, medications, and discharge instructions  Description: Complete learning assessment and assess knowledge base    Interventions:  - Provide teaching at level of understanding  - Provide teaching via preferred learning methods  Outcome: Progressing

## 2022-11-04 NOTE — ASSESSMENT & PLAN NOTE
Lab Results   Component Value Date    EGFR 46 11/04/2022    EGFR 81 10/27/2022    EGFR 64 10/26/2022    CREATININE 1 10 11/04/2022    CREATININE 0 69 10/27/2022    CREATININE 0 85 10/26/2022     · CKD 3  · Creatinine close to baseline  · Trend creatinine  · Renally dose medication  · Maintain Ball catheter  · Check urinalysis

## 2022-11-04 NOTE — WOUND OSTOMY CARE
Consult Note - Wound   Lorenza Sicard 79 y o  female MRN: [id number]  Unit/Bed#: -01 Encounter: [id number]        History and Present Illness:  79year old female presented to ED via EMS from SNF due to generalized weakness, hypoxia,PMH: COPD CHF Type 2 DM,Stage 3 CKD,Chronic anemia,Dependent for all aspects of care  Seen today for initial wound consult for sacral wound POA , wound has worsened since previous hospitalization on 10/22/22    Assessment Findings:   1)Left heel intact bullae deep purple, Allevyn heel foam applied and offloaded on pillow  right heel intact, offloaded on pillow  2)Type 2 skin tear right lateral upper arm, Dermagran applied  3)POA unstageable wound to sacrum, gluteal fold is 90% moist yellow slough covered wound  Wound bed not visible due to devitalized tissues adhered to wound bed  Irregular shaped with ill defined edges  Mixed etiology of pressure and moisture  Incontinent of stool frequently  Currently has rivera catheter in place  Sacral wound present on 10/22 hospitalization and has increased in size and unknown depth and slough  Skin care plans:  1-Calazime to sacrum, buttocks TID and PRN  2  Hydraguard to right heel bid and prn  3-Elevate heels to offload pressure  4-Ehob cushion when out of bed  5-Turn/repoisiton q2h or when medically stable for pressure re-distribution on skin  6-Moisturize skin daily with skin nourishing cream  7-Apply Allevyn foam to left heel for treatment and lauren with a 'T"  Change every 3 days and as needed  Check wound beneath foam daily      Wounds:  Wound 09/19/22 Skin Tear Arm Right;Lateral (Active)   Wound Description Beefy red;Bleeding 11/04/22 1244   Crystal-wound Assessment Fragile; Purple 11/04/22 1244   Wound Length (cm) 1 cm 11/04/22 1244   Wound Width (cm) 1 cm 11/04/22 1244   Wound Depth (cm) 0 1 cm 11/04/22 1244   Wound Surface Area (cm^2) 1 cm^2 11/04/22 1244   Wound Volume (cm^3) 0 1 cm^3 11/04/22 1244   Calculated Wound Volume (cm^3) 0 1 cm^3 11/04/22 1244   Change in Wound Size % 87 5 11/04/22 1244   Drainage Amount None 11/04/22 1244   Non-staged Wound Description Partial thickness 11/04/22 1244   Treatments Site care;Elevated;Cleansed 11/04/22 1244   Dressing Jenney Thien gauze;Dry dressing 11/04/22 1244   Dressing Changed Changed 11/04/22 1244   Patient Tolerance Tolerated well 11/04/22 1244   Dressing Status Clean;Dry; Intact 11/04/22 1244       Wound Coccyx (Active)   Wound Image   11/04/22 1244   Wound Description Drainage; Non-blanchable erythema;Slough; Tan 11/04/22 1244   Pressure Injury Stage U 11/04/22 1244   Crystal-wound Assessment Denuded; Purple; Maceration;Erythema 11/04/22 1244   Wound Length (cm) 8 cm 11/04/22 1244   Wound Width (cm) 3 cm 11/04/22 1244   Wound Depth (cm) 0 2 cm 11/04/22 1244   Wound Surface Area (cm^2) 24 cm^2 11/04/22 1244   Wound Volume (cm^3) 4 8 cm^3 11/04/22 1244   Calculated Wound Volume (cm^3) 4 8 cm^3 11/04/22 1244   Wound Site Closure Open to air 11/04/22 0448   Drainage Amount Small 11/04/22 1244   Drainage Description Tan 11/04/22 1244   Treatments Site care;Cleansed 11/04/22 1244   Dressing Protective barrier 11/04/22 1244   Dressing Changed New 11/04/22 1244   Patient Tolerance Tolerated well 11/04/22 1244   Dressing Status Remoistened 11/04/22 1244           Wound 10/22/22 Pressure Injury Heel Left (Active)   Wound Image   11/04/22 1244   Wound Description Beefy red;Black; Light purple;Non-blanchable erythema 11/04/22 1244   Pressure Injury Stage U 11/04/22 1244   Crystal-wound Assessment Intact 11/04/22 1244   Wound Length (cm) 4 cm 11/04/22 1244   Wound Width (cm) 7 cm 11/04/22 1244   Wound Surface Area (cm^2) 28 cm^2 11/04/22 1244   Drainage Amount None 11/04/22 1244   Treatments Elevated;Site care 11/04/22 1244   Dressing Foam, Silicon (eg  Allevyn, etc) 11/04/22 1244   Dressing Changed New 11/04/22 1244   Patient Tolerance Tolerated well 11/04/22 1244   Dressing Status Clean; Intact;Dry 11/04/22 1244 Call or tigertext with any questions  Wound Care will continue to follow    Jose Guadalupe CRISTOBALN RN

## 2022-11-04 NOTE — ASSESSMENT & PLAN NOTE
Lab Results   Component Value Date    HGBA1C 6 6 (H) 06/08/2022       No results for input(s): POCGLU in the last 72 hours      Blood Sugar Average: Last 72 hrs:  ·  update hemoglobin A1c  · Sliding scale insulin coverage with Accu-Cheks  · Hypoglycemia protocol  · Carbohydrate controlled diet

## 2022-11-04 NOTE — ASSESSMENT & PLAN NOTE
Background:cough, congestion from SNF- from a COVID positive at SNF  · COVID19- PCR positive 11/4/22  · Vaccinated x2  · Supplemental O2 with 2 LNC saturating  92 %; wean as tolerated to maintain saturations >88%  ?  Chronically on 2 L supplemental oxygen  · Imaging   CXR venous congestion, bilateral GGO  · Started on mild COVID pathway for treatment  · AC with Eliquis b i d   · Daily CBC and CMP  · Continue check inflammatory markers as indicated  · Remdesivir 200 mg IV day 1 and 100 mg IV on day 2-5   · Encourage ISP/flutter valve  · Encourage proning and early mobilization  · Blood cultures pending  · Trend procalcitonin  · Airborne/Droplet precautions ordered

## 2022-11-04 NOTE — ASSESSMENT & PLAN NOTE
· No acute exacerbation  · Oxygen requirement at baseline- 2 L at rest and nocturnal BiPAP 40%  · Continue inhaled corticosteroid daily  · Continue Atrovent/bronchodilator q i d  with MDI due to active COVID infection  · Monitor respiratory status closely

## 2022-11-04 NOTE — ED PROVIDER NOTES
History  Chief Complaint   Patient presents with   • Weakness - Generalized     SNF called EMS due to generalized weakness and hypoxia  Roommate COVID +  Recent hospitalization for PE  Patient is a history of COPD, CHF, atrial fibrillation  Is on Eliquis  Was short of breath this morning  Per EMS nursing staff at the nursing home got a pulse ox in the 70s  Was placed on 3 L with the oxygen saturation increasing to the high 80s  EMS placed the patient on 3 L nasal cannula and the patient is now in the mid 90s  Of note, the patient was tested COVID negative however, the patient's roommate is COVID positive  Was just discharged last week from this hospital for bilateral PEs  States she got short of breath at this morning  No chest pain  Feels slightly better now  Feels weak and tired  Had a thoracentesis done during her hospital admission  Believed to be secondary to CHF  History provided by:  Patient and EMS personnel   used: No    Shortness of Breath  Severity:  Moderate  Onset quality:  Sudden  Duration:  2 hours  Timing:  Constant  Progression:  Improving  Chronicity:  Recurrent  Context: not emotional upset, not occupational exposure and not weather changes    Relieved by:  Oxygen  Worsened by:  Nothing  Ineffective treatments:  None tried  Associated symptoms: no abdominal pain, no chest pain, no cough, no ear pain, no fever, no headaches, no neck pain, no rash, no sore throat, no sputum production, no vomiting and no wheezing        Prior to Admission Medications   Prescriptions Last Dose Informant Patient Reported? Taking? FERROUS SULFATE PO  Self Yes No   Si mg daily   Fluticasone-Salmeterol (Advair) 100-50 mcg/dose inhaler  Outside Facility (Specify) Yes No   Sig: Inhale 1 puff 2 (two) times a day Rinse mouth after use     acetaminophen (TYLENOL) 325 mg tablet  Outside Facility (Specify) No No   Sig: Take 3 tablets (975 mg total) by mouth every 8 (eight) hours apixaban (ELIQUIS) 5 mg   No No   Sig: Take 2 tablets (10 mg total) by mouth 2 (two) times a day for 2 days, THEN 1 tablet (5 mg total) 2 (two) times a day  atorvastatin (LIPITOR) 10 mg tablet  Outside Facility (Specify) Yes No   Sig: Take 10 mg by mouth daily   bisacodyl (DULCOLAX) 10 mg suppository  Outside Facility (Specify) Yes No   Sig: Insert 10 mg into the rectum daily as needed for constipation   budesonide-formoterol (SYMBICORT) 160-4 5 mcg/act inhaler  Outside Facility (Specify) Yes No   Sig: Inhale 2 puffs 2 (two) times a day Rinse mouth after use     Patient not taking: No sig reported   bumetanide (BUMEX) 2 mg tablet   No No   Sig: Take 1 tablet (2 mg total) by mouth 2 (two) times a day   carvedilol (COREG) 3 125 mg tablet   No No   Sig: Take 1 tablet (3 125 mg total) by mouth 2 (two) times a day with meals   digoxin 62 5 MCG TABS  Outside Facility (Specify) No No   Sig: Take 1 tablet (62 5 mcg total) by mouth daily   gabapentin (NEURONTIN) 300 mg capsule  Outside Facility (Specify) No No   Sig: Take 1 capsule (300 mg total) by mouth 3 (three) times a day   insulin lispro (HumaLOG) 100 units/mL injection  Outside Facility (Specify) No No   Sig: Inject 1-5 Units under the skin 4 (four) times a day (with meals and at bedtime)   Patient not taking: Reported on 10/22/2022   ipratropium (ATROVENT) 0 02 % nebulizer solution  Outside Facility (Specify) No No   Sig: Take 2 5 mL (0 5 mg total) by nebulization 3 (three) times a day   levalbuterol (XOPENEX) 1 25 mg/0 5 mL nebulizer solution  Outside Facility (Specify) No No   Sig: Take 0 5 mL (1 25 mg total) by nebulization 3 (three) times a day   lidocaine (LIDODERM) 5 %  Outside Facility (Specify) No No   Sig: Apply 1 patch topically daily Remove & Discard patch within 12 hours or as directed by MD   Patient taking differently: Apply 2 patches topically daily Remove & Discard patch within 12 hours or as directed by MD 4% one patch to left hip, 1 patch to back magnesium hydroxide (MILK OF MAGNESIA) 400 mg/5 mL oral suspension  Outside Facility (Specify) Yes No   Sig: Take 30 mL by mouth daily as needed for constipation   melatonin 3 mg  Outside Facility (Specify) No No   Sig: Take 2 tablets (6 mg total) by mouth daily at bedtime   methocarbamol (ROBAXIN) 750 mg tablet  Outside Facility (Specify) No No   Sig: Take 1 tablet (750 mg total) by mouth every 6 (six) hours   midodrine (PROAMATINE) 5 mg tablet   No No   Sig: Take 1 tablet (5 mg total) by mouth 3 (three) times a day before meals   nystatin (MYCOSTATIN) powder   No No   Sig: Apply topically 2 (two) times a day   oxyCODONE (ROXICODONE) 5 immediate release tablet   No No   Sig: Take 1 tablet (5 mg total) by mouth every 6 (six) hours as needed for moderate pain for up to 10 days 0 5 Max Daily Amount: 20 mg   pantoprazole (PROTONIX) 40 mg tablet  Outside Facility (Specify) No No   Sig: Take 1 tablet (40 mg total) by mouth 2 (two) times a day before meals   polyethylene glycol (MIRALAX) 17 g packet  Outside Facility (Specify) No No   Sig: Take 17 g by mouth daily as needed (Constipation)   potassium chloride (MICRO-K) 10 MEQ CR capsule   No No   Sig: Take 1 capsule (10 mEq total) by mouth daily   sacubitril-valsartan (ENTRESTO) 24-26 MG TABS  Outside Facility (Specify) No No   Sig: Take 1 tablet by mouth 2 (two) times a day   senna-docusate sodium (SENOKOT S) 8 6-50 mg per tablet  Outside Facility (Specify) No No   Sig: Take 1 tablet by mouth 2 (two) times a day   simethicone (MYLICON) 80 mg chewable tablet  Outside Facility (Specify) No No   Sig: Chew 1 tablet (80 mg total) every 6 (six) hours as needed for flatulence      Facility-Administered Medications: None       Past Medical History:   Diagnosis Date   • A-fib [facility])    • Cardiac pacemaker    • CHF (congestive heart failure) ([facility])    • Chronic cellulitis    • COPD (chronic obstructive pulmonary disease) (HCC)    • Diabetes mellitus (HCC)    • Edema    • High cholesterol    • Hyperparathyroidism ([facility] [address] )    • Hypertension    • Hyperuricemia    • Pulmonary emboli (HCC)    • Stage 4 chronic kidney disease (HCC)        Past Surgical History:   Procedure Laterality Date   • CARDIAC DEFIBRILLATOR PLACEMENT     •  SECTION      x 2   • COLON SURGERY     • HERNIA REPAIR     • IR THORACENTESIS  10/24/2022       Family History   Problem Relation Age of Onset   • Hypertension Mother    • Stroke Mother    • Heart disease Father      I have reviewed and agree with the history as documented  E-Cigarette/Vaping   • E-Cigarette Use Never User      E-Cigarette/Vaping Substances   • Nicotine No    • THC No    • CBD No    • Flavoring No      Social History     Tobacco Use   • Smoking status: Never Smoker   • Smokeless tobacco: Never Used   Vaping Use   • Vaping Use: Never used   Substance Use Topics   • Alcohol use: Never   • Drug use: Never       Review of Systems   Constitutional: Negative for chills and fever  HENT: Negative for ear pain, hearing loss, sore throat, trouble swallowing and voice change  Eyes: Negative for pain and discharge  Respiratory: Positive for shortness of breath  Negative for cough, sputum production and wheezing  Cardiovascular: Negative for chest pain and palpitations  Gastrointestinal: Negative for abdominal pain, blood in stool, constipation, diarrhea, nausea and vomiting  Genitourinary: Negative for dysuria, flank pain, frequency and hematuria  Musculoskeletal: Negative for joint swelling, neck pain and neck stiffness  Skin: Negative for rash and wound  Neurological: Positive for weakness  Negative for dizziness, seizures, syncope, facial asymmetry and headaches  Psychiatric/Behavioral: Negative for hallucinations, self-injury and suicidal ideas  All other systems reviewed and are negative  Physical Exam  Physical Exam  Vitals and nursing note reviewed  Constitutional:       General: She is not in acute distress  Appearance: She is well-developed  HENT:      Head: Normocephalic and atraumatic  Right Ear: External ear normal       Left Ear: External ear normal    Eyes:      General: No scleral icterus  Right eye: No discharge  Left eye: No discharge  Extraocular Movements: Extraocular movements intact  Conjunctiva/sclera: Conjunctivae normal    Cardiovascular:      Rate and Rhythm: Normal rate and regular rhythm  Heart sounds: Normal heart sounds  No murmur heard  Pulmonary:      Breath sounds: No wheezing or rales  Comments: Slightly tachypneic  Crackles right base  Significantly decreased on the left  Abdominal:      General: Bowel sounds are normal  There is no distension  Palpations: Abdomen is soft  Tenderness: There is no abdominal tenderness  There is no guarding or rebound  Musculoskeletal:         General: No deformity  Normal range of motion  Cervical back: Normal range of motion and neck supple  Skin:     General: Skin is warm and dry  Findings: No rash  Neurological:      General: No focal deficit present  Mental Status: She is alert and oriented to person, place, and time  Cranial Nerves: No cranial nerve deficit  Psychiatric:         Mood and Affect: Mood normal          Behavior: Behavior normal          Thought Content:  Thought content normal          Judgment: Judgment normal          Vital Signs  ED Triage Vitals [11/04/22 0430]   Temperature Pulse Respirations Blood Pressure SpO2   97 5 °F (36 4 °C) (!) 109 21 98/55 93 %      Temp Source Heart Rate Source Patient Position - Orthostatic VS BP Location FiO2 (%)   Temporal Monitor Lying Left arm --      Pain Score       --           Vitals:    11/04/22 0430 11/04/22 0544   BP: 98/55 103/50   Pulse: (!) 109 (!) 112   Patient Position - Orthostatic VS: Lying Lying         Visual Acuity      ED Medications  Medications - No data to display    Diagnostic Studies  Results Reviewed Procedure Component Value Units Date/Time    FLU/RSV/COVID - if FLU/RSV clinically relevant [[id number]]  (Abnormal) Collected: 11/04/22 0445    Lab Status: Final result Specimen: Nares from Nose Updated: 11/04/22 0536     SARS-CoV-2 Positive     INFLUENZA A PCR Negative     INFLUENZA B PCR Negative     RSV PCR Negative    Narrative:      FOR PEDIATRIC PATIENTS - copy/paste COVID Guidelines URL to browser: [email or web address]  Witel    SARS-CoV-2 assay is a Nucleic Acid Amplification assay intended for the  qualitative detection of nucleic acid from SARS-CoV-2 in nasopharyngeal  swabs  Results are for the presumptive identification of SARS-CoV-2 RNA  Positive results are indicative of infection with SARS-CoV-2, the virus  causing COVID-19, but do not rule out bacterial infection or co-infection  with other viruses  Laboratories within the [place] and its  territories are required to report all positive results to the appropriate  public health authorities  Negative results do not preclude SARS-CoV-2  infection and should not be used as the sole basis for treatment or other  patient management decisions  Negative results must be combined with  clinical observations, patient history, and epidemiological information  This test has not been FDA cleared or approved  This test has been authorized by FDA under an Emergency Use Authorization  (EUA)  This test is only authorized for the duration of time the  declaration that circumstances exist justifying the authorization of the  emergency use of an in vitro diagnostic tests for detection of SARS-CoV-2  virus and/or diagnosis of COVID-19 infection under section 564(b)(1) of  the Act, 21 U  S C  584BSL-3(Y)(6), unless the authorization is terminated  or revoked sooner  The test has been validated but independent review by FDA  and CLIA is pending  Test performed using [employer]:  This RT-PCR assay targets N2,  a region unique to SARS-CoV-2  A conserved region in the E-gene was chosen  for pan-Sarbecovirus detection which includes SARS-CoV-2  According to CMS-2020-01-R, this platform meets the definition of high-throughput technology      Comprehensive metabolic panel [[id number]]  (Abnormal) Collected: 11/04/22 0443    Lab Status: Final result Specimen: Blood from Arm, Left Updated: 11/04/22 0532     Sodium 146 mmol/L      Potassium 4 2 mmol/L      Chloride 104 mmol/L      CO2 40 mmol/L      ANION GAP 2 mmol/L      BUN 47 mg/dL      Creatinine 1 10 mg/dL      Glucose 204 mg/dL      Calcium 8 4 mg/dL      Corrected Calcium 10 1 mg/dL      AST 30 U/L      ALT 21 U/L      Alkaline Phosphatase 117 U/L      Total Protein 6 4 g/dL      Albumin 1 9 g/dL      Total Bilirubin 0 41 mg/dL      eGFR 46 ml/min/1 73sq m     Narrative:      [place] guidelines for Chronic Kidney Disease (CKD):   •  Stage 1 with normal or high GFR (GFR > 90 mL/min/1 73 square meters)  •  Stage 2 Mild CKD (GFR = 60-89 mL/min/1 73 square meters)  •  Stage 3A Moderate CKD (GFR = 45-59 mL/min/1 73 square meters)  •  Stage 3B Moderate CKD (GFR = 30-44 mL/min/1 73 square meters)  •  Stage 4 Severe CKD (GFR = 15-29 mL/min/1 73 square meters)  •  Stage 5 End Stage CKD (GFR <15 mL/min/1 73 square meters)  Note: GFR calculation is accurate only with a steady state creatinine    Lipase [[id number]]  (Normal) Collected: 11/04/22 0443    Lab Status: Final result Specimen: Blood from Arm, Left Updated: 11/04/22 0532     Lipase 104 u/L     C-reactive protein [[id number]]  (Abnormal) Collected: 11/04/22 0443    Lab Status: Final result Specimen: Blood from Arm, Left Updated: 11/04/22 0532      9 mg/L     NT-BNP PRO [[id number]]  (Abnormal) Collected: 11/04/22 0443    Lab Status: Final result Specimen: Blood from Arm, Left Updated: 11/04/22 0532     NT-proBNP 3,526 pg/mL     Digoxin level [[id number]]  (Normal) Collected: 11/04/22 0443    Lab Status: Final result Specimen: Blood from Arm, Left Updated: 11/04/22 0532     Digoxin Lvl 0 9 ng/mL     Lactic acid [[id number]]  (Abnormal) Collected: 11/04/22 0443    Lab Status: Final result Specimen: Blood from Arm, Left Updated: 11/04/22 0532     LACTIC ACID 2 3 mmol/L     Narrative:      Result may be elevated if tourniquet was used during collection  Lactic acid 2 Hours [[id number]]     Lab Status: No result Specimen: Blood     HS Troponin I 2hr [[id number]]     Lab Status: No result Specimen: Blood     HS Troponin 0hr (reflex protocol) [[id number]]  (Normal) Collected: 11/04/22 0443    Lab Status: Final result Specimen: Blood from Arm, Left Updated: 11/04/22 0525     hs TnI 0hr 41 ng/L     Protime-INR [[id number]]  (Abnormal) Collected: 11/04/22 0443    Lab Status: Final result Specimen: Blood from Arm, Left Updated: 11/04/22 0521     Protime 19 5 seconds      INR 1 64    APTT [[id number]]  (Normal) Collected: 11/04/22 0443    Lab Status: Final result Specimen: Blood from Arm, Left Updated: 11/04/22 0521     PTT 35 seconds     UA w Reflex to Microscopic w Reflex to Culture [[id number]]  (Abnormal) Collected: 11/04/22 0434    Lab Status: Final result Specimen: Urine, Indwelling Ball Catheter Updated: 11/04/22 0511     Color, UA Yellow     Clarity, UA Clear     Specific Gravity, UA 1 010     pH, UA 5 5     Leukocytes, UA Small     Nitrite, UA Negative     Protein, UA Negative mg/dl      Glucose, UA Negative mg/dl      Ketones, UA Negative mg/dl      Urobilinogen, UA 0 2 E U /dl      Bilirubin, UA Negative     Occult Blood, UA Negative    Urine Microscopic [[id number]] Collected: 11/04/22 0434    Lab Status:  In process Specimen: Urine, Indwelling Ball Catheter Updated: 11/04/22 0511    CBC and differential [[id number]]  (Abnormal) Collected: 11/04/22 0443    Lab Status: Final result Specimen: Blood from Arm, Left Updated: 11/04/22 0459     WBC 8 24 Thousand/uL      RBC 3 56 Million/uL Hemoglobin 10 0 g/dL      Hematocrit 35 3 %      MCV 99 fL      MCH 28 1 pg      MCHC 28 3 g/dL      RDW 16 7 %      MPV 10 1 fL      Platelets 524 Thousands/uL      nRBC 0 /100 WBCs      Neutrophils Relative 83 %      Immat GRANS % 1 %      Lymphocytes Relative 9 %      Monocytes Relative 6 %      Eosinophils Relative 1 %      Basophils Relative 0 %      Neutrophils Absolute 6 95 Thousands/µL      Immature Grans Absolute 0 05 Thousand/uL      Lymphocytes Absolute 0 70 Thousands/µL      Monocytes Absolute 0 49 Thousand/µL      Eosinophils Absolute 0 04 Thousand/µL      Basophils Absolute 0 01 Thousands/µL     Blood culture #1 [[id number]] Collected: 11/04/22 0445    Lab Status: In process Specimen: Blood from Hand, Right Updated: 11/04/22 0453    Blood culture #2 [[id number]] Collected: 11/04/22 0443    Lab Status: In process Specimen: Blood from Arm, Left Updated: 11/04/22 0453                 XR chest 1 view portable   ED Interpretation by Belen Rodríguez MD (11/04 6319)   Pulmonary vascular congestion  Increased left pleural effusion  Atelectasis versus fluid in the fissure on the right  Procedures  ECG 12 Lead Documentation Only    Date/Time: 11/4/2022 4:39 AM  Performed by: Belen Rodríguez MD  Authorized by: Belen Rodríguez MD     ECG reviewed by me, the ED Provider: yes    Patient location:  ED  Rate:     ECG rate:  100  Rhythm:     Rhythm: paced    Pacing:     Capture:  Complete             ED Course  ED Course as of 11/04/22 0553   [facility] Nov 04, 2022   0447 Is normally on 2 L nasal cannula at the nursing home per EMS  Patient had a bowel movement here  Yellow in color  Not black or bloody or tarry  0530 hs TnI 0hr: 39  Was elevated in the 30s on previous admission   0535 LACTIC ACID(!!): 2 3  Slightly elevated  Will hold off on the IV fluid bolus as the patient has history of CHF and looks to be in mild pulmonary vascular congestion on chest x-ray     0537 NT-proBNP(!): 3,526  On previous admission for CHF for BNP was 3300  [facility]  Number of Diagnoses or Management Options     Amount and/or Complexity of Data Reviewed  Clinical lab tests: reviewed  Review and summarize past medical records: yes        Disposition  Final diagnoses:   CHF (congestive heart failure) (HCC)   Pleural effusion   COVID-19 virus infection     Time reflects when diagnosis was documented in both MDM as applicable and the Disposition within this note     Time User Action Codes Description Comment    11/4/2022  5:00 AM Zander Bhat Add [I50 9] CHF (congestive heart failure) ([facility] [address] )     11/4/2022  5:00 AM Kim Bhat Junior [J90] Pleural effusion     11/4/2022  5:39 AM Kim Bhat Junior [U07 1] COVID-19 virus infection       ED Disposition     ED Disposition   Admit    Condition   Stable    Date/Time   Fri Nov 4, 2022  5:52 AM    Comment   Case was discussed with Viki Burleson and the patient's admission status was agreed to be  to the service of Dr Laverne Parrish   Follow-up Information    None         Patient's Medications   Discharge Prescriptions    No medications on file       No discharge procedures on file      PDMP Review       Value Time User    PDMP Reviewed  Yes 9/30/2022  7:50 AM Zonia Bangura PA-C          ED Provider  Electronically Signed by           Maira Flood MD  11/04/22 0337

## 2022-11-05 PROBLEM — I50.33 ACUTE ON CHRONIC DIASTOLIC CONGESTIVE HEART FAILURE (HCC): Status: ACTIVE | Noted: 2021-01-01

## 2022-11-05 PROBLEM — E44.0 MODERATE PROTEIN-CALORIE MALNUTRITION (HCC): Status: ACTIVE | Noted: 2022-01-01

## 2022-11-05 PROBLEM — E87.0 HYPERNATREMIA: Status: RESOLVED | Noted: 2022-01-01 | Resolved: 2022-01-01

## 2022-11-05 PROBLEM — I48.21 PERMANENT ATRIAL FIBRILLATION (HCC): Chronic | Status: ACTIVE | Noted: 2021-01-01

## 2022-11-05 PROBLEM — S22.089A T11 VERTEBRAL FRACTURE (HCC): Chronic | Status: ACTIVE | Noted: 2022-01-01

## 2022-11-05 PROBLEM — E11.9 TYPE 2 DIABETES MELLITUS (HCC): Chronic | Status: ACTIVE | Noted: 2022-01-01

## 2022-11-05 PROBLEM — R78.81 BACTEREMIA DUE TO ENTEROCOCCUS: Status: ACTIVE | Noted: 2022-01-01

## 2022-11-05 PROBLEM — D64.9 CHRONIC ANEMIA: Chronic | Status: ACTIVE | Noted: 2021-01-01
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
## 2022-11-05 PROBLEM — Z97.8 CHRONIC INDWELLING FOLEY CATHETER: Chronic | Status: ACTIVE | Noted: 2022-01-01

## 2022-11-05 PROBLEM — I50.33 ACUTE ON CHRONIC DIASTOLIC CHF (CONGESTIVE HEART FAILURE) (HCC): Status: RESOLVED | Noted: 2022-01-01 | Resolved: 2022-01-01

## 2022-11-05 PROBLEM — L89.152 SACRAL DECUBITUS ULCER, STAGE II (HCC): Chronic | Status: ACTIVE | Noted: 2022-01-01

## 2022-11-05 PROBLEM — B95.2 BACTEREMIA DUE TO ENTEROCOCCUS: Status: ACTIVE | Noted: 2022-01-01

## 2022-11-05 PROBLEM — G93.40 ENCEPHALOPATHY: Status: ACTIVE | Noted: 2022-01-01

## 2022-11-05 PROBLEM — I26.99 BILATERAL PULMONARY EMBOLISM (HCC): Chronic | Status: ACTIVE | Noted: 2022-01-01

## 2022-11-05 PROBLEM — J44.9 COPD (CHRONIC OBSTRUCTIVE PULMONARY DISEASE) (HCC): Chronic | Status: ACTIVE | Noted: 2022-01-01

## 2022-11-05 PROBLEM — N17.9 AKI (ACUTE KIDNEY INJURY) (HCC): Status: ACTIVE | Noted: 2021-01-01

## 2022-11-05 NOTE — RESPIRATORY THERAPY NOTE
RT Protocol Note  Shannan Im 79 y o  female MRN: [id number]  Unit/Bed#: -01 Encounter: [id number]    Assessment    Principal Problem:    Acute on chronic respiratory failure with hypoxia and hypercapnia (HCC)  Active Problems:    Acute on chronic diastolic congestive heart failure (HCC)    Permanent atrial fibrillation (HCC)    KAYLEE (acute kidney injury) ([facility] [address] )    Chronic anemia    T11 vertebral fracture (HCC)    COPD (chronic obstructive pulmonary disease) ([facility])    Type 2 diabetes mellitus ([facility] )    History bilateral pulmonary embolism    Chronic indwelling Ball catheter    Sacral decubitus ulcer, stage II (HCC)    COVID-19    Moderate protein-calorie malnutrition ([facility] )      Home Pulmonary Medications:      Past Medical History:   Diagnosis Date    A-fib ([place] [id number] )     Cardiac pacemaker     CHF (congestive heart failure) (HCC)     Chronic cellulitis     COPD (chronic obstructive pulmonary disease) (HCC)     Diabetes mellitus (HCC)     Edema     High cholesterol     Hyperparathyroidism (HCC)     Hypertension     Hyperuricemia     Pulmonary emboli (HCC)     Stage 4 chronic kidney disease (HCC)      Social History     Socioeconomic History    Marital status:       Spouse name: None    Number of children: None    Years of education: None    Highest education level: None   Occupational History    None   Tobacco Use    Smoking status: Never Smoker    Smokeless tobacco: Never Used   Vaping Use    Vaping Use: Never used   Substance and Sexual Activity    Alcohol use: Never    Drug use: Never    Sexual activity: Not Currently   Other Topics Concern    None   Social History Narrative    ** Merged History Encounter **          Social Determinants of Health     Financial Resource Strain: Not on file   Food Insecurity: No Food Insecurity    Worried About Running Out of Food in the Last Year: Never true    Ran Out of Food in the Last Year: Never true   Transportation Needs: No Transportation Needs    Lack of Transportation (Medical): No    Lack of Transportation (Non-Medical): No   Physical Activity: Not on file   Stress: Not on file   Social Connections: Not on file   Intimate Partner Violence: Not on file   Housing Stability: Low Risk     Unable to Pay for Housing in the Last Year: No    Number of Places Lived in the Last Year: 2    Unstable Housing in the Last Year: No       Subjective         Objective    Physical Exam:   Assessment Type: Assess only  General Appearance: Alert, Awake  Respiratory Pattern: Assisted  Chest Assessment: Chest expansion symmetrical  O2 Device: BiPAP AVAPS    Vitals:  Blood pressure 128/63, pulse 80, temperature 98 8 °F (37 1 °C), temperature source Axillary, resp  rate (!) 27, height 5' 4" (1 626 m), weight 97 4 kg (214 lb 11 7 oz), SpO2 (P) 90 %  Imaging and other studies: I have personally reviewed pertinent reports  O2 Device: BiPAP AVAPS     Plan             Resp Comments: (P) Dornase Alpha started, udn tx tolerated well  SpO2 87% on FiO2 80%  Pt FiO2 increased to 90% for SpO2 of 90%

## 2022-11-05 NOTE — CONSULTS
Fernando 15 1940, 79 y o  female MRN: [id number]  Unit/Bed#: -01 Encounter: [id number]  Primary Care Provider: Aislinn Hickey DO   Date and time admitted to hospital: 11/4/2022  4:28 AM    Consults    * Acute on chronic respiratory failure with hypoxia and hypercapnia (HCC)  Assessment & Plan  · 2/2 CHF/viral covid  · CXR pulm edema/pleural effusions  · Change diuresis to bumex 2mg IV BID goal net neg 1L  · Consider bumex gtt if no response +/- R thoracentesis  · On AVAPS on bipap for sats >88%  · Nebs xopenex/atrovent/pulmicort/performist    Acute on chronic diastolic congestive heart failure (HCC)  Assessment & Plan  Wt Readings from Last 3 Encounters:   11/05/22 97 4 kg (214 lb 11 7 oz)   10/28/22 97 kg (213 lb 13 5 oz)   09/30/22 95 7 kg (210 lb 15 7 oz)     · 10/24 ECHO EF 65% increased wall thickness  PA 68 RV normal  · BNP 3526 up from 3300  · GDT: holding home entresto/coreg/dig with NPO status   BP controlled  · Change to bumex 2mg IV BID  · Consider Bumex gtt if unable to get neg 1L  · I/O  · Daily weight            COVID-19  Assessment & Plan  · Covid +  · Vaccinated  · -99  · Increase Decadron to 0 1mg/kg BID D1 steroid total D2/10  · Remdesivir D2/5  · Bipap as above      Bacteremia due to Enterococcus  Assessment & Plan  · With recent Efaecalis/Klebsiella UTI unclear if treated  · BC 1/2 E faecalis   · Early speciation suggests VRE  · Repeat BC pending  · ID consulted  · Linezolid awaiting Daptomycin from pharmacy will change D1  · Check Renal U/s r/o obstruction as unable to transport to CT with hypoxia  · Check ECHO for Veg    KAYLEE (acute kidney injury) ([facility] [address] )  Assessment & Plan  · Baseline cr 0 6-0 8  · Prerenal vs ATN  · UA + cast, 30-50 wBC  · Recent E faecalis/klebsiella UTI  · I/O  · Maintain chronic rivera but change    Encephalopathy  Assessment & Plan  · Toxic metabolic with hypercarbia  · LLE weakness appears chronic  · Mental status improved and more oriented on bipap x 3   · Neuro checks    Acute cystitis  Assessment & Plan  · See above    T11 vertebral fracture (HCC)  Assessment & Plan  · 2/2 fall  · Does not move LLE  Per PT notes 10/25 appears chronic   L Hip Flexion hip flexion minimal  2-/5   L Hip ABduction minimal AROM, 2-/5   L Knee Flexion minimal AROM, 2-/5   L Knee Extension trace contraction knee extension, 1/5   L Ankle Dorsiflexion no AROM ankle DF, able to move toes minimally  0/5     ·     Permanent atrial fibrillation (HCC)  Assessment & Plan  · NPO holding home coreg/dig  · T/c IV dig  · Change eliquis to lovenox therapeutic w hx PE    COPD (chronic obstructive pulmonary disease) ([facility])  Assessment & Plan  · Chronic 2L oxygen and CPAP HS  · Without AE    Chronic anemia  Assessment & Plan  · Baseline hgb 9  · NPO hold home iron    Moderate protein-calorie malnutrition (HCC)  Assessment & Plan  Malnutrition Findings:   Adult Malnutrition type: Chronic illness  Adult Degree of Malnutrition: Malnutrition of moderate degree  Malnutrition Characteristics: Inadequate energy, Weight loss  ·  nutrition following               360 Statement: Chronic moderate malnutrition related to decreased appetite, T11 vertebral fx, COVID as evidenced by < 75% estimated energy intake > 1 mo, 13 3% weight loss x 8 mo (3/17/22 240lb, 11/4/22 208lb)  Treated with: Liberalize diet to CCD 2, 4gm MYRNA, fluid restriction per MD  Will add Maciel BID for wound healing, mix in diet vanilla pudding due to fluid restriction  Daily weights for nutrition monitoring  BMI Findings: Body mass index is 36 86 kg/m²         Sacral decubitus ulcer, stage II ([facility])  Assessment & Plan  · Wound following    Chronic indwelling Ball catheter  Assessment & Plan  · Change w E faecalis UTI prior and now bacteremia    History bilateral pulmonary embolism  Assessment & Plan  · Dx 10/22  · Change eliquis to therapeutic lovenox per Covid pathway  · If creat increases may need to change to heparin gtt    Type 2 diabetes mellitus [facility])  Assessment & Plan  Lab Results   Component Value Date    HGBA1C 5 3 11/04/2022       Recent Labs     11/04/22  1646 11/04/22  2104 11/04/22  2252 11/05/22  0800   POCGLU 250* 153* 142* 174*       Blood Sugar Average: Last 72 hrs:  (P) 089 [id number]     · SSI    -------------------------------------------------------------------------------------------------------------  Chief Complaint: sob    History of Present Illness   HX and PE limited by: mental status/bipap  Liliane Thorpe is a 79 y o  female with past medical history of diastolic CHF, PAF, PE on Eliquis, COPD, chronic 2 L oxygen, diabetes, falls with T11 fracture who presents with shortness of breath from nursing facility  Patient found to be COVID positive on pathway and with volume overloaded treated with IV diuretics  This a m  was a rapid response for hypoxia found to be hypercarbic placed on BiPAP and transferred to step-down level 2  Blood cultures revealed 1/2 concerning for VR E Enterococcus faecalis and patient was given 1 dose of linezolid placed on daptomycin awaiting ID consult    Patient required maximal BiPAP support on AVAPS and thus was upgraded to step-down level 1 this a m  Capri Lee Spoke with patient's brother at length currently patient more oriented and remains DNR/DNI will continue ongoing goals of care discussions pending diuretic response      History obtained from chart review and unobtainable from patient due to mental status and BiPAP   -------------------------------------------------------------------------------------------------------------  Dispo: Transfer to Stepdown Level 1     Code Status: Level 3 - DNAR and DNI  --------------------------------------------------------------------------------------------------------------  Review of Systems   Unable to perform ROS: Mental status change (on bipap oriented to person only) Review of systems was unable to be performed secondary to Mental status    Physical Exam  Constitutional:       General: She is not in acute distress  HENT:      Head: Normocephalic and atraumatic  Mouth/Throat:      Mouth: Mucous membranes are moist    Eyes:      General: No scleral icterus  Pupils: Pupils are equal, round, and reactive to light  Cardiovascular:      Rate and Rhythm: Normal rate and regular rhythm  Pulses: Normal pulses  Heart sounds: Normal heart sounds  No murmur heard  Pulmonary:      Effort: Pulmonary effort is normal  No respiratory distress  Breath sounds: Wheezing present  Comments: coarse  Abdominal:      General: Bowel sounds are normal  There is no distension  Palpations: Abdomen is soft  Tenderness: There is no abdominal tenderness  Musculoskeletal:         General: Swelling present  Cervical back: Neck supple  Right lower leg: Edema present  Left lower leg: Edema present  Comments: +1-2 ue/le edema  LLE 0/5 mild 1/5 contraction attempted at knee  RLE 2/5   Skin:     General: Skin is warm and dry  Capillary Refill: Capillary refill takes less than 2 seconds  Coloration: Skin is not pale  Findings: Bruising present  No erythema  Comments: UE ecchymotic   Neurological:      Mental Status: She is alert  Comments: Oriented to person only  Hard to understand with bipap states 2026       --------------------------------------------------------------------------------------------------------------  Vitals:   Vitals:    11/05/22 0836 11/05/22 0915 11/05/22 1000 11/05/22 1124   BP:   128/63    BP Location:       Pulse:   80    Resp:   (!) 27    Temp:       TempSrc:       SpO2: 95% 90% 93% 90%   Weight:       Height:         Temp  Min: 97 5 °F (36 4 °C)  Max: 98 8 °F (37 1 °C)     Height: 5' 4" (162 6 cm)  Body mass index is 36 86 kg/m²      Laboratory and Diagnostics:  Results from last 7 days   Lab Units 11/05/22 0435 11/04/22 2319 11/04/22 2311 11/04/22 0443   WBC Thousand/uL 5 20 5 79  --  8 24   HEMOGLOBIN g/dL 9 3* 9 9*  --  10 0*   I STAT HEMOGLOBIN g/dl  --   --  13 9  --    HEMATOCRIT % 33 0* 35 8  --  35 3   HEMATOCRIT, ISTAT %  --   --  41  --    PLATELETS Thousands/uL 258 290  --  282   NEUTROS PCT % 87* 86*  --  83*   MONOS PCT % 2* 4  --  6     Results from last 7 days   Lab Units 11/05/22 0435 11/04/22 2319 11/04/22 2311 11/04/22 0443   SODIUM mmol/L 143 144  --  146   POTASSIUM mmol/L 4 8 4 4  --  4 2   CHLORIDE mmol/L 103 104  --  104   CO2 mmol/L 37* 39*  --  40*   CO2, I-STAT mmol/L  --   --  43*  --    ANION GAP mmol/L 3* 1*  --  2*   BUN mg/dL 71* 70*  --  47*   CREATININE mg/dL 1 21 1 02  --  1 10   CALCIUM mg/dL 8 5 8 7  --  8 4   GLUCOSE RANDOM mg/dL 182* 136  --  204*   ALT U/L 14 21  --  21   AST U/L 34 31  --  30   ALK PHOS U/L 104 120*  --  117*   ALBUMIN g/dL 1 9* 2 1*  --  1 9*   TOTAL BILIRUBIN mg/dL 0 29 0 28  --  0 41          Results from last 7 days   Lab Units 11/04/22 0443   INR  1 64*   PTT seconds 35          Results from last 7 days   Lab Units 11/04/22  0818 11/04/22 0443   LACTIC ACID mmol/L 1 2 2 3*     ABG:    VBG:  Results from last 7 days   Lab Units 11/05/22  1008   PH WENDY  7 362   PCO2 WENDY mm Hg 64 3*   PO2 WENDY mm Hg 160 9*   HCO3 WENDY mmol/L 35 7*   BASE EXC WENDY mmol/L 8 4     Results from last 7 days   Lab Units 11/05/22 0435   PROCALCITONIN ng/ml 0 92*       Micro:  Results from last 7 days   Lab Units 11/04/22 0445 11/04/22 0443 11/04/22 0434   BLOOD CULTURE  No Growth at 24 hrs   --   --    William Bills STAIN RESULT   --  Gram positive cocci in pairs, chains and clusters*  --    URINE CULTURE   --   --  Culture too young- will reincubate       EKG: tele reviewed  Imaging: I have personally reviewed pertinent reports     and I have personally reviewed pertinent films in PACS      Historical Information   Past Medical History:   Diagnosis Date   • A-fib [facility])    • Cardiac pacemaker    • CHF (congestive heart failure) ([facility])    • Chronic cellulitis    • COPD (chronic obstructive pulmonary disease) ([facility])    • Diabetes mellitus (HCC)    • Edema    • High cholesterol    • Hyperparathyroidism ([address] )    • Hypertension    • Hyperuricemia    • Pulmonary emboli ([facility])    • Stage 4 chronic kidney disease ([facility])      Past Surgical History:   Procedure Laterality Date   • CARDIAC DEFIBRILLATOR PLACEMENT     •  SECTION      x 2   • COLON SURGERY     • HERNIA REPAIR     • IR THORACENTESIS  10/24/2022     Social History   Social History     Substance and Sexual Activity   Alcohol Use Never     Social History     Substance and Sexual Activity   Drug Use Never     Social History     Tobacco Use   Smoking Status Never Smoker   Smokeless Tobacco Never Used     Exercise History: ambl  Family History:   Family History   Problem Relation Age of Onset   • Hypertension Mother    • Stroke Mother    • Heart disease Father      I have reviewed this patient's family history and commented on sigificant items within the HPI      Medications:  Current Facility-Administered Medications   Medication Dose Route Frequency   • acetaminophen (TYLENOL) tablet 650 mg  650 mg Oral Q6H PRN   • albuterol (PROVENTIL HFA,VENTOLIN HFA) inhaler 2 puff  2 puff Inhalation Q4H PRN   • budesonide (PULMICORT) inhalation solution 0 5 mg  0 5 mg Nebulization Q12H   • bumetanide (BUMEX) injection 2 mg  2 mg Intravenous BID   • cefepime (MAXIPIME) IVPB (premix in dextrose) 2,000 mg 50 mL  2,000 mg Intravenous Q12H   • DAPTOmycin (CUBICIN) 700 mg in sodium chloride 0 9 % 50 mL IVPB  10 mg/kg (Adjusted) Intravenous Q24H   • dexamethasone (DECADRON) injection 9 76 mg  0 1 mg/kg Intravenous Q12H   • dornase osiel (PULMOZYME) inhalation solution 2 5 mg  2 5 mg Inhalation BID   • enoxaparin (LOVENOX) subcutaneous injection 100 mg  1 mg/kg Subcutaneous Q12H [address]   • formoterol (PERFOROMIST) nebulizer solution 20 mcg  20 mcg Nebulization Q12H   • insulin lispro (HumaLOG) 100 units/mL subcutaneous injection 1-6 Units  1-6 Units Subcutaneous HS   • insulin lispro (HumaLOG) 100 units/mL subcutaneous injection 2-12 Units  2-12 Units Subcutaneous TID AC   • ipratropium (ATROVENT) 0 02 % inhalation solution 0 5 mg  0 5 mg Nebulization Q6H   • levalbuterol (XOPENEX) inhalation solution 1 25 mg  1 25 mg Nebulization Q6H   • lidocaine (LIDODERM) 5 % patch 1 patch  1 patch Topical Daily   • methocarbamol (ROBAXIN) tablet 750 mg  750 mg Oral Q6H PRN   • nystatin (MYCOSTATIN) powder   Topical BID   • polyethylene glycol (MIRALAX) packet 17 g  17 g Oral Daily PRN   • remdesivir (Veklury) 100 mg in sodium chloride 0 9 % 270 mL IVPB  100 mg Intravenous Q24H     Home medications:  Prior to Admission Medications   Prescriptions Last Dose Informant Patient Reported? Taking? FERROUS SULFATE PO Unknown at Unknown time Self Yes No   Si mg daily   Fluticasone-Salmeterol (Advair) 100-50 mcg/dose inhaler Unknown at Unknown time Outside Facility (Specify) Yes No   Sig: Inhale 1 puff 2 (two) times a day Rinse mouth after use  acetaminophen (TYLENOL) 325 mg tablet Unknown at Unknown time Outside Facility (Specify) No No   Sig: Take 3 tablets (975 mg total) by mouth every 8 (eight) hours   apixaban (ELIQUIS) 5 mg Unknown at Unknown time  No No   Sig: Take 2 tablets (10 mg total) by mouth 2 (two) times a day for 2 days, THEN 1 tablet (5 mg total) 2 (two) times a day  atorvastatin (LIPITOR) 10 mg tablet Unknown at Unknown time Outside Facility (Specify) Yes No   Sig: Take 10 mg by mouth daily   bisacodyl (DULCOLAX) 10 mg suppository Unknown at Unknown time Outside Facility (Specify) Yes No   Sig: Insert 10 mg into the rectum daily as needed for constipation   budesonide-formoterol (SYMBICORT) 160-4 5 mcg/act inhaler Unknown at Unknown time Outside Facility (Specify) Yes No   Sig: Inhale 2 puffs 2 (two) times a day Rinse mouth after use     Patient not taking: No sig reported   bumetanide (BUMEX) 2 mg tablet Unknown at Unknown time  No No   Sig: Take 1 tablet (2 mg total) by mouth 2 (two) times a day   carvedilol (COREG) 3 125 mg tablet Unknown at Unknown time  No No   Sig: Take 1 tablet (3 125 mg total) by mouth 2 (two) times a day with meals   digoxin 62 5 MCG TABS Unknown at Unknown time Outside Facility (Specify) No No   Sig: Take 1 tablet (62 5 mcg total) by mouth daily   gabapentin (NEURONTIN) 300 mg capsule Unknown at Unknown time Outside Facility (Specify) No No   Sig: Take 1 capsule (300 mg total) by mouth 3 (three) times a day   insulin lispro (HumaLOG) 100 units/mL injection Unknown at Unknown time  No No   Sig: Inject 1-5 Units under the skin 4 (four) times a day (with meals and at bedtime)   Patient not taking: No sig reported   ipratropium (ATROVENT) 0 02 % nebulizer solution Unknown at Unknown time Outside Facility (Specify) No No   Sig: Take 2 5 mL (0 5 mg total) by nebulization 3 (three) times a day   levalbuterol (XOPENEX) 1 25 mg/0 5 mL nebulizer solution Unknown at Unknown time Outside Facility (Specify) No No   Sig: Take 0 5 mL (1 25 mg total) by nebulization 3 (three) times a day   lidocaine (LIDODERM) 5 % Unknown at Unknown time  No No   Sig: Apply 1 patch topically daily Remove & Discard patch within 12 hours or as directed by MD   Patient taking differently: Apply 2 patches topically daily Remove & Discard patch within 12 hours or as directed by MD 4% one patch to left hip, 1 patch to back   magnesium hydroxide (MILK OF MAGNESIA) 400 mg/5 mL oral suspension Unknown at Unknown time Outside Facility (Specify) Yes No   Sig: Take 30 mL by mouth daily as needed for constipation   melatonin 3 mg Unknown at Unknown time Outside Facility (Specify) No No   Sig: Take 2 tablets (6 mg total) by mouth daily at bedtime   methocarbamol (ROBAXIN) 750 mg tablet Unknown at Unknown time Outside Facility (Specify) No No   Sig: Take 1 tablet (750 mg total) by mouth every 6 (six) hours   midodrine (PROAMATINE) 5 mg tablet Unknown at Unknown time  No No   Sig: Take 1 tablet (5 mg total) by mouth 3 (three) times a day before meals   nystatin (MYCOSTATIN) powder Unknown at Unknown time  No No   Sig: Apply topically 2 (two) times a day   oxyCODONE (ROXICODONE) 5 immediate release tablet Unknown at Unknown time  No No   Sig: Take 1 tablet (5 mg total) by mouth every 6 (six) hours as needed for moderate pain for up to 10 days 0 5 Max Daily Amount: 20 mg   pantoprazole (PROTONIX) 40 mg tablet Unknown at Unknown time Outside Facility (Specify) No No   Sig: Take 1 tablet (40 mg total) by mouth 2 (two) times a day before meals   polyethylene glycol (MIRALAX) 17 g packet Unknown at Unknown time Outside Facility (Specify) No No   Sig: Take 17 g by mouth daily as needed (Constipation)   potassium chloride (MICRO-K) 10 MEQ CR capsule Unknown at Unknown time  No No   Sig: Take 1 capsule (10 mEq total) by mouth daily   sacubitril-valsartan (ENTRESTO) 24-26 MG TABS Unknown at Unknown time Outside Facility (Specify) No No   Sig: Take 1 tablet by mouth 2 (two) times a day   senna-docusate sodium (SENOKOT S) 8 6-50 mg per tablet Unknown at Unknown time Outside Facility (Specify) No No   Sig: Take 1 tablet by mouth 2 (two) times a day   simethicone (MYLICON) 80 mg chewable tablet Unknown at Unknown time Outside Facility (Specify) No No   Sig: Chew 1 tablet (80 mg total) every 6 (six) hours as needed for flatulence      Facility-Administered Medications: None     Allergies:  No Known Allergies  ------------------------------------------------------------------------------------------------------------  Advance Directive and Living Will:      Power of : Yes  POLST:    ------------------------------------------------------------------------------------------------------------  Care Time Delivered:   Upon my evaluation, this patient had a high probability of imminent or life-threatening deterioration due to resp failure, which required my direct attention, intervention, and personal management  I have personally provided 60 minutes (0900 to 1000) of critical care time, exclusive of procedures, teaching, family meetings, and any prior time recorded by providers other than myself  FIDELIA Cao        Portions of the record may have been created with voice recognition software  Occasional wrong word or "sound a like" substitutions may have occurred due to the inherent limitations of voice recognition software    Read the chart carefully and recognize, using context, where substitutions have occurred

## 2022-11-05 NOTE — RAPID RESPONSE
Rapid Response Note  Tj Goldman 79 y o  female MRN: [id number]  Unit/Bed#: -01 Encounter: [id number]    Rapid Response Notification(s):   Response called date/time:  11/4/2022 10:51 PM  Response team arrival date/time:  11/4/2022 10:51 PM  Response end date/time:  11/4/2022 11:15 PM  Level of care:  Medsur  [facility]  Primary reason for rapid response call:  Acute change in O2 sat    Rapid Response Intervention(s):   Airway: Other (comment) (BiPAP)  Breathing:  Oxygen  Circulation:  None  Fluids administered:  None  Medications administered:  None       Assessment:   · Acute on chronic respiratory failure with hypoxia and hypercapnia  · COVID-19 infection  · AECOPD  · Acute on chronic heart failure - EF 65% on 10/24/2022  · Bilateral pleural effusions  · Acute pulmonary embolism - diagnosed 10/22/7307  · Acute metabolic encephalopathy likely secondary to hypoxia and hypercapnia in the setting of COVID-19 infection/AECOPD/acute on chronic heart failure exacerbation    Plan:   · Check CBC, CMP  · Check ABG - sample appears mixed, CO2 is elevated at 90  · EKG - v paced  · Repeat lasix 60 mg IV now  · BiPAP applied during RRT - continue as scheduled which is HS  · Repeat VBG in am to assess CO2     Rapid Response Outcome:   Transfer:  Transfer to stepdown 2  Primary service notified of transfer: Yes    Code Status: Level 3 (DNAR and DNI)      Family notified of transfer: yes  Family member contacted: Brother Parul Broderick     Background/Situation:   Tj Goldman is a 79 y o  female who presented from skilled nursing facility early this a m  With complaints difficulty breathing  Patient tested positive for COVID-19  Past medical history of CHF, acute pulmonary embolism, atrial fibrillation, DM type 2, COPD and chronic respiratory failure with hypoxia and hypercapnia who wears 2 L of oxygen at baseline and BiPAP HS    Patient was admitted to Internal Medicine under the mild COVID treatment pathway  Chest x-ray did reveal pulmonary vascular congestion with bilateral effusions and She was also started on IV diuresis  Based on report from [facility] nursing facility, patient did not wear BiPAP mask for possibly few days leading up to admission due to comfort  BiPAP a chest was ordered while inpatient  Respiratory did place patient on BiPAP with Fio2 50% this evening however patient did not tolerate therefore it was discontinued and she was returned to nasal cannula  Rapid response was called for desaturation to 56% on 3 L nasal cannula  Non rebreathing mask was applied by nursing  Patient reports improvement in shortness breath  BiPAP mask reapplied and settings adjusted with FiO2 100%  Oxygen saturations improved 92%  Patient appears to be in no acute distress however is drowsy  Transferred to [place] [id number] for closer monitoring  Review of Systems   Constitutional: Negative  HENT: Negative  Respiratory: Positive for shortness of breath  Cardiovascular: Negative for chest pain  Gastrointestinal: Negative  Genitourinary: Negative  Musculoskeletal: Negative  Neurological: Negative  Psychiatric/Behavioral: Negative  Objective:   Vitals:    11/04/22 2257 11/04/22 2300 11/04/22 2303 11/04/22 2306   BP:       BP Location:       Pulse: 85 92 90 100   Resp:       Temp:       TempSrc:       SpO2: (!) 67% (!) 75% (!) 85% (!) 87%   Weight:       Height:         Physical Exam  Vitals and nursing note reviewed  Constitutional:       General: She is not in acute distress  Appearance: She is ill-appearing  HENT:      Head: Normocephalic and atraumatic  Mouth/Throat:      Mouth: Mucous membranes are moist       Pharynx: Oropharynx is clear  Eyes:      Conjunctiva/sclera: Conjunctivae normal       Pupils: Pupils are equal, round, and reactive to light  Cardiovascular:      Rate and Rhythm: Normal rate  Rhythm irregular     Pulmonary:      Effort: No respiratory distress  Comments: Diminished breath sounds throughout  Abdominal:      General: Bowel sounds are normal       Palpations: Abdomen is soft  Musculoskeletal:      Cervical back: Neck supple  Right lower leg: Edema present  Left lower leg: Edema present  Skin:     General: Skin is warm and dry  Capillary Refill: Capillary refill takes less than 2 seconds  Neurological:      General: No focal deficit present  Mental Status: She is lethargic  GCS: GCS eye subscore is 4  GCS verbal subscore is 4  GCS motor subscore is 6  Psychiatric:         Mood and Affect: Mood normal          Portions of the record may have been created with voice recognition software  Occasional wrong word or "sound a like" substitutions may have occurred due to the inherent limitations of voice recognition software  Read the chart carefully and recognize, using context, where substitutions have occurred      Cathie Gerardo

## 2022-11-05 NOTE — ASSESSMENT & PLAN NOTE
· Toxic metabolic with hypercarbia  · LLE weakness appears chronic  · Mental status improved and more oriented on bipap x 3   · Neuro checks

## 2022-11-05 NOTE — ASSESSMENT & PLAN NOTE
· 2/2 CHF/viral covid  · CXR pulm edema/pleural effusions  · Change diuresis to bumex 2mg IV BID goal net neg 1L  · Consider bumex gtt if no response +/- R thoracentesis  · On AVAPS on bipap for sats >88%  · Nebs xopenex/atrovent/pulmicort/performist

## 2022-11-05 NOTE — QUICK NOTE
Blood culture preliminary BCIP results received  Discussed with pharmacy, Daptomycin not available overnight since located in central pharmacy  Will give dose of linezolid x 1 now, then Daptomycin later in the day  Will check procalcitonin, repeat blood cultures ordered  Consult placed to ID

## 2022-11-05 NOTE — RESPIRATORY THERAPY NOTE
RT Protocol Note  Lj Amador 79 y o  female MRN: [id number]  Unit/Bed#: -01 Encounter: [id number]    Assessment    Principal Problem:    Acute on chronic respiratory failure with hypoxia and hypercapnia (HCC)  Active Problems:    Systolic congestive heart failure    Permanent atrial fibrillation (HCC)    Stage 3 chronic kidney disease (HCC)    Chronic anemia    T11 vertebral fracture (HCC)    COPD (chronic obstructive pulmonary disease) ([facility])    Type 2 diabetes mellitus ([place] [address] )    History bilateral pulmonary embolism    Chronic indwelling Ball catheter    Sacral decubitus ulcer, stage II ([place] [address] )    COVID-19      Home Pulmonary Medications:         Past Medical History:   Diagnosis Date    A-fib ([place] [address] )     Cardiac pacemaker     CHF (congestive heart failure) (HCC)     Chronic cellulitis     COPD (chronic obstructive pulmonary disease) (HCC)     Diabetes mellitus (HCC)     Edema     High cholesterol     Hyperparathyroidism (HCC)     Hypertension     Hyperuricemia     Pulmonary emboli (HCC)     Stage 4 chronic kidney disease ([place] [address] )      Social History     Socioeconomic History    Marital status:       Spouse name: None    Number of children: None    Years of education: None    Highest education level: None   Occupational History    None   Tobacco Use    Smoking status: Never Smoker    Smokeless tobacco: Never Used   Vaping Use    Vaping Use: Never used   Substance and Sexual Activity    Alcohol use: Never    Drug use: Never    Sexual activity: Not Currently   Other Topics Concern    None   Social History Narrative    ** Merged History Encounter **          Social Determinants of Health     Financial Resource Strain: Not on file   Food Insecurity: No Food Insecurity    Worried About Running Out of Food in the Last Year: Never true    Ran Out of Food in the Last Year: Never true   Transportation Needs: No Transportation Needs    Lack of Transportation (Medical): No    Lack of Transportation (Non-Medical): No   Physical Activity: Not on file   Stress: Not on file   Social Connections: Not on file   Intimate Partner Violence: Not on file   Housing Stability: Low Risk     Unable to Pay for Housing in the Last Year: No    Number of Places Lived in the Last Year: 2    Unstable Housing in the Last Year: No       Subjective         Objective    Physical Exam:   Assessment Type: Assess only  General Appearance: Alert, Awake  Respiratory Pattern: Assisted  Chest Assessment: Chest expansion symmetrical  O2 Device: BiPAP AVAPS    Vitals:  Blood pressure 114/67, pulse 86, temperature 98 8 °F (37 1 °C), temperature source Axillary, resp  rate (!) 27, height 5' 4" (1 626 m), weight 97 4 kg (214 lb 11 7 oz), SpO2 95 %  Imaging and other studies: I have personally reviewed pertinent reports  O2 Device: BiPAP AVAPS     Plan             Resp Comments: (P) Pt taken off BiPAP by Dr Vani Weems for goals of care discussion  Pt SpO2 64% within 5mins  Pt placed back on bipap now requiring 100% FiO2 and EPAP of 8  SpO2 increasing slowly  Pt now transfering to critical care services

## 2022-11-05 NOTE — UTILIZATION REVIEW
NOTIFICATION OF INPATIENT ADMISSION   AUTHORIZATION REQUEST   SERVICING FACILITY:   [address] [place] [address]  Anahi Mendiola [address] [place] [address], [facility] Kia Willis  Tax ID: [id number]  NPI: [id number] ATTENDING PROVIDER:  Attending Name and NPI#: Radha Colindres [[id number]]  Address: [facility]  Anahi Mendiola [address] [place] [address] Kia Willis  Phone: [phone]   ADMISSION INFORMATION:  Place of Service: Inpatient [address]  60  Place of Service Code: 21  Inpatient Admission Date/Time: 11/4/22  5:53 AM  Discharge Date/Time: No discharge date for patient encounter  Admitting Diagnosis Code/Description:  CHF (congestive heart failure) (HCC) [I50 9]  Weakness generalized [R53 1]  Pleural effusion [J90]  COVID-19 virus infection [U07 1]     UTILIZATION REVIEW CONTACT:  Luis Ye Utilization   Network Utilization Review Department  Phone: [phone]  Fax [phone]  Email: [email or web address]  Contact for approvals/pending authorizations, clinical reviews, and discharge  PHYSICIAN ADVISORY SERVICES:  Medical Necessity Denial & [facility] Review  Phone: [phone]  Fax: [phone]  Email: [email or web address]

## 2022-11-05 NOTE — RESPIRATORY THERAPY NOTE
RT Protocol Note  Tj Goldman 79 y o  female MRN: [id number]  Unit/Bed#: -01 Encounter: [id number]    Assessment    Principal Problem:    Acute on chronic respiratory failure with hypoxia and hypercapnia (HCC)  Active Problems:    Acute on chronic diastolic congestive heart failure (HCC)    Permanent atrial fibrillation (HCC)    KAYLEE (acute kidney injury) ([facility] )    Chronic anemia    T11 vertebral fracture (HCC)    COPD (chronic obstructive pulmonary disease) ([facility])    Type 2 diabetes mellitus ([facility] )    History bilateral pulmonary embolism    Chronic indwelling Ball catheter    Acute cystitis    Sacral decubitus ulcer, stage II (HCC)    COVID-19    Moderate protein-calorie malnutrition ([place] [address] )    Bacteremia due to Enterococcus    Encephalopathy      Home Pulmonary Medications:         Past Medical History:   Diagnosis Date    A-fib [facility])     Cardiac pacemaker     CHF (congestive heart failure) ([facility])     Chronic cellulitis     COPD (chronic obstructive pulmonary disease) (HCC)     Diabetes mellitus (HCC)     Edema     High cholesterol     Hyperparathyroidism (HCC)     Hypertension     Hyperuricemia     Pulmonary emboli ([facility])     Stage 4 chronic kidney disease ([facility] )      Social History     Socioeconomic History    Marital status:       Spouse name: None    Number of children: None    Years of education: None    Highest education level: None   Occupational History    None   Tobacco Use    Smoking status: Never Smoker    Smokeless tobacco: Never Used   Vaping Use    Vaping Use: Never used   Substance and Sexual Activity    Alcohol use: Never    Drug use: Never    Sexual activity: Not Currently   Other Topics Concern    None   Social History Narrative    ** Merged History Encounter **          Social Determinants of Health     Financial Resource Strain: Not on file   Food Insecurity: No Food Insecurity    Worried About [address] in the Last Year: Never true    [facility] in the Last Year: Never true   Transportation Needs: No Transportation Needs    Lack of Transportation (Medical): No    Lack of Transportation (Non-Medical): No   Physical Activity: Not on file   Stress: Not on file   Social Connections: Not on file   Intimate Partner Violence: Not on file   Housing Stability: Low Risk     Unable to Pay for Housing in the Last Year: No    Number of Places Lived in the Last Year: 2    Unstable Housing in the Last Year: No       Subjective         Objective    Physical Exam:   Assessment Type: Assess only  General Appearance: Alert, Awake  Respiratory Pattern: Assisted  Chest Assessment: Chest expansion symmetrical  O2 Device: RN called for pt with SpO2 in low to mid 70s, FiO2 increased to 100% and EPAP increased to 10  Pt not urinating much today  Dr Thang Aguilar aware and changing medication  SpO2 75-83% on BiPAP AVAPS 16/470/+10/100% pt is DNR/DNI  Vitals:  Blood pressure 148/65, pulse 84, temperature 98 8 °F (37 1 °C), temperature source Axillary, resp  rate (!) 27, height 5' 4" (1 626 m), weight 97 4 kg (214 lb 11 7 oz), SpO2 (!) 87 %  Imaging and other studies: I have personally reviewed pertinent reports  O2 Device: RN called for pt with SpO2 in low to mid 70s, FiO2 increased to 100% and EPAP increased to 10  Pt not urinating much today  Dr Thang Aguilar aware and changing medication  SpO2 75-83% on BiPAP AVAPS 16/470/+10/100% pt is DNR/DNI  Plan             Resp Comments: (P) RN called for pt with SpO2 in low to mid 70's  FiO2 increased to 100% and EPAP increased to 10  Pt not urinating much today, Dr Thang Aguilar aware and changing medication  SpO2 75-83% on BiPAP AVAPS 16/470/+10/100%

## 2022-11-05 NOTE — ASSESSMENT & PLAN NOTE
· Dx 10/22  · Change eliquis to therapeutic lovenox per Covid pathway  · If creat increases may need to change to heparin gtt

## 2022-11-05 NOTE — ASSESSMENT & PLAN NOTE
Wt Readings from Last 3 Encounters:   11/05/22 97 4 kg (214 lb 11 7 oz)   10/28/22 97 kg (213 lb 13 5 oz)   09/30/22 95 7 kg (210 lb 15 7 oz)     · 10/24 ECHO EF 65% increased wall thickness  PA 68 RV normal  · BNP 3526 up from 3300  · GDT: holding home entresto/coreg/dig with NPO status   BP controlled  · Change to bumex 2mg IV BID  · Consider Bumex gtt if unable to get neg 1L  · I/O  · Daily weight

## 2022-11-05 NOTE — DEATH NOTE
INPATIENT DEATH NOTE  Tj Godlman 79 y o  female MRN: [id number]  Unit/Bed#: -01 Encounter: [id number]    Date, Time and Cause of Death    Date of Death: 22  Time of Death:  4:37 PM  Preliminary Cause of Death: Acute on chronic respiratory failure with hypoxia ([facility] [address] )  Entered by: Michaelle MISTRY[SS1 1]     Attribution     SS1 1 FIDELIA Dougherty 22 16:42           Patient's Information  Pronounced by: Elmira Wilks  Did the patient's death occur in the ED?: No  Did the patient's death occur in the OR?: No  Did the patient's death occur less than 10 days post-op?: No  Did the patient's death occur within 24 hours of admission?: No  Was code status DNR at the time of death?: Yes    PHYSICAL EXAM:  Unresponsive to noxious stimuli, Spontaneous respirations absent, Breath sounds absent, Carotid pulse absent, Heart sounds absent, Pupillary light reflex absent and Corneal blink reflex absent    Medical Examiner notification criteria:  NONE APPLICABLE   Medical Examiner's office notified?:  No, does not meet ME notification criteria   Medical Examiner accepted case?:  No  Name of Medical Examiner: na    Family Notification  Was the family notified?: Yes  Date Notified: 22  Time Notified: 3934  Notified by: Elmira Wilks  Name of Family Notified of Death: [place] Vizcarra Person Relationship to Patient: Brother  Family Notification Route: Telephone  Was the family told to contact a  home?: Yes  Name of [facility][de-identified] Shashank Foster [phone]    Autopsy Options:  Post-mortem examination declined by next of kin    Primary Service Attending Physician notified?:  yes - Attending:  Feliz Ye DO    Physician/Resident responsible for completing Discharge Summary:  Elmira Wilks

## 2022-11-05 NOTE — ASSESSMENT & PLAN NOTE
· 2/2 fall  · Does not move LLE  Per PT notes 10/25 appears chronic   L Hip Flexion hip flexion minimal  2-/5   L Hip ABduction minimal AROM, 2-/5   L Knee Flexion minimal AROM, 2-/5   L Knee Extension trace contraction knee extension, 1/5   L Ankle Dorsiflexion no AROM ankle DF, able to move toes minimally   0/5     ·

## 2022-11-05 NOTE — UTILIZATION REVIEW
Initial Clinical Review    Admission: Date/Time/Statement:   Admission Orders (From admission, onward)     Ordered        11/04/22 0553  INPATIENT ADMISSION  Once                      Orders Placed This Encounter   Procedures   • INPATIENT ADMISSION     Standing Status:   Standing     Number of Occurrences:   1     Order Specific Question:   Level of Care     Answer:   Med Surg [16]     Order Specific Question:   Estimated length of stay     Answer:   More than 2 Midnights     Order Specific Question:   Certification     Answer:   I certify that inpatient services are medically necessary for this patient for a duration of greater than two midnights  See H&P and MD Progress Notes for additional information about the patient's course of treatment  ED Arrival Information     Expected   11/4/2022     Arrival   11/4/2022 04:28    Acuity   Urgent            Means of arrival   Ambulance    Escorted by   [facility]    Service   Anesthesiology    Admission type   Emergency            Arrival complaint   SOB           Chief Complaint   Patient presents with   • Weakness - Generalized     SNF called EMS due to generalized weakness and hypoxia  Roommate COVID +  Recent hospitalization for PE  Initial Presentation: 79 y o  female  To ER from SNF via EMS  Following onset of acute SOB  At  0330 -  SpO2 in the 70-70's on 3L NC  Pt tested Covid negative but her roommate is Covid +  Oriented  normally wears 2 L of oxygen at baseline and BiPAP at nighttime but it was not keeping her comfortable  Hospitalized 10/22 - 10/28  For B/L  PE,  Pleural effusion/CHF  requiring Thoracentesis  On bumex po bid  IN ER,  C/o  Feeling very weak and tired  Breath sounds: Moderate air entry bilateral upper lung areas with diminished breath sounds on the bases with bibasilar crackles noted  2+ nonpitting edema bilateral upper and lower extremity with some chronic bruising noted  No signs COVID PNA on CXR  Afib w/RVR  Dig level therapeutic  Trop elevated  No ischemic changes on EKG  ADMIT IP status,  MS  Level of care for  Management of   Acute on chronic respiratory failure with hypoxia and hypercapnia  2/2  acute on chronic diastolic CHF exacerbation also has COVID-19 infection  Along with COPD exacerbation  Initiate  IV Diuresis , closely monitor UO and volume status  (h/o CKD3)       Continue dexamethasone and remdesivir, IV steroids DuoNeb treatments and oxygen and BiPAP support  Trend trops  11/04  @  2324     low oxygen saturation level of 56%    called rapid response  BiPAP adjusted multiple times -  switched to AVAPS to control minute volume and reduce CO2  Date:   11/05      Day 2:    TRANSFER to  CRITICAL LEVEL OF CARE   As pt now requiring 100% FiO2 and EPAP of 8  On bipap  Repeat VBG shows improved pH  Cont  UDN tx given via bipap/aerogen    11/05    @  1147 AM:    Guille desturated down to 60s, consistently in 70s  Asking to be made comfortable but when clarified w/pt that comfort measures indicate  Pain management,  Removal of BiPAP,  keeping her comfortable  States that she is unsure of that is what she wants -  Trying to contact her brother to assist pt w/end of life decisions  No urine output with bumex bolus   Will start bumex gtt as CXR shows florid fluid overload            ED Triage Vitals   Temperature Pulse Respirations Blood Pressure SpO2   11/04/22 0430 11/04/22 0430 11/04/22 0430 11/04/22 0430 11/04/22 0430   97 5 °F (36 4 °C) (!) 109 21 98/55 93 %      Temp Source Heart Rate Source Patient Position - Orthostatic VS BP Location FiO2 (%)   11/04/22 0430 11/04/22 0430 11/04/22 0430 11/04/22 0430 --   Temporal Monitor Lying Left arm       Pain Score       11/04/22 0647       No Pain          Wt Readings from Last 1 Encounters:   11/05/22 97 4 kg (214 lb 11 7 oz)     Additional Vital Signs:    Date/Time Temp Pulse Resp BP MAP (mmHg) SpO2 Calculated FIO2 (%) - Nasal Cannula Nasal Cannula O2 Flow Rate (L/min) O2 Device   11/05/22 1400 -- 84 27 Abnormal  148/65 93 87 % Abnormal  -- -- --   11/05/22 1358 -- -- -- -- -- 95 % -- -- --   11/05/22 1200 -- 83 27 Abnormal  126/68 91 88 % Abnormal  -- -- BiPAP   11/05/22 1124 -- -- -- -- -- 90 % -- -- BiPAP   11/05/22 1000 -- 80 27 Abnormal  128/63 88 93 % -- -- BiPAP   11/05/22 0915 -- -- -- -- -- 90 % -- -- --   11/05/22 0836 -- -- -- -- -- 95 % 44 6 L/min Nasal cannula   11/05/22 0805 -- -- -- -- -- 93 % -- -- --   11/05/22 0800 98 8 °F (37 1 °C) 86 27 Abnormal  114/67 86 96 % -- -- BiPAP   11/05/22 0400 97 9 °F (36 6 °C) 85 22 122/58 83 100 % -- -- BiPAP   11/05/22 0320 -- -- -- -- -- 100 % -- -- --   11/05/22 0020 -- 84 17 108/55 77 99 % -- -- --   11/04/22 2347 -- -- -- -- -- 93 % -- -- --   11/04/22 2345 98 7 °F (37 1 °C) 95 22 112/59 79 93 % -- -- BiPAP   11/04/22 23:10:20 -- -- -- -- -- -- -- -- BiPAP   11/04/22 23:06:22 -- 100 -- -- -- 87 % Abnormal  -- -- --   11/04/22 23:03:22 -- 90 -- -- -- 85 % Abnormal  -- -- --   11/04/22 23:00:22 -- 92 -- -- -- 75 % Abnormal  -- -- --   11/04/22 22:57:22 -- 85 -- -- -- 67 % Abnormal  -- -- --   11/04/22 22:54:22 -- 87 -- -- -- 64 % Abnormal  -- -- --   11/04/22 22:54:17 -- -- -- 134/66 -- -- -- -- --   11/04/22 22:51:22 -- 94 -- -- -- 67 % Abnormal  -- -- --   11/04/22 2221 -- -- -- -- -- 95 % -- -- --   11/04/22 2030 -- -- -- -- -- -- 32 3 L/min Nasal cannula   11/04/22 16:54:01 98 2 °F (36 8 °C) 100 -- -- -- 96 % -- -- --   11/04/22 16:48:24 -- 100 -- 139/72 94 94 % -- -- --   11/04/22 12:55:09 -- 91 -- 114/62 79 95 % -- -- --   11/04/22 08:11:31 97 5 °F (36 4 °C) 102 18 144/70 95 84 % Abnormal  -- -- --   11/04/22 06:20:30 97 9 °F (36 6 °C) 95 22 118/57 77 95 % -- -- --   11/04/22 0544 -- 112  22 103/50 -- 92 % 28 2 L/min Nasal cannula       Pertinent Labs/Diagnostic Test Results:     2D echo done on 10/22 showed normal EF with diastolic dysfunction      11/4  EKG -    XR chest 1 view portable   ED Interpretation by Lilia Goss MD (11/04 1066)   Pulmonary vascular congestion  Increased left pleural effusion  Atelectasis versus fluid in the fissure on the right  Final Result by Lukas Arango MD (11/04 7155)   Pulmonary venous congestion with small effusions and left base atelectasis           XR chest portable ICU    (Results Pending)   US kidney and bladder with pvr    (Results Pending)     Results from last 7 days   Lab Units 11/04/22 0445   SARS-COV-2  Positive*     Results from last 7 days   Lab Units 11/05/22 0435 11/04/22 2319 11/04/22 2311 11/04/22 0443   WBC Thousand/uL 5 20 5 79  --  8 24   HEMOGLOBIN g/dL 9 3* 9 9*  --  10 0*   I STAT HEMOGLOBIN g/dl  --   --  13 9  --    HEMATOCRIT % 33 0* 35 8  --  35 3   HEMATOCRIT, ISTAT %  --   --  41  --    PLATELETS Thousands/uL 258 290  --  282   NEUTROS ABS Thousands/µL 4 48 4 95  --  6 95         Results from last 7 days   Lab Units 11/05/22 0435 11/04/22 2319 11/04/22 2311 11/04/22 0443   SODIUM mmol/L 143 144  --  146   POTASSIUM mmol/L 4 8 4 4  --  4 2   CHLORIDE mmol/L 103 104  --  104   CO2 mmol/L 37* 39*  --  40*   CO2, I-STAT mmol/L  --   --  43*  --    ANION GAP mmol/L 3* 1*  --  2*   BUN mg/dL 71* 70*  --  47*   CREATININE mg/dL 1 21 1 02  --  1 10   EGFR ml/min/1 73sq m 41 51  --  46   CALCIUM mg/dL 8 5 8 7  --  8 4   CALCIUM, IONIZED, ISTAT mmol/L  --   --  1 28  --      Results from last 7 days   Lab Units 11/05/22 0435 11/04/22 2319 11/04/22 0443   AST U/L 34 31 30   ALT U/L 14 21 21   ALK PHOS U/L 104 120* 117*   TOTAL PROTEIN g/dL 6 1* 6 5 6 4   ALBUMIN g/dL 1 9* 2 1* 1 9*   TOTAL BILIRUBIN mg/dL 0 29 0 28 0 41     Results from last 7 days   Lab Units 11/05/22  0800 11/04/22  2252 11/04/22  2104 11/04/22  1646 11/04/22  1118 11/04/22  0810   POC GLUCOSE mg/dl 174* 142* 153* 250* 186* 137     Results from last 7 days   Lab Units 11/05/22  0435 11/04/22  2319 11/04/22  0443   GLUCOSE RANDOM mg/dL 182* 136 204* Results from last 7 days   Lab Units 11/04/22  0443   HEMOGLOBIN A1C % 5 3   EAG mg/dl 105       Results from last 7 days   Lab Units 11/05/22  1008 11/05/22  0435   PH WENDY  7 362 7 288*   PCO2 WENDY mm Hg 64 3* 79 8*   PO2 WENDY mm Hg 160 9* 36 4   HCO3 WENDY mmol/L 35 7* 37 3*   BASE EXC WENDY mmol/L 8 4 8 5   O2 CONTENT WENDY ml/dL 16 0 9 9   O2 HGB, VENOUS % 98 4* 65 9     Results from last 7 days   Lab Units 11/04/22  2311   I STAT BASE EXC mmol/L 9*   I STAT O2 SAT % 61   ISTAT PH ART  7 254*   I STAT ART PCO2 mm HG 90 1*   I STAT ART PO2 mm HG 39 0*   I STAT ART HCO3 mmol/L 39 9*     Results from last 7 days   Lab Units 11/04/22  2319   CK TOTAL U/L 22*     Results from last 7 days   Lab Units 11/04/22  1111 11/04/22  0900 11/04/22  0443   HS TNI 0HR ng/L  --   --  41   HS TNI 2HR ng/L  --  100*  --    HSTNI D2 ng/L  --  59*  --    HS TNI 4HR ng/L 130*  --   --    HSTNI D4 ng/L 89*  --   --      Results from last 7 days   Lab Units 11/04/22  0443   D-DIMER QUANTITATIVE ug/ml FEU 3 01*     Results from last 7 days   Lab Units 11/04/22  0443   PROTIME seconds 19 5*   INR  1 64*   PTT seconds 35         Results from last 7 days   Lab Units 11/05/22  0435   PROCALCITONIN ng/ml 0 92*     Results from last 7 days   Lab Units 11/04/22  0818 11/04/22  0443   LACTIC ACID mmol/L 1 2 2 3*         Results from last 7 days   Lab Units 11/04/22  0443   DIGOXIN LVL ng/mL 0 9     Results from last 7 days   Lab Units 11/04/22  0443   NT-PRO BNP pg/mL 3,526*             Results from last 7 days   Lab Units 11/04/22  0443   LIPASE u/L 104     Results from last 7 days   Lab Units 11/05/22  0435 11/04/22  0443   CRP mg/L 99 6* 153 9*             Results from last 7 days   Lab Units 11/04/22  0434   CLARITY UA  Clear   COLOR UA  Yellow   SPEC GRAV UA  1 010   PH UA  5 5   GLUCOSE UA mg/dl Negative   KETONES UA mg/dl Negative   BLOOD UA  Negative   PROTEIN UA mg/dl Negative   NITRITE UA  Negative   BILIRUBIN UA  Negative   UROBILINOGEN UA E U /dl 0 2   LEUKOCYTES UA  Small*   WBC UA /hpf 30-50*   RBC UA /hpf None Seen   BACTERIA UA /hpf Occasional   EPITHELIAL CELLS WET PREP /hpf Occasional     Results from last 7 days   Lab Units 11/04/22  0445   INFLUENZA A PCR  Negative   INFLUENZA B PCR  Negative   RSV PCR  Negative                             Results from last 7 days   Lab Units 11/04/22  0445 11/04/22  0443 11/04/22  0434   BLOOD CULTURE  No Growth at 24 hrs   --   --    Maulik Edgar STAIN RESULT   --  Gram positive cocci in pairs, chains and clusters*  --    URINE CULTURE   --   --  Culture too young- will reincubate       Past Medical History:   Diagnosis Date   • A-fib ([facility] [address] )    • Cardiac pacemaker    • CHF (congestive heart failure) ([facility])    • Chronic cellulitis    • COPD (chronic obstructive pulmonary disease) ([facility])    • Diabetes mellitus (HCC)    • Edema    • High cholesterol    • Hyperparathyroidism (HCC)    • Hypertension    • Hyperuricemia    • Pulmonary emboli ([facility])    • Stage 4 chronic kidney disease ([facility])      Present on Admission:  • COVID-19  • Acute on chronic diastolic congestive heart failure (HCC)  • Sacral decubitus ulcer, stage II (HCC)  • History bilateral pulmonary embolism  • Acute on chronic respiratory failure with hypoxia and hypercapnia ([facility])  • T11 vertebral fracture ([facility])  • Type 2 diabetes mellitus (HCC)  • Permanent atrial fibrillation (HCC)  • COPD (chronic obstructive pulmonary disease) (HCC)  • KAYLEE (acute kidney injury) (HCC)  • Chronic anemia  • Moderate protein-calorie malnutrition ([facility])  • Bacteremia due to Enterococcus  • Acute cystitis  • Encephalopathy      Admitting Diagnosis: CHF (congestive heart failure) ([facility]) [I50 9]  Weakness generalized [R53 1]  Pleural effusion [J90]  COVID-19 virus infection [U07 1]  Age/Sex: 79 y o  female  Admission Orders:   See above note  NPO  US Kidney/bladder w/PVR  Strict I/O q shift  Daily wgt  accucks qid w/SSI      Scheduled Medications:  budesonide, 0 5 mg, Nebulization, Q12H  bumetanide, 2 mg, Intravenous, TID  cefepime, 2,000 mg, Intravenous, Q12H  DAPTOmycin, 10 mg/kg (Adjusted), Intravenous, Q24H  dexamethasone, 0 1 mg/kg, Intravenous, Q12H  dornase osiel, 2 5 mg, Inhalation, BID  enoxaparin, 1 mg/kg, Subcutaneous, Q12H DONTRELL  formoterol, 20 mcg, Nebulization, Q12H  insulin lispro, 1-6 Units, Subcutaneous, HS  insulin lispro, 2-12 Units, Subcutaneous, TID AC  ipratropium, 0 5 mg, Nebulization, Q6H  levalbuterol, 1 25 mg, Nebulization, Q6H  lidocaine, 1 patch, Topical, Daily  nystatin, , Topical, BID  pantoprazole, 40 mg, Intravenous, Daily  remdesivir, 100 mg, Intravenous, Q24H      Continuous IV Infusions:     PRN Meds:  acetaminophen, 650 mg, Oral, Q6H PRN  albuterol, 2 puff, Inhalation, Q4H PRN  methocarbamol, 750 mg, Oral, Q6H PRN  polyethylene glycol, 17 g, Oral, Daily PRN        IP CONSULT TO CASE MANAGEMENT  IP CONSULT TO INFECTIOUS DISEASES    Network Utilization Review Department  ATTENTION: Please call with any questions or concerns to [phone] and carefully listen to the prompts so that you are directed to the right person  All voicemails are confidential   Demarcus Hallmark all requests for admission clinical reviews, approved or denied determinations and any other requests to dedicated fax number below belonging to the campus where the patient is receiving treatment   List of dedicated fax numbers for the Facilities:  [address] DENIALS (Administrative/Medical Necessity) [phone]   [address] [place] [address] (Maternity/NICU/Pediatrics) [phone]   [address]  [phone]   [address] [place] [address] [address] [place] [address] [phone]   [address] [phone]

## 2022-11-05 NOTE — ASSESSMENT & PLAN NOTE
· With recent Efaecalis/Klebsiella UTI unclear if treated  · BC 1/2 E faecalis   · Early speciation suggests VRE  · Repeat BC pending  · ID consulted  · Linezolid awaiting Daptomycin from pharmacy will change D1  · Check Renal U/s r/o obstruction as unable to transport to CT with hypoxia  · Check ECHO for Veg

## 2022-11-05 NOTE — DISCHARGE SUMMARY
Discharge Summary - Lorenza Sicard 79 y o  female MRN: [id number]    Unit/Bed#: -01 Encounter: [id number] PCP: Teresa Monterroso DO    Admission Date:   Admission Orders (From admission, onward)     Ordered        22 0553  INPATIENT ADMISSION  Once                        Admitting Diagnosis: CHF (congestive heart failure) ([facility] [address] ) [I50 9]  Weakness generalized [R53 1]  Pleural effusion [J90]  COVID-19 virus infection [U07 1]    HPI:  58-year-old female who presented on  for hypoxia/respiratory distress found to be COVID positive in CHF with declining respiratory status requiring BiPAP support and transfer to the ICU  Procedures Performed:   Orders Placed This Encounter   Procedures   • ED ECG Documentation Only       Summary of Hospital Course:  58-year-old female with past medical history of diastolic CHF, PAF, PE on Eliquis, COPD, 2 L oxygen at baseline, XIOMARA, diabetes, hypertension, ICD who had recent admission from 10/21 through 10/28 treated for acute on chronic heart failure discharge to [facility]  Patient presented back on  with hypoxia/respiratory distress found to be COVID positive and in CHF  Poor response to IV diuretics  Progressive hypoxia leading to rapid response and requiring BiPAP and transferred to the ICU  Despite maximal BiPAP support and aggressive diuresis patient with continued hypoxia/oliguria  Ongoing goals of care discussion were held patient had elected for DNR/DNI status and given continued decompensation was transitioned to comfort measures only per patient request and in line with patient's brother Yury Rider  Morphine drip was started and patient  at 4:37 p m     Incidentally blood cultures also resulted as E faecalis concerning for VRE and which daptomycin was started likely secondary to UTI as prior urine culture with E faecalis  Renal ultrasound is pending at time of this note to rule out any obstructive pathology      Significant Findings, Care, Treatment and Services Provided:    chest x-ray pulmonary venous congestion with small effusions and left base atelectasis    Eleven/for COVID positive   blood cultures 1/2 g positive cocci in pairs, chains, clusters    Preliminary identification Enterococcus faecalis and Enterococcus faecium concerning for probable VRE    Complications:  None    Disposition:      Final Diagnosis:  Acute on chronic hypoxic respiratory failure secondary to CHF/COVID positive    Medical Problems             Resolved Problems  Date Reviewed: 2022   None                 Condition at Time of Death:  Comfort measures    Date, Time and Cause of Death    Date of Death: 22  Time of Death:  4:37 PM  Preliminary Cause of Death: Acute on chronic respiratory failure with hypoxia ([facility] [address] )  Entered by: Ernesto Ahumada CRNP[SS1 1]     Attribution     SS1 1 Ernesto Ahumada, [address] 22 16:42          Death Note:    INPATIENT DEATH NOTE  Otf Simpson 79 y o  female MRN: [id number]  Unit/Bed#: [place] [address] Encounter: [id number]    Date, Time and Cause of Death    Date of Death: 22  Time of Death:  4:37 PM  Preliminary Cause of Death: Acute on chronic respiratory failure with hypoxia ([facility] [address] )  Entered by: Ernesto Ahumada CRNP[SS1 1]     Attribution     SS1 1 Ernesto Ahumada, [address] 22 16:42           Patient's Information  Pronounced by: Bing Pillai  Did the patient's death occur in the ED?: No  Did the patient's death occur in the OR?: No  Did the patient's death occur less than 10 days post-op?: No  Did the patient's death occur within 24 hours of admission?: No  Was code status DNR at the time of death?: Yes    PHYSICAL EXAM:  Unresponsive to noxious stimuli, Spontaneous respirations absent, Breath sounds absent, Carotid pulse absent, Heart sounds absent, Pupillary light reflex absent and Corneal blink reflex absent    Medical Examiner notification criteria:  NONE APPLICABLE   Medical Examiner's office notified?:  No, does not meet ME notification criteria   Medical Examiner accepted case?:  No  Name of Medical Examiner: na    Family Notification  Was the family notified?: Yes  Date Notified: 22  Time Notified:   Notified by: Leda Hair  Name of Family Notified of Death: Amna Khan Person Relationship to Patient: Brother  Family Notification Route: Telephone  Was the family told to contact a  home?: Yes  Name of [facility][de-identified] Cindy Turner [phone]    Autopsy Options:  Post-mortem examination declined by next of kin    Primary Service Attending Physician notified?:  yes - Attending:  Cari Wilkins DO    Physician/Resident responsible for completing Discharge Summary:  Leda Hair

## 2022-11-05 NOTE — RESPIRATORY THERAPY NOTE
RT Protocol Note  Vaishnavi Loya 79 y o  female MRN: [id number]  Unit/Bed#: -01 Encounter: [id number]    Assessment    Principal Problem:    Acute on chronic respiratory failure with hypoxia and hypercapnia (HCC)  Active Problems:    Systolic congestive heart failure    Permanent atrial fibrillation (HCC)    Stage 3 chronic kidney disease (HCC)    Chronic anemia    T11 vertebral fracture (HCC)    COPD (chronic obstructive pulmonary disease) ([facility])    Type 2 diabetes mellitus ([place] [address] )    History bilateral pulmonary embolism    Chronic indwelling Ball catheter    Sacral decubitus ulcer, stage II ([place] [address] )    COVID-19      Home Pulmonary Medications:         Past Medical History:   Diagnosis Date    A-fib ([place] [address] )     Cardiac pacemaker     CHF (congestive heart failure) (HCC)     Chronic cellulitis     COPD (chronic obstructive pulmonary disease) (HCC)     Diabetes mellitus (HCC)     Edema     High cholesterol     Hyperparathyroidism (HCC)     Hypertension     Hyperuricemia     Pulmonary emboli (HCC)     Stage 4 chronic kidney disease ([place] [address] )      Social History     Socioeconomic History    Marital status:       Spouse name: None    Number of children: None    Years of education: None    Highest education level: None   Occupational History    None   Tobacco Use    Smoking status: Never Smoker    Smokeless tobacco: Never Used   Vaping Use    Vaping Use: Never used   Substance and Sexual Activity    Alcohol use: Never    Drug use: Never    Sexual activity: Not Currently   Other Topics Concern    None   Social History Narrative    ** Merged History Encounter **          Social Determinants of Health     Financial Resource Strain: Not on file   Food Insecurity: No Food Insecurity    Worried About Running Out of Food in the Last Year: Never true    Ran Out of Food in the Last Year: Never true   Transportation Needs: No Transportation Needs    Lack of Transportation (Medical): No    Lack of Transportation (Non-Medical): No   Physical Activity: Not on file   Stress: Not on file   Social Connections: Not on file   Intimate Partner Violence: Not on file   Housing Stability: Low Risk     Unable to Pay for Housing in the Last Year: No    Number of Places Lived in the Last Year: 2    Unstable Housing in the Last Year: No       Subjective         Objective    Physical Exam:   Assessment Type: Assess only  General Appearance: Alert, Awake  Respiratory Pattern: Assisted  Chest Assessment: Chest expansion symmetrical  O2 Device: BiPAP AVAPS    Vitals:  Blood pressure 114/67, pulse 86, temperature 98 8 °F (37 1 °C), temperature source Axillary, resp  rate (!) 27, height 5' 4" (1 626 m), weight 97 4 kg (214 lb 11 7 oz), SpO2 95 %  Imaging and other studies: I have personally reviewed pertinent reports  O2 Device: BiPAP AVAPS     Plan             Resp Comments: Pt continues on BiPAP AVAPS 16/470/+6  FiO2 decreased to 50% for SpO2 of 93%  Ti decreased to 0 9 for I:E 1:3 2

## 2022-11-05 NOTE — CODE DOCUMENTATION
Pt andrés alarming to low oxygen saturation level of 56%  RN checked andrés connection and switched sensor  Oxygen saturation levels continued to remain in the 50-60%'s  Rn removed bipap and placed nasal cannula back on pt  Oxygen saturation continued to remain in the 50-60%'s  RN placed non-rebreather mask on pt with oxygen turned up  Pt's oxygen saturation continued to remain in the 60%  RN called rapid response

## 2022-11-05 NOTE — PLAN OF CARE
Problem: Potential for Falls  Goal: Patient will remain free of falls  Description: INTERVENTIONS:  - Educate patient/family on patient safety including physical limitations  - Instruct patient to call for assistance with activity   - Consult OT/PT to assist with strengthening/mobility   - Keep Call bell within reach  - Keep bed low and locked with side rails adjusted as appropriate  - Keep care items and personal belongings within reach  - Initiate and maintain comfort rounds  - Make Fall Risk Sign visible to staff  - Obtain necessary fall risk management equipment:  - Apply yellow socks and bracelet for high fall risk patients  - Consider moving patient to room near nurses station  Outcome: Progressing     Problem: Nutrition/Hydration-ADULT  Goal: Nutrient/Hydration intake appropriate for improving, restoring or maintaining nutritional needs  Description: Monitor and assess patient's nutrition/hydration status for malnutrition  Collaborate with interdisciplinary team and initiate plan and interventions as ordered  Monitor patient's weight and dietary intake as ordered or per policy  Utilize nutrition screening tool and intervene as necessary  Determine patient's food preferences and provide high-protein, high-caloric foods as appropriate       INTERVENTIONS:  - Monitor oral intake, urinary output, labs, and treatment plans  - Assess nutrition and hydration status and recommend course of action  - Evaluate amount of meals eaten  - Assist patient with eating if necessary   - Allow adequate time for meals  - Recommend/ encourage appropriate diets, oral nutritional supplements, and vitamin/mineral supplements  - Order, calculate, and assess calorie counts as needed  - Recommend, monitor, and adjust tube feedings and TPN/PPN based on assessed needs  - Assess need for intravenous fluids  - Provide specific nutrition/hydration education as appropriate  - Include patient/family/caregiver in decisions related to nutrition  Outcome: Progressing     Problem: Prexisting or High Potential for Compromised Skin Integrity  Goal: Skin integrity is maintained or improved  Description: INTERVENTIONS:  - Identify patients at risk for skin breakdown  - Assess and monitor skin integrity  - Assess and monitor nutrition and hydration status  - Monitor labs   - Assess for incontinence   - Turn and reposition patient  - Assist with mobility/ambulation  - Relieve pressure over bony prominences  - Avoid friction and shearing  - Provide appropriate hygiene as needed including keeping skin clean and dry  - Evaluate need for skin moisturizer/barrier cream  - Collaborate with interdisciplinary team   - Patient/family teaching  - Consider wound care consult   Outcome: Progressing     Problem: PAIN - ADULT  Goal: Verbalizes/displays adequate comfort level or baseline comfort level  Description: Interventions:  - Encourage patient to monitor pain and request assistance  - Assess pain using appropriate pain scale  - Administer analgesics based on type and severity of pain and evaluate response  - Implement non-pharmacological measures as appropriate and evaluate response  - Consider cultural and social influences on pain and pain management  - Notify physician/advanced practitioner if interventions unsuccessful or patient reports new pain  Outcome: Progressing     Problem: INFECTION - ADULT  Goal: Absence or prevention of progression during hospitalization  Description: INTERVENTIONS:  - Assess and monitor for signs and symptoms of infection  - Monitor lab/diagnostic results  - Monitor all insertion sites, i e  indwelling lines, tubes, and drains  - Monitor endotracheal if appropriate and nasal secretions for changes in amount and color  - [place] appropriate cooling/warming therapies per order  - Administer medications as ordered  - Instruct and encourage patient and family to use good hand hygiene technique  - Identify and instruct in appropriate isolation precautions for identified infection/condition  Outcome: Progressing  Goal: Absence of fever/infection during neutropenic period  Description: INTERVENTIONS:  - Monitor WBC    Outcome: Progressing     Problem: SAFETY ADULT  Goal: Patient will remain free of falls  Description: INTERVENTIONS:  - Educate patient/family on patient safety including physical limitations  - Instruct patient to call for assistance with activity   - Consult OT/PT to assist with strengthening/mobility   - Keep Call bell within reach  - Keep bed low and locked with side rails adjusted as appropriate  - Keep care items and personal belongings within reach  - Initiate and maintain comfort rounds  - Make Fall Risk Sign visible to staff  - Obtain necessary fall risk management equipment:   - Apply yellow socks and bracelet for high fall risk patients  - Consider moving patient to room near nurses station  Outcome: Progressing  Goal: Maintain or return to baseline ADL function  Description: INTERVENTIONS:  -  Assess patient's ability to carry out ADLs; assess patient's baseline for ADL function and identify physical deficits which impact ability to perform ADLs (bathing, care of mouth/teeth, toileting, grooming, dressing, etc )  - Assess/evaluate cause of self-care deficits   - Assess range of motion  - Assess patient's mobility; develop plan if impaired  - Assess patient's need for assistive devices and provide as appropriate  - Encourage maximum independence but intervene and supervise when necessary  - Involve family in performance of ADLs  - Assess for home care needs following discharge   - Consider OT consult to assist with ADL evaluation and planning for discharge  - Provide patient education as appropriate  Outcome: Progressing  Goal: Maintains/Returns to pre admission functional level  Description: INTERVENTIONS:  - Perform BMAT or MOVE assessment daily    - Set and communicate daily mobility goal to care team and patient/family/caregiver  - Collaborate with rehabilitation services on mobility goals if consulted  - Out of bed for toileting  - Record patient progress and toleration of activity level   Outcome: Progressing     Problem: DISCHARGE PLANNING  Goal: Discharge to home or other facility with appropriate resources  Description: INTERVENTIONS:  - Identify barriers to discharge w/patient and caregiver  - Arrange for needed discharge resources and transportation as appropriate  - Identify discharge learning needs (meds, wound care, etc )  - Arrange for interpretive services to assist at discharge as needed  - Refer to Case Management Department for coordinating discharge planning if the patient needs post-hospital services based on physician/advanced practitioner order or complex needs related to functional status, cognitive ability, or social support system  Outcome: Progressing     Problem: Knowledge Deficit  Goal: Patient/family/caregiver demonstrates understanding of disease process, treatment plan, medications, and discharge instructions  Description: Complete learning assessment and assess knowledge base    Interventions:  - Provide teaching at level of understanding  - Provide teaching via preferred learning methods  Outcome: Progressing     Problem: MOBILITY - ADULT  Goal: Maintain or return to baseline ADL function  Description: INTERVENTIONS:  -  Assess patient's ability to carry out ADLs; assess patient's baseline for ADL function and identify physical deficits which impact ability to perform ADLs (bathing, care of mouth/teeth, toileting, grooming, dressing, etc )  - Assess/evaluate cause of self-care deficits   - Assess range of motion  - Assess patient's mobility; develop plan if impaired  - Assess patient's need for assistive devices and provide as appropriate  - Encourage maximum independence but intervene and supervise when necessary  - Involve family in performance of ADLs  - Assess for home care needs following discharge   - Consider OT consult to assist with ADL evaluation and planning for discharge  - Provide patient education as appropriate  Outcome: Progressing  Goal: Maintains/Returns to pre admission functional level  Description: INTERVENTIONS:  - Perform BMAT or MOVE assessment daily    - Set and communicate daily mobility goal to care team and patient/family/caregiver     - Collaborate with rehabilitation services on mobility goals if consulted  - Out of bed for toileting  - Record patient progress and toleration of activity level   Outcome: Progressing

## 2022-11-05 NOTE — ASSESSMENT & PLAN NOTE
· Baseline cr 0 6-0 8  · Prerenal vs ATN  · UA + cast, 30-50 wBC  · Recent E faecalis/klebsiella UTI  · I/O  · Maintain chronic rivera but change

## 2022-11-05 NOTE — PROGRESS NOTES
Spoke with patients brother Micky Price about Violette's declining status and despite maximal bipap support oxygen saturations remain 70's with respiratory distress  Dr Cornelia Hays had conversation with Amanda Bauer who elected for comfort measures only  Updated rylan on process and at this time he does not plan to come to the bedside  Unit phone number given to call for updates  Cherie Grider cell phone is not ringing but going right to voicemail and if urgent need to reach Micky Price to ask dania maldonado to text him  Emotional support given

## 2022-11-05 NOTE — RESPIRATORY THERAPY NOTE
responded to page for rapid response  Patient was removed from bipap and placed on nasal cannula based on note from nursing home reporting patient was not tolerating her unit  Patient returned to BiPAP at 14/6 (per provider order based on last stay at [facility]) but increased FiO2 to 100%  ABG was drawn by critical care PA  Point of care analysis was performed  Patient had CO2 of 90 but in S/T mode was unable to get a breath over 270 mL and minute volume above 7      Patient switched to AVAPS to control minute volume and reduce CO2    Patient was transported on BiPAP to

## 2022-11-05 NOTE — RESPIRATORY THERAPY NOTE
RT Protocol Note  Josh Bazan 79 y o  female MRN: [id number]  Unit/Bed#: -01 Encounter: [id number]    Assessment    Principal Problem:    Acute on chronic respiratory failure with hypoxia and hypercapnia (HCC)  Active Problems:    Acute on chronic diastolic congestive heart failure (HCC)    Permanent atrial fibrillation (HCC)    KAYLEE (acute kidney injury) ([facility] [address] )    Chronic anemia    T11 vertebral fracture (HCC)    COPD (chronic obstructive pulmonary disease) ([facility])    Type 2 diabetes mellitus ([facility] )    History bilateral pulmonary embolism    Chronic indwelling Ball catheter    Acute cystitis    Sacral decubitus ulcer, stage II (HCC)    COVID-19    Moderate protein-calorie malnutrition ([place] [id number] )    Bacteremia due to Enterococcus    Encephalopathy      Home Pulmonary Medications:         Past Medical History:   Diagnosis Date    A-fib [facility])     Cardiac pacemaker     CHF (congestive heart failure) ([facility])     Chronic cellulitis     COPD (chronic obstructive pulmonary disease) (HCC)     Diabetes mellitus (HCC)     Edema     High cholesterol     Hyperparathyroidism (HCC)     Hypertension     Hyperuricemia     Pulmonary emboli ([facility])     Stage 4 chronic kidney disease ([facility] )      Social History     Socioeconomic History    Marital status:       Spouse name: None    Number of children: None    Years of education: None    Highest education level: None   Occupational History    None   Tobacco Use    Smoking status: Never Smoker    Smokeless tobacco: Never Used   Vaping Use    Vaping Use: Never used   Substance and Sexual Activity    Alcohol use: Never    Drug use: Never    Sexual activity: Not Currently   Other Topics Concern    None   Social History Narrative    ** Merged History Encounter **          Social Determinants of Health     Financial Resource Strain: Not on file   Food Insecurity: No Food Insecurity    Worried About [address] in the Last Year: Never true    [facility] in the Last Year: Never true   Transportation Needs: No Transportation Needs    Lack of Transportation (Medical): No    Lack of Transportation (Non-Medical): No   Physical Activity: Not on file   Stress: Not on file   Social Connections: Not on file   Intimate Partner Violence: Not on file   Housing Stability: Low Risk     Unable to Pay for Housing in the Last Year: No    Number of Places Lived in the Last Year: 2    Unstable Housing in the Last Year: No       Subjective         Objective    Physical Exam:   Assessment Type: Assess only  General Appearance: Alert, Awake  Respiratory Pattern: Assisted  Chest Assessment: Chest expansion symmetrical  O2 Device: BiPAP AVAPS    Vitals:  Blood pressure 148/65, pulse 84, temperature 98 8 °F (37 1 °C), temperature source Axillary, resp  rate (!) 27, height 5' 4" (1 626 m), weight 97 4 kg (214 lb 11 7 oz), SpO2 (!) 87 %  Imaging and other studies: I have personally reviewed pertinent reports  O2 Device: BiPAP AVAPS     Plan             Resp Comments: (P) Comfort care pt medication started by RN, Bipap mask removed  Pt talking with friend at bedside

## 2022-11-05 NOTE — ASSESSMENT & PLAN NOTE
Lab Results   Component Value Date    HGBA1C 5 3 11/04/2022       Recent Labs     11/04/22  1646 11/04/22  2104 11/04/22  2252 11/05/22  0800   POCGLU 250* 153* 142* 174*       Blood Sugar Average: Last 72 hrs:  (P) 691 [id number]     · SSI

## 2022-11-05 NOTE — PLAN OF CARE
Problem: Potential for Falls  Goal: Patient will remain free of falls  Description: INTERVENTIONS:  - Educate patient/family on patient safety including physical limitations  - Instruct patient to call for assistance with activity   - Consult OT/PT to assist with strengthening/mobility   - Keep Call bell within reach  - Keep bed low and locked with side rails adjusted as appropriate  - Keep care items and personal belongings within reach  - Initiate and maintain comfort rounds  - Make Fall Risk Sign visible to staff    - Apply yellow socks and bracelet for high fall risk patients  - Consider moving patient to room near nurses station  Outcome: Progressing     Problem: Nutrition/Hydration-ADULT  Goal: Nutrient/Hydration intake appropriate for improving, restoring or maintaining nutritional needs  Description: Monitor and assess patient's nutrition/hydration status for malnutrition  Collaborate with interdisciplinary team and initiate plan and interventions as ordered  Monitor patient's weight and dietary intake as ordered or per policy  Utilize nutrition screening tool and intervene as necessary  Determine patient's food preferences and provide high-protein, high-caloric foods as appropriate       INTERVENTIONS:  - Monitor oral intake, urinary output, labs, and treatment plans  - Assess nutrition and hydration status and recommend course of action  - Evaluate amount of meals eaten  - Assist patient with eating if necessary   - Allow adequate time for meals  - Recommend/ encourage appropriate diets, oral nutritional supplements, and vitamin/mineral supplements  - Order, calculate, and assess calorie counts as needed  - Recommend, monitor, and adjust tube feedings and TPN/PPN based on assessed needs  - Assess need for intravenous fluids  - Provide specific nutrition/hydration education as appropriate  - Include patient/family/caregiver in decisions related to nutrition  Outcome: Progressing     Problem: Prexisting or High Potential for Compromised Skin Integrity  Goal: Skin integrity is maintained or improved  Description: INTERVENTIONS:  - Identify patients at risk for skin breakdown  - Assess and monitor skin integrity  - Assess and monitor nutrition and hydration status  - Monitor labs   - Assess for incontinence   - Turn and reposition patient  - Assist with mobility/ambulation  - Relieve pressure over bony prominences  - Avoid friction and shearing  - Provide appropriate hygiene as needed including keeping skin clean and dry  - Evaluate need for skin moisturizer/barrier cream  - Collaborate with interdisciplinary team   - Patient/family teaching  - Consider wound care consult   Outcome: Progressing     Problem: PAIN - ADULT  Goal: Verbalizes/displays adequate comfort level or baseline comfort level  Description: Interventions:  - Encourage patient to monitor pain and request assistance  - Assess pain using appropriate pain scale  - Administer analgesics based on type and severity of pain and evaluate response  - Implement non-pharmacological measures as appropriate and evaluate response  - Consider cultural and social influences on pain and pain management  - Notify physician/advanced practitioner if interventions unsuccessful or patient reports new pain  Outcome: Progressing     Problem: INFECTION - ADULT  Goal: Absence or prevention of progression during hospitalization  Description: INTERVENTIONS:  - Assess and monitor for signs and symptoms of infection  - Monitor lab/diagnostic results  - Monitor all insertion sites, i e  indwelling lines, tubes, and drains  - Monitor endotracheal if appropriate and nasal secretions for changes in amount and color  - [place] appropriate cooling/warming therapies per order  - Administer medications as ordered  - Instruct and encourage patient and family to use good hand hygiene technique  - Identify and instruct in appropriate isolation precautions for identified infection/condition  Outcome: Progressing  Goal: Absence of fever/infection during neutropenic period  Description: INTERVENTIONS:  - Monitor WBC    Outcome: Progressing     Problem: SAFETY ADULT  Goal: Patient will remain free of falls  Description: INTERVENTIONS:  - Educate patient/family on patient safety including physical limitations  - Instruct patient to call for assistance with activity   - Consult OT/PT to assist with strengthening/mobility   - Keep Call bell within reach  - Keep bed low and locked with side rails adjusted as appropriate  - Keep care items and personal belongings within reach  - Initiate and maintain comfort rounds  - Make Fall Risk Sign visible to staff    - Apply yellow socks and bracelet for high fall risk patients  - Consider moving patient to room near nurses station  Outcome: Progressing  Goal: Maintain or return to baseline ADL function  Description: INTERVENTIONS:  -  Assess patient's ability to carry out ADLs; assess patient's baseline for ADL function and identify physical deficits which impact ability to perform ADLs (bathing, care of mouth/teeth, toileting, grooming, dressing, etc )  - Assess/evaluate cause of self-care deficits   - Assess range of motion  - Assess patient's mobility; develop plan if impaired  - Assess patient's need for assistive devices and provide as appropriate  - Encourage maximum independence but intervene and supervise when necessary  - Involve family in performance of ADLs  - Assess for home care needs following discharge   - Consider OT consult to assist with ADL evaluation and planning for discharge  - Provide patient education as appropriate  Outcome: Progressing  Goal: Maintains/Returns to pre admission functional level  Description: INTERVENTIONS:  - Perform BMAT or MOVE assessment daily    - Set and communicate daily mobility goal to care team and patient/family/caregiver     - Collaborate with rehabilitation services on mobility goals if consulted  - Out of bed for toileting  - Record patient progress and toleration of activity level   Outcome: Progressing     Problem: DISCHARGE PLANNING  Goal: Discharge to home or other facility with appropriate resources  Description: INTERVENTIONS:  - Identify barriers to discharge w/patient and caregiver  - Arrange for needed discharge resources and transportation as appropriate  - Identify discharge learning needs (meds, wound care, etc )  - Arrange for interpretive services to assist at discharge as needed  - Refer to Case Management Department for coordinating discharge planning if the patient needs post-hospital services based on physician/advanced practitioner order or complex needs related to functional status, cognitive ability, or social support system  Outcome: Progressing     Problem: Knowledge Deficit  Goal: Patient/family/caregiver demonstrates understanding of disease process, treatment plan, medications, and discharge instructions  Description: Complete learning assessment and assess knowledge base    Interventions:  - Provide teaching at level of understanding  - Provide teaching via preferred learning methods  Outcome: Progressing     Problem: MOBILITY - ADULT  Goal: Maintain or return to baseline ADL function  Description: INTERVENTIONS:  -  Assess patient's ability to carry out ADLs; assess patient's baseline for ADL function and identify physical deficits which impact ability to perform ADLs (bathing, care of mouth/teeth, toileting, grooming, dressing, etc )  - Assess/evaluate cause of self-care deficits   - Assess range of motion  - Assess patient's mobility; develop plan if impaired  - Assess patient's need for assistive devices and provide as appropriate  - Encourage maximum independence but intervene and supervise when necessary  - Involve family in performance of ADLs  - Assess for home care needs following discharge   - Consider OT consult to assist with ADL evaluation and planning for discharge  - Provide patient education as appropriate  Outcome: Progressing  Goal: Maintains/Returns to pre admission functional level  Description: INTERVENTIONS:  - Perform BMAT or MOVE assessment daily    - Set and communicate daily mobility goal to care team and patient/family/caregiver     - Collaborate with rehabilitation services on mobility goals if consulted    - Out of bed for toileting  - Record patient progress and toleration of activity level   Outcome: Progressing

## 2022-11-05 NOTE — RESPIRATORY THERAPY NOTE
RT Protocol Note  Josh Bazan 79 y o  female MRN: [id number]  Unit/Bed#: -01 Encounter: [id number]    Assessment    Principal Problem:    Acute on chronic respiratory failure with hypoxia and hypercapnia (HCC)  Active Problems:    Systolic congestive heart failure    Permanent atrial fibrillation (HCC)    Stage 3 chronic kidney disease (HCC)    Chronic anemia    T11 vertebral fracture (HCC)    COPD (chronic obstructive pulmonary disease) ([facility])    Type 2 diabetes mellitus ([place] [address] )    History bilateral pulmonary embolism    Chronic indwelling Ball catheter    Sacral decubitus ulcer, stage II ([place] [address] )    COVID-19      Home Pulmonary Medications:         Past Medical History:   Diagnosis Date    A-fib ([place] [address] )     Cardiac pacemaker     CHF (congestive heart failure) (HCC)     Chronic cellulitis     COPD (chronic obstructive pulmonary disease) (HCC)     Diabetes mellitus (HCC)     Edema     High cholesterol     Hyperparathyroidism (HCC)     Hypertension     Hyperuricemia     Pulmonary emboli (HCC)     Stage 4 chronic kidney disease ([place] [address] )      Social History     Socioeconomic History    Marital status:       Spouse name: None    Number of children: None    Years of education: None    Highest education level: None   Occupational History    None   Tobacco Use    Smoking status: Never Smoker    Smokeless tobacco: Never Used   Vaping Use    Vaping Use: Never used   Substance and Sexual Activity    Alcohol use: Never    Drug use: Never    Sexual activity: Not Currently   Other Topics Concern    None   Social History Narrative    ** Merged History Encounter **          Social Determinants of Health     Financial Resource Strain: Not on file   Food Insecurity: No Food Insecurity    Worried About Running Out of Food in the Last Year: Never true    Ran Out of Food in the Last Year: Never true   Transportation Needs: No Transportation Needs    Lack of Transportation (Medical): No    Lack of Transportation (Non-Medical): No   Physical Activity: Not on file   Stress: Not on file   Social Connections: Not on file   Intimate Partner Violence: Not on file   Housing Stability: Low Risk     Unable to Pay for Housing in the Last Year: No    Number of Places Lived in the Last Year: 2    Unstable Housing in the Last Year: No       Subjective         Objective    Physical Exam:   Assessment Type: Assess only  General Appearance: Alert, Awake  Respiratory Pattern: Assisted  Chest Assessment: Chest expansion symmetrical  O2 Device: BiPAP AVAPS    Vitals:  Blood pressure 114/67, pulse 86, temperature 98 8 °F (37 1 °C), temperature source Axillary, resp  rate (!) 27, height 5' 4" (1 626 m), weight 97 4 kg (214 lb 11 7 oz), SpO2 90 %  Imaging and other studies: I have personally reviewed pertinent reports  O2 Device: BiPAP AVAPS     Plan             Resp Comments: Pt SpO2>95% FiO2 titrated to 80% for SpO2 of 90%  UDN tx given via bipap/aerogen as ordered

## 2022-11-05 NOTE — ASSESSMENT & PLAN NOTE
· Covid +  · Vaccinated  · -99  · Increase Decadron to 0 1mg/kg BID D1 steroid total D2/10  · Remdesivir D2/5  · Bipap as above

## 2022-11-05 NOTE — ASSESSMENT & PLAN NOTE
Malnutrition Findings:   Adult Malnutrition type: Chronic illness  Adult Degree of Malnutrition: Malnutrition of moderate degree  Malnutrition Characteristics: Inadequate energy, Weight loss  ·  nutrition following               360 Statement: Chronic moderate malnutrition related to decreased appetite, T11 vertebral fx, COVID as evidenced by < 75% estimated energy intake > 1 mo, 13 3% weight loss x 8 mo (3/17/22 240lb, 11/4/22 208lb)  Treated with: Liberalize diet to CCD 2, 4gm MYRNA, fluid restriction per MD  Will add Maciel BID for wound healing, mix in diet vanilla pudding due to fluid restriction  Daily weights for nutrition monitoring  BMI Findings: Body mass index is 36 86 kg/m²

## 2022-11-06 LAB
BACTERIA UR CULT: ABNORMAL
BACTERIA UR CULT: ABNORMAL

## 2022-11-07 LAB
BACTERIA BLD CULT: ABNORMAL
E FAECALIS DNA BLD POS QL NAA+NON-PROBE: DETECTED
E FAECIUM DNA BLD POS QL NAA+NON-PROBE: DETECTED
GRAM STN SPEC: ABNORMAL
MECA+MECC ISLT/SPM QL: DETECTED
S EPIDERMIDIS DNA BLD POS QL NAA+NON-PRB: DETECTED
VANA+VANB ISLT/SPM QL: DETECTED

## 2022-11-07 NOTE — UTILIZATION REVIEW
NOTIFICATION OF ADMISSION DISCHARGE   This is a Notification of Discharge from [address]  Please be advised that this patient has been discharge from our facility  Below you will find the admission and discharge date and time including the patient’s disposition  UTILIZATION REVIEW CONTACT:  [address] [place]  Utilization   Network Utilization Review Department  Phone: [phone] x carefully listen to the prompts  All voicemails are confidential   Email: [email or web address]     ADMISSION INFORMATION  PRESENTATION DATE: 2022  4:28 AM  OBERVATION ADMISSION DATE:   INPATIENT ADMISSION DATE: 22  5:53 AM   DISCHARGE DATE: 2022  6:13 PM  DISPOSITION:        IMPORTANT INFORMATION:  Send all requests for admission clinical reviews, approved or denied determinations and any other requests to dedicated fax number below belonging to the campus where the patient is receiving treatment   List of dedicated fax numbers:  [address] DENIALS (Administrative/Medical Necessity) [phone]   [address] (Maternity/NICU/Pediatrics) [phone]   [facility] [phone]   [place] [address] [phone]   [address] [phone]   220 [facility] [phone] 90 [facility] [phone]   [address] [place] [address] 119 [phone]   [facility]  [phone] [address] [facility] [phone]   [address] [facility] [address] [facility] [phone]

## 2022-11-08 LAB
BACTERIA BLD CULT: ABNORMAL
GRAM STN SPEC: ABNORMAL

## 2022-11-09 LAB — BACTERIA BLD CULT: NORMAL

## 2022-11-10 LAB — BACTERIA BLD CULT: NORMAL
